# Patient Record
Sex: MALE | Race: BLACK OR AFRICAN AMERICAN | NOT HISPANIC OR LATINO | URBAN - METROPOLITAN AREA
[De-identification: names, ages, dates, MRNs, and addresses within clinical notes are randomized per-mention and may not be internally consistent; named-entity substitution may affect disease eponyms.]

---

## 2017-08-01 ENCOUNTER — EMERGENCY (EMERGENCY)
Facility: HOSPITAL | Age: 52
LOS: 1 days | Discharge: HOME | End: 2017-08-01
Attending: EMERGENCY MEDICINE | Admitting: EMERGENCY MEDICINE
Payer: COMMERCIAL

## 2017-08-01 VITALS
RESPIRATION RATE: 18 BRPM | SYSTOLIC BLOOD PRESSURE: 108 MMHG | HEART RATE: 90 BPM | OXYGEN SATURATION: 100 % | DIASTOLIC BLOOD PRESSURE: 65 MMHG | TEMPERATURE: 98 F

## 2017-08-01 LAB
ALBUMIN SERPL ELPH-MCNC: 4 G/DL — SIGNIFICANT CHANGE UP (ref 3.3–5)
ALP SERPL-CCNC: 65 U/L — SIGNIFICANT CHANGE UP (ref 40–120)
ALT FLD-CCNC: 20 U/L — SIGNIFICANT CHANGE UP (ref 4–41)
ANISOCYTOSIS BLD QL: SLIGHT — SIGNIFICANT CHANGE UP
AST SERPL-CCNC: 20 U/L — SIGNIFICANT CHANGE UP (ref 4–40)
BASE EXCESS BLDV CALC-SCNC: 4.7 MMOL/L — SIGNIFICANT CHANGE UP
BASOPHILS # BLD AUTO: 0 K/UL — SIGNIFICANT CHANGE UP (ref 0–0.2)
BASOPHILS NFR BLD AUTO: 0 % — SIGNIFICANT CHANGE UP (ref 0–2)
BASOPHILS NFR SPEC: 0 % — SIGNIFICANT CHANGE UP (ref 0–2)
BILIRUB SERPL-MCNC: 0.4 MG/DL — SIGNIFICANT CHANGE UP (ref 0.2–1.2)
BLOOD GAS VENOUS - CREATININE: 1.6 MG/DL — HIGH (ref 0.5–1.3)
BUN SERPL-MCNC: 18 MG/DL — SIGNIFICANT CHANGE UP (ref 7–23)
CALCIUM SERPL-MCNC: 9.4 MG/DL — SIGNIFICANT CHANGE UP (ref 8.4–10.5)
CHLORIDE BLDV-SCNC: 102 MMOL/L — SIGNIFICANT CHANGE UP (ref 96–108)
CHLORIDE SERPL-SCNC: 100 MMOL/L — SIGNIFICANT CHANGE UP (ref 98–107)
CK MB BLD-MCNC: 1.61 NG/ML — SIGNIFICANT CHANGE UP (ref 1–6.6)
CK MB BLD-MCNC: 1.76 NG/ML — SIGNIFICANT CHANGE UP (ref 1–6.6)
CK MB BLD-MCNC: SIGNIFICANT CHANGE UP (ref 0–2.5)
CK MB BLD-MCNC: SIGNIFICANT CHANGE UP (ref 0–2.5)
CK SERPL-CCNC: 48 U/L — SIGNIFICANT CHANGE UP (ref 30–200)
CK SERPL-CCNC: 61 U/L — SIGNIFICANT CHANGE UP (ref 30–200)
CO2 SERPL-SCNC: 26 MMOL/L — SIGNIFICANT CHANGE UP (ref 22–31)
CREAT SERPL-MCNC: 1.53 MG/DL — HIGH (ref 0.5–1.3)
ELLIPTOCYTES BLD QL SMEAR: SLIGHT — SIGNIFICANT CHANGE UP
EOSINOPHIL # BLD AUTO: 0.02 K/UL — SIGNIFICANT CHANGE UP (ref 0–0.5)
EOSINOPHIL NFR BLD AUTO: 0.4 % — SIGNIFICANT CHANGE UP (ref 0–6)
EOSINOPHIL NFR FLD: 0 % — SIGNIFICANT CHANGE UP (ref 0–6)
GAS PNL BLDV: 135 MMOL/L — LOW (ref 136–146)
GIANT PLATELETS BLD QL SMEAR: PRESENT — SIGNIFICANT CHANGE UP
GLUCOSE BLDV-MCNC: 135 — HIGH (ref 70–99)
GLUCOSE SERPL-MCNC: 133 MG/DL — HIGH (ref 70–99)
HBA1C BLD-MCNC: 7.3 % — HIGH (ref 4–5.6)
HCO3 BLDV-SCNC: 27 MMOL/L — SIGNIFICANT CHANGE UP (ref 20–27)
HCT VFR BLD CALC: 32.6 % — LOW (ref 39–50)
HCT VFR BLDV CALC: 33.9 % — LOW (ref 39–51)
HGB BLD-MCNC: 11 G/DL — LOW (ref 13–17)
HGB BLDV-MCNC: 11 G/DL — LOW (ref 13–17)
HYPOCHROMIA BLD QL: SLIGHT — SIGNIFICANT CHANGE UP
IMM GRANULOCYTES # BLD AUTO: 0.02 # — SIGNIFICANT CHANGE UP
IMM GRANULOCYTES NFR BLD AUTO: 0.4 % — SIGNIFICANT CHANGE UP (ref 0–1.5)
LACTATE BLDV-MCNC: 0.9 MMOL/L — SIGNIFICANT CHANGE UP (ref 0.5–2)
LYMPHOCYTES # BLD AUTO: 0.4 K/UL — LOW (ref 1–3.3)
LYMPHOCYTES # BLD AUTO: 8.9 % — LOW (ref 13–44)
LYMPHOCYTES NFR SPEC AUTO: 4.4 % — LOW (ref 13–44)
MACROCYTES BLD QL: SIGNIFICANT CHANGE UP
MCHC RBC-ENTMCNC: 30.6 PG — SIGNIFICANT CHANGE UP (ref 27–34)
MCHC RBC-ENTMCNC: 33.7 % — SIGNIFICANT CHANGE UP (ref 32–36)
MCV RBC AUTO: 90.8 FL — SIGNIFICANT CHANGE UP (ref 80–100)
MICROCYTES BLD QL: SLIGHT — SIGNIFICANT CHANGE UP
MONOCYTES # BLD AUTO: 0.46 K/UL — SIGNIFICANT CHANGE UP (ref 0–0.9)
MONOCYTES NFR BLD AUTO: 10.2 % — SIGNIFICANT CHANGE UP (ref 2–14)
MONOCYTES NFR BLD: 5.3 % — SIGNIFICANT CHANGE UP (ref 2–9)
NEUTROPHIL AB SER-ACNC: 88.6 % — HIGH (ref 43–77)
NEUTROPHILS # BLD AUTO: 3.6 K/UL — SIGNIFICANT CHANGE UP (ref 1.8–7.4)
NEUTROPHILS NFR BLD AUTO: 80.1 % — HIGH (ref 43–77)
NRBC # FLD: 0 — SIGNIFICANT CHANGE UP
OVALOCYTES BLD QL SMEAR: SLIGHT — SIGNIFICANT CHANGE UP
PCO2 BLDV: 50 MMHG — SIGNIFICANT CHANGE UP (ref 41–51)
PH BLDV: 7.39 PH — SIGNIFICANT CHANGE UP (ref 7.32–7.43)
PLATELET # BLD AUTO: 142 K/UL — LOW (ref 150–400)
PLATELET COUNT - ESTIMATE: SIGNIFICANT CHANGE UP
PMV BLD: 9.9 FL — SIGNIFICANT CHANGE UP (ref 7–13)
PO2 BLDV: < 24 MMHG — LOW (ref 35–40)
POIKILOCYTOSIS BLD QL AUTO: SLIGHT — SIGNIFICANT CHANGE UP
POLYCHROMASIA BLD QL SMEAR: SLIGHT — SIGNIFICANT CHANGE UP
POTASSIUM BLDV-SCNC: 4.9 MMOL/L — HIGH (ref 3.4–4.5)
POTASSIUM SERPL-MCNC: 5 MMOL/L — SIGNIFICANT CHANGE UP (ref 3.5–5.3)
POTASSIUM SERPL-SCNC: 5 MMOL/L — SIGNIFICANT CHANGE UP (ref 3.5–5.3)
PROT SERPL-MCNC: 7.2 G/DL — SIGNIFICANT CHANGE UP (ref 6–8.3)
RBC # BLD: 3.59 M/UL — LOW (ref 4.2–5.8)
RBC # FLD: 15.2 % — HIGH (ref 10.3–14.5)
SAO2 % BLDV: 30.2 % — LOW (ref 60–85)
SCHISTOCYTES BLD QL AUTO: SLIGHT — SIGNIFICANT CHANGE UP
SODIUM SERPL-SCNC: 140 MMOL/L — SIGNIFICANT CHANGE UP (ref 135–145)
TROPONIN T SERPL-MCNC: < 0.06 NG/ML — SIGNIFICANT CHANGE UP (ref 0–0.06)
TROPONIN T SERPL-MCNC: < 0.06 NG/ML — SIGNIFICANT CHANGE UP (ref 0–0.06)
VARIANT LYMPHS # BLD: 1.7 % — SIGNIFICANT CHANGE UP
WBC # BLD: 4.5 K/UL — SIGNIFICANT CHANGE UP (ref 3.8–10.5)
WBC # FLD AUTO: 4.5 K/UL — SIGNIFICANT CHANGE UP (ref 3.8–10.5)

## 2017-08-01 PROCEDURE — 72100 X-RAY EXAM L-S SPINE 2/3 VWS: CPT | Mod: 26

## 2017-08-01 PROCEDURE — 93010 ELECTROCARDIOGRAM REPORT: CPT | Mod: 59

## 2017-08-01 PROCEDURE — 99220: CPT | Mod: 25

## 2017-08-01 PROCEDURE — 71020: CPT | Mod: 26

## 2017-08-01 RX ORDER — LISINOPRIL 2.5 MG/1
20 TABLET ORAL DAILY
Qty: 0 | Refills: 0 | Status: DISCONTINUED | OUTPATIENT
Start: 2017-08-01 | End: 2017-08-05

## 2017-08-01 RX ORDER — CARVEDILOL PHOSPHATE 80 MG/1
25 CAPSULE, EXTENDED RELEASE ORAL EVERY 12 HOURS
Qty: 0 | Refills: 0 | Status: DISCONTINUED | OUTPATIENT
Start: 2017-08-01 | End: 2017-08-05

## 2017-08-01 RX ORDER — ACYCLOVIR SODIUM 500 MG
400 VIAL (EA) INTRAVENOUS DAILY
Qty: 0 | Refills: 0 | Status: DISCONTINUED | OUTPATIENT
Start: 2017-08-02 | End: 2017-08-05

## 2017-08-01 RX ORDER — ASPIRIN/CALCIUM CARB/MAGNESIUM 324 MG
81 TABLET ORAL DAILY
Qty: 0 | Refills: 0 | Status: DISCONTINUED | OUTPATIENT
Start: 2017-08-02 | End: 2017-08-05

## 2017-08-01 NOTE — ED CDU PROVIDER NOTE - INDICATION FOR OBSERVATION
Cardiac telemetry monitoring, CE #2, echocardiogram, observation and reassessment./Other Cardiac telemetry monitoring, CE #2 and #3, echocardiogram, observation and reassessment./Other

## 2017-08-01 NOTE — ED ADULT NURSE NOTE - CHIEF COMPLAINT QUOTE
hx of mult myeloma currently on DARZALEX treatment at [facility]. Patient reports back pain x 1 week and dizziness that began today. pt reports blurry vision and near syncope. Patient reports this near syncopal episode has passed at this time. 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

## 2017-08-01 NOTE — ED CDU PROVIDER NOTE - FAMILY HISTORY
No pertinent family history in first degree relatives Mother  Still living? Unknown  Family history of hypertension, Age at diagnosis: Age Unknown     Sibling  Still living? Unknown  Family history of hypertension, Age at diagnosis: Age Unknown     Father  Still living? Unknown  Family history of diabetes mellitus, Age at diagnosis: Age Unknown     Sibling  Still living? Unknown  Family history of diabetes mellitus, Age at diagnosis: Age Unknown

## 2017-08-01 NOTE — ED CDU PROVIDER NOTE - ATTENDING CONTRIBUTION TO CARE
Pt was seen and evaluated by me. Pt has a history of multiple myeloma with chronic back pain that presented to ED for episode of near syncope. Pt denies any associated headache, nausea, vomiting, SOB, chest pain, or abd pain. Pt did have a previous episode of decreased ejection fraction to 10% after initial chemo treatment but that has since resolved with last echo last year at 42%. Lungs CTA b/l. RRR. Abd soft, non-tender. 55/ b/l UE and LE. + distal pulses. Sent to OBS for CE, monitoring, and echo.

## 2017-08-01 NOTE — ED ADULT TRIAGE NOTE - CHIEF COMPLAINT QUOTE
hx of mult myeloma currently on DARZALEX treatment at [facility]. Patient reports back pain x 1 week and dizziness that began today. pt reports blurry vision and near syncope. Patient reports this near syncopal episode has passed at this time. 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

## 2017-08-01 NOTE — ED PROVIDER NOTE - OBJECTIVE STATEMENT
51yom w/ multiple myeloma, hx of HF w/ EF10% in the setting of chemotherapy, now resolved p/w 1 week of right lower back pain, and near syncopal episode today. 51yom w/ multiple myeloma, hx of HF w/ EF10% in the setting of chemotherapy, now resolved p/w 1 week of right lower back pain, and near syncopal episode today. Pt was at work at the nursing home, had a sudden onset of sweating, blurry vision, and feeling like he was going to pass out. Had his BP checked there and per pt it was 68 and his FS was in the 200s. Symptoms gradually resolved, currently asymptomatic. Back pain has been waxing and waning in severity for the past week, but more persistent for the past 2 days. Dull, aching, tight pain in the right lower back. Non-radiating. No trauma. No paresthesias, weakness, saddle anesthesia, bowel/bladder dysfunction. Follows w/ [facility] oncology. Had surveillance for mets and echocardiogram a month ago or so prior to restarting chemotherapy. Currently on darzalex chemotherapy.

## 2017-08-01 NOTE — ED ADULT NURSE NOTE - OBJECTIVE STATEMENT
received pt A&Ox3 in no apparent distress at this time. #20g IVL to LAC, bloods drawn and sent to the lab. no s/s of infiltration noted at this time. family and MD at bedside. vss. cardiac monitor in place. pt sts felt like he was going to pass out but sts feels fine now and has no complaints at this time. dispo pending

## 2017-08-01 NOTE — ED CDU PROVIDER NOTE - CONSTITUTIONAL, MLM
normal... Well appearing, thin / well nourished, awake, alert, oriented to person, place, time/situation and in no apparent distress.  Pt. appears objectively comfortable, pt is verbalizing in full, clear, effortless sentences.

## 2017-08-01 NOTE — ED CDU PROVIDER NOTE - PLAN OF CARE
Follow up with your Doctor in 1-2 days.  Follow up with cardiology in 1-2 days.  Return to the ER for any persistent/worsening or new symptoms, chest pain, shortness of breath, palpitations, dizziness or any concerning symptoms.

## 2017-08-01 NOTE — ED PROVIDER NOTE - MEDICAL DECISION MAKING DETAILS
51yom w/ multiple myeloma, prior hx of low EF p/w near syncope. Currently asymptomatic. ECG w/ evidence of STEMI, critical stenosis, Brugada or long QT. Will check CXR, basic labs, enzymes. Non-focal neurologic exam, no indication for head imaging at this time. Back pain w/o red flag or radicular symptoms, likely musculoskeletal. Will get lumbar plain film to r/o lytic lesion or compression fracture.

## 2017-08-01 NOTE — ED PROVIDER NOTE - ATTENDING CONTRIBUTION TO CARE
Dr. Walton: I have personally performed a face to face bedside history and physical examination of this patient. I have discussed the history, examination, review of systems, assessment and plan of management with the resident. I have reviewed the electronic medical record and amended it to reflect my history, review of systems, physical exam, assessment and plan.  51M h/o MM on chemo, CHF with EF 10% due to last chemotherapeutic agent, but last EF normal per pt per TTE 3 weeks ago, started new chemotherapeutic agents 2 weeks ago, p/w 1 episodes of lightheadedness, vision darkening and near syncope earlier today. Denies cp or sob. C/o R lower back pain worse with ROM. No dysuria or hematuria. Pain is non radiating.  On exam pt appears well, nad, rrr, ctab, abdo soft/nt/nd, no midline spinal ttp, no cvat, no pedal edema or calf ttp.  Plan - LS spine xray r/o mets, labs, CDU for tele monitoring and echo, no signs of acute chf exacerbation

## 2017-08-01 NOTE — ED CDU PROVIDER NOTE - MEDICAL DECISION MAKING DETAILS
Cardiac telemetry monitoring, CE #2, echocardiogram, observation and reassessment. Cardiac telemetry monitoring, CE #2 and #3, echocardiogram, observation and reassessment.

## 2017-08-01 NOTE — ED CDU PROVIDER NOTE - OBJECTIVE STATEMENT
51yom w/ multiple myeloma, hx of HF w/ EF10% in the setting of chemotherapy, now resolved p/w 1 week of right lower back pain, and near syncopal episode today. Pt was at work at the nursing home, had a sudden onset of sweating, blurry vision, and feeling like he was going to pass out. Had his BP checked there and per pt it was 68 and his FS was in the 200s. Symptoms gradually resolved, currently asymptomatic. Back pain has been waxing and waning in severity for the past week, but more persistent for the past 2 days. Dull, aching, tight pain in the right lower back. Non-radiating. No trauma. No paresthesias, weakness, saddle anesthesia, bowel/bladder dysfunction. Follows w/ [facility] oncology. Had surveillance for mets and echocardiogram a month ago or so prior to restarting chemotherapy. Currently on darzalex chemotherapy.    CDU BRISSA Rodas Note-----  ED Provider Note reviewed per above; pt is a 50 yo male with PMH of multiple myeloma (dx 2 years ago) and heart failure (dx one year ago with pre-surgical testing eval echo showing EF of 15%), who presented to the ED as noted above.  Pt. was evaluated in the ED and sent to CDU for cardiac telemetry monitoring, CE #2, echocardiogram, observation and reassessment.  On CDU arrival, pt    ; CDU plan discussed with pt who verbalizes agreement with plan. 51yom w/ multiple myeloma, hx of HF w/ EF10% in the setting of chemotherapy, now resolved p/w 1 week of right lower back pain, and near syncopal episode today. Pt was at work at the nursing home, had a sudden onset of sweating, blurry vision, and feeling like he was going to pass out. Had his BP checked there and per pt it was 68 and his FS was in the 200s. Symptoms gradually resolved, currently asymptomatic. Back pain has been waxing and waning in severity for the past week, but more persistent for the past 2 days. Dull, aching, tight pain in the right lower back. Non-radiating. No trauma. No paresthesias, weakness, saddle anesthesia, bowel/bladder dysfunction. Follows w/ [facility] oncology. Had surveillance for mets and echocardiogram a month ago or so prior to restarting chemotherapy. Currently on darzalex chemotherapy.    CDU BRISSA Rodas Note-----  ED Provider Note reviewed per above; pt is a 52 yo male with PMH of multiple myeloma (dx 2 years ago), heart failure (dx one year ago with pre-surgical testing eval echo showing EF of 15%), and former smoker status (1 ppd x 11 years; Quit ~age 26), who presented to the ED as noted above.  Pt. was evaluated in the ED and sent to CDU for cardiac telemetry monitoring, CE #2, echocardiogram, observation and reassessment.  On CDU arrival, pt has no active c/o; states back pain (atraumatic generalized nonradiating lower back pain) is felt only with ROM.  Pt. was dx with multiple myeloma 2 years ago; was initially on chemo agent Valkin, then was undergoing pre-surgical testing for stem cell transplant when PST echo found heart failure with EF 15%.  Pt. did not undergo procedure; recently started new CTX agent (daratumumab) approx. 4 weeks ago.  1 day ago, pt developed new, atraumatic, generalized, non-radiating lower back pain.  No hx/o fevers, abdominal pain, other back pain, urinary frequency / dysuria / hematuria, focal weakness.  Last night, pt states he felt "sweaty", had wife go to pharmacy and get Tylenol for pain.  Today, pt developed diaphoresis, OU blurred vision, lightheadedness; states "felt like I was passing out".  Pt. denies chest pain / SOB.  No other c/o; no active symptoms at this time except for back pain, felt only on ROM.  CDU plan discussed with pt who verbalizes agreement with plan. 51yom w/ multiple myeloma, hx of HF w/ EF10% in the setting of chemotherapy, now resolved p/w 1 week of right lower back pain, and near syncopal episode today. Pt was at work at the nursing home, had a sudden onset of sweating, blurry vision, and feeling like he was going to pass out. Had his BP checked there and per pt it was 68 and his FS was in the 200s. Symptoms gradually resolved, currently asymptomatic. Back pain has been waxing and waning in severity for the past week, but more persistent for the past 2 days. Dull, aching, tight pain in the right lower back. Non-radiating. No trauma. No paresthesias, weakness, saddle anesthesia, bowel/bladder dysfunction. Follows w/ [facility] oncology. Had surveillance for mets and echocardiogram a month ago or so prior to restarting chemotherapy. Currently on darzalex chemotherapy.    CDU BRISSA Rodas Note-----  ED Provider Note reviewed per above; pt is a 52 yo male with PMH of multiple myeloma (dx 2 years ago), heart failure (dx one year ago with pre-surgical testing eval echo showing EF of 15%), and former smoker status (1 ppd x 11 years; Quit ~age 26), who presented to the ED as noted above.  Pt. was evaluated in the ED and sent to CDU for cardiac telemetry monitoring, CE #2 and #3, echocardiogram, observation and reassessment.  On CDU arrival, pt has no active c/o; states back pain (atraumatic generalized nonradiating lower back pain) is felt only with ROM.  Pt. was dx with multiple myeloma 2 years ago; was initially on chemo agent Valkin, then was undergoing pre-surgical testing for stem cell transplant when PST echo found heart failure with EF 15%.  Pt. did not undergo procedure; recently started new CTX agent (daratumumab) approx. 4 weeks ago.  1 day ago, pt developed new, atraumatic, generalized, non-radiating lower back pain.  No hx/o fevers, abdominal pain, other back pain, urinary frequency / dysuria / hematuria, focal weakness.  Last night, pt states he felt "sweaty", had wife go to pharmacy and get Tylenol for pain.  Today, pt developed diaphoresis, OU blurred vision, lightheadedness; states "felt like I was passing out".  Pt. denies chest pain / SOB.  No other c/o; no active symptoms at this time except for back pain, felt only on ROM.  CDU plan discussed with pt who verbalizes agreement with plan.

## 2017-08-01 NOTE — ED CDU PROVIDER NOTE - PROGRESS NOTE DETAILS
CDU BRISSA Rodas Addendum----  Pt. resting comfortably in interim; no issues to date; will continue to monitor / reassess.  Pt. will be signed out to night CDU BRISSA Jenkins at 1900 hrs. DIPIKA Rodas Addendum----  Pt. resting comfortably in interim; no issues to date; will continue to monitor / reassess.  Pt. will be signed out to night DIPIKA Saini at 1900 hrs. CDU MD Vargas Note: Pt states feeling better. Denies any current dizziness or pain. Lungs CTA b/l. RRR. 5/5 b/l LE. CE neg X 3. Awaiting Echo. Will continue to monitor. BRISSA Chen: pt resting comfortably in bed NAD pt denies chest pain/dizziness at present time.  CE x 3 negative.  Pt on cardiac monitor awaiting echo will continue to monitor. CDU MD Vargas D/C Note: Pt states feeling better. Denies any dizziness or chest pain. Lungs CTA b/l. RRR. Ambulating without any difficulty. CE neg X 3. Echo shows no acute abnormalities. Will D/C home with Cardio f/u and PMD f/u. CDU MD Vargas PA Attestation for 8/2/17: I have evaluated the patient and agree with the documentation and assessment as made by the PA. We have discussed plan of care and work up for the patient. CDU MD Vargas D/C Note: Pt states feeling better. Denies any dizziness or chest pain. Lungs CTA b/l. RRR. Ambulating without any difficulty. CE neg X 3. Echo shows no acute abnormalities. A1C 7.3 discussed with pt who wants to f/u with PMD before starting Metformin. Will D/C home with Cardio f/u and PMD f/u.

## 2017-08-01 NOTE — ED CDU PROVIDER NOTE - PMH
Multiple myeloma Former smoker  (1 ppd x 11 years; Quit ~age 26)  Heart failure  (EF 15% at diagnosis in 2016)  Multiple myeloma

## 2017-08-01 NOTE — ED PROVIDER NOTE - PROGRESS NOTE DETAILS
Pt wants to dc home to get his chemo tomorrow. PMD paged for urgent outpt cards f/u for TTE Results d/w pt, cardiologist paged, will place in CDU for echo prior to getting more chemo to r/o cardiotoxic effects of chemotherapeutic agent Dee: Message also left w/ pt's oncologist Pankaj Phillip [phone]

## 2017-08-02 VITALS
OXYGEN SATURATION: 97 % | SYSTOLIC BLOOD PRESSURE: 104 MMHG | DIASTOLIC BLOOD PRESSURE: 56 MMHG | RESPIRATION RATE: 16 BRPM | TEMPERATURE: 99 F | HEART RATE: 90 BPM

## 2017-08-02 LAB
CK MB BLD-MCNC: 1.28 NG/ML — SIGNIFICANT CHANGE UP (ref 1–6.6)
CK MB BLD-MCNC: SIGNIFICANT CHANGE UP (ref 0–2.5)
CK SERPL-CCNC: 39 U/L — SIGNIFICANT CHANGE UP (ref 30–200)
TROPONIN T SERPL-MCNC: < 0.06 NG/ML — SIGNIFICANT CHANGE UP (ref 0–0.06)

## 2017-08-02 PROCEDURE — 99217: CPT

## 2017-08-02 PROCEDURE — 93306 TTE W/DOPPLER COMPLETE: CPT | Mod: 26

## 2017-08-02 RX ADMIN — Medication 81 MILLIGRAM(S): at 11:31

## 2017-08-02 RX ADMIN — CARVEDILOL PHOSPHATE 25 MILLIGRAM(S): 80 CAPSULE, EXTENDED RELEASE ORAL at 11:31

## 2017-08-02 RX ADMIN — Medication 400 MILLIGRAM(S): at 11:31

## 2017-08-02 RX ADMIN — LISINOPRIL 20 MILLIGRAM(S): 2.5 TABLET ORAL at 06:30

## 2018-09-02 ENCOUNTER — TRANSCRIPTION ENCOUNTER (OUTPATIENT)
Age: 53
End: 2018-09-02

## 2018-09-02 ENCOUNTER — RESULT REVIEW (OUTPATIENT)
Age: 53
End: 2018-09-02

## 2018-09-02 ENCOUNTER — INPATIENT (INPATIENT)
Facility: HOSPITAL | Age: 53
LOS: 4 days | Discharge: HOME HEALTH | End: 2018-09-07
Attending: INTERNAL MEDICINE | Admitting: INTERNAL MEDICINE
Payer: COMMERCIAL

## 2018-09-02 VITALS
OXYGEN SATURATION: 99 % | DIASTOLIC BLOOD PRESSURE: 72 MMHG | TEMPERATURE: 99 F | SYSTOLIC BLOOD PRESSURE: 118 MMHG | RESPIRATION RATE: 16 BRPM | HEART RATE: 114 BPM

## 2018-09-02 DIAGNOSIS — K57.20 DIVERTICULITIS OF LARGE INTESTINE WITH PERFORATION AND ABSCESS WITHOUT BLEEDING: ICD-10-CM

## 2018-09-02 LAB
ALBUMIN SERPL ELPH-MCNC: 3.4 G/DL — SIGNIFICANT CHANGE UP (ref 3.3–5)
ALP SERPL-CCNC: 70 U/L — SIGNIFICANT CHANGE UP (ref 40–120)
ALT FLD-CCNC: 10 U/L — SIGNIFICANT CHANGE UP (ref 4–41)
ANISOCYTOSIS BLD QL: SLIGHT — SIGNIFICANT CHANGE UP
APPEARANCE UR: CLEAR — SIGNIFICANT CHANGE UP
APTT BLD: 27.2 SEC — LOW (ref 27.5–37.4)
AST SERPL-CCNC: 9 U/L — SIGNIFICANT CHANGE UP (ref 4–40)
BACTERIA # UR AUTO: NEGATIVE — SIGNIFICANT CHANGE UP
BASE EXCESS BLDV CALC-SCNC: 1.5 MMOL/L — SIGNIFICANT CHANGE UP
BASOPHILS # BLD AUTO: 0 K/UL — SIGNIFICANT CHANGE UP (ref 0–0.2)
BASOPHILS NFR BLD AUTO: 0 % — SIGNIFICANT CHANGE UP (ref 0–2)
BASOPHILS NFR SPEC: 0 % — SIGNIFICANT CHANGE UP (ref 0–2)
BILIRUB SERPL-MCNC: 0.9 MG/DL — SIGNIFICANT CHANGE UP (ref 0.2–1.2)
BILIRUB UR-MCNC: NEGATIVE — SIGNIFICANT CHANGE UP
BLASTS # FLD: 0 % — SIGNIFICANT CHANGE UP (ref 0–0)
BLOOD GAS VENOUS - CREATININE: 1.19 MG/DL — SIGNIFICANT CHANGE UP (ref 0.5–1.3)
BLOOD UR QL VISUAL: NEGATIVE — SIGNIFICANT CHANGE UP
BUN SERPL-MCNC: 11 MG/DL — SIGNIFICANT CHANGE UP (ref 7–23)
CALCIUM SERPL-MCNC: 8.7 MG/DL — SIGNIFICANT CHANGE UP (ref 8.4–10.5)
CHLORIDE BLDV-SCNC: 105 MMOL/L — SIGNIFICANT CHANGE UP (ref 96–108)
CHLORIDE SERPL-SCNC: 102 MMOL/L — SIGNIFICANT CHANGE UP (ref 98–107)
CO2 SERPL-SCNC: 22 MMOL/L — SIGNIFICANT CHANGE UP (ref 22–31)
COLOR SPEC: YELLOW — SIGNIFICANT CHANGE UP
CREAT SERPL-MCNC: 1.15 MG/DL — SIGNIFICANT CHANGE UP (ref 0.5–1.3)
DACRYOCYTES BLD QL SMEAR: SLIGHT — SIGNIFICANT CHANGE UP
EOSINOPHIL # BLD AUTO: 0.02 K/UL — SIGNIFICANT CHANGE UP (ref 0–0.5)
EOSINOPHIL NFR BLD AUTO: 0.3 % — SIGNIFICANT CHANGE UP (ref 0–6)
EOSINOPHIL NFR FLD: 0 % — SIGNIFICANT CHANGE UP (ref 0–6)
GAS PNL BLDV: 136 MMOL/L — SIGNIFICANT CHANGE UP (ref 136–146)
GIANT PLATELETS BLD QL SMEAR: PRESENT — SIGNIFICANT CHANGE UP
GLUCOSE BLDV-MCNC: 208 — HIGH (ref 70–99)
GLUCOSE SERPL-MCNC: 208 MG/DL — HIGH (ref 70–99)
GLUCOSE UR-MCNC: SIGNIFICANT CHANGE UP
HCO3 BLDV-SCNC: 25 MMOL/L — SIGNIFICANT CHANGE UP (ref 20–27)
HCT VFR BLD CALC: 31.7 % — LOW (ref 39–50)
HCT VFR BLDV CALC: 34.1 % — LOW (ref 39–51)
HGB BLD-MCNC: 10.6 G/DL — LOW (ref 13–17)
HGB BLDV-MCNC: 11.1 G/DL — LOW (ref 13–17)
HYALINE CASTS # UR AUTO: NEGATIVE — SIGNIFICANT CHANGE UP
IMM GRANULOCYTES # BLD AUTO: 0.08 # — SIGNIFICANT CHANGE UP
IMM GRANULOCYTES NFR BLD AUTO: 1.2 % — SIGNIFICANT CHANGE UP (ref 0–1.5)
INR BLD: 1.21 — HIGH (ref 0.88–1.17)
KETONES UR-MCNC: NEGATIVE — SIGNIFICANT CHANGE UP
LACTATE BLDV-MCNC: 1.7 MMOL/L — SIGNIFICANT CHANGE UP (ref 0.5–2)
LEUKOCYTE ESTERASE UR-ACNC: NEGATIVE — SIGNIFICANT CHANGE UP
LIDOCAIN IGE QN: 13.2 U/L — SIGNIFICANT CHANGE UP (ref 7–60)
LYMPHOCYTES # BLD AUTO: 1.19 K/UL — SIGNIFICANT CHANGE UP (ref 1–3.3)
LYMPHOCYTES # BLD AUTO: 18.3 % — SIGNIFICANT CHANGE UP (ref 13–44)
LYMPHOCYTES NFR SPEC AUTO: 22.1 % — SIGNIFICANT CHANGE UP (ref 13–44)
MACROCYTES BLD QL: SLIGHT — SIGNIFICANT CHANGE UP
MCHC RBC-ENTMCNC: 31.3 PG — SIGNIFICANT CHANGE UP (ref 27–34)
MCHC RBC-ENTMCNC: 33.4 % — SIGNIFICANT CHANGE UP (ref 32–36)
MCV RBC AUTO: 93.5 FL — SIGNIFICANT CHANGE UP (ref 80–100)
METAMYELOCYTES # FLD: 0 % — SIGNIFICANT CHANGE UP (ref 0–1)
MONOCYTES # BLD AUTO: 1.38 K/UL — HIGH (ref 0–0.9)
MONOCYTES NFR BLD AUTO: 21.2 % — HIGH (ref 2–14)
MONOCYTES NFR BLD: 13.3 % — HIGH (ref 2–9)
MYELOCYTES NFR BLD: 0.9 % — HIGH (ref 0–0)
NEUTROPHIL AB SER-ACNC: 60.2 % — SIGNIFICANT CHANGE UP (ref 43–77)
NEUTROPHILS # BLD AUTO: 3.85 K/UL — SIGNIFICANT CHANGE UP (ref 1.8–7.4)
NEUTROPHILS NFR BLD AUTO: 59 % — SIGNIFICANT CHANGE UP (ref 43–77)
NEUTS BAND # BLD: 0 % — SIGNIFICANT CHANGE UP (ref 0–6)
NITRITE UR-MCNC: NEGATIVE — SIGNIFICANT CHANGE UP
NRBC # FLD: 0 — SIGNIFICANT CHANGE UP
OTHER - HEMATOLOGY %: 0 — SIGNIFICANT CHANGE UP
OVALOCYTES BLD QL SMEAR: SLIGHT — SIGNIFICANT CHANGE UP
PCO2 BLDV: 40 MMHG — LOW (ref 41–51)
PH BLDV: 7.43 PH — SIGNIFICANT CHANGE UP (ref 7.32–7.43)
PH UR: 6.5 — SIGNIFICANT CHANGE UP (ref 5–8)
PLATELET # BLD AUTO: 174 K/UL — SIGNIFICANT CHANGE UP (ref 150–400)
PLATELET COUNT - ESTIMATE: NORMAL — SIGNIFICANT CHANGE UP
PMV BLD: 9.9 FL — SIGNIFICANT CHANGE UP (ref 7–13)
PO2 BLDV: < 24 MMHG — LOW (ref 35–40)
POIKILOCYTOSIS BLD QL AUTO: SLIGHT — SIGNIFICANT CHANGE UP
POLYCHROMASIA BLD QL SMEAR: SLIGHT — SIGNIFICANT CHANGE UP
POTASSIUM BLDV-SCNC: 3.8 MMOL/L — SIGNIFICANT CHANGE UP (ref 3.4–4.5)
POTASSIUM SERPL-MCNC: 3.7 MMOL/L — SIGNIFICANT CHANGE UP (ref 3.5–5.3)
POTASSIUM SERPL-SCNC: 3.7 MMOL/L — SIGNIFICANT CHANGE UP (ref 3.5–5.3)
PROMYELOCYTES # FLD: 0 % — SIGNIFICANT CHANGE UP (ref 0–0)
PROT SERPL-MCNC: 6.6 G/DL — SIGNIFICANT CHANGE UP (ref 6–8.3)
PROT UR-MCNC: HIGH
PROTHROM AB SERPL-ACNC: 13.5 SEC — HIGH (ref 9.8–13.1)
RBC # BLD: 3.39 M/UL — LOW (ref 4.2–5.8)
RBC # FLD: 15.7 % — HIGH (ref 10.3–14.5)
RBC CASTS # UR COMP ASSIST: SIGNIFICANT CHANGE UP (ref 0–?)
SAO2 % BLDV: 28.1 % — LOW (ref 60–85)
SODIUM SERPL-SCNC: 138 MMOL/L — SIGNIFICANT CHANGE UP (ref 135–145)
SP GR SPEC: > 1.05 — HIGH (ref 1–1.04)
SQUAMOUS # UR AUTO: SIGNIFICANT CHANGE UP
TARGETS BLD QL SMEAR: SLIGHT — SIGNIFICANT CHANGE UP
UROBILINOGEN FLD QL: NORMAL — SIGNIFICANT CHANGE UP
VARIANT LYMPHS # BLD: 3.5 % — SIGNIFICANT CHANGE UP
WBC # BLD: 6.52 K/UL — SIGNIFICANT CHANGE UP (ref 3.8–10.5)
WBC # FLD AUTO: 6.52 K/UL — SIGNIFICANT CHANGE UP (ref 3.8–10.5)
WBC UR QL: SIGNIFICANT CHANGE UP (ref 0–?)

## 2018-09-02 PROCEDURE — 74177 CT ABD & PELVIS W/CONTRAST: CPT | Mod: 26

## 2018-09-02 PROCEDURE — 99223 1ST HOSP IP/OBS HIGH 75: CPT | Mod: GC,57,25

## 2018-09-02 PROCEDURE — 71045 X-RAY EXAM CHEST 1 VIEW: CPT | Mod: 26

## 2018-09-02 RX ORDER — MORPHINE SULFATE 50 MG/1
4 CAPSULE, EXTENDED RELEASE ORAL ONCE
Qty: 0 | Refills: 0 | Status: DISCONTINUED | OUTPATIENT
Start: 2018-09-02 | End: 2018-09-02

## 2018-09-02 RX ORDER — ACETAMINOPHEN 500 MG
650 TABLET ORAL ONCE
Qty: 0 | Refills: 0 | Status: COMPLETED | OUTPATIENT
Start: 2018-09-02 | End: 2018-09-02

## 2018-09-02 RX ORDER — SODIUM CHLORIDE 9 MG/ML
1000 INJECTION INTRAMUSCULAR; INTRAVENOUS; SUBCUTANEOUS ONCE
Qty: 0 | Refills: 0 | Status: COMPLETED | OUTPATIENT
Start: 2018-09-02 | End: 2018-09-02

## 2018-09-02 RX ORDER — SODIUM CHLORIDE 9 MG/ML
1000 INJECTION, SOLUTION INTRAVENOUS
Qty: 0 | Refills: 0 | Status: DISCONTINUED | OUTPATIENT
Start: 2018-09-02 | End: 2018-09-03

## 2018-09-02 RX ORDER — PIPERACILLIN AND TAZOBACTAM 4; .5 G/20ML; G/20ML
3.38 INJECTION, POWDER, LYOPHILIZED, FOR SOLUTION INTRAVENOUS ONCE
Qty: 0 | Refills: 0 | Status: COMPLETED | OUTPATIENT
Start: 2018-09-02 | End: 2018-09-02

## 2018-09-02 RX ADMIN — MORPHINE SULFATE 4 MILLIGRAM(S): 50 CAPSULE, EXTENDED RELEASE ORAL at 19:29

## 2018-09-02 RX ADMIN — MORPHINE SULFATE 4 MILLIGRAM(S): 50 CAPSULE, EXTENDED RELEASE ORAL at 18:53

## 2018-09-02 RX ADMIN — SODIUM CHLORIDE 1000 MILLILITER(S): 9 INJECTION INTRAMUSCULAR; INTRAVENOUS; SUBCUTANEOUS at 18:53

## 2018-09-02 RX ADMIN — SODIUM CHLORIDE 1000 MILLILITER(S): 9 INJECTION INTRAMUSCULAR; INTRAVENOUS; SUBCUTANEOUS at 19:29

## 2018-09-02 RX ADMIN — Medication 650 MILLIGRAM(S): at 21:52

## 2018-09-02 RX ADMIN — PIPERACILLIN AND TAZOBACTAM 3.38 GRAM(S): 4; .5 INJECTION, POWDER, LYOPHILIZED, FOR SOLUTION INTRAVENOUS at 21:52

## 2018-09-02 RX ADMIN — PIPERACILLIN AND TAZOBACTAM 200 GRAM(S): 4; .5 INJECTION, POWDER, LYOPHILIZED, FOR SOLUTION INTRAVENOUS at 19:29

## 2018-09-02 NOTE — ED ADULT NURSE NOTE - NSIMPLEMENTINTERV_GEN_ALL_ED
Implemented All Universal Safety Interventions:  [place] to call system. Call bell, personal items and telephone within reach. Instruct patient to call for assistance. Room bathroom lighting operational. Non-slip footwear when patient is off stretcher. Physically safe environment: no spills, clutter or unnecessary equipment. Stretcher in lowest position, wheels locked, appropriate side rails in place.

## 2018-09-02 NOTE — H&P ADULT - NSHPLABSRESULTS_GEN_ALL_CORE
CBC (09-02 @ 18:51)                          10.6<L>                   6.52    )--------------(  174        59.0  % Neuts, 18.3  % Lymphs, ANC: 3.85                            31.7<L>    BMP (09-02 @ 18:51)       138     |  102     |  11    			Ca++ --      Ca 8.7          ---------------------------------( 208<H>		Mg --           3.7     |  22      |  1.15  			Ph --        LFTs (09-02 @ 18:51)      TPro 6.6 / Alb 3.4 / TBili 0.9 / DBili -- / AST 9 / ALT 10 / AlkPhos 70    Coags (09-02 @ 22:44)  aPTT 27.2<L> / INR 1.21<H> / PT 13.5<H>        VBG (09-02 @ 18:52)     7.43 / 40<L> / < 24<L> / 25 / 1.5 / 28.1<L>%      Lactate: 1.7    Urinalysis (09-02 @ 23:00):     Color: YELLOW / Appearance: CLEAR / SG: > 1.050<H> / pH: 6.5 / Gluc: LARGE / Ketones: NEGATIVE / Bili: NEGATIVE / Urobili: NORMAL / Protein :MODERATE<H> / Nitrites: NEGATIVE / Leuk.Est: NEGATIVE / RBC: 3-5 / WBC: 0-2 / Sq Epi: OCC / Non Sq Epi:  / Bacteria NEGATIVE       CT: (prelim)  Free air, free fluid in both paracolic gutters, diverticulosis throughout bowel with diverticulitis in sigmoid with associated free fluid and air adjacent

## 2018-09-02 NOTE — ED PROVIDER NOTE - PMH
Former smoker  (1 ppd x 11 years; Quit ~age 26)  Heart failure  (EF 15% at diagnosis in 2016)  Multiple myeloma

## 2018-09-02 NOTE — H&P ADULT - FAMILY HISTORY
Father  Still living? Unknown  Family history of hypertension, Age at diagnosis: Age Unknown  Family history of diabetes mellitus, Age at diagnosis: Age Unknown     Mother  Still living? Unknown  Family history of hypertension, Age at diagnosis: Age Unknown  Family history of diabetes mellitus, Age at diagnosis: Age Unknown

## 2018-09-02 NOTE — ED ADULT NURSE NOTE - OBJECTIVE STATEMENT
Pt received AA&Ox3. Pt with a PMH of Multiple Myeloma s/p stem cell transplant 1 year ago presents to ED with 2 days of generalized abdominal pain, constipation and decreased po intake. Pt appears uncomfortable. Pt denies all other symptoms. Pt found to be febrile rectally. Septic workup started. 20g IV placed in RT AC, labs sent will monitor.

## 2018-09-02 NOTE — ED PROVIDER NOTE - OBJECTIVE STATEMENT
51yo male with MM, currently on oral chemo and decadron, p/w 3 days of abdominal pain, constipation and chills. + flatus, no h/o abdominal surgeries, no vomiting, no dysuria. + decreased PO

## 2018-09-02 NOTE — H&P ADULT - NSHPPHYSICALEXAM_GEN_ALL_CORE
NAD, A + O x 4  nonlabored breathing  RRR  hard, involuntary guarding, diffusely tender, rebound tenderness, tender to palpation in all four quadrants

## 2018-09-02 NOTE — H&P ADULT - HISTORY OF PRESENT ILLNESS
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 He has never had pain like this before. Denies nausea, vomiting, SOB, CP, dysuria, lightheadedness. He has never had a colonoscopy. He is currently on decadron, Panobinostat, and Pomalidomide for MM.

## 2018-09-02 NOTE — ED PROVIDER NOTE - PROGRESS NOTE DETAILS
Norma Ham, resident MD: pt was signed out to me. xray shows no free air. will f/u with CT. pt describes that pain is currently manageable. Norma Ham, resident MD: CT abdomen reveals perforated diverticulitis with pneumoperitoneum. surgery was notified and they will see the pt. Norma Ham, resident MD: CT abdomen reveals perforated diverticulitis with pneumoperitoneum. discussed with pt. surgery was notified and they will see the pt. Norma Ham, resident MD: CT abdomen reveals perforated diverticulitis with pneumoperitoneum. discussed with pt. surgery was notified and they will see the pt. pt aware that he will most likely be admitted. Norma Ham, resident MD: surgery has seen pt and will admit.

## 2018-09-02 NOTE — ED ADULT NURSE REASSESSMENT NOTE - NS ED NURSE REASSESS COMMENT FT1
Patient direct to OR- UA& culture sent, EKG complete. Patient awake and alert, no allergies last intake this morning- fish and plaintains- no allergies. Patient states he is 6'2'' 200lbs. Belongings secured and given to wife.

## 2018-09-02 NOTE — H&P ADULT - ATTENDING COMMENTS
I saw and evaluated Mr. Hood at bedside while in the ER. He exhibited profound peritonitis with increasingly painful shake tenderness and tachycardia. Along with his CT scan, the diagnosis of an intra-abdominal uncontrolled infection was made and I discussed operative management with him. I discussed the need for a partial colectomy and colostomy and outlined risks and benefits. He was reluctant to receive a colostomy however he understood the need to divert the fecal stream initially and possibly reverse him in future. We discussed non-operative management with IV antibiotics however I did state due to his immunocompromised state that this may yield inferior results. He opted for surgical management and was brought to the OR for a sigmoidectomy and end colostomy.

## 2018-09-02 NOTE — ED ADULT TRIAGE NOTE - CHIEF COMPLAINT QUOTE
Pt c/o abd pain , poor appetite and  constipation since Friday.  Denies nausea.  Pt with hx of Multiple  Myeloma

## 2018-09-02 NOTE — ED PROVIDER NOTE - ATTENDING CONTRIBUTION TO CARE
AJM: Patient seen with resident and agree with above note. 53yo male with MM, currently on oral chemo and decadron, p/w 3 days of abdominal pain, constipation and chills. + flatus, no h/o abdominal surgeries, no vomiting, no dysuria. + decreased PO No trauma, urinary complaints. + decreased appetite. Never had this before. No fevers or chills. Pt noted to be tachycardic on arrival. + generalized ttp and distention on abd exam without bowel sounds. + rectal fever. will obtain sepsis workup, zosyn, ivf, ct a/p, pain control

## 2018-09-03 PROBLEM — Z87.891 PERSONAL HISTORY OF NICOTINE DEPENDENCE: Chronic | Status: ACTIVE | Noted: 2017-08-01

## 2018-09-03 LAB
BASOPHILS # BLD AUTO: 0.01 K/UL — SIGNIFICANT CHANGE UP (ref 0–0.2)
BASOPHILS NFR BLD AUTO: 0.3 % — SIGNIFICANT CHANGE UP (ref 0–2)
BLD GP AB SCN SERPL QL: NEGATIVE — SIGNIFICANT CHANGE UP
BUN SERPL-MCNC: 11 MG/DL — SIGNIFICANT CHANGE UP (ref 7–23)
CALCIUM SERPL-MCNC: 7.7 MG/DL — LOW (ref 8.4–10.5)
CHLORIDE SERPL-SCNC: 105 MMOL/L — SIGNIFICANT CHANGE UP (ref 98–107)
CO2 SERPL-SCNC: 21 MMOL/L — LOW (ref 22–31)
CREAT SERPL-MCNC: 1.03 MG/DL — SIGNIFICANT CHANGE UP (ref 0.5–1.3)
EOSINOPHIL # BLD AUTO: 0.02 K/UL — SIGNIFICANT CHANGE UP (ref 0–0.5)
EOSINOPHIL NFR BLD AUTO: 0.5 % — SIGNIFICANT CHANGE UP (ref 0–6)
GLUCOSE SERPL-MCNC: 204 MG/DL — HIGH (ref 70–99)
GRAM STN WND: SIGNIFICANT CHANGE UP
HCT VFR BLD CALC: 28.7 % — LOW (ref 39–50)
HGB BLD-MCNC: 9.9 G/DL — LOW (ref 13–17)
IMM GRANULOCYTES # BLD AUTO: 0.05 # — SIGNIFICANT CHANGE UP
IMM GRANULOCYTES NFR BLD AUTO: 1.4 % — SIGNIFICANT CHANGE UP (ref 0–1.5)
LYMPHOCYTES # BLD AUTO: 0.68 K/UL — LOW (ref 1–3.3)
LYMPHOCYTES # BLD AUTO: 18.5 % — SIGNIFICANT CHANGE UP (ref 13–44)
MCHC RBC-ENTMCNC: 32.2 PG — SIGNIFICANT CHANGE UP (ref 27–34)
MCHC RBC-ENTMCNC: 34.5 % — SIGNIFICANT CHANGE UP (ref 32–36)
MCV RBC AUTO: 93.5 FL — SIGNIFICANT CHANGE UP (ref 80–100)
MONOCYTES # BLD AUTO: 0.68 K/UL — SIGNIFICANT CHANGE UP (ref 0–0.9)
MONOCYTES NFR BLD AUTO: 18.5 % — HIGH (ref 2–14)
NEUTROPHILS # BLD AUTO: 2.23 K/UL — SIGNIFICANT CHANGE UP (ref 1.8–7.4)
NEUTROPHILS NFR BLD AUTO: 60.8 % — SIGNIFICANT CHANGE UP (ref 43–77)
NRBC # FLD: 0 — SIGNIFICANT CHANGE UP
PLATELET # BLD AUTO: 175 K/UL — SIGNIFICANT CHANGE UP (ref 150–400)
PMV BLD: 10.1 FL — SIGNIFICANT CHANGE UP (ref 7–13)
POTASSIUM SERPL-MCNC: 3.8 MMOL/L — SIGNIFICANT CHANGE UP (ref 3.5–5.3)
POTASSIUM SERPL-SCNC: 3.8 MMOL/L — SIGNIFICANT CHANGE UP (ref 3.5–5.3)
RBC # BLD: 3.07 M/UL — LOW (ref 4.2–5.8)
RBC # FLD: 15.8 % — HIGH (ref 10.3–14.5)
RH IG SCN BLD-IMP: POSITIVE — SIGNIFICANT CHANGE UP
RH IG SCN BLD-IMP: POSITIVE — SIGNIFICANT CHANGE UP
SODIUM SERPL-SCNC: 140 MMOL/L — SIGNIFICANT CHANGE UP (ref 135–145)
SPECIMEN SOURCE: SIGNIFICANT CHANGE UP
WBC # BLD: 3.67 K/UL — LOW (ref 3.8–10.5)
WBC # FLD AUTO: 3.67 K/UL — LOW (ref 3.8–10.5)

## 2018-09-03 PROCEDURE — 99223 1ST HOSP IP/OBS HIGH 75: CPT | Mod: GC

## 2018-09-03 PROCEDURE — 44143 PARTIAL REMOVAL OF COLON: CPT

## 2018-09-03 PROCEDURE — 88307 TISSUE EXAM BY PATHOLOGIST: CPT | Mod: 26

## 2018-09-03 RX ORDER — POTASSIUM CHLORIDE 20 MEQ
10 PACKET (EA) ORAL ONCE
Qty: 0 | Refills: 0 | Status: COMPLETED | OUTPATIENT
Start: 2018-09-03 | End: 2018-09-03

## 2018-09-03 RX ORDER — METOPROLOL TARTRATE 50 MG
5 TABLET ORAL EVERY 6 HOURS
Qty: 0 | Refills: 0 | Status: DISCONTINUED | OUTPATIENT
Start: 2018-09-03 | End: 2018-09-06

## 2018-09-03 RX ORDER — HYDROMORPHONE HYDROCHLORIDE 2 MG/ML
0.5 INJECTION INTRAMUSCULAR; INTRAVENOUS; SUBCUTANEOUS EVERY 6 HOURS
Qty: 0 | Refills: 0 | Status: DISCONTINUED | OUTPATIENT
Start: 2018-09-03 | End: 2018-09-06

## 2018-09-03 RX ORDER — SODIUM CHLORIDE 9 MG/ML
1000 INJECTION, SOLUTION INTRAVENOUS
Qty: 0 | Refills: 0 | Status: DISCONTINUED | OUTPATIENT
Start: 2018-09-03 | End: 2018-09-03

## 2018-09-03 RX ORDER — HYDROMORPHONE HYDROCHLORIDE 2 MG/ML
1 INJECTION INTRAMUSCULAR; INTRAVENOUS; SUBCUTANEOUS EVERY 6 HOURS
Qty: 0 | Refills: 0 | Status: DISCONTINUED | OUTPATIENT
Start: 2018-09-03 | End: 2018-09-06

## 2018-09-03 RX ORDER — ONDANSETRON 8 MG/1
4 TABLET, FILM COATED ORAL ONCE
Qty: 0 | Refills: 0 | Status: DISCONTINUED | OUTPATIENT
Start: 2018-09-03 | End: 2018-09-03

## 2018-09-03 RX ORDER — ACETAMINOPHEN 500 MG
1000 TABLET ORAL ONCE
Qty: 0 | Refills: 0 | Status: COMPLETED | OUTPATIENT
Start: 2018-09-03 | End: 2018-09-03

## 2018-09-03 RX ORDER — ENOXAPARIN SODIUM 100 MG/ML
40 INJECTION SUBCUTANEOUS DAILY
Qty: 0 | Refills: 0 | Status: DISCONTINUED | OUTPATIENT
Start: 2018-09-03 | End: 2018-09-07

## 2018-09-03 RX ORDER — BENZOCAINE AND MENTHOL 5; 1 G/100ML; G/100ML
1 LIQUID ORAL
Qty: 0 | Refills: 0 | Status: DISCONTINUED | OUTPATIENT
Start: 2018-09-03 | End: 2018-09-06

## 2018-09-03 RX ORDER — INFLUENZA VIRUS VACCINE 15; 15; 15; 15 UG/.5ML; UG/.5ML; UG/.5ML; UG/.5ML
0.5 SUSPENSION INTRAMUSCULAR ONCE
Qty: 0 | Refills: 0 | Status: DISCONTINUED | OUTPATIENT
Start: 2018-09-03 | End: 2018-09-07

## 2018-09-03 RX ORDER — DEXTROSE MONOHYDRATE, SODIUM CHLORIDE, AND POTASSIUM CHLORIDE 50; .745; 4.5 G/1000ML; G/1000ML; G/1000ML
1000 INJECTION, SOLUTION INTRAVENOUS
Qty: 0 | Refills: 0 | Status: DISCONTINUED | OUTPATIENT
Start: 2018-09-03 | End: 2018-09-07

## 2018-09-03 RX ORDER — PIPERACILLIN AND TAZOBACTAM 4; .5 G/20ML; G/20ML
3.38 INJECTION, POWDER, LYOPHILIZED, FOR SOLUTION INTRAVENOUS EVERY 8 HOURS
Qty: 0 | Refills: 0 | Status: COMPLETED | OUTPATIENT
Start: 2018-09-03 | End: 2018-09-06

## 2018-09-03 RX ORDER — HYDROMORPHONE HYDROCHLORIDE 2 MG/ML
0.5 INJECTION INTRAMUSCULAR; INTRAVENOUS; SUBCUTANEOUS
Qty: 0 | Refills: 0 | Status: DISCONTINUED | OUTPATIENT
Start: 2018-09-03 | End: 2018-09-03

## 2018-09-03 RX ADMIN — Medication 100 MILLIEQUIVALENT(S): at 11:39

## 2018-09-03 RX ADMIN — SODIUM CHLORIDE 125 MILLILITER(S): 9 INJECTION, SOLUTION INTRAVENOUS at 05:11

## 2018-09-03 RX ADMIN — HYDROMORPHONE HYDROCHLORIDE 0.5 MILLIGRAM(S): 2 INJECTION INTRAMUSCULAR; INTRAVENOUS; SUBCUTANEOUS at 11:02

## 2018-09-03 RX ADMIN — PIPERACILLIN AND TAZOBACTAM 25 GRAM(S): 4; .5 INJECTION, POWDER, LYOPHILIZED, FOR SOLUTION INTRAVENOUS at 21:07

## 2018-09-03 RX ADMIN — ENOXAPARIN SODIUM 40 MILLIGRAM(S): 100 INJECTION SUBCUTANEOUS at 11:36

## 2018-09-03 RX ADMIN — HYDROMORPHONE HYDROCHLORIDE 0.5 MILLIGRAM(S): 2 INJECTION INTRAMUSCULAR; INTRAVENOUS; SUBCUTANEOUS at 17:31

## 2018-09-03 RX ADMIN — HYDROMORPHONE HYDROCHLORIDE 0.5 MILLIGRAM(S): 2 INJECTION INTRAMUSCULAR; INTRAVENOUS; SUBCUTANEOUS at 18:10

## 2018-09-03 RX ADMIN — Medication 400 MILLIGRAM(S): at 04:30

## 2018-09-03 RX ADMIN — Medication 1000 MILLIGRAM(S): at 11:27

## 2018-09-03 RX ADMIN — Medication 400 MILLIGRAM(S): at 10:28

## 2018-09-03 RX ADMIN — DEXTROSE MONOHYDRATE, SODIUM CHLORIDE, AND POTASSIUM CHLORIDE 110 MILLILITER(S): 50; .745; 4.5 INJECTION, SOLUTION INTRAVENOUS at 19:52

## 2018-09-03 RX ADMIN — BENZOCAINE AND MENTHOL 1 LOZENGE: 5; 1 LIQUID ORAL at 11:36

## 2018-09-03 RX ADMIN — HYDROMORPHONE HYDROCHLORIDE 0.5 MILLIGRAM(S): 2 INJECTION INTRAMUSCULAR; INTRAVENOUS; SUBCUTANEOUS at 11:27

## 2018-09-03 RX ADMIN — PIPERACILLIN AND TAZOBACTAM 25 GRAM(S): 4; .5 INJECTION, POWDER, LYOPHILIZED, FOR SOLUTION INTRAVENOUS at 05:12

## 2018-09-03 RX ADMIN — HYDROMORPHONE HYDROCHLORIDE 1 MILLIGRAM(S): 2 INJECTION INTRAMUSCULAR; INTRAVENOUS; SUBCUTANEOUS at 23:25

## 2018-09-03 RX ADMIN — SODIUM CHLORIDE 125 MILLILITER(S): 9 INJECTION, SOLUTION INTRAVENOUS at 10:29

## 2018-09-03 RX ADMIN — Medication 1000 MILLIGRAM(S): at 05:16

## 2018-09-03 RX ADMIN — HYDROMORPHONE HYDROCHLORIDE 1 MILLIGRAM(S): 2 INJECTION INTRAMUSCULAR; INTRAVENOUS; SUBCUTANEOUS at 23:48

## 2018-09-03 RX ADMIN — PIPERACILLIN AND TAZOBACTAM 25 GRAM(S): 4; .5 INJECTION, POWDER, LYOPHILIZED, FOR SOLUTION INTRAVENOUS at 13:11

## 2018-09-03 NOTE — CONSULT NOTE ADULT - ASSESSMENT
SONAL GARCIA is a 52y Male PMH Multiple myeloma s/p bone marrow transplant approx. 1 year ago, HF (EF ~ 15% "many years ago"), presented to [facility] ED with 2 days of progressive severe diffuse abdominal pain associated with diaphoresis and diarrhea, found on imaging to have perforated diverticulitis. Now s/p ex-lap, Hartmanns.     - Patient with unclear history of CHF (pt states he was cured of CHF "many years ago") - would recommend Echo to assess cardiac function  - AVSS - not requiring pressors or sedation, no signs of decompensation  - Patient does not require SICU bed at this time but will continue to follow along in continuum with primary team  - Care per primary team    To be discussed with attending Dr. Regino Reid PGY2  SICU SONAL GARCIA is a 52y Male PMH Multiple myeloma s/p bone marrow transplant approx. 1 year ago, HF (EF ~ 15% "many years ago"), presented to [facility] ED with 2 days of progressive severe diffuse abdominal pain associated with diaphoresis and diarrhea, found on imaging to have perforated diverticulitis. Now s/p ex-lap, Hartmanns.     - Patient with unclear history of CHF (pt states he was cured of CHF "many years ago") - would consider Echo to assess cardiac function  - Patient currently stable in the PACU - not requiring pressors or sedation, no signs of decompensation  - Patient does not require SICU bed at this time but will continue to follow along in continuum with primary team  - Care per primary team  - Discussed with attending Dr. Regino Reid PGY2  SICU

## 2018-09-03 NOTE — CONSULT NOTE ADULT - SUBJECTIVE AND OBJECTIVE BOX
HISTORY OF PRESENT ILLNESS:  SONAL GARCIA is a 52y Male PMH Multiple myeloma s/p bone marrow transplant approx. 1 year ago, HF (EF ~ 15% "many years ago"), presented to [facility] ED with 2 days of progressive severe diffuse abdominal pain associated with diaphoresis and diarrhea x 1, NBNB. He has never had pain like this before. Denies nausea, vomiting, SOB, CP, dysuria, lightheadedness. He has never had a colonoscopy. He is currently on decadron, Panobinostat, and Pomalidomide for MM.     Upon arrival to ED pt had a CT abd/pelvis which showed free air in paracolic gutters, diverticulitis in sigmoid with associated free fluid and air adjacent. On exam pt noted to be peritoneal, and decision was made to take pt to the OR. SICU consulted preemptively for possible need for post-op management. Patient underwent Hartmanns procedure, extubated successfully post-operatively. Currently in PACU not requiring pressors.     PAST MEDICAL HISTORY: Former smoker  Heart failure      PAST SURGICAL HISTORY:     FAMILY HISTORY: Family history of diabetes mellitus (Father, Mother)  Family history of hypertension (Father, Mother)  No pertinent family history in first degree relatives      SOCIAL HISTORY: Former smoker. Lives with wife    CODE STATUS: FULL    HOME MEDICATIONS:   Decadron, Panobinostat, and Pomalidomide     ALLERGIES: No Known Allergies      VITAL SIGNS:  ICU Vital Signs Last 24 Hrs  T(C): 36.6 (03 Sep 2018 04:00), Max: 38.4 (02 Sep 2018 19:01)  T(F): 97.9 (03 Sep 2018 04:00), Max: 101.2 (02 Sep 2018 19:01)  HR: 84 (03 Sep 2018 04:00) (84 - 114)  BP: 118/84 (03 Sep 2018 04:00) (103/73 - 119/65)  BP(mean): 77 (03 Sep 2018 03:30) (77 - 79)  ABP: --  ABP(mean): --  RR: 17 (03 Sep 2018 04:00) (12 - 22)  SpO2: 97% (03 Sep 2018 04:00) (95% - 100%)      NEURO  Exam:  Meds:acetaminophen  IVPB. 1000 milliGRAM(s) IV Intermittent once  acetaminophen  IVPB. 1000 milliGRAM(s) IV Intermittent once  HYDROmorphone  Injectable 1 milliGRAM(s) IV Push every 6 hours PRN Severe Pain (7 - 10)  HYDROmorphone  Injectable 0.5 milliGRAM(s) IV Push every 6 hours PRN Moderate Pain (4 - 6)  HYDROmorphone  Injectable 0.5 milliGRAM(s) IV Push every 10 minutes PRN Moderate Pain (4 - 6)  ondansetron Injectable 4 milliGRAM(s) IV Push once PRN Nausea and/or Vomiting      RESPIRATORY  Mechanical Ventilation: N/A    Exam: CTAB, on 2L NC  Meds:    CARDIOVASCULAR  VBG - ( 02 Sep 2018 18:52 )  pH: 7.43  /  pCO2: 40    /  pO2: < 24  / HCO3: 25    / Base Excess: 1.5   /  SaO2: 28.1   Lactate: 1.7              Exam:   Cardiac Rhythm: RRR  Meds:metoprolol tartrate Injectable 5 milliGRAM(s) IV Push every 6 hours      GI/NUTRITION  Exam: Soft, mildly distended. Midline incision dressed, c/d/i. Ostomy in place, bowel sweat in bag  Diet: NPO/NGT  Meds:    GENITOURINARY/RENAL  Meds:lactated ringers. 1000 milliLiter(s) IV Continuous <Continuous>      09-02 @ 07:01  -  09-03 @ 04:20  --------------------------------------------------------  IN:  Total IN: 0 mL    OUT:    Indwelling Catheter - Urethral: 50 mL  Total OUT: 50 mL    Total NET: -50 mL        Weight (kg): 91 (09-03 @ 00:40)  09-03    140  |  105  |  11  ----------------------------<  204<H>  3.8   |  21<L>  |  1.03    Ca    7.7<L>      03 Sep 2018 03:10    TPro  6.6  /  Alb  3.4  /  TBili  0.9  /  DBili  x   /  AST  9   /  ALT  10  /  AlkPhos  70  09-02    [ ] Duckworth catheter, indication: urine output monitoring in critically ill patient    HEMATOLOGIC  [X] VTE Prophylaxis:  enoxaparin Injectable 40 milliGRAM(s) SubCutaneous daily                          9.9    3.67  )-----------( 175      ( 03 Sep 2018 03:10 )             28.7     PT/INR - ( 02 Sep 2018 22:44 )   PT: 13.5 SEC;   INR: 1.21          PTT - ( 02 Sep 2018 22:44 )  PTT:27.2 SEC  Transfusion: [ ] PRBC	[ ] Platelets	[ ] FFP	[ ] Cryoprecipitate      INFECTIOUS DISEASES  Meds:piperacillin/tazobactam IVPB. 3.375 Gram(s) IV Intermittent every 8 hours    RECENT CULTURES:      ENDOCRINE  Meds:  CAPILLARY BLOOD GLUCOSE          PATIENT CARE ACCESS DEVICES:  [ ] Peripheral IV  [ ] Central Venous Line	[ ] R	[ ] L	[ ] IJ	[ ] Fem	[ ] SC	Placed:   [ ] Arterial Line		[ ] R	[ ] L	[ ] Fem	[ ] Rad	[ ] Ax	Placed:   [ ] PICC:					[ ] Mediport  [ ] Urinary Catheter, Date Placed:   [x] Necessity of urinary, arterial, and venous catheters discussed    OTHER MEDICATIONS:     IMAGING STUDIES:  CT Abdomen and Pelvis w/ IV Cont (09.02.18 @ 20:44)  Acute sigmoid colon diverticulitis with perforation. No associated   intra-abdominal abscess.    Mildly dilated loops of small bowel most likely related to ileus   secondary to the acute process discussed above.

## 2018-09-03 NOTE — BRIEF OPERATIVE NOTE - OPERATION/FINDINGS
purulent fluid in the abdomen, no feculence. Thickened sigmoid loop adherent to small bowel mesentery dissected free. Descending colon and sigmoid mobilized medially, proximal margin taken with FREDERIC 80 blue, mesentery taken with ligasure. distal margin taken with TA 90, left as closed rectal stump. end colostomy created after further mobilizing the descending colon.

## 2018-09-04 LAB
BACTERIA UR CULT: SIGNIFICANT CHANGE UP
BUN SERPL-MCNC: 14 MG/DL — SIGNIFICANT CHANGE UP (ref 7–23)
CALCIUM SERPL-MCNC: 8.1 MG/DL — LOW (ref 8.4–10.5)
CHLORIDE SERPL-SCNC: 106 MMOL/L — SIGNIFICANT CHANGE UP (ref 98–107)
CO2 SERPL-SCNC: 24 MMOL/L — SIGNIFICANT CHANGE UP (ref 22–31)
CREAT SERPL-MCNC: 1.02 MG/DL — SIGNIFICANT CHANGE UP (ref 0.5–1.3)
GLUCOSE SERPL-MCNC: 178 MG/DL — HIGH (ref 70–99)
HCT VFR BLD CALC: 27 % — LOW (ref 39–50)
HGB BLD-MCNC: 9.3 G/DL — LOW (ref 13–17)
MAGNESIUM SERPL-MCNC: 2.6 MG/DL — SIGNIFICANT CHANGE UP (ref 1.6–2.6)
MCHC RBC-ENTMCNC: 31.6 PG — SIGNIFICANT CHANGE UP (ref 27–34)
MCHC RBC-ENTMCNC: 34.4 % — SIGNIFICANT CHANGE UP (ref 32–36)
MCV RBC AUTO: 91.8 FL — SIGNIFICANT CHANGE UP (ref 80–100)
NRBC # FLD: 0 — SIGNIFICANT CHANGE UP
PHOSPHATE SERPL-MCNC: 2.2 MG/DL — LOW (ref 2.5–4.5)
PLATELET # BLD AUTO: 168 K/UL — SIGNIFICANT CHANGE UP (ref 150–400)
PMV BLD: 10.1 FL — SIGNIFICANT CHANGE UP (ref 7–13)
POTASSIUM SERPL-MCNC: 4 MMOL/L — SIGNIFICANT CHANGE UP (ref 3.5–5.3)
POTASSIUM SERPL-SCNC: 4 MMOL/L — SIGNIFICANT CHANGE UP (ref 3.5–5.3)
RBC # BLD: 2.94 M/UL — LOW (ref 4.2–5.8)
RBC # FLD: 15.9 % — HIGH (ref 10.3–14.5)
SODIUM SERPL-SCNC: 141 MMOL/L — SIGNIFICANT CHANGE UP (ref 135–145)
SPECIMEN SOURCE: SIGNIFICANT CHANGE UP
WBC # BLD: 4.42 K/UL — SIGNIFICANT CHANGE UP (ref 3.8–10.5)
WBC # FLD AUTO: 4.42 K/UL — SIGNIFICANT CHANGE UP (ref 3.8–10.5)

## 2018-09-04 RX ORDER — ACETAMINOPHEN 500 MG
1000 TABLET ORAL ONCE
Qty: 0 | Refills: 0 | Status: DISCONTINUED | OUTPATIENT
Start: 2018-09-04 | End: 2018-09-04

## 2018-09-04 RX ORDER — FUROSEMIDE 40 MG
10 TABLET ORAL ONCE
Qty: 0 | Refills: 0 | Status: COMPLETED | OUTPATIENT
Start: 2018-09-04 | End: 2018-09-04

## 2018-09-04 RX ORDER — ACETAMINOPHEN 500 MG
1000 TABLET ORAL ONCE
Qty: 0 | Refills: 0 | Status: COMPLETED | OUTPATIENT
Start: 2018-09-04 | End: 2018-09-04

## 2018-09-04 RX ADMIN — PIPERACILLIN AND TAZOBACTAM 25 GRAM(S): 4; .5 INJECTION, POWDER, LYOPHILIZED, FOR SOLUTION INTRAVENOUS at 23:21

## 2018-09-04 RX ADMIN — Medication 5 MILLIGRAM(S): at 23:21

## 2018-09-04 RX ADMIN — Medication 5 MILLIGRAM(S): at 17:45

## 2018-09-04 RX ADMIN — Medication 10 MILLIGRAM(S): at 12:24

## 2018-09-04 RX ADMIN — HYDROMORPHONE HYDROCHLORIDE 0.5 MILLIGRAM(S): 2 INJECTION INTRAMUSCULAR; INTRAVENOUS; SUBCUTANEOUS at 12:25

## 2018-09-04 RX ADMIN — Medication 400 MILLIGRAM(S): at 08:41

## 2018-09-04 RX ADMIN — HYDROMORPHONE HYDROCHLORIDE 0.5 MILLIGRAM(S): 2 INJECTION INTRAMUSCULAR; INTRAVENOUS; SUBCUTANEOUS at 12:40

## 2018-09-04 RX ADMIN — Medication 63.75 MILLIMOLE(S): at 09:46

## 2018-09-04 RX ADMIN — ENOXAPARIN SODIUM 40 MILLIGRAM(S): 100 INJECTION SUBCUTANEOUS at 12:25

## 2018-09-04 RX ADMIN — PIPERACILLIN AND TAZOBACTAM 25 GRAM(S): 4; .5 INJECTION, POWDER, LYOPHILIZED, FOR SOLUTION INTRAVENOUS at 14:26

## 2018-09-04 RX ADMIN — Medication 1000 MILLIGRAM(S): at 09:11

## 2018-09-04 RX ADMIN — Medication 5 MILLIGRAM(S): at 12:24

## 2018-09-04 RX ADMIN — PIPERACILLIN AND TAZOBACTAM 25 GRAM(S): 4; .5 INJECTION, POWDER, LYOPHILIZED, FOR SOLUTION INTRAVENOUS at 06:14

## 2018-09-04 RX ADMIN — HYDROMORPHONE HYDROCHLORIDE 1 MILLIGRAM(S): 2 INJECTION INTRAMUSCULAR; INTRAVENOUS; SUBCUTANEOUS at 21:35

## 2018-09-04 RX ADMIN — HYDROMORPHONE HYDROCHLORIDE 1 MILLIGRAM(S): 2 INJECTION INTRAMUSCULAR; INTRAVENOUS; SUBCUTANEOUS at 22:00

## 2018-09-04 RX ADMIN — HYDROMORPHONE HYDROCHLORIDE 1 MILLIGRAM(S): 2 INJECTION INTRAMUSCULAR; INTRAVENOUS; SUBCUTANEOUS at 06:35

## 2018-09-04 RX ADMIN — BENZOCAINE AND MENTHOL 1 LOZENGE: 5; 1 LIQUID ORAL at 12:24

## 2018-09-04 RX ADMIN — HYDROMORPHONE HYDROCHLORIDE 1 MILLIGRAM(S): 2 INJECTION INTRAMUSCULAR; INTRAVENOUS; SUBCUTANEOUS at 06:14

## 2018-09-04 NOTE — PHYSICAL THERAPY INITIAL EVALUATION ADULT - ACTIVE RANGE OF MOTION EXAMINATION, REHAB EVAL
bilateral lower extremity Active ROM was WNL (within normal limits)/wiley. upper extremity Active ROM was WNL (within normal limits)

## 2018-09-04 NOTE — PHYSICAL THERAPY INITIAL EVALUATION ADULT - ADDITIONAL COMMENTS
Pt reports that he lives in a private house with wife, son, daughter, and sister in law, with ~3STE; (+)1 handrail; and a flight of stairs to negotiate to bedroom (+)1 handrail. Prior to hospital admission pt was completely independent and used no assistive device with ambulation. pt denies any recent falls.    Pt left comfortable in bed, NAD, all lines intact, all precautions maintained, with call bell in reach, family @bedside, and RN aware of PT evaluation.

## 2018-09-04 NOTE — PROGRESS NOTE ADULT - ASSESSMENT
Assessment/Plan:    52M w/ PMH Multiple myeloma s/p bone marrow transplant approx. 1 year ago, HF (EF ~ 15%) with perforated diverticulitis, now POD#2 s/p ex-lap, Hartmanns.     - Abx: zosyn   - No chemo at this time   - Pain control  - NPO/NGT  - Monitor bowel function  - Duckworth  - IVF at 48  -Ostomy care teaching Assessment/Plan:    52M w/ PMH Multiple myeloma s/p bone marrow transplant approx. 1 year ago, HF (EF ~ 15%) with perforated diverticulitis, now POD#2 s/p ex-lap, Hartmanns.     - Abx: zosyn   - No chemo at this time   - Pain control  - NPO/NGT  - Monitor bowel function  - Continue with quintero at this time   - IVF at 48  -Ostomy care teaching    General Surgery- B Team  [id number]

## 2018-09-04 NOTE — PHYSICAL THERAPY INITIAL EVALUATION ADULT - GENERAL OBSERVATIONS, REHAB EVAL
Pt encountered in semisupine position, no distress, Axox4, with +IV, +NGT to wall suction, +quintero, +cardiac monitor, and family @bedside.

## 2018-09-04 NOTE — PHYSICAL THERAPY INITIAL EVALUATION ADULT - PERTINENT HX OF CURRENT PROBLEM, REHAB EVAL
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

## 2018-09-05 LAB
BUN SERPL-MCNC: 14 MG/DL — SIGNIFICANT CHANGE UP (ref 7–23)
CALCIUM SERPL-MCNC: 8.7 MG/DL — SIGNIFICANT CHANGE UP (ref 8.4–10.5)
CHLORIDE SERPL-SCNC: 108 MMOL/L — HIGH (ref 98–107)
CO2 SERPL-SCNC: 24 MMOL/L — SIGNIFICANT CHANGE UP (ref 22–31)
CREAT SERPL-MCNC: 1.03 MG/DL — SIGNIFICANT CHANGE UP (ref 0.5–1.3)
GLUCOSE SERPL-MCNC: 132 MG/DL — HIGH (ref 70–99)
HCT VFR BLD CALC: 27.3 % — LOW (ref 39–50)
HGB BLD-MCNC: 9.2 G/DL — LOW (ref 13–17)
MAGNESIUM SERPL-MCNC: 2.5 MG/DL — SIGNIFICANT CHANGE UP (ref 1.6–2.6)
MCHC RBC-ENTMCNC: 31.5 PG — SIGNIFICANT CHANGE UP (ref 27–34)
MCHC RBC-ENTMCNC: 33.7 % — SIGNIFICANT CHANGE UP (ref 32–36)
MCV RBC AUTO: 93.5 FL — SIGNIFICANT CHANGE UP (ref 80–100)
NRBC # FLD: 0 — SIGNIFICANT CHANGE UP
PHOSPHATE SERPL-MCNC: 2.9 MG/DL — SIGNIFICANT CHANGE UP (ref 2.5–4.5)
PLATELET # BLD AUTO: 181 K/UL — SIGNIFICANT CHANGE UP (ref 150–400)
PMV BLD: 10.1 FL — SIGNIFICANT CHANGE UP (ref 7–13)
POTASSIUM SERPL-MCNC: 4 MMOL/L — SIGNIFICANT CHANGE UP (ref 3.5–5.3)
POTASSIUM SERPL-SCNC: 4 MMOL/L — SIGNIFICANT CHANGE UP (ref 3.5–5.3)
RBC # BLD: 2.92 M/UL — LOW (ref 4.2–5.8)
RBC # FLD: 16 % — HIGH (ref 10.3–14.5)
SODIUM SERPL-SCNC: 143 MMOL/L — SIGNIFICANT CHANGE UP (ref 135–145)
WBC # BLD: 3.56 K/UL — LOW (ref 3.8–10.5)
WBC # FLD AUTO: 3.56 K/UL — LOW (ref 3.8–10.5)

## 2018-09-05 RX ADMIN — Medication 5 MILLIGRAM(S): at 13:19

## 2018-09-05 RX ADMIN — BENZOCAINE AND MENTHOL 1 LOZENGE: 5; 1 LIQUID ORAL at 17:13

## 2018-09-05 RX ADMIN — Medication 5 MILLIGRAM(S): at 17:13

## 2018-09-05 RX ADMIN — PIPERACILLIN AND TAZOBACTAM 25 GRAM(S): 4; .5 INJECTION, POWDER, LYOPHILIZED, FOR SOLUTION INTRAVENOUS at 05:35

## 2018-09-05 RX ADMIN — Medication 5 MILLIGRAM(S): at 23:45

## 2018-09-05 RX ADMIN — PIPERACILLIN AND TAZOBACTAM 25 GRAM(S): 4; .5 INJECTION, POWDER, LYOPHILIZED, FOR SOLUTION INTRAVENOUS at 13:18

## 2018-09-05 RX ADMIN — Medication 5 MILLIGRAM(S): at 05:36

## 2018-09-05 RX ADMIN — ENOXAPARIN SODIUM 40 MILLIGRAM(S): 100 INJECTION SUBCUTANEOUS at 13:19

## 2018-09-05 RX ADMIN — PIPERACILLIN AND TAZOBACTAM 25 GRAM(S): 4; .5 INJECTION, POWDER, LYOPHILIZED, FOR SOLUTION INTRAVENOUS at 23:45

## 2018-09-05 RX ADMIN — BENZOCAINE AND MENTHOL 1 LOZENGE: 5; 1 LIQUID ORAL at 13:18

## 2018-09-05 NOTE — PROGRESS NOTE ADULT - ATTENDING COMMENTS
Seen and examined, chart and note reviewed, case discussed with B team    SEPSIS secondary Diverticulitis (Hinchey 3) s/p Kiah's procedure PD#0  a.  NPO  b.  Continue IVF resuscitation  c.   Continue IV antibiotics  d.  Continue DVT prophylaxis    At risk for malnutrition  a.  Change IVF to maintenance
I have personally interviewed and examined this patient, reviewed pertinent labs and imaging, and discussed the case with colleagues, residents, and physician assistants on B Team rounds.    Awaiting ostomy function - a little retracted but viable appearing.     Plan  dc quintero  ambulate  ngt clamp trial   completing 4 days of abx  dvt prophylaxis        The Acute Care Surgery (B Team) Attending Group Practice:  Dr. Regino Domingo, Dr. Liban Salgado, Dr. Adonis Dudley, Dr. Supriya Shaikh, Dr. Aidan Alcaraz    urgent issues - spectra [id number] or [phone]  nonurgent issues - [phone]  patient appointments or afterhours - [phone]

## 2018-09-05 NOTE — PROGRESS NOTE ADULT - ASSESSMENT
Assessment/Plan:    52M w/ PMH Multiple myeloma s/p bone marrow transplant approx. 1 year ago, HF (EF ~ 15%) with perforated diverticulitis, now POD#2 s/p ex-lap, Hartmanns.     - Abx: zosyn   - No chemo at this time   - Pain control  - NPO/NGT  - Monitor bowel function  - Continue with quintero at this time   - IVF at 48  -Ostomy care teaching    General Surgery- B Team  [id number]

## 2018-09-06 LAB
BUN SERPL-MCNC: 15 MG/DL — SIGNIFICANT CHANGE UP (ref 7–23)
CALCIUM SERPL-MCNC: 8.7 MG/DL — SIGNIFICANT CHANGE UP (ref 8.4–10.5)
CHLORIDE SERPL-SCNC: 109 MMOL/L — HIGH (ref 98–107)
CO2 SERPL-SCNC: 21 MMOL/L — LOW (ref 22–31)
CREAT SERPL-MCNC: 0.99 MG/DL — SIGNIFICANT CHANGE UP (ref 0.5–1.3)
GLUCOSE SERPL-MCNC: 128 MG/DL — HIGH (ref 70–99)
HCT VFR BLD CALC: 24.7 % — LOW (ref 39–50)
HGB BLD-MCNC: 8.3 G/DL — LOW (ref 13–17)
MAGNESIUM SERPL-MCNC: 2.4 MG/DL — SIGNIFICANT CHANGE UP (ref 1.6–2.6)
MCHC RBC-ENTMCNC: 31.1 PG — SIGNIFICANT CHANGE UP (ref 27–34)
MCHC RBC-ENTMCNC: 33.6 % — SIGNIFICANT CHANGE UP (ref 32–36)
MCV RBC AUTO: 92.5 FL — SIGNIFICANT CHANGE UP (ref 80–100)
NRBC # FLD: 0 — SIGNIFICANT CHANGE UP
PHOSPHATE SERPL-MCNC: 3.7 MG/DL — SIGNIFICANT CHANGE UP (ref 2.5–4.5)
PLATELET # BLD AUTO: 179 K/UL — SIGNIFICANT CHANGE UP (ref 150–400)
PMV BLD: 9.8 FL — SIGNIFICANT CHANGE UP (ref 7–13)
POTASSIUM SERPL-MCNC: 4 MMOL/L — SIGNIFICANT CHANGE UP (ref 3.5–5.3)
POTASSIUM SERPL-SCNC: 4 MMOL/L — SIGNIFICANT CHANGE UP (ref 3.5–5.3)
RBC # BLD: 2.67 M/UL — LOW (ref 4.2–5.8)
RBC # FLD: 15.8 % — HIGH (ref 10.3–14.5)
SODIUM SERPL-SCNC: 142 MMOL/L — SIGNIFICANT CHANGE UP (ref 135–145)
WBC # BLD: 2.84 K/UL — LOW (ref 3.8–10.5)
WBC # FLD AUTO: 2.84 K/UL — LOW (ref 3.8–10.5)

## 2018-09-06 RX ORDER — OXYCODONE HYDROCHLORIDE 5 MG/1
10 TABLET ORAL EVERY 4 HOURS
Qty: 0 | Refills: 0 | Status: DISCONTINUED | OUTPATIENT
Start: 2018-09-06 | End: 2018-09-07

## 2018-09-06 RX ORDER — ACETAMINOPHEN 500 MG
650 TABLET ORAL EVERY 6 HOURS
Qty: 0 | Refills: 0 | Status: DISCONTINUED | OUTPATIENT
Start: 2018-09-06 | End: 2018-09-07

## 2018-09-06 RX ORDER — ASPIRIN/CALCIUM CARB/MAGNESIUM 324 MG
81 TABLET ORAL DAILY
Qty: 0 | Refills: 0 | Status: DISCONTINUED | OUTPATIENT
Start: 2018-09-07 | End: 2018-09-07

## 2018-09-06 RX ORDER — LISINOPRIL 2.5 MG/1
20 TABLET ORAL DAILY
Qty: 0 | Refills: 0 | Status: DISCONTINUED | OUTPATIENT
Start: 2018-09-07 | End: 2018-09-07

## 2018-09-06 RX ORDER — OXYCODONE HYDROCHLORIDE 5 MG/1
5 TABLET ORAL EVERY 4 HOURS
Qty: 0 | Refills: 0 | Status: DISCONTINUED | OUTPATIENT
Start: 2018-09-06 | End: 2018-09-07

## 2018-09-06 RX ORDER — ACYCLOVIR SODIUM 500 MG
400 VIAL (EA) INTRAVENOUS DAILY
Qty: 0 | Refills: 0 | Status: DISCONTINUED | OUTPATIENT
Start: 2018-09-06 | End: 2018-09-07

## 2018-09-06 RX ORDER — CARVEDILOL PHOSPHATE 80 MG/1
25 CAPSULE, EXTENDED RELEASE ORAL EVERY 12 HOURS
Qty: 0 | Refills: 0 | Status: DISCONTINUED | OUTPATIENT
Start: 2018-09-06 | End: 2018-09-07

## 2018-09-06 RX ADMIN — Medication 400 MILLIGRAM(S): at 14:22

## 2018-09-06 RX ADMIN — DEXTROSE MONOHYDRATE, SODIUM CHLORIDE, AND POTASSIUM CHLORIDE 50 MILLILITER(S): 50; .745; 4.5 INJECTION, SOLUTION INTRAVENOUS at 13:39

## 2018-09-06 RX ADMIN — PIPERACILLIN AND TAZOBACTAM 25 GRAM(S): 4; .5 INJECTION, POWDER, LYOPHILIZED, FOR SOLUTION INTRAVENOUS at 21:01

## 2018-09-06 RX ADMIN — PIPERACILLIN AND TAZOBACTAM 25 GRAM(S): 4; .5 INJECTION, POWDER, LYOPHILIZED, FOR SOLUTION INTRAVENOUS at 05:28

## 2018-09-06 RX ADMIN — ENOXAPARIN SODIUM 40 MILLIGRAM(S): 100 INJECTION SUBCUTANEOUS at 13:39

## 2018-09-06 RX ADMIN — CARVEDILOL PHOSPHATE 25 MILLIGRAM(S): 80 CAPSULE, EXTENDED RELEASE ORAL at 17:26

## 2018-09-06 RX ADMIN — PIPERACILLIN AND TAZOBACTAM 25 GRAM(S): 4; .5 INJECTION, POWDER, LYOPHILIZED, FOR SOLUTION INTRAVENOUS at 13:39

## 2018-09-06 RX ADMIN — Medication 5 MILLIGRAM(S): at 05:28

## 2018-09-06 NOTE — PROGRESS NOTE ADULT - ASSESSMENT
52M w/ PMH Multiple myeloma s/p bone marrow transplant approx. 1 year ago, HF (EF ~ 15%) with perforated diverticulitis, now POD#3 s/p ex-lap, Hartmanns.     - Abx: zosyn   - No chemo at this time   - Pain control  - advance diet to clears  - Monitor bowel function  - Continue with quintero at this time   - IVF at 48  - Ostomy care teaching  - Out of bed    General Surgery- B Team  [id number]

## 2018-09-07 ENCOUNTER — TRANSCRIPTION ENCOUNTER (OUTPATIENT)
Age: 53
End: 2018-09-07

## 2018-09-07 VITALS
OXYGEN SATURATION: 97 % | RESPIRATION RATE: 18 BRPM | SYSTOLIC BLOOD PRESSURE: 132 MMHG | DIASTOLIC BLOOD PRESSURE: 78 MMHG | HEART RATE: 85 BPM | TEMPERATURE: 99 F

## 2018-09-07 LAB
BACTERIA BLD CULT: SIGNIFICANT CHANGE UP
BACTERIA BLD CULT: SIGNIFICANT CHANGE UP
BLD GP AB SCN SERPL QL: NEGATIVE — SIGNIFICANT CHANGE UP
BUN SERPL-MCNC: 12 MG/DL — SIGNIFICANT CHANGE UP (ref 7–23)
CALCIUM SERPL-MCNC: 8.7 MG/DL — SIGNIFICANT CHANGE UP (ref 8.4–10.5)
CHLORIDE SERPL-SCNC: 106 MMOL/L — SIGNIFICANT CHANGE UP (ref 98–107)
CO2 SERPL-SCNC: 22 MMOL/L — SIGNIFICANT CHANGE UP (ref 22–31)
CREAT SERPL-MCNC: 1.04 MG/DL — SIGNIFICANT CHANGE UP (ref 0.5–1.3)
GLUCOSE SERPL-MCNC: 116 MG/DL — HIGH (ref 70–99)
HCT VFR BLD CALC: 25.6 % — LOW (ref 39–50)
HGB BLD-MCNC: 8.7 G/DL — LOW (ref 13–17)
MAGNESIUM SERPL-MCNC: 2.2 MG/DL — SIGNIFICANT CHANGE UP (ref 1.6–2.6)
MCHC RBC-ENTMCNC: 31.9 PG — SIGNIFICANT CHANGE UP (ref 27–34)
MCHC RBC-ENTMCNC: 34 % — SIGNIFICANT CHANGE UP (ref 32–36)
MCV RBC AUTO: 93.8 FL — SIGNIFICANT CHANGE UP (ref 80–100)
NRBC # FLD: 0 — SIGNIFICANT CHANGE UP
PHOSPHATE SERPL-MCNC: 3.2 MG/DL — SIGNIFICANT CHANGE UP (ref 2.5–4.5)
PLATELET # BLD AUTO: 176 K/UL — SIGNIFICANT CHANGE UP (ref 150–400)
PMV BLD: 9.9 FL — SIGNIFICANT CHANGE UP (ref 7–13)
POTASSIUM SERPL-MCNC: 4.4 MMOL/L — SIGNIFICANT CHANGE UP (ref 3.5–5.3)
POTASSIUM SERPL-SCNC: 4.4 MMOL/L — SIGNIFICANT CHANGE UP (ref 3.5–5.3)
RBC # BLD: 2.73 M/UL — LOW (ref 4.2–5.8)
RBC # FLD: 15.6 % — HIGH (ref 10.3–14.5)
RH IG SCN BLD-IMP: POSITIVE — SIGNIFICANT CHANGE UP
SODIUM SERPL-SCNC: 141 MMOL/L — SIGNIFICANT CHANGE UP (ref 135–145)
WBC # BLD: 2.99 K/UL — LOW (ref 3.8–10.5)
WBC # FLD AUTO: 2.99 K/UL — LOW (ref 3.8–10.5)

## 2018-09-07 RX ORDER — ACETAMINOPHEN 500 MG
2 TABLET ORAL
Qty: 0 | Refills: 0 | COMMUNITY
Start: 2018-09-07

## 2018-09-07 RX ORDER — OXYCODONE HYDROCHLORIDE 5 MG/1
1 TABLET ORAL
Qty: 12 | Refills: 0 | OUTPATIENT
Start: 2018-09-07 | End: 2018-09-09

## 2018-09-07 RX ORDER — DEXAMETHASONE 0.5 MG/5ML
0 ELIXIR ORAL
Qty: 0 | Refills: 0 | COMMUNITY

## 2018-09-07 RX ADMIN — Medication 81 MILLIGRAM(S): at 12:12

## 2018-09-07 RX ADMIN — CARVEDILOL PHOSPHATE 25 MILLIGRAM(S): 80 CAPSULE, EXTENDED RELEASE ORAL at 05:53

## 2018-09-07 RX ADMIN — LISINOPRIL 20 MILLIGRAM(S): 2.5 TABLET ORAL at 05:53

## 2018-09-07 RX ADMIN — ENOXAPARIN SODIUM 40 MILLIGRAM(S): 100 INJECTION SUBCUTANEOUS at 12:12

## 2018-09-07 RX ADMIN — Medication 400 MILLIGRAM(S): at 12:12

## 2018-09-07 NOTE — DISCHARGE NOTE ADULT - CONDITIONS AT DISCHARGE
vss. patient in agreement with discharge. ostomy teaching done. ostomy nurse came and spoke with patient, supplies given.

## 2018-09-07 NOTE — DISCHARGE NOTE ADULT - HOSPITAL COURSE
52M w/ PMH Multiple myeloma s/p bone marrow transplant approx. 1 year ago, HF (EF ~ 15%) with presented with progressive severe diffuse abdominal pain associated with diaphoresis and diarrhea x 1 nonbloody he was found to have perforated diverticulitis, patient went to the OR urgently and is s/p exploratory lap with sigmoid resection and end colostomy. Post op patients diet was slowly advanced as tolerated.  At this time, pt is tolerating a regular diet, ambulating and voiding.  Pt has been deemed stable for discharge at this time. 52M w/ PMH Multiple myeloma s/p bone marrow transplant approx. 1 year ago, HF (EF ~ 15%) with presented with progressive severe diffuse abdominal pain associated with diaphoresis and diarrhea x 1 nonbloody he was found to have perforated diverticulitis, patient went to the OR urgently and is s/p exploratory lap with sigmoid resection and end colostomy. Post op patients diet was slowly advanced as tolerated.  He was seen by PT and had no PT needs. At this time, pt is tolerating a regular diet, ambulating and voiding.  Pt has been deemed stable for discharge at this time.

## 2018-09-07 NOTE — DISCHARGE NOTE ADULT - PLAN OF CARE
a/p exploratory lap with sigmoid resection and end colostomy WOUND CARE:  Please keep incisions clean and dry. Please do not Scrub or rub incisions. Do not use lotion or powder on incisions.   BATHING: You may shower and/or sponge bathe. You may use warm soapy water in the shower and rinse, pat dry.  ACTIVITY: No heavy lifting or straining. Otherwise, you may return to your usual level of physical activity. If you are taking narcotic pain medication DO NOT drive a car, operate machinery or make important decisions.  DIET: Return to your usual diet.  NOTIFY YOUR SURGEON IF: You have any bleeding that does not stop, any pus draining from your wound(s), any fever (over 100.4 F) persistent nausea/vomiting, or if your pain is not controlled on your discharge pain medications, unable to urinate.  Please follow up with your primary care physician in one week regarding your hospitalization, bring copies of your discharge paperwork.  Please follow up with your surgeon, Dr. Alcaraz s/p exploratory lap with sigmoid resection and end colostomy

## 2018-09-07 NOTE — DISCHARGE NOTE ADULT - PATIENT PORTAL LINK FT
You can access the [facility] Patient Portal, offered by [facility], by registering with the following website: [email or web address]

## 2018-09-07 NOTE — PROGRESS NOTE ADULT - SUBJECTIVE AND OBJECTIVE BOX
GENERAL SURGERY DAILY PROGRESS NOTE:       Subjective:  Pain controlled. Denies N/V. No ostomy output. Duckworth to be dc'd. NG to suction. POD2 from wang's procedure.         Objective:    PE:  Gen: NAD  Resp: unlabored respirations  Abd: soft, appropriately tender. Midline incision dressing with some strikethrough   Ext: no edema, WWP  Neuro: AAOx3, no focal deficits    Vital Signs Last 24 Hrs  T(C): 36.9 (05 Sep 2018 08:01), Max: 37 (04 Sep 2018 12:18)  T(F): 98.4 (05 Sep 2018 08:01), Max: 98.6 (04 Sep 2018 12:18)  HR: 90 (05 Sep 2018 08:01) (90 - 96)  BP: 126/75 (05 Sep 2018 08:01) (126/75 - 136/80)  BP(mean): --  RR: 18 (05 Sep 2018 08:01) (18 - 20)  SpO2: 98% (05 Sep 2018 08:01) (95% - 100%)    I&O's Detail    04 Sep 2018 07:01  -  05 Sep 2018 07:00  --------------------------------------------------------  IN:    dextrose 5% + sodium chloride 0.45% with potassium chloride 20 mEq/L: 960 mL    IV PiggyBack: 250 mL  Total IN: 1210 mL    OUT:    Indwelling Catheter - Urethral: 1450 mL    Nasoenteral Tube: 400 mL    Voided: 400 mL  Total OUT: 2250 mL    Total NET: -1040 mL      05 Sep 2018 07:01  -  05 Sep 2018 10:12  --------------------------------------------------------  IN:  Total IN: 0 mL    OUT:    Indwelling Catheter - Urethral: 650 mL  Total OUT: 650 mL    Total NET: -650 mL          Daily     Daily     MEDICATIONS  (STANDING):  benzocaine 15 mG/menthol 3.6 mG Lozenge 1 Lozenge Oral four times a day  dextrose 5% + sodium chloride 0.45% with potassium chloride 20 mEq/L 1000 milliLiter(s) (50 mL/Hr) IV Continuous <Continuous>  enoxaparin Injectable 40 milliGRAM(s) SubCutaneous daily  influenza   Vaccine 0.5 milliLiter(s) IntraMuscular once  metoprolol tartrate Injectable 5 milliGRAM(s) IV Push every 6 hours  piperacillin/tazobactam IVPB. 3.375 Gram(s) IV Intermittent every 8 hours    MEDICATIONS  (PRN):  HYDROmorphone  Injectable 1 milliGRAM(s) IV Push every 6 hours PRN Severe Pain (7 - 10)  HYDROmorphone  Injectable 0.5 milliGRAM(s) IV Push every 6 hours PRN Moderate Pain (4 - 6)      LABS:                        9.2    3.56  )-----------( 181      ( 05 Sep 2018 06:12 )             27.3     09-05    143  |  108<H>  |  14  ----------------------------<  132<H>  4.0   |  24  |  1.03    Ca    8.7      05 Sep 2018 06:12  Phos  2.9     09-05  Mg     2.5     09-05            RADIOLOGY & ADDITIONAL STUDIES:
Morning Surgical Progress Note    SUBJECTIVE: Patient seen and examined at bedside with surgical team, patient without complaints this am    Vital Signs Last 24 Hrs  T(C): 36.8 (06 Sep 2018 16:17), Max: 37.2 (05 Sep 2018 23:43)  T(F): 98.2 (06 Sep 2018 16:17), Max: 98.9 (05 Sep 2018 23:43)  HR: 90 (06 Sep 2018 16:17) (85 - 98)  BP: 136/77 (06 Sep 2018 16:17) (124/75 - 136/77)  BP(mean): --  RR: 18 (06 Sep 2018 16:17) (18 - 19)  SpO2: 97% (06 Sep 2018 16:17) (96% - 98%)I&O's Detail    05 Sep 2018 07:01  -  06 Sep 2018 07:00  --------------------------------------------------------  IN:    dextrose 5% + sodium chloride 0.45% with potassium chloride 20 mEq/L: 450 mL  Total IN: 450 mL    OUT:    Indwelling Catheter - Urethral: 650 mL    Nasoenteral Tube: 50 mL    Voided: 1900 mL  Total OUT: 2600 mL    Total NET: -2150 mL      06 Sep 2018 07:01  -  06 Sep 2018 16:47  --------------------------------------------------------  IN:    dextrose 5% + sodium chloride 0.45% with potassium chloride 20 mEq/L: 400 mL    IV PiggyBack: 100 mL  Total IN: 500 mL    OUT:    Colostomy: 25 mL    Voided: 1000 mL  Total OUT: 1025 mL    Total NET: -525 mL      MEDICATIONS  (STANDING):  acyclovir   Tablet 400 milliGRAM(s) Oral daily  carvedilol 25 milliGRAM(s) Oral every 12 hours  dextrose 5% + sodium chloride 0.45% with potassium chloride 20 mEq/L 1000 milliLiter(s) (50 mL/Hr) IV Continuous <Continuous>  enoxaparin Injectable 40 milliGRAM(s) SubCutaneous daily  influenza   Vaccine 0.5 milliLiter(s) IntraMuscular once  piperacillin/tazobactam IVPB. 3.375 Gram(s) IV Intermittent every 8 hours    MEDICATIONS  (PRN):  acetaminophen   Tablet .. 650 milliGRAM(s) Oral every 6 hours PRN Mild Pain (1 - 3)  oxyCODONE    IR 5 milliGRAM(s) Oral every 4 hours PRN Moderate Pain (4 - 6)  oxyCODONE    IR 10 milliGRAM(s) Oral every 4 hours PRN Severe Pain (7 - 10)      Physical Exam  General: A&Ox3, NAD  Abdominal: soft nontender, left sided ostomy with bowel sweat no gas    LABS:                        8.3    2.84  )-----------( 179      ( 06 Sep 2018 06:26 )             24.7     09-06    142  |  109<H>  |  15  ----------------------------<  128<H>  4.0   |  21<L>  |  0.99    Ca    8.7      06 Sep 2018 06:26  Phos  3.7     09-06  Mg     2.4     09-06
Morning Surgical Progress Note    SUBJECTIVE: Patient seen and examined at bedside with surgical team, patient without complaints. He denies pain, he is tolerating a clear diet, he would like to go home    Vital Signs Last 24 Hrs  T(C): 36.9 (07 Sep 2018 05:50), Max: 37.3 (07 Sep 2018 00:07)  T(F): 98.5 (07 Sep 2018 05:50), Max: 99.1 (07 Sep 2018 00:07)  HR: 89 (07 Sep 2018 05:50) (85 - 98)  BP: 120/79 (07 Sep 2018 05:50) (120/79 - 144/79)  BP(mean): --  RR: 18 (07 Sep 2018 05:50) (18 - 19)  SpO2: 97% (07 Sep 2018 05:50) (96% - 98%)I&O's Detail    05 Sep 2018 07:01  -  06 Sep 2018 07:00  --------------------------------------------------------  IN:    dextrose 5% + sodium chloride 0.45% with potassium chloride 20 mEq/L: 450 mL  Total IN: 450 mL    OUT:    Indwelling Catheter - Urethral: 650 mL    Nasoenteral Tube: 50 mL    Voided: 1900 mL  Total OUT: 2600 mL    Total NET: -2150 mL      06 Sep 2018 07:01  -  07 Sep 2018 06:20  --------------------------------------------------------  IN:    dextrose 5% + sodium chloride 0.45% with potassium chloride 20 mEq/L: 550 mL    IV PiggyBack: 100 mL  Total IN: 650 mL    OUT:    Colostomy: 75 mL    Voided: 1600 mL  Total OUT: 1675 mL    Total NET: -1025 mL      MEDICATIONS  (STANDING):  acyclovir   Tablet 400 milliGRAM(s) Oral daily  aspirin  chewable 81 milliGRAM(s) Oral daily  carvedilol 25 milliGRAM(s) Oral every 12 hours  dextrose 5% + sodium chloride 0.45% with potassium chloride 20 mEq/L 1000 milliLiter(s) (50 mL/Hr) IV Continuous <Continuous>  enoxaparin Injectable 40 milliGRAM(s) SubCutaneous daily  influenza   Vaccine 0.5 milliLiter(s) IntraMuscular once  lisinopril 20 milliGRAM(s) Oral daily    MEDICATIONS  (PRN):  acetaminophen   Tablet .. 650 milliGRAM(s) Oral every 6 hours PRN Mild Pain (1 - 3)  oxyCODONE    IR 5 milliGRAM(s) Oral every 4 hours PRN Moderate Pain (4 - 6)  oxyCODONE    IR 10 milliGRAM(s) Oral every 4 hours PRN Severe Pain (7 - 10)      Physical Exam  General: A&Ox3, NAD  Abdominal: mildly distended, ostomy with stool and gas    LABS:                        8.3    2.84  )-----------( 179      ( 06 Sep 2018 06:26 )             24.7     09-06    142  |  109<H>  |  15  ----------------------------<  128<H>  4.0   |  21<L>  |  0.99    Ca    8.7      06 Sep 2018 06:26  Phos  3.7     09-06  Mg     2.4     09-06
S: Patient underwent ex-lap, sigmoid resection w/ end colostomy and tolerated procedure without issue and sent to PACU then floors.  Patient denies chest pain, shortness of breath, nausea, vomiting, lightheadedness, or dizziness. Pain was well controlled.      O:T(C): 36.3 (09-03-18 @ 11:09), Max: 36.6 (09-03-18 @ 04:00)  HR: 92 (09-03-18 @ 11:09) (82 - 92)  BP: 109/71 (09-03-18 @ 11:09) (109/71 - 118/84)  RR: 19 (09-03-18 @ 11:09) (15 - 19)  SpO2: 98% (09-03-18 @ 11:09) (96% - 99%)  Wt(kg): --                        9.9    3.67  )-----------( 175      ( 03 Sep 2018 03:10 )             28.7        09-03    140  |  105  |  11  ----------------------------<  204<H>  3.8   |  21<L>  |  1.03    Ca    7.7<L>      03 Sep 2018 03:10      Gen: NAD  Resp: breathing comfortably   Abd: Soft, nontender, nondistended.  No palpable masses/hematomas. Midline incision c/d/i. ostomy in place pink/viable with bowel sweat  : quintero in place      Assessment/Plan:    52M w/ PMH Multiple myeloma s/p bone marrow transplant approx. 1 year ago, HF (EF ~ 15%) with perforated diverticulitis, now POD#1 s/p ex-lap, Hartmanns.     - Abx: zosyn   - F/u hematologist for chemo recs  - Pain control  - NPO/NGT  - Monitor bowel function  - Quintero  - IVF  - DVT ppx
GENERAL SURGERY DAILY PROGRESS NOTE:       Subjective:  Patient continuing to have intense pain- medications adjusted. Denies bowel function.         Objective:    PE:  Gen: NAD  Resp:respirations unlabored  Abd: soft, ND, appropriately tender, incision c/d/i, ostomy site without output   Neuro: AAOx3, no focal deficits    Vital Signs Last 24 Hrs  T(C): 37 (04 Sep 2018 12:18), Max: 37.4 (04 Sep 2018 06:07)  T(F): 98.6 (04 Sep 2018 12:18), Max: 99.4 (04 Sep 2018 06:07)  HR: 91 (04 Sep 2018 12:18) (91 - 97)  BP: 126/78 (04 Sep 2018 12:18) (111/72 - 126/81)  BP(mean): --  RR: 20 (04 Sep 2018 12:18) (18 - 20)  SpO2: 100% (04 Sep 2018 12:18) (96% - 100%)    I&O's Detail    03 Sep 2018 07:01  -  04 Sep 2018 07:00  --------------------------------------------------------  IN:  Total IN: 0 mL    OUT:    Indwelling Catheter - Urethral: 2015 mL    Nasoenteral Tube: 500 mL  Total OUT: 2515 mL    Total NET: -2515 mL      04 Sep 2018 07:01  -  04 Sep 2018 12:33  --------------------------------------------------------  IN:    dextrose 5% + sodium chloride 0.45% with potassium chloride 20 mEq/L: 210 mL    IV PiggyBack: 250 mL  Total IN: 460 mL    OUT:    Indwelling Catheter - Urethral: 350 mL  Total OUT: 350 mL    Total NET: 110 mL          Daily     Daily     MEDICATIONS  (STANDING):  benzocaine 15 mG/menthol 3.6 mG Lozenge 1 Lozenge Oral four times a day  dextrose 5% + sodium chloride 0.45% with potassium chloride 20 mEq/L 1000 milliLiter(s) (50 mL/Hr) IV Continuous <Continuous>  enoxaparin Injectable 40 milliGRAM(s) SubCutaneous daily  influenza   Vaccine 0.5 milliLiter(s) IntraMuscular once  metoprolol tartrate Injectable 5 milliGRAM(s) IV Push every 6 hours  piperacillin/tazobactam IVPB. 3.375 Gram(s) IV Intermittent every 8 hours    MEDICATIONS  (PRN):  HYDROmorphone  Injectable 1 milliGRAM(s) IV Push every 6 hours PRN Severe Pain (7 - 10)  HYDROmorphone  Injectable 0.5 milliGRAM(s) IV Push every 6 hours PRN Moderate Pain (4 - 6)      LABS:                        9.3    4.42  )-----------( 168      ( 04 Sep 2018 06:50 )             27.0     09-04    141  |  106  |  14  ----------------------------<  178<H>  4.0   |  24  |  1.02    Ca    8.1<L>      04 Sep 2018 06:50  Phos  2.2     09-04  Mg     2.6     09-04    TPro  6.6  /  Alb  3.4  /  TBili  0.9  /  DBili  x   /  AST  9   /  ALT  10  /  AlkPhos  70  09-02    PT/INR - ( 02 Sep 2018 22:44 )   PT: 13.5 SEC;   INR: 1.21          PTT - ( 02 Sep 2018 22:44 )  PTT:27.2 SEC  Urinalysis Basic - ( 02 Sep 2018 23:00 )    Color: YELLOW / Appearance: CLEAR / SG: > 1.050 / pH: 6.5  Gluc: LARGE / Ketone: NEGATIVE  / Bili: NEGATIVE / Urobili: NORMAL   Blood: NEGATIVE / Protein: MODERATE / Nitrite: NEGATIVE   Leuk Esterase: NEGATIVE / RBC: 3-5 / WBC 0-2   Sq Epi: OCC / Non Sq Epi: x / Bacteria: NEGATIVE        RADIOLOGY & ADDITIONAL STUDIES:

## 2018-09-07 NOTE — PROGRESS NOTE ADULT - ASSESSMENT
52M w/ PMH Multiple myeloma s/p bone marrow transplant approx. 1 year ago, HF (EF ~ 15%) with perforated diverticulitis, now POD#4 s/p ex-lap, Hartmanns tolerating clears doing well    - Abx: zosyn   - No chemo at this time   - Pain control  - advance diet to regular  - IVL  - Ostomy care teaching  - Out of bed  - D/c home today if tolerates a diet  - D/w B team    General Surgery- B Team  [id number]

## 2018-09-07 NOTE — DISCHARGE NOTE ADULT - HOME CARE AGENCY
[facility] [phone] initial visit will be day after discharge home. A nurse will call prior to the home visit.

## 2018-09-07 NOTE — DISCHARGE NOTE ADULT - CARE PROVIDER_API CALL
Aidan Alcaraz), Surgery; Surgical Critical Care  [id number] [address] [place] [address]  Phone: [phone]  Fax: [phone]

## 2018-09-07 NOTE — DISCHARGE NOTE ADULT - MEDICATION SUMMARY - MEDICATIONS TO TAKE
I will START or STAY ON the medications listed below when I get home from the hospital:    aspirin 81 mg oral tablet  -- 1 tab(s) by mouth once a day  -- Indication: For Antiplatelet    acetaminophen 325 mg oral tablet  -- 2 tab(s) by mouth every 6 hours, As needed, Mild Pain (1 - 3)  -- Indication: For Pain    oxyCODONE 5 mg oral tablet  -- 1 tab(s) by mouth every 6 hours as needed for severe pain MDD:4   -- Caution federal law prohibits the transfer of this drug to any person other  than the person for whom it was prescribed.  It is very important that you take or use this exactly as directed.  Do not skip doses or discontinue unless directed by your doctor.  May cause drowsiness.  Alcohol may intensify this effect.  Use care when operating dangerous machinery.  This prescription cannot be refilled.  Using more of this medication than prescribed may cause serious breathing problems.    -- Indication: For Severe pain    lisinopril 20 mg oral tablet  -- 1 tab(s) by mouth once a day  -- Indication: For Hypertension    acyclovir 400 mg oral tablet  -- 1 tab(s) by mouth once a day  -- Indication: For Antiviral    carvedilol 25 mg oral tablet  -- 1 tab(s) by mouth 2 times a day  -- Indication: For Hypertension    Neupogen  --  injectable , As Needed  -- Indication: For Home med

## 2018-09-08 LAB — CULTURE - SURGICAL SITE: SIGNIFICANT CHANGE UP

## 2018-09-09 ENCOUNTER — EMERGENCY (EMERGENCY)
Facility: HOSPITAL | Age: 53
LOS: 1 days | Discharge: HOME | End: 2018-09-09
Attending: EMERGENCY MEDICINE | Admitting: EMERGENCY MEDICINE
Payer: COMMERCIAL

## 2018-09-09 VITALS
OXYGEN SATURATION: 100 % | RESPIRATION RATE: 17 BRPM | SYSTOLIC BLOOD PRESSURE: 133 MMHG | DIASTOLIC BLOOD PRESSURE: 84 MMHG | TEMPERATURE: 98 F | HEART RATE: 89 BPM

## 2018-09-09 VITALS
SYSTOLIC BLOOD PRESSURE: 126 MMHG | DIASTOLIC BLOOD PRESSURE: 79 MMHG | RESPIRATION RATE: 18 BRPM | HEART RATE: 95 BPM | TEMPERATURE: 99 F | OXYGEN SATURATION: 99 %

## 2018-09-09 LAB
ALBUMIN SERPL ELPH-MCNC: 3.5 G/DL — SIGNIFICANT CHANGE UP (ref 3.3–5)
ALP SERPL-CCNC: 73 U/L — SIGNIFICANT CHANGE UP (ref 40–120)
ALT FLD-CCNC: 25 U/L — SIGNIFICANT CHANGE UP (ref 4–41)
AST SERPL-CCNC: 23 U/L — SIGNIFICANT CHANGE UP (ref 4–40)
BASE EXCESS BLDV CALC-SCNC: -0.3 MMOL/L — SIGNIFICANT CHANGE UP
BASOPHILS # BLD AUTO: 0.02 K/UL — SIGNIFICANT CHANGE UP (ref 0–0.2)
BASOPHILS NFR BLD AUTO: 0.5 % — SIGNIFICANT CHANGE UP (ref 0–2)
BILIRUB SERPL-MCNC: 0.3 MG/DL — SIGNIFICANT CHANGE UP (ref 0.2–1.2)
BLOOD GAS VENOUS - CREATININE: SIGNIFICANT CHANGE UP MG/DL (ref 0.5–1.3)
BUN SERPL-MCNC: 8 MG/DL — SIGNIFICANT CHANGE UP (ref 7–23)
CALCIUM SERPL-MCNC: 8.9 MG/DL — SIGNIFICANT CHANGE UP (ref 8.4–10.5)
CHLORIDE BLDV-SCNC: 108 MMOL/L — SIGNIFICANT CHANGE UP (ref 96–108)
CHLORIDE SERPL-SCNC: 104 MMOL/L — SIGNIFICANT CHANGE UP (ref 98–107)
CO2 SERPL-SCNC: 23 MMOL/L — SIGNIFICANT CHANGE UP (ref 22–31)
CREAT SERPL-MCNC: 0.92 MG/DL — SIGNIFICANT CHANGE UP (ref 0.5–1.3)
EOSINOPHIL # BLD AUTO: 0.05 K/UL — SIGNIFICANT CHANGE UP (ref 0–0.5)
EOSINOPHIL NFR BLD AUTO: 1.4 % — SIGNIFICANT CHANGE UP (ref 0–6)
GAS PNL BLDV: 139 MMOL/L — SIGNIFICANT CHANGE UP (ref 136–146)
GLUCOSE BLDV-MCNC: 156 — HIGH (ref 70–99)
GLUCOSE SERPL-MCNC: 153 MG/DL — HIGH (ref 70–99)
HCO3 BLDV-SCNC: 23 MMOL/L — SIGNIFICANT CHANGE UP (ref 20–27)
HCT VFR BLD CALC: 27.3 % — LOW (ref 39–50)
HCT VFR BLDV CALC: 29 % — LOW (ref 39–51)
HGB BLD-MCNC: 8.9 G/DL — LOW (ref 13–17)
HGB BLDV-MCNC: 9.4 G/DL — LOW (ref 13–17)
IMM GRANULOCYTES # BLD AUTO: 0.02 # — SIGNIFICANT CHANGE UP
IMM GRANULOCYTES NFR BLD AUTO: 0.5 % — SIGNIFICANT CHANGE UP (ref 0–1.5)
LACTATE BLDV-MCNC: 1.8 MMOL/L — SIGNIFICANT CHANGE UP (ref 0.5–2)
LYMPHOCYTES # BLD AUTO: 1.04 K/UL — SIGNIFICANT CHANGE UP (ref 1–3.3)
LYMPHOCYTES # BLD AUTO: 28.4 % — SIGNIFICANT CHANGE UP (ref 13–44)
MCHC RBC-ENTMCNC: 30.5 PG — SIGNIFICANT CHANGE UP (ref 27–34)
MCHC RBC-ENTMCNC: 32.6 % — SIGNIFICANT CHANGE UP (ref 32–36)
MCV RBC AUTO: 93.5 FL — SIGNIFICANT CHANGE UP (ref 80–100)
MONOCYTES # BLD AUTO: 0.49 K/UL — SIGNIFICANT CHANGE UP (ref 0–0.9)
MONOCYTES NFR BLD AUTO: 13.4 % — SIGNIFICANT CHANGE UP (ref 2–14)
NEUTROPHILS # BLD AUTO: 2.04 K/UL — SIGNIFICANT CHANGE UP (ref 1.8–7.4)
NEUTROPHILS NFR BLD AUTO: 55.8 % — SIGNIFICANT CHANGE UP (ref 43–77)
NRBC # FLD: 0 — SIGNIFICANT CHANGE UP
PCO2 BLDV: 43 MMHG — SIGNIFICANT CHANGE UP (ref 41–51)
PH BLDV: 7.37 PH — SIGNIFICANT CHANGE UP (ref 7.32–7.43)
PLATELET # BLD AUTO: 227 K/UL — SIGNIFICANT CHANGE UP (ref 150–400)
PMV BLD: 10.6 FL — SIGNIFICANT CHANGE UP (ref 7–13)
PO2 BLDV: 24 MMHG — LOW (ref 35–40)
POTASSIUM BLDV-SCNC: 3.6 MMOL/L — SIGNIFICANT CHANGE UP (ref 3.4–4.5)
POTASSIUM SERPL-MCNC: 3.6 MMOL/L — SIGNIFICANT CHANGE UP (ref 3.5–5.3)
POTASSIUM SERPL-SCNC: 3.6 MMOL/L — SIGNIFICANT CHANGE UP (ref 3.5–5.3)
PROT SERPL-MCNC: 6.9 G/DL — SIGNIFICANT CHANGE UP (ref 6–8.3)
RBC # BLD: 2.92 M/UL — LOW (ref 4.2–5.8)
RBC # FLD: 15.6 % — HIGH (ref 10.3–14.5)
SAO2 % BLDV: 31.2 % — LOW (ref 60–85)
SODIUM SERPL-SCNC: 141 MMOL/L — SIGNIFICANT CHANGE UP (ref 135–145)
WBC # BLD: 3.66 K/UL — LOW (ref 3.8–10.5)
WBC # FLD AUTO: 3.66 K/UL — LOW (ref 3.8–10.5)

## 2018-09-09 PROCEDURE — 99284 EMERGENCY DEPT VISIT MOD MDM: CPT

## 2018-09-09 PROCEDURE — 74177 CT ABD & PELVIS W/CONTRAST: CPT | Mod: 26

## 2018-09-09 RX ORDER — AZTREONAM 2 G
1 VIAL (EA) INJECTION
Qty: 14 | Refills: 0 | OUTPATIENT
Start: 2018-09-09 | End: 2018-09-15

## 2018-09-09 NOTE — ED ADULT TRIAGE NOTE - CHIEF COMPLAINT QUOTE
SP exploratory lap with sigmoid resection and colostomy placement 1 week ago and dc 2 days ago. Pt has + midline healing incision with redness around surgical site and some drainage. Warm to touch. Denies fever. Hx: heart failure

## 2018-09-09 NOTE — ED PROVIDER NOTE - OBJECTIVE STATEMENT
51 Y/O F PMH Heart failure, Multiple Myeloma presents with 53 Y/O F PMH Heart failure, Multiple Myeloma S/P exploratory laparotomy and subsequent Delacruz's procedure for perforated diverticulitis presents with discharge, redness and warmth at his incisional site which began today (procedure was 7 days ago). He denies systemic symptoms such as fever, chills or nightsweats. He has been passing gas and brown stool from his colostomy site. He denies any other symptoms or acute changes such as CP SOB ABD PN N V D Dizz or any other acute changes. 53 Y/O F PMH Heart failure, Multiple Myeloma S/P exploratory laparotomy and subsequent Delacruz's procedure for perforated diverticulitis presents with discharge, redness and warmth at his incisional site which began today (procedure was 7 days ago). He denies systemic symptoms such as fever, chills or nightsweats. He has been passing gas and brown stool from his colostomy site. He denies any other symptoms or acute changes such as CP SOB ABD PN N V D Dizz or any other acute changes.    Attendinyo male presents with drainage from surgical wound site.  pt had surgery for perforated diverticulitis 1 week ago.  discharged from this hospital 2 days ago.  had drainage this AM which was serous.  no fever.  no pain now.  no pus drained.

## 2018-09-09 NOTE — ED ADULT NURSE NOTE - NSIMPLEMENTINTERV_GEN_ALL_ED
Implemented All Fall Risk Interventions:  [place] to call system. Call bell, personal items and telephone within reach. Instruct patient to call for assistance. Room bathroom lighting operational. Non-slip footwear when patient is off stretcher. Physically safe environment: no spills, clutter or unnecessary equipment. Stretcher in lowest position, wheels locked, appropriate side rails in place. Provide visual cue, wrist band, yellow gown, etc. Monitor gait and stability. Monitor for mental status changes and reorient to person, place, and time. Review medications for side effects contributing to fall risk. Reinforce activity limits and safety measures with patient and family.

## 2018-09-09 NOTE — CONSULT NOTE ADULT - SUBJECTIVE AND OBJECTIVE BOX
53yo M with PMHx MM s/p autologous bone marrow transplant, recent admission for perforated diverticulitis ([place] 3) s/p Kiah's, now presents two days following discharge with redness, swelling in midline incision and some discharge in the inferior aspect of the wound. Discharge is described as clear, red, not milky, not purulent, not foul-smelling, low-volume. He has otherwise been eating well, no fevers, colostomy functioning well. He complains of intermittent pain around the ostomy relieved with gas passing into the bag.     PMH  Former smoker  Heart failure    PSH  Kiah's  Bone marrow transplant    Allergies  No Known Allergies      Physical Exam  T(C): 36.7 (09-09-18 @ 15:19), Max: 37 (09-09-18 @ 11:04)  HR: 89 (09-09-18 @ 15:19) (89 - 95)  BP: 133/84 (09-09-18 @ 15:19) (126/79 - 153/86)  RR: 17 (09-09-18 @ 15:19) (16 - 18)  SpO2: 100% (09-09-18 @ 15:19) (98% - 100%)  Wt(kg): --  Tmax: T(C): , Max: 37 (09-09-18 @ 11:04)  Wt(kg): --    Gen: NAD  HEENT: normocephalic, atraumatic, no scleral icterus  CV: S1, S2, RRR  Pulm: CTA B/L  Abd: Soft, ND, maria t-incisional tenderness, midline stapled incision, some induration around the umbilicus, very small amount of serosanguinous discharge expressed from inferior aspect. No pus. No rebound, no guarding, no palpable organomegaly/masses. Ostomy pink/viable with soft stool and gas in bag  Ext: warm, no edema      Labs:                        8.9    3.66  )-----------( 227      ( 09 Sep 2018 12:55 )             27.3     09-09    141  |  104  |  8   ----------------------------<  153<H>  3.6   |  23  |  0.92    Ca    8.9      09 Sep 2018 12:55    TPro  6.9  /  Alb  3.5  /  TBili  0.3  /  DBili  x   /  AST  23  /  ALT  25  /  AlkPhos  73  09-09      Imaging    < from: CT Abdomen and Pelvis w/ IV Cont (09.09.18 @ 13:34) >  FINDINGS:    LOWER CHEST: Small bilateral pleural effusions. Trace pericardial   effusion. Mild cardiomegaly.    LIVER: Within normal limits.  BILE DUCTS: Normal caliber.  GALLBLADDER: Within normal limits.  SPLEEN: Within normal limits.  PANCREAS: Within normal limits.  ADRENALS: Within normal limits.  KIDNEYS/URETERS: Within normal limits.    BLADDER: Under distended.  REPRODUCTIVE ORGANS: The prostate is enlarged.    BOWEL: Status post Delacruz's procedure with colostomy. There is a small   loculation of fluid in the left lower quadrant. Reference measurement is   3.5 x 3.6 cm on image 86. No bowel obstruction. Colonic diverticulosis.  PERITONEUM: Trace ascites.  VESSELS:  Within normal limits.  RETROPERITONEUM: No lymphadenopathy.    ABDOMINAL WALL: Postoperative changes without abscess.  BONES: Degenerative changes. Loss of height involving multiple   thoracolumbar vertebral bodies.    IMPRESSION:     A few small loculations of fluid in the pelvis.    Small bilateral pleural effusions and trace pericardial effusion.    < end of copied text >

## 2018-09-09 NOTE — ED PROVIDER NOTE - PLAN OF CARE
Follow up with your primary doctor and surgeon as soon as possible. Return to the Emergency Department if you develop abdominal pain, fever, chills, nightsweats chest pain shortness of breath or any other new, persistent or worsening symptoms. Rest, drink plenty of fluids.  Advance activity as tolerated.  Continue all previously prescribed medications as directed.  Follow up with your primary care physician in 48-72 hours- bring copies of your results.  Return to the ER for worsening or persistent symptoms, and/or ANY NEW OR CONCERNING SYMPTOMS. If you have issues obtaining follow up, please call: [phone] to obtain a doctor or specialist who takes your insurance in your area.  You may call [phone] to make an appointment with the internal medicine clinic.

## 2018-09-09 NOTE — ED PROVIDER NOTE - MEDICAL DECISION MAKING DETAILS
Pt C/O 1 day of serous discharge and erythema, pt feels well systemically and has no fever, chills, nightsweats CP SOB ABD PN N V D Dizz or any other acute symptoms. plan is labwork and surgery evaluation.

## 2018-09-09 NOTE — ED PROVIDER NOTE - CARE PLAN
Principal Discharge DX:	Abdominal pain  Assessment and plan of treatment:	Follow up with your primary doctor and surgeon as soon as possible. Return to the Emergency Department if you develop abdominal pain, fever, chills, nightsweats chest pain shortness of breath or any other new, persistent or worsening symptoms. Rest, drink plenty of fluids.  Advance activity as tolerated.  Continue all previously prescribed medications as directed.  Follow up with your primary care physician in 48-72 hours- bring copies of your results.  Return to the ER for worsening or persistent symptoms, and/or ANY NEW OR CONCERNING SYMPTOMS. If you have issues obtaining follow up, please call: [phone] to obtain a doctor or specialist who takes your insurance in your area.  You may call [phone] to make an appointment with the internal medicine clinic.

## 2018-09-09 NOTE — ED PROVIDER NOTE - PROGRESS NOTE DETAILS
BRISSA Yi: Surgery contacted, will evaluate patient in ER. BRISSA Yi: surgery states pt should F/U with surgeon outpatient and be given Bactrim DS BID until then, no acute surgical management needed. Pt still denies pain, fever, chills, nightsweats or other symptoms.

## 2018-09-09 NOTE — CONSULT NOTE ADULT - ASSESSMENT
51yo M with incisional pain, swelling, found to have intra-abdominal fluid collection. No abscesses.  - No acute surgical intervention at this time  - Patient with no systemic symptoms. Would not elect to drain intra-abdominal collection at this time.  - Would treat superficial cellulitis with PO abx (bactrim DS)  - F/u with Dr. Aidan Alcaraz in office within 1-2 weeks  - D/w attending surgeon on call    B team  [id number]

## 2018-09-09 NOTE — ED ADULT NURSE REASSESSMENT NOTE - NS ED NURSE REASSESS COMMENT FT1
handoff received from day RN- pt currently vitally stable, awake, a/ox3, in NAD- states he has mininal pain to incision site because he sneezed multiple times, but states the pain is currently subsiding- given new colostomy bag to change- pt comfortable with family at bedside

## 2018-09-09 NOTE — ED ADULT NURSE NOTE - OBJECTIVE STATEMENT
Pt presents to room14, A&Ox3, ambulatory at baseline without assistance, pmhx of heart failure, diverticulitis with colon resection and colostomy on 9/2/18, here for evaluation of incision site pain and redness, feels as though the colostomy isn't emptying correctly. Denies any chest pain, dizziness, nausea, vomiting, shortness of breath, palpitations, diarrhea, fever, constipation, or chills.

## 2018-09-10 LAB
SPECIMEN SOURCE: SIGNIFICANT CHANGE UP
SPECIMEN SOURCE: SIGNIFICANT CHANGE UP

## 2018-09-14 LAB
BACTERIA BLD CULT: SIGNIFICANT CHANGE UP
BACTERIA BLD CULT: SIGNIFICANT CHANGE UP

## 2018-09-26 ENCOUNTER — APPOINTMENT (OUTPATIENT)
Dept: SURGERY | Facility: CLINIC | Age: 53
End: 2018-09-26
Payer: COMMERCIAL

## 2018-09-26 VITALS
BODY MASS INDEX: 24.38 KG/M2 | DIASTOLIC BLOOD PRESSURE: 88 MMHG | HEART RATE: 88 BPM | TEMPERATURE: 98.4 F | SYSTOLIC BLOOD PRESSURE: 138 MMHG | WEIGHT: 190 LBS | HEIGHT: 74 IN

## 2018-09-26 PROCEDURE — 99024 POSTOP FOLLOW-UP VISIT: CPT

## 2018-10-03 ENCOUNTER — APPOINTMENT (OUTPATIENT)
Dept: CARDIOLOGY | Facility: CLINIC | Age: 53
End: 2018-10-03
Payer: COMMERCIAL

## 2018-10-03 ENCOUNTER — NON-APPOINTMENT (OUTPATIENT)
Age: 53
End: 2018-10-03

## 2018-10-03 VITALS
SYSTOLIC BLOOD PRESSURE: 156 MMHG | HEIGHT: 74 IN | BODY MASS INDEX: 24.64 KG/M2 | HEART RATE: 83 BPM | DIASTOLIC BLOOD PRESSURE: 96 MMHG | OXYGEN SATURATION: 99 % | WEIGHT: 192 LBS

## 2018-10-03 VITALS — SYSTOLIC BLOOD PRESSURE: 152 MMHG | DIASTOLIC BLOOD PRESSURE: 88 MMHG

## 2018-10-03 PROCEDURE — 99214 OFFICE O/P EST MOD 30 MIN: CPT

## 2018-10-03 PROCEDURE — 93000 ELECTROCARDIOGRAM COMPLETE: CPT

## 2018-11-05 ENCOUNTER — APPOINTMENT (OUTPATIENT)
Dept: CARDIOLOGY | Facility: CLINIC | Age: 53
End: 2018-11-05
Payer: COMMERCIAL

## 2018-11-05 ENCOUNTER — NON-APPOINTMENT (OUTPATIENT)
Age: 53
End: 2018-11-05

## 2018-11-05 VITALS
OXYGEN SATURATION: 100 % | DIASTOLIC BLOOD PRESSURE: 93 MMHG | SYSTOLIC BLOOD PRESSURE: 151 MMHG | WEIGHT: 196 LBS | HEART RATE: 86 BPM | BODY MASS INDEX: 25.17 KG/M2

## 2018-11-05 DIAGNOSIS — I34.0 NONRHEUMATIC MITRAL (VALVE) INSUFFICIENCY: ICD-10-CM

## 2018-11-05 PROCEDURE — 93000 ELECTROCARDIOGRAM COMPLETE: CPT

## 2018-11-05 PROCEDURE — 99214 OFFICE O/P EST MOD 30 MIN: CPT

## 2018-11-05 PROCEDURE — 93306 TTE W/DOPPLER COMPLETE: CPT

## 2018-11-05 NOTE — REASON FOR VISIT
[FreeTextEntry1] : This is the second office visit  for this 52-year-old black male who comes in for followup and eventual cardiological clearance prior to having his colostomy reversed in December of 2018. The patient had perforated diverticulitis and had a colostomy. He has been treated for multiple myeloma. He has had a reduced ejection fraction and cardiomyopathy in the past as well as mitral regurgitation. He denies any chest pains, shortness of breath at rest, or palpitations.An echocardiogram performed today shows an ejection fraction of 54% which was within normal limits. There was only mild mitral regurgitation and mild aortic regurgitation. There was mild global left ventricular dysfunction and left ventricular hypertrophy. There was mild diastolic dysfunction.

## 2018-11-05 NOTE — REVIEW OF SYSTEMS
[Negative] : Heme/Lymph [Shortness Of Breath] : no shortness of breath [Chest Pain] : no chest pain [Palpitations] : no palpitations

## 2018-11-05 NOTE — PHYSICAL EXAM
[General Appearance - Well Developed] : well developed [Normal Appearance] : normal appearance [Well Groomed] : well groomed [General Appearance - Well Nourished] : well nourished [No Deformities] : no deformities [General Appearance - In No Acute Distress] : no acute distress [Normal Conjunctiva] : the conjunctiva exhibited no abnormalities [Eyelids - No Xanthelasma] : the eyelids demonstrated no xanthelasmas [Normal Oral Mucosa] : normal oral mucosa [No Oral Pallor] : no oral pallor [No Oral Cyanosis] : no oral cyanosis [Normal Jugular Venous A Waves Present] : normal jugular venous A waves present [Normal Jugular Venous V Waves Present] : normal jugular venous V waves present [No Jugular Venous Ricks A Waves] : no jugular venous ricks A waves [Respiration, Rhythm And Depth] : normal respiratory rhythm and effort [Exaggerated Use Of Accessory Muscles For Inspiration] : no accessory muscle use [Auscultation Breath Sounds / Voice Sounds] : lungs were clear to auscultation bilaterally [Heart Rate And Rhythm] : heart rate and rhythm were normal [Heart Sounds] : normal S1 and S2 [Murmurs] : no murmurs present [Abdomen Soft] : soft [Abdomen Tenderness] : non-tender [Abdomen Mass (___ Cm)] : no abdominal mass palpated [Abnormal Walk] : normal gait [Gait - Sufficient For Exercise Testing] : the gait was sufficient for exercise testing [Nail Clubbing] : no clubbing of the fingernails [Cyanosis, Localized] : no localized cyanosis [Petechial Hemorrhages (___cm)] : no petechial hemorrhages [Skin Color & Pigmentation] : normal skin color and pigmentation [] : no rash [No Venous Stasis] : no venous stasis [Skin Lesions] : no skin lesions [No Skin Ulcers] : no skin ulcer [No Xanthoma] : no  xanthoma was observed [Oriented To Time, Place, And Person] : oriented to person, place, and time [Affect] : the affect was normal [Mood] : the mood was normal [No Anxiety] : not feeling anxious [FreeTextEntry1] : colostomy

## 2018-11-05 NOTE — DISCUSSION/SUMMARY
[FreeTextEntry1] : The patient was examined. His blood pressure was 151/93,and  his pulse was 86. His lungs were clear to auscultation. Cardiac exam was  negative for murmurs rubs or gallops. His abdomen is status post a Delacruz's procedure with colostomy. The remainder of his physical exam is unremarkable. His EKG showed normal sinus rhythm with a tall R-wave in lead V1. This is unchanged from previous.  He will continue his current medications. He'll try to cut down on salt and caffeine. The patient is medically and cardiologically cleared for reversal of his colostomy.

## 2018-11-15 ENCOUNTER — OUTPATIENT (OUTPATIENT)
Dept: OUTPATIENT SERVICES | Facility: HOSPITAL | Age: 53
LOS: 1 days | End: 2018-11-15

## 2018-11-15 ENCOUNTER — APPOINTMENT (OUTPATIENT)
Dept: RADIOLOGY | Facility: HOSPITAL | Age: 53
End: 2018-11-15
Payer: COMMERCIAL

## 2018-11-15 DIAGNOSIS — K22.2 ESOPHAGEAL OBSTRUCTION: ICD-10-CM

## 2018-11-15 DIAGNOSIS — K62.4 STENOSIS OF ANUS AND RECTUM: ICD-10-CM

## 2018-11-15 DIAGNOSIS — K56.699 OTHER INTESTINAL OBSTRUCTION UNSPECIFIED AS TO PARTIAL VERSUS COMPLETE OBSTRUCTION: ICD-10-CM

## 2018-11-15 PROCEDURE — 74270 X-RAY XM COLON 1CNTRST STD: CPT | Mod: 26

## 2018-11-21 ENCOUNTER — APPOINTMENT (OUTPATIENT)
Dept: SURGERY | Facility: CLINIC | Age: 53
End: 2018-11-21

## 2018-11-21 VITALS
SYSTOLIC BLOOD PRESSURE: 131 MMHG | TEMPERATURE: 98 F | BODY MASS INDEX: 35.88 KG/M2 | HEIGHT: 62 IN | DIASTOLIC BLOOD PRESSURE: 83 MMHG | HEART RATE: 79 BPM | WEIGHT: 195 LBS

## 2018-11-27 ENCOUNTER — CHART COPY (OUTPATIENT)
Age: 53
End: 2018-11-27

## 2019-01-02 ENCOUNTER — NON-APPOINTMENT (OUTPATIENT)
Age: 54
End: 2019-01-02

## 2019-01-02 ENCOUNTER — APPOINTMENT (OUTPATIENT)
Dept: CARDIOLOGY | Facility: CLINIC | Age: 54
End: 2019-01-02
Payer: COMMERCIAL

## 2019-01-02 VITALS
WEIGHT: 193 LBS | HEART RATE: 88 BPM | HEIGHT: 74 IN | BODY MASS INDEX: 24.77 KG/M2 | DIASTOLIC BLOOD PRESSURE: 82 MMHG | SYSTOLIC BLOOD PRESSURE: 125 MMHG | OXYGEN SATURATION: 100 %

## 2019-01-02 PROCEDURE — 93000 ELECTROCARDIOGRAM COMPLETE: CPT

## 2019-01-02 PROCEDURE — 99214 OFFICE O/P EST MOD 30 MIN: CPT

## 2019-01-02 NOTE — PHYSICAL EXAM
[General Appearance - Well Developed] : well developed [Normal Appearance] : normal appearance [Well Groomed] : well groomed [General Appearance - Well Nourished] : well nourished [No Deformities] : no deformities [General Appearance - In No Acute Distress] : no acute distress [Normal Conjunctiva] : the conjunctiva exhibited no abnormalities [Eyelids - No Xanthelasma] : the eyelids demonstrated no xanthelasmas [Normal Oral Mucosa] : normal oral mucosa [No Oral Pallor] : no oral pallor [No Oral Cyanosis] : no oral cyanosis [Normal Jugular Venous A Waves Present] : normal jugular venous A waves present [Normal Jugular Venous V Waves Present] : normal jugular venous V waves present [No Jugular Venous Ricks A Waves] : no jugular venous ricks A waves [Respiration, Rhythm And Depth] : normal respiratory rhythm and effort [Exaggerated Use Of Accessory Muscles For Inspiration] : no accessory muscle use [Auscultation Breath Sounds / Voice Sounds] : lungs were clear to auscultation bilaterally [Heart Rate And Rhythm] : heart rate and rhythm were normal [Heart Sounds] : normal S1 and S2 [Murmurs] : no murmurs present [Abdomen Soft] : soft [Abdomen Tenderness] : non-tender [Abdomen Mass (___ Cm)] : no abdominal mass palpated [Abnormal Walk] : normal gait [Gait - Sufficient For Exercise Testing] : the gait was sufficient for exercise testing [Nail Clubbing] : no clubbing of the fingernails [Cyanosis, Localized] : no localized cyanosis [Petechial Hemorrhages (___cm)] : no petechial hemorrhages [Skin Color & Pigmentation] : normal skin color and pigmentation [] : no rash [No Venous Stasis] : no venous stasis [Skin Lesions] : no skin lesions [No Skin Ulcers] : no skin ulcer [No Xanthoma] : no  xanthoma was observed [Oriented To Time, Place, And Person] : oriented to person, place, and time [Affect] : the affect was normal [Mood] : the mood was normal [No Anxiety] : not feeling anxious

## 2019-01-02 NOTE — DISCUSSION/SUMMARY
[FreeTextEntry1] : The patient was examined. His blood pressure was 125/82,and his pulse was 88.. His lungs were clear to auscultation. Cardiac exam is negative for murmurs rubs or gallops. His abdomen is status post a Delacruz's procedure with colostomy. The remainder of his physical exam is unremarkable. His EKG showed normal sinus rhythm with a tall R-wave in lead V1. This is unchanged from previous.He will continue his current medications. He'll try to cut down on salt and caffeine. He will return in 4 months for medical and cardiological clearance prior to having reversal of colostomy and reanastomosis of his colon.

## 2019-01-02 NOTE — REASON FOR VISIT
[FreeTextEntry1] : This is the first office visit in 2 months  for this 53-year-old black male who comes in for followup and eventual cardiological clearance prior to having his colostomy reversed in December of 2018. The patient had perforated diverticulitis and had a colostomy. He has been treated for multiple myeloma. He has had a reduced ejection fraction and cardiomyopathy in the past as well as moderate mitral regurgitation. He requires another echocardiogram evaluation of his left ventricle prior to surgery. He denies any chest pains, shortness of breath at rest, or palpitations.

## 2019-01-30 ENCOUNTER — APPOINTMENT (OUTPATIENT)
Dept: SURGERY | Facility: CLINIC | Age: 54
End: 2019-01-30

## 2019-01-31 ENCOUNTER — APPOINTMENT (OUTPATIENT)
Dept: COLORECTAL SURGERY | Facility: CLINIC | Age: 54
End: 2019-01-31
Payer: COMMERCIAL

## 2019-01-31 VITALS
WEIGHT: 186 LBS | HEIGHT: 74 IN | BODY MASS INDEX: 23.87 KG/M2 | DIASTOLIC BLOOD PRESSURE: 64 MMHG | SYSTOLIC BLOOD PRESSURE: 110 MMHG

## 2019-01-31 DIAGNOSIS — Z93.3 COLOSTOMY STATUS: ICD-10-CM

## 2019-01-31 PROCEDURE — 99244 OFF/OP CNSLTJ NEW/EST MOD 40: CPT

## 2019-01-31 NOTE — ASSESSMENT
[FreeTextEntry1] : 53-year-old male with multiple myeloma status post Kiah's procedure\par -patient is eager for reversal.  However, after long discussion with primary surgeon, his oncologic treatment is not clear.\par -I will call his oncologist to discuss further\par -We'll discuss further with Dr. Alcaraz\par -Will schedule flex sig to evaluate rectal stump\par -Determine treatment plan after discussing further with his medical team

## 2019-01-31 NOTE — CONSULT LETTER
[Dear  ___] : Dear  [unfilled], [Consult Letter:] : I had the pleasure of evaluating your patient, [unfilled]. [( Thank you for referring [unfilled] for consultation for _____ )] : Thank you for referring [unfilled] for consultation for [unfilled] [Please see my note below.] : Please see my note below. [Sincerely,] : Sincerely, [FreeTextEntry2] : Aidan Alcaraz [FreeTextEntry3] : Will Nguyen MD FACS\par Chief Colon and Rectal Surgery\par [facility]

## 2019-01-31 NOTE — REVIEW OF SYSTEMS
[Negative] : Endocrine [FreeTextEntry3] : wears glasses [FreeTextEntry5] : h/o cardiomyopathy [FreeTextEntry7] : s/p perforated diverticulitis, ostomy [de-identified] : Multiple Myeloma

## 2019-01-31 NOTE — HISTORY OF PRESENT ILLNESS
[FreeTextEntry1] : Patient is a 54 yo male here to discuss reversal of ostomy.  He is s/p emergent Kiah's for perforated diverticulitis 9/3/18.  Ostomy is functioning well.  He currently is being treated for multiple myeloma and has had a stem cell transplant in the past.  He was on medication till the surgery and was restarted last week.

## 2019-01-31 NOTE — PHYSICAL EXAM
[Normal Breath Sounds] : Normal breath sounds [Normal Heart Sounds] : normal heart sounds [No Rash or Lesion] : No rash or lesion [Alert] : alert [Oriented to Person] : oriented to person [Oriented to Place] : oriented to place [Oriented to Time] : oriented to time [Calm] : calm [de-identified] : round, +BS, ostomy in place [de-identified] : well nourished male [de-identified] : NC/AT [de-identified] : LUNA/+ROM [de-identified] : Intact

## 2019-02-15 ENCOUNTER — OUTPATIENT (OUTPATIENT)
Dept: OUTPATIENT SERVICES | Facility: HOSPITAL | Age: 54
LOS: 1 days | End: 2019-02-15
Payer: COMMERCIAL

## 2019-02-15 ENCOUNTER — OUTPATIENT (OUTPATIENT)
Dept: OUTPATIENT SERVICES | Facility: HOSPITAL | Age: 54
LOS: 1 days | Discharge: HOME | End: 2019-02-15

## 2019-02-15 ENCOUNTER — APPOINTMENT (OUTPATIENT)
Age: 54
End: 2019-02-15

## 2019-02-15 DIAGNOSIS — C90.00 MULTIPLE MYELOMA NOT HAVING ACHIEVED REMISSION: ICD-10-CM

## 2019-02-15 LAB
BLD GP AB SCN SERPL QL: NEGATIVE — SIGNIFICANT CHANGE UP
RH IG SCN BLD-IMP: POSITIVE — SIGNIFICANT CHANGE UP
RH IG SCN BLD-IMP: POSITIVE — SIGNIFICANT CHANGE UP

## 2019-02-16 ENCOUNTER — APPOINTMENT (OUTPATIENT)
Age: 54
End: 2019-02-16

## 2019-02-20 DIAGNOSIS — Z51.89 ENCOUNTER FOR OTHER SPECIFIED AFTERCARE: ICD-10-CM

## 2019-02-21 ENCOUNTER — APPOINTMENT (OUTPATIENT)
Dept: COLORECTAL SURGERY | Facility: CLINIC | Age: 54
End: 2019-02-21
Payer: COMMERCIAL

## 2019-02-21 DIAGNOSIS — I51.9 HEART DISEASE, UNSPECIFIED: ICD-10-CM

## 2019-02-21 PROCEDURE — 99213 OFFICE O/P EST LOW 20 MIN: CPT | Mod: 25

## 2019-02-21 PROCEDURE — 45330 DIAGNOSTIC SIGMOIDOSCOPY: CPT

## 2019-02-21 NOTE — HISTORY OF PRESENT ILLNESS
[FreeTextEntry1] : 53-year-old male with multiple myeloma status post Kiah's procedure for perforated diverticulitis. Patient presents today for flexible sigmoidoscopy to evaluate rectal stump. He is currently obtaining clearance for surgery. He denies pain. No fevers or chills

## 2019-02-21 NOTE — ASSESSMENT
[FreeTextEntry1] : Perforated diverticulitis with colostomy multiple myeloma\par -Patient will need cardiac clearance before any reversal can be performed\par -I'm still trying to get in touch with oncology about multiple myeloma treatment\par -Patient wishes to have colostomy reversal despite any detriment it may cause to his overall prognosis for multiple myeloma.  She will be discussed further with Dr. Alcaraz, oncology and cardiology before proceeding

## 2019-02-25 ENCOUNTER — APPOINTMENT (OUTPATIENT)
Dept: CARDIOLOGY | Facility: CLINIC | Age: 54
End: 2019-02-25
Payer: COMMERCIAL

## 2019-02-25 ENCOUNTER — NON-APPOINTMENT (OUTPATIENT)
Age: 54
End: 2019-02-25

## 2019-02-25 VITALS — OXYGEN SATURATION: 100 % | HEART RATE: 100 BPM

## 2019-02-25 VITALS
BODY MASS INDEX: 25.41 KG/M2 | OXYGEN SATURATION: 98 % | SYSTOLIC BLOOD PRESSURE: 160 MMHG | WEIGHT: 198 LBS | HEART RATE: 105 BPM | HEIGHT: 74 IN | DIASTOLIC BLOOD PRESSURE: 95 MMHG

## 2019-02-25 DIAGNOSIS — Z01.810 ENCOUNTER FOR PREPROCEDURAL CARDIOVASCULAR EXAMINATION: ICD-10-CM

## 2019-02-25 PROCEDURE — 93000 ELECTROCARDIOGRAM COMPLETE: CPT

## 2019-02-25 PROCEDURE — 99214 OFFICE O/P EST MOD 30 MIN: CPT

## 2019-02-25 NOTE — HISTORY OF PRESENT ILLNESS
[FreeTextEntry1] : The patient comes in for medical and  cardiological clearance prior to having a reversal of his colostomy. He denies any chest pains, shortness of breath at rest, or palpitations.

## 2019-02-25 NOTE — DISCUSSION/SUMMARY
[FreeTextEntry1] : The patient was examined. His blood pressure was 160/95,and his pulse was 100.. His lungs were clear to auscultation. Cardiac exam was negative for murmurs rubs or gallops. His abdomen is status post a Delacruz's procedure with colostomy. The remainder of his physical exam is unremarkable. His EKG showed normal sinus rhythm with nonspecific ST and T wave changes. No acute changes.He will continue his current medications. He'll try to cut down on salt and caffeine. He Is medically and cardiologically cleared for surgery.

## 2019-02-27 ENCOUNTER — APPOINTMENT (OUTPATIENT)
Dept: SURGERY | Facility: CLINIC | Age: 54
End: 2019-02-27
Payer: COMMERCIAL

## 2019-02-27 VITALS
DIASTOLIC BLOOD PRESSURE: 100 MMHG | HEART RATE: 103 BPM | SYSTOLIC BLOOD PRESSURE: 158 MMHG | HEIGHT: 74 IN | WEIGHT: 198 LBS | BODY MASS INDEX: 25.41 KG/M2 | TEMPERATURE: 97.8 F

## 2019-02-27 PROCEDURE — 99214 OFFICE O/P EST MOD 30 MIN: CPT

## 2019-03-01 NOTE — PLAN
[FreeTextEntry1] : At this time Mr Erwin desperately wants his ostomy reversed. He understands the safest thing to do is either no surgery at all or undergo a reversal with a loop ileostomy. Since he absolutely doesn't want to live with an ostomy, I had him see our colorectal surgeon. he understands that he has been told by many oncologists that he has less than a year to live, even with chemotherapy. \par We will plan for reversal ASAP as he has completed pre-op enema, colonoscopy and cardiac clearance. Dr. Will Nguyen will be available for intraoperative assistance. We will try to reverse him with out a ileostomy however he understands that if there is a concern regarding the anastomosis, we will make a second ostomy. \par -Booked for early march\par -pre-op labs, ERP/ERAS pathway\par \par I spent 35min reviewing data, images and information. Greater than 50% of my time was spent in face to face discussion regarding wound healing, postoperative diet and activity.\par \par Aidan Alcaraz MD\par Acute Care Surgery\par

## 2019-03-01 NOTE — HISTORY OF PRESENT ILLNESS
[de-identified] : Mr. Hood is a pleasant 52M w/ PMH Multiple myeloma h/o bone marrow transplant and medication induced cardiomyopathy (1 year ago HF (EF ~ 15%)) who presented with progressive severe diffuse abdominal pain associated with diaphoresis and diarrhea x 1 nonbloody he was found to have perforated diverticulitis for which he is s/p exploratory laparotomy with sigmoid resection and end colostomy (Delacruz's Procedure) on 9/2/18. He recovered well and was discharged home. He did present to the ER w/ 2 days of discharge with concerns of wound drainage however this was deemed normal without underlying abscess or infection. \par Today, he feels well without fever/chills, SOB, diarrhea or constipation. He follows up after obtaining cardiac clearance and after seeing multiple oncologists and after I referred him to colorectal surgery. \par

## 2019-03-01 NOTE — PHYSICAL EXAM
[de-identified] : Well ,comfortable. [de-identified] : Soft, NT. ND. Ostomy functioning and perfused. No prolapse noted.

## 2019-03-11 ENCOUNTER — OUTPATIENT (OUTPATIENT)
Dept: OUTPATIENT SERVICES | Facility: HOSPITAL | Age: 54
LOS: 1 days | End: 2019-03-11

## 2019-03-11 VITALS
HEIGHT: 72 IN | SYSTOLIC BLOOD PRESSURE: 130 MMHG | TEMPERATURE: 99 F | OXYGEN SATURATION: 98 % | WEIGHT: 182.1 LBS | RESPIRATION RATE: 16 BRPM | HEART RATE: 98 BPM | DIASTOLIC BLOOD PRESSURE: 80 MMHG

## 2019-03-11 DIAGNOSIS — Z98.890 OTHER SPECIFIED POSTPROCEDURAL STATES: Chronic | ICD-10-CM

## 2019-03-11 DIAGNOSIS — K57.32 DIVERTICULITIS OF LARGE INTESTINE WITHOUT PERFORATION OR ABSCESS WITHOUT BLEEDING: ICD-10-CM

## 2019-03-11 DIAGNOSIS — R06.83 SNORING: ICD-10-CM

## 2019-03-11 LAB
ALBUMIN SERPL ELPH-MCNC: 4 G/DL — SIGNIFICANT CHANGE UP (ref 3.3–5)
ALP SERPL-CCNC: 67 U/L — SIGNIFICANT CHANGE UP (ref 40–120)
ALT FLD-CCNC: 73 U/L — HIGH (ref 4–41)
ANION GAP SERPL CALC-SCNC: 17 MMO/L — HIGH (ref 7–14)
ANISOCYTOSIS BLD QL: SLIGHT — SIGNIFICANT CHANGE UP
AST SERPL-CCNC: 67 U/L — HIGH (ref 4–40)
BILIRUB SERPL-MCNC: 0.7 MG/DL — SIGNIFICANT CHANGE UP (ref 0.2–1.2)
BLD GP AB SCN SERPL QL: NEGATIVE — SIGNIFICANT CHANGE UP
BUN SERPL-MCNC: 15 MG/DL — SIGNIFICANT CHANGE UP (ref 7–23)
CALCIUM SERPL-MCNC: 10.3 MG/DL — SIGNIFICANT CHANGE UP (ref 8.4–10.5)
CHLORIDE SERPL-SCNC: 101 MMOL/L — SIGNIFICANT CHANGE UP (ref 98–107)
CO2 SERPL-SCNC: 22 MMOL/L — SIGNIFICANT CHANGE UP (ref 22–31)
CREAT SERPL-MCNC: 1.38 MG/DL — HIGH (ref 0.5–1.3)
GLUCOSE SERPL-MCNC: 135 MG/DL — HIGH (ref 70–99)
HBA1C BLD-MCNC: 6.2 % — HIGH (ref 4–5.6)
HCT VFR BLD CALC: 23.4 % — LOW (ref 39–50)
HGB BLD-MCNC: 7.4 G/DL — LOW (ref 13–17)
HYPOCHROMIA BLD QL: SLIGHT — SIGNIFICANT CHANGE UP
MANUAL SMEAR VERIFICATION: SIGNIFICANT CHANGE UP
MCHC RBC-ENTMCNC: 29.4 PG — SIGNIFICANT CHANGE UP (ref 27–34)
MCHC RBC-ENTMCNC: 31.6 % — LOW (ref 32–36)
MCV RBC AUTO: 92.9 FL — SIGNIFICANT CHANGE UP (ref 80–100)
NRBC # FLD: 0.23 K/UL — LOW (ref 25–125)
NRBC FLD-RTO: 7.3 — SIGNIFICANT CHANGE UP
PLATELET # BLD AUTO: 17 K/UL — CRITICAL LOW (ref 150–400)
POIKILOCYTOSIS BLD QL AUTO: SLIGHT — SIGNIFICANT CHANGE UP
POLYCHROMASIA BLD QL SMEAR: SLIGHT — SIGNIFICANT CHANGE UP
POTASSIUM SERPL-MCNC: 3.6 MMOL/L — SIGNIFICANT CHANGE UP (ref 3.5–5.3)
POTASSIUM SERPL-SCNC: 3.6 MMOL/L — SIGNIFICANT CHANGE UP (ref 3.5–5.3)
PROT SERPL-MCNC: 10.1 G/DL — HIGH (ref 6–8.3)
RBC # BLD: 2.52 M/UL — LOW (ref 4.2–5.8)
RBC # FLD: 20.4 % — HIGH (ref 10.3–14.5)
RH IG SCN BLD-IMP: POSITIVE — SIGNIFICANT CHANGE UP
SCHISTOCYTES BLD QL AUTO: SLIGHT — SIGNIFICANT CHANGE UP
SODIUM SERPL-SCNC: 140 MMOL/L — SIGNIFICANT CHANGE UP (ref 135–145)
WBC # BLD: 3.16 K/UL — LOW (ref 3.8–10.5)
WBC # FLD AUTO: 3.16 K/UL — LOW (ref 3.8–10.5)

## 2019-03-11 RX ORDER — ASPIRIN/CALCIUM CARB/MAGNESIUM 324 MG
1 TABLET ORAL
Qty: 0 | Refills: 0 | COMMUNITY

## 2019-03-11 RX ORDER — ACYCLOVIR SODIUM 500 MG
1 VIAL (EA) INTRAVENOUS
Qty: 0 | Refills: 0 | COMMUNITY

## 2019-03-11 RX ORDER — FILGRASTIM 480MCG/1.6
0 VIAL (ML) INJECTION
Qty: 0 | Refills: 0 | COMMUNITY

## 2019-03-11 NOTE — H&P PST ADULT - NEGATIVE OPHTHALMOLOGIC SYMPTOMS
no discharge L/no pain L/no diplopia/no blurred vision L/no pain R/no irritation R/no discharge R/no irritation L/no photophobia/no blurred vision R

## 2019-03-11 NOTE — H&P PST ADULT - RS GEN PE MLT RESP DETAILS PC
clear to auscultation bilaterally/no rales/no wheezes/airway patent/respirations non-labored/breath sounds equal/good air movement

## 2019-03-11 NOTE — H&P PST ADULT - PMH
Cardiomyopathy    Diverticulitis of large intestine without perforation or abscess without bleeding    Former smoker  (1 ppd x 11 years; Quit ~age 26)  Heart failure  (EF 15% at diagnosis in 2016)  Hypertension    Left ventricular dysfunction    Mild aortic regurgitation    Mild mitral regurgitation    Multiple myeloma

## 2019-03-11 NOTE — H&P PST ADULT - PROBLEM SELECTOR PLAN 1
Patient is scheduled colostomy reversal scheduled on 3/20/2019 with Dr. Alcaraz.    Preop instructions, pepcid, surgical scrub provided. Pt stated understanding.    Cardiology / Medical evaluation in chart from Dr. Kevin Castellanos per surgeon and PST ( History of cardiomyopathy ) - [phone]-Cardiologist / and Primary.    Patient with a history of Multiple Myeloma - Pending oncology evaluation per surgeon and PST -Dr. Sarmiento -[phone]    Patient with a history of cardiomyopathy - Patient instructed to take Carvedilol, and Lisinopril in the am of surgery with a sip .

## 2019-03-11 NOTE — H&P PST ADULT - NEGATIVE MUSCULOSKELETAL SYMPTOMS
no leg pain L/no joint swelling/no myalgia/no arm pain L/no arthritis/no stiffness/no arm pain R/no leg pain R/no muscle cramps/no muscle weakness/no neck pain/no back pain

## 2019-03-11 NOTE — H&P PST ADULT - HISTORY OF PRESENT ILLNESS
53 year old male with a history of Multiple myeloma  s/p bone marrow transplant approximately 1 and a half year ago. Patient with a history of perforated diverticulitis s/p exploratory laparotomy with sigmoid resection and colostomy on 9/2/2018. Patient presents to Presurgical Testing for an evaluation for a scheduled colostomy reversal scheduled on 3/20/2019 with Dr. Alcaraz. Pre op : Diverticulitis of large intestine without perforation or abscess without bleeding.

## 2019-03-11 NOTE — H&P PST ADULT - NSANTHOSAYNRD_GEN_A_CORE
No. DEVI screening performed.  STOP BANG Legend: 0-2 = LOW Risk; 3-4 = INTERMEDIATE Risk; 5-8 = HIGH Risk

## 2019-03-11 NOTE — H&P PST ADULT - NEUROLOGICAL DETAILS
responds to verbal commands/alert and oriented x 3/responds to pain/sensation intact/normal strength

## 2019-03-11 NOTE — H&P PST ADULT - GASTROINTESTINAL COMMENTS
Left colostomy in place Left colostomy in place, Pre op : Diverticulitis of large intestine without perforation or abscess without bleeding.

## 2019-03-11 NOTE — H&P PST ADULT - ASSESSMENT
Patient is scheduled colostomy reversal scheduled on 3/20/2019 with Dr. Alcaraz. Pre op : Diverticulitis of large intestine without perforation or abscess without bleeding.

## 2019-03-11 NOTE — H&P PST ADULT - NEGATIVE NEUROLOGICAL SYMPTOMS
no weakness/no generalized seizures/no focal seizures/no syncope/no vertigo/no transient paralysis/no loss of consciousness/no loss of sensation/no difficulty walking/no paresthesias/no tremors/no headache

## 2019-03-11 NOTE — H&P PST ADULT - NEGATIVE ENMT SYMPTOMS
no nasal congestion/no nasal obstruction/no sinus symptoms/no ear pain/no tinnitus/no post-nasal discharge/no nose bleeds/no nasal discharge/no abnormal taste sensation/no gum bleeding/no vertigo/no hearing difficulty/no recurrent cold sores/no throat pain/no dysphagia

## 2019-03-12 ENCOUNTER — RESULT REVIEW (OUTPATIENT)
Age: 54
End: 2019-03-12

## 2019-03-13 ENCOUNTER — APPOINTMENT (OUTPATIENT)
Age: 54
End: 2019-03-13

## 2019-03-13 PROBLEM — I51.9 HEART DISEASE, UNSPECIFIED: Chronic | Status: ACTIVE | Noted: 2019-03-11

## 2019-03-13 PROBLEM — I10 ESSENTIAL (PRIMARY) HYPERTENSION: Chronic | Status: ACTIVE | Noted: 2019-03-11

## 2019-03-13 LAB
BLD GP AB SCN SERPL QL: NEGATIVE — SIGNIFICANT CHANGE UP
RH IG SCN BLD-IMP: POSITIVE — SIGNIFICANT CHANGE UP

## 2019-03-13 PROCEDURE — 86923 COMPATIBILITY TEST ELECTRIC: CPT

## 2019-03-13 PROCEDURE — 86850 RBC ANTIBODY SCREEN: CPT

## 2019-03-13 PROCEDURE — 86900 BLOOD TYPING SEROLOGIC ABO: CPT

## 2019-03-13 PROCEDURE — 86901 BLOOD TYPING SEROLOGIC RH(D): CPT

## 2019-03-13 PROCEDURE — 86905 BLOOD TYPING RBC ANTIGENS: CPT

## 2019-03-14 ENCOUNTER — OUTPATIENT (OUTPATIENT)
Dept: OUTPATIENT SERVICES | Facility: HOSPITAL | Age: 54
LOS: 1 days | End: 2019-03-14
Payer: COMMERCIAL

## 2019-03-14 ENCOUNTER — APPOINTMENT (OUTPATIENT)
Age: 54
End: 2019-03-14

## 2019-03-14 DIAGNOSIS — C90.00 MULTIPLE MYELOMA NOT HAVING ACHIEVED REMISSION: ICD-10-CM

## 2019-03-14 DIAGNOSIS — Z98.890 OTHER SPECIFIED POSTPROCEDURAL STATES: Chronic | ICD-10-CM

## 2019-03-19 ENCOUNTER — INPATIENT (INPATIENT)
Facility: HOSPITAL | Age: 54
LOS: 3 days | Discharge: HOME | End: 2019-03-23
Attending: INTERNAL MEDICINE | Admitting: INTERNAL MEDICINE
Payer: COMMERCIAL

## 2019-03-19 VITALS
TEMPERATURE: 98 F | RESPIRATION RATE: 16 BRPM | HEART RATE: 103 BPM | OXYGEN SATURATION: 100 % | DIASTOLIC BLOOD PRESSURE: 86 MMHG | SYSTOLIC BLOOD PRESSURE: 126 MMHG

## 2019-03-19 DIAGNOSIS — D69.6 THROMBOCYTOPENIA, UNSPECIFIED: ICD-10-CM

## 2019-03-19 DIAGNOSIS — Z98.890 OTHER SPECIFIED POSTPROCEDURAL STATES: Chronic | ICD-10-CM

## 2019-03-19 LAB
ALBUMIN SERPL ELPH-MCNC: 4 G/DL — SIGNIFICANT CHANGE UP (ref 3.3–5)
ALP SERPL-CCNC: 58 U/L — SIGNIFICANT CHANGE UP (ref 40–120)
ALT FLD-CCNC: 72 U/L — HIGH (ref 4–41)
ANION GAP SERPL CALC-SCNC: 16 MMO/L — HIGH (ref 7–14)
ANISOCYTOSIS BLD QL: SLIGHT — SIGNIFICANT CHANGE UP
APTT BLD: 30.1 SEC — SIGNIFICANT CHANGE UP (ref 27.5–36.3)
AST SERPL-CCNC: 75 U/L — HIGH (ref 4–40)
BASOPHILS # BLD AUTO: 0.01 K/UL — SIGNIFICANT CHANGE UP (ref 0–0.2)
BASOPHILS NFR BLD AUTO: 0.2 % — SIGNIFICANT CHANGE UP (ref 0–2)
BASOPHILS NFR SPEC: 0 % — SIGNIFICANT CHANGE UP (ref 0–2)
BILIRUB SERPL-MCNC: 0.9 MG/DL — SIGNIFICANT CHANGE UP (ref 0.2–1.2)
BLD GP AB SCN SERPL QL: NEGATIVE — SIGNIFICANT CHANGE UP
BUN SERPL-MCNC: 14 MG/DL — SIGNIFICANT CHANGE UP (ref 7–23)
CALCIUM SERPL-MCNC: 9.4 MG/DL — SIGNIFICANT CHANGE UP (ref 8.4–10.5)
CHLORIDE SERPL-SCNC: 100 MMOL/L — SIGNIFICANT CHANGE UP (ref 98–107)
CO2 SERPL-SCNC: 22 MMOL/L — SIGNIFICANT CHANGE UP (ref 22–31)
CREAT SERPL-MCNC: 1.46 MG/DL — HIGH (ref 0.5–1.3)
EOSINOPHIL # BLD AUTO: 0.02 K/UL — SIGNIFICANT CHANGE UP (ref 0–0.5)
EOSINOPHIL NFR BLD AUTO: 0.4 % — SIGNIFICANT CHANGE UP (ref 0–6)
EOSINOPHIL NFR FLD: 0 % — SIGNIFICANT CHANGE UP (ref 0–6)
GLUCOSE SERPL-MCNC: 100 MG/DL — HIGH (ref 70–99)
HAPTOGLOB SERPL-MCNC: 72 MG/DL — SIGNIFICANT CHANGE UP (ref 34–200)
HCT VFR BLD CALC: 22.8 % — LOW (ref 39–50)
HGB BLD-MCNC: 7.2 G/DL — LOW (ref 13–17)
IMM GRANULOCYTES NFR BLD AUTO: 2.4 % — HIGH (ref 0–1.5)
INR BLD: 1.26 — HIGH (ref 0.88–1.17)
LDH SERPL L TO P-CCNC: 314 U/L — HIGH (ref 135–225)
LYMPHOCYTES # BLD AUTO: 1.77 K/UL — SIGNIFICANT CHANGE UP (ref 1–3.3)
LYMPHOCYTES # BLD AUTO: 39.2 % — SIGNIFICANT CHANGE UP (ref 13–44)
LYMPHOCYTES NFR SPEC AUTO: 47 % — HIGH (ref 13–44)
MANUAL SMEAR VERIFICATION: SIGNIFICANT CHANGE UP
MCHC RBC-ENTMCNC: 28.5 PG — SIGNIFICANT CHANGE UP (ref 27–34)
MCHC RBC-ENTMCNC: 31.6 % — LOW (ref 32–36)
MCV RBC AUTO: 90.1 FL — SIGNIFICANT CHANGE UP (ref 80–100)
MONOCYTES # BLD AUTO: 1.63 K/UL — HIGH (ref 0–0.9)
MONOCYTES NFR BLD AUTO: 36.1 % — HIGH (ref 2–14)
MONOCYTES NFR BLD: 11 % — HIGH (ref 2–9)
NEUTROPHIL AB SER-ACNC: 30 % — LOW (ref 43–77)
NEUTROPHILS # BLD AUTO: 0.98 K/UL — LOW (ref 1.8–7.4)
NEUTROPHILS NFR BLD AUTO: 21.7 % — LOW (ref 43–77)
NEUTS BAND # BLD: 4 % — SIGNIFICANT CHANGE UP (ref 0–6)
NRBC # BLD: 8 /100WBC — SIGNIFICANT CHANGE UP
NRBC # FLD: 0.45 K/UL — LOW (ref 25–125)
NRBC FLD-RTO: 10 — SIGNIFICANT CHANGE UP
PLATELET # BLD AUTO: 10 K/UL — CRITICAL LOW (ref 150–400)
PLATELET COUNT - ESTIMATE: SIGNIFICANT CHANGE UP
PMV BLD: SIGNIFICANT CHANGE UP FL (ref 7–13)
POTASSIUM SERPL-MCNC: 4.1 MMOL/L — SIGNIFICANT CHANGE UP (ref 3.5–5.3)
POTASSIUM SERPL-SCNC: 4.1 MMOL/L — SIGNIFICANT CHANGE UP (ref 3.5–5.3)
PROT SERPL-MCNC: 10.4 G/DL — HIGH (ref 6–8.3)
PROTHROM AB SERPL-ACNC: 14.5 SEC — HIGH (ref 9.8–13.1)
RBC # BLD: 2.53 M/UL — LOW (ref 4.2–5.8)
RBC # FLD: 19.6 % — HIGH (ref 10.3–14.5)
RETICS #: 47 K/UL — SIGNIFICANT CHANGE UP (ref 25–125)
RETICS/RBC NFR: 1.9 % — SIGNIFICANT CHANGE UP (ref 0.5–2.5)
RH IG SCN BLD-IMP: POSITIVE — SIGNIFICANT CHANGE UP
ROULEAUX BLD QL SMEAR: PRESENT — SIGNIFICANT CHANGE UP
SODIUM SERPL-SCNC: 138 MMOL/L — SIGNIFICANT CHANGE UP (ref 135–145)
VARIANT LYMPHS # BLD: 8 % — SIGNIFICANT CHANGE UP
WBC # BLD: 4.52 K/UL — SIGNIFICANT CHANGE UP (ref 3.8–10.5)
WBC # FLD AUTO: 4.52 K/UL — SIGNIFICANT CHANGE UP (ref 3.8–10.5)

## 2019-03-19 PROCEDURE — 99223 1ST HOSP IP/OBS HIGH 75: CPT

## 2019-03-19 PROCEDURE — 70450 CT HEAD/BRAIN W/O DYE: CPT | Mod: 26

## 2019-03-19 NOTE — ED PROVIDER NOTE - ATTENDING CONTRIBUTION TO CARE
Delores: I have seen and examined the patient face to face, have reviewed and addended the HPI, PE and a/p as necessary.     54 y/o male pmh MM, heart failure, s/p bone marrow transplant 1.5 yrs ago, ex lap with sigmoid resection, colostomy, scheduled for resection, colostomy reversal on 3/20/19, noted to have thrombocytopenia to 16 s/p transfusion and reversal was canceled.  Pt went for re-eval noted to have plts of 6.  Had 1 episode of epistaxis earlier today, with + bleeding from colostomy (oozing).  Denies HA, neck pain, headache, nausea or vomiting, fever chills, dizziness, abdominal pain.      GEN - NAD; well appearing; A+O x3; non-toxic appearing CARD -s1s2, RRR, no M,G,R; PULM - CTA b/l, symmetric breath sounds; ABD:  +BS, ND, NT, colostomy in place with slight oozing of brb,soft, no guarding, no rebound, no masses; BACK: no CVA tenderness, Normal  spine; EXT: symmetric pulses, 2+ dp, capillary refill < 2 seconds, no clubbing, no cyanosis, no edema NEURO: alert, CN 2-12 intact, reflexes nl, sensation nl, coordination nl, finger to nose nl, romberg negative, motor 5/5 RUE/LUE/RLE/LLE/EHL/Plantar flexion, no pronator drift, gait nl     54 yo M a/w thrombocytopenia currently asymptomatic, recalcitrant to transfusions last week.  -  Check labs, type and transfuse plts over 3 hours.

## 2019-03-19 NOTE — CONSULT NOTE ADULT - SUBJECTIVE AND OBJECTIVE BOX
GENERAL SURGERY CONSULT NOTE  --------------------------------------------------------------------------------------------    Patient is a 53y old  Male who presents with a chief complaint of bleeding    HPI: 52 y/o male pmh MM, heart failure, s/p bone marrow transplant, h/o diverticulitis s/p Kiah's on 9/3/18, scheduled for reversal on 3/20/19, however 1 week ago on 3/16 needed transfusion so surgery was canceled. Patient found to be thrombocytopenic in oncologist's office. Patient did have epistaxis today and clots per colostomy, now stopped.    ROS: 10-system review is otherwise negative except HPI above.      PAST MEDICAL & SURGICAL HISTORY:  Hypertension  Mild mitral regurgitation  Mild aortic regurgitation  Left ventricular dysfunction  Cardiomyopathy  Diverticulitis of large intestine without perforation or abscess without bleeding  Former smoker: (1 ppd x 11 years; Quit ~age 26)  Heart failure: (EF 15% at diagnosis in 2016)  History of surgery: s/p exploratory laparotomy with sigmoid resection and colostomy on 9/2/2018    FAMILY HISTORY:  Family history of diabetes mellitus (Father, Mother)  Family history of hypertension (Father, Mother)    ALLERGIES: No Known Allergies    CURRENT MEDICATIONS  MEDICATIONS (STANDING):   MEDICATIONS (PRN):  --------------------------------------------------------------------------------------------    Vitals:   T(C): 36.5 (03-19-19 @ 21:55), Max: 36.9 (03-19-19 @ 19:07)  HR: 97 (03-19-19 @ 22:35) (97 - 103)  BP: 139/82 (03-19-19 @ 22:35) (121/64 - 139/82)  RR: 16 (03-19-19 @ 22:35) (16 - 18)  SpO2: 100% (03-19-19 @ 22:35) (100% - 100%)  CAPILLARY BLOOD GLUCOSE    CAPILLARY BLOOD GLUCOSE      PHYSICAL EXAM:  NAD, A+Ox3  Non labored respirations  Abdomen soft, nontender, well healed incision, colostomy viable, small clot in bag, no active bleeding.  --------------------------------------------------------------------------------------------    LABS  CBC (03-19 @ 20:20)                              7.2<L>                         4.52    )----------------(  10<LL>     21.7<L>% Neutrophils, 39.2  % Lymphocytes, ANC: 0.98<L>                              22.8<L>    BMP (03-19 @ 20:20)             138     |  100     |  14    		Ca++ --      Ca 9.4                ---------------------------------( 100<H>		Mg --                 4.1     |  22      |  1.46<H>			Ph --        LFTs (03-19 @ 20:20)      TPro 10.4<H> / Alb 4.0 / TBili 0.9 / DBili -- / AST 75<H> / ALT 72<H> / AlkPhos 58    Coags (03-19 @ 20:20)  aPTT 30.1 / INR 1.26<H> / PT 14.5<H>    --------------------------------------------------------------------------------------------      ASSESSMENT: Patient is a 53y old m with h/o multiple myeloma s/p BMT, h/o Kiah's with reversal delayed by thrombocytopenia. Patient admitted for transfusions. Patient will need to follow up in office for rescheduling of reversal once workup and treatment for thrombocytopenia complete.    Evelin, PGY4

## 2019-03-19 NOTE — ED PROVIDER NOTE - OBJECTIVE STATEMENT
52 y/o male pmh MM, heart failure, s/p bone marrow transplant 1.5 yrs ago, ex lap with sigmoid resection, colostomy, scheduled for resection, colostomy reversal on 3/20/19, however 1 week ago on 3/16 needed transfusion so suregry was canceled, went to oncologost dr. borjas today, cbc doen in office, found to have platlets of 6, sent to er for transfusion + admission, pt had 1 episode of epistaxis earlier today, + has some mild bleeding from colostomy, pt deneis any headaches, neck pain, cough,f /c/nv/d/,c hest pain, sob, abdominal pain, urinary symptoms, numbness/weakness/tingling, recent travel, social hsitory

## 2019-03-19 NOTE — ED ADULT NURSE NOTE - OBJECTIVE STATEMENT
pt called by PMD today and told his platelet count was 6, sent to ED for further evaluation. pt also had bleeding from nose and colostomy today. offers no complaints of pain at present. colostomy drainage bag empty at this time. #18 sl placed Lt ac, labs sent. skin warm and dry. offers no complaints.

## 2019-03-19 NOTE — H&P ADULT - NSHPPHYSICALEXAM_GEN_ALL_CORE
Vital Signs Last 24 Hrs  T(C): 36.9 (19 Mar 2019 22:55), Max: 36.9 (19 Mar 2019 19:07)  T(F): 98.5 (19 Mar 2019 22:55), Max: 98.5 (19 Mar 2019 19:07)  HR: 96 (19 Mar 2019 22:55) (96 - 103)  BP: 128/79 (19 Mar 2019 22:55) (121/64 - 139/82)  BP(mean): --  RR: 18 (19 Mar 2019 22:55) (16 - 18)  SpO2: 100% (19 Mar 2019 22:55) (100% - 100%)    PHYSICAL EXAM:  General: Awake and alert.  No acute distress.  HEENT: Normocephalic, atraumatic.  PERRL.  EOMI.  No scleral icterus.  Moist MM.  No oropharyngeal exudates.    Neck: Supple.  Full range of motion.  No JVD.  No carotid bruits.  No thyromegaly.  Trachea midline.  No lymphadenopathy.   Heart: RRR.  Normal S1 and S2.  No murmurs, rubs, or gallops.  No LE edema b/l.   Lungs: Good inspiratory effort.  Nonlabored breathing.  CTAB.  No wheezes, crackles, or rhonchi.    Abdomen: BS+, soft, NT/ND.  No organomegaly.  Skin: Warm and dry.  No rashes.  Extremities: No clubbing or cyanosis.  2+ peripheral pulses b/l.  Musculoskeletal: No joint deformities.  No spinal or paraspinal tenderness.  Neuro: A&Ox3.  CN II-XII intact.  5/5 motor strength in UE and LE b/l.  Tactile sensation intact in UE and LE b/l.  Cerebellar function intact as assessed by finger-to-nose test. Vital Signs Last 24 Hrs  T(C): 36.9 (19 Mar 2019 22:55), Max: 36.9 (19 Mar 2019 19:07)  T(F): 98.5 (19 Mar 2019 22:55), Max: 98.5 (19 Mar 2019 19:07)  HR: 96 (19 Mar 2019 22:55) (96 - 103)  BP: 128/79 (19 Mar 2019 22:55) (121/64 - 139/82)  BP(mean): --  RR: 18 (19 Mar 2019 22:55) (16 - 18)  SpO2: 100% (19 Mar 2019 22:55) (100% - 100%)    PHYSICAL EXAM:  General: Awake and alert.  No acute distress.  Head: Normocephalic, atraumatic.    Eyes: PERRL.  EOMI.  No scleral icterus.  No conjunctival pallor.  Mouth: No palatal petechiae.  Moist MM.  No oropharyngeal exudates.    Neck: Supple.  Full range of motion.  No JVD.  Trachea midline.  No lymphadenopathy.   Heart: Tachycardic.  Regular rhythm.  Normal S1 and S2.  No murmurs, rubs, or gallops.  No LE edema b/l.   Lungs: Good inspiratory effort.  Nonlabored breathing.  CTAB.  No wheezes, crackles, or rhonchi.    Abdomen: +Colostomy with empty bag but bright red blood coating stoma.  BS+, soft, nontender, nondistended.  No splenomegaly or hepatomegaly.  Skin: Multiple nonblanching petechiae on b/l LEs and few nonblanching petechiae on b/l UEs.  Warm and dry.  No rashes.  Extremities: No clubbing or cyanosis.  2+ peripheral pulses b/l.  Musculoskeletal: No joint deformities.  No spinal or paraspinal tenderness.  Neuro: A&Ox3.  CN II-XII intact.  5/5 motor strength in UE and LE b/l.  Tactile sensation intact in UE and LE b/l.  Cerebellar function intact as assessed by finger-to-nose test.  No focal deficits.

## 2019-03-19 NOTE — H&P ADULT - NSHPLABSRESULTS_GEN_ALL_CORE
EKG personally reviewed.   NSR at 95 bpm, no acute ischemic changes, QTc 472 ms.    Imaging personally reviewed.  CT Head No Cont (03.19.19 @ 22:50)     FINDINGS:   The ventricles and cortical sulci are within normal limits for age. There   is no acute intracranial hemorrhage, mass effect, midline shift or   abnormal extra-axial collection.     The gray white differentiation appears grossly preserved without evidence   for an acute transcortical infarction.     There is mild periventricular white matter lucency, likely the sequela of   small vessel ischemic disease.     Mild mucosal thickening of the right maxillary sinus. The mastoid air   cells are well aerated.  No displaced calvarial fracture.    IMPRESSION:   No acute intracranial hemorrhage or mass effect.

## 2019-03-19 NOTE — CONSULT NOTE ADULT - ATTENDING COMMENTS
I saw and examined the patient and agree with the above note.  [facility] delivered on3/20/19    Presence of colostomy  -due to his chronic and life threatening thrombocytopenia and anemia, any elective surgery will be postponed.   -follow up in my office in 1 week  -appreciate med/onc care.   Please call me with any surgical questions - [phone]    I spent 35min reviewing history, data, images and in discussion/coordination of care. Greater than 50% of my time was spent in face-to-face discussion with the patient regarding his course and plan for colostomy reversal.    Aidan Alcaraz MD (Cell: [phone])  Acute and Critical Care Surgery    The Acute Care Surgery (B Team) Attending Group Practice:  Dr. Liban Salgado, Dr. Adonis Dudley, Dr. Supriya Shaikh, Dr. Aidan Alcaraz    Urgent issues - spectra [id number] or [id number]  Nonurgent issues - [phone]  Patient appointments or after hours - [phone]

## 2019-03-19 NOTE — ED ADULT NURSE REASSESSMENT NOTE - GENERAL PATIENT STATE
family/SO at bedside/comfortable appearance/no change observed
comfortable appearance/family/SO at bedside/no change observed

## 2019-03-19 NOTE — H&P ADULT - NSHPREVIEWOFSYSTEMS_GEN_ALL_CORE
Constitutional: No weakness, fevers, chills, or weight loss  Eyes: No visual changes, double vision, or eye pain  Ears, Nose, Mouth, Throat: No runny nose, sinus pain, ear pain, tinnitus, sore throat, dysphagia, or odynophagia  Cardiovascular: No chest pain, palpitations, or LE edema  Respiratory: No cough, wheezing, hemoptysis, or shortness of breath  Gastrointestinal: No abdominal pain, dysphagia, anorexia, nausea/vomiting, diarrhea/constipation, hematemesis, BRBPR, or melena  Genitourinary: No dysuria, frequency, urgency, or hematuria  Musculoskeletal: No joint pain, joint swelling, or decreased ROM  Skin: No pruritus, rashes, lesions, or wounds  Neurologic:  No seizures, headache, paresthesias, numbness, or limb weakness  Psychiatric: No depression, anxiety, difficulty concentrating, anhedonia, or lack of energy  Endocrine: No heat/cold intolerance, mood swings, sweats, polydipsia, or polyuria  Hematologic/lymphatic: No purpura, petechia, or prolonged or excessive bleeding after dental extraction / injury  Allergic/Immunologic: No anaphylaxis or allergic response to materials, foods, animals    Positives and pertinent negatives noted and all other systems negative. Constitutional: No weakness, fevers, chills, or weight loss  Eyes: No visual changes, double vision, or eye pain  Ears, Nose, Mouth, Throat: No runny nose, sinus pain, ear pain, tinnitus, sore throat, dysphagia, or odynophagia  Cardiovascular: No chest pain, palpitations, or LE edema  Respiratory: No cough, wheezing, hemoptysis, or shortness of breath  Gastrointestinal: +Bleeding from stoma. No abdominal pain, nausea/vomiting, hematemesis, or changes in stool output.  Genitourinary: No dysuria, frequency, urgency, or hematuria  Musculoskeletal: No joint pain, joint swelling, or decreased ROM  Skin: No pruritus, rashes, lesions, or wounds  Neurologic:  No seizures, headache, paresthesias, numbness, or limb weakness  Psychiatric: No depression, anxiety, difficulty concentrating, anhedonia, or lack of energy  Endocrine: No heat/cold intolerance, mood swings, sweats, polydipsia, or polyuria  Hematologic/lymphatic: No purpura, petechia, or prolonged or excessive bleeding after dental extraction / injury  Allergic/Immunologic: No anaphylaxis or allergic response to materials, foods, animals    Positives and pertinent negatives noted and all other systems negative. Constitutional: No weakness, fevers, chills, or weight loss  Eyes: No visual changes, double vision, or eye pain  Ears, Nose, Mouth, Throat: +Epistaxis. No runny nose, sinus pain, ear pain, tinnitus, sore throat, dysphagia, or odynophagia.  Cardiovascular: No chest pain, palpitations, or LE edema  Respiratory: No cough, wheezing, hemoptysis, or shortness of breath  Gastrointestinal: +Bleeding from stoma. No abdominal pain, nausea/vomiting, hematemesis, or changes in stool output.  Genitourinary: No dysuria, frequency, urgency, or hematuria  Musculoskeletal: No joint pain, joint swelling, or decreased ROM  Skin: No pruritus or rashes.  Neurologic: +Headaches. No seizures, paresthesias, numbness, or limb weakness.  Psychiatric: No depression, anxiety, or agitation  Endocrine: No heat/cold intolerance, mood swings, sweats, polydipsia, or polyuria  Hematologic/lymphatic: +Petechiae. No purpura or prolonged or excessive bleeding after dental extraction / injury.  Allergic/Immunologic: No anaphylaxis or allergic response to materials, foods, animals    Positives and pertinent negatives noted and all other systems negative.

## 2019-03-19 NOTE — H&P ADULT - NSICDXPASTSURGICALHX_GEN_ALL_CORE_FT
PAST SURGICAL HISTORY:  History of surgery s/p exploratory laparotomy with sigmoid resection and colostomy on 9/2/2018 PAST SURGICAL HISTORY:  History of bone marrow transplant     History of surgery s/p exploratory laparotomy with sigmoid resection and colostomy on 9/2/2018

## 2019-03-19 NOTE — H&P ADULT - NSICDXPROBLEM_GEN_ALL_CORE_FT
PROBLEM DIAGNOSES  Problem: Thrombocytopenia  Assessment and Plan: Unclear etiology. Can be from decreased platelet production vs. increased platelet consumption vs. platelet sequestration. Pt with epistaxis, bleeding from stoma, and gum bleeding.  - S/p 2u platelets in the ED. Will check post-transfusion CBC.   - Peripheral smear examined using microscope in lab. Numerous rouleaux formation seen; no schistocytes or giant platelets present.   - Monitor CBC and transfuse if plt count < 10,000 if afebrile and < 20,000 if febrile or neutropenic  - PT/INR elevated, LDH increased, and haptoglobin wnl.  Will check fibrinogen and D-dimer       Problem: Transaminitis  Assessment and Plan:     Problem: ROBB (acute kidney injury)  Assessment and Plan:     Problem: Hypertension  Assessment and Plan:     Problem: Nonischemic cardiomyopathy  Assessment and Plan:     Problem: Multiple myeloma  Assessment and Plan:     Problem: Anemia  Assessment and Plan: PROBLEM DIAGNOSES  Problem: Thrombocytopenia  Assessment and Plan: Unclear etiology. Can be from decreased platelet production vs. increased platelet consumption vs. platelet sequestration. Pt with epistaxis, bleeding from stoma, and gum bleeding.  - S/p 2u platelets in the ED. Will check post-transfusion CBC.   - Peripheral smear examined using microscope in lab. Numerous rouleaux formation seen; no schistocytes or giant platelets present.   - Monitor CBC and transfuse if plt count < 10,000 if afebrile and < 20,000 if febrile or neutropenic  - PT/INR elevated, LDH increased, and haptoglobin wnl.  Will check fibrinogen and D-dimer to r/o DIC.  - Will check HIV, Hep B, Hep C  - Hematology-Oncology consult in AM     Problem: Anemia  Assessment and Plan: Hgb 7.2. Likely multifactorial, including 2/2 multiple myeloma, anemia of chronic disease, poor bone marrow response, and acute blood loss in setting of epistaxis and bleeding from stoma.   - LDH increased, but haptoglobin and t bili wnl; low suspicion for MAHA  - Will check direct Radha for possible autoimmune hemolysis  - Will check SPEP, UPEP, serum immunofixation, serum free light chains, and serum immunoglobulins to evaluate for possible progression of multiple myeloma  - Monitor CBC and transfuse if Hgb < 7  - Hematology-Oncology consult in AM     Problem: ROBB (acute kidney injury)  Assessment and Plan: Currently, Cr 1.46 vs. baseline Cr 0.9-1.05. Likely 2/2 multiple myeloma, with possible prerenal component.  - Will check SPEP, UPEP, serum immunofixation, serum free light chains, and serum immunoglobulins to evaluate for possible progression of multiple myeloma  - Monitor Cr and UOP  - Will hold pt's lisinopril and HCTZ for now  - Hold nephrotoxic agents  - Renally dose meds    Problem: Transaminitis  Assessment and Plan: Mild, with AST 75 and ALT 72. Unclear etiology. No abdominal complaints and no abdominal tenderness on exam.  - Will check HIV, Hep B, Hep C  - Trend LFTs  - If continues to uptrend, will obtain imaging with US vs. CT    Problem: Colostomy status  Assessment and Plan: Pt with mild bleeding from stoma, likely mucocutaneous in setting of severe thrombocytopenia.   - Surgery consult obtained. Will f/u recs. Will ask if any imaging necessary to evaluate colostomy.  - Monitor bleeding    Problem: Multiple myeloma  Assessment and Plan: Per pt, treatment on hold for past 2 months in preparation for scheduled colostomy reversal for 3/20/19. Pt previously on Pomalyst.  - Given labs, concern for progression of multiple myeloma  - Hematology-Oncology consult in AM     Problem: Nonischemic cardiomyopathy  Assessment and Plan: Pt with mild global LV systolic dysfunction with EF 54% and diastolic LV dysfunction based on TTE in 11/2018.  - Pt euvolemic on exam  - Monitor I&O, volume status  - Daily weights  - Salt restriction  - C/w Coreg, will hold lisinopril given ROBB    Problem: Hypertension  Assessment and Plan: BPs in acceptable range.  - C/w Coreg, will hold lisinopril and HCTZ given ROBB    Problem: Need for prophylactic measure  Assessment and Plan: - DVT ppx: Hold pharmacologic ppx given thrombocytopenia and bleeding  - Diet: DASH/TLC

## 2019-03-19 NOTE — ED PROVIDER NOTE - PROGRESS NOTE DETAILS
BRISSA LIU: I spoke with dr. borjas-oncology, who told me to admit  to dr. herbert carlson, I spoke with him and he accepted, will transfuse and admit

## 2019-03-19 NOTE — H&P ADULT - HISTORY OF PRESENT ILLNESS
52 yo man with history of multiple myeloma s/p bone marrow transplant (~1.5 yrs ago) and chemo/RT (stopped ~2 months ago in preparation for colostomy reversal), perforated diverticulitis s/p Kiah's (proctosigmoidectomy with colostomy, 9/2018), nonischemic cardiomyopathy (mild global LV systolic dysfunction with EF 54% and diastolic LV dysfunction based on TTE in 11/2018), and HTN presents after outpatient blood work at oncologist's office (Dr. Murphy) revealed a platelet count of 6. 52 yo man with history of multiple myeloma s/p bone marrow transplant (~1.5 yrs ago) and chemo/RT (stopped ~2 months ago in preparation for colostomy reversal), perforated diverticulitis s/p Kiah's (proctosigmoidectomy with colostomy, 9/2018), nonischemic cardiomyopathy (mild global LV systolic dysfunction with EF 54% and diastolic LV dysfunction based on TTE in 11/2018), and HTN presents after outpatient blood work at oncologist's office (Dr. Murphy) on Tuesday revealed a platelet count of 6. Pt was also found to be thrombocytopenic last Monday (3/11/19) when he was at pre-surgical testing for a scheduled colostomy reversal on 3/20/19 with Dr. Alcaraz. At that visit, pt's platelet count was 17, with pt ultimately getting transfused 1u of platelets in addition to 1u of pRBCs at his oncologist's office that same day. Pt reports that over the past 1 week, pt has been having brief episodes of epistaxis after sneezing or blowing his nose and also bleeding from his stoma when cleaning the area, though without any clots. Yesterday, pt had gum bleeding while brushing his teeth. 54 yo man with history of multiple myeloma s/p bone marrow transplant (~1.5 yrs ago) and chemo/RT (stopped ~2 months ago in preparation for colostomy reversal), perforated diverticulitis s/p Kiah's (proctosigmoidectomy with colostomy, 9/2018), nonischemic cardiomyopathy (mild global LV systolic dysfunction with EF 54% and diastolic LV dysfunction based on TTE in 11/2018), and HTN presents after outpatient blood work at oncologist's office (Dr. Murphy) on Tuesday revealed a platelet count of 6. Pt was also found to be thrombocytopenic last Monday (3/11/19) when he was at pre-surgical testing for a scheduled colostomy reversal on 3/20/19 with Dr. Alcaraz. At that visit, pt's platelet count was 17, with pt ultimately getting transfused 1u of platelets in addition to 1u of pRBCs at his oncologist's office that same day. Pt reports that over the last week, pt has been having brief episodes of epistaxis after sneezing or blowing his nose and bleeding from his stoma, though both without any clots. Pt notes that up until today, the bleeding from his stoma only occurred when cleaning the area and would quickly stop; today, however, pt started having spontaneous, ongoing bleeding from the stoma. Yesterday, pt had gum bleeding while brushing his teeth. Pt denies any ecchymoses, purpura, hematuria, hemoptysis, or hematemesis. Pt also states that although he doesn't usually get headaches, he has been experiencing intermittent headaches, ~2-3x per day for the past week. The headaches are located at the top of his head, last ~30 minutes, and resolve on their own. No accompanying visual changes, numbness/tingling, weakness, speech changes, F/C, or neck pain/stiffness. Pt denies any recent CP, SOB, URI symptoms, abdominal pain, N/V, urinary complaints, 52 yo man with history of multiple myeloma s/p bone marrow transplant (~1.5 yrs ago) and chemo/RT (stopped ~2 months ago in preparation for colostomy reversal), perforated diverticulitis s/p Kiah's (proctosigmoidectomy with colostomy, 9/2018), nonischemic cardiomyopathy (mild global LV systolic dysfunction with EF 54% and diastolic LV dysfunction based on TTE in 11/2018), and HTN presents after outpatient blood work at oncologist's office (Dr. Murphy) on Tuesday revealed a platelet count of 6. Pt was also found to be thrombocytopenic last Monday (3/11/19) when he was at pre-surgical testing for a scheduled colostomy reversal on 3/20/19 with Dr. Alcaraz. At that visit, pt's platelet count was 17, with pt ultimately getting transfused 1u of platelets in addition to 1u of pRBCs at his oncologist's office that day. Pt reports that over the last week, he has been having brief episodes of epistaxis after sneezing or blowing his nose and bleeding from his stoma, though both without any clots. Pt notes that up until today, the bleeding from his stoma only occurred when cleaning the area and would quickly stop; today, however, pt started having spontaneous, ongoing bleeding from the stoma. Yesterday, pt had gum bleeding while brushing his teeth. Pt denies any ecchymoses, purpura, hematuria, hemoptysis, or hematemesis. Pt also states that although he doesn't usually get headaches, he has been experiencing intermittent headaches, ~2-3x per day for the past week. The headaches are located at the top of his head, last ~30 minutes, and resolve on their own. No accompanying visual changes, numbness/tingling, weakness, speech changes, F/C, or neck pain/stiffness. Pt denies any recent CP, SOB, URI symptoms, abdominal pain, N/V, changes in stool output, urinary complaints, sick contacts, or travel.    In the ED,  T 98.5, , /86, RR 16, O2 sats 100% RA. Platelet count 10, Hgb 7.2, WBC 4.52. 54 yo man with history of multiple myeloma s/p bone marrow transplant (~1.5 yrs ago) and chemo/RT (stopped ~2 months ago in preparation for colostomy reversal), perforated diverticulitis s/p Kiah's (proctosigmoidectomy with colostomy, 9/2018), nonischemic cardiomyopathy (mild global LV systolic dysfunction with EF 54% and diastolic LV dysfunction based on TTE in 11/2018), and HTN presents after outpatient blood work at oncologist's office (Dr. Murphy) on Tuesday revealed a platelet count of 6. Pt was also found to be thrombocytopenic last Monday (3/11/19) when he was at pre-surgical testing for a scheduled colostomy reversal on 3/20/19 with Dr. Alcaraz. At that visit, pt's platelet count was 17, with pt ultimately getting transfused 1u of platelets in addition to 1u of pRBCs at his oncologist's office that day. Pt reports that over the last week, he has been having brief episodes of epistaxis after sneezing or blowing his nose and bleeding from his stoma, though both without any clots. Pt notes that up until today, the bleeding from his stoma only occurred when cleaning the area and would quickly stop; today, however, pt started having spontaneous, ongoing bleeding from the stoma. Yesterday, pt had gum bleeding while brushing his teeth. Pt denies any ecchymoses, purpura, rashes, hematuria, hemoptysis, or hematemesis. Pt also states that although he doesn't usually get headaches, he has been experiencing intermittent headaches, ~2-3x per day for the past week. The headaches are located at the top of his head, last ~30 minutes, and resolve on their own. No accompanying visual changes, numbness/tingling, weakness, speech changes, F/C, or neck pain/stiffness. Pt denies any recent CP, SOB, URI symptoms, abdominal pain, N/V, changes in stool output, urinary complaints, sick contacts, travel, or medication changes.    In the ED,  T 98.5, , /86, RR 16, O2 sats 100% RA. Platelet count 10, Hgb 7.2, WBC 4.52.

## 2019-03-19 NOTE — ED ADULT NURSE REASSESSMENT NOTE - SYMPTOMS
offers no complaints at this time. surg resident here to see patient.
tolerating platelets without complaints, respirations even and unlabored. offers no complaints. vs stable.

## 2019-03-19 NOTE — H&P ADULT - NSICDXPASTMEDICALHX_GEN_ALL_CORE_FT
PAST MEDICAL HISTORY:  Cardiomyopathy     Diverticulitis of large intestine without perforation or abscess without bleeding     Former smoker (1 ppd x 11 years; Quit ~age 26)    Heart failure (EF 15% at diagnosis in 2016)    Hypertension     Left ventricular dysfunction     Mild aortic regurgitation     Mild mitral regurgitation PAST MEDICAL HISTORY:  Cardiomyopathy     Diverticulitis     Former smoker (1 ppd x 11 years; Quit ~age 26)    Hypertension     Left ventricular dysfunction     Multiple myeloma

## 2019-03-19 NOTE — H&P ADULT - ASSESSMENT
54 yo man with history of multiple myeloma s/p bone marrow transplant (~1.5 yrs ago) and chemo/RT (stopped ~2 months ago in preparation for colostomy reversal), perforated diverticulitis s/p Kiah's (proctosigmoidectomy with colostomy, 9/2018), nonischemic cardiomyopathy (mild global LV systolic dysfunction with EF 54% and diastolic LV dysfunction based on TTE in 11/2018), and HTN presents after outpatient blood work at oncologist's office (Dr. Murphy) on Tuesday revealed a platelet count of 6, admitted with thrombocytopenia of unclear etiology.

## 2019-03-19 NOTE — ED ADULT TRIAGE NOTE - CHIEF COMPLAINT QUOTE
Pt sent by PCP with low platelets of 6"; and blood in colostomy bag and nose bleed . Pt  s/p blood transfusions last Thursday and  on 2/16; Pt schedule for a reverse colostomy tomorrow. Pt sent by PCP with low platelets of 6"; blood in colostomy bag and nose bleed . Pt  s/p blood transfusions last Thursday and  on 2/16; Pt schedule for a reverse colostomy tomorrow.

## 2019-03-19 NOTE — ED ADULT NURSE NOTE - CHIEF COMPLAINT QUOTE
Pt sent by PCP with low platelets of 6"; blood in colostomy bag and nose bleed . Pt  s/p blood transfusions last Thursday and  on 2/16; Pt schedule for a reverse colostomy tomorrow.

## 2019-03-19 NOTE — H&P ADULT - NSHPSOCIALHISTORY_GEN_ALL_CORE
Tobacco: denies  EtOH: denies  Illicit drugs: denies  Lives with Tobacco: former smoker, smoked 1 ppd x 11 yrs, quit ~26 yrs old  EtOH: denies  Illicit drugs: denies  Lives with wife. Ambulates without any assistance.

## 2019-03-19 NOTE — ED ADULT NURSE REASSESSMENT NOTE - REASSESS COMMUNICATION
platelet ct of 10 as per lab, Dr Estrella and BRISSA Hu made aware at time of notification/ED physician notified
transported to CT then floor with ACLS transport at 1030pm

## 2019-03-20 ENCOUNTER — APPOINTMENT (OUTPATIENT)
Dept: SURGERY | Facility: HOSPITAL | Age: 54
End: 2019-03-20

## 2019-03-20 ENCOUNTER — APPOINTMENT (OUTPATIENT)
Dept: COLORECTAL SURGERY | Facility: HOSPITAL | Age: 54
End: 2019-03-20

## 2019-03-20 DIAGNOSIS — N17.9 ACUTE KIDNEY FAILURE, UNSPECIFIED: ICD-10-CM

## 2019-03-20 DIAGNOSIS — Z94.81 BONE MARROW TRANSPLANT STATUS: Chronic | ICD-10-CM

## 2019-03-20 DIAGNOSIS — D64.9 ANEMIA, UNSPECIFIED: ICD-10-CM

## 2019-03-20 DIAGNOSIS — D69.6 THROMBOCYTOPENIA, UNSPECIFIED: ICD-10-CM

## 2019-03-20 DIAGNOSIS — I42.8 OTHER CARDIOMYOPATHIES: ICD-10-CM

## 2019-03-20 DIAGNOSIS — I10 ESSENTIAL (PRIMARY) HYPERTENSION: ICD-10-CM

## 2019-03-20 DIAGNOSIS — R74.0 NONSPECIFIC ELEVATION OF LEVELS OF TRANSAMINASE AND LACTIC ACID DEHYDROGENASE [LDH]: ICD-10-CM

## 2019-03-20 DIAGNOSIS — Z93.3 COLOSTOMY STATUS: ICD-10-CM

## 2019-03-20 DIAGNOSIS — Z29.9 ENCOUNTER FOR PROPHYLACTIC MEASURES, UNSPECIFIED: ICD-10-CM

## 2019-03-20 DIAGNOSIS — C90.00 MULTIPLE MYELOMA NOT HAVING ACHIEVED REMISSION: ICD-10-CM

## 2019-03-20 PROBLEM — K57.32 DIVERTICULITIS OF LARGE INTESTINE WITHOUT PERFORATION OR ABSCESS WITHOUT BLEEDING: Chronic | Status: INACTIVE | Noted: 2019-03-11 | Resolved: 2019-03-20

## 2019-03-20 PROBLEM — I35.1 NONRHEUMATIC AORTIC (VALVE) INSUFFICIENCY: Chronic | Status: INACTIVE | Noted: 2019-03-11 | Resolved: 2019-03-20

## 2019-03-20 PROBLEM — I34.0 NONRHEUMATIC MITRAL (VALVE) INSUFFICIENCY: Chronic | Status: INACTIVE | Noted: 2019-03-11 | Resolved: 2019-03-20

## 2019-03-20 PROBLEM — I50.9 HEART FAILURE, UNSPECIFIED: Chronic | Status: INACTIVE | Noted: 2017-08-01 | Resolved: 2019-03-20

## 2019-03-20 LAB
ALBUMIN SERPL ELPH-MCNC: 3.7 G/DL — SIGNIFICANT CHANGE UP (ref 3.3–5)
ALP SERPL-CCNC: 53 U/L — SIGNIFICANT CHANGE UP (ref 40–120)
ALT FLD-CCNC: 61 U/L — HIGH (ref 4–41)
ANION GAP SERPL CALC-SCNC: 15 MMO/L — HIGH (ref 7–14)
APPEARANCE UR: CLEAR — SIGNIFICANT CHANGE UP
AST SERPL-CCNC: 63 U/L — HIGH (ref 4–40)
BACTERIA # UR AUTO: NEGATIVE — SIGNIFICANT CHANGE UP
BASOPHILS # BLD AUTO: 0.01 K/UL — SIGNIFICANT CHANGE UP (ref 0–0.2)
BASOPHILS # BLD AUTO: 0.01 K/UL — SIGNIFICANT CHANGE UP (ref 0–0.2)
BASOPHILS NFR BLD AUTO: 0.3 % — SIGNIFICANT CHANGE UP (ref 0–2)
BASOPHILS NFR BLD AUTO: 0.3 % — SIGNIFICANT CHANGE UP (ref 0–2)
BILIRUB SERPL-MCNC: 0.9 MG/DL — SIGNIFICANT CHANGE UP (ref 0.2–1.2)
BILIRUB UR-MCNC: NEGATIVE — SIGNIFICANT CHANGE UP
BLOOD UR QL VISUAL: NEGATIVE — SIGNIFICANT CHANGE UP
BUN SERPL-MCNC: 14 MG/DL — SIGNIFICANT CHANGE UP (ref 7–23)
CALCIUM SERPL-MCNC: 9 MG/DL — SIGNIFICANT CHANGE UP (ref 8.4–10.5)
CHLORIDE SERPL-SCNC: 100 MMOL/L — SIGNIFICANT CHANGE UP (ref 98–107)
CO2 SERPL-SCNC: 24 MMOL/L — SIGNIFICANT CHANGE UP (ref 22–31)
COLOR SPEC: SIGNIFICANT CHANGE UP
CREAT ?TM UR-MCNC: 111.7 MG/DL — SIGNIFICANT CHANGE UP
CREAT SERPL-MCNC: 1.37 MG/DL — HIGH (ref 0.5–1.3)
D DIMER BLD IA.RAPID-MCNC: 217 NG/ML — SIGNIFICANT CHANGE UP
DAT C3-SP REAG RBC QL: NEGATIVE — SIGNIFICANT CHANGE UP
DAT POLY-SP REAG RBC QL: NEGATIVE — SIGNIFICANT CHANGE UP
DIRECT COOMBS IGG: NEGATIVE — SIGNIFICANT CHANGE UP
EOSINOPHIL # BLD AUTO: 0.01 K/UL — SIGNIFICANT CHANGE UP (ref 0–0.5)
EOSINOPHIL # BLD AUTO: 0.01 K/UL — SIGNIFICANT CHANGE UP (ref 0–0.5)
EOSINOPHIL NFR BLD AUTO: 0.3 % — SIGNIFICANT CHANGE UP (ref 0–6)
EOSINOPHIL NFR BLD AUTO: 0.3 % — SIGNIFICANT CHANGE UP (ref 0–6)
FIBRINOGEN PPP-MCNC: 344.5 MG/DL — LOW (ref 350–510)
GLUCOSE SERPL-MCNC: 86 MG/DL — SIGNIFICANT CHANGE UP (ref 70–99)
GLUCOSE UR-MCNC: NEGATIVE — SIGNIFICANT CHANGE UP
HBV SURFACE AG SER-ACNC: NEGATIVE — SIGNIFICANT CHANGE UP
HCT VFR BLD CALC: 20.7 % — CRITICAL LOW (ref 39–50)
HCT VFR BLD CALC: 23.9 % — LOW (ref 39–50)
HGB BLD-MCNC: 6.4 G/DL — CRITICAL LOW (ref 13–17)
HGB BLD-MCNC: 7.8 G/DL — LOW (ref 13–17)
HIV 1+2 AB+HIV1 P24 AG SERPL QL IA: SIGNIFICANT CHANGE UP
HYALINE CASTS # UR AUTO: NEGATIVE — SIGNIFICANT CHANGE UP
IGA FLD-MCNC: 3819 MG/DL — HIGH (ref 70–400)
IGG FLD-MCNC: 524 MG/DL — LOW (ref 700–1600)
IGM SERPL-MCNC: 8 MG/DL — LOW (ref 40–230)
IMM GRANULOCYTES NFR BLD AUTO: 3 % — HIGH (ref 0–1.5)
IMM GRANULOCYTES NFR BLD AUTO: 3.5 % — HIGH (ref 0–1.5)
KETONES UR-MCNC: NEGATIVE — SIGNIFICANT CHANGE UP
LEUKOCYTE ESTERASE UR-ACNC: NEGATIVE — SIGNIFICANT CHANGE UP
LYMPHOCYTES # BLD AUTO: 1.33 K/UL — SIGNIFICANT CHANGE UP (ref 1–3.3)
LYMPHOCYTES # BLD AUTO: 1.33 K/UL — SIGNIFICANT CHANGE UP (ref 1–3.3)
LYMPHOCYTES # BLD AUTO: 33.3 % — SIGNIFICANT CHANGE UP (ref 13–44)
LYMPHOCYTES # BLD AUTO: 36.2 % — SIGNIFICANT CHANGE UP (ref 13–44)
MAGNESIUM SERPL-MCNC: 1.9 MG/DL — SIGNIFICANT CHANGE UP (ref 1.6–2.6)
MANUAL SMEAR VERIFICATION: SIGNIFICANT CHANGE UP
MANUAL SMEAR VERIFICATION: SIGNIFICANT CHANGE UP
MCHC RBC-ENTMCNC: 28.8 PG — SIGNIFICANT CHANGE UP (ref 27–34)
MCHC RBC-ENTMCNC: 29.2 PG — SIGNIFICANT CHANGE UP (ref 27–34)
MCHC RBC-ENTMCNC: 30.9 % — LOW (ref 32–36)
MCHC RBC-ENTMCNC: 32.6 % — SIGNIFICANT CHANGE UP (ref 32–36)
MCV RBC AUTO: 89.5 FL — SIGNIFICANT CHANGE UP (ref 80–100)
MCV RBC AUTO: 93.2 FL — SIGNIFICANT CHANGE UP (ref 80–100)
MONOCYTES # BLD AUTO: 1.37 K/UL — HIGH (ref 0–0.9)
MONOCYTES # BLD AUTO: 1.63 K/UL — HIGH (ref 0–0.9)
MONOCYTES NFR BLD AUTO: 37.3 % — HIGH (ref 2–14)
MONOCYTES NFR BLD AUTO: 40.8 % — HIGH (ref 2–14)
NEUTROPHILS # BLD AUTO: 0.84 K/UL — LOW (ref 1.8–7.4)
NEUTROPHILS # BLD AUTO: 0.88 K/UL — LOW (ref 1.8–7.4)
NEUTROPHILS NFR BLD AUTO: 21.8 % — LOW (ref 43–77)
NEUTROPHILS NFR BLD AUTO: 22.9 % — LOW (ref 43–77)
NITRITE UR-MCNC: NEGATIVE — SIGNIFICANT CHANGE UP
NRBC # FLD: 0.31 K/UL — LOW (ref 25–125)
NRBC # FLD: 0.35 K/UL — LOW (ref 25–125)
NRBC FLD-RTO: 8.4 — SIGNIFICANT CHANGE UP
NRBC FLD-RTO: 8.8 — SIGNIFICANT CHANGE UP
PH UR: 6.5 — SIGNIFICANT CHANGE UP (ref 5–8)
PHOSPHATE SERPL-MCNC: 3.6 MG/DL — SIGNIFICANT CHANGE UP (ref 2.5–4.5)
PLATELET # BLD AUTO: 75 K/UL — LOW (ref 150–400)
PLATELET # BLD AUTO: 84 K/UL — LOW (ref 150–400)
PMV BLD: 10.1 FL — SIGNIFICANT CHANGE UP (ref 7–13)
PMV BLD: 11.3 FL — SIGNIFICANT CHANGE UP (ref 7–13)
POTASSIUM SERPL-MCNC: 3.7 MMOL/L — SIGNIFICANT CHANGE UP (ref 3.5–5.3)
POTASSIUM SERPL-SCNC: 3.7 MMOL/L — SIGNIFICANT CHANGE UP (ref 3.5–5.3)
PROT SERPL-MCNC: 9.7 G/DL — HIGH (ref 6–8.3)
PROT SERPL-MCNC: 9.8 G/DL — HIGH (ref 6–8.3)
PROT UR-MCNC: 70 — SIGNIFICANT CHANGE UP
RBC # BLD: 2.22 M/UL — LOW (ref 4.2–5.8)
RBC # BLD: 2.67 M/UL — LOW (ref 4.2–5.8)
RBC # FLD: 18.6 % — HIGH (ref 10.3–14.5)
RBC # FLD: 19.4 % — HIGH (ref 10.3–14.5)
RBC CASTS # UR COMP ASSIST: SIGNIFICANT CHANGE UP (ref 0–?)
SODIUM SERPL-SCNC: 139 MMOL/L — SIGNIFICANT CHANGE UP (ref 135–145)
SODIUM UR-SCNC: 89 MMOL/L — SIGNIFICANT CHANGE UP
SP GR SPEC: 1.01 — SIGNIFICANT CHANGE UP (ref 1–1.04)
SQUAMOUS # UR AUTO: SIGNIFICANT CHANGE UP
UROBILINOGEN FLD QL: NORMAL — SIGNIFICANT CHANGE UP
WBC # BLD: 3.67 K/UL — LOW (ref 3.8–10.5)
WBC # BLD: 4 K/UL — SIGNIFICANT CHANGE UP (ref 3.8–10.5)
WBC # FLD AUTO: 3.67 K/UL — LOW (ref 3.8–10.5)
WBC # FLD AUTO: 4 K/UL — SIGNIFICANT CHANGE UP (ref 3.8–10.5)
WBC UR QL: SIGNIFICANT CHANGE UP (ref 0–?)

## 2019-03-20 PROCEDURE — 84165 PROTEIN E-PHORESIS SERUM: CPT | Mod: 26

## 2019-03-20 PROCEDURE — 86334 IMMUNOFIX E-PHORESIS SERUM: CPT | Mod: 26

## 2019-03-20 RX ORDER — DEXAMETHASONE 0.5 MG/5ML
40 ELIXIR ORAL DAILY
Qty: 0 | Refills: 0 | Status: COMPLETED | OUTPATIENT
Start: 2019-03-20 | End: 2019-03-23

## 2019-03-20 RX ORDER — CARVEDILOL PHOSPHATE 80 MG/1
25 CAPSULE, EXTENDED RELEASE ORAL EVERY 12 HOURS
Qty: 0 | Refills: 0 | Status: DISCONTINUED | OUTPATIENT
Start: 2019-03-20 | End: 2019-03-23

## 2019-03-20 RX ORDER — LISINOPRIL 2.5 MG/1
1 TABLET ORAL
Qty: 0 | Refills: 0 | COMMUNITY

## 2019-03-20 RX ORDER — PANTOPRAZOLE SODIUM 20 MG/1
40 TABLET, DELAYED RELEASE ORAL
Qty: 0 | Refills: 0 | Status: DISCONTINUED | OUTPATIENT
Start: 2019-03-20 | End: 2019-03-23

## 2019-03-20 RX ADMIN — CARVEDILOL PHOSPHATE 25 MILLIGRAM(S): 80 CAPSULE, EXTENDED RELEASE ORAL at 06:28

## 2019-03-20 RX ADMIN — Medication 120 MILLIGRAM(S): at 17:13

## 2019-03-20 RX ADMIN — CARVEDILOL PHOSPHATE 25 MILLIGRAM(S): 80 CAPSULE, EXTENDED RELEASE ORAL at 17:13

## 2019-03-20 NOTE — PROGRESS NOTE ADULT - SUBJECTIVE AND OBJECTIVE BOX
HPI:  52 yo man well known to me with relapsed/refractory multiple myeloma, failed autologous stem cell transplant, refuses clinical trial, currently off of tx (last received Pomalidomide and Panobinostat ~ 6 weeks ago). Pt refuses any tx at this time until he has colostomy reversed. Colostomy is for perforated divertic. His myeloma is uncontrolled.     Pt was  found to be thrombocytopenic last Monday (3/11/19) when he was at pre-surgical testing for a scheduled colostomy reversal on 3/20/19 with Dr. Alcaraz. At that visit, pt's platelet count was 17, with pt ultimately getting transfused 1u of platelets in addition to 1u of pRBCs at his oncologist's office that day. Pt reports that over the last week, he has been having brief episodes of epistaxis after sneezing or blowing his nose and bleeding from his stoma, though both without any clots. Pt notes that up until today, the bleeding from his stoma only occurred when cleaning the area and would quickly stop; today, however, pt started having spontaneous, ongoing bleeding from the stoma. Yesterday, pt had gum bleeding while brushing his teeth. Pt denies any ecchymoses, purpura, rashes, hematuria, hemoptysis, or hematemesis. Pt also states that although he doesn't usually get headaches, he has been experiencing intermittent headaches, ~2-3x per day for the past week. The headaches are located at the top of his head, last ~30 minutes, and resolve on their own. No accompanying visual changes, numbness/tingling, weakness, speech changes, F/C, or neck pain/stiffness. Pt denies any recent CP, SOB, URI symptoms, abdominal pain, N/V, changes in stool output, urinary complaints, sick contacts, travel, or medication changes.           Pt is seen and examined  pt is awake and lying in bed/out of bed to chair  pt seems comfortable and denies any complaints at this time      PAST MEDICAL & SURGICAL HISTORY:  Diverticulitis  Multiple myeloma  Hypertension  Left ventricular dysfunction  Cardiomyopathy  Former smoker: (1 ppd x 11 years; Quit ~age 26)  History of bone marrow transplant  History of surgery: s/p exploratory laparotomy with sigmoid resection and colostomy on 2018      ROS:  Negative except for:    MEDICATIONS  (STANDING):  carvedilol 25 milliGRAM(s) Oral every 12 hours  dexamethasone  IVPB 40 milliGRAM(s) IV Intermittent daily  pantoprazole    Tablet 40 milliGRAM(s) Oral before breakfast    MEDICATIONS  (PRN):      Allergies    No Known Allergies    Intolerances        Vital Signs Last 24 Hrs  T(C): 36.7 (20 Mar 2019 05:09), Max: 36.9 (19 Mar 2019 19:07)  T(F): 98 (20 Mar 2019 05:09), Max: 98.5 (19 Mar 2019 19:07)  HR: 96 (20 Mar 2019 05:09) (96 - 103)  BP: 116/67 (20 Mar 2019 05:09) (116/67 - 139/82)  BP(mean): --  RR: 18 (20 Mar 2019 05:09) (16 - 18)  SpO2: 99% (20 Mar 2019 05:09) (99% - 100%)    PHYSICAL EXAM  General: adult in NAD  HEENT: clear oropharynx, anicteric sclera, pink conjunctiva  Neck: supple  CV: normal S1/S2 with no murmur rubs or gallops  Lungs: positive air movement b/l ant lungs,clear to auscultation, no wheezes, no rales  Abdomen: soft non-tender non-distended, no hepatosplenomegaly + Colostomy  Ext: no clubbing cyanosis or edema  Skin: no rashes and no petechiae  Neuro: alert and oriented X 4, no focal deficits  LABS:                          7.2    4.52  )-----------( 10       ( 19 Mar 2019 20:20 )             22.8     Serial CBC's   @ 20:20  Hct-22.8 / Hgb-7.2 / Plat-10 / RBC-2.53 / WBC-4.52            03-20    139  |  100  |  14  ----------------------------<  86  3.7   |  24  |  1.37<H>    Ca    9.0      20 Mar 2019 06:20  Phos  3.6     03-20  Mg     1.9     03-20    TPro  9.7<H>  /  Alb  3.7  /  TBili  0.9  /  DBili  x   /  AST  63<H>  /  ALT  61<H>  /  AlkPhos  53  03-20      PT/INR - ( 19 Mar 2019 20:20 )   PT: 14.5 SEC;   INR: 1.26          PTT - ( 19 Mar 2019 20:20 )  PTT:30.1 SEC    Platelet Count - Automated: 10 K/uL ( @ 20:20)  WBC Count: 4.52 K/uL ( @ 20:20)      Quantitative Ig mg/dL ( @ 06:20)        RADIOLOGY & ADDITIONAL STUDIES:

## 2019-03-20 NOTE — CONSULT NOTE ADULT - PROBLEM SELECTOR RECOMMENDATION 9
- blood at stoma site in setting of thrombocytopenia  - cont to follow heme recs on platelet transfusions   - transfuse prbc to goal of Hgb > 7.5  - outpt surgery follow up for colostomy reversal once cause of thrombocytopenia established
Uncertain etiology of ROBB.   Would check UA and urine lytes.  More likely ATN, low suspicion of Myeloma related nephropathy.   No need for IVF at this time, as pt tolerating meals well.   Consider renal imaging to assess for obstruction if Cr not improving by tomorrow.   Otherwise electrolytes accpetable.   Transfuse prbc and platelets as needed.

## 2019-03-20 NOTE — CONSULT NOTE ADULT - SUBJECTIVE AND OBJECTIVE BOX
[facility] Consult Note Nephrology - CONSULTATION NOTE - [phone] - Dr Bahena / Dr Regalado / Dr Benz / Dr Frost / Dr Mahmood / Dr Mcgee / Dr Guzman / Dr Guerra    Patient is a 53y Male with multiple myeloma, a/p failed stem cell transplant     PAST MEDICAL & SURGICAL HISTORY:  Diverticulitis  Multiple myeloma  Hypertension  Left ventricular dysfunction  Cardiomyopathy  Former smoker: (1 ppd x 11 years; Quit ~age 26)  History of bone marrow transplant  History of surgery: s/p exploratory laparotomy with sigmoid resection and colostomy on 9/2/2018    Allergies:  No Known Allergies    Home Medications Reviewed  Hospital Medications:   MEDICATIONS  (STANDING):  carvedilol 25 milliGRAM(s) Oral every 12 hours  dexamethasone  IVPB 40 milliGRAM(s) IV Intermittent daily  pantoprazole    Tablet 40 milliGRAM(s) Oral before breakfast    SOCIAL HISTORY:  Denies ETOh,Smoking,   FAMILY HISTORY:  Family history of diabetes mellitus  Family history of hypertension    REVIEW OF SYSTEMS:  CONSTITUTIONAL: No weakness, fevers or chills  EYES/ENT: No visual changes;  No vertigo or throat pain   NECK: No pain or stiffness  RESPIRATORY: No cough, wheezing, hemoptysis; No shortness of breath  CARDIOVASCULAR: No chest pain or palpitations.  GASTROINTESTINAL: No abdominal or epigastric pain. No nausea, vomiting, or hematemesis; No diarrhea or constipation. No melena or hematochezia.  GENITOURINARY: No dysuria, frequency, foamy urine, urinary urgency, incontinence or hematuria  NEUROLOGICAL: No numbness or weakness  SKIN: No itching, burning, rashes, or lesions   VASCULAR: No bilateral lower extremity edema.   All other review of systems is negative unless indicated above.    VITALS:  T(F): 97.8 (03-20-19 @ 09:42), Max: 98.5 (03-19-19 @ 19:07)  HR: 100 (03-20-19 @ 09:42)  BP: 108/71 (03-20-19 @ 09:42)  RR: 18 (03-20-19 @ 09:42)  SpO2: 99% (03-20-19 @ 09:42)  Wt(kg): --    Height (cm): 185.42 (03-19 @ 22:55)  Weight (kg): 81.6 (03-19 @ 22:55)  BMI (kg/m2): 23.7 (03-19 @ 22:55)  BSA (m2): 2.06 (03-19 @ 22:55)  PHYSICAL EXAM:  Constitutional: NAD  HEENT: anicteric sclera, oropharynx clear, MMM  Neck: No JVD  Respiratory: CTAB, no wheezes, rales or rhonchi  Cardiovascular: S1, S2, RRR  Gastrointestinal: BS+, soft, NT/ND  Extremities: No cyanosis or clubbing. No peripheral edema  Neurological: A/O x 3, no focal deficits  Psychiatric: Normal mood, normal affect  : No CVA tenderness. No quintero.   Skin: No rashes  Vascular Access:    LABS:  03-20    139  |  100  |  14  ----------------------------<  86  3.7   |  24  |  1.37<H>    Ca    9.0      20 Mar 2019 06:20  Phos  3.6     03-20  Mg     1.9     03-20    TPro  9.7<H>  /  Alb  3.7  /  TBili  0.9  /  DBili      /  AST  63<H>  /  ALT  61<H>  /  AlkPhos  53  03-20    Creatinine Trend: 1.37 <--, 1.46 <--                        6.4    3.67  )-----------( 84       ( 20 Mar 2019 06:20 )             20.7     Urine Studies:      RADIOLOGY & ADDITIONAL STUDIES: [facility] Consult Note Nephrology - CONSULTATION NOTE - [phone] - Dr Bahena / Dr Regalado / Dr Benz / Dr Frost / Dr Mahmood / Dr Mcgee / Dr Guzman / Dr Guerra    Patient is a 53y Male with refractory multiple myeloma, a/p failed BM transplant, no longer on treatment, perforated diverticulitis s/p Kiah's (proctosigmoidectomy with colostomy, 9/2018), nonischemic cardiomyopathy was referred to ED for thrombocytopenia. Pt was at presurgical testing, to obtain labwork for planned colostomy reversal on 3/20/19 with Dr. Alcaraz. Over the last week, he has been having brief episodes of epistaxis after sneezing or blowing his nose and bleeding from his stoma, though both without any clots. He denies blood in stool from stoma. Yesterday, pt had gum bleeding while brushing his teeth. On admission, platelet level of 10, and hg 7.2. He denies hematemesis. Renal consult for ROBB, cr ~1.3, with no prior hx of renal disease. No urinary complaints, denies hematuria or foamy urine. Appetite at baseline.     PAST MEDICAL & SURGICAL HISTORY:  Diverticulitis  Multiple myeloma  Hypertension  Left ventricular dysfunction  Cardiomyopathy  Former smoker: (1 ppd x 11 years; Quit ~age 26)  History of bone marrow transplant  History of surgery: s/p exploratory laparotomy with sigmoid resection and colostomy on 9/2/2018    Allergies:  No Known Allergies    Home Medications Reviewed  Hospital Medications:   MEDICATIONS  (STANDING):  carvedilol 25 milliGRAM(s) Oral every 12 hours  dexamethasone  IVPB 40 milliGRAM(s) IV Intermittent daily  pantoprazole    Tablet 40 milliGRAM(s) Oral before breakfast    SOCIAL HISTORY:  Denies ETOh,Smoking,   FAMILY HISTORY:  Family history of diabetes mellitus  Family history of hypertension    REVIEW OF SYSTEMS:  CONSTITUTIONAL: No weakness, fevers or chills  EYES/ENT: No visual changes;  No vertigo or throat pain, +epistaxis.   NECK: No pain or stiffness  RESPIRATORY: No cough, wheezing, hemoptysis; No shortness of breath  CARDIOVASCULAR: No chest pain or palpitations.  GASTROINTESTINAL: No abdominal or epigastric pain. No nausea, vomiting, or hematemesis; No diarrhea or constipation. No melena or hematochezia.  GENITOURINARY: No dysuria, frequency, foamy urine, urinary urgency, incontinence or hematuria  NEUROLOGICAL: No numbness or weakness  SKIN: No itching, burning, rashes, or lesions   VASCULAR: No bilateral lower extremity edema.   All other review of systems is negative unless indicated above.    VITALS:  T(F): 97.8 (03-20-19 @ 09:42), Max: 98.5 (03-19-19 @ 19:07)  HR: 100 (03-20-19 @ 09:42)  BP: 108/71 (03-20-19 @ 09:42)  RR: 18 (03-20-19 @ 09:42)  SpO2: 99% (03-20-19 @ 09:42)  Wt(kg): --    Height (cm): 185.42 (03-19 @ 22:55)  Weight (kg): 81.6 (03-19 @ 22:55)  BMI (kg/m2): 23.7 (03-19 @ 22:55)  BSA (m2): 2.06 (03-19 @ 22:55)  PHYSICAL EXAM:  Constitutional: NAD  HEENT: anicteric sclera, oropharynx clear, MMM  Neck: No JVD  Respiratory: CTAB, no wheezes, rales or rhonchi  Cardiovascular: S1, S2, RRR  Gastrointestinal: BS+, soft, NT/ND  Extremities: No cyanosis or clubbing. No peripheral edema  Neurological: A/O x 3, no focal deficits  Psychiatric: Normal mood, normal affect  : No CVA tenderness. No quintero.   Skin: No rashes    LABS:  03-20    139  |  100  |  14  ----------------------------<  86  3.7   |  24  |  1.37<H>    Ca    9.0      20 Mar 2019 06:20  Phos  3.6     03-20  Mg     1.9     03-20    TPro  9.7<H>  /  Alb  3.7  /  TBili  0.9  /  DBili      /  AST  63<H>  /  ALT  61<H>  /  AlkPhos  53  03-20    Creatinine Trend: 1.37 <--, 1.46 <--                        6.4    3.67  )-----------( 84       ( 20 Mar 2019 06:20 )             20.7     Urine Studies:      RADIOLOGY & ADDITIONAL STUDIES:  < from: CT Head No Cont (03.19.19 @ 22:50) >  IMPRESSION:   No acute intracranial hemorrhage or mass effect.    < end of copied text >

## 2019-03-20 NOTE — CONSULT NOTE ADULT - SUBJECTIVE AND OBJECTIVE BOX
Patient is a 53y Male     Patient is a 53y old  Male who presents with a chief complaint of Thrombocytopenia (20 Mar 2019 08:22)      HPI:  54 yo man with history of multiple myeloma s/p bone marrow transplant (~1.5 yrs ago) and chemo/RT (stopped ~2 months ago in preparation for colostomy reversal), perforated diverticulitis s/p Kiah's (proctosigmoidectomy with colostomy, 9/2018), nonischemic cardiomyopathy (mild global LV systolic dysfunction with EF 54% and diastolic LV dysfunction based on TTE in 11/2018), and HTN presents after outpatient blood work at oncologist's office (Dr. Murphy) on Tuesday revealed a platelet count of 6. Pt was also found to be thrombocytopenic last Monday (3/11/19) when he was at pre-surgical testing for a scheduled colostomy reversal on 3/20/19 with Dr. Alcaraz. At that visit, pt's platelet count was 17, with pt ultimately getting transfused 1u of platelets in addition to 1u of pRBCs at his oncologist's office that day. Pt reports that over the last week, he has been having brief episodes of epistaxis after sneezing or blowing his nose and bleeding from his stoma, though both without any clots. Pt notes that up until today, the bleeding from his stoma only occurred when cleaning the area and would quickly stop; today, however, pt started having spontaneous, ongoing bleeding from the stoma. Yesterday, pt had gum bleeding while brushing his teeth. Pt denies any ecchymoses, purpura, rashes, hematuria, hemoptysis, or hematemesis. Pt also states that although he doesn't usually get headaches, he has been experiencing intermittent headaches, ~2-3x per day for the past week. The headaches are located at the top of his head, last ~30 minutes, and resolve on their own. No accompanying visual changes, numbness/tingling, weakness, speech changes, F/C, or neck pain/stiffness. Pt denies any recent CP, SOB, URI symptoms, abdominal pain, N/V, changes in stool output, urinary complaints, sick contacts, travel, or medication changes.    In the ED,  T 98.5, , /86, RR 16, O2 sats 100% RA. Platelet count 10, Hgb 7.2, WBC 4.52. (19 Mar 2019 22:50)      PAST MEDICAL & SURGICAL HISTORY:  Diverticulitis  Multiple myeloma  Hypertension  Left ventricular dysfunction  Cardiomyopathy  Former smoker: (1 ppd x 11 years; Quit ~age 26)  History of bone marrow transplant  History of surgery: s/p exploratory laparotomy with sigmoid resection and colostomy on 9/2/2018      MEDICATIONS  (STANDING):  carvedilol 25 milliGRAM(s) Oral every 12 hours  dexamethasone  IVPB 40 milliGRAM(s) IV Intermittent daily  pantoprazole    Tablet 40 milliGRAM(s) Oral before breakfast      Allergies    No Known Allergies    Intolerances        SOCIAL HISTORY:  Denies ETOh,Smoking,     FAMILY HISTORY:  Family history of diabetes mellitus  Family history of hypertension      REVIEW OF SYSTEMS:    CONSTITUTIONAL: No weakness, fevers or chills  EYES/ENT: No visual changes;  No vertigo or throat pain   NECK: No pain or stiffness  RESPIRATORY: No cough, wheezing, hemoptysis; No shortness of breath  CARDIOVASCULAR: No chest pain or palpitations  GASTROINTESTINAL: No abdominal or epigastric pain. No nausea, vomiting, or hematemesis; No diarrhea or constipation. No melena or hematochezia.  GENITOURINARY: No dysuria, frequency or hematuria  NEUROLOGICAL: No numbness or weakness  SKIN: No itching, burning, rashes, or lesions   All other review of systems is negative unless indicated above.    VITAL:  T(C): , Max: 36.9 (03-19-19 @ 19:07)  T(F): , Max: 98.5 (03-19-19 @ 19:07)  HR: 100 (03-20-19 @ 09:42)  BP: 108/71 (03-20-19 @ 09:42)  BP(mean): --  RR: 18 (03-20-19 @ 09:42)  SpO2: 99% (03-20-19 @ 09:42)  Wt(kg): --    I and O's:    Height (cm): 185.42 (03-19 @ 22:55)  Weight (kg): 81.6 (03-19 @ 22:55)  BMI (kg/m2): 23.7 (03-19 @ 22:55)  BSA (m2): 2.06 (03-19 @ 22:55)    PHYSICAL EXAM:    Constitutional: NAD  HEENT: PERRLA,   Neck: No JVD  Respiratory: CTA B/L  Cardiovascular: S1 and S2  Gastrointestinal: BS+, soft, NT/ND  Extremities: No peripheral edema  Neurological: A/O x 3, no focal deficits  Psychiatric: Normal mood, normal affect  : No [place]  Skin: No rashes  Access: Not applicable  Back: No CVA tenderness    LABS:                        6.4    3.67  )-----------( 84       ( 20 Mar 2019 06:20 )             20.7     03-20    139  |  100  |  14  ----------------------------<  86  3.7   |  24  |  1.37<H>    Ca    9.0      20 Mar 2019 06:20  Phos  3.6     03-20  Mg     1.9     03-20    TPro  9.7<H>  /  Alb  3.7  /  TBili  0.9  /  DBili  x   /  AST  63<H>  /  ALT  61<H>  /  AlkPhos  53  03-20          RADIOLOGY & ADDITIONAL STUDIES:

## 2019-03-20 NOTE — CONSULT NOTE ADULT - PROBLEM SELECTOR RECOMMENDATION 2
- heme donato in progress; likely either sec to myelomatous infiltration of Bone Marrow   - trial of Dexamethasone 40mg IV daily x 4 days per heme   - per heme transfuse platelets if Less than 10K or clinically significant bleeding

## 2019-03-20 NOTE — PROGRESS NOTE ADULT - SUBJECTIVE AND OBJECTIVE BOX
Patient seen and examined at bedside  bleeding in colostomy overnight  Case discussed with medical team    HPI:  52 yo man with history of multiple myeloma s/p bone marrow transplant (~1.5 yrs ago) and chemo/RT (stopped ~2 months ago in preparation for colostomy reversal), perforated diverticulitis s/p Kiah's (proctosigmoidectomy with colostomy, 9/2018), nonischemic cardiomyopathy (mild global LV systolic dysfunction with EF 54% and diastolic LV dysfunction based on TTE in 11/2018), and HTN presents after outpatient blood work at oncologist's office (Dr. Murphy) on Tuesday revealed a platelet count of 6. Pt was also found to be thrombocytopenic last Monday (3/11/19) when he was at pre-surgical testing for a scheduled colostomy reversal on 3/20/19 with Dr. Alcaraz. At that visit, pt's platelet count was 17, with pt ultimately getting transfused 1u of platelets in addition to 1u of pRBCs at his oncologist's office that day. Pt reports that over the last week, he has been having brief episodes of epistaxis after sneezing or blowing his nose and bleeding from his stoma, though both without any clots. Pt notes that up until today, the bleeding from his stoma only occurred when cleaning the area and would quickly stop; today, however, pt started having spontaneous, ongoing bleeding from the stoma. Yesterday, pt had gum bleeding while brushing his teeth. Pt denies any ecchymoses, purpura, rashes, hematuria, hemoptysis, or hematemesis. Pt also states that although he doesn't usually get headaches, he has been experiencing intermittent headaches, ~2-3x per day for the past week. The headaches are located at the top of his head, last ~30 minutes, and resolve on their own. No accompanying visual changes, numbness/tingling, weakness, speech changes, F/C, or neck pain/stiffness. Pt denies any recent CP, SOB, URI symptoms, abdominal pain, N/V, changes in stool output, urinary complaints, sick contacts, travel, or medication changes.    In the ED,  T 98.5, , /86, RR 16, O2 sats 100% RA. Platelet count 10, Hgb 7.2, WBC 4.52. (19 Mar 2019 22:50)      PAST MEDICAL & SURGICAL HISTORY:  Diverticulitis  Multiple myeloma  Hypertension  Left ventricular dysfunction  Cardiomyopathy  Former smoker: (1 ppd x 11 years; Quit ~age 26)  History of bone marrow transplant  History of surgery: s/p exploratory laparotomy with sigmoid resection and colostomy on 9/2/2018      No Known Allergies       MEDICATIONS  (STANDING):  carvedilol 25 milliGRAM(s) Oral every 12 hours    MEDICATIONS  (PRN):      REVIEW OF SYSTEMS:  CONSTITUTIONAL: (+) malaise.   EYES: No acute change in vision   ENT:  No tinnitus  NECK: No stiffness  RESPIRATORY: No hemoptysis  CARDIOVASCULAR: No chest pain, palpitations, syncope  GASTROINTESTINAL: hematochezia. No hematemesis, diarrhea, melena4.  GENITOURINARY: No hematuria  NEUROLOGICAL: No headaches  LYMPH Nodes: No enlarged glands  ENDOCRINE: No heat or cold intolerance	    T(C): 36.7 (03-20-19 @ 05:09), Max: 36.9 (03-19-19 @ 19:07)  HR: 96 (03-20-19 @ 05:09) (96 - 103)  BP: 116/67 (03-20-19 @ 05:09) (116/67 - 139/82)  RR: 18 (03-20-19 @ 05:09) (16 - 18)  SpO2: 99% (03-20-19 @ 05:09) (99% - 100%)    PHYSICAL EXAMINATION:   Constitutional: WD, NAD  HEENT: NC, AT  Neck:  Supple  Respiratory:  Adequate airflow b/l. Not using accessory muscles of respiration.  Cardiovascular:  S1 & S2 intact, no R/G, 2+ radial pulses b/l  Gastrointestinal: ostomy intact. Soft, NT, ND, normoactive b.s., no organomegaly/RT/rigidity  Extremities: WWP  Neurological:  Alert and awake.  No acute focal motor deficits. Crude sensation intact.     Labs and imaging reviewed    LABS:                        7.2    4.52  )-----------( 10       ( 19 Mar 2019 20:20 )             22.8     03-19    138  |  100  |  14  ----------------------------<  100<H>  4.1   |  22  |  1.46<H>    Ca    9.4      19 Mar 2019 20:20    TPro  10.4<H>  /  Alb  4.0  /  TBili  0.9  /  DBili  x   /  AST  75<H>  /  ALT  72<H>  /  AlkPhos  58  03-19        PT/INR - ( 19 Mar 2019 20:20 )   PT: 14.5 SEC;   INR: 1.26          PTT - ( 19 Mar 2019 20:20 )  PTT:30.1 SEC    CAPILLARY BLOOD GLUCOSE            LIVER FUNCTIONS - ( 19 Mar 2019 20:20 )  Alb: 4.0 g/dL / Pro: 10.4 g/dL / ALK PHOS: 58 u/L / ALT: 72 u/L / AST: 75 u/L / GGT: x               RADIOLOGY & ADDITIONAL STUDIES:

## 2019-03-20 NOTE — CONSULT NOTE ADULT - SUBJECTIVE AND OBJECTIVE BOX
Chief Complaint:  Patient is a 53y old  Male who presents with a chief complaint of Thrombocytopenia (20 Mar 2019 10:59)    Diverticulitis  Multiple myeloma  Hypertension  Mild mitral regurgitation  Mild aortic regurgitation  Left ventricular dysfunction  Cardiomyopathy  Diverticulitis of large intestine without perforation or abscess without bleeding  Former smoker  Heart failure  History of bone marrow transplant  History of surgery     HPI:  54 yo man known to our practice with h/o multiple myeloma s/p bone marrow transplant (~1.5 yrs ago) and chemo/RT (stopped ~2 months ago in preparation for colostomy reversal), perforated diverticulitis s/p Kiah's (proctosigmoidectomy with colostomy, 9/2018), nonischemic cardiomyopathy (mild global LV systolic dysfunction with EF 54% and diastolic LV dysfunction based on TTE in 11/2018), and HTN presents after outpatient blood work at oncologist's office (Dr. Murphy) on Tuesday revealed a platelet count of 6. Pt was also found to be thrombocytopenic last Monday (3/11/19) when he was at pre-surgical testing for a scheduled colostomy reversal on 3/20/19 with Dr. Alcaraz. At that visit, pt's platelet count was 17, with pt ultimately getting transfused 1u of platelets in addition to 1u of pRBCs at his oncologist's office that day. Pt reports that over the last week, he has been having brief episodes of epistaxis after sneezing or blowing his nose and bleeding from his stoma, though both without any clots. Pt notes that up until today, the bleeding from his stoma only occurred when cleaning the area and would quickly stop; today, however, pt started having spontaneous, ongoing bleeding from the stoma. Yesterday, pt had gum bleeding while brushing his teeth. Pt denies any ecchymoses, purpura, rashes, hematuria, hemoptysis, or hematemesis. Pt also states that although he doesn't usually get headaches, he has been experiencing intermittent headaches, ~2-3x per day for the past week. The headaches are located at the top of his head, last ~30 minutes, and resolve on their own. No accompanying visual changes, numbness/tingling, weakness, speech changes, F/C, or neck pain/stiffness. Pt denies any recent CP, SOB, URI symptoms, abdominal pain, N/V, changes in stool output, urinary complaints, sick contacts, travel, or medication changes. Mr. Hood most recently had a colonoscopy in November 2018 with internal hemorrhoids noted and negative pathology. He reports that prior to his platelet counts dropping his colostomy output was brown in color, however, over the last few days, he noticed that there was blood at his stoma site in addition to epistaxis and sometime bleeding gums.      In the ED,  T 98.5, , /86, RR 16, O2 sats 100% RA. Platelet count 10, Hgb 7.2, WBC 4.52. (19 Mar 2019 22:50)      No Known Allergies      carvedilol 25 milliGRAM(s) Oral every 12 hours  dexamethasone  IVPB 40 milliGRAM(s) IV Intermittent daily  pantoprazole    Tablet 40 milliGRAM(s) Oral before breakfast        FAMILY HISTORY:  Family history of diabetes mellitus  Family history of hypertension        Review of Systems:    General:  No wt loss, fevers, chills, night sweats, fatigue  Eyes:  Good vision, no reported pain  ENT:  No sore throat, pain, runny nose, dysphagia  CV:  No pain, palpitations, no lightheadedness  Resp:  No dyspnea, cough, tachypnea, wheezing  GI: denies n/v/d/c, abdominal pain, melena   :  No pain, bleeding, incontinence, nocturia  Muscle:  No pain, weakness  Neuro:  No weakness, tingling, memory problems  Psych:  No fatigue, insomnia, mood problems, depression  Endocrine:  No polyuria, polydypsia, cold/heat intolerance  Heme:  No petechiae, ecchymosis, easy bruisability  Skin:  No rash, tattoos, scars, edema    Relevant Family History:   n/c    Relevant Social History: n/c      Physical Exam:    Vital Signs:  Vital Signs Last 24 Hrs  T(C): 36.6 (20 Mar 2019 09:42), Max: 36.9 (19 Mar 2019 19:07)  T(F): 97.8 (20 Mar 2019 09:42), Max: 98.5 (19 Mar 2019 19:07)  HR: 100 (20 Mar 2019 09:42) (96 - 103)  BP: 108/71 (20 Mar 2019 09:42) (108/71 - 139/82)  BP(mean): --  RR: 18 (20 Mar 2019 09:42) (16 - 18)  SpO2: 99% (20 Mar 2019 09:42) (99% - 100%)  Daily Height in cm: 185.42 (19 Mar 2019 22:55)    Daily     General:  Appears stated age, well-groomed, nad  HEENT:  NC/AT,  conjunctivae clear and pink, no thyromegaly, nodules, adenopathy, no JVD  Chest:  Full & symmetric excursion, no increased effort, breath sounds clear  Cardiovascular:  Regular rhythm, S1, S2, no murmur/rub/S3/S4, no abdominal bruit, no edema  Abdomen:  Soft, non-tender, non-distended, normoactive bowel sounds,  no masses ,no hepatosplenomeagaly, no signs of chronic liver disease  Extremities:  no cyanosis,clubbing or edema  Skin:  No rash/erythema/ecchymoses/petechiae/wounds/abscess/warm/dry  Neuro/Psych:  A&Ox3  , no asterixis, no tremor, no encephalopathy    Laboratory:                            6.4    3.67  )-----------( 84       ( 20 Mar 2019 06:20 )             20.7     03-20    139  |  100  |  14  ----------------------------<  86  3.7   |  24  |  1.37<H>    Ca    9.0      20 Mar 2019 06:20  Phos  3.6     03-20  Mg     1.9     03-20    TPro  9.7<H>  /  Alb  3.7  /  TBili  0.9  /  DBili  x   /  AST  63<H>  /  ALT  61<H>  /  AlkPhos  53  03-20    LIVER FUNCTIONS - ( 20 Mar 2019 06:20 )  Alb: 3.7 g/dL / Pro: 9.7 g/dL / ALK PHOS: 53 u/L / ALT: 61 u/L / AST: 63 u/L / GGT: x           PT/INR - ( 19 Mar 2019 20:20 )   PT: 14.5 SEC;   INR: 1.26          PTT - ( 19 Mar 2019 20:20 )  PTT:30.1 SEC      Imaging:

## 2019-03-20 NOTE — CONSULT NOTE ADULT - PROBLEM SELECTOR RECOMMENDATION 3
- clinical trial at [facility] strongly recommended but pt declined per heme   - cont to follow heme recs/appreciated

## 2019-03-20 NOTE — CONSULT NOTE ADULT - ASSESSMENT
52 yo man known to our practice with h/o multiple myeloma s/p bone marrow transplant (~1.5 yrs ago) and chemo/RT (stopped ~2 months ago in preparation for colostomy reversal), perforated diverticulitis s/p Kiah's (proctosigmoidectomy with colostomy, 9/2018), nonischemic cardiomyopathy (mild global LV systolic dysfunction with EF 54% and diastolic LV dysfunction based on TTE in 11/2018), and HTN presents after outpatient blood work at oncologist's office (Dr. Murphy) on Tuesday revealed a low platelet count and also with bleeding at stoma site
gib likely related to pancytopeisa, ? nose bled. recommend conservative gi managment. appreciate heme onc.  pt reports brown ostomy output.  will follow with you.
53y Male with refractory multiple myeloma, a/p failed BM transplant, no longer on treatment, perforated diverticulitis s/p Kiah's (proctosigmoidectomy with colostomy, 9/2018), nonischemic cardiomyopathy was referred to ED for thrombocytopenia and anemia, c/b ROBB

## 2019-03-20 NOTE — PROGRESS NOTE ADULT - ASSESSMENT
52 yo man with history of multiple myeloma s/p bone marrow transplant (~1.5 yrs ago) and chemo/RT (stopped ~2 months ago in preparation for colostomy reversal), perforated diverticulitis s/p Kiah's (proctosigmoidectomy with colostomy, 9/2018), nonischemic cardiomyopathy (mild global LV systolic dysfunction with EF 54% and diastolic LV dysfunction based on TTE in 11/2018), and HTN presents after outpatient blood work at oncologist's office (Dr. Murphy) on Tuesday revealed a platelet count of 6, admitted with thrombocytopenia of unclear etiology.    -> Symptomatic Thrombocytopenia:     - f/u am labs     - s/p 2 u platelets     - transfuse additional platelets and/or prbcs prn     - hem/onc management greatly appreciated   -> Hematochezia, Gastrointestinal Bleed:     - likely 2/2 symptomatic thrombocytopenia      - anemia workup     - gi on board     - treat underlying plts/hgb, monitor clinical status closely     - will adjust management per consultants recs  -> Chronic Combined Systolic and Diastolic CHF:     - c/w lifestyle modifications     - adjust rx as needed  -> Multiple Myeloma:     - per hem/onc   -> ROBB:     - unspecified etiology     - potentially 2/2 anemia vs MM vs alternative     - robb workup     - monitor renal function

## 2019-03-20 NOTE — PROGRESS NOTE ADULT - ASSESSMENT
1. Thrombocytopenia    -- Likely either sec to myelomatous infiltration of Bone Marrow vs ITP  -- will give trial of Dexamethasone 40mg IV daily x 4  -- transfuse platelets if Less than 10K or clinically significant bleeding    2. Multiple Myeloma    -- relapsed / refractory ( failed all tx) and failed auto stem cell transplant  -- pt off of tx against my and other oncologist advise in order to reverse colostomy  -- clinical trial at [facility] strongly recommended but pt declined  -- check spep, jackie, sflc, QIggs  -- transfuse for Hgb < 7    3. Bleeding from colostomy    -- his GI (Dr. hylton) aware and will see  -- plan for reversal of colostomy if counts improve    Torsten Murphy MD  [phone]

## 2019-03-21 LAB
ALBUMIN SERPL ELPH-MCNC: 3.7 G/DL — SIGNIFICANT CHANGE UP (ref 3.3–5)
ALP SERPL-CCNC: 54 U/L — SIGNIFICANT CHANGE UP (ref 40–120)
ALT FLD-CCNC: 53 U/L — HIGH (ref 4–41)
ANION GAP SERPL CALC-SCNC: 18 MMO/L — HIGH (ref 7–14)
AST SERPL-CCNC: 42 U/L — HIGH (ref 4–40)
BASOPHILS # BLD AUTO: 0.01 K/UL — SIGNIFICANT CHANGE UP (ref 0–0.2)
BASOPHILS # BLD AUTO: 0.01 K/UL — SIGNIFICANT CHANGE UP (ref 0–0.2)
BASOPHILS NFR BLD AUTO: 0.2 % — SIGNIFICANT CHANGE UP (ref 0–2)
BASOPHILS NFR BLD AUTO: 0.3 % — SIGNIFICANT CHANGE UP (ref 0–2)
BILIRUB SERPL-MCNC: 0.9 MG/DL — SIGNIFICANT CHANGE UP (ref 0.2–1.2)
BLD GP AB SCN SERPL QL: NEGATIVE — SIGNIFICANT CHANGE UP
BUN SERPL-MCNC: 19 MG/DL — SIGNIFICANT CHANGE UP (ref 7–23)
CALCIUM SERPL-MCNC: 8.8 MG/DL — SIGNIFICANT CHANGE UP (ref 8.4–10.5)
CHLORIDE SERPL-SCNC: 100 MMOL/L — SIGNIFICANT CHANGE UP (ref 98–107)
CO2 SERPL-SCNC: 19 MMOL/L — LOW (ref 22–31)
CREAT SERPL-MCNC: 1.24 MG/DL — SIGNIFICANT CHANGE UP (ref 0.5–1.3)
EOSINOPHIL # BLD AUTO: 0 K/UL — SIGNIFICANT CHANGE UP (ref 0–0.5)
EOSINOPHIL # BLD AUTO: 0.01 K/UL — SIGNIFICANT CHANGE UP (ref 0–0.5)
EOSINOPHIL NFR BLD AUTO: 0 % — SIGNIFICANT CHANGE UP (ref 0–6)
EOSINOPHIL NFR BLD AUTO: 0.3 % — SIGNIFICANT CHANGE UP (ref 0–6)
GLUCOSE SERPL-MCNC: 151 MG/DL — HIGH (ref 70–99)
HCT VFR BLD CALC: 23.2 % — LOW (ref 39–50)
HCT VFR BLD CALC: 24.4 % — LOW (ref 39–50)
HGB BLD-MCNC: 7.6 G/DL — LOW (ref 13–17)
HGB BLD-MCNC: 7.9 G/DL — LOW (ref 13–17)
IMM GRANULOCYTES NFR BLD AUTO: 1.9 % — HIGH (ref 0–1.5)
IMM GRANULOCYTES NFR BLD AUTO: 4 % — HIGH (ref 0–1.5)
LYMPHOCYTES # BLD AUTO: 1.35 K/UL — SIGNIFICANT CHANGE UP (ref 1–3.3)
LYMPHOCYTES # BLD AUTO: 1.51 K/UL — SIGNIFICANT CHANGE UP (ref 1–3.3)
LYMPHOCYTES # BLD AUTO: 36.7 % — SIGNIFICANT CHANGE UP (ref 13–44)
LYMPHOCYTES # BLD AUTO: 38.7 % — SIGNIFICANT CHANGE UP (ref 13–44)
MAGNESIUM SERPL-MCNC: 2 MG/DL — SIGNIFICANT CHANGE UP (ref 1.6–2.6)
MCHC RBC-ENTMCNC: 28.7 PG — SIGNIFICANT CHANGE UP (ref 27–34)
MCHC RBC-ENTMCNC: 29 PG — SIGNIFICANT CHANGE UP (ref 27–34)
MCHC RBC-ENTMCNC: 32.4 % — SIGNIFICANT CHANGE UP (ref 32–36)
MCHC RBC-ENTMCNC: 32.8 % — SIGNIFICANT CHANGE UP (ref 32–36)
MCV RBC AUTO: 87.5 FL — SIGNIFICANT CHANGE UP (ref 80–100)
MCV RBC AUTO: 89.7 FL — SIGNIFICANT CHANGE UP (ref 80–100)
MONOCYTES # BLD AUTO: 0.61 K/UL — SIGNIFICANT CHANGE UP (ref 0–0.9)
MONOCYTES # BLD AUTO: 0.68 K/UL — SIGNIFICANT CHANGE UP (ref 0–0.9)
MONOCYTES NFR BLD AUTO: 14.8 % — HIGH (ref 2–14)
MONOCYTES NFR BLD AUTO: 19.5 % — HIGH (ref 2–14)
NEUTROPHILS # BLD AUTO: 1.3 K/UL — LOW (ref 1.8–7.4)
NEUTROPHILS # BLD AUTO: 1.91 K/UL — SIGNIFICANT CHANGE UP (ref 1.8–7.4)
NEUTROPHILS NFR BLD AUTO: 37.2 % — LOW (ref 43–77)
NEUTROPHILS NFR BLD AUTO: 46.4 % — SIGNIFICANT CHANGE UP (ref 43–77)
NRBC # FLD: 0.28 K/UL — LOW (ref 25–125)
NRBC # FLD: 0.36 K/UL — LOW (ref 25–125)
NRBC FLD-RTO: 8 — SIGNIFICANT CHANGE UP
NRBC FLD-RTO: 8.7 — SIGNIFICANT CHANGE UP
PHOSPHATE SERPL-MCNC: 3.8 MG/DL — SIGNIFICANT CHANGE UP (ref 2.5–4.5)
PLATELET # BLD AUTO: 61 K/UL — LOW (ref 150–400)
PLATELET # BLD AUTO: 68 K/UL — LOW (ref 150–400)
PMV BLD: 10.3 FL — SIGNIFICANT CHANGE UP (ref 7–13)
PMV BLD: 10.4 FL — SIGNIFICANT CHANGE UP (ref 7–13)
POTASSIUM SERPL-MCNC: 3.9 MMOL/L — SIGNIFICANT CHANGE UP (ref 3.5–5.3)
POTASSIUM SERPL-SCNC: 3.9 MMOL/L — SIGNIFICANT CHANGE UP (ref 3.5–5.3)
PROT SERPL-MCNC: 10.1 G/DL — HIGH (ref 6–8.3)
RBC # BLD: 2.65 M/UL — LOW (ref 4.2–5.8)
RBC # BLD: 2.72 M/UL — LOW (ref 4.2–5.8)
RBC # FLD: 18.6 % — HIGH (ref 10.3–14.5)
RBC # FLD: 18.6 % — HIGH (ref 10.3–14.5)
RH IG SCN BLD-IMP: POSITIVE — SIGNIFICANT CHANGE UP
SODIUM SERPL-SCNC: 137 MMOL/L — SIGNIFICANT CHANGE UP (ref 135–145)
WBC # BLD: 3.49 K/UL — LOW (ref 3.8–10.5)
WBC # BLD: 4.12 K/UL — SIGNIFICANT CHANGE UP (ref 3.8–10.5)
WBC # FLD AUTO: 3.49 K/UL — LOW (ref 3.8–10.5)
WBC # FLD AUTO: 4.12 K/UL — SIGNIFICANT CHANGE UP (ref 3.8–10.5)

## 2019-03-21 RX ADMIN — PANTOPRAZOLE SODIUM 40 MILLIGRAM(S): 20 TABLET, DELAYED RELEASE ORAL at 06:01

## 2019-03-21 RX ADMIN — CARVEDILOL PHOSPHATE 25 MILLIGRAM(S): 80 CAPSULE, EXTENDED RELEASE ORAL at 17:50

## 2019-03-21 RX ADMIN — CARVEDILOL PHOSPHATE 25 MILLIGRAM(S): 80 CAPSULE, EXTENDED RELEASE ORAL at 06:01

## 2019-03-21 RX ADMIN — Medication 120 MILLIGRAM(S): at 06:01

## 2019-03-21 NOTE — PROGRESS NOTE ADULT - PROBLEM SELECTOR PLAN 2
- luc donato in progress  - trial of Dexamethasone 40mg IV daily x 4 days per heme   - per heme transfuse platelets if Less than 10K or clinically significant bleeding

## 2019-03-21 NOTE — PROGRESS NOTE ADULT - SUBJECTIVE AND OBJECTIVE BOX
Patient seen and examined at bedside  No acute events noted overnight  Case discussed with medical team    HPI:  52 yo man with history of multiple myeloma s/p bone marrow transplant (~1.5 yrs ago) and chemo/RT (stopped ~2 months ago in preparation for colostomy reversal), perforated diverticulitis s/p Kiah's (proctosigmoidectomy with colostomy, 2018), nonischemic cardiomyopathy (mild global LV systolic dysfunction with EF 54% and diastolic LV dysfunction based on TTE in 2018), and HTN presents after outpatient blood work at oncologist's office (Dr. Murphy) on Tuesday revealed a platelet count of 6. Pt was also found to be thrombocytopenic last Monday (3/11/19) when he was at pre-surgical testing for a scheduled colostomy reversal on 3/20/19 with Dr. Alcaraz. At that visit, pt's platelet count was 17, with pt ultimately getting transfused 1u of platelets in addition to 1u of pRBCs at his oncologist's office that day. Pt reports that over the last week, he has been having brief episodes of epistaxis after sneezing or blowing his nose and bleeding from his stoma, though both without any clots. Pt notes that up until today, the bleeding from his stoma only occurred when cleaning the area and would quickly stop; today, however, pt started having spontaneous, ongoing bleeding from the stoma. Yesterday, pt had gum bleeding while brushing his teeth. Pt denies any ecchymoses, purpura, rashes, hematuria, hemoptysis, or hematemesis. Pt also states that although he doesn't usually get headaches, he has been experiencing intermittent headaches, ~2-3x per day for the past week. The headaches are located at the top of his head, last ~30 minutes, and resolve on their own. No accompanying visual changes, numbness/tingling, weakness, speech changes, F/C, or neck pain/stiffness. Pt denies any recent CP, SOB, URI symptoms, abdominal pain, N/V, changes in stool output, urinary complaints, sick contacts, travel, or medication changes.    In the ED,  T 98.5, , /86, RR 16, O2 sats 100% RA. Platelet count 10, Hgb 7.2, WBC 4.52. (19 Mar 2019 22:50)      PAST MEDICAL & SURGICAL HISTORY:  Diverticulitis  Multiple myeloma  Hypertension  Left ventricular dysfunction  Cardiomyopathy  Former smoker: (1 ppd x 11 years; Quit ~age 26)  History of bone marrow transplant  History of surgery: s/p exploratory laparotomy with sigmoid resection and colostomy on 2018      No Known Allergies       MEDICATIONS  (STANDING):  carvedilol 25 milliGRAM(s) Oral every 12 hours  dexamethasone  IVPB 40 milliGRAM(s) IV Intermittent daily  pantoprazole    Tablet 40 milliGRAM(s) Oral before breakfast    MEDICATIONS  (PRN):      REVIEW OF SYSTEMS:  CONSTITUTIONAL: (+) malaise.   EYES: No acute change in vision   ENT:  No tinnitus  NECK: No stiffness  RESPIRATORY: No hemoptysis  CARDIOVASCULAR: No chest pain, palpitations, syncope  GASTROINTESTINAL: No hematemesis, diarrhea, melena, or hematochezia.  GENITOURINARY: No hematuria  NEUROLOGICAL: No headaches  LYMPH Nodes: No enlarged glands  ENDOCRINE: No heat or cold intolerance	    T(C): 36.7 (19 @ 05:59), Max: 36.7 (19 @ 13:00)  HR: 92 (19 @ 05:59) (90 - 103)  BP: 120/75 (19 @ 05:59) (108/71 - 131/79)  RR: 18 (19 @ 05:59) (18 - 18)  SpO2: 97% (19 @ 05:59) (97% - 99%)    PHYSICAL EXAMINATION:   Constitutional: WD, NAD  HEENT: NC, AT  Neck:  Supple  Respiratory:  Adequate airflow b/l. Not using accessory muscles of respiration.  Cardiovascular:  S1 & S2 intact, no R/G, 2+ radial pulses b/l  Gastrointestinal: Soft, NT, ND, normoactive b.s., no organomegaly/RT/rigidity  Extremities: WWP  Neurological:  Alert and awake.  No acute focal motor deficits. Crude sensation intact.     Labs and imaging reviewed    LABS:                        7.6    3.49  )-----------( 68       ( 21 Mar 2019 06:25 )             23.2     -    137  |  100  |  19  ----------------------------<  151<H>  3.9   |  19<L>  |  1.24    Ca    8.8      21 Mar 2019 06:25  Phos  3.8     -  Mg     2.0     -    TPro  10.1<H>  /  Alb  3.7  /  TBili  0.9  /  DBili  x   /  AST  42<H>  /  ALT  53<H>  /  AlkPhos  54  03-21        PT/INR - ( 19 Mar 2019 20:20 )   PT: 14.5 SEC;   INR: 1.26          PTT - ( 19 Mar 2019 20:20 )  PTT:30.1 SEC  Urinalysis Basic - ( 20 Mar 2019 16:54 )    Color: LIGHT YELLOW / Appearance: CLEAR / S.013 / pH: 6.5  Gluc: NEGATIVE / Ketone: NEGATIVE  / Bili: NEGATIVE / Urobili: NORMAL   Blood: NEGATIVE / Protein: 70 / Nitrite: NEGATIVE   Leuk Esterase: NEGATIVE / RBC: 0-2 / WBC 0-2   Sq Epi: OCC / Non Sq Epi: x / Bacteria: NEGATIVE      CAPILLARY BLOOD GLUCOSE      POCT Blood Glucose.: 163 mg/dL (21 Mar 2019 08:29)  POCT Blood Glucose.: 178 mg/dL (20 Mar 2019 21:53)        LIVER FUNCTIONS - ( 21 Mar 2019 06:25 )  Alb: 3.7 g/dL / Pro: 10.1 g/dL / ALK PHOS: 54 u/L / ALT: 53 u/L / AST: 42 u/L / GGT: x               RADIOLOGY & ADDITIONAL STUDIES:

## 2019-03-21 NOTE — PROGRESS NOTE ADULT - PROBLEM SELECTOR PLAN 1
- blood at stoma site in setting of thrombocytopenia; blood resolved  - cont to follow heme recs on platelet transfusions   - transfuse prbc to goal of Hgb > 7.5  - outpt surgery follow up for colostomy reversal once cause of thrombocytopenia established

## 2019-03-21 NOTE — PROGRESS NOTE ADULT - ASSESSMENT
52 yo man with history of multiple myeloma s/p bone marrow transplant (~1.5 yrs ago) and chemo/RT (stopped ~2 months ago in preparation for colostomy reversal), perforated diverticulitis s/p Kiah's (proctosigmoidectomy with colostomy, 9/2018), nonischemic cardiomyopathy (mild global LV systolic dysfunction with EF 54% and diastolic LV dysfunction based on TTE in 11/2018), and HTN presents after outpatient blood work at oncologist's office (Dr. Murphy) on Tuesday revealed a platelet count of 6, admitted with thrombocytopenia of unclear etiology.    -> Symptomatic Thrombocytopenia:     - progressing well s/p transfusions     - Decadron     - monitor plts closely     - transfuse additional platelets and/or prbcs prn     - hem/onc management greatly appreciated   -> Hematochezia, Gastrointestinal Bleed:     - improved. GI on board. Possible colostomy reversal if counts improve     - will adjust management per consultants   -> Chronic Combined Systolic and Diastolic CHF:     - stable. c/w lifestyle modifications     - adjust rx as needed  -> Multiple Myeloma:     - refractory/relapse     - outpt f/u for tx and per hem/onc   -> ROBB:     - unspecified etiology     - potentially 2/2 anemia vs MM vs alternative     - robb workup     - monitor renal function

## 2019-03-21 NOTE — PROGRESS NOTE ADULT - SUBJECTIVE AND OBJECTIVE BOX
Pt seen and examined at bedside   feels well  denies any compls  Had slight oozing from colostomy ostium and slight epistaxis. has resolved  no hematuria.  urinating well        Allergies:  No Known Allergies    Hospital Medications:   MEDICATIONS  (STANDING):  carvedilol 25 milliGRAM(s) Oral every 12 hours  dexamethasone  IVPB 40 milliGRAM(s) IV Intermittent daily  pantoprazole    Tablet 40 milliGRAM(s) Oral before breakfast         VITALS:  T(F): 98.1 (19 @ 05:59), Max: 98.1 (19 @ 13:00)  HR: 92 (19 @ 05:59)  BP: 120/75 (19 @ 05:59)  RR: 18 (19 @ 05:59)  SpO2: 97% (19 @ 05:59)  Wt(kg): --      PHYSICAL EXAM:  Constitutional: NAD  HEENT: anicteric sclera, oropharynx clear, MMM  Neck: No JVD  Respiratory: CTAB, no wheezes, rales or rhonchi  Cardiovascular: S1, S2, RRR  Gastrointestinal: BS+, soft, NT/ND  Extremities: No cyanosis or clubbing. No peripheral edema  Neurological: A/O x 3, no focal deficits  Psychiatric: Normal mood, normal affect  : No CVA tenderness. No quintero.   Skin: No rashes       LABS:      137  |  100  |  19  ----------------------------<  151<H>  3.9   |  19<L>  |  1.24    Ca    8.8      21 Mar 2019 06:25  Phos  3.8       Mg     2.0         TPro  10.1<H>  /  Alb  3.7  /  TBili  0.9  /  DBili      /  AST  42<H>  /  ALT  53<H>  /  AlkPhos  54      Creatinine Trend: 1.24 <--, 1.37 <--, 1.46 <--                        7.6    3.49  )-----------( 68       ( 21 Mar 2019 06:25 )             23.2     Urine Studies:  Urinalysis Basic - ( 20 Mar 2019 16:54 )    Color: LIGHT YELLOW / Appearance: CLEAR / S.013 / pH: 6.5  Gluc: NEGATIVE / Ketone: NEGATIVE  / Bili: NEGATIVE / Urobili: NORMAL   Blood: NEGATIVE / Protein: 70 / Nitrite: NEGATIVE   Leuk Esterase: NEGATIVE / RBC: 0-2 / WBC 0-2   Sq Epi: OCC / Non Sq Epi:  / Bacteria: NEGATIVE      Creatinine, Random Urine: 111.70 mg/dL (03-20 @ 16:54)  Sodium, Random Urine: 89 mmol/L (03-20 @ 16:54)    RADIOLOGY & ADDITIONAL STUDIES:

## 2019-03-21 NOTE — PROGRESS NOTE ADULT - ASSESSMENT
52 yo man known to our practice with h/o multiple myeloma s/p bone marrow transplant (~1.5 yrs ago) and chemo/RT (stopped ~2 months ago in preparation for colostomy reversal), perforated diverticulitis s/p Kiah's (proctosigmoidectomy with colostomy, 9/2018), nonischemic cardiomyopathy (mild global LV systolic dysfunction with EF 54% and diastolic LV dysfunction based on TTE in 11/2018), and HTN presents after outpatient blood work at oncologist's office (Dr. Murphy) on Tuesday revealed a low platelet count and also with bleeding at stoma site

## 2019-03-21 NOTE — PROGRESS NOTE ADULT - PROBLEM SELECTOR PLAN 3
- clinical trial at [facility] strongly recommended but pt declined per heme   - cont to follow heme recs/appreciated

## 2019-03-21 NOTE — PROGRESS NOTE ADULT - PROBLEM SELECTOR PLAN 1
Cr improving. 1.46 to 1.24  minimal proteinuria  check Urine for immunoprotein electrophoresis, Immnofixation  advised oral Hydration

## 2019-03-21 NOTE — PROGRESS NOTE ADULT - SUBJECTIVE AND OBJECTIVE BOX
INTERVAL HPI/OVERNIGHT EVENTS:    pt without further colostomy/stoma bleeding    MEDICATIONS  (STANDING):  carvedilol 25 milliGRAM(s) Oral every 12 hours  dexamethasone  IVPB 40 milliGRAM(s) IV Intermittent daily  pantoprazole    Tablet 40 milliGRAM(s) Oral before breakfast    MEDICATIONS  (PRN):      Allergies    No Known Allergies    Intolerances        Review of Systems:    General:  No wt loss, fevers, chills, night sweats, fatigue   Eyes:  Good vision, no reported pain  ENT:  No sore throat, pain, runny nose, dysphagia  CV:  No pain, palpitations, hypo/hypertension  Resp:  No dyspnea, cough, tachypnea, wheezing  GI:  No pain, No nausea, No vomiting, No diarrhea, No constipation, No weight loss, No fever, No pruritis, No rectal bleeding, No melena, No dysphagia  :  No pain, bleeding, incontinence, nocturia  Muscle:  No pain, weakness  Neuro:  No weakness, tingling, memory problems  Psych:  No fatigue, insomnia, mood problems, depression  Endocrine:  No polyuria, polydypsia, cold/heat intolerance  Heme:  No petechiae, ecchymosis, easy bruisability  Skin:  No rash, tattoos, scars, edema      Vital Signs Last 24 Hrs  T(C): 36.7 (21 Mar 2019 05:59), Max: 36.7 (20 Mar 2019 13:00)  T(F): 98.1 (21 Mar 2019 05:59), Max: 98.1 (20 Mar 2019 13:00)  HR: 92 (21 Mar 2019 05:59) (90 - 103)  BP: 120/75 (21 Mar 2019 05:59) (112/66 - 131/79)  BP(mean): --  RR: 18 (21 Mar 2019 05:59) (18 - 18)  SpO2: 97% (21 Mar 2019 05:59) (97% - 99%)    PHYSICAL EXAM:    Constitutional: NAD  HEENT: EOMI, throat clear  Neck: No LAD, supple  Respiratory: CTA and P  Cardiovascular: S1 and S2, RRR, no M  Gastrointestinal: BS+, soft, NT/ND, neg HSM, +colostomy with gas/stool  Extremities: No peripheral edema, neg clubbing, cyanosis  Vascular: 2+ peripheral pulses  Neurological: A/O x 3, no focal deficits  Psychiatric: Normal mood, normal affect  Skin: No rashes      LABS:                        7.6    3.49  )-----------( 68       ( 21 Mar 2019 06:25 )             23.2     03-21    137  |  100  |  19  ----------------------------<  151<H>  3.9   |  19<L>  |  1.24    Ca    8.8      21 Mar 2019 06:25  Phos  3.8     -  Mg     2.0         TPro  10.1<H>  /  Alb  3.7  /  TBili  0.9  /  DBili  x   /  AST  42<H>  /  ALT  53<H>  /  AlkPhos  54  03-21    PT/INR - ( 19 Mar 2019 20:20 )   PT: 14.5 SEC;   INR: 1.26          PTT - ( 19 Mar 2019 20:20 )  PTT:30.1 SEC  Urinalysis Basic - ( 20 Mar 2019 16:54 )    Color: LIGHT YELLOW / Appearance: CLEAR / S.013 / pH: 6.5  Gluc: NEGATIVE / Ketone: NEGATIVE  / Bili: NEGATIVE / Urobili: NORMAL   Blood: NEGATIVE / Protein: 70 / Nitrite: NEGATIVE   Leuk Esterase: NEGATIVE / RBC: 0-2 / WBC 0-2   Sq Epi: OCC / Non Sq Epi: x / Bacteria: NEGATIVE        RADIOLOGY & ADDITIONAL TESTS:

## 2019-03-21 NOTE — PROGRESS NOTE ADULT - ASSESSMENT
53y Male with refractory multiple myeloma, a/p failed BM transplant, no longer on treatment, perforated diverticulitis s/p Kiah's (proctosigmoidectomy with colostomy, 9/2018), nonischemic cardiomyopathy was referred to ED for thrombocytopenia and anemia, c/b ROBB

## 2019-03-22 LAB
ALBUMIN SERPL ELPH-MCNC: 3.8 G/DL — SIGNIFICANT CHANGE UP (ref 3.3–5)
ALP SERPL-CCNC: 50 U/L — SIGNIFICANT CHANGE UP (ref 40–120)
ALT FLD-CCNC: 40 U/L — SIGNIFICANT CHANGE UP (ref 4–41)
ANION GAP SERPL CALC-SCNC: 17 MMO/L — HIGH (ref 7–14)
ANISOCYTOSIS BLD QL: SLIGHT — SIGNIFICANT CHANGE UP
AST SERPL-CCNC: 27 U/L — SIGNIFICANT CHANGE UP (ref 4–40)
BASOPHILS # BLD AUTO: 0.02 K/UL — SIGNIFICANT CHANGE UP (ref 0–0.2)
BASOPHILS NFR BLD AUTO: 0.4 % — SIGNIFICANT CHANGE UP (ref 0–2)
BASOPHILS NFR SPEC: 0 % — SIGNIFICANT CHANGE UP (ref 0–2)
BILIRUB SERPL-MCNC: 0.7 MG/DL — SIGNIFICANT CHANGE UP (ref 0.2–1.2)
BUN SERPL-MCNC: 27 MG/DL — HIGH (ref 7–23)
CALCIUM SERPL-MCNC: 9.1 MG/DL — SIGNIFICANT CHANGE UP (ref 8.4–10.5)
CHLORIDE SERPL-SCNC: 103 MMOL/L — SIGNIFICANT CHANGE UP (ref 98–107)
CO2 SERPL-SCNC: 20 MMOL/L — LOW (ref 22–31)
CREAT SERPL-MCNC: 1.31 MG/DL — HIGH (ref 0.5–1.3)
EOSINOPHIL # BLD AUTO: 0.01 K/UL — SIGNIFICANT CHANGE UP (ref 0–0.5)
EOSINOPHIL NFR BLD AUTO: 0.2 % — SIGNIFICANT CHANGE UP (ref 0–6)
EOSINOPHIL NFR FLD: 0 % — SIGNIFICANT CHANGE UP (ref 0–6)
GLUCOSE SERPL-MCNC: 121 MG/DL — HIGH (ref 70–99)
HBV CORE AB SER-ACNC: NONREACTIVE — SIGNIFICANT CHANGE UP
HCT VFR BLD CALC: 19.2 % — CRITICAL LOW (ref 39–50)
HCV AB S/CO SERPL IA: 0.04 S/CO — SIGNIFICANT CHANGE UP (ref 0–0.79)
HCV AB SERPL-IMP: SIGNIFICANT CHANGE UP
HGB BLD-MCNC: 6.1 G/DL — CRITICAL LOW (ref 13–17)
IMM GRANULOCYTES NFR BLD AUTO: 1.9 % — HIGH (ref 0–1.5)
KAPPA FREE LIGHT CHAINS, SERUM: 141.58 MG/DL — HIGH (ref 0.33–1.94)
LAMBDA FREE LIGHT CHAINS, SERUM: 0.17 MG/DL — LOW (ref 0.57–2.63)
LYMPHOCYTES # BLD AUTO: 1.71 K/UL — SIGNIFICANT CHANGE UP (ref 1–3.3)
LYMPHOCYTES # BLD AUTO: 33 % — SIGNIFICANT CHANGE UP (ref 13–44)
LYMPHOCYTES NFR SPEC AUTO: 36 % — SIGNIFICANT CHANGE UP (ref 13–44)
M PROTEIN 24H MFR UR ELPH: 4784 MG/24 HR — SIGNIFICANT CHANGE UP
MACROCYTES BLD QL: SLIGHT — SIGNIFICANT CHANGE UP
MAGNESIUM SERPL-MCNC: 2.1 MG/DL — SIGNIFICANT CHANGE UP (ref 1.6–2.6)
MANUAL SMEAR VERIFICATION: SIGNIFICANT CHANGE UP
MCHC RBC-ENTMCNC: 29 PG — SIGNIFICANT CHANGE UP (ref 27–34)
MCHC RBC-ENTMCNC: 31.8 % — LOW (ref 32–36)
MCV RBC AUTO: 91.4 FL — SIGNIFICANT CHANGE UP (ref 80–100)
MONOCYTES # BLD AUTO: 1.17 K/UL — HIGH (ref 0–0.9)
MONOCYTES NFR BLD AUTO: 22.6 % — HIGH (ref 2–14)
MONOCYTES NFR BLD: 14 % — HIGH (ref 2–9)
NEUTROPHIL AB SER-ACNC: 48 % — SIGNIFICANT CHANGE UP (ref 43–77)
NEUTROPHILS # BLD AUTO: 2.17 K/UL — SIGNIFICANT CHANGE UP (ref 1.8–7.4)
NEUTROPHILS NFR BLD AUTO: 41.9 % — LOW (ref 43–77)
NEUTS BAND # BLD: 1 % — SIGNIFICANT CHANGE UP (ref 0–6)
NRBC # BLD: 5 /100WBC — SIGNIFICANT CHANGE UP
NRBC # FLD: 0.33 K/UL — LOW (ref 25–125)
NRBC FLD-RTO: 6.4 — SIGNIFICANT CHANGE UP
PHOSPHATE SERPL-MCNC: 3.6 MG/DL — SIGNIFICANT CHANGE UP (ref 2.5–4.5)
PLATELET # BLD AUTO: 47 K/UL — LOW (ref 150–400)
PLATELET COUNT - ESTIMATE: SIGNIFICANT CHANGE UP
PMV BLD: 9.8 FL — SIGNIFICANT CHANGE UP (ref 7–13)
POLYCHROMASIA BLD QL SMEAR: SLIGHT — SIGNIFICANT CHANGE UP
POTASSIUM SERPL-MCNC: 4.1 MMOL/L — SIGNIFICANT CHANGE UP (ref 3.5–5.3)
POTASSIUM SERPL-SCNC: 4.1 MMOL/L — SIGNIFICANT CHANGE UP (ref 3.5–5.3)
PROT SERPL-MCNC: 9.5 G/DL — HIGH (ref 6–8.3)
RBC # BLD: 2.1 M/UL — LOW (ref 4.2–5.8)
RBC # FLD: 18.4 % — HIGH (ref 10.3–14.5)
SODIUM SERPL-SCNC: 140 MMOL/L — SIGNIFICANT CHANGE UP (ref 135–145)
SPECIMEN VOL 24H UR: 2300 ML — SIGNIFICANT CHANGE UP
VARIANT LYMPHS # BLD: 1 % — SIGNIFICANT CHANGE UP
WBC # BLD: 5.18 K/UL — SIGNIFICANT CHANGE UP (ref 3.8–10.5)
WBC # FLD AUTO: 5.18 K/UL — SIGNIFICANT CHANGE UP (ref 3.8–10.5)

## 2019-03-22 PROCEDURE — 86335 IMMUNFIX E-PHORSIS/URINE/CSF: CPT | Mod: 26

## 2019-03-22 PROCEDURE — 84166 PROTEIN E-PHORESIS/URINE/CSF: CPT | Mod: 26

## 2019-03-22 RX ADMIN — Medication 120 MILLIGRAM(S): at 06:12

## 2019-03-22 RX ADMIN — CARVEDILOL PHOSPHATE 25 MILLIGRAM(S): 80 CAPSULE, EXTENDED RELEASE ORAL at 18:39

## 2019-03-22 RX ADMIN — CARVEDILOL PHOSPHATE 25 MILLIGRAM(S): 80 CAPSULE, EXTENDED RELEASE ORAL at 06:12

## 2019-03-22 RX ADMIN — PANTOPRAZOLE SODIUM 40 MILLIGRAM(S): 20 TABLET, DELAYED RELEASE ORAL at 06:11

## 2019-03-22 NOTE — PROGRESS NOTE ADULT - ASSESSMENT
52 yo man with history of multiple myeloma s/p bone marrow transplant (~1.5 yrs ago) and chemo/RT (stopped ~2 months ago in preparation for colostomy reversal), perforated diverticulitis s/p Kiah's (proctosigmoidectomy with colostomy, 9/2018), nonischemic cardiomyopathy (mild global LV systolic dysfunction with EF 54% and diastolic LV dysfunction based on TTE in 11/2018), and HTN presents after outpatient blood work at oncologist's office (Dr. Murphy) on Tuesday revealed a platelet count of 6, admitted with thrombocytopenia of unclear etiology.    -> Symptomatic Thrombocytopenia:     - downtrending platelets and h/h with active bleeding - transfuse prbc and plts, repeat labs post transfusion, f/u all consultants for managemet      - Decadron      - hem/onc management greatly appreciated   -> Hematochezia, Gastrointestinal Bleed:     - persistent, f/u GI for  management      - Possible colostomy reversal if counts improve     - will adjust management per consultants   -> Chronic Combined Systolic and Diastolic CHF:     - stable. c/w lifestyle modifications     - adjust rx as needed  -> Multiple Myeloma:     - refractory/relapse     - tx per hem/onc   -> ROBB:     - unspecified etiology     - potentially 2/2 anemia vs MM vs alternative     - robb workup     - monitor renal function

## 2019-03-22 NOTE — PROVIDER CONTACT NOTE (CRITICAL VALUE NOTIFICATION) - ACTION/TREATMENT ORDERED:
Vitals signs taken .Awaiting for blood from blood bank be transfused. Call bell within reach.Will continue to monitor.

## 2019-03-22 NOTE — PROGRESS NOTE ADULT - PROBLEM SELECTOR PLAN 3
- clinical trial at [facility] strongly recommended but pt declined per heme   - cont to follow heme recs/appreciated

## 2019-03-22 NOTE — PROVIDER CONTACT NOTE (CRITICAL VALUE NOTIFICATION) - ASSESSMENT
Patient A&ox4 . Denies chest pain,,SOB. Pt is Asymptomatic.VSS.
NAD noted; no signs of bleeding noted

## 2019-03-22 NOTE — PROGRESS NOTE ADULT - ASSESSMENT
54 yo man known to our practice with h/o multiple myeloma s/p bone marrow transplant (~1.5 yrs ago) and chemo/RT (stopped ~2 months ago in preparation for colostomy reversal), perforated diverticulitis s/p Kiah's (proctosigmoidectomy with colostomy, 9/2018), nonischemic cardiomyopathy (mild global LV systolic dysfunction with EF 54% and diastolic LV dysfunction based on TTE in 11/2018), and HTN presents after outpatient blood work at oncologist's office (Dr. Murphy) on Tuesday revealed a low platelet count and also with bleeding at stoma site

## 2019-03-22 NOTE — PROGRESS NOTE ADULT - PROBLEM SELECTOR PLAN 1
Cr stable. No significant proteinuria noted.   Doubt Myeloma nephropathy.   Off IVF, and tolerating meals.   Otherwise, electrolytes and volume status acceptable.   If getting discharged, he can follow up with me in 3-4 weeks at:  [address] [facility], [id number] [phone]

## 2019-03-22 NOTE — PROGRESS NOTE ADULT - SUBJECTIVE AND OBJECTIVE BOX
Patient seen and examined at bedside  bleeding in colostomy overnight  Case discussed with medical team    HPI:  52 yo man with history of multiple myeloma s/p bone marrow transplant (~1.5 yrs ago) and chemo/RT (stopped ~2 months ago in preparation for colostomy reversal), perforated diverticulitis s/p Kiah's (proctosigmoidectomy with colostomy, 2018), nonischemic cardiomyopathy (mild global LV systolic dysfunction with EF 54% and diastolic LV dysfunction based on TTE in 2018), and HTN presents after outpatient blood work at oncologist's office (Dr. Murphy) on Tuesday revealed a platelet count of 6. Pt was also found to be thrombocytopenic last Monday (3/11/19) when he was at pre-surgical testing for a scheduled colostomy reversal on 3/20/19 with Dr. Alcaraz. At that visit, pt's platelet count was 17, with pt ultimately getting transfused 1u of platelets in addition to 1u of pRBCs at his oncologist's office that day. Pt reports that over the last week, he has been having brief episodes of epistaxis after sneezing or blowing his nose and bleeding from his stoma, though both without any clots. Pt notes that up until today, the bleeding from his stoma only occurred when cleaning the area and would quickly stop; today, however, pt started having spontaneous, ongoing bleeding from the stoma. Yesterday, pt had gum bleeding while brushing his teeth. Pt denies any ecchymoses, purpura, rashes, hematuria, hemoptysis, or hematemesis. Pt also states that although he doesn't usually get headaches, he has been experiencing intermittent headaches, ~2-3x per day for the past week. The headaches are located at the top of his head, last ~30 minutes, and resolve on their own. No accompanying visual changes, numbness/tingling, weakness, speech changes, F/C, or neck pain/stiffness. Pt denies any recent CP, SOB, URI symptoms, abdominal pain, N/V, changes in stool output, urinary complaints, sick contacts, travel, or medication changes.    In the ED,  T 98.5, , /86, RR 16, O2 sats 100% RA. Platelet count 10, Hgb 7.2, WBC 4.52. (19 Mar 2019 22:50)      PAST MEDICAL & SURGICAL HISTORY:  Diverticulitis  Multiple myeloma  Hypertension  Left ventricular dysfunction  Cardiomyopathy  Former smoker: (1 ppd x 11 years; Quit ~age 26)  History of bone marrow transplant  History of surgery: s/p exploratory laparotomy with sigmoid resection and colostomy on 2018      No Known Allergies       MEDICATIONS  (STANDING):  carvedilol 25 milliGRAM(s) Oral every 12 hours  dexamethasone  IVPB 40 milliGRAM(s) IV Intermittent daily  pantoprazole    Tablet 40 milliGRAM(s) Oral before breakfast    MEDICATIONS  (PRN):      REVIEW OF SYSTEMS:  CONSTITUTIONAL: (+) malaise.   EYES: No acute change in vision   ENT:  No tinnitus  NECK: No stiffness  RESPIRATORY: No hemoptysis  CARDIOVASCULAR: No chest pain, palpitations, syncope  GASTROINTESTINAL: bright red blood in colostomy. No hematemesis,.  GENITOURINARY:  No hematuria  NEUROLOGICAL: No headaches  LYMPH Nodes: No enlarged glands  ENDOCRINE: No heat or cold intolerance	    T(C): 37.1 (19 @ 07:30), Max: 37.1 (19 @ 07:30)  HR: 87 (19 @ 07:30) (79 - 93)  BP: 141/85 (19 @ 07:30) (122/68 - 141/85)  RR: 18 (19 @ 07:30) (18 - 18)  SpO2: 99% (19 @ 07:30) (97% - 99%)    PHYSICAL EXAMINATION:   Constitutional: NAD  HEENT: NC, AT  Neck:  Supple  Respiratory:  Adequate airflow b/l. Not using accessory muscles of respiration.  Cardiovascular:  S1 & S2 intact, no R/G, 2+ radial pulses b/l  Gastrointestinal: Soft, NT, ND, normoactive b.s., no organomegaly/RT/rigidity  Extremities: WWP  Neurological:  Alert and awake.  No acute focal motor deficits. Crude sensation intact.     Labs and imaging reviewed    LABS:                        6.1    5.18  )-----------( 47       ( 22 Mar 2019 05:49 )             19.2     -    140  |  103  |  27<H>  ----------------------------<  121<H>  4.1   |  20<L>  |  1.31<H>    Ca    9.1      22 Mar 2019 05:49  Phos  3.6     -  Mg     2.1     -    TPro  9.5<H>  /  Alb  3.8  /  TBili  0.7  /  DBili  x   /  AST  27  /  ALT  40  /  AlkPhos  50  -          Urinalysis Basic - ( 20 Mar 2019 16:54 )    Color: LIGHT YELLOW / Appearance: CLEAR / S.013 / pH: 6.5  Gluc: NEGATIVE / Ketone: NEGATIVE  / Bili: NEGATIVE / Urobili: NORMAL   Blood: NEGATIVE / Protein: 70 / Nitrite: NEGATIVE   Leuk Esterase: NEGATIVE / RBC: 0-2 / WBC 0-2   Sq Epi: OCC / Non Sq Epi: x / Bacteria: NEGATIVE      CAPILLARY BLOOD GLUCOSE      POCT Blood Glucose.: 178 mg/dL (21 Mar 2019 22:23)  POCT Blood Glucose.: 195 mg/dL (21 Mar 2019 17:21)  POCT Blood Glucose.: 222 mg/dL (21 Mar 2019 12:21)  POCT Blood Glucose.: 163 mg/dL (21 Mar 2019 08:29)        LIVER FUNCTIONS - ( 22 Mar 2019 05:49 )  Alb: 3.8 g/dL / Pro: 9.5 g/dL / ALK PHOS: 50 u/L / ALT: 40 u/L / AST: 27 u/L / GGT: x               RADIOLOGY & ADDITIONAL STUDIES:

## 2019-03-22 NOTE — PROGRESS NOTE ADULT - SUBJECTIVE AND OBJECTIVE BOX
INTERVAL HPI/OVERNIGHT EVENTS:    pt reports yesterday evening while changing ostomy bag, blood was noted  no abd pain   no n/v  tolerating po intake   to get PRBC today     MEDICATIONS  (STANDING):  carvedilol 25 milliGRAM(s) Oral every 12 hours  dexamethasone  IVPB 40 milliGRAM(s) IV Intermittent daily  pantoprazole    Tablet 40 milliGRAM(s) Oral before breakfast    MEDICATIONS  (PRN):      Allergies    No Known Allergies    Intolerances        Review of Systems:    General:  No wt loss, fevers, chills, night sweats, fatigue   Eyes:  Good vision, no reported pain  ENT:  No sore throat, pain, runny nose, dysphagia  CV:  No pain, palpitations, hypo/hypertension  Resp:  No dyspnea, cough, tachypnea, wheezing  GI:  No pain, No nausea, No vomiting, No diarrhea, No constipation, No weight loss, No fever, No pruritis, No rectal bleeding, No melena, No dysphagia  :  No pain, bleeding, incontinence, nocturia  Muscle:  No pain, weakness  Neuro:  No weakness, tingling, memory problems  Psych:  No fatigue, insomnia, mood problems, depression  Endocrine:  No polyuria, polydypsia, cold/heat intolerance  Heme:  No petechiae, ecchymosis, easy bruisability  Skin:  No rash, tattoos, scars, edema      Vital Signs Last 24 Hrs  T(C): 36.5 (22 Mar 2019 10:18), Max: 37.1 (22 Mar 2019 07:30)  T(F): 97.7 (22 Mar 2019 10:18), Max: 98.7 (22 Mar 2019 07:30)  HR: 81 (22 Mar 2019 10:18) (79 - 93)  BP: 132/66 (22 Mar 2019 10:18) (131/75 - 141/85)  BP(mean): --  RR: 16 (22 Mar 2019 10:18) (16 - 18)  SpO2: 100% (22 Mar 2019 10:18) (97% - 100%)    PHYSICAL EXAM:    Constitutional: NAD  HEENT: EOMI, throat clear  Neck: No LAD, supple  Respiratory: CTA and P  Cardiovascular: S1 and S2, RRR, no M  Gastrointestinal: BS+, soft, NT/ND, neg HSM, +ostomy; unable to see contents  Extremities: No peripheral edema, neg clubbing, cyanosis  Vascular: 2+ peripheral pulses  Neurological: A/O x 3, no focal deficits  Psychiatric: Normal mood, normal affect  Skin: No rashes      LABS:                        6.1    5.18  )-----------( 47       ( 22 Mar 2019 05:49 )             19.2     -    140  |  103  |  27<H>  ----------------------------<  121<H>  4.1   |  20<L>  |  1.31<H>    Ca    9.1      22 Mar 2019 05:49  Phos  3.6       Mg     2.1         TPro  9.5<H>  /  Alb  3.8  /  TBili  0.7  /  DBili  x   /  AST  27  /  ALT  40  /  AlkPhos  50        Urinalysis Basic - ( 20 Mar 2019 16:54 )    Color: LIGHT YELLOW / Appearance: CLEAR / S.013 / pH: 6.5  Gluc: NEGATIVE / Ketone: NEGATIVE  / Bili: NEGATIVE / Urobili: NORMAL   Blood: NEGATIVE / Protein: 70 / Nitrite: NEGATIVE   Leuk Esterase: NEGATIVE / RBC: 0-2 / WBC 0-2   Sq Epi: OCC / Non Sq Epi: x / Bacteria: NEGATIVE        RADIOLOGY & ADDITIONAL TESTS:

## 2019-03-22 NOTE — PROGRESS NOTE ADULT - PROBLEM SELECTOR PLAN 2
- luc donato in progress  - trial of Dexamethasone 40mg IV daily through 3/24  - per heme transfuse platelets if Less than 10K or clinically significant bleeding  - cont to follow heme recs

## 2019-03-22 NOTE — PROGRESS NOTE ADULT - SUBJECTIVE AND OBJECTIVE BOX
Nephrology Followup Note - [phone] - Dr Bahena / Dr Regalado / Dr Benz / Dr Frost / Dr Mahmood / Dr Mcgee / Dr Guzman / Dr Guerra  Pt seen and examined at bedside  Pt feeling well overall. Denies SOB. No urinary complaints, nor LE swelling.     Allergies:  No Known Allergies    Hospital Medications:   MEDICATIONS  (STANDING):  carvedilol 25 milliGRAM(s) Oral every 12 hours  dexamethasone  IVPB 40 milliGRAM(s) IV Intermittent daily  pantoprazole    Tablet 40 milliGRAM(s) Oral before breakfast    VITALS:  T(F): 98.1 (19 @ 14:16), Max: 98.7 (19 @ 07:30)  HR: 75 (19 @ 14:16)  BP: 123/62 (19 @ 14:16)  RR: 16 (19 @ 14:16)  SpO2: 100% (19 @ 14:16)  Wt(kg): --      PHYSICAL EXAM:  Constitutional: NAD  HEENT: anicteric sclera, oropharynx clear, MMM  Neck: No JVD  Respiratory: CTAB, no wheezes, rales or rhonchi  Cardiovascular: S1, S2, RRR  Gastrointestinal: BS+, soft, NT/ND, Colostomy   Extremities: No cyanosis or clubbing. No peripheral edema  Neurological: A/O x 3, no focal deficits  Psychiatric: Normal mood, normal affect  : No CVA tenderness. No quintero.   Skin: No rashes    LABS:      140  |  103  |  27<H>  ----------------------------<  121<H>  4.1   |  20<L>  |  1.31<H>    Ca    9.1      22 Mar 2019 05:49  Phos  3.6       Mg     2.1         TPro  9.5<H>  /  Alb  3.8  /  TBili  0.7  /  DBili      /  AST  27  /  ALT  40  /  AlkPhos  50      Creatinine Trend: 1.31 <--, 1.24 <--, 1.37 <--, 1.46 <--                        6.1    5.18  )-----------( 47       ( 22 Mar 2019 05:49 )             19.2     Urine Studies:  Urinalysis Basic - ( 20 Mar 2019 16:54 )    Color: LIGHT YELLOW / Appearance: CLEAR / S.013 / pH: 6.5  Gluc: NEGATIVE / Ketone: NEGATIVE  / Bili: NEGATIVE / Urobili: NORMAL   Blood: NEGATIVE / Protein: 70 / Nitrite: NEGATIVE   Leuk Esterase: NEGATIVE / RBC: 0-2 / WBC 0-2   Sq Epi: OCC / Non Sq Epi:  / Bacteria: NEGATIVE      Creatinine, Random Urine: 111.70 mg/dL ( @ 16:54)  Sodium, Random Urine: 89 mmol/L ( @ 16:54)    RADIOLOGY & ADDITIONAL STUDIES:

## 2019-03-22 NOTE — PROGRESS NOTE ADULT - ASSESSMENT
1. Thrombocytopenia + Anemia    -- sec to Myeloma. Outpt labs show sig increase in paraprotein  --  Dexamethasone 40mg IV daily x 4, no response  -- transfuse platelets if Less than 15 K or clinically significant bleeding and Hgb < 7  -- plan to restart Pomalidomide PO (pt has own meds) and Bendamustine as an outpt    2. Multiple Myeloma    -- relapsed / refractory ( failed all tx) and failed auto stem cell transplant  -- pt off of tx against my and other oncologist advise in order to reverse colostomy  -- clinical trial at [facility] strongly recommended but pt declined  -- transfuse for Hgb < 7  -- will restart Pomalidomide, plan for outpt Bendamustine as salvage tx    3. Bleeding from colostomy    -- GI following  -- plan for reversal of colostomy if counts improve    Torsten Murphy MD  [phone]

## 2019-03-22 NOTE — PROGRESS NOTE ADULT - PROBLEM SELECTOR PLAN 1
- blood at stoma site in setting of thrombocytopenia  - cont to follow heme recs on platelet transfusions   - transfuse prbc to goal of Hgb > 7.5  - outpt surgery follow up for colostomy reversal once cause of thrombocytopenia established

## 2019-03-22 NOTE — PROGRESS NOTE ADULT - SUBJECTIVE AND OBJECTIVE BOX
Pt is seen and examined  pt is awake and lying in bed/out of bed to chair  pt seems comfortable and denies any complaints  still w occ blood colostomy      PAST MEDICAL & SURGICAL HISTORY:  Diverticulitis  Multiple myeloma  Hypertension  Left ventricular dysfunction  Cardiomyopathy  Former smoker: (1 ppd x 11 years; Quit ~age 26)  History of bone marrow transplant  History of surgery: s/p exploratory laparotomy with sigmoid resection and colostomy on 9/2/2018      ROS:  Negative except for:    MEDICATIONS  (STANDING):  carvedilol 25 milliGRAM(s) Oral every 12 hours  dexamethasone  IVPB 40 milliGRAM(s) IV Intermittent daily  pantoprazole    Tablet 40 milliGRAM(s) Oral before breakfast    MEDICATIONS  (PRN):      Allergies    No Known Allergies    Intolerances        Vital Signs Last 24 Hrs  T(C): 37.1 (22 Mar 2019 07:30), Max: 37.1 (22 Mar 2019 07:30)  T(F): 98.7 (22 Mar 2019 07:30), Max: 98.7 (22 Mar 2019 07:30)  HR: 87 (22 Mar 2019 07:30) (79 - 93)  BP: 141/85 (22 Mar 2019 07:30) (122/68 - 141/85)  BP(mean): --  RR: 18 (22 Mar 2019 07:30) (18 - 18)  SpO2: 99% (22 Mar 2019 07:30) (97% - 99%)    PHYSICAL EXAM  General: adult in NAD  HEENT: clear oropharynx, anicteric sclera, pink conjunctiva  Neck: supple  CV: normal S1/S2 with no murmur rubs or gallops  Lungs: positive air movement b/l ant lungs,clear to auscultation, no wheezes, no rales  Abdomen: soft non-tender non-distended, no hepatosplenomegaly + colostomy  Ext: no clubbing cyanosis or edema  Skin: no rashes and no petechiae  Neuro: alert and oriented X 4, no focal deficits  LABS:                          6.1    5.18  )-----------( 47       ( 22 Mar 2019 05:49 )             19.2     Serial CBC's  03-22 @ 05:49  Hct-19.2 / Hgb-6.1 / Plat-47 / RBC-2.10 / WBC-5.18          Serial CBC's  03-21 @ 16:20  Hct-24.4 / Hgb-7.9 / Plat-61 / RBC-2.72 / WBC-4.12            03-22    140  |  103  |  27<H>  ----------------------------<  121<H>  4.1   |  20<L>  |  1.31<H>    Ca    9.1      22 Mar 2019 05:49  Phos  3.6     03-22  Mg     2.1     03-22    TPro  9.5<H>  /  Alb  3.8  /  TBili  0.7  /  DBili  x   /  AST  27  /  ALT  40  /  AlkPhos  50  03-22          WBC Count: 5.18 K/uL (03-22 @ 05:49)  Hemoglobin: 6.1 g/dL (03-22 @ 05:49)            RADIOLOGY & ADDITIONAL STUDIES:

## 2019-03-23 ENCOUNTER — TRANSCRIPTION ENCOUNTER (OUTPATIENT)
Age: 54
End: 2019-03-23

## 2019-03-23 VITALS
DIASTOLIC BLOOD PRESSURE: 68 MMHG | HEART RATE: 62 BPM | TEMPERATURE: 98 F | SYSTOLIC BLOOD PRESSURE: 129 MMHG | OXYGEN SATURATION: 100 % | RESPIRATION RATE: 18 BRPM

## 2019-03-23 LAB
ALBUMIN SERPL ELPH-MCNC: 3.6 G/DL — SIGNIFICANT CHANGE UP (ref 3.3–5)
ALP SERPL-CCNC: 48 U/L — SIGNIFICANT CHANGE UP (ref 40–120)
ALT FLD-CCNC: 35 U/L — SIGNIFICANT CHANGE UP (ref 4–41)
ANION GAP SERPL CALC-SCNC: 18 MMO/L — HIGH (ref 7–14)
AST SERPL-CCNC: 24 U/L — SIGNIFICANT CHANGE UP (ref 4–40)
BASOPHILS # BLD AUTO: 0.02 K/UL — SIGNIFICANT CHANGE UP (ref 0–0.2)
BASOPHILS # BLD AUTO: 0.02 K/UL — SIGNIFICANT CHANGE UP (ref 0–0.2)
BASOPHILS NFR BLD AUTO: 0.4 % — SIGNIFICANT CHANGE UP (ref 0–2)
BASOPHILS NFR BLD AUTO: 0.5 % — SIGNIFICANT CHANGE UP (ref 0–2)
BILIRUB SERPL-MCNC: 0.7 MG/DL — SIGNIFICANT CHANGE UP (ref 0.2–1.2)
BUN SERPL-MCNC: 34 MG/DL — HIGH (ref 7–23)
CALCIUM SERPL-MCNC: 8.8 MG/DL — SIGNIFICANT CHANGE UP (ref 8.4–10.5)
CHLORIDE SERPL-SCNC: 102 MMOL/L — SIGNIFICANT CHANGE UP (ref 98–107)
CO2 SERPL-SCNC: 20 MMOL/L — LOW (ref 22–31)
CREAT SERPL-MCNC: 1.48 MG/DL — HIGH (ref 0.5–1.3)
EOSINOPHIL # BLD AUTO: 0 K/UL — SIGNIFICANT CHANGE UP (ref 0–0.5)
EOSINOPHIL # BLD AUTO: 0 K/UL — SIGNIFICANT CHANGE UP (ref 0–0.5)
EOSINOPHIL NFR BLD AUTO: 0 % — SIGNIFICANT CHANGE UP (ref 0–6)
EOSINOPHIL NFR BLD AUTO: 0 % — SIGNIFICANT CHANGE UP (ref 0–6)
GLUCOSE SERPL-MCNC: 138 MG/DL — HIGH (ref 70–99)
HCT VFR BLD CALC: 26.2 % — LOW (ref 39–50)
HCT VFR BLD CALC: 26.3 % — LOW (ref 39–50)
HGB BLD-MCNC: 8.5 G/DL — LOW (ref 13–17)
HGB BLD-MCNC: 8.6 G/DL — LOW (ref 13–17)
IMM GRANULOCYTES NFR BLD AUTO: 2.4 % — HIGH (ref 0–1.5)
IMM GRANULOCYTES NFR BLD AUTO: 4.6 % — HIGH (ref 0–1.5)
LYMPHOCYTES # BLD AUTO: 0.87 K/UL — LOW (ref 1–3.3)
LYMPHOCYTES # BLD AUTO: 1.18 K/UL — SIGNIFICANT CHANGE UP (ref 1–3.3)
LYMPHOCYTES # BLD AUTO: 19 % — SIGNIFICANT CHANGE UP (ref 13–44)
LYMPHOCYTES # BLD AUTO: 28.6 % — SIGNIFICANT CHANGE UP (ref 13–44)
MAGNESIUM SERPL-MCNC: 2.1 MG/DL — SIGNIFICANT CHANGE UP (ref 1.6–2.6)
MANUAL SMEAR VERIFICATION: SIGNIFICANT CHANGE UP
MANUAL SMEAR VERIFICATION: SIGNIFICANT CHANGE UP
MCHC RBC-ENTMCNC: 28.4 PG — SIGNIFICANT CHANGE UP (ref 27–34)
MCHC RBC-ENTMCNC: 29.2 PG — SIGNIFICANT CHANGE UP (ref 27–34)
MCHC RBC-ENTMCNC: 32.4 % — SIGNIFICANT CHANGE UP (ref 32–36)
MCHC RBC-ENTMCNC: 32.7 % — SIGNIFICANT CHANGE UP (ref 32–36)
MCV RBC AUTO: 86.8 FL — SIGNIFICANT CHANGE UP (ref 80–100)
MCV RBC AUTO: 90 FL — SIGNIFICANT CHANGE UP (ref 80–100)
MONOCYTES # BLD AUTO: 0.58 K/UL — SIGNIFICANT CHANGE UP (ref 0–0.9)
MONOCYTES # BLD AUTO: 1.38 K/UL — HIGH (ref 0–0.9)
MONOCYTES NFR BLD AUTO: 14.1 % — HIGH (ref 2–14)
MONOCYTES NFR BLD AUTO: 30.1 % — HIGH (ref 2–14)
NEUTROPHILS # BLD AUTO: 2.1 K/UL — SIGNIFICANT CHANGE UP (ref 1.8–7.4)
NEUTROPHILS # BLD AUTO: 2.24 K/UL — SIGNIFICANT CHANGE UP (ref 1.8–7.4)
NEUTROPHILS NFR BLD AUTO: 45.9 % — SIGNIFICANT CHANGE UP (ref 43–77)
NEUTROPHILS NFR BLD AUTO: 54.4 % — SIGNIFICANT CHANGE UP (ref 43–77)
NRBC # FLD: 0.4 K/UL — LOW (ref 25–125)
NRBC # FLD: 0.44 K/UL — LOW (ref 25–125)
NRBC FLD-RTO: 9.6 — SIGNIFICANT CHANGE UP
NRBC FLD-RTO: 9.7 — SIGNIFICANT CHANGE UP
PHOSPHATE SERPL-MCNC: 4.6 MG/DL — HIGH (ref 2.5–4.5)
PLATELET # BLD AUTO: 64 K/UL — LOW (ref 150–400)
PLATELET # BLD AUTO: 67 K/UL — LOW (ref 150–400)
PMV BLD: 10.1 FL — SIGNIFICANT CHANGE UP (ref 7–13)
PMV BLD: 11.4 FL — SIGNIFICANT CHANGE UP (ref 7–13)
POTASSIUM SERPL-MCNC: 4 MMOL/L — SIGNIFICANT CHANGE UP (ref 3.5–5.3)
POTASSIUM SERPL-SCNC: 4 MMOL/L — SIGNIFICANT CHANGE UP (ref 3.5–5.3)
PROT SERPL-MCNC: 9.3 G/DL — HIGH (ref 6–8.3)
RBC # BLD: 2.91 M/UL — LOW (ref 4.2–5.8)
RBC # BLD: 3.03 M/UL — LOW (ref 4.2–5.8)
RBC # FLD: 18.8 % — HIGH (ref 10.3–14.5)
RBC # FLD: 19.2 % — HIGH (ref 10.3–14.5)
SODIUM SERPL-SCNC: 140 MMOL/L — SIGNIFICANT CHANGE UP (ref 135–145)
WBC # BLD: 4.12 K/UL — SIGNIFICANT CHANGE UP (ref 3.8–10.5)
WBC # BLD: 4.58 K/UL — SIGNIFICANT CHANGE UP (ref 3.8–10.5)
WBC # FLD AUTO: 4.12 K/UL — SIGNIFICANT CHANGE UP (ref 3.8–10.5)
WBC # FLD AUTO: 4.58 K/UL — SIGNIFICANT CHANGE UP (ref 3.8–10.5)

## 2019-03-23 RX ORDER — PANTOPRAZOLE SODIUM 20 MG/1
1 TABLET, DELAYED RELEASE ORAL
Qty: 0 | Refills: 0 | COMMUNITY
Start: 2019-03-23

## 2019-03-23 RX ORDER — LISINOPRIL 2.5 MG/1
1 TABLET ORAL
Qty: 0 | Refills: 0 | COMMUNITY

## 2019-03-23 RX ADMIN — CARVEDILOL PHOSPHATE 25 MILLIGRAM(S): 80 CAPSULE, EXTENDED RELEASE ORAL at 06:14

## 2019-03-23 RX ADMIN — Medication 120 MILLIGRAM(S): at 06:14

## 2019-03-23 RX ADMIN — PANTOPRAZOLE SODIUM 40 MILLIGRAM(S): 20 TABLET, DELAYED RELEASE ORAL at 06:14

## 2019-03-23 NOTE — DISCHARGE NOTE NURSING/CASE MANAGEMENT/SOCIAL WORK - NSDCDPATPORTLINK_GEN_ALL_CORE
You can access the [facility] Patient Portal, offered by [facility], by registering with the following website: [email or web address]

## 2019-03-23 NOTE — PROGRESS NOTE ADULT - SUBJECTIVE AND OBJECTIVE BOX
INTERVAL HPI/OVERNIGHT EVENTS:    no new events   hgb stable         MEDICATIONS  (STANDING):  carvedilol 25 milliGRAM(s) Oral every 12 hours  dexamethasone  IVPB 40 milliGRAM(s) IV Intermittent daily  pantoprazole    Tablet 40 milliGRAM(s) Oral before breakfast    MEDICATIONS  (PRN):      Allergies    No Known Allergies    Intolerances        Review of Systems:    General:  No wt loss, fevers, chills, night sweats, fatigue   Eyes:  Good vision, no reported pain  ENT:  No sore throat, pain, runny nose, dysphagia  CV:  No pain, palpitations, hypo/hypertension  Resp:  No dyspnea, cough, tachypnea, wheezing  GI:  No pain, No nausea, No vomiting, No diarrhea, No constipation, No weight loss, No fever, No pruritis, No rectal bleeding, No melena, No dysphagia  :  No pain, bleeding, incontinence, nocturia  Muscle:  No pain, weakness  Neuro:  No weakness, tingling, memory problems  Psych:  No fatigue, insomnia, mood problems, depression  Endocrine:  No polyuria, polydypsia, cold/heat intolerance  Heme:  No petechiae, ecchymosis, easy bruisability  Skin:  No rash, tattoos, scars, edema      Vital Signs Last 24 Hrs  T(C): 36.5 (22 Mar 2019 10:18), Max: 37.1 (22 Mar 2019 07:30)  T(F): 97.7 (22 Mar 2019 10:18), Max: 98.7 (22 Mar 2019 07:30)  HR: 81 (22 Mar 2019 10:18) (79 - 93)  BP: 132/66 (22 Mar 2019 10:18) (131/75 - 141/85)  BP(mean): --  RR: 16 (22 Mar 2019 10:18) (16 - 18)  SpO2: 100% (22 Mar 2019 10:18) (97% - 100%)    PHYSICAL EXAM:    Constitutional: NAD  HEENT: EOMI, throat clear  Neck: No LAD, supple  Respiratory: CTA and P  Cardiovascular: S1 and S2, RRR, no M  Gastrointestinal: BS+, soft, NT/ND, neg HSM, +ostomy; unable to see contents  Extremities: No peripheral edema, neg clubbing, cyanosis  Vascular: 2+ peripheral pulses  Neurological: A/O x 3, no focal deficits  Psychiatric: Normal mood, normal affect  Skin: No rashes      LABS:                        6.1    5.18  )-----------( 47       ( 22 Mar 2019 05:49 )             19.2         140  |  103  |  27<H>  ----------------------------<  121<H>  4.1   |  20<L>  |  1.31<H>    Ca    9.1      22 Mar 2019 05:49  Phos  3.6       Mg     2.1         TPro  9.5<H>  /  Alb  3.8  /  TBili  0.7  /  DBili  x   /  AST  27  /  ALT  40  /  AlkPhos  50        Urinalysis Basic - ( 20 Mar 2019 16:54 )    Color: LIGHT YELLOW / Appearance: CLEAR / S.013 / pH: 6.5  Gluc: NEGATIVE / Ketone: NEGATIVE  / Bili: NEGATIVE / Urobili: NORMAL   Blood: NEGATIVE / Protein: 70 / Nitrite: NEGATIVE   Leuk Esterase: NEGATIVE / RBC: 0-2 / WBC 0-2   Sq Epi: OCC / Non Sq Epi: x / Bacteria: NEGATIVE        RADIOLOGY & ADDITIONAL TESTS:

## 2019-03-23 NOTE — PROGRESS NOTE ADULT - ASSESSMENT
1. Thrombocytopenia + Anemia    -- counts improved post transfusion  -- sec to Myeloma. Outpt labs show sig increase in paraprotein  --  Dexamethasone 40mg IV daily x 4   -- transfuse platelets if Less than 15 K or clinically significant bleeding and Hgb < 7  -- plan to restart Pomalidomide PO (pt has own meds) and Bendamustine as an outpt  -- No objection to d/c home today    2. Multiple Myeloma    -- relapsed / refractory ( failed all tx) and failed auto stem cell transplant  -- pt off of tx against my and other oncologist advise in order to reverse colostomy  -- clinical trial at [facility] strongly recommended but pt declined  -- transfuse for Hgb < 7  -- will restart Pomalidomide, plan for outpt Bendamustine as salvage tx    3. Bleeding from colostomy    -- GI following  -- plan for reversal of colostomy if counts improve      D/C home today.   F/U w me on Tuesday    Torsten Murphy MD  [phone]

## 2019-03-23 NOTE — PROGRESS NOTE ADULT - SUBJECTIVE AND OBJECTIVE BOX
Pt is seen and examined  pt is awake and lying in bed   comfortable  no complaints  counts improved  no bleed    PAST MEDICAL & SURGICAL HISTORY:  Diverticulitis  Multiple myeloma  Hypertension  Left ventricular dysfunction  Cardiomyopathy  Former smoker: (1 ppd x 11 years; Quit ~age 26)  History of bone marrow transplant  History of surgery: s/p exploratory laparotomy with sigmoid resection and colostomy on 9/2/2018      ROS:  Negative except for:    MEDICATIONS  (STANDING):  carvedilol 25 milliGRAM(s) Oral every 12 hours  pantoprazole    Tablet 40 milliGRAM(s) Oral before breakfast    MEDICATIONS  (PRN):      Allergies    No Known Allergies    Intolerances        Vital Signs Last 24 Hrs  T(C): 36.6 (23 Mar 2019 10:55), Max: 36.9 (23 Mar 2019 06:08)  T(F): 97.8 (23 Mar 2019 10:55), Max: 98.5 (23 Mar 2019 06:08)  HR: 66 (23 Mar 2019 10:55) (66 - 80)  BP: 132/72 (23 Mar 2019 10:55) (123/62 - 148/83)  BP(mean): --  RR: 18 (23 Mar 2019 10:55) (16 - 18)  SpO2: 98% (23 Mar 2019 10:55) (95% - 100%)    PHYSICAL EXAM      General: adult in NAD  HEENT: clear oropharynx, anicteric sclera, pink conjunctiva  Neck: supple  CV: normal S1/S2 with no murmur rubs or gallops  Lungs: positive air movement b/l ant lungs,clear to auscultation, no wheezes, no rales  Abdomen: soft non-tender non-distended, no hepatosplenomegaly + colostomy  Ext: no clubbing cyanosis or edema  Skin: no rashes and no petechiae  Neuro: alert and oriented X 4, no focal deficits    LABS:                          8.6    4.58  )-----------( 67       ( 23 Mar 2019 06:38 )             26.3     Serial CBC's  03-23 @ 06:38  Hct-26.3 / Hgb-8.6 / Plat-67 / RBC-3.03 / WBC-4.58          Serial CBC's  03-23 @ 00:19  Hct-26.2 / Hgb-8.5 / Plat-64 / RBC-2.91 / WBC-4.12            03-23    140  |  102  |  34<H>  ----------------------------<  138<H>  4.0   |  20<L>  |  1.48<H>    Ca    8.8      23 Mar 2019 06:38  Phos  4.6     03-23  Mg     2.1     03-23    TPro  9.3<H>  /  Alb  3.6  /  TBili  0.7  /  DBili  x   /  AST  24  /  ALT  35  /  AlkPhos  48  03-23          WBC Count: 4.58 K/uL (03-23 @ 06:38)  Hemoglobin: 8.6 g/dL (03-23 @ 06:38)            RADIOLOGY & ADDITIONAL STUDIES:

## 2019-03-23 NOTE — PROGRESS NOTE ADULT - ASSESSMENT
54 yo man with history of multiple myeloma s/p bone marrow transplant (~1.5 yrs ago) and chemo/RT (stopped ~2 months ago in preparation for colostomy reversal), perforated diverticulitis s/p Kiah's (proctosigmoidectomy with colostomy, 9/2018), nonischemic cardiomyopathy (mild global LV systolic dysfunction with EF 54% and diastolic LV dysfunction based on TTE in 11/2018), and HTN presents after outpatient blood work at oncologist's office (Dr. Murphy) on Tuesday revealed a platelet count of 6, admitted with thrombocytopenia of unclear etiology.    -> Symptomatic Thrombocytopenia:     - improving and stable, pt is medically cleared for safe discharge with close outpt f/u with hem/onc for additional management   -> Hematochezia, Gastrointestinal Bleed:     - improved   -> Chronic Combined Systolic and Diastolic CHF:     - stable. c/w lifestyle modifications  -> Multiple Myeloma:     - refractory/relapse     - tx per hem/onc, f/u as outpt  -> ROBB:     - likely new baseline  -> colostomy:      - outpt colorectal sx f/u for management and eventual reversal

## 2019-03-23 NOTE — DISCHARGE NOTE PROVIDER - NSDCCPCAREPLAN_GEN_ALL_CORE_FT
PRINCIPAL DISCHARGE DIAGNOSIS  Diagnosis: Thrombocytopenia  Assessment and Plan of Treatment: Please follow up with your Oncologist on Monday 5/25.      SECONDARY DISCHARGE DIAGNOSES  Diagnosis: ROBB (acute kidney injury)  Assessment and Plan of Treatment: Continue to stop taking hydrochlorthiazide and lisinopril. Follow up with Dr. Kirill Bahena in 3-4 weeks. [address] [facility], [address]  [phone]. PRINCIPAL DISCHARGE DIAGNOSIS  Diagnosis: Thrombocytopenia  Assessment and Plan of Treatment: Please follow up with your Oncologist on Monday 5/25.      SECONDARY DISCHARGE DIAGNOSES  Diagnosis: Colostomy status  Assessment and Plan of Treatment: Please follow up with your Colorectal surgeon upon discharge to discuss reversal of your colostomy.    Diagnosis: ROBB (acute kidney injury)  Assessment and Plan of Treatment: Continue to stop taking hydrochlorthiazide and lisinopril. Follow up with Dr. Kirill Bahena in 3-4 weeks. [address] [facility], [address]  [phone].

## 2019-03-23 NOTE — PROGRESS NOTE ADULT - SUBJECTIVE AND OBJECTIVE BOX
HISTORY OF PRESENT ILLNESS  Merlin Dusky is a 62 y.o. female. HPI    Patient presents for a new office visit. She does not have a prior cardiac history. She does have a history of hypertension and obesity who recently started a nonsurgical weight loss program and lost approximately 60 pounds over the past 4 months. She has been treated for what sounds like dependent edema in the past with Lasix which she continues to take. Patient was seen by her PCP early last week complaining primarily of abdominal pain, fatigue, decreased activity tolerance and not feeling well. She underwent blood work and was started on a proton pump inhibitor, however, her symptoms persisted, so she was evaluated in the emergency room at the end of last week. At that time she was diagnosed with new onset atrial fibrillation with a heart rate around 140 bpm.  She was given a dose of intravenous diltiazem and her heart rate did improve to the upper 90s, so she was discharged home on metoprolol 50 mg twice daily. She also underwent a CT scan which was negative. Her blood work showed an elevated hemoglobin level with some evidence of volume depletion. She presents to our office as part of a post ER follow-up. She has not felt better since leaving the emergency room. Her biggest complaint is fatigue, malaise and no activity tolerance. She denies any chest pain, palpitations or shortness of breath, though she admits she has not been doing any physical activity over the past week.     Past Medical History:   Diagnosis Date    Arthritis     Bilateral Knee, and Bilateral Hand/Thumb    Atrial fibrillation ([address] 1/29/2018    Carpal tunnel syndrome     Headache     History of ankle fusion 1997    left    Hx of migraine headaches     Hypercholesterolemia     Hypertension     Morbid obesity ([address]     HERZOG (nonalcoholic steatohepatitis) suggested by ultrasound report, 2016 8/11/2017    Vertigo      Current Outpatient Patient seen and examined at bedside  No acute events noted overnight  Case discussed with medical team    HPI:  54 yo man with history of multiple myeloma s/p bone marrow transplant (~1.5 yrs ago) and chemo/RT (stopped ~2 months ago in preparation for colostomy reversal), perforated diverticulitis s/p Kiah's (proctosigmoidectomy with colostomy, 9/2018), nonischemic cardiomyopathy (mild global LV systolic dysfunction with EF 54% and diastolic LV dysfunction based on TTE in 11/2018), and HTN presents after outpatient blood work at oncologist's office (Dr. Murphy) on Tuesday revealed a platelet count of 6. Pt was also found to be thrombocytopenic last Monday (3/11/19) when he was at pre-surgical testing for a scheduled colostomy reversal on 3/20/19 with Dr. Alcaraz. At that visit, pt's platelet count was 17, with pt ultimately getting transfused 1u of platelets in addition to 1u of pRBCs at his oncologist's office that day. Pt reports that over the last week, he has been having brief episodes of epistaxis after sneezing or blowing his nose and bleeding from his stoma, though both without any clots. Pt notes that up until today, the bleeding from his stoma only occurred when cleaning the area and would quickly stop; today, however, pt started having spontaneous, ongoing bleeding from the stoma. Yesterday, pt had gum bleeding while brushing his teeth. Pt denies any ecchymoses, purpura, rashes, hematuria, hemoptysis, or hematemesis. Pt also states that although he doesn't usually get headaches, he has been experiencing intermittent headaches, ~2-3x per day for the past week. The headaches are located at the top of his head, last ~30 minutes, and resolve on their own. No accompanying visual changes, numbness/tingling, weakness, speech changes, F/C, or neck pain/stiffness. Pt denies any recent CP, SOB, URI symptoms, abdominal pain, N/V, changes in stool output, urinary complaints, sick contacts, travel, or medication changes.    In the ED,  T 98.5, , /86, RR 16, O2 sats 100% RA. Platelet count 10, Hgb 7.2, WBC 4.52. (19 Mar 2019 22:50)      PAST MEDICAL & SURGICAL HISTORY:  Diverticulitis  Multiple myeloma  Hypertension  Left ventricular dysfunction  Cardiomyopathy  Former smoker: (1 ppd x 11 years; Quit ~age 26)  History of bone marrow transplant  History of surgery: s/p exploratory laparotomy with sigmoid resection and colostomy on 9/2/2018      No Known Allergies       MEDICATIONS  (STANDING):  carvedilol 25 milliGRAM(s) Oral every 12 hours  pantoprazole    Tablet 40 milliGRAM(s) Oral before breakfast    MEDICATIONS  (PRN):      REVIEW OF SYSTEMS:  CONSTITUTIONAL: (+) malaise.   EYES: No acute change in vision   ENT:  No tinnitus  NECK: No stiffness  RESPIRATORY: No hemoptysis  CARDIOVASCULAR: No chest pain, palpitations, syncope  GASTROINTESTINAL: No hematemesis, diarrhea, melena, or hematochezia.  GENITOURINARY: No hematuria  NEUROLOGICAL: No headaches  LYMPH Nodes: No enlarged glands  ENDOCRINE: No heat or cold intolerance	    T(C): 36.6 (03-23-19 @ 10:55), Max: 36.9 (03-23-19 @ 06:08)  HR: 66 (03-23-19 @ 10:55) (66 - 80)  BP: 132/72 (03-23-19 @ 10:55) (123/62 - 148/83)  RR: 18 (03-23-19 @ 10:55) (16 - 18)  SpO2: 98% (03-23-19 @ 10:55) (95% - 100%)    PHYSICAL EXAMINATION:   Constitutional: WD, NAD  HEENT: NC, AT  Neck:  Supple  Respiratory:  Adequate airflow b/l. Not using accessory muscles of respiration.  Cardiovascular:  S1 & S2 intact, no R/G, 2+ radial pulses b/l  Gastrointestinal: Soft, NT, ND, normoactive b.s., no organomegaly/RT/rigidity  Extremities: WWP  Neurological:  Alert and awake.  No acute focal motor deficits. Crude sensation intact.     Labs and imaging reviewed    LABS:                        8.6    4.58  )-----------( 67       ( 23 Mar 2019 06:38 )             26.3     03-23    140  |  102  |  34<H>  ----------------------------<  138<H>  4.0   |  20<L>  |  1.48<H>    Ca    8.8      23 Mar 2019 06:38  Phos  4.6     03-23  Mg     2.1     03-23    TPro  9.3<H>  /  Alb  3.6  /  TBili  0.7  /  DBili  x   /  AST  24  /  ALT  35  /  AlkPhos  48  03-23            CAPILLARY BLOOD GLUCOSE      POCT Blood Glucose.: 204 mg/dL (22 Mar 2019 22:39)  POCT Blood Glucose.: 140 mg/dL (22 Mar 2019 16:56)        LIVER FUNCTIONS - ( 23 Mar 2019 06:38 )  Alb: 3.6 g/dL / Pro: 9.3 g/dL / ALK PHOS: 48 u/L / ALT: 35 u/L / AST: 24 u/L / GGT: x               RADIOLOGY & ADDITIONAL STUDIES: Prescriptions   Medication Sig Dispense Refill    metoprolol tartrate (LOPRESSOR) 100 mg IR tablet Take 0.5 Tabs by mouth two (2) times a day. 30 Tab 0    aspirin 81 mg chewable tablet Take 1 Tab by mouth daily. 30 Tab 0    HYDROcodone-acetaminophen (NORCO) 7.5-325 mg per tablet Take 1-2 Tabs by mouth nightly as needed for Pain. Max Daily Amount: 2 Tabs. 14 Tab 0    cholecalciferol, vitamin D3, (VITAMIN D3) 2,000 unit tab Take 2,000 Units by mouth daily.  potassium 99 mg tablet Take 99 mg by mouth daily as needed.  acetaminophen (TYLENOL ARTHRITIS PAIN) 650 mg CR tablet Take 650 mg by mouth every six (6) hours as needed for Pain.  meloxicam (MOBIC) 15 mg tablet TAKE 1 TABLET BY MOUTH DAILY 90 Tab 3    furosemide (LASIX) 40 mg tablet Take 1 Tab by mouth daily. 90 Tab 3    simvastatin (ZOCOR) 20 mg tablet Take 1 Tab by mouth nightly. 90 Tab 3    omega-3 fatty acids-vitamin e 1,000 mg cap Take 1 Cap by mouth two (2) times a day.  calcium-cholecalciferol, d3, 600-125 mg-unit tab Take 1,200 mg by mouth daily. No Known Allergies     Social History   Substance Use Topics    Smoking status: Never Smoker    Smokeless tobacco: Never Used    Alcohol use 0.0 oz/week     0 Standard drinks or equivalent per week      Comment: rare once a year     Family History   Problem Relation Age of Onset    Heart Disease Mother     Hypertension Mother     Heart Surgery Mother     Cancer Father      colon    Hypertension Brother     No Known Problems Maternal Grandmother     Heart Disease Maternal Grandfather     MS Paternal Grandmother     Stroke Paternal Grandfather     Heart Disease Paternal Grandfather     No Known Problems Son          Review of Systems   Constitutional: Positive for malaise/fatigue. Negative for chills, fever and weight loss. HENT: Negative for nosebleeds. Eyes: Negative for blurred vision and double vision.    Respiratory: Negative for cough, shortness of breath and wheezing. Cardiovascular: Negative for chest pain, palpitations, orthopnea, claudication, leg swelling and PND. Gastrointestinal: Negative for abdominal pain, heartburn, nausea and vomiting. Genitourinary: Negative for dysuria and hematuria. Musculoskeletal: Negative for falls and myalgias. Skin: Negative for rash. Neurological: Negative for dizziness, focal weakness and headaches. Endo/Heme/Allergies: Does not bruise/bleed easily. Psychiatric/Behavioral: Negative for substance abuse. Visit Vitals    /80    Pulse (!) 131    Ht 5' 5\" (1.651 m)    Wt 95.7 kg (211 lb)    SpO2 97%    BMI 35.11 kg/m2       Physical Exam   Constitutional: She is oriented to person, place, and time. She appears well-developed and well-nourished. HENT:   Head: Normocephalic and atraumatic. Eyes: Conjunctivae are normal.   Neck: Neck supple. No JVD present. Carotid bruit is not present. Cardiovascular: S1 normal, S2 normal and normal pulses. An irregular rhythm present. Tachycardia present. Exam reveals distant heart sounds. Exam reveals no gallop. No murmur heard. Pulmonary/Chest: Effort normal and breath sounds normal. She has no wheezes. She has no rales. Abdominal: Soft. Bowel sounds are normal. There is no tenderness. Musculoskeletal: She exhibits no edema, tenderness or deformity. Neurological: She is alert and oriented to person, place, and time. Skin: Skin is warm and dry. Psychiatric: She has a normal mood and affect. Her behavior is normal. Thought content normal.     EKG: Atrial fibrillation, rapid ventricular response, low voltage throughout, nonspecific ST flattening. Compared to the previous EKG from last week, no significant interval change. ASSESSMENT and PLAN  Encounter Diagnoses   Name Primary?  Atrial fibrillation, unspecified type ([address] Yes    Pre-procedure lab exam     Essential hypertension     Rapid weight loss      Atrial fibrillation.   New onset, I suspect this is the cause of the majority of her symptoms. She has been feeling poorly for at least a week, so I suspect her atrial fibrillation may have started a week ago. She is currently taking metoprolol 50 mg twice daily and this is not controlling her heart rates, so I suspect this will be difficult to control. I recommended starting her on Xarelto 20 mg daily and scheduling her for a transesophageal echocardiogram with possible cardioversion tomorrow. In the meantime, she should discontinue her nonsurgical weight loss program.  Repeat labs will be checked tomorrow. History of essential hypertension. Patient states her blood pressure improved after the weight loss. She was previously on an unknown antihypertensive may agent, but continues to take scheduled furosemide 40 mg daily. I recommended that she stop her furosemide altogether since there was some evidence of volume depletion on her recent lab work. Rapid weight loss. Patient lost 60 pounds in weight over the past 4 months using a nonsurgical weight loss program which includes dietary shakes. I recommended that she stop this until her cardiac issues are sorted out. Follow-up in 1 month following her cardioversion, sooner if needed.

## 2019-03-23 NOTE — DISCHARGE NOTE PROVIDER - HOSPITAL COURSE
52 yo man with history of multiple myeloma s/p bone marrow transplant (~1.5 yrs ago) and chemo/RT (stopped ~2 months ago in preparation for colostomy reversal), perforated diverticulitis s/p Kiah's (proctosigmoidectomy with colostomy, 9/2018), nonischemic cardiomyopathy (mild global LV systolic dysfunction with EF 54% and diastolic LV dysfunction based on TTE in 11/2018), and HTN presents after outpatient blood work at oncologist's office (Dr. Murphy) on Tuesday revealed a platelet count of 6, admitted with thrombocytopenia, found to be 2/2 refractory/ relapse of myeloma. Heme/Onc was consulted, s/p decadron 40mg IV daily x 4 doses, no response. Plan to restart Pomalidomide PO (pt has own meds) and Bendamustine as an outpt. Platelet count on discharge was 67. Pt will follow up with outpatient Heme/Onc on Monday.         Also noted to have ROBB, stable likely new baseline, followed by Nephrology, dina on discharge 1.48, plan to follow up with Dr. Bahena outpatient.         Bleeding at colostomy site - Now resolved, outpt colorectal sx f/u for management and eventual reversal

## 2019-03-23 NOTE — PROGRESS NOTE ADULT - REASON FOR ADMISSION
Thrombocytopenia
symptomatic thrombocytopenia
Thrombocytopenia

## 2019-03-23 NOTE — DISCHARGE NOTE PROVIDER - CARE PROVIDER_API CALL
Kirill Bahena)  Internal Medicine; Nephrology  [facility] [address] [place] [address]  Phone: [phone]  Fax: [phone]  Follow Up Time:     Torsten Murphy)  Hematology; Medical Oncology  [address] [place] [address]  Phone: [phone]  Fax: [phone]  Follow Up Time:

## 2019-03-25 ENCOUNTER — TRANSCRIPTION ENCOUNTER (OUTPATIENT)
Age: 54
End: 2019-03-25

## 2019-03-25 ENCOUNTER — INPATIENT (INPATIENT)
Facility: HOSPITAL | Age: 54
LOS: 13 days | Discharge: HOME | End: 2019-04-08
Attending: INTERNAL MEDICINE | Admitting: INTERNAL MEDICINE
Payer: COMMERCIAL

## 2019-03-25 VITALS
SYSTOLIC BLOOD PRESSURE: 120 MMHG | TEMPERATURE: 98 F | RESPIRATION RATE: 18 BRPM | DIASTOLIC BLOOD PRESSURE: 76 MMHG | OXYGEN SATURATION: 99 % | HEART RATE: 110 BPM

## 2019-03-25 DIAGNOSIS — Z94.81 BONE MARROW TRANSPLANT STATUS: Chronic | ICD-10-CM

## 2019-03-25 DIAGNOSIS — Z98.890 OTHER SPECIFIED POSTPROCEDURAL STATES: Chronic | ICD-10-CM

## 2019-03-25 LAB
ALBUMIN SERPL ELPH-MCNC: 3.2 G/DL — LOW (ref 3.3–5)
ALP SERPL-CCNC: 138 U/L — HIGH (ref 40–120)
ALT FLD-CCNC: 69 U/L — HIGH (ref 4–41)
ANION GAP SERPL CALC-SCNC: 18 MMO/L — HIGH (ref 7–14)
ANISOCYTOSIS BLD QL: SLIGHT — SIGNIFICANT CHANGE UP
APPEARANCE UR: CLEAR — SIGNIFICANT CHANGE UP
APTT BLD: 28.4 SEC — SIGNIFICANT CHANGE UP (ref 27.5–36.3)
AST SERPL-CCNC: 148 U/L — HIGH (ref 4–40)
B PERT DNA SPEC QL NAA+PROBE: NOT DETECTED — SIGNIFICANT CHANGE UP
BACTERIA # UR AUTO: NEGATIVE — SIGNIFICANT CHANGE UP
BASE EXCESS BLDV CALC-SCNC: -1.7 MMOL/L — SIGNIFICANT CHANGE UP
BASOPHILS # BLD AUTO: 0.02 K/UL — SIGNIFICANT CHANGE UP (ref 0–0.2)
BASOPHILS NFR BLD AUTO: 0.3 % — SIGNIFICANT CHANGE UP (ref 0–2)
BASOPHILS NFR SPEC: 0 % — SIGNIFICANT CHANGE UP (ref 0–2)
BILIRUB SERPL-MCNC: 1.1 MG/DL — SIGNIFICANT CHANGE UP (ref 0.2–1.2)
BILIRUB UR-MCNC: NEGATIVE — SIGNIFICANT CHANGE UP
BLOOD GAS VENOUS - CREATININE: 1 MG/DL — SIGNIFICANT CHANGE UP (ref 0.5–1.3)
BLOOD UR QL VISUAL: SIGNIFICANT CHANGE UP
BUN SERPL-MCNC: 24 MG/DL — HIGH (ref 7–23)
C PNEUM DNA SPEC QL NAA+PROBE: NOT DETECTED — SIGNIFICANT CHANGE UP
CALCIUM SERPL-MCNC: 7.9 MG/DL — LOW (ref 8.4–10.5)
CHLORIDE BLDV-SCNC: 104 MMOL/L — SIGNIFICANT CHANGE UP (ref 96–108)
CHLORIDE SERPL-SCNC: 98 MMOL/L — SIGNIFICANT CHANGE UP (ref 98–107)
CK MB BLD-MCNC: < 1 NG/ML — LOW (ref 1–6.6)
CK SERPL-CCNC: 65 U/L — SIGNIFICANT CHANGE UP (ref 30–200)
CO2 SERPL-SCNC: 21 MMOL/L — LOW (ref 22–31)
COLOR SPEC: YELLOW — SIGNIFICANT CHANGE UP
CREAT SERPL-MCNC: 1.19 MG/DL — SIGNIFICANT CHANGE UP (ref 0.5–1.3)
EOSINOPHIL # BLD AUTO: 0.02 K/UL — SIGNIFICANT CHANGE UP (ref 0–0.5)
EOSINOPHIL NFR BLD AUTO: 0.3 % — SIGNIFICANT CHANGE UP (ref 0–6)
EOSINOPHIL NFR FLD: 0 % — SIGNIFICANT CHANGE UP (ref 0–6)
FLUAV H1 2009 PAND RNA SPEC QL NAA+PROBE: NOT DETECTED — SIGNIFICANT CHANGE UP
FLUAV H1 RNA SPEC QL NAA+PROBE: NOT DETECTED — SIGNIFICANT CHANGE UP
FLUAV H3 RNA SPEC QL NAA+PROBE: NOT DETECTED — SIGNIFICANT CHANGE UP
FLUAV SUBTYP SPEC NAA+PROBE: NOT DETECTED — SIGNIFICANT CHANGE UP
FLUBV RNA SPEC QL NAA+PROBE: NOT DETECTED — SIGNIFICANT CHANGE UP
GAS PNL BLDV: 137 MMOL/L — SIGNIFICANT CHANGE UP (ref 136–146)
GLUCOSE BLDV-MCNC: 113 — HIGH (ref 70–99)
GLUCOSE SERPL-MCNC: 104 MG/DL — HIGH (ref 70–99)
GLUCOSE UR-MCNC: NEGATIVE — SIGNIFICANT CHANGE UP
HADV DNA SPEC QL NAA+PROBE: NOT DETECTED — SIGNIFICANT CHANGE UP
HCO3 BLDV-SCNC: 22 MMOL/L — SIGNIFICANT CHANGE UP (ref 20–27)
HCOV PNL SPEC NAA+PROBE: SIGNIFICANT CHANGE UP
HCT VFR BLD CALC: 28.4 % — LOW (ref 39–50)
HCT VFR BLDV CALC: 29.5 % — LOW (ref 39–51)
HGB BLD-MCNC: 9.4 G/DL — LOW (ref 13–17)
HGB BLDV-MCNC: 9.5 G/DL — LOW (ref 13–17)
HMPV RNA SPEC QL NAA+PROBE: NOT DETECTED — SIGNIFICANT CHANGE UP
HPIV1 RNA SPEC QL NAA+PROBE: NOT DETECTED — SIGNIFICANT CHANGE UP
HPIV2 RNA SPEC QL NAA+PROBE: NOT DETECTED — SIGNIFICANT CHANGE UP
HPIV3 RNA SPEC QL NAA+PROBE: NOT DETECTED — SIGNIFICANT CHANGE UP
HPIV4 RNA SPEC QL NAA+PROBE: NOT DETECTED — SIGNIFICANT CHANGE UP
HYALINE CASTS # UR AUTO: SIGNIFICANT CHANGE UP
HYPOCHROMIA BLD QL: SLIGHT — SIGNIFICANT CHANGE UP
IMM GRANULOCYTES NFR BLD AUTO: 4 % — HIGH (ref 0–1.5)
INR BLD: 1.52 — HIGH (ref 0.88–1.17)
KETONES UR-MCNC: NEGATIVE — SIGNIFICANT CHANGE UP
LACTATE BLDV-MCNC: 1.6 MMOL/L — SIGNIFICANT CHANGE UP (ref 0.5–2)
LDH SERPL L TO P-CCNC: SIGNIFICANT CHANGE UP U/L (ref 135–225)
LEUKOCYTE ESTERASE UR-ACNC: NEGATIVE — SIGNIFICANT CHANGE UP
LYMPHOCYTES # BLD AUTO: 1.52 K/UL — SIGNIFICANT CHANGE UP (ref 1–3.3)
LYMPHOCYTES # BLD AUTO: 26.2 % — SIGNIFICANT CHANGE UP (ref 13–44)
LYMPHOCYTES NFR SPEC AUTO: 44 % — SIGNIFICANT CHANGE UP (ref 13–44)
MAGNESIUM SERPL-MCNC: 1.8 MG/DL — SIGNIFICANT CHANGE UP (ref 1.6–2.6)
MANUAL SMEAR VERIFICATION: SIGNIFICANT CHANGE UP
MCHC RBC-ENTMCNC: 29 PG — SIGNIFICANT CHANGE UP (ref 27–34)
MCHC RBC-ENTMCNC: 33.1 % — SIGNIFICANT CHANGE UP (ref 32–36)
MCV RBC AUTO: 87.7 FL — SIGNIFICANT CHANGE UP (ref 80–100)
METAMYELOCYTES # FLD: 1 % — SIGNIFICANT CHANGE UP (ref 0–1)
MICROCYTES BLD QL: SLIGHT — SIGNIFICANT CHANGE UP
MONOCYTES # BLD AUTO: 2.49 K/UL — HIGH (ref 0–0.9)
MONOCYTES NFR BLD AUTO: 42.9 % — HIGH (ref 2–14)
MONOCYTES NFR BLD: 13 % — HIGH (ref 2–9)
MYELOCYTES NFR BLD: 2 % — HIGH (ref 0–0)
NEUTROPHIL AB SER-ACNC: 27 % — LOW (ref 43–77)
NEUTROPHILS # BLD AUTO: 1.53 K/UL — LOW (ref 1.8–7.4)
NEUTROPHILS NFR BLD AUTO: 26.3 % — LOW (ref 43–77)
NEUTS BAND # BLD: 4 % — SIGNIFICANT CHANGE UP (ref 0–6)
NITRITE UR-MCNC: NEGATIVE — SIGNIFICANT CHANGE UP
NRBC # BLD: 0 /100WBC — SIGNIFICANT CHANGE UP
NRBC # FLD: 0.49 K/UL — SIGNIFICANT CHANGE UP (ref 0–0)
NRBC FLD-RTO: 8.4 — SIGNIFICANT CHANGE UP
NT-PROBNP SERPL-SCNC: 1661 PG/ML — SIGNIFICANT CHANGE UP
PCO2 BLDV: 37 MMHG — LOW (ref 41–51)
PH BLDV: 7.4 PH — SIGNIFICANT CHANGE UP (ref 7.32–7.43)
PH UR: 6 — SIGNIFICANT CHANGE UP (ref 5–8)
PHOSPHATE SERPL-MCNC: 5.2 MG/DL — HIGH (ref 2.5–4.5)
PLATELET # BLD AUTO: 22 K/UL — LOW (ref 150–400)
PLATELET COUNT - ESTIMATE: SIGNIFICANT CHANGE UP
PMV BLD: SIGNIFICANT CHANGE UP FL (ref 7–13)
PO2 BLDV: 30 MMHG — LOW (ref 35–40)
POLYCHROMASIA BLD QL SMEAR: SLIGHT — SIGNIFICANT CHANGE UP
POTASSIUM BLDV-SCNC: 4 MMOL/L — SIGNIFICANT CHANGE UP (ref 3.4–4.5)
POTASSIUM SERPL-MCNC: 4.2 MMOL/L — SIGNIFICANT CHANGE UP (ref 3.5–5.3)
POTASSIUM SERPL-SCNC: 4.2 MMOL/L — SIGNIFICANT CHANGE UP (ref 3.5–5.3)
PROT SERPL-MCNC: 8.4 G/DL — HIGH (ref 6–8.3)
PROT UR-MCNC: 200 — HIGH
PROTHROM AB SERPL-ACNC: 17.1 SEC — HIGH (ref 9.8–13.1)
RBC # BLD: 3.24 M/UL — LOW (ref 4.2–5.8)
RBC # FLD: 18.4 % — HIGH (ref 10.3–14.5)
RBC CASTS # UR COMP ASSIST: HIGH (ref 0–?)
RSV RNA SPEC QL NAA+PROBE: NOT DETECTED — SIGNIFICANT CHANGE UP
RV+EV RNA SPEC QL NAA+PROBE: NOT DETECTED — SIGNIFICANT CHANGE UP
SAO2 % BLDV: 50.8 % — LOW (ref 60–85)
SODIUM SERPL-SCNC: 137 MMOL/L — SIGNIFICANT CHANGE UP (ref 135–145)
SP GR SPEC: 1.02 — SIGNIFICANT CHANGE UP (ref 1–1.04)
SQUAMOUS # UR AUTO: SIGNIFICANT CHANGE UP
TROPONIN T, HIGH SENSITIVITY: 8 NG/L — SIGNIFICANT CHANGE UP (ref ?–14)
URATE SERPL-MCNC: 12.2 MG/DL — HIGH (ref 3.4–8.8)
UROBILINOGEN FLD QL: NORMAL — SIGNIFICANT CHANGE UP
VARIANT LYMPHS # BLD: 9 % — SIGNIFICANT CHANGE UP
WBC # BLD: 5.81 K/UL — SIGNIFICANT CHANGE UP (ref 3.8–10.5)
WBC # FLD AUTO: 5.81 K/UL — SIGNIFICANT CHANGE UP (ref 3.8–10.5)
WBC UR QL: SIGNIFICANT CHANGE UP (ref 0–?)

## 2019-03-25 PROCEDURE — 76705 ECHO EXAM OF ABDOMEN: CPT | Mod: 26

## 2019-03-25 PROCEDURE — 71045 X-RAY EXAM CHEST 1 VIEW: CPT | Mod: 26

## 2019-03-25 RX ORDER — MORPHINE SULFATE 50 MG/1
4 CAPSULE, EXTENDED RELEASE ORAL ONCE
Qty: 0 | Refills: 0 | Status: DISCONTINUED | OUTPATIENT
Start: 2019-03-25 | End: 2019-03-25

## 2019-03-25 RX ORDER — ACETAMINOPHEN 500 MG
650 TABLET ORAL ONCE
Qty: 0 | Refills: 0 | Status: COMPLETED | OUTPATIENT
Start: 2019-03-25 | End: 2019-03-25

## 2019-03-25 RX ORDER — PIPERACILLIN AND TAZOBACTAM 4; .5 G/20ML; G/20ML
3.38 INJECTION, POWDER, LYOPHILIZED, FOR SOLUTION INTRAVENOUS ONCE
Qty: 0 | Refills: 0 | Status: COMPLETED | OUTPATIENT
Start: 2019-03-25 | End: 2019-03-25

## 2019-03-25 RX ORDER — SODIUM CHLORIDE 9 MG/ML
2000 INJECTION INTRAMUSCULAR; INTRAVENOUS; SUBCUTANEOUS ONCE
Qty: 0 | Refills: 0 | Status: COMPLETED | OUTPATIENT
Start: 2019-03-25 | End: 2019-03-25

## 2019-03-25 RX ORDER — VANCOMYCIN HCL 1 G
1000 VIAL (EA) INTRAVENOUS ONCE
Qty: 0 | Refills: 0 | Status: COMPLETED | OUTPATIENT
Start: 2019-03-25 | End: 2019-03-25

## 2019-03-25 RX ADMIN — MORPHINE SULFATE 4 MILLIGRAM(S): 50 CAPSULE, EXTENDED RELEASE ORAL at 23:25

## 2019-03-25 RX ADMIN — Medication 250 MILLIGRAM(S): at 19:59

## 2019-03-25 RX ADMIN — SODIUM CHLORIDE 2000 MILLILITER(S): 9 INJECTION INTRAMUSCULAR; INTRAVENOUS; SUBCUTANEOUS at 19:59

## 2019-03-25 RX ADMIN — MORPHINE SULFATE 4 MILLIGRAM(S): 50 CAPSULE, EXTENDED RELEASE ORAL at 23:40

## 2019-03-25 RX ADMIN — MORPHINE SULFATE 4 MILLIGRAM(S): 50 CAPSULE, EXTENDED RELEASE ORAL at 19:59

## 2019-03-25 RX ADMIN — PIPERACILLIN AND TAZOBACTAM 3.38 GRAM(S): 4; .5 INJECTION, POWDER, LYOPHILIZED, FOR SOLUTION INTRAVENOUS at 23:16

## 2019-03-25 RX ADMIN — PIPERACILLIN AND TAZOBACTAM 200 GRAM(S): 4; .5 INJECTION, POWDER, LYOPHILIZED, FOR SOLUTION INTRAVENOUS at 22:46

## 2019-03-25 RX ADMIN — Medication 650 MILLIGRAM(S): at 19:59

## 2019-03-25 NOTE — ED PROVIDER NOTE - PSH
History of bone marrow transplant    History of surgery  s/p exploratory laparotomy with sigmoid resection and colostomy on 9/2/2018

## 2019-03-25 NOTE — ED PROVIDER NOTE - PMH
Cardiomyopathy    Diverticulitis    Former smoker  (1 ppd x 11 years; Quit ~age 26)  Hypertension    Left ventricular dysfunction    Multiple myeloma    Multiple myeloma

## 2019-03-25 NOTE — ED PROVIDER NOTE - ATTENDING CONTRIBUTION TO CARE
keke: PMH as noted with a recent hospital discharge 2 days ago. felt well on discharge. yesterday developed body aches involving the neck and down. feels weak and poor appetite.   no other acute complaints.   exam: unremarkable.  WIll do sepsis w/u and test for influenza.   start antibiotics and fluids pending further testing keke: PMH as noted with a recent hospital discharge 2 days ago. felt well on discharge. yesterday developed body aches involving the neck and down. feels weak and poor appetite.   no other acute complaints.   exam: unremarkable.  WIll do sepsis w/u and test for influenza.   start antibiotics and fluids pending further testing.

## 2019-03-25 NOTE — ED PROVIDER NOTE - CLINICAL SUMMARY MEDICAL DECISION MAKING FREE TEXT BOX
54 yo M c PMH of MM, HTN, and SBO (s/p colostomy) p/w 1 day hx of whole body pain with associated CP and SOB. Pt d/c'd 2 d/a after requiring PLT transfusion for PLT of 6 that was found pre-op before scheduled colostomy reversal that was post-poned. On exam,  T100.2F EKG sinus tach VS otherwise wnl, pt appears in severe distress no other remarkable exam findings (except firm abdomen due to guarding likely from whole body pain). labs and cxr pending, ivf/tylenol/morphine/vanc/zosyn for tx. will reassess.

## 2019-03-25 NOTE — ED PROVIDER NOTE - PROGRESS NOTE DETAILS
Multiple attempts have been made to contact Dr. Castro, [phone] without success  ~Bam Hong Klepfish: Vitals improving. US w/ no acute biliary pathology. Will admit for further care.

## 2019-03-25 NOTE — ED PROVIDER NOTE - ENMT, MLM
Airway patent, Nasal mucosa clear. Mouth with normal mucosa. Throat has no vesicles, no oropharyngeal exudates and no bacilio blood in nares/pharynx

## 2019-03-25 NOTE — ED ADULT NURSE NOTE - OBJECTIVE STATEMENT
Pt to bed 11. Alert and oriented x 3. Pt c/o generalized body pain. hx multiple myeloma. IVL placed. Bloods drawn. Will continue to monitor.

## 2019-03-25 NOTE — ED ADULT TRIAGE NOTE - CHIEF COMPLAINT QUOTE
Pt c/o chest pain, neck pain, B/L lower extrem pain x 2 days. Was d/c 2 days for low platelets. Hx: Multiple myeloma, HTN, cardiomyopathy. Denies fever, vomiting, dizziness.

## 2019-03-25 NOTE — ED ADULT NURSE REASSESSMENT NOTE - NS ED NURSE REASSESS COMMENT FT1
Family yelling at triage registrar and RN. Pt c/o increased pain. ANM made aware/notified. pt flagged for sepsis for repeat vs. Re-accommodated in ambay for comfort at this time.

## 2019-03-25 NOTE — ED PROVIDER NOTE - OBJECTIVE STATEMENT
52 yo M c PMH of MM 54 yo M c PMH of MM, HTN, and SBO (s/p colostomy) p/w 1 day hx of whole body pain with associated CP and SOB. 54 yo M c PMH of MM, HTN, and SBO (s/p colostomy) p/w 1 day hx of whole body pain with associated CP and SOB. Pt d/c'd 2 d/a after requiring PLT transfusion for PLT of 6 that was found pre-op before scheduled colostomy reversal that was post-poned. Pt also stopped chemotx 3 m/a in preparation to his surgery and pamela d/c'd from lisinopril after d/c 2 d/a. no hx of this kind of pain.    ROS positive: body pain, CP, SOB  ROS negative: f/c, congestion, coryza, pharyngitis, n/v/d, epistaxis, cough, hemoptysis, n/v/d, hematachezia, melena, dysuria, hematuria

## 2019-03-26 DIAGNOSIS — R50.9 FEVER, UNSPECIFIED: ICD-10-CM

## 2019-03-26 DIAGNOSIS — R74.0 NONSPECIFIC ELEVATION OF LEVELS OF TRANSAMINASE AND LACTIC ACID DEHYDROGENASE [LDH]: ICD-10-CM

## 2019-03-26 DIAGNOSIS — I10 ESSENTIAL (PRIMARY) HYPERTENSION: ICD-10-CM

## 2019-03-26 DIAGNOSIS — D69.6 THROMBOCYTOPENIA, UNSPECIFIED: ICD-10-CM

## 2019-03-26 DIAGNOSIS — Z43.3 ENCOUNTER FOR ATTENTION TO COLOSTOMY: ICD-10-CM

## 2019-03-26 DIAGNOSIS — Z29.9 ENCOUNTER FOR PROPHYLACTIC MEASURES, UNSPECIFIED: ICD-10-CM

## 2019-03-26 DIAGNOSIS — C90.00 MULTIPLE MYELOMA NOT HAVING ACHIEVED REMISSION: ICD-10-CM

## 2019-03-26 DIAGNOSIS — R52 PAIN, UNSPECIFIED: ICD-10-CM

## 2019-03-26 LAB
BLD GP AB SCN SERPL QL: NEGATIVE — SIGNIFICANT CHANGE UP
GAS PNL BLDMV: SIGNIFICANT CHANGE UP
KAPPA/LAMBDA FREE LIGHT CHAIN RATIO, SERUM: SIGNIFICANT CHANGE UP
RH IG SCN BLD-IMP: POSITIVE — SIGNIFICANT CHANGE UP
SPECIMEN SOURCE: SIGNIFICANT CHANGE UP
SPECIMEN SOURCE: SIGNIFICANT CHANGE UP
TROPONIN T, HIGH SENSITIVITY: 8 NG/L — SIGNIFICANT CHANGE UP (ref ?–14)

## 2019-03-26 PROCEDURE — 99223 1ST HOSP IP/OBS HIGH 75: CPT

## 2019-03-26 RX ORDER — CARVEDILOL PHOSPHATE 80 MG/1
25 CAPSULE, EXTENDED RELEASE ORAL EVERY 12 HOURS
Qty: 0 | Refills: 0 | Status: DISCONTINUED | OUTPATIENT
Start: 2019-03-26 | End: 2019-04-08

## 2019-03-26 RX ORDER — HYDROMORPHONE HYDROCHLORIDE 2 MG/ML
1 INJECTION INTRAMUSCULAR; INTRAVENOUS; SUBCUTANEOUS EVERY 6 HOURS
Qty: 0 | Refills: 0 | Status: DISCONTINUED | OUTPATIENT
Start: 2019-03-26 | End: 2019-03-27

## 2019-03-26 RX ORDER — HYDROMORPHONE HYDROCHLORIDE 2 MG/ML
2 INJECTION INTRAMUSCULAR; INTRAVENOUS; SUBCUTANEOUS ONCE
Qty: 0 | Refills: 0 | Status: DISCONTINUED | OUTPATIENT
Start: 2019-03-26 | End: 2019-03-26

## 2019-03-26 RX ORDER — OXYCODONE AND ACETAMINOPHEN 5; 325 MG/1; MG/1
1 TABLET ORAL ONCE
Qty: 0 | Refills: 0 | Status: DISCONTINUED | OUTPATIENT
Start: 2019-03-26 | End: 2019-03-26

## 2019-03-26 RX ADMIN — OXYCODONE AND ACETAMINOPHEN 1 TABLET(S): 5; 325 TABLET ORAL at 10:18

## 2019-03-26 RX ADMIN — HYDROMORPHONE HYDROCHLORIDE 2 MILLIGRAM(S): 2 INJECTION INTRAMUSCULAR; INTRAVENOUS; SUBCUTANEOUS at 11:11

## 2019-03-26 RX ADMIN — HYDROMORPHONE HYDROCHLORIDE 1 MILLIGRAM(S): 2 INJECTION INTRAMUSCULAR; INTRAVENOUS; SUBCUTANEOUS at 19:00

## 2019-03-26 RX ADMIN — HYDROMORPHONE HYDROCHLORIDE 2 MILLIGRAM(S): 2 INJECTION INTRAMUSCULAR; INTRAVENOUS; SUBCUTANEOUS at 12:09

## 2019-03-26 RX ADMIN — CARVEDILOL PHOSPHATE 25 MILLIGRAM(S): 80 CAPSULE, EXTENDED RELEASE ORAL at 18:31

## 2019-03-26 RX ADMIN — OXYCODONE AND ACETAMINOPHEN 1 TABLET(S): 5; 325 TABLET ORAL at 11:10

## 2019-03-26 RX ADMIN — HYDROMORPHONE HYDROCHLORIDE 1 MILLIGRAM(S): 2 INJECTION INTRAMUSCULAR; INTRAVENOUS; SUBCUTANEOUS at 18:31

## 2019-03-26 NOTE — H&P ADULT - ASSESSMENT
53M hx of Multiple Myeloma and perforated diverticulitis s/p colostomy admitted for diffuse body pain and worsening of thrombocytopenia

## 2019-03-26 NOTE — H&P ADULT - NSICDXPASTSURGICALHX_GEN_ALL_CORE_FT
PAST SURGICAL HISTORY:  History of bone marrow transplant     History of surgery s/p exploratory laparotomy with sigmoid resection and colostomy on 9/2/2018

## 2019-03-26 NOTE — H&P ADULT - NSICDXPASTMEDICALHX_GEN_ALL_CORE_FT
PAST MEDICAL HISTORY:  Cardiomyopathy     Diverticulitis     Former smoker (1 ppd x 11 years; Quit ~age 26)    Hypertension     Left ventricular dysfunction     Multiple myeloma

## 2019-03-26 NOTE — H&P ADULT - PROBLEM SELECTOR PLAN 2
Unclear etiology, non-toxic appearing however given relative immunocompromised state need to check for infection - f/u blood and urine cultures. Observe off abx for now given no cough, dysuria, diarrhea, or rashes

## 2019-03-26 NOTE — H&P ADULT - PROBLEM SELECTOR PLAN 5
As per H/O, patient failed certain MM treatments. Was supposed to start on other chemotherapy. F/u outpatient

## 2019-03-26 NOTE — H&P ADULT - NSHPSOCIALHISTORY_GEN_ALL_CORE
Tobacco: former smoker, smoked 1 ppd x 11 yrs, quit ~26 yrs old  EtOH: denies  Illicit drugs: denies  Lives with wife.

## 2019-03-26 NOTE — H&P ADULT - NSHPREVIEWOFSYSTEMS_GEN_ALL_CORE
REVIEW OF SYSTEMS:    CONSTITUTIONAL: No weakness, fevers or chills. (+) DIFFUSE BODY PAIN   EYES/ENT: No visual changes;  No vertigo or throat pain   NECK: No pain or stiffness  RESPIRATORY: No cough, wheezing, hemoptysis; No shortness of breath  CARDIOVASCULAR: No chest pain or palpitations  GASTROINTESTINAL: No abdominal or epigastric pain. No nausea, vomiting, or hematemesis; No diarrhea or constipation. No melena or hematochezia.  GENITOURINARY: No dysuria, frequency or hematuria  NEUROLOGICAL: No numbness or weakness  SKIN: No itching, burning, rashes, or lesions   All other review of systems is negative unless indicated above.

## 2019-03-26 NOTE — H&P ADULT - PROBLEM SELECTOR PLAN 4
RUQ is negative for acute pathology. Possibly related to myeloma v sepsis? Avoid hepatotoxic agents when possible and trend liver enzymes

## 2019-03-26 NOTE — H&P ADULT - PROBLEM SELECTOR PLAN 1
Patient with diffuse axial and partial appendicular body pain. Unclear etiology. I discussed case with patient's H/O Dr. Murphy - concern for relapsing and acute worsening of multiple myeloma with precipitated pain syndrome  - As per H/O - to give 1U of PLT and supportive therapy with pain control PRN

## 2019-03-26 NOTE — H&P ADULT - NSHPPHYSICALEXAM_GEN_ALL_CORE
Vital Signs Last 24 Hrs  T(C): 37.5 (26 Mar 2019 08:07), Max: 38.2 (25 Mar 2019 18:20)  T(F): 99.5 (26 Mar 2019 08:07), Max: 100.8 (25 Mar 2019 18:20)  HR: 115 (26 Mar 2019 08:07) (102 - 115)  BP: 107/69 (26 Mar 2019 08:07) (107/69 - 133/78)  BP(mean): --  RR: 20 (26 Mar 2019 08:07) (18 - 20)  SpO2: 96% (26 Mar 2019 08:07) (96% - 100%)    General: uncomfortable 2/2 pain  HEENT: EOMI, MMM, no JVD, no thyromegaly, neck supple, trachea midline  CV: S1S2 RRR no MRG  Lungs: CTA BL  Abdomen: soft NTND +BS, (+) colostomy bag   Extremities: No CCE +WWP  Skin/MSK: No rashes, preserved ROM on active & passive movement  Neuro: AAOx3, no focal deficits (5/5 strength all extremities), no sensory deficits

## 2019-03-26 NOTE — H&P ADULT - PROBLEM SELECTOR PLAN 3
PLT of 22, concern for worsened MM v. ITP. As per H/O - hold off steroids for now, transfuse 1U of PLT

## 2019-03-27 DIAGNOSIS — C90.02 MULTIPLE MYELOMA IN RELAPSE: ICD-10-CM

## 2019-03-27 DIAGNOSIS — R53.81 OTHER MALAISE: ICD-10-CM

## 2019-03-27 DIAGNOSIS — D61.818 OTHER PANCYTOPENIA: ICD-10-CM

## 2019-03-27 LAB
ALBUMIN SERPL ELPH-MCNC: 2.7 G/DL — LOW (ref 3.3–5)
ALP SERPL-CCNC: 117 U/L — SIGNIFICANT CHANGE UP (ref 40–120)
ALT FLD-CCNC: 34 U/L — SIGNIFICANT CHANGE UP (ref 4–41)
ANION GAP SERPL CALC-SCNC: 15 MMO/L — HIGH (ref 7–14)
AST SERPL-CCNC: 30 U/L — SIGNIFICANT CHANGE UP (ref 4–40)
BACTERIA UR CULT: SIGNIFICANT CHANGE UP
BASOPHILS # BLD AUTO: 0.01 K/UL — SIGNIFICANT CHANGE UP (ref 0–0.2)
BASOPHILS NFR BLD AUTO: 0.4 % — SIGNIFICANT CHANGE UP (ref 0–2)
BILIRUB SERPL-MCNC: 1 MG/DL — SIGNIFICANT CHANGE UP (ref 0.2–1.2)
BUN SERPL-MCNC: 27 MG/DL — HIGH (ref 7–23)
CALCIUM SERPL-MCNC: 7.3 MG/DL — LOW (ref 8.4–10.5)
CHLORIDE SERPL-SCNC: 101 MMOL/L — SIGNIFICANT CHANGE UP (ref 98–107)
CO2 SERPL-SCNC: 20 MMOL/L — LOW (ref 22–31)
CREAT SERPL-MCNC: 1.37 MG/DL — HIGH (ref 0.5–1.3)
CRP SERPL-MCNC: 293.5 MG/L — HIGH
EOSINOPHIL # BLD AUTO: 0 K/UL — SIGNIFICANT CHANGE UP (ref 0–0.5)
EOSINOPHIL NFR BLD AUTO: 0 % — SIGNIFICANT CHANGE UP (ref 0–6)
ERYTHROCYTE [SEDIMENTATION RATE] IN BLOOD: 121 MM/HR — HIGH (ref 1–15)
GLUCOSE SERPL-MCNC: 122 MG/DL — HIGH (ref 70–99)
HCT VFR BLD CALC: 24.4 % — LOW (ref 39–50)
HCV AB S/CO SERPL IA: 0.04 S/CO — SIGNIFICANT CHANGE UP (ref 0–0.79)
HCV AB SERPL-IMP: SIGNIFICANT CHANGE UP
HGB BLD-MCNC: 8.2 G/DL — LOW (ref 13–17)
IMM GRANULOCYTES NFR BLD AUTO: 0.8 % — SIGNIFICANT CHANGE UP (ref 0–1.5)
LYMPHOCYTES # BLD AUTO: 0.72 K/UL — LOW (ref 1–3.3)
LYMPHOCYTES # BLD AUTO: 30.5 % — SIGNIFICANT CHANGE UP (ref 13–44)
MAGNESIUM SERPL-MCNC: 1.9 MG/DL — SIGNIFICANT CHANGE UP (ref 1.6–2.6)
MCHC RBC-ENTMCNC: 28.6 PG — SIGNIFICANT CHANGE UP (ref 27–34)
MCHC RBC-ENTMCNC: 33.6 % — SIGNIFICANT CHANGE UP (ref 32–36)
MCV RBC AUTO: 85 FL — SIGNIFICANT CHANGE UP (ref 80–100)
MONOCYTES # BLD AUTO: 0.68 K/UL — SIGNIFICANT CHANGE UP (ref 0–0.9)
MONOCYTES NFR BLD AUTO: 28.8 % — HIGH (ref 2–14)
NEUTROPHILS # BLD AUTO: 0.93 K/UL — LOW (ref 1.8–7.4)
NEUTROPHILS NFR BLD AUTO: 39.5 % — LOW (ref 43–77)
NRBC # FLD: 0.08 K/UL — SIGNIFICANT CHANGE UP (ref 0–0)
NRBC FLD-RTO: 3.4 — SIGNIFICANT CHANGE UP
PHOSPHATE SERPL-MCNC: 2.7 MG/DL — SIGNIFICANT CHANGE UP (ref 2.5–4.5)
PLATELET # BLD AUTO: 33 K/UL — LOW (ref 150–400)
PMV BLD: 9 FL — SIGNIFICANT CHANGE UP (ref 7–13)
POTASSIUM SERPL-MCNC: 3.6 MMOL/L — SIGNIFICANT CHANGE UP (ref 3.5–5.3)
POTASSIUM SERPL-SCNC: 3.6 MMOL/L — SIGNIFICANT CHANGE UP (ref 3.5–5.3)
PROCALCITONIN SERPL-MCNC: 8.48 NG/ML — HIGH (ref 0.02–0.1)
PROT SERPL-MCNC: 7.7 G/DL — SIGNIFICANT CHANGE UP (ref 6–8.3)
RBC # BLD: 2.87 M/UL — LOW (ref 4.2–5.8)
RBC # FLD: 18 % — HIGH (ref 10.3–14.5)
SODIUM SERPL-SCNC: 136 MMOL/L — SIGNIFICANT CHANGE UP (ref 135–145)
SPECIMEN SOURCE: SIGNIFICANT CHANGE UP
WBC # BLD: 2.36 K/UL — LOW (ref 3.8–10.5)
WBC # FLD AUTO: 2.36 K/UL — LOW (ref 3.8–10.5)

## 2019-03-27 PROCEDURE — 72146 MRI CHEST SPINE W/O DYE: CPT | Mod: 26

## 2019-03-27 PROCEDURE — 72148 MRI LUMBAR SPINE W/O DYE: CPT | Mod: 26

## 2019-03-27 PROCEDURE — 72141 MRI NECK SPINE W/O DYE: CPT | Mod: 26

## 2019-03-27 RX ORDER — HYDROMORPHONE HYDROCHLORIDE 2 MG/ML
1 INJECTION INTRAMUSCULAR; INTRAVENOUS; SUBCUTANEOUS EVERY 4 HOURS
Qty: 0 | Refills: 0 | Status: DISCONTINUED | OUTPATIENT
Start: 2019-03-27 | End: 2019-03-28

## 2019-03-27 RX ORDER — HYDROMORPHONE HYDROCHLORIDE 2 MG/ML
2 INJECTION INTRAMUSCULAR; INTRAVENOUS; SUBCUTANEOUS EVERY 4 HOURS
Qty: 0 | Refills: 0 | Status: DISCONTINUED | OUTPATIENT
Start: 2019-03-27 | End: 2019-03-28

## 2019-03-27 RX ORDER — OXYCODONE HYDROCHLORIDE 5 MG/1
10 TABLET ORAL EVERY 6 HOURS
Qty: 0 | Refills: 0 | Status: DISCONTINUED | OUTPATIENT
Start: 2019-03-27 | End: 2019-03-28

## 2019-03-27 RX ADMIN — HYDROMORPHONE HYDROCHLORIDE 1 MILLIGRAM(S): 2 INJECTION INTRAMUSCULAR; INTRAVENOUS; SUBCUTANEOUS at 21:34

## 2019-03-27 RX ADMIN — HYDROMORPHONE HYDROCHLORIDE 1 MILLIGRAM(S): 2 INJECTION INTRAMUSCULAR; INTRAVENOUS; SUBCUTANEOUS at 10:31

## 2019-03-27 RX ADMIN — HYDROMORPHONE HYDROCHLORIDE 1 MILLIGRAM(S): 2 INJECTION INTRAMUSCULAR; INTRAVENOUS; SUBCUTANEOUS at 14:03

## 2019-03-27 RX ADMIN — CARVEDILOL PHOSPHATE 25 MILLIGRAM(S): 80 CAPSULE, EXTENDED RELEASE ORAL at 06:25

## 2019-03-27 RX ADMIN — HYDROMORPHONE HYDROCHLORIDE 1 MILLIGRAM(S): 2 INJECTION INTRAMUSCULAR; INTRAVENOUS; SUBCUTANEOUS at 20:59

## 2019-03-27 RX ADMIN — HYDROMORPHONE HYDROCHLORIDE 1 MILLIGRAM(S): 2 INJECTION INTRAMUSCULAR; INTRAVENOUS; SUBCUTANEOUS at 11:00

## 2019-03-27 RX ADMIN — HYDROMORPHONE HYDROCHLORIDE 1 MILLIGRAM(S): 2 INJECTION INTRAMUSCULAR; INTRAVENOUS; SUBCUTANEOUS at 06:40

## 2019-03-27 RX ADMIN — HYDROMORPHONE HYDROCHLORIDE 1 MILLIGRAM(S): 2 INJECTION INTRAMUSCULAR; INTRAVENOUS; SUBCUTANEOUS at 06:25

## 2019-03-27 NOTE — PROGRESS NOTE ADULT - SUBJECTIVE AND OBJECTIVE BOX
Patient seen and examined at bedside  uncontrolled pain overnight  Case discussed with medical team    HPI:  53M hx of multiple myeloma s/p bone marrow transplant (~1.5 yrs ago) and chemo/RT (stopped ~2 months ago in preparation for colostomy reversal), perforated diverticulitis s/p Kiah's (proctosigmoidectomy with colostomy, 2018), nonischemic cardiomyopathy (mild global LV systolic dysfunction with EF 54% and diastolic LV dysfunction based on TTE in 2018), and HTN presents to [facility] ED c/o diffuse body pain. Patient recently discharged 3/23/19 from [facility] after being hospitalized for thrombocytopenia.     Patient started to develop severe non-radiating throbbing 10/10 pain in his b/l shoulders, chest, abdomen, and b/l thighs since  night - leaving him essentially bedbound. Was supposed to see Dr. Murphy for follow up today. However because of the pain he came to the ED (26 Mar 2019 11:51)      PAST MEDICAL & SURGICAL HISTORY:  Diverticulitis  Multiple myeloma  Hypertension  Left ventricular dysfunction  Cardiomyopathy  Former smoker: (1 ppd x 11 years; Quit ~age 26)  History of bone marrow transplant  History of surgery: s/p exploratory laparotomy with sigmoid resection and colostomy on 2018      No Known Allergies       MEDICATIONS  (STANDING):  carvedilol 25 milliGRAM(s) Oral every 12 hours    MEDICATIONS  (PRN):  HYDROmorphone  Injectable 1 milliGRAM(s) IV Push every 4 hours PRN Moderate Pain (4 - 6)  HYDROmorphone  Injectable 2 milliGRAM(s) IV Push every 4 hours PRN Severe Pain (7 - 10)  oxyCODONE    IR 10 milliGRAM(s) Oral every 6 hours PRN Mild Pain (1 - 3)      REVIEW OF SYSTEMS:  CONSTITUTIONAL: (+) debilitating pain and distress, malaise.   EYES: No acute change in vision   ENT:  No tinnitus  NECK: No stiffness  RESPIRATORY: No hemoptysis  CARDIOVASCULAR: No chest pain, palpitations, syncope  GASTROINTESTINAL: No hematemesis, diarrhea, melena, or hematochezia.  GENITOURINARY: No hematuria  NEUROLOGICAL: No headaches  LYMPH Nodes: No enlarged glands  ENDOCRINE: No heat or cold intolerance	    T(C): 37.1 (19 @ 06:40), Max: 37.8 (19 @ 18:13)  HR: 107 (19 @ 06:40) (104 - 114)  BP: 111/65 (19 @ 06:40) (108/66 - 129/82)  RR: 17 (19 @ 06:40) (17 - 18)  SpO2: 100% (19 @ 06:40) (98% - 100%)    PHYSICAL EXAMINATION:   Constitutional: ill appearing, in distress 2/2 pain  HEENT: NC, AT  Neck:  Supple  Respiratory:  Adequate airflow b/l. Not using accessory muscles of respiration.  Cardiovascular:  S1 & S2 intact, no R/G, 2+ radial pulses b/l  Gastrointestinal: Soft, NT, ND, normoactive b.s., no organomegaly/RT/rigidity  Extremities: decreased rom of 4 extremities 2/2 pain, tender to palpation throughout body particularly to bone palpation. WWP  Neurological:  Alert and awake.  Crude sensation intact.     Labs and imaging reviewed    LABS:                        8.2    2.36  )-----------( 33       ( 27 Mar 2019 07:22 )             24.4         136  |  101  |  27<H>  ----------------------------<  122<H>  3.6   |  20<L>  |  1.37<H>    Ca    7.3<L>      27 Mar 2019 07:22  Phos  2.7       Mg     1.9         TPro  7.7  /  Alb  2.7<L>  /  TBili  1.0  /  DBili  x   /  AST  30  /  ALT  34  /  AlkPhos  117  03-27    CARDIAC MARKERS ( 25 Mar 2019 19:50 )  x     / x     / 65 u/L / < 1.00 ng/mL / x          PT/INR - ( 25 Mar 2019 19:50 )   PT: 17.1 SEC;   INR: 1.52          PTT - ( 25 Mar 2019 19:50 )  PTT:28.4 SEC  Urinalysis Basic - ( 25 Mar 2019 20:38 )    Color: YELLOW / Appearance: CLEAR / S.019 / pH: 6.0  Gluc: NEGATIVE / Ketone: NEGATIVE  / Bili: NEGATIVE / Urobili: NORMAL   Blood: TRACE / Protein: 200 / Nitrite: NEGATIVE   Leuk Esterase: NEGATIVE / RBC: 11-25 / WBC 3-5   Sq Epi: OCC / Non Sq Epi: x / Bacteria: NEGATIVE      CAPILLARY BLOOD GLUCOSE            LIVER FUNCTIONS - ( 27 Mar 2019 07:22 )  Alb: 2.7 g/dL / Pro: 7.7 g/dL / ALK PHOS: 117 u/L / ALT: 34 u/L / AST: 30 u/L / GGT: x               RADIOLOGY & ADDITIONAL STUDIES:

## 2019-03-27 NOTE — PROGRESS NOTE ADULT - SUBJECTIVE AND OBJECTIVE BOX
HPI:    54 yo man well known to me with relapsed/refractory multiple myeloma, failed autologous stem cell transplant, refuses clinical trial, currently off of tx (last received Pomalidomide and Panobinostat ~ 6 weeks ago). Pt refuses any tx at this time until he has colostomy reversed. Colostomy is for perforated divertic. His myeloma is uncontrolled.     Pt  presentedto [facility] ED on 3/26 c/o acute onset, severe diffuse body pain. Patient recently discharged 3/23/19 from [facility] after being hospitalized for thrombocytopenia.     Patient started to develop severe non-radiating throbbing 10/10 pain in his b/l shoulders, chest, abdomen, and b/l thighs since Sunday night - leaving him essentially bedbound. Was supposed to see me this week to begin salvage chemo with bendamustine and pomalidomide. However because of the pain he came to the ED     No N/V. No focal numbness or weakness    + fevers to 100.1         PAST MEDICAL & SURGICAL HISTORY:  Diverticulitis  Multiple myeloma  Hypertension  Left ventricular dysfunction  Cardiomyopathy  Former smoker: (1 ppd x 11 years; Quit ~age 26)  History of bone marrow transplant  History of surgery: s/p exploratory laparotomy with sigmoid resection and colostomy on 9/2/2018      ROS:  Negative except for:    MEDICATIONS  (STANDING):  carvedilol 25 milliGRAM(s) Oral every 12 hours    MEDICATIONS  (PRN):  HYDROmorphone  Injectable 1 milliGRAM(s) IV Push every 6 hours PRN Severe Pain (7 - 10)      Allergies    No Known Allergies    Intolerances        Vital Signs Last 24 Hrs  T(C): 36.9 (27 Mar 2019 06:11), Max: 37.8 (26 Mar 2019 18:13)  T(F): 98.4 (27 Mar 2019 06:11), Max: 100.1 (26 Mar 2019 18:13)  HR: 104 (27 Mar 2019 06:11) (104 - 115)  BP: 108/66 (27 Mar 2019 06:11) (107/69 - 129/82)  BP(mean): --  RR: 17 (27 Mar 2019 06:11) (17 - 20)  SpO2: 99% (27 Mar 2019 06:11) (96% - 100%)    PHYSICAL EXAM  General: uncomfortable appearing male   HEENT: clear oropharynx, anicteric sclera, pink conjunctiva  Neck: supple  CV: normal S1/S2 with no murmur rubs or gallops  Lungs: positive air movement b/l ant lungs,clear to auscultation, no wheezes, no rales  Abdomen: soft non-tender non-distended, no hepatosplenomegaly  Ext: no clubbing cyanosis or edema  Skin: no rashes and no petechiae  Neuro: alert and oriented X 4, no focal deficits  LABS:                          9.4    5.81  )-----------( 22       ( 25 Mar 2019 19:50 )             28.4     Serial CBC's  03-25 @ 19:50  Hct-28.4 / Hgb-9.4 / Plat-22 / RBC-3.24 / WBC-5.81            03-25    137  |  98  |  24<H>  ----------------------------<  104<H>  4.2   |  21<L>  |  1.19    Ca    7.9<L>      25 Mar 2019 19:50  Phos  5.2     03-25  Mg     1.8     03-25    TPro  8.4<H>  /  Alb  3.2<L>  /  TBili  1.1  /  DBili  x   /  AST  148<H>  /  ALT  69<H>  /  AlkPhos  138<H>  03-25      PT/INR - ( 25 Mar 2019 19:50 )   PT: 17.1 SEC;   INR: 1.52          PTT - ( 25 Mar 2019 19:50 )  PTT:28.4 SEC    WBC Count: 5.81 K/uL (03-25 @ 19:50)  Hemoglobin: 9.4 g/dL (03-25 @ 19:50)            RADIOLOGY & ADDITIONAL STUDIES:

## 2019-03-27 NOTE — PROGRESS NOTE ADULT - ASSESSMENT
53M hx of Multiple Myeloma and perforated diverticulitis s/p colostomy admitted for diffuse debilitating pain

## 2019-03-27 NOTE — PROGRESS NOTE ADULT - ASSESSMENT
1. Diffuse bone pain of unclear etiology    -- pt has known extensive bone involvement by myeloma  -- would get MRI C/T/L Spine to evaluate for any cord involvement  -- pain management      2. Transfusion dependent Anemia and Thrombocytopenia    -- sec to myeloma  --did not respond to high dose decadron last week  -- transfuse platelets if Less than 10K or clinically significant bleeding  -- keep Hgb > 7    3. Multiple Myeloma    -- relapsed / refractory ( failed all FDA Available tx) and failed auto stem cell transplant  -- pt off of tx against my and other oncologist advise in order to reverse colostomy  -- clinical trial at [facility] strongly recommended but pt declined  -- Plan to start Bendamustine 120mg/M2 Day 1, Pomalidomide 3mg PO QD Days 1-21 and Dexa 40mg weekly Q 28 day cycle. Was to start tx this week as outpt, may give first dose in hospital    Overall Prognosis is unfortunately very poor since pt has exhausted just about every available tx option including transplant    Torsten Murphy MD  [phone]

## 2019-03-27 NOTE — PROVIDER CONTACT NOTE (OTHER) - SITUATION
Patient admitted to unit with  and complaining of 10/10 pain due to activity. Patient received Dilaudid 1mg in ED.

## 2019-03-27 NOTE — PROGRESS NOTE ADULT - PROBLEM SELECTOR PROBLEM 9
Patient called today to clarify whether or not he should continue on Flomax. Patient informed that message was sent to provider after last [facility] visit. New message sent today to PCP. Of note, patient reports significant improvement in urinary/prostate sx (dysuria, hesitancy urgency, and pressure). Rx cued with pharmacy. Patient has 2 weeks left of current supply. Kirstin Meléndez, MARLINEN, RN       Essential hypertension

## 2019-03-27 NOTE — PROVIDER CONTACT NOTE (OTHER) - BACKGROUND
Patient admitted with fever and body pain. PMH of multiple myeloma, diverticulitis, hypertension, cardiomyopathy.

## 2019-03-27 NOTE — CONSULT NOTE ADULT - SUBJECTIVE AND OBJECTIVE BOX
53M hx of multiple myeloma s/p bone marrow transplant (~1.5 yrs ago) and chemo/RT (stopped ~2 months ago in preparation for colostomy reversal), perforated diverticulitis s/p Kiah's (proctosigmoidectomy with colostomy, 2018), nonischemic cardiomyopathy (mild global LV systolic dysfunction with EF 54% and diastolic LV dysfunction based on TTE in 2018), and HTN who is being seen for management of cardiomyopathy.  The patient was admitted with diffuse body pain.  The patient denies chest pain or any other anginal symptoms.  He has no orthopnea, PND, or LE edema.  He was diagnosed with cardiomyopathy several years ago and reports an initial EF of 10% that improved to 54% on last TTE.      Patient is a 53y old  Male who presents with a chief complaint of Diffuse body pain (27 Mar 2019 13:36)      INTERVAL HPI/OVERNIGHT EVENTS:        PAST MEDICAL & SURGICAL HISTORY:  Diverticulitis  Multiple myeloma  Hypertension  Left ventricular dysfunction  Cardiomyopathy  Former smoker: (1 ppd x 11 years; Quit ~age 26)  History of bone marrow transplant  History of surgery: s/p exploratory laparotomy with sigmoid resection and colostomy on 2018      REVIEW OF SYSTEMS: Total of twelve systems have been reviewed with patient and found to be negative unless mentioned in HPI      SOCIAL HISTORY  Alcohol: Does not drink  Tobacco: Does not smoke  Illicit substance use: None      FAMILY HISTORY: Non contributory to the present illness        No Known Allergies        T(C): 36.8 (19 @ 13:18), Max: 37.1 (19 @ 21:22)  HR: 98 (19 @ 17:31) (98 - 113)  BP: 111/67 (19 @ 17:31) (103/57 - 122/66)  RR: 16 (19 @ 13:18) (16 - 17)  SpO2: 100% (19 @ 13:18) (99% - 100%)        PHYSICAL EXAM:  GENERAL: Not in distress   CHEST/LUNG:  Aire ntry bilaterally  HEART: s1 and s2 present  ABDOMEN:  Nontender and  Nondistended  EXTREMITIES: No pedal  edema  CNS: Awake and Alert      LABS:                        8.2    2.36  )-----------( 33       ( 27 Mar 2019 07:22 )             24.4         136  |  101  |  27<H>  ----------------------------<  122<H>  3.6   |  20<L>  |  1.37<H>    Ca    7.3<L>      27 Mar 2019 07:22  Phos  2.7       Mg     1.9         TPro  7.7  /  Alb  2.7<L>  /  TBili  1.0  /  DBili  x   /  AST  30  /  ALT  34  /  AlkPhos  117      PT/INR - ( 25 Mar 2019 19:50 )   PT: 17.1 SEC;   INR: 1.52          PTT - ( 25 Mar 2019 19:50 )  PTT:28.4 SEC  Urinalysis Basic - ( 25 Mar 2019 20:38 )    Color: YELLOW / Appearance: CLEAR / S.019 / pH: 6.0  Gluc: NEGATIVE / Ketone: NEGATIVE  / Bili: NEGATIVE / Urobili: NORMAL   Blood: TRACE / Protein: 200 / Nitrite: NEGATIVE   Leuk Esterase: NEGATIVE / RBC: 11-25 / WBC 3-5   Sq Epi: OCC / Non Sq Epi: x / Bacteria: NEGATIVE      CAPILLARY BLOOD GLUCOSE            Urinalysis Basic - ( 25 Mar 2019 20:38 )    Color: YELLOW / Appearance: CLEAR / S.019 / pH: 6.0  Gluc: NEGATIVE / Ketone: NEGATIVE  / Bili: NEGATIVE / Urobili: NORMAL   Blood: TRACE / Protein: 200 / Nitrite: NEGATIVE   Leuk Esterase: NEGATIVE / RBC: 11-25 / WBC 3-5   Sq Epi: OCC / Non Sq Epi: x / Bacteria: NEGATIVE        MEDICATIONS  (STANDING):  carvedilol 25 milliGRAM(s) Oral every 12 hours    MEDICATIONS  (PRN):  artificial  tears Solution 1 Drop(s) Both EYES every 6 hours PRN Dry Eyes  HYDROmorphone  Injectable 1 milliGRAM(s) IV Push every 4 hours PRN Moderate Pain (4 - 6)  HYDROmorphone  Injectable 2 milliGRAM(s) IV Push every 4 hours PRN Severe Pain (7 - 10)  oxyCODONE    IR 10 milliGRAM(s) Oral every 6 hours PRN Mild Pain (1 - 3)          RADIOLOGY & ADDITIONAL TESTS: A  54 yo Male with multiple myeloma s/p bone marrow transplant (~1.5 yrs ago) and Perforated  Diverticulitis ,s/p  Kiah's (proctosigmoidectomy with colostomy, on 2018  and now chemo/RT (stopped ~2 months ago in preparation for colostomy reversal, who presents to the ER for evaluation  of diffuse body pain. Patient recently discharged, on 3/23/19, from [facility] after being hospitalized for thrombocytopenia. On admission, he found to have Fever, Temp. of 100.8, Tachycardia, HR of 110, and received dose of Zosyn and Vancomycin. The cultures are negative to date. The ID consult requested to assist with further evaluation and antibiotic management.         REVIEW OF SYSTEMS: Total of twelve systems have been reviewed with patient and found to be negative unless mentioned in HPI        PAST MEDICAL & SURGICAL HISTORY:  Diverticulitis  Multiple myeloma  Hypertension  Left ventricular dysfunction  Cardiomyopathy,  initial EF of 10% that improved to 54% on last TTE.  Former smoker: (1 ppd x 11 years; Quit ~age 26)  History of bone marrow transplant  Perforated  Diverticulitis-  s/p s/p Kiah's (proctosigmoidectomy with colostomy, on 2018        SOCIAL HISTORY  Alcohol: Does not drink  Tobacco: Does not smoke  Illicit substance use: None        FAMILY HISTORY: Non contributory to the present illness        ALLERGIES: No Known Allergies        T(C): 36.8 (19 @ 13:18), Max: 37.1 (19 @ 21:22)  HR: 98 (19 @ 17:31) (98 - 113)  BP: 111/67 (19 @ 17:31) (103/57 - 122/66)  RR: 16 (19 @ 13:18) (16 - 17)  SpO2: 100% (19 @ 13:18) (99% - 100%)        PHYSICAL EXAM:  GENERAL: Not in  acute distress   CHEST/LUNG:  Air entry  bilaterally  HEART: s1 and s2 present  ABDOMEN:  Colostomy bag in placed  EXTREMITIES: No pedal  edema  CNS: Awake and Alert        LABS:                        8.2    2.36  )-----------( 33       ( 27 Mar 2019 07:22 )             24.4             136  |  101  |  27<H>  ----------------------------<  122<H>  3.6   |  20<L>  |  1.37<H>    Ca    7.3<L>      27 Mar 2019 07:22  Phos  2.7       Mg     1.9         TPro  7.7  /  Alb  2.7<L>  /  TBili  1.0  /  DBili  x   /  AST  30  /  ALT  34  /  AlkPhos  117    PT/INR - ( 25 Mar 2019 19:50 )   PT: 17.1 SEC;   INR: 1.52     PTT - ( 25 Mar 2019 19:50 )  PTT:28.4 SEC      Urinalysis Basic - ( 25 Mar 2019 20:38 )  Color: YELLOW / Appearance: CLEAR / S.019 / pH: 6.0  Gluc: NEGATIVE / Ketone: NEGATIVE  / Bili: NEGATIVE / Urobili: NORMAL   Blood: TRACE / Protein: 200 / Nitrite: NEGATIVE   Leuk Esterase: NEGATIVE / RBC: 11-25 / WBC 3-5   Sq Epi: OCC / Non Sq Epi: x / Bacteria: NEGATIVE        MEDICATIONS  (STANDING):  carvedilol 25 milliGRAM(s) Oral every 12 hours    MEDICATIONS  (PRN):  artificial  tears Solution 1 Drop(s) Both EYES every 6 hours PRN Dry Eyes  HYDROmorphone  Injectable 1 milliGRAM(s) IV Push every 4 hours PRN Moderate Pain (4 - 6)  HYDROmorphone  Injectable 2 milliGRAM(s) IV Push every 4 hours PRN Severe Pain (7 - 10)  oxyCODONE    IR 10 milliGRAM(s) Oral every 6 hours PRN Mild Pain (1 - 3)        RADIOLOGY & ADDITIONAL TESTS:    3/27/19 : MR Lumbar Spine No Cont (19 @ 10:38) Abnormal T1 prolongation involving the cervical thoracic lumbar and sacral spine region as well as both iliac bones. This is likely related to patient's known multiple myeloma.  Bilateral pleural effusions are seen.      3/25/19 : US Abdomen Limited (19 @ 21:38) Normal right upper quadrant abdominal ultrasound.  Small right pleural effusion.        MICROBIOLOGY DATA:    Culture - Urine (19 @ 21:18)    Culture - Urine:   Culture grew 3 or more types of organisms which indicate  collection contamination; consider recollection only if  clinically indicated.    Specimen Source: URINE MIDSTREAM      Culture - Blood (19 @ 20:26)    Culture - Blood:   NO ORGANISMS ISOLATED  NO ORGANISMS ISOLATED AT 48 HRS.    Specimen Source: BLOOD VENOUS      Culture - Blood (19 @ 20:26)    Culture - Blood:   NO ORGANISMS ISOLATED  NO ORGANISMS ISOLATED AT 48 HRS.    Specimen Source: BLOOD PERIPHERAL      Rapid Respiratory Viral Panel (19 @ 19:33)    Adenovirus (RapRVP): Not Detected    Influenza A (RapRVP): Not Detected    Influenza AH1 2009 (RapRVP): Not Detected    Influenza AH1 (RapRVP): Not Detected    Influenza AH3 (RapRVP): Not Detected    Influenza B (RapRVP): Not Detected    Parainfluenza 1 (RapRVP): Not Detected    Parainfluenza 2 (RapRVP): Not Detected    Parainfluenza 3 (RapRVP): Not Detected    Parainfluenza 4 (RapRVP): Not Detected    Resp Syncytial Virus (RapRVP): Not Detected    Chlamydia pneumoniae (RapRVP): Not Detected    Mycoplasma pneumoniae (RapRVP): Not Detected    Entero/Rhinovirus (RapRVP): Not Detected    hMPV (RapRVP): Not Detected    Coronavirus (229E,HKU1,NL63,OC43): Not Detected This Respiratory Panel uses polymerase chain reaction (PCR)  to detect for adenovirus; coronavirus (HKU1, NL63, 229E,  OC43); human metapneumovirus (hMPV); human  enterovirus/rhinovirus (Entero/RV); influenza A; influenza  A/H1: influenza A/H3; influenza A/H1-2009; influenza B;  parainfluenza viruses 1,2,3,4; respiratory syncytial virus;  Mycoplasma pneumoniae; and Chlamydophila pneumoniae.

## 2019-03-27 NOTE — CONSULT NOTE ADULT - SUBJECTIVE AND OBJECTIVE BOX
[facility] [facility]        Patient is a 53y old  Male who presents with a chief complaint of Diffuse body pain (27 Mar 2019 18:30)      HPI:  53M hx of multiple myeloma s/p bone marrow transplant (~1.5 yrs ago) and chemo/RT (stopped ~2 months ago in preparation for colostomy reversal), perforated diverticulitis s/p Kiah's (proctosigmoidectomy with colostomy, 2018), nonischemic cardiomyopathy (mild global LV systolic dysfunction with EF 54% and diastolic LV dysfunction based on TTE in 2018), and HTN presents to [facility] ED c/o diffuse body pain. Patient recently discharged 3/23/19 from [facility] after being hospitalized for thrombocytopenia. Pt presents with severe pain all over a day after discharge. Pt was discharged after multiple platelet infusion.                  *****PAST MEDICAL / Surgical  HISTORY:  PAST MEDICAL & SURGICAL HISTORY:  Diverticulitis  Multiple myeloma  Hypertension  Left ventricular dysfunction  Cardiomyopathy  Former smoker: (1 ppd x 11 years; Quit ~age 26)  History of bone marrow transplant  History of surgery: s/p exploratory laparotomy with sigmoid resection and colostomy on 2018           *****FAMILY HISTORY:  FAMILY HISTORY:  Family history of diabetes mellitus  Family history of hypertension           *****SOCIAL HISTORY:  Alcohol: None  Smoking: None         *****ALLERGIES:   Allergies    No Known Allergies    Intolerances             *****MEDICATIONS: current medication reviewed and documented.   MEDICATIONS  (STANDING):  carvedilol 25 milliGRAM(s) Oral every 12 hours    MEDICATIONS  (PRN):  artificial  tears Solution 1 Drop(s) Both EYES every 6 hours PRN Dry Eyes  HYDROmorphone  Injectable 1 milliGRAM(s) IV Push every 4 hours PRN Moderate Pain (4 - 6)  HYDROmorphone  Injectable 2 milliGRAM(s) IV Push every 4 hours PRN Severe Pain (7 - 10)  oxyCODONE    IR 10 milliGRAM(s) Oral every 6 hours PRN Mild Pain (1 - 3)           *****REVIEW OF SYSTEM:  GEN: no fever, no chills, no pain  RESP: no SOB, no cough, no sputum  CVS: no chest pain, no palpitations, no edema  GI: no abdominal pain, no nausea, no vomiting, no constipation, no diarrhea  : no dysurea, no frequency, no hematurea  Neuro: no headache, no dizziness  PSYCH: no anxiety, no depression  Derm : no itching, no rash         *****VITAL SIGNS:  T(C): 36.8 (19 @ 13:18), Max: 37.1 (19 @ 21:22)  HR: 98 (19 @ 17:31) (98 - 113)  BP: 111/67 (19 @ 17:31) (103/57 - 122/66)  RR: 16 (19 @ 13:18) (16 - 17)  SpO2: 100% (19 @ 13:18) (99% - 100%)  Wt(kg): --           *****PHYSICAL EXAM:   Alert oriented x 3   Attention comprehension are fair. Able to name, repeat, read without any difficulty.   Able to follow 3 step commands.     EOMI fundi not visualized,  VFF to confrontration  No facial asymmetry   Tongue is midline   Palate elevates symmetrically   Moving all 4 ext symmetrically no pronator drift   Reflexes are symmetric throughout   sensation is grossly symmetric  Gait : not assessed.  B/L down going toes               *****LAB AND IMAGIN.2    2.36  )-----------( 33       ( 27 Mar 2019 07:22 )             24.4                   136  |  101  |  27<H>  ----------------------------<  122<H>  3.6   |  20<L>  |  1.37<H>    Ca    7.3<L>      27 Mar 2019 07:22  Phos  2.7       Mg     1.9         TPro  7.7  /  Alb  2.7<L>  /  TBili  1.0  /  DBili  x   /  AST  30  /  ALT  34  /  AlkPhos  117                                < from: MR Lumbar Spine No Cont (19 @ 10:38) >  Abnormal T1 prolongation involving the cervical thoracic   lumbar and sacral spine region as well as both iliac bones. This is   likely related to patient's known multiple myeloma.    Degenerative changes as described above.    Bilateral pleural effusionsare seen.            < end of copied text >    [All pertinent recent Imaging reports reviewed]         *****A S S E S S M E N T   A N D   P L A N :53M hx of multiple myeloma s/p bone marrow transplant (~1.5 yrs ago) and chemo/RT (stopped ~2 months ago in preparation for colostomy reversal), perforated diverticulitis s/p Kiah's (proctosigmoidectomy with colostomy, 2018), nonischemic cardiomyopathy (mild global LV systolic dysfunction with EF 54% and diastolic LV dysfunction based on TTE in 2018), and HTN presents to [facility] ED c/o diffuse body pain. Patient recently discharged 3/23/19 from [facility] after being hospitalized for thrombocytopenia. Pt presents with severe pain all over a day after discharge. Pt was discharged after multiple platelet infusion.         Problem/Recommendations 1:  pain generalized of unclear etiology    likely due to relapsed myeloma   extensive bony infiltration consider rt for palliative rt     ? somatoform/functional pain as pt gives poor effort.   hematology consult appreciated.    pain management per primary    check cpk   check rvp titers.  ___  Will follow with you.  Thank you,  Margret Yousif MD  Diplomate of the American Board of Neurology and Psychiatry.  Diplomate of the American Board of Vascular Neurology.   [facility] [facility]   Ph: [phone]    Differential diagnosis and plan of care discussed with patient after the evaluation.   Advanced care planning options discussed.   Pain assessed and judicious use of narcotics when appropriate was discussed.  Importance of Fall prevention discussed.  Counseling on Smoking and Alcohol cessation was offered when appropriate.  Counseling on Diet, exercise, and medication compliance was done.     62 minutes spent on the total encounter;  more than 50 % of the visit was spent on counseling  and or coordinating care by the attending physician.    Thank you for allowing me to participate in the care of this valentina patient. Please do not hesitate to call me if you have any questions.     This and subsequent notes were partially created using voice recognition software and will  inherently be subject to errors including those of syntax and sound alike substitutions which may escape proofreading. In such instances original meaning may be extrapolated by contextual derivation.

## 2019-03-27 NOTE — CONSULT NOTE ADULT - SUBJECTIVE AND OBJECTIVE BOX
[facility] - [place] / Shruti S /Margot/ S Mahmood/ SCOUT Benz/ Ori Guzman / ANALIAS Njtdu  ---------------------------------------------------------------------------------------------------------------  Patient seen and examined bedside  Full consult to follow  Denied recent NSAID use/Abx use/iv contrast studies.    PAST MEDICAL & SURGICAL HISTORY:  Diverticulitis  Multiple myeloma  Hypertension  Left ventricular dysfunction  Cardiomyopathy  Former smoker: (1 ppd x 11 years; Quit ~age 26)  History of bone marrow transplant  History of surgery: s/p exploratory laparotomy with sigmoid resection and colostomy on 2018      Allergies: No Known Allergies    Home Medications Reviewed  Hospital Medications:   MEDICATIONS  (STANDING):  carvedilol 25 milliGRAM(s) Oral every 12 hours    SOCIAL HISTORY:  Denies ETOh,Smoking, illicit drug use  FAMILY HISTORY:  Family history of diabetes mellitus  Family history of hypertension      REVIEW OF SYSTEMS:  CONSTITUTIONAL: No weakness, fevers or chills  EYES/ENT: No visual changes;  No vertigo or throat pain   NECK: No pain or stiffness  RESPIRATORY: No cough, wheezing, hemoptysis; No shortness of breath  CARDIOVASCULAR: No chest pain or palpitations.  GASTROINTESTINAL: No abdominal or epigastric pain. No nausea, vomiting, or hematemesis; No diarrhea or constipation. No melena or hematochezia.  GENITOURINARY: No dysuria, frequency, foamy urine, urinary urgency, incontinence or hematuria  NEUROLOGICAL: No numbness or weakness  SKIN: No itching, burning, rashes, or lesions   VASCULAR: No bilateral lower extremity edema.   All other review of systems is negative unless indicated above.    VITALS:  T(F): 98.2 (19 @ 13:18), Max: 100.1 (19 @ 18:13)  HR: 99 (19 @ 13:18)  BP: 103/57 (19 @ 13:18)  RR: 16 (19 @ 13:18)  SpO2: 100% (19 @ 13:18)  Wt(kg): --    Height (cm): 185.42 ( @ 21:22)  Weight (kg): 83.90251430 ( @ 21:22)  BMI (kg/m2): 24.4 ( @ 21:22)  BSA (m2): 2.08 ( @ 21:22)    PHYSICAL EXAM:  Constitutional: NAD  HEENT: anicteric sclera, oropharynx clear, MMM  Neck: No JVD  Respiratory: CTAB, no wheezes, rales or rhonchi  Cardiovascular: S1, S2, RRR  Gastrointestinal: BS+, soft, NT/ND  Extremities: No cyanosis or clubbing. No peripheral edema  Neurological: A/O x 3, no focal deficits  Psychiatric: Normal mood, normal affect  : No CVA tenderness. No quintero.   Skin: No rashes      LABS:      136  |  101  |  27<H>  ----------------------------<  122<H>  3.6   |  20<L>  |  1.37<H>    Ca    7.3<L>      27 Mar 2019 07:22  Phos  2.7       Mg     1.9         TPro  7.7  /  Alb  2.7<L>  /  TBili  1.0  /  DBili      /  AST  30  /  ALT  34  /  AlkPhos  117      Creatinine Trend: 1.37 <--, 1.19 <--, 1.48 <--, 1.31 <--, 1.24 <--                        8.2    2.36  )-----------( 33       ( 27 Mar 2019 07:22 )             24.4     Urine Studies:  Urinalysis Basic - ( 25 Mar 2019 20:38 )    Color: YELLOW / Appearance: CLEAR / S.019 / pH: 6.0  Gluc: NEGATIVE / Ketone: NEGATIVE  / Bili: NEGATIVE / Urobili: NORMAL   Blood: TRACE / Protein: 200 / Nitrite: NEGATIVE   Leuk Esterase: NEGATIVE / RBC: 11-25 / WBC 3-5   Sq Epi: OCC / Non Sq Epi:  / Bacteria: NEGATIVE      Creatinine, Random Urine: 111.70 mg/dL (03-20 @ 16:54)  Sodium, Random Urine: 89 mmol/L ( @ 16:54)      RADIOLOGY & ADDITIONAL STUDIES: [facility] - [place] / Shruti S /Margot/ S Timoteo/ S Benz/ Ori Guzman / ANALISA Njeru  ---------------------------------------------------------------------------------------------------------------  Patient seen and examined bedside    53M hx of multiple myeloma s/p bone marrow transplant (~1.5 yrs ago) and chemo/RT (stopped ~2 months ago in preparation for colostomy reversal), perforated diverticulitis s/p Kiah's (proctosigmoidectomy with colostomy, 2018), nonischemic cardiomyopathy (mild global LV systolic dysfunction with EF 54% and diastolic LV dysfunction based on TTE in 2018), and HTN presented to [facility] ED 3/26 c/o diffuse body pains. Renal consult called today for elevated renal function. Patient was recently discharged 3/23/19 from [facility] after being hospitalized for thrombocytopenia, colostomy reversal was cancelled due to the same reason per pt. Patient reported severe throbbing pain in his b/l shoulders, chest, abdomen, and b/l thighs since  night - leaving him essentially bedbound. Currently denied cp. Denied SOB/urinary sxs. reports poor po intake of solids but drinking plenty of fluids. uses Oxycodone at home for pain. Denied recent NSAID use/Abx use/iv contrast studies. Pt denied known h/o CKD/renal problems but admits wasn't f/u w/PCP regularly.     PAST MEDICAL & SURGICAL HISTORY:  Diverticulitis  Multiple myeloma  Hypertension  Left ventricular dysfunction  Cardiomyopathy  Former smoker: (1 ppd x 11 years; Quit ~age 26)  History of bone marrow transplant  History of surgery: s/p exploratory laparotomy with sigmoid resection and colostomy on 2018      Allergies: No Known Allergies    Home Medications Reviewed  Hospital Medications:   MEDICATIONS  (STANDING):  carvedilol 25 milliGRAM(s) Oral every 12 hours    SOCIAL HISTORY:  Denies ETOh, illicit drug use. former smoker, smoked 1 ppd x 11 yrs, quit ~26 yrs old  FAMILY HISTORY:  Family history of diabetes mellitus  Family history of hypertension      REVIEW OF SYSTEMS:  CONSTITUTIONAL: No weakness, fevers or chills. +whole bodyaches  EYES/ENT: No visual changes;  No vertigo or throat pain   NECK: No pain or stiffness  RESPIRATORY: No cough, wheezing, hemoptysis; No shortness of breath  CARDIOVASCULAR: No chest pain or palpitations.  GASTROINTESTINAL: No abdominal or epigastric pain. No nausea, vomiting, or hematemesis; No diarrhea or constipation. No melena or hematochezia.  GENITOURINARY: No dysuria, frequency, foamy urine, urinary urgency, incontinence or hematuria  NEUROLOGICAL: No numbness or weakness  SKIN: No itching, burning, rashes, or lesions   VASCULAR: No bilateral lower extremity edema.   All other review of systems is negative unless indicated above.    VITALS:  T(F): 98.2 (19 @ 13:18), Max: 100.1 (19 @ 18:13)  HR: 99 (19 @ 13:18)  BP: 103/57 (19 @ 13:18)  RR: 16 (19 @ 13:18)  SpO2: 100% (19 @ 13:18)  Wt(kg): --    Height (cm): 185.42 ( @ 21:22)  Weight (kg): 83.94678943 ( @ 21:22)  BMI (kg/m2): 24.4 ( @ 21:22)  BSA (m2): 2.08 ( @ 21:22)    PHYSICAL EXAM:  Constitutional: NAD  HEENT: anicteric sclera, oropharynx clear, MMM  Neck: No JVD  Respiratory: CTAB, no wheezes, rales or rhonchi  Cardiovascular: S1, S2, RRR  Gastrointestinal: BS+, soft, NT, +colostomy  Extremities: No cyanosis or clubbing. No peripheral edema  Neurological: A/O x 3, no focal deficits  Psychiatric: Normal mood, normal affect  : No CVA tenderness. No quintero.   Skin: No rashes      LABS:      136  |  101  |  27<H>  ----------------------------<  122<H>  3.6   |  20<L>  |  1.37<H>    Ca    7.3<L>      27 Mar 2019 07:22  Phos  2.7       Mg     1.9         TPro  7.7  /  Alb  2.7<L>  /  TBili  1.0  /  DBili      /  AST  30  /  ALT  34  /  AlkPhos  117      Creatinine Trend: 1.37 <--, 1.19 <--, 1.48 <--, 1.31 <--, 1.24 <--                        8.2    2.36  )-----------( 33       ( 27 Mar 2019 07:22 )             24.4     Urine Studies:  Urinalysis Basic - ( 25 Mar 2019 20:38 )    Color: YELLOW / Appearance: CLEAR / S.019 / pH: 6.0  Gluc: NEGATIVE / Ketone: NEGATIVE  / Bili: NEGATIVE / Urobili: NORMAL   Blood: TRACE / Protein: 200 / Nitrite: NEGATIVE   Leuk Esterase: NEGATIVE / RBC: 11-25 / WBC 3-5   Sq Epi: OCC / Non Sq Epi:  / Bacteria: NEGATIVE      Creatinine, Random Urine: 111.70 mg/dL ( @ 16:54)  Sodium, Random Urine: 89 mmol/L ( @ 16:54)      RADIOLOGY & ADDITIONAL STUDIES:    < from: US Abdomen Limited (19 @ 21:38) >    Right kidney: 11.6 cm. No hydronephrosis.    IMPRESSION:     Normal right upper quadrant abdominal ultrasound.    Small right pleural effusion.      < from: MR Lumbar Spine No Cont (19 @ 10:38) >  Impression: Abnormal T1 prolongation involving the cervical thoracic   lumbar and sacral spine region as well as both iliac bones. This is   likely related to patient's known multiple myeloma.    < end of copied text >

## 2019-03-27 NOTE — PROGRESS NOTE ADULT - PROBLEM SELECTOR PLAN 1
2/2 multiple myeloma relapse  - F/u MRI spine to r/o cord compression   - pain control  - f/u esr to r/o pmr   - rx and management per onc and consultants appreciated

## 2019-03-27 NOTE — CONSULT NOTE ADULT - SUBJECTIVE AND OBJECTIVE BOX
Requesting Physician : Dr. Castro    Reason for Consultation: Cardiomyopathy     HISTORY OF PRESENT ILLNESS: 53M hx of multiple myeloma s/p bone marrow transplant (~1.5 yrs ago) and chemo/RT (stopped ~2 months ago in preparation for colostomy reversal), perforated diverticulitis s/p Kiah's (proctosigmoidectomy with colostomy, 9/2018), nonischemic cardiomyopathy (mild global LV systolic dysfunction with EF 54% and diastolic LV dysfunction based on TTE in 11/2018), and HTN who is being seen for management of cardiomyopathy.  The patient was admitted with diffuse body pain.  The patient denies chest pain or any other anginal symptoms.  He has no orthopnea, PND, or LE edema.  He was diagnosed with cardiomyopathy several years ago and reports an initial EF of 10% that improved to 54% on last TTE.      PAST MEDICAL & SURGICAL HISTORY:  Diverticulitis  Multiple myeloma  Hypertension  Left ventricular dysfunction  Cardiomyopathy  Former smoker: (1 ppd x 11 years; Quit ~age 26)  History of bone marrow transplant  History of surgery: s/p exploratory laparotomy with sigmoid resection and colostomy on 9/2/2018          MEDICATIONS:  MEDICATIONS  (STANDING):  carvedilol 25 milliGRAM(s) Oral every 12 hours      Allergies    No Known Allergies    Intolerances        FAMILY HISTORY:  Family history of diabetes mellitus  Family history of hypertension    Non-contributary for premature coronary disease or sudden cardiac death    SOCIAL HISTORY:    [x ] Non-smoker  [ ] Smoker  [ ] Alcohol      REVIEW OF SYSTEMS:  [ ]chest pain  [  ]shortness of breath  [  ]palpitations  [  ]syncope  [ ]near syncope [ ]upper extremity weakness   [ ] lower extremity weakness  [  ]diplopia  [  ]altered mental status   [  ]fevers  [ ]chills [ ]nausea  [ ]vomitting  [  ]dysphagia    [ ]abdominal pain  [ ]melena  [ ]BRBPR    [  ]epistaxis  [  ]rash    [ ]lower extremity edema        [x ] All others negative	  [ ] Unable to obtain    PHYSICAL EXAM:  T(C): 37.1 (03-27-19 @ 06:40), Max: 37.8 (03-26-19 @ 18:13)  HR: 107 (03-27-19 @ 06:40) (104 - 114)  BP: 111/65 (03-27-19 @ 06:40) (108/66 - 129/82)  RR: 17 (03-27-19 @ 06:40) (17 - 18)  SpO2: 100% (03-27-19 @ 06:40) (98% - 100%)  Wt(kg): --  I&O's Summary        	  Lymphatic: No lymphadenopathy , no edema  Cardiovascular: Normal S1 S2, No JVD, No murmurs , Peripheral pulses palpable 2+ bilaterally  Respiratory: Lungs clear to auscultation, normal effort 	  Gastrointestinal:  Soft, Non-tender, + BS	  Skin: No rashes, No ecchymoses, No cyanosis, warm to touch  Psychiatry:  Mood & affect appropriate      TELEMETRY: 	    ECG:  	  RADIOLOGY:  OTHER:     DIAGNOSTIC TESTING:  [ ] Echocardiogram: < from: Transthoracic Echocardiogram (08.02.17 @ 07:06) >  CONCLUSIONS:  1. Normal mitral valve. Mild-moderate mitral regurgitation.  2. Normal trileaflet aortic valve. Mild aortic  regurgitation.  3. Normal left ventricular internal dimensions and wall  thicknesses.  4. Mild to moderate global left ventricular systolic  dysfunction.  5. The right ventricle is not well visualized; grossly  normal right ventricular systolic function.    < end of copied text >    [ ]  Catheterization:  [ ] Stress Test:    	  	  LABS:	 	    CARDIAC MARKERS:  CARDIAC MARKERS ( 25 Mar 2019 19:50 )  x     / x     / 65 u/L / < 1.00 ng/mL / x                                  8.2    2.36  )-----------( 33       ( 27 Mar 2019 07:22 )             24.4     03-27    136  |  101  |  27<H>  ----------------------------<  122<H>  3.6   |  20<L>  |  1.37<H>    Ca    7.3<L>      27 Mar 2019 07:22  Phos  2.7     03-27  Mg     1.9     03-27    TPro  7.7  /  Alb  2.7<L>  /  TBili  1.0  /  DBili  x   /  AST  30  /  ALT  34  /  AlkPhos  117  03-27    proBNP:   Lipid Profile:   HgA1c:   TSH:     ASSESSMENT/PLAN:  53M hx of multiple myeloma s/p bone marrow transplant (~1.5 yrs ago) and chemo/RT (stopped ~2 months ago in preparation for colostomy reversal), perforated diverticulitis s/p Kiah's (proctosigmoidectomy with colostomy, 9/2018), nonischemic cardiomyopathy (mild global LV systolic dysfunction with EF 54% and diastolic LV dysfunction based on TTE in 11/2018), and HTN who is being seen for management of cardiomyopathy.    -pt. with no clinical heart failure  -appears well compensated from heart failure perpsective  -continue with coreg for history of cardiomyopathy  -check TTE to evaluate LV function    Marc Owen MD

## 2019-03-28 DIAGNOSIS — I50.23 ACUTE ON CHRONIC SYSTOLIC (CONGESTIVE) HEART FAILURE: ICD-10-CM

## 2019-03-28 DIAGNOSIS — N17.9 ACUTE KIDNEY FAILURE, UNSPECIFIED: ICD-10-CM

## 2019-03-28 LAB
ALBUMIN SERPL ELPH-MCNC: 2.8 G/DL — LOW (ref 3.3–5)
ALP SERPL-CCNC: 138 U/L — HIGH (ref 40–120)
ALT FLD-CCNC: 30 U/L — SIGNIFICANT CHANGE UP (ref 4–41)
ANION GAP SERPL CALC-SCNC: 15 MMO/L — HIGH (ref 7–14)
ANISOCYTOSIS BLD QL: SLIGHT — SIGNIFICANT CHANGE UP
APPEARANCE UR: CLEAR — SIGNIFICANT CHANGE UP
AST SERPL-CCNC: 26 U/L — SIGNIFICANT CHANGE UP (ref 4–40)
BACTERIA # UR AUTO: NEGATIVE — SIGNIFICANT CHANGE UP
BASOPHILS # BLD AUTO: 0.01 K/UL — SIGNIFICANT CHANGE UP (ref 0–0.2)
BASOPHILS NFR BLD AUTO: 0.3 % — SIGNIFICANT CHANGE UP (ref 0–2)
BASOPHILS NFR SPEC: 1 % — SIGNIFICANT CHANGE UP (ref 0–2)
BILIRUB SERPL-MCNC: 0.8 MG/DL — SIGNIFICANT CHANGE UP (ref 0.2–1.2)
BILIRUB UR-MCNC: NEGATIVE — SIGNIFICANT CHANGE UP
BLASTS # FLD: 0 % — SIGNIFICANT CHANGE UP (ref 0–0)
BLOOD UR QL VISUAL: NEGATIVE — SIGNIFICANT CHANGE UP
BUN SERPL-MCNC: 32 MG/DL — HIGH (ref 7–23)
CALCIUM SERPL-MCNC: 7.4 MG/DL — LOW (ref 8.4–10.5)
CHLORIDE SERPL-SCNC: 99 MMOL/L — SIGNIFICANT CHANGE UP (ref 98–107)
CO2 SERPL-SCNC: 21 MMOL/L — LOW (ref 22–31)
COLOR SPEC: YELLOW — SIGNIFICANT CHANGE UP
CREAT ?TM UR-MCNC: 91.4 MG/DL — SIGNIFICANT CHANGE UP
CREAT SERPL-MCNC: 1.66 MG/DL — HIGH (ref 0.5–1.3)
EOSINOPHIL # BLD AUTO: 0.01 K/UL — SIGNIFICANT CHANGE UP (ref 0–0.5)
EOSINOPHIL NFR BLD AUTO: 0.3 % — SIGNIFICANT CHANGE UP (ref 0–6)
EOSINOPHIL NFR FLD: 0 % — SIGNIFICANT CHANGE UP (ref 0–6)
GAS PNL BLDMV: SIGNIFICANT CHANGE UP
GLUCOSE SERPL-MCNC: 118 MG/DL — HIGH (ref 70–99)
GLUCOSE UR-MCNC: NEGATIVE — SIGNIFICANT CHANGE UP
HCT VFR BLD CALC: 26.1 % — LOW (ref 39–50)
HGB BLD-MCNC: 8.6 G/DL — LOW (ref 13–17)
HYPOCHROMIA BLD QL: SLIGHT — SIGNIFICANT CHANGE UP
IMM GRANULOCYTES NFR BLD AUTO: 3.6 % — HIGH (ref 0–1.5)
KETONES UR-MCNC: NEGATIVE — SIGNIFICANT CHANGE UP
LEUKOCYTE ESTERASE UR-ACNC: NEGATIVE — SIGNIFICANT CHANGE UP
LYMPHOCYTES # BLD AUTO: 0.96 K/UL — LOW (ref 1–3.3)
LYMPHOCYTES # BLD AUTO: 31.6 % — SIGNIFICANT CHANGE UP (ref 13–44)
LYMPHOCYTES NFR SPEC AUTO: 12.6 % — LOW (ref 13–44)
MACROCYTES BLD QL: SLIGHT — SIGNIFICANT CHANGE UP
MAGNESIUM SERPL-MCNC: 2.1 MG/DL — SIGNIFICANT CHANGE UP (ref 1.6–2.6)
MCHC RBC-ENTMCNC: 28.9 PG — SIGNIFICANT CHANGE UP (ref 27–34)
MCHC RBC-ENTMCNC: 33 % — SIGNIFICANT CHANGE UP (ref 32–36)
MCV RBC AUTO: 87.6 FL — SIGNIFICANT CHANGE UP (ref 80–100)
METAMYELOCYTES # FLD: 0 % — SIGNIFICANT CHANGE UP (ref 0–1)
MONOCYTES # BLD AUTO: 0.58 K/UL — SIGNIFICANT CHANGE UP (ref 0–0.9)
MONOCYTES NFR BLD AUTO: 19.1 % — HIGH (ref 2–14)
MONOCYTES NFR BLD: 17.5 % — HIGH (ref 2–9)
MYELOCYTES NFR BLD: 0 % — SIGNIFICANT CHANGE UP (ref 0–0)
NEUTROPHIL AB SER-ACNC: 49.5 % — SIGNIFICANT CHANGE UP (ref 43–77)
NEUTROPHILS # BLD AUTO: 1.37 K/UL — LOW (ref 1.8–7.4)
NEUTROPHILS NFR BLD AUTO: 45.1 % — SIGNIFICANT CHANGE UP (ref 43–77)
NEUTS BAND # BLD: 11.6 % — HIGH (ref 0–6)
NITRITE UR-MCNC: NEGATIVE — SIGNIFICANT CHANGE UP
NRBC # BLD: 2 /100WBC — SIGNIFICANT CHANGE UP
NRBC # FLD: 0.1 K/UL — SIGNIFICANT CHANGE UP (ref 0–0)
NRBC FLD-RTO: 3.3 — SIGNIFICANT CHANGE UP
OTHER - HEMATOLOGY %: 0 — SIGNIFICANT CHANGE UP
PH UR: 6 — SIGNIFICANT CHANGE UP (ref 5–8)
PHOSPHATE SERPL-MCNC: 2.6 MG/DL — SIGNIFICANT CHANGE UP (ref 2.5–4.5)
PLATELET # BLD AUTO: 36 K/UL — LOW (ref 150–400)
PLATELET COUNT - ESTIMATE: SIGNIFICANT CHANGE UP
PMV BLD: SIGNIFICANT CHANGE UP FL (ref 7–13)
POLYCHROMASIA BLD QL SMEAR: SIGNIFICANT CHANGE UP
POTASSIUM SERPL-MCNC: 3.7 MMOL/L — SIGNIFICANT CHANGE UP (ref 3.5–5.3)
POTASSIUM SERPL-SCNC: 3.7 MMOL/L — SIGNIFICANT CHANGE UP (ref 3.5–5.3)
PROMYELOCYTES # FLD: 0 % — SIGNIFICANT CHANGE UP (ref 0–0)
PROT SERPL-MCNC: 7.8 G/DL — SIGNIFICANT CHANGE UP (ref 6–8.3)
PROT UR-MCNC: 100 — HIGH
PROT UR-MCNC: 111.6 MG/DL — SIGNIFICANT CHANGE UP
RBC # BLD: 2.98 M/UL — LOW (ref 4.2–5.8)
RBC # FLD: 17.4 % — HIGH (ref 10.3–14.5)
RBC CASTS # UR COMP ASSIST: SIGNIFICANT CHANGE UP (ref 0–?)
SMUDGE CELLS # BLD: PRESENT — SIGNIFICANT CHANGE UP
SODIUM SERPL-SCNC: 135 MMOL/L — SIGNIFICANT CHANGE UP (ref 135–145)
SODIUM UR-SCNC: 20 MMOL/L — SIGNIFICANT CHANGE UP
SP GR SPEC: 1.01 — SIGNIFICANT CHANGE UP (ref 1–1.04)
SQUAMOUS # UR AUTO: SIGNIFICANT CHANGE UP
UROBILINOGEN FLD QL: NORMAL — SIGNIFICANT CHANGE UP
VARIANT LYMPHS # BLD: 7.8 % — SIGNIFICANT CHANGE UP
WBC # BLD: 3.04 K/UL — LOW (ref 3.8–10.5)
WBC # FLD AUTO: 3.04 K/UL — LOW (ref 3.8–10.5)
WBC UR QL: HIGH (ref 0–?)

## 2019-03-28 RX ORDER — CEFTRIAXONE 500 MG/1
1 INJECTION, POWDER, FOR SOLUTION INTRAMUSCULAR; INTRAVENOUS ONCE
Qty: 0 | Refills: 0 | Status: COMPLETED | OUTPATIENT
Start: 2019-03-28 | End: 2019-03-28

## 2019-03-28 RX ORDER — CEFTRIAXONE 500 MG/1
INJECTION, POWDER, FOR SOLUTION INTRAMUSCULAR; INTRAVENOUS
Qty: 0 | Refills: 0 | Status: DISCONTINUED | OUTPATIENT
Start: 2019-03-28 | End: 2019-03-29

## 2019-03-28 RX ORDER — SODIUM CHLORIDE 9 MG/ML
1000 INJECTION INTRAMUSCULAR; INTRAVENOUS; SUBCUTANEOUS
Qty: 0 | Refills: 0 | Status: DISCONTINUED | OUTPATIENT
Start: 2019-03-28 | End: 2019-03-28

## 2019-03-28 RX ORDER — HYDROMORPHONE HYDROCHLORIDE 2 MG/ML
2 INJECTION INTRAMUSCULAR; INTRAVENOUS; SUBCUTANEOUS EVERY 4 HOURS
Qty: 0 | Refills: 0 | Status: DISCONTINUED | OUTPATIENT
Start: 2019-03-28 | End: 2019-03-30

## 2019-03-28 RX ORDER — OXYCODONE HYDROCHLORIDE 5 MG/1
10 TABLET ORAL EVERY 6 HOURS
Qty: 0 | Refills: 0 | Status: DISCONTINUED | OUTPATIENT
Start: 2019-03-28 | End: 2019-03-31

## 2019-03-28 RX ORDER — FUROSEMIDE 40 MG
20 TABLET ORAL ONCE
Qty: 0 | Refills: 0 | Status: COMPLETED | OUTPATIENT
Start: 2019-03-28 | End: 2019-03-28

## 2019-03-28 RX ORDER — HYDROMORPHONE HYDROCHLORIDE 2 MG/ML
1 INJECTION INTRAMUSCULAR; INTRAVENOUS; SUBCUTANEOUS EVERY 4 HOURS
Qty: 0 | Refills: 0 | Status: DISCONTINUED | OUTPATIENT
Start: 2019-03-28 | End: 2019-03-30

## 2019-03-28 RX ORDER — CEFTRIAXONE 500 MG/1
1 INJECTION, POWDER, FOR SOLUTION INTRAMUSCULAR; INTRAVENOUS EVERY 24 HOURS
Qty: 0 | Refills: 0 | Status: DISCONTINUED | OUTPATIENT
Start: 2019-03-29 | End: 2019-03-29

## 2019-03-28 RX ADMIN — HYDROMORPHONE HYDROCHLORIDE 2 MILLIGRAM(S): 2 INJECTION INTRAMUSCULAR; INTRAVENOUS; SUBCUTANEOUS at 11:13

## 2019-03-28 RX ADMIN — CARVEDILOL PHOSPHATE 25 MILLIGRAM(S): 80 CAPSULE, EXTENDED RELEASE ORAL at 05:10

## 2019-03-28 RX ADMIN — CARVEDILOL PHOSPHATE 25 MILLIGRAM(S): 80 CAPSULE, EXTENDED RELEASE ORAL at 17:25

## 2019-03-28 RX ADMIN — CEFTRIAXONE 100 GRAM(S): 500 INJECTION, POWDER, FOR SOLUTION INTRAMUSCULAR; INTRAVENOUS at 21:46

## 2019-03-28 RX ADMIN — SODIUM CHLORIDE 100 MILLILITER(S): 9 INJECTION INTRAMUSCULAR; INTRAVENOUS; SUBCUTANEOUS at 13:09

## 2019-03-28 RX ADMIN — HYDROMORPHONE HYDROCHLORIDE 2 MILLIGRAM(S): 2 INJECTION INTRAMUSCULAR; INTRAVENOUS; SUBCUTANEOUS at 05:37

## 2019-03-28 RX ADMIN — HYDROMORPHONE HYDROCHLORIDE 2 MILLIGRAM(S): 2 INJECTION INTRAMUSCULAR; INTRAVENOUS; SUBCUTANEOUS at 22:00

## 2019-03-28 RX ADMIN — Medication 20 MILLIGRAM(S): at 07:52

## 2019-03-28 RX ADMIN — HYDROMORPHONE HYDROCHLORIDE 2 MILLIGRAM(S): 2 INJECTION INTRAMUSCULAR; INTRAVENOUS; SUBCUTANEOUS at 16:08

## 2019-03-28 RX ADMIN — HYDROMORPHONE HYDROCHLORIDE 2 MILLIGRAM(S): 2 INJECTION INTRAMUSCULAR; INTRAVENOUS; SUBCUTANEOUS at 11:40

## 2019-03-28 RX ADMIN — HYDROMORPHONE HYDROCHLORIDE 2 MILLIGRAM(S): 2 INJECTION INTRAMUSCULAR; INTRAVENOUS; SUBCUTANEOUS at 16:54

## 2019-03-28 RX ADMIN — HYDROMORPHONE HYDROCHLORIDE 2 MILLIGRAM(S): 2 INJECTION INTRAMUSCULAR; INTRAVENOUS; SUBCUTANEOUS at 05:07

## 2019-03-28 RX ADMIN — HYDROMORPHONE HYDROCHLORIDE 2 MILLIGRAM(S): 2 INJECTION INTRAMUSCULAR; INTRAVENOUS; SUBCUTANEOUS at 22:26

## 2019-03-28 NOTE — PROGRESS NOTE ADULT - PROBLEM SELECTOR PLAN 1
2/2 multiple myeloma relapse  MRI spine results appreciated   c./w pain control  adjust management per consultants  possible inpt chemo

## 2019-03-28 NOTE — PROGRESS NOTE ADULT - SUBJECTIVE AND OBJECTIVE BOX
Patient is seen and examined at the bed side, is afebrile.        REVIEW OF SYSTEMS: All other review systems are negative      ALLERGIES: No Known Allergies        Vital Signs Last 24 Hrs  T(C): 36.9 (28 Mar 2019 20:45), Max: 37.4 (27 Mar 2019 20:56)  T(F): 98.5 (28 Mar 2019 20:45), Max: 99.4 (27 Mar 2019 20:56)  HR: 101 (28 Mar 2019 20:45) (72 - 109)  BP: 125/68 (28 Mar 2019 20:45) (108/69 - 126/76)  BP(mean): --  RR: 18 (28 Mar 2019 20:45) (17 - 20)  SpO2: 100% (28 Mar 2019 20:45) (98% - 100%)      PHYSICAL EXAM:  GENERAL: Not in  acute distress   CHEST/LUNG:  Air entry  bilaterally  HEART: s1 and s2 present  ABDOMEN:  Colostomy bag in placed  EXTREMITIES: No pedal  edema  CNS: Awake and Alert        LABS:                        8.6    3.04  )-----------( 36       ( 28 Mar 2019 06:46 )             26.1                           8.2    2.36  )-----------( 33       ( 27 Mar 2019 07:22 )             24.4           03    135  |  99  |  32<H>  ----------------------------<  118<H>  3.7   |  21<L>  |  1.66<H>    Ca    7.4<L>      28 Mar 2019 06:46  Phos  2.6       Mg     2.1         TPro  7.8  /  Alb  2.8<L>  /  TBili  0.8  /  DBili  x   /  AST  26  /  ALT  30  /  AlkPhos  138<H>      136  |  101  |  27<H>  ----------------------------<  122<H>  3.6   |  20<L>  |  1.37<H>    Ca    7.3<L>      27 Mar 2019 07:22  Phos  2.7       Mg     1.9         TPro  7.7  /  Alb  2.7<L>  /  TBili  1.0  /  DBili  x   /  AST  30  /  ALT  34  /  AlkPhos  117  03-  PT/INR - ( 25 Mar 2019 19:50 )   PT: 17.1 SEC;   INR: 1.52     PTT - ( 25 Mar 2019 19:50 )  PTT:28.4 SEC      Urinalysis Basic - ( 25 Mar 2019 20:38 )  Color: YELLOW / Appearance: CLEAR / S.019 / pH: 6.0  Gluc: NEGATIVE / Ketone: NEGATIVE  / Bili: NEGATIVE / Urobili: NORMAL   Blood: TRACE / Protein: 200 / Nitrite: NEGATIVE   Leuk Esterase: NEGATIVE / RBC: 11-25 / WBC 3-5   Sq Epi: OCC / Non Sq Epi: x / Bacteria: NEGATIVE        MEDICATIONS  (STANDING):  carvedilol 25 milliGRAM(s) Oral every 12 hours    MEDICATIONS  (PRN):  artificial  tears Solution 1 Drop(s) Both EYES every 6 hours PRN Dry Eyes  HYDROmorphone  Injectable 2 milliGRAM(s) IV Push every 4 hours PRN Severe Pain (7 - 10)  HYDROmorphone  Injectable 1 milliGRAM(s) IV Push every 4 hours PRN Moderate Pain (4 - 6)  oxyCODONE    IR 10 milliGRAM(s) Oral every 6 hours PRN Mild Pain (1 - 3)      Procalcitonin, Serum (19 @ 16:46)    Procalcitonin, Serum: 8.48: Procalcitonin (PCT) Interpretation (ng/mL) - Diagnosis of  systemic bacterial infection/sepsis:  PCT < 0.5: Systemic infection (sepsis) is not likely and  risk for progression to severe systemic infection is low.  Local bacterial infection is possible. If early sepsis is  suspected clinically, PCT should be re-assessed in 6-24  hours.  PCT >/= 0.5 but < 2.0: Systemic infection (sepsis) is  possible, but other conditions are known to elevate PCT as  well. Moderate risk for progression to severe systemic  infection. The patient should be closely monitored both  clinically and by re-assessing PCT within 6-24 hours.  PCT >/= 2.0 but < 10.0: Systemic infection (sepsis) is  likely, unless other causes are known. High risk of  progression to severe systemic infection (severe  sepsis/septic shock).  PCT >/= 10.0: Important systemic inflammatory response,  almost exclusively due to severe bacterial sepsis or septic  shock. High likelihood of severe sepsis or septic shock. ng/mL          RADIOLOGY & ADDITIONAL TESTS:    3/27/19 : MR Lumbar Spine No Cont (19 @ 10:38) Abnormal T1 prolongation involving the cervical thoracic lumbar and sacral spine region as well as both iliac bones. This is likely related to patient's known multiple myeloma.  Bilateral pleural effusions are seen.      3/25/19 : US Abdomen Limited (19 @ 21:38) Normal right upper quadrant abdominal ultrasound.  Small right pleural effusion.        MICROBIOLOGY DATA:    Culture - Urine (19 @ 21:18)    Culture - Urine:   Culture grew 3 or more types of organisms which indicate  collection contamination; consider recollection only if  clinically indicated.    Specimen Source: URINE MIDSTREAM      Culture - Blood (19 @ 20:26)    Culture - Blood:   NO ORGANISMS ISOLATED  NO ORGANISMS ISOLATED AT 48 HRS.    Specimen Source: BLOOD VENOUS      Culture - Blood (19 @ 20:26)    Culture - Blood:   NO ORGANISMS ISOLATED  NO ORGANISMS ISOLATED AT 48 HRS.    Specimen Source: BLOOD PERIPHERAL      Rapid Respiratory Viral Panel (19 @ 19:33)    Adenovirus (RapRVP): Not Detected    Influenza A (RapRVP): Not Detected    Influenza AH1 2009 (RapRVP): Not Detected    Influenza AH1 (RapRVP): Not Detected    Influenza AH3 (RapRVP): Not Detected    Influenza B (RapRVP): Not Detected    Parainfluenza 1 (RapRVP): Not Detected    Parainfluenza 2 (RapRVP): Not Detected    Parainfluenza 3 (RapRVP): Not Detected    Parainfluenza 4 (RapRVP): Not Detected    Resp Syncytial Virus (RapRVP): Not Detected    Chlamydia pneumoniae (RapRVP): Not Detected    Mycoplasma pneumoniae (RapRVP): Not Detected    Entero/Rhinovirus (RapRVP): Not Detected    hMPV (RapRVP): Not Detected    Coronavirus (229E,HKU1,NL63,OC43): Not Detected This Respiratory Panel uses polymerase chain reaction (PCR)  to detect for adenovirus; coronavirus (HKU1, NL63, 229E,  OC43); human metapneumovirus (hMPV); human  enterovirus/rhinovirus (Entero/RV); influenza A; influenza  A/H1: influenza A/H3; influenza A/H1-2009; influenza B;  parainfluenza viruses 1,2,3,4; respiratory syncytial virus;  Mycoplasma pneumoniae; and Chlamydophila pneumoniae. Patient is seen and examined at the bed side, is afebrile. The bodyache has improved a little. The Urine analysis is positive, culture pending. The Procalcitonin level very elevated, 8.38. The Leukopenia is resolving.        REVIEW OF SYSTEMS: All other review systems are negative      ALLERGIES: No Known Allergies        Vital Signs Last 24 Hrs  T(C): 36.9 (28 Mar 2019 20:45), Max: 37.4 (27 Mar 2019 20:56)  T(F): 98.5 (28 Mar 2019 20:45), Max: 99.4 (27 Mar 2019 20:56)  HR: 101 (28 Mar 2019 20:45) (72 - 109)  BP: 125/68 (28 Mar 2019 20:45) (108/69 - 126/76)  BP(mean): --  RR: 18 (28 Mar 2019 20:45) (17 - 20)  SpO2: 100% (28 Mar 2019 20:45) (98% - 100%)      PHYSICAL EXAM:  GENERAL: Not in  acute distress   CHEST/LUNG:  Air entry  bilaterally  HEART: s1 and s2 present  ABDOMEN:  Colostomy bag in placed  EXTREMITIES: No pedal  edema  CNS: Awake and Alert        LABS:                        8.6    3.04  )-----------( 36       ( 28 Mar 2019 06:46 )             26.1                           8.2    2.36  )-----------( 33       ( 27 Mar 2019 07:22 )             24.4               135  |  99  |  32<H>  ----------------------------<  118<H>  3.7   |  21<L>  |  1.66<H>    Ca    7.4<L>      28 Mar 2019 06:46  Phos  2.6       Mg     2.1         TPro  7.8  /  Alb  2.8<L>  /  TBili  0.8  /  DBili  x   /  AST  26  /  ALT  30  /  AlkPhos  138<H>      136  |  101  |  27<H>  ----------------------------<  122<H>  3.6   |  20<L>  |  1.37<H>    Ca    7.3<L>      27 Mar 2019 07:22  Phos  2.7       Mg     1.9         TPro  7.7  /  Alb  2.7<L>  /  TBili  1.0  /  DBili  x   /  AST  30  /  ALT  34  /  AlkPhos  117    PT/INR - ( 25 Mar 2019 19:50 )   PT: 17.1 SEC;   INR: 1.52     PTT - ( 25 Mar 2019 19:50 )  PTT:28.4 SEC      Urinalysis Basic - ( 25 Mar 2019 20:38 )  Color: YELLOW / Appearance: CLEAR / S.019 / pH: 6.0  Gluc: NEGATIVE / Ketone: NEGATIVE  / Bili: NEGATIVE / Urobili: NORMAL   Blood: TRACE / Protein: 200 / Nitrite: NEGATIVE   Leuk Esterase: NEGATIVE / RBC: 11-25 / WBC 3-5   Sq Epi: OCC / Non Sq Epi: x / Bacteria: NEGATIVE        MEDICATIONS  (STANDING):  carvedilol 25 milliGRAM(s) Oral every 12 hours    MEDICATIONS  (PRN):  artificial  tears Solution 1 Drop(s) Both EYES every 6 hours PRN Dry Eyes  HYDROmorphone  Injectable 2 milliGRAM(s) IV Push every 4 hours PRN Severe Pain (7 - 10)  HYDROmorphone  Injectable 1 milliGRAM(s) IV Push every 4 hours PRN Moderate Pain (4 - 6)  oxyCODONE    IR 10 milliGRAM(s) Oral every 6 hours PRN Mild Pain (1 - 3)      Procalcitonin, Serum (19 @ 16:46)    Procalcitonin, Serum: 8.48: Procalcitonin (PCT) Interpretation (ng/mL) - Diagnosis of  systemic bacterial infection/sepsis:          RADIOLOGY & ADDITIONAL TESTS:      3/27/19 : MR Lumbar Spine No Cont (19 @ 10:38) Abnormal T1 prolongation involving the cervical thoracic lumbar and sacral spine region as well as both iliac bones. This is likely related to patient's known multiple myeloma.  Bilateral pleural effusions are seen.      3/25/19 : US Abdomen Limited (19 @ 21:38) Normal right upper quadrant abdominal ultrasound.  Small right pleural effusion.        MICROBIOLOGY DATA:        Culture - Urine (19 @ 21:18)    Culture - Urine:   Culture grew 3 or more types of organisms which indicate  collection contamination; consider recollection only if  clinically indicated.    Specimen Source: URINE MIDSTREAM      Culture - Blood (19 @ 20:26)    Culture - Blood:   NO ORGANISMS ISOLATED  NO ORGANISMS ISOLATED AT 48 HRS.    Specimen Source: BLOOD VENOUS      Culture - Blood (19 @ 20:26)    Culture - Blood:   NO ORGANISMS ISOLATED  NO ORGANISMS ISOLATED AT 48 HRS.    Specimen Source: BLOOD PERIPHERAL      Rapid Respiratory Viral Panel (19 @ 19:33)    Adenovirus (RapRVP): Not Detected    Influenza A (RapRVP): Not Detected    Influenza AH1 2009 (RapRVP): Not Detected    Influenza AH1 (RapRVP): Not Detected    Influenza AH3 (RapRVP): Not Detected    Influenza B (RapRVP): Not Detected    Parainfluenza 1 (RapRVP): Not Detected    Parainfluenza 2 (RapRVP): Not Detected    Parainfluenza 3 (RapRVP): Not Detected    Parainfluenza 4 (RapRVP): Not Detected    Resp Syncytial Virus (RapRVP): Not Detected    Chlamydia pneumoniae (RapRVP): Not Detected    Mycoplasma pneumoniae (RapRVP): Not Detected    Entero/Rhinovirus (RapRVP): Not Detected    hMPV (RapRVP): Not Detected    Coronavirus (229E,HKU1,NL63,OC43): Not Detected This Respiratory Panel uses polymerase chain reaction (PCR)  to detect for adenovirus; coronavirus (HKU1, NL63, 229E,  OC43); human metapneumovirus (hMPV); human  enterovirus/rhinovirus (Entero/RV); influenza A; influenza  A/H1: influenza A/H3; influenza A/H1-2009; influenza B;  parainfluenza viruses 1,2,3,4; respiratory syncytial virus;  Mycoplasma pneumoniae; and Chlamydophila pneumoniae.

## 2019-03-28 NOTE — PROGRESS NOTE ADULT - SUBJECTIVE AND OBJECTIVE BOX
S: no chest pain, SOB     	  MEDICATIONS:  MEDICATIONS  (STANDING):  carvedilol 25 milliGRAM(s) Oral every 12 hours    LABS:	 	    CARDIAC MARKERS:  CARDIAC MARKERS ( 25 Mar 2019 19:50 )  x     / x     / 65 u/L / < 1.00 ng/mL / x                             8.6    3.04  )-----------( 36       ( 28 Mar 2019 06:46 )             26.1     Hemoglobin: 8.6 g/dL (03-28 @ 06:46)  Hemoglobin: 8.2 g/dL (03-27 @ 07:22)  Hemoglobin: 9.4 g/dL (03-25 @ 19:50)    03-28    135  |  99  |  32<H>  ----------------------------<  118<H>  3.7   |  21<L>  |  1.66<H>    Ca    7.4<L>      28 Mar 2019 06:46  Phos  2.6     03-28  Mg     2.1     03-28    TPro  7.8  /  Alb  2.8<L>  /  TBili  0.8  /  DBili  x   /  AST  26  /  ALT  30  /  AlkPhos  138<H>  03-28    Creatinine Trend: 1.66<--, 1.37<--, 1.19<--, 1.48<--, 1.31<--, 1.24<--    PHYSICAL EXAM:  T(C): 37 (03-28-19 @ 05:03), Max: 37.4 (03-27-19 @ 20:56)  HR: 96 (03-28-19 @ 07:36) (96 - 109)  BP: 108/69 (03-28-19 @ 07:36) (103/57 - 126/76)  RR: 20 (03-28-19 @ 05:03) (16 - 20)  SpO2: 98% (03-28-19 @ 05:03) (98% - 100%)  Wt(kg): --  83.9      Gen: Appears well in NAD  CV: N S1 S2, RRR, no m/r/g  Lungs: Clear to auscultation B/L, normal effort  GI: (+) BS Soft, NT, ND  Ext:  no edema       TELEMETRY: 	no tele         ASSESSMENT/PLAN: 	    53y  Male S: no chest pain, no SOB       MEDICATIONS:  MEDICATIONS  (STANDING):  carvedilol 25 milliGRAM(s) Oral every 12 hours    LABS:	 	    CARDIAC MARKERS:  CARDIAC MARKERS ( 25 Mar 2019 19:50 )  x     / x     / 65 u/L / < 1.00 ng/mL / x                             8.6    3.04  )-----------( 36       ( 28 Mar 2019 06:46 )             26.1     Hemoglobin: 8.6 g/dL (03-28 @ 06:46)  Hemoglobin: 8.2 g/dL (03-27 @ 07:22)  Hemoglobin: 9.4 g/dL (03-25 @ 19:50)    03-28    135  |  99  |  32<H>  ----------------------------<  118<H>  3.7   |  21<L>  |  1.66<H>    Ca    7.4<L>      28 Mar 2019 06:46  Phos  2.6     03-28  Mg     2.1     03-28    TPro  7.8  /  Alb  2.8<L>  /  TBili  0.8  /  DBili  x   /  AST  26  /  ALT  30  /  AlkPhos  138<H>  03-28    Creatinine Trend: 1.66<--, 1.37<--, 1.19<--, 1.48<--, 1.31<--, 1.24<--    PHYSICAL EXAM:  T(C): 37 (03-28-19 @ 05:03), Max: 37.4 (03-27-19 @ 20:56)  HR: 96 (03-28-19 @ 07:36) (96 - 109)  BP: 108/69 (03-28-19 @ 07:36) (103/57 - 126/76)  RR: 20 (03-28-19 @ 05:03) (16 - 20)  SpO2: 98% (03-28-19 @ 05:03) (98% - 100%)  Wt(kg): --  83.9      CV: N S1 S2, RRR, no m/r/g  Lungs: Clear to auscultation B/L, normal effort  GI: (+) BS Soft, NT, ND  Ext:  no edema       TELEMETRY: 	no tele         ASSESSMENT/PLAN: 	    53M hx of multiple myeloma s/p bone marrow transplant (~1.5 yrs ago) and chemo/RT (stopped ~2 months ago in preparation for colostomy reversal), perforated diverticulitis s/p Kiah's (proctosigmoidectomy with colostomy, 9/2018), nonischemic cardiomyopathy (mild global LV systolic dysfunction with EF 54% and diastolic LV dysfunction based on TTE in 11/2018), and HTN who is being seen for management of cardiomyopathy.    -pt. with no anginal or heart failure symptoms   -continue with coreg for history of cardiomyopathy  -pending ECHO to evaluate LV function  -ID note appreciated, BC (3/25/19) with NGTD  -MRI results noted, neurology following  -Renal note appreciated, following for elevated Cr   -continued management per primary team

## 2019-03-28 NOTE — PROGRESS NOTE ADULT - ASSESSMENT
53M hx of multiple myeloma s/p bone marrow transplant (~1.5 yrs ago) and chemo/RT (stopped ~2 months ago in preparation for colostomy reversal), perforated diverticulitis s/p Kiah's (proctosigmoidectomy with colostomy, 9/2018), nonischemic cardiomyopathy (mild global LV systolic dysfunction with EF 54% and diastolic LV dysfunction based on TTE in 11/2018), and HTN presented to [facility] ED 3/26 c/o diffuse body pains. Renal consult called today for elevated renal function.    Kristyn vs KRISTYN on CKD -b/l Cr 0.9-1 09/2018   Cr 3/25 1.2 but earlier this mth Cr was 1.2-1.5; no Cr available b/w Sept 2018 and 03/2019    KRISTYN likely 2/2 hemodynamics. clinically euvolemic. no e/o TTP/HUS  HTN, controlled-on BB  Thrombocytopenia. PLT 33k noted, concern for worsened MM v. ITP. transfused 1U of PLT 3/26.   Multiple myeloma s/p bone marrow transplant (~1.5 yrs ago) and chemo/RT    labs, rad, chart reviewed  encouraged po intake  montir BMP daily  monitor CBC closely  avoid nephrotoxics/NSAIDs/ACEI/ARB  pain Mx per primary team  UA noted, not a clean catch as 3 or more orgs on UCx. rec rpt UA w/UPCR in AM  will f/u

## 2019-03-28 NOTE — PROGRESS NOTE ADULT - SUBJECTIVE AND OBJECTIVE BOX
Patient seen and examined at bedside  body pain mildly controlled with rx  c/o mild sob and gates    Case discussed with medical team    HPI:  53M hx of multiple myeloma s/p bone marrow transplant (~1.5 yrs ago) and chemo/RT (stopped ~2 months ago in preparation for colostomy reversal), perforated diverticulitis s/p Kiah's (proctosigmoidectomy with colostomy, 2018), nonischemic cardiomyopathy (mild global LV systolic dysfunction with EF 54% and diastolic LV dysfunction based on TTE in 2018), and HTN presents to [facility] ED c/o diffuse body pain. Patient recently discharged 3/23/19 from [facility] after being hospitalized for thrombocytopenia.     Patient started to develop severe non-radiating throbbing 10/10 pain in his b/l shoulders, chest, abdomen, and b/l thighs since  night - leaving him essentially bedbound. Was supposed to see Dr. Murphy for follow up today. However because of the pain he came to the ED (26 Mar 2019 11:51)      PAST MEDICAL & SURGICAL HISTORY:  Diverticulitis  Multiple myeloma  Hypertension  Left ventricular dysfunction  Cardiomyopathy  Former smoker: (1 ppd x 11 years; Quit ~age 26)  History of bone marrow transplant  History of surgery: s/p exploratory laparotomy with sigmoid resection and colostomy on 2018      No Known Allergies       MEDICATIONS  (STANDING):  carvedilol 25 milliGRAM(s) Oral every 12 hours    MEDICATIONS  (PRN):  artificial  tears Solution 1 Drop(s) Both EYES every 6 hours PRN Dry Eyes  HYDROmorphone  Injectable 2 milliGRAM(s) IV Push every 4 hours PRN Severe Pain (7 - 10)  HYDROmorphone  Injectable 1 milliGRAM(s) IV Push every 4 hours PRN Moderate Pain (4 - 6)  oxyCODONE    IR 10 milliGRAM(s) Oral every 6 hours PRN Mild Pain (1 - 3)      REVIEW OF SYSTEMS:  CONSTITUTIONAL: (+) malaise. pain.  EYES: No acute change in vision   ENT:  No tinnitus  NECK: No stiffness  RESPIRATORY: gates. sob. No hemoptysis  CARDIOVASCULAR: No chest pain, palpitations, syncope  GASTROINTESTINAL: No hematemesis, diarrhea, melena, or hematochezia.  GENITOURINARY: No hematuria  NEUROLOGICAL: No headaches  LYMPH Nodes: No enlarged glands  ENDOCRINE: No heat or cold intolerance	    T(C): 37 (19 @ 05:03), Max: 37.4 (19 @ 20:56)  HR: 96 (19 @ 07:36) (96 - 109)  BP: 108/69 (19 @ 07:36) (103/57 - 126/76)  RR: 20 (19 @ 05:03) (16 - 20)  SpO2: 98% (19 @ 05:03) (98% - 100%)    PHYSICAL EXAMINATION:   Constitutional: ill appearing but mildly improving. WD  HEENT: NC, AT  Neck:  Supple  Respiratory: b/l lower lobe rales. Adequate airflow b/l. Not using accessory muscles of respiration.  Cardiovascular:  S1 & S2 intact, no R/G, 2+ radial pulses b/l  Gastrointestinal: Soft, NT, ND, normoactive b.s., no organomegaly/RT/rigidity  Extremities: WWP  Neurological:  Alert and awake.Crude sensation intact.     Labs and imaging reviewed    LABS:                        8.6    3.04  )-----------( 36       ( 28 Mar 2019 06:46 )             26.1         135  |  99  |  32<H>  ----------------------------<  118<H>  3.7   |  21<L>  |  1.66<H>    Ca    7.4<L>      28 Mar 2019 06:46  Phos  2.6       Mg     2.1         TPro  7.8  /  Alb  2.8<L>  /  TBili  0.8  /  DBili  x   /  AST  26  /  ALT  30  /  AlkPhos  138<H>            Urinalysis Basic - ( 28 Mar 2019 06:50 )    Color: YELLOW / Appearance: CLEAR / S.014 / pH: 6.0  Gluc: NEGATIVE / Ketone: NEGATIVE  / Bili: NEGATIVE / Urobili: NORMAL   Blood: NEGATIVE / Protein: 100 / Nitrite: NEGATIVE   Leuk Esterase: NEGATIVE / RBC: 0-2 / WBC 6-10   Sq Epi: FEW / Non Sq Epi: x / Bacteria: NEGATIVE      CAPILLARY BLOOD GLUCOSE            LIVER FUNCTIONS - ( 28 Mar 2019 06:46 )  Alb: 2.8 g/dL / Pro: 7.8 g/dL / ALK PHOS: 138 u/L / ALT: 30 u/L / AST: 26 u/L / GGT: x               RADIOLOGY & ADDITIONAL STUDIES:

## 2019-03-28 NOTE — PROGRESS NOTE ADULT - PROBLEM SELECTOR PLAN 1
Cr inc, 1.19 to 1.6  in setting of Multiple myeloma, uncontrolled,not on meds  sepsis  bp slightly low, Low urine Na. likely Volume depletion  Give IV  cc hrly  for 24 hrs, re-evaluate in AM

## 2019-03-28 NOTE — CHART NOTE - NSCHARTNOTEFT_GEN_A_CORE
Spoke with Dr. Castro regarding ID recs of Ct chest. Will hold off for now- will get repeat Chest xray in AM to assess pleural effusions. Attending recommended stopping IV fluids at this time.

## 2019-03-28 NOTE — PROGRESS NOTE ADULT - SUBJECTIVE AND OBJECTIVE BOX
Pt seen and examined at bedside  feels fairly well  still c/o inc  bone,body pains, sligtly better  eating poorly  no n/v/d  no dyspnea  urinating ok      Allergies:  No Known Allergies    Hospital Medications:   MEDICATIONS  (STANDING):  carvedilol 25 milliGRAM(s) Oral every 12 hours         VITALS:  T(F): 98.6 (19 @ 05:03), Max: 99.4 (19 @ 20:56)  HR: 96 (19 @ 07:36)  BP: 108/69 (19 @ 07:36)  RR: 20 (19 @ 05:03)  SpO2: 98% (19 @ 05:03)  Wt(kg): --      PHYSICAL EXAM:  Constitutional: NAD  HEENT: anicteric sclera, oropharynx clear, MMM  Neck: No JVD  Respiratory: CTAB, no wheezes, rales or rhonchi  Cardiovascular: S1, S2, RRR  Gastrointestinal: BS+, soft, NT/ND, colostomy in place  Extremities: No cyanosis or clubbing. No peripheral edema  Neurological: A/O x 3, no focal deficits  Psychiatric: Normal mood, normal affect  : No CVA tenderness. No quintero.   Skin: No rashes       LABS:      135  |  99  |  32<H>  ----------------------------<  118<H>  3.7   |  21<L>  |  1.66<H>    Ca    7.4<L>      28 Mar 2019 06:46  Phos  2.6       Mg     2.1         TPro  7.8  /  Alb  2.8<L>  /  TBili  0.8  /  DBili      /  AST  26  /  ALT  30  /  AlkPhos  138<H>      Creatinine Trend: 1.66 <--, 1.37 <--, 1.19 <--, 1.48 <--, 1.31 <--                        8.6    3.04  )-----------( 36       ( 28 Mar 2019 06:46 )             26.1     Urine Studies:  Urinalysis Basic - ( 28 Mar 2019 06:50 )    Color: YELLOW / Appearance: CLEAR / S.014 / pH: 6.0  Gluc: NEGATIVE / Ketone: NEGATIVE  / Bili: NEGATIVE / Urobili: NORMAL   Blood: NEGATIVE / Protein: 100 / Nitrite: NEGATIVE   Leuk Esterase: NEGATIVE / RBC: 0-2 / WBC 6-10   Sq Epi: FEW / Non Sq Epi:  / Bacteria: NEGATIVE      Protein, Random Urine: 111.6 mg/dL ( @ 06:50)  Creatinine, Random Urine: 91.40 mg/dL ( @ 06:50)  Sodium, Random Urine: 20 mmol/L ( @ 06:50)    RADIOLOGY & ADDITIONAL STUDIES:

## 2019-03-28 NOTE — PROGRESS NOTE ADULT - SUBJECTIVE AND OBJECTIVE BOX
[facility]      Seen earlier today, and examined.  - Today, patient is without complaints.           *****MEDICATIONS: Current medication reviewed and documented.    MEDICATIONS  (STANDING):  carvedilol 25 milliGRAM(s) Oral every 12 hours  sodium chloride 0.9%. 1000 milliLiter(s) (100 mL/Hr) IV Continuous <Continuous>    MEDICATIONS  (PRN):  artificial  tears Solution 1 Drop(s) Both EYES every 6 hours PRN Dry Eyes  HYDROmorphone  Injectable 2 milliGRAM(s) IV Push every 4 hours PRN Severe Pain (7 - 10)  HYDROmorphone  Injectable 1 milliGRAM(s) IV Push every 4 hours PRN Moderate Pain (4 - 6)  oxyCODONE    IR 10 milliGRAM(s) Oral every 6 hours PRN Mild Pain (1 - 3)          ***** VITAL SIGNS:  T(F): 97.5 (19 @ 12:36), Max: 99.4 (19 @ 20:56)  HR: 101 (19 @ 17:22) (72 - 109)  BP: 117/79 (19 @ 17:22) (108/69 - 126/76)  RR: 18 (19 @ 12:36) (17 - 20)  SpO2: 100% (19 @ 12:36) (98% - 100%)  Wt(kg): --  ,   I&O's Summary           *****PHYSICAL EXAM:   alert oriented x 3 attention comprehension are fair.  Able to name, repeat.   EOmi fundi not visualized   no nystagmus VFF to confrontation  Tongue is midline  Palate elevates symmetrically   Moving all 4 ext spontaneously no drift appreciated    Gait not assessed.            *****LAB AND IMAGIN.6    3.04  )-----------( 36       ( 28 Mar 2019 06:46 )             26.1                   135  |  99  |  32<H>  ----------------------------<  118<H>  3.7   |  21<L>  |  1.66<H>    Ca    7.4<L>      28 Mar 2019 06:46  Phos  2.6       Mg     2.1         TPro  7.8  /  Alb  2.8<L>  /  TBili  0.8  /  DBili  x   /  AST  26  /  ALT  30  /  AlkPhos  138<H>                         Urinalysis Basic - ( 28 Mar 2019 06:50 )    Color: YELLOW / Appearance: CLEAR / S.014 / pH: 6.0  Gluc: NEGATIVE / Ketone: NEGATIVE  / Bili: NEGATIVE / Urobili: NORMAL   Blood: NEGATIVE / Protein: 100 / Nitrite: NEGATIVE   Leuk Esterase: NEGATIVE / RBC: 0-2 / WBC 6-10   Sq Epi: FEW / Non Sq Epi: x / Bacteria: NEGATIVE      [All pertinent recent Imaging/Reports reviewed]  Creatine Kinase, Serum: 65: SPECIMEN MILDLY HEMOLYZED  CKMB is no longer reflexively performed on elevated CK  results.  To get CKMB results please order "CK AND CKMB".  Effective Brianna 15, 2016. u/L (19 @ 19:50)             *****A S S E S S M E N T   A N D   P L A N :      3M hx of multiple myeloma s/p bone marrow transplant (~1.5 yrs ago) and chemo/RT (stopped ~2 months ago in preparation for colostomy reversal), perforated diverticulitis s/p Kiah's (proctosigmoidectomy with colostomy, 2018), nonischemic cardiomyopathy (mild global LV systolic dysfunction with EF 54% and diastolic LV dysfunction based on TTE in 2018), and HTN presents to [facility] ED c/o diffuse body pain. Patient recently discharged 3/23/19 from [facility] after being hospitalized for thrombocytopenia. Pt presents with severe pain all over a day after discharge. Pt was discharged after multiple platelet infusion.         Problem/Recommendations 1:  pain generalized of unclear etiology    likely due to relapsed myeloma   extensive bony infiltration consider rt for palliative rt     ? somatoform/functional pain as pt gives poor effort.   hematology consult appreciated.    pain management per primary   nl  cpk    mobility improving, pt recommending jeniffer.     Thank you for allowing me to participate in the care of this patient. Please do not hesitate to call me if you have any  questions.        ________________  Margret Yousif MD  [facility] ([facility]  [phone]      30 minutes spent on total encounter; more than 50 % of the visit was  spent counseling about plan of care, compliance to diet/exercise and medication regimen and or  coordinating care by the attending physician.      It is advised that s stroke patients follow up with SAMUEL Mckeon @ [phone] in 1- 2 weeks.   Others please follow up with Dr. Michael Nissenbaum [phone]

## 2019-03-28 NOTE — PROGRESS NOTE ADULT - ASSESSMENT
A  54 yo Male with multiple myeloma s/p bone marrow transplant (~1.5 yrs ago) and Perforated  Diverticulitis ,s/p  Kiah's (proctosigmoidectomy with colostomy, on 9/2/2018  and now chemo/RT (stopped ~2 months ago in preparation for colostomy reversal, who presents to the ER for evaluation  of diffuse body pain. Patient recently discharged, on 3/23/19, from [facility] after being hospitalized for thrombocytopenia. On admission, he found to have Fever, Temp. of 100.8, Tachycardia, HR of 110, and received dose of Zosyn and Vancomycin. The cultures are negative to date. The ID consult requested to assist with further evaluation and antibiotic management.     # Sepsis - ( fever + tachycardia ) -source to be determined   # B/L Pleural effusion- on MRI  # RVP- Negative  # Multiple  Myeloma    would recommend:  1. Follow up final blood cultures  2. Obtain Procalcitonin level  3. Pain management as per Primary  4. Monitor Temp. and c/w supportive care  5. Repeat Chest Imaging to reassess the pleural effusion, may need diagnostic thoracentesis     d/w patient     will follow the patient with you A  52 yo Male with multiple myeloma s/p bone marrow transplant (~1.5 yrs ago) and Perforated  Diverticulitis ,s/p  Kiah's (proctosigmoidectomy with colostomy, on 9/2/2018  and now chemo/RT (stopped ~2 months ago in preparation for colostomy reversal, who presents to the ER for evaluation  of diffuse body pain. Patient recently discharged, on 3/23/19, from [facility] after being hospitalized for thrombocytopenia. On admission, he found to have Fever, Temp. of 100.8, Tachycardia, HR of 110, and received dose of Zosyn and Vancomycin. The cultures are negative to date. The ID consult requested to assist with further evaluation and antibiotic management.     # Sepsis - ( fever + tachycardia ) -source to be determined   # B/L Pleural effusion- on MRI  # RVP- Negative  # Multiple  Myeloma  # UTI- 3/28/19  # Elevated Procalcitonin level    would recommend:  1. Follow up urine  culture and start on Ceftriaxone until culture resulted  2. Pain management as per Primary  3. Monitor Temp. and c/w supportive care  4. Repeat Chest Imaging to reassess the pleural effusion, may need diagnostic thoracentesis     d/w patient, family at the bed side and Nursing staff    will follow the patient with you

## 2019-03-29 ENCOUNTER — TRANSCRIPTION ENCOUNTER (OUTPATIENT)
Age: 54
End: 2019-03-29

## 2019-03-29 LAB
ALBUMIN SERPL ELPH-MCNC: 2.9 G/DL — LOW (ref 3.3–5)
ALP SERPL-CCNC: 178 U/L — HIGH (ref 40–120)
ALT FLD-CCNC: 28 U/L — SIGNIFICANT CHANGE UP (ref 4–41)
ANION GAP SERPL CALC-SCNC: 14 MMO/L — SIGNIFICANT CHANGE UP (ref 7–14)
AST SERPL-CCNC: 27 U/L — SIGNIFICANT CHANGE UP (ref 4–40)
BACTERIA UR CULT: SIGNIFICANT CHANGE UP
BASOPHILS # BLD AUTO: 0.03 K/UL — SIGNIFICANT CHANGE UP (ref 0–0.2)
BASOPHILS NFR BLD AUTO: 0.7 % — SIGNIFICANT CHANGE UP (ref 0–2)
BILIRUB SERPL-MCNC: 0.6 MG/DL — SIGNIFICANT CHANGE UP (ref 0.2–1.2)
BUN SERPL-MCNC: 25 MG/DL — HIGH (ref 7–23)
CALCIUM SERPL-MCNC: 7.5 MG/DL — LOW (ref 8.4–10.5)
CHLORIDE SERPL-SCNC: 102 MMOL/L — SIGNIFICANT CHANGE UP (ref 98–107)
CO2 SERPL-SCNC: 21 MMOL/L — LOW (ref 22–31)
CREAT SERPL-MCNC: 1.28 MG/DL — SIGNIFICANT CHANGE UP (ref 0.5–1.3)
EOSINOPHIL # BLD AUTO: 0.01 K/UL — SIGNIFICANT CHANGE UP (ref 0–0.5)
EOSINOPHIL NFR BLD AUTO: 0.2 % — SIGNIFICANT CHANGE UP (ref 0–6)
GLUCOSE SERPL-MCNC: 111 MG/DL — HIGH (ref 70–99)
HCT VFR BLD CALC: 25.5 % — LOW (ref 39–50)
HGB BLD-MCNC: 8.5 G/DL — LOW (ref 13–17)
IMM GRANULOCYTES NFR BLD AUTO: 5.1 % — HIGH (ref 0–1.5)
LYMPHOCYTES # BLD AUTO: 1.3 K/UL — SIGNIFICANT CHANGE UP (ref 1–3.3)
LYMPHOCYTES # BLD AUTO: 28.9 % — SIGNIFICANT CHANGE UP (ref 13–44)
MANUAL SMEAR VERIFICATION: SIGNIFICANT CHANGE UP
MCHC RBC-ENTMCNC: 28.8 PG — SIGNIFICANT CHANGE UP (ref 27–34)
MCHC RBC-ENTMCNC: 33.3 % — SIGNIFICANT CHANGE UP (ref 32–36)
MCV RBC AUTO: 86.4 FL — SIGNIFICANT CHANGE UP (ref 80–100)
MONOCYTES # BLD AUTO: 0.94 K/UL — HIGH (ref 0–0.9)
MONOCYTES NFR BLD AUTO: 20.9 % — HIGH (ref 2–14)
NEUTROPHILS # BLD AUTO: 1.99 K/UL — SIGNIFICANT CHANGE UP (ref 1.8–7.4)
NEUTROPHILS NFR BLD AUTO: 44.2 % — SIGNIFICANT CHANGE UP (ref 43–77)
NRBC # FLD: 0.1 K/UL — SIGNIFICANT CHANGE UP (ref 0–0)
NRBC FLD-RTO: 2.2 — SIGNIFICANT CHANGE UP
PLATELET # BLD AUTO: 22 K/UL — LOW (ref 150–400)
PMV BLD: SIGNIFICANT CHANGE UP FL (ref 7–13)
POTASSIUM SERPL-MCNC: 3.7 MMOL/L — SIGNIFICANT CHANGE UP (ref 3.5–5.3)
POTASSIUM SERPL-SCNC: 3.7 MMOL/L — SIGNIFICANT CHANGE UP (ref 3.5–5.3)
PROCALCITONIN SERPL-MCNC: 2.42 NG/ML — HIGH (ref 0.02–0.1)
PROT SERPL-MCNC: 7.8 G/DL — SIGNIFICANT CHANGE UP (ref 6–8.3)
RBC # BLD: 2.95 M/UL — LOW (ref 4.2–5.8)
RBC # FLD: 17.9 % — HIGH (ref 10.3–14.5)
SODIUM SERPL-SCNC: 137 MMOL/L — SIGNIFICANT CHANGE UP (ref 135–145)
SPECIMEN SOURCE: SIGNIFICANT CHANGE UP
WBC # BLD: 4.5 K/UL — SIGNIFICANT CHANGE UP (ref 3.8–10.5)
WBC # FLD AUTO: 4.5 K/UL — SIGNIFICANT CHANGE UP (ref 3.8–10.5)

## 2019-03-29 PROCEDURE — 99222 1ST HOSP IP/OBS MODERATE 55: CPT | Mod: GC

## 2019-03-29 PROCEDURE — 71045 X-RAY EXAM CHEST 1 VIEW: CPT | Mod: 26

## 2019-03-29 PROCEDURE — 93306 TTE W/DOPPLER COMPLETE: CPT | Mod: 26

## 2019-03-29 RX ORDER — CEFTRIAXONE 500 MG/1
1 INJECTION, POWDER, FOR SOLUTION INTRAMUSCULAR; INTRAVENOUS EVERY 24 HOURS
Qty: 0 | Refills: 0 | Status: DISCONTINUED | OUTPATIENT
Start: 2019-03-29 | End: 2019-03-31

## 2019-03-29 RX ADMIN — HYDROMORPHONE HYDROCHLORIDE 2 MILLIGRAM(S): 2 INJECTION INTRAMUSCULAR; INTRAVENOUS; SUBCUTANEOUS at 23:18

## 2019-03-29 RX ADMIN — CEFTRIAXONE 100 GRAM(S): 500 INJECTION, POWDER, FOR SOLUTION INTRAMUSCULAR; INTRAVENOUS at 22:35

## 2019-03-29 RX ADMIN — HYDROMORPHONE HYDROCHLORIDE 2 MILLIGRAM(S): 2 INJECTION INTRAMUSCULAR; INTRAVENOUS; SUBCUTANEOUS at 11:39

## 2019-03-29 RX ADMIN — HYDROMORPHONE HYDROCHLORIDE 2 MILLIGRAM(S): 2 INJECTION INTRAMUSCULAR; INTRAVENOUS; SUBCUTANEOUS at 17:14

## 2019-03-29 RX ADMIN — HYDROMORPHONE HYDROCHLORIDE 2 MILLIGRAM(S): 2 INJECTION INTRAMUSCULAR; INTRAVENOUS; SUBCUTANEOUS at 16:44

## 2019-03-29 RX ADMIN — CARVEDILOL PHOSPHATE 25 MILLIGRAM(S): 80 CAPSULE, EXTENDED RELEASE ORAL at 05:25

## 2019-03-29 RX ADMIN — HYDROMORPHONE HYDROCHLORIDE 2 MILLIGRAM(S): 2 INJECTION INTRAMUSCULAR; INTRAVENOUS; SUBCUTANEOUS at 11:09

## 2019-03-29 RX ADMIN — CARVEDILOL PHOSPHATE 25 MILLIGRAM(S): 80 CAPSULE, EXTENDED RELEASE ORAL at 17:15

## 2019-03-29 RX ADMIN — HYDROMORPHONE HYDROCHLORIDE 2 MILLIGRAM(S): 2 INJECTION INTRAMUSCULAR; INTRAVENOUS; SUBCUTANEOUS at 05:24

## 2019-03-29 NOTE — PROGRESS NOTE ADULT - SUBJECTIVE AND OBJECTIVE BOX
Pt is seen and examined  pt is awake and lying in bed   feels much better  ambulating  pain controlled      PAST MEDICAL & SURGICAL HISTORY:  Diverticulitis  Multiple myeloma  Hypertension  Left ventricular dysfunction  Cardiomyopathy  Former smoker: (1 ppd x 11 years; Quit ~age 26)  History of bone marrow transplant  History of surgery: s/p exploratory laparotomy with sigmoid resection and colostomy on 9/2/2018      ROS:  Negative except for:    MEDICATIONS  (STANDING):  carvedilol 25 milliGRAM(s) Oral every 12 hours  cefTRIAXone   IVPB 1 Gram(s) IV Intermittent every 24 hours    MEDICATIONS  (PRN):  artificial  tears Solution 1 Drop(s) Both EYES every 6 hours PRN Dry Eyes  HYDROmorphone  Injectable 2 milliGRAM(s) IV Push every 4 hours PRN Severe Pain (7 - 10)  HYDROmorphone  Injectable 1 milliGRAM(s) IV Push every 4 hours PRN Moderate Pain (4 - 6)  oxyCODONE    IR 10 milliGRAM(s) Oral every 6 hours PRN Mild Pain (1 - 3)      Allergies    No Known Allergies    Intolerances        Vital Signs Last 24 Hrs  T(C): 37 (29 Mar 2019 05:19), Max: 37 (29 Mar 2019 05:19)  T(F): 98.6 (29 Mar 2019 05:19), Max: 98.6 (29 Mar 2019 05:19)  HR: 103 (29 Mar 2019 11:08) (98 - 103)  BP: 118/72 (29 Mar 2019 11:08) (117/79 - 139/80)  BP(mean): --  RR: 18 (29 Mar 2019 11:08) (18 - 18)  SpO2: 100% (29 Mar 2019 11:08) (100% - 100%)    PHYSICAL EXAM  General: adult in NAD Looks Chronically Ill  HEENT: clear oropharynx, anicteric sclera, pink conjunctiva  Neck: supple  CV: normal S1/S2 with no murmur rubs or gallops  Lungs: positive air movement b/l ant lungs,clear to auscultation, no wheezes, no rales  Abdomen: soft non-tender non-distended, no hepatosplenomegaly  Ext: no clubbing cyanosis or edema     LABS:                          8.5    4.50  )-----------( 22       ( 29 Mar 2019 06:14 )             25.5     Serial CBC's  03-29 @ 06:14  Hct-25.5 / Hgb-8.5 / Plat-22 / RBC-2.95 / WBC-4.50          Serial CBC's  03-28 @ 06:46  Hct-26.1 / Hgb-8.6 / Plat-36 / RBC-2.98 / WBC-3.04            03-29    137  |  102  |  25<H>  ----------------------------<  111<H>  3.7   |  21<L>  |  1.28    Ca    7.5<L>      29 Mar 2019 06:14  Phos  2.6     03-28  Mg     2.1     03-28    TPro  7.8  /  Alb  2.9<L>  /  TBili  0.6  /  DBili  x   /  AST  27  /  ALT  28  /  AlkPhos  178<H>  03-29          WBC Count: 4.50 K/uL (03-29 @ 06:14)  Hemoglobin: 8.5 g/dL (03-29 @ 06:14)            RADIOLOGY & ADDITIONAL STUDIES:

## 2019-03-29 NOTE — PROGRESS NOTE ADULT - SUBJECTIVE AND OBJECTIVE BOX
[facility]      Seen earlier today, and examined.  - Today, patient is without complaints.           *****MEDICATIONS: Current medication reviewed and documented.    MEDICATIONS  (STANDING):  carvedilol 25 milliGRAM(s) Oral every 12 hours  cefTRIAXone   IVPB 1 Gram(s) IV Intermittent every 24 hours    MEDICATIONS  (PRN):  artificial  tears Solution 1 Drop(s) Both EYES every 6 hours PRN Dry Eyes  HYDROmorphone  Injectable 2 milliGRAM(s) IV Push every 4 hours PRN Severe Pain (7 - 10)  HYDROmorphone  Injectable 1 milliGRAM(s) IV Push every 4 hours PRN Moderate Pain (4 - 6)  oxyCODONE    IR 10 milliGRAM(s) Oral every 6 hours PRN Mild Pain (1 - 3)          ***** VITAL SIGNS:  T(F): 97.4 (19 @ 13:25), Max: 98.6 (19 @ 05:19)  HR: 105 (19 @ 13:25) (98 - 105)  BP: 129/84 (19 @ 13:25) (117/70 - 139/80)  RR: 18 (19 @ 13:25) (18 - 18)  SpO2: 100% (19 @ 13:25) (100% - 100%)  Wt(kg): --  ,   I&O's Summary           *****PHYSICAL EXAM:   alert oriented x 3 attention comprehension are fair.  Able to name, repeat.   EOmi fundi not visualized   no nystagmus VFF to confrontation  Tongue is midline  Palate elevates symmetrically   Moving all 4 ext spontaneously no drift appreciated    Gait not assessed.            *****LAB AND IMAGIN.5    4.50  )-----------(        ( 29 Mar 2019 06:14 )             25.5               -    137  |  102  |  25<H>  ----------------------------<  111<H>  3.7   |  21<L>  |  1.28    Ca    7.5<L>      29 Mar 2019 06:14  Phos  2.6     -  Mg     2.1     -    TPro  7.8  /  Alb  2.9<L>  /  TBili  0.6  /  DBili  x   /  AST  27  /  ALT  28  /  AlkPhos  178<H>  03-                       Urinalysis Basic - ( 28 Mar 2019 06:50 )    Color: YELLOW / Appearance: CLEAR / S.014 / pH: 6.0  Gluc: NEGATIVE / Ketone: NEGATIVE  / Bili: NEGATIVE / Urobili: NORMAL   Blood: NEGATIVE / Protein: 100 / Nitrite: NEGATIVE   Leuk Esterase: NEGATIVE / RBC: 0-2 / WBC 6-10   Sq Epi: FEW / Non Sq Epi: x / Bacteria: NEGATIVE      [All pertinent recent Imaging/Reports reviewed]           *****A S S E S S M E N T   A N D   P L A N :      53M hx of multiple myeloma s/p bone marrow transplant (~1.5 yrs ago) and chemo/RT (stopped ~2 months ago in preparation for colostomy reversal), perforated diverticulitis s/p Kiah's (proctosigmoidectomy with colostomy, 2018), nonischemic cardiomyopathy (mild global LV systolic dysfunction with EF 54% and diastolic LV dysfunction based on TTE in 2018), and HTN presents to [facility] ED c/o diffuse body pain. Patient recently discharged 3/23/19 from [facility] after being hospitalized for thrombocytopenia. Pt presents with severe pain all over a day after discharge. Pt was discharged after multiple platelet infusion.         Problem/Recommendations 1:  pain generalized of unclear etiology    likely due to relapsed myeloma   extensive bony infiltration consider rt for palliative rt     ? somatoform/functional pain as pt gives poor effort.   hematology consult appreciated.    pain management per primary   nl  cpk    mobility improving        Thank you for allowing me to participate in the care of this patient. Please do not hesitate to call me if you have any  questions.        ________________  Margret Yousif MD  [facility] (PN)[facility]  [phone]      30 minutes spent on total encounter; more than 50 % of the visit was  spent counseling about plan of care, compliance to diet/exercise and medication regimen and or  coordinating care by the attending physician.      It is advised that s stroke patients follow up with NP Zulema Mckeon @ [phone] in 1- 2 weeks.   Others please follow up with Dr. Michael Nissenbaum [phone]

## 2019-03-29 NOTE — PROGRESS NOTE ADULT - SUBJECTIVE AND OBJECTIVE BOX
CARDIOLOGY ATTENDING    no tele    no palpitations, no syncope, no angina    artificial  tears Solution 1 Drop(s) Both EYES every 6 hours PRN  carvedilol 25 milliGRAM(s) Oral every 12 hours  cefTRIAXone   IVPB 1 Gram(s) IV Intermittent every 24 hours  HYDROmorphone  Injectable 2 milliGRAM(s) IV Push every 4 hours PRN  HYDROmorphone  Injectable 1 milliGRAM(s) IV Push every 4 hours PRN  oxyCODONE    IR 10 milliGRAM(s) Oral every 6 hours PRN                            8.5    4.50  )-----------( 22       ( 29 Mar 2019 06:14 )             25.5       03-29    137  |  102  |  25<H>  ----------------------------<  111<H>  3.7   |  21<L>  |  1.28    Ca    7.5<L>      29 Mar 2019 06:14  Phos  2.6     03-28  Mg     2.1     03-28    TPro  7.8  /  Alb  2.9<L>  /  TBili  0.6  /  DBili  x   /  AST  27  /  ALT  28  /  AlkPhos  178<H>  03-29            T(C): 36.3 (03-29-19 @ 13:25), Max: 37 (03-29-19 @ 05:19)  HR: 105 (03-29-19 @ 13:25) (98 - 105)  BP: 129/84 (03-29-19 @ 13:25) (117/70 - 139/80)  RR: 18 (03-29-19 @ 13:25) (18 - 18)  SpO2: 100% (03-29-19 @ 13:25) (100% - 100%)  Wt(kg): --    no JVD  RRR, no murmurs  CTAB  soft nt/nd  no c/c/e      A/P) 53 male PMH multiple myeloma s/p bone marrow transplant and chemo/RT, perforated diverticulitis s/p Kiah's (proctosigmoidectomy with colostomy, 9/2018), nonischemic cardiomyopathy (mild global LV systolic dysfunction with EF 54% and diastolic LV dysfunction based on TTE in 11/2018), and HTN who is being seen for management of cardiomyopathy.    -pt. with no anginal or heart failure symptoms   -continue with coreg for history of cardiomyopathy  -pending ECHO to evaluate LV function  -ID note appreciated, BC (3/25/19) with NGTD  -MRI results noted, neurology following  -Renal note appreciated, following for elevated Cr   -continued management per primary team   -if LVEF remains unremarkable then no further inpatient cardiac workup expected

## 2019-03-29 NOTE — PROGRESS NOTE ADULT - PROBLEM SELECTOR PLAN 1
improving overall, 2/2 multiple myeloma relapse  c./w pain control  f/u ct chest to r/o infectious process.  If negative infectios process, and continued ability to safely ambulate with pain control, then d/c planning per hem/onc recs (ie: inpt/outpt chemo)

## 2019-03-29 NOTE — PROGRESS NOTE ADULT - SUBJECTIVE AND OBJECTIVE BOX
Patient is seen and examined at the bed side, is afebrile. He is doing better.  The WBC count is coming up, leukopenia resolving. The Urine culture has no growth.        REVIEW OF SYSTEMS: All other review systems are negative      ALLERGIES: No Known Allergies        Vital Signs Last 24 Hrs  T(C): 37 (29 Mar 2019 05:19), Max: 37 (29 Mar 2019 05:19)  T(F): 98.6 (29 Mar 2019 05:19), Max: 98.6 (29 Mar 2019 05:19)  HR: 98 (29 Mar 2019 05:19) (72 - 101)  BP: 139/80 (29 Mar 2019 05:19) (112/63 - 139/80)  BP(mean): --  RR: 18 (29 Mar 2019 05:19) (18 - 18)  SpO2: 100% (29 Mar 2019 05:19) (100% - 100%)      PHYSICAL EXAM:  GENERAL: Not in  acute distress   CHEST/LUNG:  Air entry  bilaterally  HEART: s1 and s2 present  ABDOMEN:  Colostomy bag in placed  EXTREMITIES: No pedal  edema  CNS: Awake and Alert        LABS:                        8.5    4.50  )-----------( 22       ( 29 Mar 2019 06:14 )             25.5                           8.6    3.04  )-----------( 36       ( 28 Mar 2019 06:46 )             26.1                           8.2    2.36  )-----------( 33       ( 27 Mar 2019 07:22 )             24.4         -    137  |  102  |  25<H>  ----------------------------<  111<H>  3.7   |  21<L>  |  1.28    Ca    7.5<L>      29 Mar 2019 06:14  Phos  2.6       Mg     2.1         TPro  7.8  /  Alb  2.9<L>  /  TBili  0.6  /  DBili  x   /  AST  27  /  ALT  28  /  AlkPhos  178<H>      135  |  99  |  32<H>  ----------------------------<  118<H>  3.7   |  21<L>  |  1.66<H>    Ca    7.4<L>      28 Mar 2019 06:46  Phos  2.6       Mg     2.1         TPro  7.8  /  Alb  2.8<L>  /  TBili  0.8  /  DBili  x   /  AST  26  /  ALT  30  /  AlkPhos  138<H>        Urinalysis Basic - ( 25 Mar 2019 20:38 )  Color: YELLOW / Appearance: CLEAR / S.019 / pH: 6.0  Gluc: NEGATIVE / Ketone: NEGATIVE  / Bili: NEGATIVE / Urobili: NORMAL   Blood: TRACE / Protein: 200 / Nitrite: NEGATIVE   Leuk Esterase: NEGATIVE / RBC: 11-25 / WBC 3-5   Sq Epi: OCC / Non Sq Epi: x / Bacteria: NEGATIVE        MEDICATIONS  (STANDING):  carvedilol 25 milliGRAM(s) Oral every 12 hours    MEDICATIONS  (PRN):  artificial  tears Solution 1 Drop(s) Both EYES every 6 hours PRN Dry Eyes  HYDROmorphone  Injectable 2 milliGRAM(s) IV Push every 4 hours PRN Severe Pain (7 - 10)  HYDROmorphone  Injectable 1 milliGRAM(s) IV Push every 4 hours PRN Moderate Pain (4 - 6)  oxyCODONE    IR 10 milliGRAM(s) Oral every 6 hours PRN Mild Pain (1 - 3)      Procalcitonin, Serum (19 @ 16:46)    Procalcitonin, Serum: 8.48: Procalcitonin (PCT) Interpretation (ng/mL) - Diagnosis of  systemic bacterial infection/sepsis:          RADIOLOGY & ADDITIONAL TESTS:      3/27/19 : MR Lumbar Spine No Cont (19 @ 10:38) Abnormal T1 prolongation involving the cervical thoracic lumbar and sacral spine region as well as both iliac bones. This is likely related to patient's known multiple myeloma.  Bilateral pleural effusions are seen.      3/25/19 : US Abdomen Limited (19 @ 21:38) Normal right upper quadrant abdominal ultrasound.  Small right pleural effusion.        MICROBIOLOGY DATA:    Culture - Urine in AM (19 @ 07:41)    Culture - Urine:   NO GROWTH AT 24 HOURS    Specimen Source: URINE MIDSTREAM      Culture - Urine (19 @ 21:18)    Culture - Urine:   Culture grew 3 or more types of organisms which indicate  collection contamination; consider recollection only if  clinically indicated.    Specimen Source: URINE MIDSTREAM      Culture - Blood (19 @ 20:26)    Culture - Blood:   NO ORGANISMS ISOLATED  NO ORGANISMS ISOLATED AT 48 HRS.    Specimen Source: BLOOD VENOUS      Culture - Blood (19 @ 20:26)    Culture - Blood:   NO ORGANISMS ISOLATED  NO ORGANISMS ISOLATED AT 48 HRS.    Specimen Source: BLOOD PERIPHERAL      Rapid Respiratory Viral Panel (19 @ 19:33)    Adenovirus (RapRVP): Not Detected    Influenza A (RapRVP): Not Detected    Influenza AH1 2009 (RapRVP): Not Detected    Influenza AH1 (RapRVP): Not Detected    Influenza AH3 (RapRVP): Not Detected    Influenza B (RapRVP): Not Detected    Parainfluenza 1 (RapRVP): Not Detected    Parainfluenza 2 (RapRVP): Not Detected    Parainfluenza 3 (RapRVP): Not Detected    Parainfluenza 4 (RapRVP): Not Detected    Resp Syncytial Virus (RapRVP): Not Detected    Chlamydia pneumoniae (RapRVP): Not Detected    Mycoplasma pneumoniae (RapRVP): Not Detected    Entero/Rhinovirus (RapRVP): Not Detected    hMPV (RapRVP): Not Detected    Coronavirus (229E,HKU1,NL63,OC43): Not Detected This Respiratory Panel uses polymerase chain reaction (PCR)  to detect for adenovirus; coronavirus (HKU1, NL63, 229E,  OC43); human metapneumovirus (hMPV); human  enterovirus/rhinovirus (Entero/RV); influenza A; influenza  A/H1: influenza A/H3; influenza A/H1-2009; influenza B;  parainfluenza viruses 1,2,3,4; respiratory syncytial virus;  Mycoplasma pneumoniae; and Chlamydophila pneumoniae. Patient is seen and examined at the bed side, is afebrile. He has no new complaints.   The WBC count is coming up, leukopenia resolving. The Urine culture has no growth.        REVIEW OF SYSTEMS: All other review systems are negative      ALLERGIES: No Known Allergies        Vital Signs Last 24 Hrs  T(C): 37 (29 Mar 2019 05:19), Max: 37 (29 Mar 2019 05:19)  T(F): 98.6 (29 Mar 2019 05:19), Max: 98.6 (29 Mar 2019 05:19)  HR: 98 (29 Mar 2019 05:19) (72 - 101)  BP: 139/80 (29 Mar 2019 05:19) (112/63 - 139/80)  BP(mean): --  RR: 18 (29 Mar 2019 05:19) (18 - 18)  SpO2: 100% (29 Mar 2019 05:19) (100% - 100%)      PHYSICAL EXAM:  GENERAL: Not in  acute distress   CHEST/LUNG:  Air entry  bilaterally  HEART: s1 and s2 present  ABDOMEN:  Colostomy bag in placed  EXTREMITIES: No pedal  edema  CNS: Awake and Alert        LABS:                        8.5    4.50  )-----------( 22       ( 29 Mar 2019 06:14 )             25.5                           8.6    3.04  )-----------( 36       ( 28 Mar 2019 06:46 )             26.1                           8.2    2.36  )-----------( 33       ( 27 Mar 2019 07:22 )             24.4         -29    137  |  102  |  25<H>  ----------------------------<  111<H>  3.7   |  21<L>  |  1.28    Ca    7.5<L>      29 Mar 2019 06:14  Phos  2.6     -  Mg     2.1         TPro  7.8  /  Alb  2.9<L>  /  TBili  0.6  /  DBili  x   /  AST  27  /  ALT  28  /  AlkPhos  178<H>      135  |  99  |  32<H>  ----------------------------<  118<H>  3.7   |  21<L>  |  1.66<H>    Ca    7.4<L>      28 Mar 2019 06:46  Phos  2.6       Mg     2.1         TPro  7.8  /  Alb  2.8<L>  /  TBili  0.8  /  DBili  x   /  AST  26  /  ALT  30  /  AlkPhos  138<H>        Urinalysis Basic - ( 25 Mar 2019 20:38 )  Color: YELLOW / Appearance: CLEAR / S.019 / pH: 6.0  Gluc: NEGATIVE / Ketone: NEGATIVE  / Bili: NEGATIVE / Urobili: NORMAL   Blood: TRACE / Protein: 200 / Nitrite: NEGATIVE   Leuk Esterase: NEGATIVE / RBC: 11-25 / WBC 3-5   Sq Epi: OCC / Non Sq Epi: x / Bacteria: NEGATIVE        MEDICATIONS  (STANDING):  carvedilol 25 milliGRAM(s) Oral every 12 hours    MEDICATIONS  (PRN):  artificial  tears Solution 1 Drop(s) Both EYES every 6 hours PRN Dry Eyes  HYDROmorphone  Injectable 2 milliGRAM(s) IV Push every 4 hours PRN Severe Pain (7 - 10)  HYDROmorphone  Injectable 1 milliGRAM(s) IV Push every 4 hours PRN Moderate Pain (4 - 6)  oxyCODONE    IR 10 milliGRAM(s) Oral every 6 hours PRN Mild Pain (1 - 3)      Procalcitonin, Serum (19 @ 16:46)    Procalcitonin, Serum: 8.48: Procalcitonin (PCT) Interpretation (ng/mL) - Diagnosis of  systemic bacterial infection/sepsis:          RADIOLOGY & ADDITIONAL TESTS:      3/27/19 : MR Lumbar Spine No Cont (19 @ 10:38) Abnormal T1 prolongation involving the cervical thoracic lumbar and sacral spine region as well as both iliac bones. This is likely related to patient's known multiple myeloma.  Bilateral pleural effusions are seen.      3/25/19 : US Abdomen Limited (19 @ 21:38) Normal right upper quadrant abdominal ultrasound.  Small right pleural effusion.        MICROBIOLOGY DATA:    Culture - Urine in AM (19 @ 07:41)    Culture - Urine:   NO GROWTH AT 24 HOURS    Specimen Source: URINE MIDSTREAM      Culture - Urine (19 @ 21:18)    Culture - Urine:   Culture grew 3 or more types of organisms which indicate  collection contamination; consider recollection only if  clinically indicated.    Specimen Source: URINE MIDSTREAM      Culture - Blood (19 @ 20:26)    Culture - Blood:   NO ORGANISMS ISOLATED  NO ORGANISMS ISOLATED AT 48 HRS.    Specimen Source: BLOOD VENOUS      Culture - Blood (19 @ 20:26)    Culture - Blood:   NO ORGANISMS ISOLATED  NO ORGANISMS ISOLATED AT 48 HRS.    Specimen Source: BLOOD PERIPHERAL      Rapid Respiratory Viral Panel (19 @ 19:33)    Adenovirus (RapRVP): Not Detected    Influenza A (RapRVP): Not Detected    Influenza AH1 2009 (RapRVP): Not Detected    Influenza AH1 (RapRVP): Not Detected    Influenza AH3 (RapRVP): Not Detected    Influenza B (RapRVP): Not Detected    Parainfluenza 1 (RapRVP): Not Detected    Parainfluenza 2 (RapRVP): Not Detected    Parainfluenza 3 (RapRVP): Not Detected    Parainfluenza 4 (RapRVP): Not Detected    Resp Syncytial Virus (RapRVP): Not Detected    Chlamydia pneumoniae (RapRVP): Not Detected    Mycoplasma pneumoniae (RapRVP): Not Detected    Entero/Rhinovirus (RapRVP): Not Detected    hMPV (RapRVP): Not Detected    Coronavirus (229E,HKU1,NL63,OC43): Not Detected This Respiratory Panel uses polymerase chain reaction (PCR)  to detect for adenovirus; coronavirus (HKU1, NL63, 229E,  OC43); human metapneumovirus (hMPV); human  enterovirus/rhinovirus (Entero/RV); influenza A; influenza  A/H1: influenza A/H3; influenza A/H1-2009; influenza B;  parainfluenza viruses 1,2,3,4; respiratory syncytial virus;  Mycoplasma pneumoniae; and Chlamydophila pneumoniae.

## 2019-03-29 NOTE — DISCHARGE NOTE PROVIDER - NSDCCAREPROVSEEN_GEN_ALL_CORE_FT
[facility], ADS  Jack Castro [facility], ADS  Jack Castro, Joseph Bolden

## 2019-03-29 NOTE — CONSULT NOTE ADULT - SUBJECTIVE AND OBJECTIVE BOX
Patient is a 53y old  Male who presents with a chief complaint of Diffuse body pain (29 Mar 2019 13:39)      HPI:  53M hx of multiple myeloma s/p bone marrow transplant (~1.5 yrs ago) and chemo/RT (stopped ~2 months ago in preparation for colostomy reversal), perforated diverticulitis s/p Kiah's (proctosigmoidectomy with colostomy, 2018), nonischemic cardiomyopathy (mild global LV systolic dysfunction with EF 54% and diastolic LV dysfunction based on TTE in 2018), and HTN presents to [facility] ED c/o diffuse body pain. Patient recently discharged 3/23/19 from [facility] after being hospitalized for thrombocytopenia.  Patient started to develop severe non-radiating throbbing 10/10 pain in his b/l shoulders, chest, abdomen, and b/l thighs since  night - leaving him essentially bedbound. Was supposed to see Dr. Murphy for follow up today. However because of the pain he came to the ED (26 Mar 2019 11:51)   On admission, he found to have Fever, Temp, tachycardia_> treated for sepsis.   h/o MM-> refractory to treatment-> supposed to follow up with onc as outpt   pain is better today and he is more ambulatory.     REVIEW OF SYSTEMS: No chest pain, shortness of breath, nausea, vomiting or diarhea.      PAST MEDICAL & SURGICAL HISTORY  Diverticulitis  Multiple myeloma  Hypertension  Mild mitral regurgitation  Mild aortic regurgitation  Left ventricular dysfunction  Cardiomyopathy  Diverticulitis of large intestine without perforation or abscess without bleeding  Former smoker  Heart failure  History of bone marrow transplant  History of surgery      SOCIAL HISTORY  Smoking - Denied, EtOH - Denied, Drugs - Denied    FUNCTIONAL HISTORY:   Lives with family   Independent PTA     CURRENT FUNCTIONAL STATUS:supervision       FAMILY HISTORY   Family history of diabetes mellitus  Family history of hypertension  No pertinent family history in first degree relatives      RECENT LABS/IMAGING  CBC Full  -  ( 29 Mar 2019 06:14 )  WBC Count : 4.50 K/uL  RBC Count : 2.95 M/uL  Hemoglobin : 8.5 g/dL  Hematocrit : 25.5 %  Platelet Count - Automated : 22 K/uL  Mean Cell Volume : 86.4 fL  Mean Cell Hemoglobin : 28.8 pg  Mean Cell Hemoglobin Concentration : 33.3 %  Auto Neutrophil # : 1.99 K/uL  Auto Lymphocyte # : 1.30 K/uL  Auto Monocyte # : 0.94 K/uL  Auto Eosinophil # : 0.01 K/uL  Auto Basophil # : 0.03 K/uL  Auto Neutrophil % : 44.2 %  Auto Lymphocyte % : 28.9 %  Auto Monocyte % : 20.9 %  Auto Eosinophil % : 0.2 %  Auto Basophil % : 0.7 %        137  |  102  |  25<H>  ----------------------------<  111<H>  3.7   |  21<L>  |  1.28    Ca    7.5<L>      29 Mar 2019 06:14  Phos  2.6       Mg     2.1         TPro  7.8  /  Alb  2.9<L>  /  TBili  0.6  /  DBili  x   /  AST  27  /  ALT  28  /  AlkPhos  178<H>      Urinalysis Basic - ( 28 Mar 2019 06:50 )    Color: YELLOW / Appearance: CLEAR / S.014 / pH: 6.0  Gluc: NEGATIVE / Ketone: NEGATIVE  / Bili: NEGATIVE / Urobili: NORMAL   Blood: NEGATIVE / Protein: 100 / Nitrite: NEGATIVE   Leuk Esterase: NEGATIVE / RBC: 0-2 / WBC 6-10   Sq Epi: FEW / Non Sq Epi: x / Bacteria: NEGATIVE        VITALS  T(C): 36.3 (19 @ 13:25), Max: 37 (19 @ 05:19)  HR: 105 (19 @ 13:25) (98 - 105)  BP: 129/84 (19 @ 13:25) (117/70 - 139/80)  RR: 18 (19 @ 13:25) (18 - 18)  SpO2: 100% (19 @ 13:25) (100% - 100%)  Wt(kg): --    ALLERGIES  No Known Allergies      MEDICATIONS   artificial  tears Solution 1 Drop(s) Both EYES every 6 hours PRN  carvedilol 25 milliGRAM(s) Oral every 12 hours  cefTRIAXone   IVPB 1 Gram(s) IV Intermittent every 24 hours  HYDROmorphone  Injectable 2 milliGRAM(s) IV Push every 4 hours PRN  HYDROmorphone  Injectable 1 milliGRAM(s) IV Push every 4 hours PRN  oxyCODONE    IR 10 milliGRAM(s) Oral every 6 hours PRN    Abnormal T1 prolongation involving the cervical thoracic   lumbar and sacral spine region as well as both iliac bones. This is   likely related to patient's known multiple myeloma.    Degenerative changes as described above.    ----------------------------------------------------------------------------------------  PHYSICAL EXAM  Constitutional - NAD, Comfortable  HEENT - NCAT, EOMI  Neck - Supple, No limited ROM  Chest - CTA bilaterally, No wheeze, No rhonchi, No crackles  Cardiovascular - RRR, S1S2, No murmurs  Abdomen - BS+, Soft, NTND  Extremities - No C/C/E, No calf tenderness   Neurologic Exam -                    Cognitive - Awake, Alert, AAO to self, place, date, year, situation     Communication - Fluent, No dysarthria, no aphasia     Cranial Nerves - CN 2-12 intact     Motor - No focal deficits                       Sensory - Intact to LT     Reflexes - DTR Intact, No primitive reflexive     Balance - WNL Static  Psychiatric - Mood stable, Affect WNL

## 2019-03-29 NOTE — PROGRESS NOTE ADULT - PROBLEM SELECTOR PLAN 9
As per H/O, patient failed certain MM treatments. Was supposed to start on other chemotherapy. F/u outpatient c.w Coreg

## 2019-03-29 NOTE — PROGRESS NOTE ADULT - SUBJECTIVE AND OBJECTIVE BOX
Nephrology Followup Note - [phone] - Dr Bahena / Dr Regalado / Dr Benz / Dr Frost / Dr Mahmood / Dr Mcgee / Dr Guzman / Dr Guerra  Pt seen and examined at bedside  No acute events overnight. Pt reports pain overall better. Good appetite.     Allergies:  No Known Allergies    Hospital Medications:   MEDICATIONS  (STANDING):  carvedilol 25 milliGRAM(s) Oral every 12 hours  cefTRIAXone   IVPB 1 Gram(s) IV Intermittent every 24 hours      VITALS:  T(F): 97.4 (19 @ 13:25), Max: 98.6 (19 @ 05:19)  HR: 105 (19 @ 13:25)  BP: 129/84 (19 @ 13:25)  RR: 18 (19 @ 13:25)  SpO2: 100% (19 @ 13:25)  Wt(kg): --      PHYSICAL EXAM:  Constitutional: NAD  HEENT: anicteric sclera, oropharynx clear, MMM  Neck: No JVD  Respiratory: CTAB, no wheezes, rales or rhonchi  Cardiovascular: S1, S2, RRR  Gastrointestinal: BS+, soft, NT/ND, colostomy  Extremities: No cyanosis or clubbing. No peripheral edema  Neurological: A/O x 3, no focal deficits  Psychiatric: Normal mood, normal affect  : No CVA tenderness. No quintero.   Skin: No rashes    LABS:      137  |  102  |  25<H>  ----------------------------<  111<H>  3.7   |  21<L>  |  1.28    Ca    7.5<L>      29 Mar 2019 06:14  Phos  2.6       Mg     2.1         TPro  7.8  /  Alb  2.9<L>  /  TBili  0.6  /  DBili      /  AST  27  /  ALT  28  /  AlkPhos  178<H>      Creatinine Trend: 1.28 <--, 1.66 <--, 1.37 <--, 1.19 <--, 1.48 <--                        8.5    4.50  )-----------( 22       ( 29 Mar 2019 06:14 )             25.5     Urine Studies:  Urinalysis Basic - ( 28 Mar 2019 06:50 )    Color: YELLOW / Appearance: CLEAR / S.014 / pH: 6.0  Gluc: NEGATIVE / Ketone: NEGATIVE  / Bili: NEGATIVE / Urobili: NORMAL   Blood: NEGATIVE / Protein: 100 / Nitrite: NEGATIVE   Leuk Esterase: NEGATIVE / RBC: 0-2 / WBC 6-10   Sq Epi: FEW / Non Sq Epi:  / Bacteria: NEGATIVE      Protein, Random Urine: 111.6 mg/dL ( @ 06:50)  Creatinine, Random Urine: 91.40 mg/dL ( @ 06:50)  Sodium, Random Urine: 20 mmol/L ( @ 06:50)    RADIOLOGY & ADDITIONAL STUDIES:

## 2019-03-29 NOTE — DISCHARGE NOTE PROVIDER - HOSPITAL COURSE
This is 52 yo male with PMH of MM, HTN, and SBO (s/p colostomy) p/w 1 day hx of whole body pain with associated CP and SOB.        Dr. Murphy: concern for relapsing and acute worsening of multiple myeloma with precipitated pain syndrome    Patient is s/p 1 unit Platelet     MRI C/T/L Spine:  Abnormal T1 prolongation involving the cervical thoracic lumbar and sacral spine region as well as both iliac bones. This is likely related to patient's known multiple myeloma. Degenerative changes as described above. Bilateral pleural effusions are seen.             Fever.      Bcx NGTD    Ucx contaminated    Observe off abx for now        Thrombocytopenia (PLT of 22)    - hold off steroids for now        Transaminitis.    -RUQ Normal right upper quadrant abdominal US.Small right pleural effusion.        Essential hypertension    - Coreg.          Colostomy care 53 M PMHx MM S/P BMT (~1.5 years ago) and chemo/RT (stopped ~2 months ago for prep for colostomy removal), perforated diverticulitis S/P Kiah's (proctosigmoidectomy with colostomy, 9/2018), nonischemic cardiomyopathy (mild global LV systolic dysfunction with EF 54% and diastolic LV dysfunction based on TTE in 11/2018), HTN presents to [facility] ED c/o diffuse body pain. Vitals with low grade fever, tachycardia. S/P Vanco/Zosyn. Admitted 3/23 for thrombocytopenia.  MRI C/T/L spine - abnormal T1 prolongation involving the C/T/L/sacral/iliac bones. Degenerative changes as described above. B/L pleural effusions are seen.        Diffuse axial and partial appendicular body pain.    -Heme Cx     -Concern for relapsing and acute worsening of MM with precipitated pain syndrome    -MRI C/T/L spine - abnormal T1 prolongation involving the C/T/L/sacral/iliac bones. Degenerative changes as described above. B/L pleural effusions are seen.    -Stable, F/U outpatient PCP         Fever, tachycardia    -BCx negative, UCx contaminated, CXR clear    -Procalcitonin elevated, downtrending     -CTX (3/28)    -Stable, F/U outpatient PCP         SOB    -Repeat CXR 3/29 with B/L pleural effusions, pleural interstitial edema. S/P IV Lasix x1    -CT chest negative for PNA     -Stable, F/U outpatient PCP         Thrombocytopenia (PLT of 22)    -S/P 1 U PLT 3/26 and 3/31     -Stable, F/U outpatient PCP         Transaminitis.    -US abdomen WNL.    -Stable, F/U outpatient PCP         ROBB    -S/P IVF    -Nephro Cx    -Stable, F/U outpatient PCP         Essential hypertension    -Coreg.     -Lisinopril held 2/2 ROBB, restarted 3/30    -Stable, F/U outpatient PCP         Discharge to home with F/U Dr. Murphy (Onc) for starting treatment for MM 53 M PMHx MM S/P BMT (~1.5 years ago) and chemo/RT (stopped ~2 months ago for prep for colostomy removal), perforated diverticulitis S/P Kiah's (proctosigmoidectomy with colostomy, 9/2018), nonischemic cardiomyopathy (mild global LV systolic dysfunction with EF 54% and diastolic LV dysfunction based on TTE in 11/2018), HTN presents to [facility] ED c/o diffuse body pain. Vitals with low grade fever, tachycardia. S/P Vanco/Zosyn. Admitted 3/23 for thrombocytopenia.  MRI C/T/L spine - abnormal T1 prolongation involving the C/T/L/sacral/iliac bones. Degenerative changes as described above. B/L pleural effusions are seen.        Diffuse axial and partial appendicular body pain.    -Heme Cx     -Concern for relapsing and acute worsening of MM with precipitated pain syndrome    -MRI C/T/L spine - abnormal T1 prolongation involving the C/T/L/sacral/iliac bones. Degenerative changes as described above. B/L pleural effusions are seen.    -Stable, F/U outpatient PCP         Fever, tachycardia    -BCx negative, UCx contaminated, CXR clear    -Procalcitonin elevated, downtrending     -CTX (3/28)    -Stable, F/U outpatient PCP         SOB    -Repeat CXR 3/29 with B/L pleural effusions, pleural interstitial edema. S/P IV Lasix x1    -CT chest negative for PNA     -Stable, F/U outpatient PCP         Thrombocytopenia (PLT of 22)    -S/P 1 U PLT 3/26 and 2 U PLT 3/31     -Stable, F/U outpatient PCP         Transaminitis.    -US abdomen WNL.    -Stable, F/U outpatient PCP         ROBB    -S/P IVF    -Nephro Cx    -Stable, F/U outpatient PCP         Essential hypertension    -Coreg.     -Lisinopril held 2/2 ROBB, restarted 3/30    -Stable, F/U outpatient PCP         Discharge to home with F/U Dr. Murphy (Onc) for starting treatment for MM 53 M PMHx MM S/P BMT (~1.5 years ago) and chemo/RT (stopped ~2 months ago for prep for colostomy removal), perforated diverticulitis S/P Kiah's (proctosigmoidectomy with colostomy, 9/2018), nonischemic cardiomyopathy (mild global LV systolic dysfunction with EF 54% and diastolic LV dysfunction based on TTE in 11/2018), HTN presents to [facility] ED c/o diffuse body pain. Vitals with low grade fever, tachycardia. S/P Vanco/Zosyn. Admitted 3/23 for thrombocytopenia.  MRI C/T/L spine - abnormal T1 prolongation involving the C/T/L/sacral/iliac bones. Degenerative changes as described above. B/L pleural effusions are seen.        Diffuse axial and partial appendicular body pain.    -Heme Cx     -Concern for relapsing and acute worsening of MM with precipitated pain syndrome    -MRI C/T/L spine - abnormal T1 prolongation involving the C/T/L/sacral/iliac bones. Degenerative changes as described above. B/L pleural effusions are seen.    -Stable, F/U outpatient PCP         Fever, tachycardia    -BCx negative, UCx contaminated, CXR clear    -Procalcitonin elevated, downtrending     -CTX (3/28)    -Stable, F/U outpatient PCP         SOB    -Repeat CXR 3/29 with B/L pleural effusions, pleural interstitial edema. S/P IV Lasix x1    -CT chest negative for PNA     -Stable, F/U outpatient PCP         Thrombocytopenia (PLT of 22)    -S/P 1 U PLT 3/26 and 2 U PLT 3/31     -Stable, F/U outpatient PCP         Transaminitis.    -US abdomen WNL.    -Stable, F/U outpatient PCP         ROBB    -S/P IVF    -Nephro Cx    -Stable, F/U outpatient PCP         Essential hypertension    -Coreg.     -Lisinopril held 2/2 ROBB, restarted 3/30    -Stable, F/U outpatient PCP         Discharge to home with F/U Dr. Murphy (Onc) to continue treatment for MM 53 M PMHx MM S/P BMT (~1.5 years ago) and chemo/RT (stopped ~2 months ago for prep for colostomy removal), perforated diverticulitis S/P Kiah's (proctosigmoidectomy with colostomy, 9/2018), nonischemic cardiomyopathy (mild global LV systolic dysfunction with EF 54% and diastolic LV dysfunction based on TTE in 11/2018), HTN presents to [facility] ED c/o diffuse body pain. Vitals with low grade fever, tachycardia. S/P Vanco/Zosyn. Admitted 3/23 for thrombocytopenia.  MRI C/T/L spine - abnormal T1 prolongation involving the C/T/L/sacral/iliac bones. Degenerative changes as described above. B/L pleural effusions are seen.        Diffuse axial and partial appendicular body pain.    -Heme Cx     -Concern for relapsing and acute worsening of MM with precipitated pain syndrome    -MRI C/T/L spine - abnormal T1 prolongation involving the C/T/L/sacral/iliac bones. Degenerative changes as described above. B/L pleural effusions are seen.    -Stable, F/U outpatient PCP         Fever, tachycardia    -BCx negative, UCx contaminated, CXR clear    -Procalcitonin elevated, downtrending     -CTX (3/28)    -Stable, F/U outpatient PCP         SOB    -Repeat CXR 3/29 with B/L pleural effusions, pleural interstitial edema. S/P IV Lasix x1    -CT chest negative for PNA     -Stable, F/U outpatient PCP         Thrombocytopenia (PLT of 22)    -S/P 1 U PLT 3/26, 2 U PLT 3/31, 1 U PLT 4/6    -Stable, F/U outpatient PCP         Transaminitis.    -US abdomen WNL.    -Stable, F/U outpatient PCP         ROBB    -S/P IVF    -Nephro Cx    -Stable, F/U outpatient PCP         Essential hypertension    -Coreg.     -Lisinopril held 2/2 ROBB, restarted 3/30    -Stable, F/U outpatient PCP         Discharge to home with F/U Dr. Murphy (Onc) to continue treatment for MM

## 2019-03-29 NOTE — PROGRESS NOTE ADULT - ASSESSMENT
53M hx of multiple myeloma s/p bone marrow transplant (~1.5 yrs ago) and chemo/RT (stopped ~2 months ago in preparation for colostomy reversal), perforated diverticulitis s/p Kiah's (proctosigmoidectomy with colostomy, 9/2018), nonischemic cardiomyopathy (mild global LV systolic dysfunction with EF 54% and diastolic LV dysfunction based on TTE in 11/2018), and HTN presented to [facility] ED 3/26 c/o diffuse body pains. Renal consult called today for elevated renal function.    Kristyn vs KRISTYN on CKD -b/l Cr 0.9-1 09/2018   Cr 3/25 1.2 but earlier this mth Cr was 1.2-1.5; no Cr available b/w Sept 2018 and 03/2019    KRISTYN likely 2/2 hemodynamics. clinically euvolemic. no e/o TTP/HUS  HTN, controlled-on BB  Thrombocytopenia. PLT 33k noted, concern for worsened MM v. ITP. transfused 1U of PLT 3/26.   Multiple myeloma s/p bone marrow transplant (~1.5 yrs ago) and chemo/RT    labs, rad, chart reviewed  encouraged po intake  montir BMP daily  monitor CBC closely  avoid nephrotoxics/NSAIDs/ACEI/ARB  pain Mx per primary team

## 2019-03-29 NOTE — PROGRESS NOTE ADULT - SUBJECTIVE AND OBJECTIVE BOX
Patient seen and examined at bedside  endorses improving yet persistent pain, improving mobility, right sided chest discomfort with inspiration  Case discussed with medical team    HPI:  53M hx of multiple myeloma s/p bone marrow transplant (~1.5 yrs ago) and chemo/RT (stopped ~2 months ago in preparation for colostomy reversal), perforated diverticulitis s/p Kiah's (proctosigmoidectomy with colostomy, 2018), nonischemic cardiomyopathy (mild global LV systolic dysfunction with EF 54% and diastolic LV dysfunction based on TTE in 2018), and HTN presents to [facility] ED c/o diffuse body pain. Patient recently discharged 3/23/19 from [facility] after being hospitalized for thrombocytopenia.     Patient started to develop severe non-radiating throbbing 10/10 pain in his b/l shoulders, chest, abdomen, and b/l thighs since  night - leaving him essentially bedbound. Was supposed to see Dr. Murphy for follow up today. However because of the pain he came to the ED (26 Mar 2019 11:51)      PAST MEDICAL & SURGICAL HISTORY:  Diverticulitis  Multiple myeloma  Hypertension  Left ventricular dysfunction  Cardiomyopathy  Former smoker: (1 ppd x 11 years; Quit ~age 26)  History of bone marrow transplant  History of surgery: s/p exploratory laparotomy with sigmoid resection and colostomy on 2018      No Known Allergies       MEDICATIONS  (STANDING):  carvedilol 25 milliGRAM(s) Oral every 12 hours    MEDICATIONS  (PRN):  artificial  tears Solution 1 Drop(s) Both EYES every 6 hours PRN Dry Eyes  HYDROmorphone  Injectable 2 milliGRAM(s) IV Push every 4 hours PRN Severe Pain (7 - 10)  HYDROmorphone  Injectable 1 milliGRAM(s) IV Push every 4 hours PRN Moderate Pain (4 - 6)  oxyCODONE    IR 10 milliGRAM(s) Oral every 6 hours PRN Mild Pain (1 - 3)      REVIEW OF SYSTEMS:  CONSTITUTIONAL: (+) malaise.   EYES: No acute change in vision   ENT:  No tinnitus  NECK: No stiffness  RESPIRATORY: chest discomfort right sided. No hemoptysis  CARDIOVASCULAR: No chest pain, palpitations, syncope  GASTROINTESTINAL: No hematemesis, diarrhea, melena, or hematochezia.  GENITOURINARY: No hematuria  NEUROLOGICAL: pain. No headaches  LYMPH Nodes: No enlarged glands  ENDOCRINE: No heat or cold intolerance	    T(C): 37 (19 @ 05:19), Max: 37 (19 @ 05:19)  HR: 98 (19 @ 05:19) (72 - 101)  BP: 139/80 (19 @ 05:19) (112/63 - 139/80)  RR: 18 (19 @ 05:19) (18 - 18)  SpO2: 100% (19 @ 05:19) (100% - 100%)    PHYSICAL EXAMINATION:   Constitutional: WD, NAD  HEENT: NC, AT  Neck:  Supple  Respiratory:  Adequate airflow b/l. Not using accessory muscles of respiration.  Cardiovascular:  S1 & S2 intact, no R/G, 2+ radial pulses b/l  Gastrointestinal: Soft, NT, ND, normoactive b.s., no organomegaly/RT/rigidity  Extremities: improving rom with pain control. WWP  Neurological:  Alert and awake.  No acute focal motor deficits. Crude sensation intact.     Labs and imaging reviewed    LABS:                        8.5    4.50  )-----------( 22       ( 29 Mar 2019 06:14 )             25.5         137  |  102  |  25<H>  ----------------------------<  111<H>  3.7   |  21<L>  |  1.28    Ca    7.5<L>      29 Mar 2019 06:14  Phos  2.6       Mg     2.1         TPro  7.8  /  Alb  2.9<L>  /  TBili  0.6  /  DBili  x   /  AST  27  /  ALT  28  /  AlkPhos  178<H>            Urinalysis Basic - ( 28 Mar 2019 06:50 )    Color: YELLOW / Appearance: CLEAR / S.014 / pH: 6.0  Gluc: NEGATIVE / Ketone: NEGATIVE  / Bili: NEGATIVE / Urobili: NORMAL   Blood: NEGATIVE / Protein: 100 / Nitrite: NEGATIVE   Leuk Esterase: NEGATIVE / RBC: 0-2 / WBC 6-10   Sq Epi: FEW / Non Sq Epi: x / Bacteria: NEGATIVE      CAPILLARY BLOOD GLUCOSE            LIVER FUNCTIONS - ( 29 Mar 2019 06:14 )  Alb: 2.9 g/dL / Pro: 7.8 g/dL / ALK PHOS: 178 u/L / ALT: 28 u/L / AST: 27 u/L / GGT: x               RADIOLOGY & ADDITIONAL STUDIES:

## 2019-03-29 NOTE — PROGRESS NOTE ADULT - ASSESSMENT
1. Diffuse bone pain of unclear etiology    -- pt has known extensive bone involvement by myeloma  --  MRI C/T/L Spine w no cord compression  -- pain management      2. Transfusion dependent Anemia and Thrombocytopenia    -- sec to myeloma  --did not respond to high dose decadron last week  -- transfuse platelets if Less than 10K or clinically significant bleeding  -- keep Hgb > 7    3. Multiple Myeloma    -- relapsed / refractory ( failed all FDA Available tx) and failed auto stem cell transplant  -- pt off of tx against my and other oncologist advise in order to reverse colostomy  -- clinical trial at [facility] strongly recommended but pt declined  -- Plan to start Bendamustine 120mg/M2 Day 1, Pomalidomide 3mg PO QD Days 1-21 and Dexa 40mg weekly Q 28 day cycle. Will Give as outpt      Overall Prognosis is unfortunately very poor since pt has exhausted just about every available tx option including transplant    I spoke at length today w patient. Wife and family friend yesterday asked to transfer care to [place]. Pt today tells me he does not wish to go. Wants to go home and begin outpt treatment as outlined above with me    If goes home, please give 1 unit Platelets    Torsten Murphy MD  [phone]

## 2019-03-29 NOTE — DISCHARGE NOTE PROVIDER - CARE PROVIDER_API CALL
Torsten Murphy)  Hematology; Medical Oncology  [address] [place] [address]  Phone: [phone]  Fax: [phone]  Follow Up Time:

## 2019-03-29 NOTE — PROGRESS NOTE ADULT - ASSESSMENT
A  54 yo Male with multiple myeloma s/p bone marrow transplant (~1.5 yrs ago) and Perforated  Diverticulitis ,s/p  Kiah's (proctosigmoidectomy with colostomy, on 9/2/2018  and now chemo/RT (stopped ~2 months ago in preparation for colostomy reversal, who presents to the ER for evaluation  of diffuse body pain. Patient recently discharged, on 3/23/19, from [facility] after being hospitalized for thrombocytopenia. On admission, he found to have Fever, Temp. of 100.8, Tachycardia, HR of 110, and received dose of Zosyn and Vancomycin. The cultures are negative to date. The ID consult requested to assist with further evaluation and antibiotic management.     # Sepsis - ( fever + tachycardia ) -source to be determined   # B/L Pleural effusion- on MRI  # RVP- Negative  # Multiple  Myeloma  # UTI- 3/28/19  # Elevated Procalcitonin level    would recommend:  1.   2. Pain management as per Primary  3. Monitor Temp. and c/w supportive care  4. Repeat Chest Imaging to reassess the pleural effusion, may need diagnostic thoracentesis     d/w patient, family at the bed side and Nursing staff    will follow the patient with you A  52 yo Male with multiple myeloma s/p bone marrow transplant (~1.5 yrs ago) and Perforated  Diverticulitis ,s/p  Kiah's (proctosigmoidectomy with colostomy, on 9/2/2018  and now chemo/RT (stopped ~2 months ago in preparation for colostomy reversal, who presents to the ER for evaluation  of diffuse body pain. Patient recently discharged, on 3/23/19, from [facility] after being hospitalized for thrombocytopenia. On admission, he found to have Fever, Temp. of 100.8, Tachycardia, HR of 110, and received dose of Zosyn and Vancomycin. The cultures are negative to date. The ID consult requested to assist with further evaluation and antibiotic management.     # Sepsis - ( fever + tachycardia ) -source to be determined   # B/L Pleural effusion- on MRI  # RVP- Negative  # Multiple  Myeloma  # UTI- 3/28/19  # Elevated Procalcitonin level    would recommend:  1. Continue Ceftriaxone to treat UTI  2. Pain management as per Primary  3. Follow up CT chest     d/w patient, and DR. Castro    will follow the patient with you

## 2019-03-29 NOTE — PROGRESS NOTE ADULT - PROBLEM SELECTOR PLAN 8
Avoid hepatotoxic agents when possible and trend liver enzymes As per H/O, patient failed certain MM treatments. Was supposed to start on other chemotherapy. F/u outpatient

## 2019-03-30 LAB
ANION GAP SERPL CALC-SCNC: 15 MMO/L — HIGH (ref 7–14)
BACTERIA BLD CULT: SIGNIFICANT CHANGE UP
BACTERIA BLD CULT: SIGNIFICANT CHANGE UP
BASOPHILS # BLD AUTO: 0.05 K/UL — SIGNIFICANT CHANGE UP (ref 0–0.2)
BASOPHILS NFR BLD AUTO: 0.9 % — SIGNIFICANT CHANGE UP (ref 0–2)
BUN SERPL-MCNC: 19 MG/DL — SIGNIFICANT CHANGE UP (ref 7–23)
CALCIUM SERPL-MCNC: 7.8 MG/DL — LOW (ref 8.4–10.5)
CHLORIDE SERPL-SCNC: 100 MMOL/L — SIGNIFICANT CHANGE UP (ref 98–107)
CO2 SERPL-SCNC: 22 MMOL/L — SIGNIFICANT CHANGE UP (ref 22–31)
CREAT SERPL-MCNC: 1.19 MG/DL — SIGNIFICANT CHANGE UP (ref 0.5–1.3)
EOSINOPHIL # BLD AUTO: 0 K/UL — SIGNIFICANT CHANGE UP (ref 0–0.5)
EOSINOPHIL NFR BLD AUTO: 0 % — SIGNIFICANT CHANGE UP (ref 0–6)
GLUCOSE SERPL-MCNC: 144 MG/DL — HIGH (ref 70–99)
HCT VFR BLD CALC: 26.5 % — LOW (ref 39–50)
HGB BLD-MCNC: 8.8 G/DL — LOW (ref 13–17)
IMM GRANULOCYTES NFR BLD AUTO: 8.9 % — HIGH (ref 0–1.5)
LYMPHOCYTES # BLD AUTO: 1.81 K/UL — SIGNIFICANT CHANGE UP (ref 1–3.3)
LYMPHOCYTES # BLD AUTO: 32.9 % — SIGNIFICANT CHANGE UP (ref 13–44)
MAGNESIUM SERPL-MCNC: 2.1 MG/DL — SIGNIFICANT CHANGE UP (ref 1.6–2.6)
MANUAL SMEAR VERIFICATION: SIGNIFICANT CHANGE UP
MCHC RBC-ENTMCNC: 29.3 PG — SIGNIFICANT CHANGE UP (ref 27–34)
MCHC RBC-ENTMCNC: 33.2 % — SIGNIFICANT CHANGE UP (ref 32–36)
MCV RBC AUTO: 88.3 FL — SIGNIFICANT CHANGE UP (ref 80–100)
MONOCYTES # BLD AUTO: 0.93 K/UL — HIGH (ref 0–0.9)
MONOCYTES NFR BLD AUTO: 16.9 % — HIGH (ref 2–14)
NEUTROPHILS # BLD AUTO: 2.22 K/UL — SIGNIFICANT CHANGE UP (ref 1.8–7.4)
NEUTROPHILS NFR BLD AUTO: 40.4 % — LOW (ref 43–77)
NRBC # FLD: 0.08 K/UL — SIGNIFICANT CHANGE UP (ref 0–0)
NRBC FLD-RTO: 1.5 — SIGNIFICANT CHANGE UP
PHOSPHATE SERPL-MCNC: 2.7 MG/DL — SIGNIFICANT CHANGE UP (ref 2.5–4.5)
PLATELET # BLD AUTO: 14 K/UL — CRITICAL LOW (ref 150–400)
PMV BLD: SIGNIFICANT CHANGE UP FL (ref 7–13)
POTASSIUM SERPL-MCNC: 4 MMOL/L — SIGNIFICANT CHANGE UP (ref 3.5–5.3)
POTASSIUM SERPL-SCNC: 4 MMOL/L — SIGNIFICANT CHANGE UP (ref 3.5–5.3)
RBC # BLD: 3 M/UL — LOW (ref 4.2–5.8)
RBC # FLD: 18.2 % — HIGH (ref 10.3–14.5)
SODIUM SERPL-SCNC: 137 MMOL/L — SIGNIFICANT CHANGE UP (ref 135–145)
WBC # BLD: 5.5 K/UL — SIGNIFICANT CHANGE UP (ref 3.8–10.5)
WBC # FLD AUTO: 5.5 K/UL — SIGNIFICANT CHANGE UP (ref 3.8–10.5)

## 2019-03-30 PROCEDURE — 71250 CT THORAX DX C-: CPT | Mod: 26

## 2019-03-30 PROCEDURE — 93010 ELECTROCARDIOGRAM REPORT: CPT

## 2019-03-30 RX ORDER — LISINOPRIL 2.5 MG/1
10 TABLET ORAL DAILY
Qty: 0 | Refills: 0 | Status: DISCONTINUED | OUTPATIENT
Start: 2019-03-30 | End: 2019-04-08

## 2019-03-30 RX ORDER — HYDROMORPHONE HYDROCHLORIDE 2 MG/ML
1 INJECTION INTRAMUSCULAR; INTRAVENOUS; SUBCUTANEOUS ONCE
Qty: 0 | Refills: 0 | Status: DISCONTINUED | OUTPATIENT
Start: 2019-03-30 | End: 2019-03-30

## 2019-03-30 RX ORDER — HYDROMORPHONE HYDROCHLORIDE 2 MG/ML
1 INJECTION INTRAMUSCULAR; INTRAVENOUS; SUBCUTANEOUS EVERY 4 HOURS
Qty: 0 | Refills: 0 | Status: DISCONTINUED | OUTPATIENT
Start: 2019-03-30 | End: 2019-03-31

## 2019-03-30 RX ORDER — FUROSEMIDE 40 MG
20 TABLET ORAL ONCE
Qty: 0 | Refills: 0 | Status: COMPLETED | OUTPATIENT
Start: 2019-03-30 | End: 2019-03-30

## 2019-03-30 RX ORDER — MORPHINE SULFATE 50 MG/1
15 CAPSULE, EXTENDED RELEASE ORAL EVERY 12 HOURS
Qty: 0 | Refills: 0 | Status: DISCONTINUED | OUTPATIENT
Start: 2019-03-30 | End: 2019-03-31

## 2019-03-30 RX ORDER — TRAMADOL HYDROCHLORIDE 50 MG/1
50 TABLET ORAL EVERY 8 HOURS
Qty: 0 | Refills: 0 | Status: DISCONTINUED | OUTPATIENT
Start: 2019-03-30 | End: 2019-03-31

## 2019-03-30 RX ADMIN — HYDROMORPHONE HYDROCHLORIDE 1 MILLIGRAM(S): 2 INJECTION INTRAMUSCULAR; INTRAVENOUS; SUBCUTANEOUS at 11:45

## 2019-03-30 RX ADMIN — MORPHINE SULFATE 15 MILLIGRAM(S): 50 CAPSULE, EXTENDED RELEASE ORAL at 17:01

## 2019-03-30 RX ADMIN — HYDROMORPHONE HYDROCHLORIDE 1 MILLIGRAM(S): 2 INJECTION INTRAMUSCULAR; INTRAVENOUS; SUBCUTANEOUS at 23:15

## 2019-03-30 RX ADMIN — HYDROMORPHONE HYDROCHLORIDE 1 MILLIGRAM(S): 2 INJECTION INTRAMUSCULAR; INTRAVENOUS; SUBCUTANEOUS at 16:54

## 2019-03-30 RX ADMIN — CARVEDILOL PHOSPHATE 25 MILLIGRAM(S): 80 CAPSULE, EXTENDED RELEASE ORAL at 16:54

## 2019-03-30 RX ADMIN — HYDROMORPHONE HYDROCHLORIDE 1 MILLIGRAM(S): 2 INJECTION INTRAMUSCULAR; INTRAVENOUS; SUBCUTANEOUS at 06:54

## 2019-03-30 RX ADMIN — Medication 20 MILLIGRAM(S): at 06:54

## 2019-03-30 RX ADMIN — CARVEDILOL PHOSPHATE 25 MILLIGRAM(S): 80 CAPSULE, EXTENDED RELEASE ORAL at 06:17

## 2019-03-30 RX ADMIN — HYDROMORPHONE HYDROCHLORIDE 1 MILLIGRAM(S): 2 INJECTION INTRAMUSCULAR; INTRAVENOUS; SUBCUTANEOUS at 22:38

## 2019-03-30 RX ADMIN — HYDROMORPHONE HYDROCHLORIDE 1 MILLIGRAM(S): 2 INJECTION INTRAMUSCULAR; INTRAVENOUS; SUBCUTANEOUS at 12:16

## 2019-03-30 RX ADMIN — HYDROMORPHONE HYDROCHLORIDE 1 MILLIGRAM(S): 2 INJECTION INTRAMUSCULAR; INTRAVENOUS; SUBCUTANEOUS at 21:31

## 2019-03-30 RX ADMIN — HYDROMORPHONE HYDROCHLORIDE 1 MILLIGRAM(S): 2 INJECTION INTRAMUSCULAR; INTRAVENOUS; SUBCUTANEOUS at 17:20

## 2019-03-30 RX ADMIN — HYDROMORPHONE HYDROCHLORIDE 1 MILLIGRAM(S): 2 INJECTION INTRAMUSCULAR; INTRAVENOUS; SUBCUTANEOUS at 07:30

## 2019-03-30 RX ADMIN — CEFTRIAXONE 100 GRAM(S): 500 INJECTION, POWDER, FOR SOLUTION INTRAMUSCULAR; INTRAVENOUS at 21:34

## 2019-03-30 RX ADMIN — HYDROMORPHONE HYDROCHLORIDE 1 MILLIGRAM(S): 2 INJECTION INTRAMUSCULAR; INTRAVENOUS; SUBCUTANEOUS at 22:00

## 2019-03-30 RX ADMIN — HYDROMORPHONE HYDROCHLORIDE 2 MILLIGRAM(S): 2 INJECTION INTRAMUSCULAR; INTRAVENOUS; SUBCUTANEOUS at 00:00

## 2019-03-30 NOTE — PROGRESS NOTE ADULT - ASSESSMENT
53M hx of multiple myeloma s/p bone marrow transplant (~1.5 yrs ago) and chemo/RT (stopped ~2 months ago in preparation for colostomy reversal), perforated diverticulitis s/p Kiah's (proctosigmoidectomy with colostomy, 9/2018), nonischemic cardiomyopathy (mild global LV systolic dysfunction with EF 54% and diastolic LV dysfunction based on TTE in 11/2018), and HTN presented to [facility] ED 3/26 c/o diffuse body pains. Renal consult called today for elevated renal function.    Kristyn vs KRISTYN on CKD -b/l Cr 0.9-1 09/2018   Cr 3/25 1.2 but earlier this mth Cr was 1.2-1.5; no Cr available b/w Sept 2018 and 03/2019    KRISTYN likely 2/2 hemodynamics. clinically euvolemic. no e/o TTP/HUS  HTN, controlled-on BB  Thrombocytopenia. PLT 33k noted, concern for worsened MM v. ITP. transfused 1U of PLT 3/26.   Multiple myeloma s/p bone marrow transplant (~1.5 yrs ago) and chemo/RT    labs, rad, chart reviewed  encouraged po intake  montir BMP daily  monitor CBC closely  avoid nephrotoxics/NSAIDs/ACEI/ARB  pain Mx per primary team

## 2019-03-30 NOTE — PROGRESS NOTE ADULT - PROBLEM SELECTOR PLAN 1
Improved cr today. Electrolytes acceptable and clinically euvolemic.  in setting of Multiple myeloma, uncontrolled, not on chemotherapy  Off IVF and tolerating diet.  will restart lisinopril 10mg po qd   monitor cr

## 2019-03-30 NOTE — PROGRESS NOTE ADULT - PROBLEM SELECTOR PLAN 1
improving overall, 2/2 multiple myeloma relapse  c./w pain control  ->F/u ct chest to r/o infectious process.  Adjust abx accordingly.   -> Transition to po analgesics as tolerated, then discharge for outpt chemo improving overall with analgesics,, 2/2 multiple myeloma relapse  c./w pain control  -> Patient requests inpt chemo and requests bone marrow team eval.  Day team please obtain bone marrow consult.  ->F/u ct chest to r/o infectious process.  Adjust abx accordingly.

## 2019-03-30 NOTE — PROGRESS NOTE ADULT - SUBJECTIVE AND OBJECTIVE BOX
Patient is seen and examined at the bed side, is afebrile.  He is c/o Upper respiratory symptoms today.  The  CT chest shows no pneumonia except  Small bilateral pleural effusions and small pericardial effusion.      REVIEW OF SYSTEMS: All other review systems are negative      ALLERGIES: No Known Allergies      Vital Signs Last 24 Hrs  T(C): 36.6 (30 Mar 2019 13:45), Max: 36.7 (29 Mar 2019 22:25)  T(F): 97.8 (30 Mar 2019 13:45), Max: 98 (29 Mar 2019 22:25)  HR: 109 (30 Mar 2019 16:53) (109 - 110)  BP: 137/78 (30 Mar 2019 16:53) (128/73 - 137/78)  BP(mean): --  RR: 18 (30 Mar 2019 13:45) (18 - 18)  SpO2: 100% (30 Mar 2019 13:45) (98% - 100%)      PHYSICAL EXAM:  GENERAL: Not in  acute distress   CHEST/LUNG:  Air entry  bilaterally  HEART: s1 and s2 present  ABDOMEN:  Colostomy bag in placed  EXTREMITIES: No pedal  edema  CNS: Awake and Alert        LABS:                        8.8    5.50  )-----------( 14       ( 30 Mar 2019 08:35 )             26.5                           8.6    3.04  )-----------( 36       ( 28 Mar 2019 06:46 )             26.1                           8.2    2.36  )-----------( 33       ( 27 Mar 2019 07:22 )             24.4       -30    137  |  100  |  19  ----------------------------<  144<H>  4.0   |  22  |  1.19    Ca    7.8<L>      30 Mar 2019 08:35  Phos  2.7     03-30  Mg     2.1     03-30    TPro  7.8  /  Alb  2.9<L>  /  TBili  0.6  /  DBili  x   /  AST  27  /  ALT  28  /  AlkPhos  178<H>      137  |  102  |  25<H>  ----------------------------<  111<H>  3.7   |  21<L>  |  1.28    Ca    7.5<L>      29 Mar 2019 06:14  Phos  2.6       Mg     2.1         TPro  7.8  /  Alb  2.9<L>  /  TBili  0.6  /  DBili  x   /  AST  27  /  ALT  28  /  AlkPhos  178<H>        Urinalysis Basic - ( 25 Mar 2019 20:38 )  Color: YELLOW / Appearance: CLEAR / S.019 / pH: 6.0  Gluc: NEGATIVE / Ketone: NEGATIVE  / Bili: NEGATIVE / Urobili: NORMAL   Blood: TRACE / Protein: 200 / Nitrite: NEGATIVE   Leuk Esterase: NEGATIVE / RBC: 11-25 / WBC 3-5       Procalcitonin, Serum (19 @ 16:46)    Procalcitonin, Serum: 8.48: Procalcitonin (PCT) Interpretation (ng/mL) - Diagnosis of  systemic bacterial infection/sepsis:        MEDICATIONS  (STANDING):  carvedilol 25 milliGRAM(s) Oral every 12 hours  cefTRIAXone   IVPB 1 Gram(s) IV Intermittent every 24 hours  lisinopril 10 milliGRAM(s) Oral daily  morphine ER Tablet 15 milliGRAM(s) Oral every 12 hours    MEDICATIONS  (PRN):  artificial  tears Solution 1 Drop(s) Both EYES every 6 hours PRN Dry Eyes  HYDROmorphone  Injectable 1 milliGRAM(s) IV Push every 4 hours PRN Severe Pain (7 - 10)  oxyCODONE    IR 10 milliGRAM(s) Oral every 6 hours PRN Mild Pain (1 - 3)  traMADol 50 milliGRAM(s) Oral every 8 hours PRN Moderate Pain (4 - 6)        RADIOLOGY & ADDITIONAL TESTS:    3/30/19 : CT Chest No Cont (19 @ 08:47) No pneumonia. Small bilateral pleural effusions and small pericardial effusion.  Unchanged thoracolumbar compression fractures. T7 posterior vertebral  body lucency corresponds to the site of epidural soft tissue seen on the   3/27/2019 MR thoracic spine.      3/27/19 : MR Lumbar Spine No Cont (19 @ 10:38) Abnormal T1 prolongation involving the cervical thoracic lumbar and sacral spine region as well as both iliac bones. This is likely related to patient's known multiple myeloma.  Bilateral pleural effusions are seen.      3/25/19 : US Abdomen Limited (19 @ 21:38) Normal right upper quadrant abdominal ultrasound.  Small right pleural effusion.        MICROBIOLOGY DATA:    Culture - Urine in AM (19 @ 07:41)    Culture - Urine:   NO GROWTH AT 24 HOURS    Specimen Source: URINE MIDSTREAM      Culture - Urine (19 @ 21:18)    Culture - Urine:   Culture grew 3 or more types of organisms which indicate  collection contamination; consider recollection only if  clinically indicated.    Specimen Source: URINE MIDSTREAM      Culture - Blood (19 @ 20:26)    Culture - Blood:   NO ORGANISMS ISOLATED  NO ORGANISMS ISOLATED AT 48 HRS.    Specimen Source: BLOOD VENOUS      Culture - Blood (19 @ 20:26)    Culture - Blood:   NO ORGANISMS ISOLATED  NO ORGANISMS ISOLATED AT 48 HRS.    Specimen Source: BLOOD PERIPHERAL      Rapid Respiratory Viral Panel (19 @ 19:33)    Adenovirus (RapRVP): Not Detected    Influenza A (RapRVP): Not Detected    Influenza AH1 2009 (RapRVP): Not Detected    Influenza AH1 (RapRVP): Not Detected    Influenza AH3 (RapRVP): Not Detected    Influenza B (RapRVP): Not Detected    Parainfluenza 1 (RapRVP): Not Detected    Parainfluenza 2 (RapRVP): Not Detected    Parainfluenza 3 (RapRVP): Not Detected    Parainfluenza 4 (RapRVP): Not Detected    Resp Syncytial Virus (RapRVP): Not Detected    Chlamydia pneumoniae (RapRVP): Not Detected    Mycoplasma pneumoniae (RapRVP): Not Detected    Entero/Rhinovirus (RapRVP): Not Detected    hMPV (RapRVP): Not Detected    Coronavirus (229E,HKU1,NL63,OC43): Not Detected This Respiratory Panel uses polymerase chain reaction (PCR)  to detect for adenovirus; coronavirus (HKU1, NL63, 229E,  OC43); human metapneumovirus (hMPV); human  enterovirus/rhinovirus (Entero/RV); influenza A; influenza  A/H1: influenza A/H3; influenza A/H1-2009; influenza B;  parainfluenza viruses 1,2,3,4; respiratory syncytial virus;  Mycoplasma pneumoniae; and Chlamydophila pneumoniae.

## 2019-03-30 NOTE — PROGRESS NOTE ADULT - SUBJECTIVE AND OBJECTIVE BOX
pt seen and examined, no chest pain , diffuse ache of muscles overnight     no tele    no palpitations, no syncope, no angina    artificial  tears Solution 1 Drop(s) Both EYES every 6 hours PRN  carvedilol 25 milliGRAM(s) Oral every 12 hours  cefTRIAXone   IVPB 1 Gram(s) IV Intermittent every 24 hours  furosemide   Injectable 20 milliGRAM(s) IV Push once  HYDROmorphone  Injectable 1 milliGRAM(s) IV Push every 4 hours PRN  oxyCODONE    IR 10 milliGRAM(s) Oral every 6 hours PRN                            8.5    4.50  )-----------( 22       ( 29 Mar 2019 06:14 )             25.5       Hemoglobin: 8.5 g/dL (03-29 @ 06:14)  Hemoglobin: 8.6 g/dL (03-28 @ 06:46)  Hemoglobin: 8.2 g/dL (03-27 @ 07:22)  Hemoglobin: 9.4 g/dL (03-25 @ 19:50)      03-29    137  |  102  |  25<H>  ----------------------------<  111<H>  3.7   |  21<L>  |  1.28    Ca    7.5<L>      29 Mar 2019 06:14    TPro  7.8  /  Alb  2.9<L>  /  TBili  0.6  /  DBili  x   /  AST  27  /  ALT  28  /  AlkPhos  178<H>  03-29    Creatinine Trend: 1.28<--, 1.66<--, 1.37<--, 1.19<--, 1.48<--, 1.31<--    COAGS:           T(C): 36.6 (03-30-19 @ 06:12), Max: 36.7 (03-29-19 @ 22:25)  HR: 109 (03-30-19 @ 06:12) (100 - 110)  BP: 134/82 (03-30-19 @ 06:12) (117/70 - 144/82)  RR: 18 (03-30-19 @ 06:12) (18 - 18)  SpO2: 98% (03-30-19 @ 06:12) (98% - 100%)  Wt(kg): --    I&O's Summary    no JVD  RRR, no murmurs  CTAB  soft nt/nd  no c/c/e    ECHO: Pending     A/P) 53 male PMH multiple myeloma s/p bone marrow transplant and chemo/RT, perforated diverticulitis s/p Kiah's (proctosigmoidectomy with colostomy, 9/2018), nonischemic cardiomyopathy (mild global LV systolic dysfunction with EF 54% and diastolic LV dysfunction based on TTE in 11/2018), and HTN who is being seen for management of cardiomyopathy.    - Heme / onc follow up , M/M   - ABX per medicine, follow up on cultures , ID follow up   - Pain management   - HR stable on Coreg ,   - ECHO pending , if LVEF remains unremarkable then no further inpatient cardiac workup expected  - no s/sof chf on exam .   - Medical management for CM at present   D/W Dr Hanna

## 2019-03-30 NOTE — PROGRESS NOTE ADULT - SUBJECTIVE AND OBJECTIVE BOX
Patient seen and examined  no complaints    No Known Allergies    Hospital Medications:   MEDICATIONS  (STANDING):  carvedilol 25 milliGRAM(s) Oral every 12 hours  cefTRIAXone   IVPB 1 Gram(s) IV Intermittent every 24 hours  morphine ER Tablet 15 milliGRAM(s) Oral every 12 hours        VITALS:  T(F): 97.9 (19 @ 06:12), Max: 98 (19 @ 22:25)  HR: 109 (19 @ 06:12)  BP: 134/82 (19 @ 06:12)  RR: 18 (19 @ 06:12)  SpO2: 98% (19 @ 06:12)  Wt(kg): --      PHYSICAL EXAM:  Constitutional: NAD  HEENT: anicteric sclera, oropharynx clear, MMM  Neck: No JVD  Respiratory: CTAB, no wheezes, rales or rhonchi  Cardiovascular: S1, S2, RRR  Gastrointestinal: BS+, soft, NT/ND, colostomy  Extremities: No cyanosis or clubbing. No peripheral edema  Neurological: A/O x 3, no focal deficits  Psychiatric: Normal mood, normal affect  : No CVA tenderness. No quintero.   Skin: No rashes    LABS:      137  |  100  |  19  ----------------------------<  144<H>  4.0   |  22  |  1.19    Ca    7.8<L>      30 Mar 2019 08:35  Phos  2.7       Mg     2.1         TPro  7.8  /  Alb  2.9<L>  /  TBili  0.6  /  DBili      /  AST  27  /  ALT  28  /  AlkPhos  178<H>      Creatinine Trend: 1.19 <--, 1.28 <--, 1.66 <--, 1.37 <--, 1.19 <--                        8.8    5.50  )-----------( 14       ( 30 Mar 2019 08:35 )             26.5     Urine Studies:  Urinalysis Basic - ( 28 Mar 2019 06:50 )    Color: YELLOW / Appearance: CLEAR / S.014 / pH: 6.0  Gluc: NEGATIVE / Ketone: NEGATIVE  / Bili: NEGATIVE / Urobili: NORMAL   Blood: NEGATIVE / Protein: 100 / Nitrite: NEGATIVE   Leuk Esterase: NEGATIVE / RBC: 0-2 / WBC 6-10   Sq Epi: FEW / Non Sq Epi:  / Bacteria: NEGATIVE      Protein, Random Urine: 111.6 mg/dL ( @ 06:50)  Creatinine, Random Urine: 91.40 mg/dL ( @ 06:50)  Sodium, Random Urine: 20 mmol/L ( @ 06:50)    RADIOLOGY & ADDITIONAL STUDIES:

## 2019-03-30 NOTE — CHART NOTE - NSCHARTNOTEFT_GEN_A_CORE
Spoke with Hematology regarding PLT 14. Pt asymptomatic w/o signs of bleeding. Will hold off transfusion and monitor. Will follow.     ADS  [phone] (pager)

## 2019-03-30 NOTE — PROGRESS NOTE ADULT - SUBJECTIVE AND OBJECTIVE BOX
Patient seen and examined at bedside  c/o body pain and gates. otherwise no additional acute events noted overnight  Case discussed with medical team    HPI:  53M hx of multiple myeloma s/p bone marrow transplant (~1.5 yrs ago) and chemo/RT (stopped ~2 months ago in preparation for colostomy reversal), perforated diverticulitis s/p Kiah's (proctosigmoidectomy with colostomy, 2018), nonischemic cardiomyopathy (mild global LV systolic dysfunction with EF 54% and diastolic LV dysfunction based on TTE in 2018), and HTN presents to [facility] ED c/o diffuse body pain. Patient recently discharged 3/23/19 from [facility] after being hospitalized for thrombocytopenia.     Patient started to develop severe non-radiating throbbing 10/10 pain in his b/l shoulders, chest, abdomen, and b/l thighs since  night - leaving him essentially bedbound. Was supposed to see Dr. Murphy for follow up today. However because of the pain he came to the ED (26 Mar 2019 11:51)      PAST MEDICAL & SURGICAL HISTORY:  Diverticulitis  Multiple myeloma  Hypertension  Left ventricular dysfunction  Cardiomyopathy  Former smoker: (1 ppd x 11 years; Quit ~age 26)  History of bone marrow transplant  History of surgery: s/p exploratory laparotomy with sigmoid resection and colostomy on 2018      No Known Allergies       MEDICATIONS  (STANDING):  carvedilol 25 milliGRAM(s) Oral every 12 hours  cefTRIAXone   IVPB 1 Gram(s) IV Intermittent every 24 hours  furosemide   Injectable 20 milliGRAM(s) IV Push once    MEDICATIONS  (PRN):  artificial  tears Solution 1 Drop(s) Both EYES every 6 hours PRN Dry Eyes  HYDROmorphone  Injectable 1 milliGRAM(s) IV Push every 4 hours PRN Moderate Pain (4 - 6)  oxyCODONE    IR 10 milliGRAM(s) Oral every 6 hours PRN Mild Pain (1 - 3)      REVIEW OF SYSTEMS:  CONSTITUTIONAL: (+) malaise. body pain  EYES: No acute change in vision   ENT:  No tinnitus  NECK: No stiffness  RESPIRATORY:gates. No hemoptysis  CARDIOVASCULAR: No chest pain, palpitations, syncope  GASTROINTESTINAL: No hematemesis, diarrhea, melena, or hematochezia.  GENITOURINARY: No hematuria  NEUROLOGICAL: No headaches  LYMPH Nodes: No enlarged glands  ENDOCRINE: No heat or cold intolerance	    T(C): 36.6 (19 @ 06:12), Max: 36.7 (19 @ 22:25)  HR: 109 (19 @ 06:12) (100 - 110)  BP: 134/82 (19 @ 06:12) (117/70 - 144/82)  RR: 18 (19 @ 06:12) (18 - 18)  SpO2: 98% (19 @ 06:12) (98% - 100%)    PHYSICAL EXAMINATION:   Constitutional: WD, NAD  HEENT: NC, AT  Neck:  Supple  Respiratory:  mild b/l inspiratory rales. Adequate airflow b/l. Not using accessory muscles of respiration.  Cardiovascular:  S1 & S2 intact, no R/G, 2+ radial pulses b/l  Gastrointestinal: Soft, NT, ND, normoactive b.s., no organomegaly/RT/rigidity  Extremities: WWP  Neurological:  Alert and awake.  No acute focal motor deficits. Crude sensation intact.     Labs and imaging reviewed    LABS:                        8.5    4.50  )-----------(        ( 29 Mar 2019 06:14 )             25.5     -    137  |  102  |  25<H>  ----------------------------<  111<H>  3.7   |  21<L>  |  1.28    Ca    7.5<L>      29 Mar 2019 06:14  Phos  2.6     -  Mg     2.1     -    TPro  7.8  /  Alb  2.9<L>  /  TBili  0.6  /  DBili  x   /  AST  27  /  ALT  28  /  AlkPhos  178<H>            Urinalysis Basic - ( 28 Mar 2019 06:50 )    Color: YELLOW / Appearance: CLEAR / S.014 / pH: 6.0  Gluc: NEGATIVE / Ketone: NEGATIVE  / Bili: NEGATIVE / Urobili: NORMAL   Blood: NEGATIVE / Protein: 100 / Nitrite: NEGATIVE   Leuk Esterase: NEGATIVE / RBC: 0-2 / WBC 6-10   Sq Epi: FEW / Non Sq Epi: x / Bacteria: NEGATIVE      CAPILLARY BLOOD GLUCOSE            LIVER FUNCTIONS - ( 29 Mar 2019 06:14 )  Alb: 2.9 g/dL / Pro: 7.8 g/dL / ALK PHOS: 178 u/L / ALT: 28 u/L / AST: 27 u/L / GGT: x               RADIOLOGY & ADDITIONAL STUDIES:

## 2019-03-30 NOTE — PROGRESS NOTE ADULT - ASSESSMENT
A  52 yo Male with multiple myeloma s/p bone marrow transplant (~1.5 yrs ago) and Perforated  Diverticulitis ,s/p  Kiah's (proctosigmoidectomy with colostomy, on 9/2/2018  and now chemo/RT (stopped ~2 months ago in preparation for colostomy reversal, who presents to the ER for evaluation  of diffuse body pain. Patient recently discharged, on 3/23/19, from [facility] after being hospitalized for thrombocytopenia. On admission, he found to have Fever, Temp. of 100.8, Tachycardia, HR of 110, and received dose of Zosyn and Vancomycin. The cultures are negative to date. The ID consult requested to assist with further evaluation and antibiotic management.     # Sepsis - ( fever + tachycardia ) -source to be determined   # B/L Pleural effusion- on MRI -  Small bilateral pleural effusions and small pericardial effusion on CT chest 3/30/19  # RVP- Negative  # Multiple  Myeloma  # UTI- 3/28/19  # Elevated Procalcitonin level -8.48    would recommend:  1. Obtain Procalcitonin level in AM, and if 80 % reduction from initial level then may discontinue ABx  2. Anti- tussive agents  3. Pain management as per Primary      d/w Family at the bed side and Nursing staff    will follow the patient with you

## 2019-03-31 LAB
ANION GAP SERPL CALC-SCNC: 14 MMO/L — SIGNIFICANT CHANGE UP (ref 7–14)
BASOPHILS # BLD AUTO: 0.02 K/UL — SIGNIFICANT CHANGE UP (ref 0–0.2)
BASOPHILS NFR BLD AUTO: 0.4 % — SIGNIFICANT CHANGE UP (ref 0–2)
BASOPHILS NFR SPEC: 0 % — SIGNIFICANT CHANGE UP (ref 0–2)
BLASTS # FLD: 0 % — SIGNIFICANT CHANGE UP (ref 0–0)
BUN SERPL-MCNC: 17 MG/DL — SIGNIFICANT CHANGE UP (ref 7–23)
CALCIUM SERPL-MCNC: 7.5 MG/DL — LOW (ref 8.4–10.5)
CHLORIDE SERPL-SCNC: 101 MMOL/L — SIGNIFICANT CHANGE UP (ref 98–107)
CO2 SERPL-SCNC: 23 MMOL/L — SIGNIFICANT CHANGE UP (ref 22–31)
CREAT SERPL-MCNC: 1.13 MG/DL — SIGNIFICANT CHANGE UP (ref 0.5–1.3)
DACRYOCYTES BLD QL SMEAR: SLIGHT — SIGNIFICANT CHANGE UP
EOSINOPHIL # BLD AUTO: 0.01 K/UL — SIGNIFICANT CHANGE UP (ref 0–0.5)
EOSINOPHIL NFR BLD AUTO: 0.2 % — SIGNIFICANT CHANGE UP (ref 0–6)
EOSINOPHIL NFR FLD: 0 % — SIGNIFICANT CHANGE UP (ref 0–6)
GIANT PLATELETS BLD QL SMEAR: PRESENT — SIGNIFICANT CHANGE UP
GLUCOSE SERPL-MCNC: 116 MG/DL — HIGH (ref 70–99)
HCT VFR BLD CALC: 25 % — LOW (ref 39–50)
HCT VFR BLD CALC: 26.3 % — LOW (ref 39–50)
HGB BLD-MCNC: 8 G/DL — LOW (ref 13–17)
HGB BLD-MCNC: 8.4 G/DL — LOW (ref 13–17)
IMM GRANULOCYTES NFR BLD AUTO: 10.4 % — HIGH (ref 0–1.5)
LYMPHOCYTES # BLD AUTO: 1.51 K/UL — SIGNIFICANT CHANGE UP (ref 1–3.3)
LYMPHOCYTES # BLD AUTO: 28 % — SIGNIFICANT CHANGE UP (ref 13–44)
LYMPHOCYTES NFR SPEC AUTO: 18.2 % — SIGNIFICANT CHANGE UP (ref 13–44)
MAGNESIUM SERPL-MCNC: 2.1 MG/DL — SIGNIFICANT CHANGE UP (ref 1.6–2.6)
MCHC RBC-ENTMCNC: 29 PG — SIGNIFICANT CHANGE UP (ref 27–34)
MCHC RBC-ENTMCNC: 29 PG — SIGNIFICANT CHANGE UP (ref 27–34)
MCHC RBC-ENTMCNC: 31.9 % — LOW (ref 32–36)
MCHC RBC-ENTMCNC: 32 % — SIGNIFICANT CHANGE UP (ref 32–36)
MCV RBC AUTO: 90.6 FL — SIGNIFICANT CHANGE UP (ref 80–100)
MCV RBC AUTO: 90.7 FL — SIGNIFICANT CHANGE UP (ref 80–100)
METAMYELOCYTES # FLD: 3.6 % — HIGH (ref 0–1)
MONOCYTES # BLD AUTO: 1.14 K/UL — HIGH (ref 0–0.9)
MONOCYTES NFR BLD AUTO: 21.2 % — HIGH (ref 2–14)
MONOCYTES NFR BLD: 9.1 % — HIGH (ref 2–9)
MYELOCYTES NFR BLD: 3.6 % — HIGH (ref 0–0)
NEUTROPHIL AB SER-ACNC: 52.7 % — SIGNIFICANT CHANGE UP (ref 43–77)
NEUTROPHILS # BLD AUTO: 2.15 K/UL — SIGNIFICANT CHANGE UP (ref 1.8–7.4)
NEUTROPHILS NFR BLD AUTO: 39.8 % — LOW (ref 43–77)
NEUTS BAND # BLD: 7.3 % — HIGH (ref 0–6)
NRBC # BLD: 3 /100WBC — SIGNIFICANT CHANGE UP
NRBC # FLD: 0.08 K/UL — SIGNIFICANT CHANGE UP (ref 0–0)
NRBC # FLD: 0.08 K/UL — SIGNIFICANT CHANGE UP (ref 0–0)
NRBC FLD-RTO: 1.5 — SIGNIFICANT CHANGE UP
NRBC FLD-RTO: 1.6 — SIGNIFICANT CHANGE UP
OTHER - HEMATOLOGY %: 0 — SIGNIFICANT CHANGE UP
PHOSPHATE SERPL-MCNC: 2.7 MG/DL — SIGNIFICANT CHANGE UP (ref 2.5–4.5)
PLATELET # BLD AUTO: 10 K/UL — CRITICAL LOW (ref 150–400)
PLATELET # BLD AUTO: 72 K/UL — LOW (ref 150–400)
PLATELET COUNT - ESTIMATE: SIGNIFICANT CHANGE UP
PMV BLD: 9.6 FL — SIGNIFICANT CHANGE UP (ref 7–13)
PMV BLD: SIGNIFICANT CHANGE UP FL (ref 7–13)
POIKILOCYTOSIS BLD QL AUTO: SLIGHT — SIGNIFICANT CHANGE UP
POLYCHROMASIA BLD QL SMEAR: SLIGHT — SIGNIFICANT CHANGE UP
POTASSIUM SERPL-MCNC: 4.3 MMOL/L — SIGNIFICANT CHANGE UP (ref 3.5–5.3)
POTASSIUM SERPL-SCNC: 4.3 MMOL/L — SIGNIFICANT CHANGE UP (ref 3.5–5.3)
PROCALCITONIN SERPL-MCNC: 0.71 NG/ML — HIGH (ref 0.02–0.1)
PROMYELOCYTES # FLD: 0 % — SIGNIFICANT CHANGE UP (ref 0–0)
RBC # BLD: 2.76 M/UL — LOW (ref 4.2–5.8)
RBC # BLD: 2.9 M/UL — LOW (ref 4.2–5.8)
RBC # FLD: 17.9 % — HIGH (ref 10.3–14.5)
RBC # FLD: 18.1 % — HIGH (ref 10.3–14.5)
SMUDGE CELLS # BLD: PRESENT — SIGNIFICANT CHANGE UP
SODIUM SERPL-SCNC: 138 MMOL/L — SIGNIFICANT CHANGE UP (ref 135–145)
TARGETS BLD QL SMEAR: SLIGHT — SIGNIFICANT CHANGE UP
VARIANT LYMPHS # BLD: 5.5 % — SIGNIFICANT CHANGE UP
WBC # BLD: 5.12 K/UL — SIGNIFICANT CHANGE UP (ref 3.8–10.5)
WBC # BLD: 5.39 K/UL — SIGNIFICANT CHANGE UP (ref 3.8–10.5)
WBC # FLD AUTO: 5.12 K/UL — SIGNIFICANT CHANGE UP (ref 3.8–10.5)
WBC # FLD AUTO: 5.39 K/UL — SIGNIFICANT CHANGE UP (ref 3.8–10.5)

## 2019-03-31 PROCEDURE — 93010 ELECTROCARDIOGRAM REPORT: CPT

## 2019-03-31 RX ORDER — HYDROMORPHONE HYDROCHLORIDE 2 MG/ML
2 INJECTION INTRAMUSCULAR; INTRAVENOUS; SUBCUTANEOUS EVERY 4 HOURS
Qty: 0 | Refills: 0 | Status: DISCONTINUED | OUTPATIENT
Start: 2019-03-31 | End: 2019-04-04

## 2019-03-31 RX ORDER — MORPHINE SULFATE 50 MG/1
15 CAPSULE, EXTENDED RELEASE ORAL EVERY 12 HOURS
Qty: 0 | Refills: 0 | Status: DISCONTINUED | OUTPATIENT
Start: 2019-03-31 | End: 2019-04-01

## 2019-03-31 RX ORDER — HYDROMORPHONE HYDROCHLORIDE 2 MG/ML
1 INJECTION INTRAMUSCULAR; INTRAVENOUS; SUBCUTANEOUS EVERY 4 HOURS
Qty: 0 | Refills: 0 | Status: DISCONTINUED | OUTPATIENT
Start: 2019-03-31 | End: 2019-04-04

## 2019-03-31 RX ORDER — HYDROMORPHONE HYDROCHLORIDE 2 MG/ML
4 INJECTION INTRAMUSCULAR; INTRAVENOUS; SUBCUTANEOUS EVERY 4 HOURS
Qty: 0 | Refills: 0 | Status: DISCONTINUED | OUTPATIENT
Start: 2019-03-31 | End: 2019-04-04

## 2019-03-31 RX ORDER — OXYCODONE HYDROCHLORIDE 5 MG/1
10 TABLET ORAL EVERY 6 HOURS
Qty: 0 | Refills: 0 | Status: DISCONTINUED | OUTPATIENT
Start: 2019-03-31 | End: 2019-03-31

## 2019-03-31 RX ADMIN — HYDROMORPHONE HYDROCHLORIDE 4 MILLIGRAM(S): 2 INJECTION INTRAMUSCULAR; INTRAVENOUS; SUBCUTANEOUS at 08:25

## 2019-03-31 RX ADMIN — MORPHINE SULFATE 15 MILLIGRAM(S): 50 CAPSULE, EXTENDED RELEASE ORAL at 16:46

## 2019-03-31 RX ADMIN — HYDROMORPHONE HYDROCHLORIDE 4 MILLIGRAM(S): 2 INJECTION INTRAMUSCULAR; INTRAVENOUS; SUBCUTANEOUS at 16:46

## 2019-03-31 RX ADMIN — HYDROMORPHONE HYDROCHLORIDE 4 MILLIGRAM(S): 2 INJECTION INTRAMUSCULAR; INTRAVENOUS; SUBCUTANEOUS at 08:48

## 2019-03-31 RX ADMIN — HYDROMORPHONE HYDROCHLORIDE 4 MILLIGRAM(S): 2 INJECTION INTRAMUSCULAR; INTRAVENOUS; SUBCUTANEOUS at 17:30

## 2019-03-31 RX ADMIN — HYDROMORPHONE HYDROCHLORIDE 4 MILLIGRAM(S): 2 INJECTION INTRAMUSCULAR; INTRAVENOUS; SUBCUTANEOUS at 21:43

## 2019-03-31 RX ADMIN — LISINOPRIL 10 MILLIGRAM(S): 2.5 TABLET ORAL at 06:45

## 2019-03-31 RX ADMIN — CARVEDILOL PHOSPHATE 25 MILLIGRAM(S): 80 CAPSULE, EXTENDED RELEASE ORAL at 16:46

## 2019-03-31 RX ADMIN — CARVEDILOL PHOSPHATE 25 MILLIGRAM(S): 80 CAPSULE, EXTENDED RELEASE ORAL at 06:45

## 2019-03-31 RX ADMIN — MORPHINE SULFATE 15 MILLIGRAM(S): 50 CAPSULE, EXTENDED RELEASE ORAL at 06:45

## 2019-03-31 RX ADMIN — HYDROMORPHONE HYDROCHLORIDE 4 MILLIGRAM(S): 2 INJECTION INTRAMUSCULAR; INTRAVENOUS; SUBCUTANEOUS at 22:30

## 2019-03-31 RX ADMIN — MORPHINE SULFATE 15 MILLIGRAM(S): 50 CAPSULE, EXTENDED RELEASE ORAL at 07:14

## 2019-03-31 NOTE — CHART NOTE - NSCHARTNOTEFT_GEN_A_CORE
Spoke with attending regarding PLT 10 on AM labs. Will transfuse 2 units of platelets.     ADS  [phone] (pager)

## 2019-03-31 NOTE — PROGRESS NOTE ADULT - SUBJECTIVE AND OBJECTIVE BOX
Patient is seen and examined at the bed side, is afebrile.  The pain controlled better today.   The Procalcitonin level trended down more  than 80 % of initial level.       REVIEW OF SYSTEMS: All other review systems are negative      ALLERGIES: No Known Allergies      Vital Signs Last 24 Hrs  T(C): 37.4 (31 Mar 2019 16:43), Max: 37.4 (31 Mar 2019 16:43)  T(F): 99.3 (31 Mar 2019 16:43), Max: 99.3 (31 Mar 2019 16:43)  HR: 111 (31 Mar 2019 16:43) (107 - 113)  BP: 143/86 (31 Mar 2019 16:43) (126/76 - 143/86)  BP(mean): --  RR: 18 (31 Mar 2019 12:27) (18 - 18)  SpO2: 100% (31 Mar 2019 12:27) (98% - 100%)        PHYSICAL EXAM:  GENERAL: Not in  acute distress   CHEST/LUNG:  Air entry  bilaterally  HEART: s1 and s2 present  ABDOMEN:  Colostomy bag in placed  EXTREMITIES: No pedal  edema  CNS: Awake and Alert        LABS:                        8.0    5.12  )-----------( 72       ( 31 Mar 2019 15:14 )             25.0                           8.8    5.50  )-----------( 14       ( 30 Mar 2019 08:35 )             26.5                8.2    2.36  )-----------( 33       ( 27 Mar 2019 07:22 )             24.4       -    138  |  101  |  17  ----------------------------<  116<H>  4.3   |  23  |  1.13    Ca    7.5<L>      31 Mar 2019 06:35  Phos  2.7     03-31  Mg     2.1     03-31      03-30    137  |  100  |  19  ----------------------------<  144<H>  4.0   |  22  |  1.19    Ca    7.8<L>      30 Mar 2019 08:35  Phos  2.7     03-30  Mg     2.1     03-30    TPro  7.8  /  Alb  2.9<L>  /  TBili  0.6  /  DBili  x   /  AST  27  /  ALT  28  /  AlkPhos  178<H>            Urinalysis Basic - ( 25 Mar 2019 20:38 )  Color: YELLOW / Appearance: CLEAR / S.019 / pH: 6.0  Gluc: NEGATIVE / Ketone: NEGATIVE  / Bili: NEGATIVE / Urobili: NORMAL   Blood: TRACE / Protein: 200 / Nitrite: NEGATIVE   Leuk Esterase: NEGATIVE / RBC: 11-25 / WBC 3-5       Procalcitonin, Serum (19 @ 06:35)    Procalcitonin, Serum: 0.71:    Procalcitonin, Serum (19 @ 06:14)    Procalcitonin, Serum: 2.42:     Procalcitonin, Serum (19 @ 16:46)    Procalcitonin, Serum: 8.48: Procalcitonin (PCT) Interpretation (ng/mL) - Diagnosis of  systemic bacterial infection/sepsis:        MEDICATIONS  (STANDING):  carvedilol 25 milliGRAM(s) Oral every 12 hours  cefTRIAXone   IVPB 1 Gram(s) IV Intermittent every 24 hours  lisinopril 10 milliGRAM(s) Oral daily  morphine ER Tablet 15 milliGRAM(s) Oral every 12 hours    MEDICATIONS  (PRN):  artificial  tears Solution 1 Drop(s) Both EYES every 6 hours PRN Dry Eyes  HYDROmorphone  Injectable 1 milliGRAM(s) IV Push every 4 hours PRN Severe Pain (7 - 10)  oxyCODONE    IR 10 milliGRAM(s) Oral every 6 hours PRN Mild Pain (1 - 3)  traMADol 50 milliGRAM(s) Oral every 8 hours PRN Moderate Pain (4 - 6)        RADIOLOGY & ADDITIONAL TESTS:    3/30/19 : CT Chest No Cont (19 @ 08:47) No pneumonia. Small bilateral pleural effusions and small pericardial effusion.  Unchanged thoracolumbar compression fractures. T7 posterior vertebral  body lucency corresponds to the site of epidural soft tissue seen on the   3/27/2019 MR thoracic spine.      3/27/19 : MR Lumbar Spine No Cont (19 @ 10:38) Abnormal T1 prolongation involving the cervical thoracic lumbar and sacral spine region as well as both iliac bones. This is likely related to patient's known multiple myeloma.  Bilateral pleural effusions are seen.      3/25/19 : US Abdomen Limited (19 @ 21:38) Normal right upper quadrant abdominal ultrasound.  Small right pleural effusion.        MICROBIOLOGY DATA:    Culture - Urine in AM (19 @ 07:41)    Culture - Urine:   NO GROWTH AT 24 HOURS    Specimen Source: URINE MIDSTREAM      Culture - Urine (19 @ 21:18)    Culture - Urine:   Culture grew 3 or more types of organisms which indicate  collection contamination; consider recollection only if  clinically indicated.    Specimen Source: URINE MIDSTREAM      Culture - Blood (19 @ 20:26)    Culture - Blood:   NO ORGANISMS ISOLATED  NO ORGANISMS ISOLATED AT 48 HRS.    Specimen Source: BLOOD VENOUS      Culture - Blood (19 @ 20:26)    Culture - Blood:   NO ORGANISMS ISOLATED  NO ORGANISMS ISOLATED AT 48 HRS.    Specimen Source: BLOOD PERIPHERAL      Rapid Respiratory Viral Panel (19 @ 19:33)    Adenovirus (RapRVP): Not Detected    Influenza A (RapRVP): Not Detected    Influenza AH1 2009 (RapRVP): Not Detected    Influenza AH1 (RapRVP): Not Detected    Influenza AH3 (RapRVP): Not Detected    Influenza B (RapRVP): Not Detected    Parainfluenza 1 (RapRVP): Not Detected    Parainfluenza 2 (RapRVP): Not Detected    Parainfluenza 3 (RapRVP): Not Detected    Parainfluenza 4 (RapRVP): Not Detected    Resp Syncytial Virus (RapRVP): Not Detected    Chlamydia pneumoniae (RapRVP): Not Detected    Mycoplasma pneumoniae (RapRVP): Not Detected    Entero/Rhinovirus (RapRVP): Not Detected    hMPV (RapRVP): Not Detected    Coronavirus (229E,HKU1,NL63,OC43): Not Detected This Respiratory Panel uses polymerase chain reaction (PCR)  to detect for adenovirus; coronavirus (HKU1, NL63, 229E,  OC43); human metapneumovirus (hMPV); human  enterovirus/rhinovirus (Entero/RV); influenza A; influenza  A/H1: influenza A/H3; influenza A/H1-2009; influenza B;  parainfluenza viruses 1,2,3,4; respiratory syncytial virus;  Mycoplasma pneumoniae; and Chlamydophila pneumoniae.

## 2019-03-31 NOTE — PROGRESS NOTE ADULT - ASSESSMENT
1. Diffuse bone pain of unclear etiology    -- pt has known extensive bone involvement by myeloma  --  MRI C/T/L Spine w no cord compression  -- pain management      2. Transfusion dependent Anemia and Thrombocytopenia    -- sec to myeloma  --did not respond to high dose decadron last week  -- transfuse platelets if Less than 10K or clinically significant bleeding  -- keep Hgb > 7  plts today 10; please transfuse one unit single donor platelets.     3. Multiple Myeloma    -- relapsed / refractory ( failed all FDA Available tx) and failed auto stem cell transplant  -- pt off of tx against my and other oncologist advise in order to reverse colostomy  -- clinical trial at [facility] strongly recommended but pt declined  -- Plan to start Bendamustine 120mg/M2 Day 1, Pomalidomide 3mg PO QD Days 1-21 and Dexa 40mg weekly Q 28 day cycle. Will Give as outpt      Overall Prognosis is unfortunately very poor since pt has exhausted just about every available tx option including transplant    I spoke at length today w patient. Wife and family friend yesterday asked to transfer care to [place]. Pt today tells me he does not wish to go. Wants to go home and begin outpt treatment as outlined above with Dr Murphy 1. Diffuse bone pain of unclear etiology    -- pt has known extensive bone involvement by myeloma  --  MRI C/T/L Spine w no cord compression  -- poor pain control -  pain management consult       2. Transfusion dependent Anemia and Thrombocytopenia    -- sec to myeloma  --did not respond to high dose decadron last week  -- transfuse platelets if Less than 10K or clinically significant bleeding  -- keep Hgb > 7  plts today 10; please transfuse one unit single donor platelets.     3. Multiple Myeloma    -- relapsed / refractory ( failed all FDA Available tx) and failed auto stem cell transplant  -- pt off of tx against my and other oncologist advise in order to reverse colostomy  -- clinical trial at [facility] strongly recommended but pt declined  -- Plan to start Bendamustine 120mg/M2 Day 1, Pomalidomide 3mg PO QD Days 1-21 and Dexa 40mg weekly Q 28 day cycle.     Overall Prognosis is unfortunately very poor since pt has exhausted just about every available tx option including transplant    pt and family concerned about his declining medical condition and poor pain control.   may consider giving one cycle of chemo while inpt  pain management consult   will d/w Dr. Murphy

## 2019-03-31 NOTE — PROGRESS NOTE ADULT - SUBJECTIVE AND OBJECTIVE BOX
Pt is seen and examined  pt is awake and lying in bed   feels much better  ambulating  pain controlled      PAST MEDICAL & SURGICAL HISTORY:  Diverticulitis  Multiple myeloma  Hypertension  Left ventricular dysfunction  Cardiomyopathy  Former smoker: (1 ppd x 11 years; Quit ~age 26)  History of bone marrow transplant  History of surgery: s/p exploratory laparotomy with sigmoid resection and colostomy on 9/2/2018      ROS:  Negative except for:    MEDICATIONS  (STANDING):  carvedilol 25 milliGRAM(s) Oral every 12 hours  cefTRIAXone   IVPB 1 Gram(s) IV Intermittent every 24 hours    MEDICATIONS  (PRN):  artificial  tears Solution 1 Drop(s) Both EYES every 6 hours PRN Dry Eyes  HYDROmorphone  Injectable 2 milliGRAM(s) IV Push every 4 hours PRN Severe Pain (7 - 10)  HYDROmorphone  Injectable 1 milliGRAM(s) IV Push every 4 hours PRN Moderate Pain (4 - 6)  oxyCODONE    IR 10 milliGRAM(s) Oral every 6 hours PRN Mild Pain (1 - 3)      Allergies    No Known Allergies    Intolerances    Vital Signs Last 24 Hrs  T(C): 36.7 (31 Mar 2019 09:03), Max: 37.2 (30 Mar 2019 21:24)  T(F): 98 (31 Mar 2019 09:03), Max: 98.9 (30 Mar 2019 21:24)  HR: 107 (31 Mar 2019 09:03) (107 - 113)  BP: 131/75 (31 Mar 2019 09:03) (126/76 - 137/78)  BP(mean): --  RR: 18 (31 Mar 2019 09:03) (18 - 18)  SpO2: 100% (31 Mar 2019 09:03) (98% - 100%)    PHYSICAL EXAM  General: adult in NAD Looks Chronically Ill  HEENT: clear oropharynx, anicteric sclera, pink conjunctiva  Neck: supple  CV: normal S1/S2 with no murmur rubs or gallops  Lungs: positive air movement b/l ant lungs,clear to auscultation, no wheezes, no rales  Abdomen: soft non-tender non-distended, no hepatosplenomegaly  Ext: no clubbing cyanosis or edema     LABS:                                   8.4    5.39  )-----------( 10       ( 31 Mar 2019 06:35 )             26.3       Serial CBC's  03-28 @ 06:46  Hct-26.1 / Hgb-8.6 / Plat-36 / RBC-2.98 / WBC-3.04            03-29    137  |  102  |  25<H>  ----------------------------<  111<H>  3.7   |  21<L>  |  1.28    Ca    7.5<L>      29 Mar 2019 06:14  Phos  2.6     03-28  Mg     2.1     03-28    TPro  7.8  /  Alb  2.9<L>  /  TBili  0.6  /  DBili  x   /  AST  27  /  ALT  28  /  AlkPhos  178<H>  03-29          WBC Count: 4.50 K/uL (03-29 @ 06:14)  Hemoglobin: 8.5 g/dL (03-29 @ 06:14)            RADIOLOGY & ADDITIONAL STUDIES: Pt is seen and examined  pt is awake and lying in bed   pain is again worse he says after switching to PO pain meds   no fever, no N/V     PAST MEDICAL & SURGICAL HISTORY:  Diverticulitis  Multiple myeloma  Hypertension  Left ventricular dysfunction  Cardiomyopathy  Former smoker: (1 ppd x 11 years; Quit ~age 26)  History of bone marrow transplant  History of surgery: s/p exploratory laparotomy with sigmoid resection and colostomy on 9/2/2018      ROS:  Negative except for:    MEDICATIONS  (STANDING):  carvedilol 25 milliGRAM(s) Oral every 12 hours  lisinopril 10 milliGRAM(s) Oral daily  morphine ER Tablet 15 milliGRAM(s) Oral every 12 hours    MEDICATIONS  (PRN):  artificial  tears Solution 1 Drop(s) Both EYES every 6 hours PRN Dry Eyes  HYDROmorphone   Tablet 2 milliGRAM(s) Oral every 4 hours PRN Moderate Pain (4 - 6)  HYDROmorphone   Tablet 4 milliGRAM(s) Oral every 4 hours PRN Severe Pain (7 - 10)  HYDROmorphone  Injectable 1 milliGRAM(s) IV Push every 4 hours PRN Severe breakthrough pain      Allergies    No Known Allergies    Intolerances    Vital Signs Last 24 Hrs  T(C): 36.7 (31 Mar 2019 09:03), Max: 37.2 (30 Mar 2019 21:24)  T(F): 98 (31 Mar 2019 09:03), Max: 98.9 (30 Mar 2019 21:24)  HR: 107 (31 Mar 2019 09:03) (107 - 113)  BP: 131/75 (31 Mar 2019 09:03) (126/76 - 137/78)  BP(mean): --  RR: 18 (31 Mar 2019 09:03) (18 - 18)  SpO2: 100% (31 Mar 2019 09:03) (98% - 100%)    PHYSICAL EXAM  General: adult in NAD Looks Chronically Ill  HEENT: clear oropharynx, anicteric sclera, pink conjunctiva  Neck: supple  CV: normal S1/S2 with no murmur rubs or gallops  Lungs: positive air movement b/l ant lungs,clear to auscultation, no wheezes, no rales  Abdomen: soft non-tender non-distended, no hepatosplenomegaly  Ext: no clubbing cyanosis or edema     LABS:                                   8.4    5.39  )-----------( 10       ( 31 Mar 2019 06:35 )             26.3       Serial CBC's  03-28 @ 06:46  Hct-26.1 / Hgb-8.6 / Plat-36 / RBC-2.98 / WBC-3.04            03-29    137  |  102  |  25<H>  ----------------------------<  111<H>  3.7   |  21<L>  |  1.28    Ca    7.5<L>      29 Mar 2019 06:14  Phos  2.6     03-28  Mg     2.1     03-28    TPro  7.8  /  Alb  2.9<L>  /  TBili  0.6  /  DBili  x   /  AST  27  /  ALT  28  /  AlkPhos  178<H>  03-29          WBC Count: 4.50 K/uL (03-29 @ 06:14)  Hemoglobin: 8.5 g/dL (03-29 @ 06:14)            RADIOLOGY & ADDITIONAL STUDIES:

## 2019-03-31 NOTE — PROGRESS NOTE ADULT - SUBJECTIVE AND OBJECTIVE BOX
pt seen and examined, no chest pain, c/o body aches    MEDICATIONS  (STANDING):  carvedilol 25 milliGRAM(s) Oral every 12 hours  lisinopril 10 milliGRAM(s) Oral daily  morphine ER Tablet 15 milliGRAM(s) Oral every 12 hours    MEDICATIONS  (PRN):  artificial  tears Solution 1 Drop(s) Both EYES every 6 hours PRN Dry Eyes  HYDROmorphone   Tablet 2 milliGRAM(s) Oral every 4 hours PRN Moderate Pain (4 - 6)  HYDROmorphone   Tablet 4 milliGRAM(s) Oral every 4 hours PRN Severe Pain (7 - 10)  HYDROmorphone  Injectable 1 milliGRAM(s) IV Push every 4 hours PRN Severe breakthrough pain      LABS:                            8.4    5.39  )-----------( 10       ( 31 Mar 2019 06:35 )             26.3     138  |  101  |  17  ----------------------------<  116<H>  4.3   |  23  |  1.13    Ca    7.5<L>      31 Mar 2019 06:35  Phos  2.7     03-31  Mg     2.1     03-31    Creatinine Trend: 1.13<--, 1.19<--, 1.28<--, 1.66<--, 1.37<--, 1.19<--     PHYSICAL EXAM  Vital Signs Last 24 Hrs  T(C): 37.1 (31 Mar 2019 12:27), Max: 37.2 (30 Mar 2019 21:24)  T(F): 98.8 (31 Mar 2019 12:27), Max: 98.9 (30 Mar 2019 21:24)  HR: 107 (31 Mar 2019 12:27) (107 - 113)  BP: 133/79 (31 Mar 2019 12:27) (126/76 - 137/78)  RR: 18 (31 Mar 2019 12:27) (18 - 18)  SpO2: 100% (31 Mar 2019 12:27) (98% - 100%)    no JVD  RRR, no murmurs  CTAB  soft nt/nd  no c/c/e    ECHO: Pending     A/P) 53 male PMH multiple myeloma s/p bone marrow transplant and chemo/RT, perforated diverticulitis s/p Kiah's (proctosigmoidectomy with colostomy, 9/2018), nonischemic cardiomyopathy (mild global LV systolic dysfunction with EF 54% and diastolic LV dysfunction based on TTE in 11/2018), and HTN who is being seen for management of cardiomyopathy.    - Heme / onc follow up noted  - ABX per medicine, follow up on cultures , ID follow up   --await Echo  --not in decomp CHF  --pain management

## 2019-03-31 NOTE — PROGRESS NOTE ADULT - ASSESSMENT
A  54 yo Male with multiple myeloma s/p bone marrow transplant (~1.5 yrs ago) and Perforated  Diverticulitis ,s/p  Kiah's (proctosigmoidectomy with colostomy, on 9/2/2018  and now chemo/RT (stopped ~2 months ago in preparation for colostomy reversal, who presents to the ER for evaluation  of diffuse body pain. Patient recently discharged, on 3/23/19, from [facility] after being hospitalized for thrombocytopenia. On admission, he found to have Fever, Temp. of 100.8, Tachycardia, HR of 110, and received dose of Zosyn and Vancomycin. The cultures are negative to date. The ID consult requested to assist with further evaluation and antibiotic management.     # Sepsis - ( fever + tachycardia ) -source to be determined   # B/L Pleural effusion- on MRI -  Small bilateral pleural effusions and small pericardial effusion on CT chest 3/30/19  # RVP- Negative  # Multiple  Myeloma  # UTI- 3/28/19  # Elevated Procalcitonin level -8.48 (3/27), -2.42-0.70 (3/31)    would recommend:  1. Discontinue Ceftriaxone since  Procalcitonin level has >more than  80 % reduction from initial level   2. Continue supportive care  3. Pain management as per Primary      d/w patient, Family at the bed side and Nursing staff    will follow the patient with you

## 2019-04-01 DIAGNOSIS — M84.48XA PATHOLOGICAL FRACTURE, OTHER SITE, INITIAL ENCOUNTER FOR FRACTURE: ICD-10-CM

## 2019-04-01 LAB
ANION GAP SERPL CALC-SCNC: 14 MMO/L — SIGNIFICANT CHANGE UP (ref 7–14)
BASOPHILS # BLD AUTO: 0.04 K/UL — SIGNIFICANT CHANGE UP (ref 0–0.2)
BASOPHILS NFR BLD AUTO: 0.8 % — SIGNIFICANT CHANGE UP (ref 0–2)
BUN SERPL-MCNC: 16 MG/DL — SIGNIFICANT CHANGE UP (ref 7–23)
CALCIUM SERPL-MCNC: 7.7 MG/DL — LOW (ref 8.4–10.5)
CHLORIDE SERPL-SCNC: 101 MMOL/L — SIGNIFICANT CHANGE UP (ref 98–107)
CO2 SERPL-SCNC: 22 MMOL/L — SIGNIFICANT CHANGE UP (ref 22–31)
CREAT SERPL-MCNC: 1.11 MG/DL — SIGNIFICANT CHANGE UP (ref 0.5–1.3)
EOSINOPHIL # BLD AUTO: 0.01 K/UL — SIGNIFICANT CHANGE UP (ref 0–0.5)
EOSINOPHIL NFR BLD AUTO: 0.2 % — SIGNIFICANT CHANGE UP (ref 0–6)
GLUCOSE SERPL-MCNC: 123 MG/DL — HIGH (ref 70–99)
HCT VFR BLD CALC: 24 % — LOW (ref 39–50)
HGB BLD-MCNC: 7.7 G/DL — LOW (ref 13–17)
IMM GRANULOCYTES NFR BLD AUTO: 11.9 % — HIGH (ref 0–1.5)
LYMPHOCYTES # BLD AUTO: 1.64 K/UL — SIGNIFICANT CHANGE UP (ref 1–3.3)
LYMPHOCYTES # BLD AUTO: 31.9 % — SIGNIFICANT CHANGE UP (ref 13–44)
MAGNESIUM SERPL-MCNC: 2.1 MG/DL — SIGNIFICANT CHANGE UP (ref 1.6–2.6)
MANUAL SMEAR VERIFICATION: SIGNIFICANT CHANGE UP
MCHC RBC-ENTMCNC: 28.6 PG — SIGNIFICANT CHANGE UP (ref 27–34)
MCHC RBC-ENTMCNC: 32.1 % — SIGNIFICANT CHANGE UP (ref 32–36)
MCV RBC AUTO: 89.2 FL — SIGNIFICANT CHANGE UP (ref 80–100)
MONOCYTES # BLD AUTO: 0.98 K/UL — HIGH (ref 0–0.9)
MONOCYTES NFR BLD AUTO: 19.1 % — HIGH (ref 2–14)
NEUTROPHILS # BLD AUTO: 1.86 K/UL — SIGNIFICANT CHANGE UP (ref 1.8–7.4)
NEUTROPHILS NFR BLD AUTO: 36.1 % — LOW (ref 43–77)
NRBC # FLD: 0.11 K/UL — SIGNIFICANT CHANGE UP (ref 0–0)
NRBC FLD-RTO: 2.1 — SIGNIFICANT CHANGE UP
PHOSPHATE SERPL-MCNC: 2.9 MG/DL — SIGNIFICANT CHANGE UP (ref 2.5–4.5)
PLATELET # BLD AUTO: 68 K/UL — LOW (ref 150–400)
PMV BLD: 11.6 FL — SIGNIFICANT CHANGE UP (ref 7–13)
POTASSIUM SERPL-MCNC: 4.1 MMOL/L — SIGNIFICANT CHANGE UP (ref 3.5–5.3)
POTASSIUM SERPL-SCNC: 4.1 MMOL/L — SIGNIFICANT CHANGE UP (ref 3.5–5.3)
PROCALCITONIN SERPL-MCNC: 0.45 NG/ML — HIGH (ref 0.02–0.1)
RBC # BLD: 2.69 M/UL — LOW (ref 4.2–5.8)
RBC # FLD: 17.8 % — HIGH (ref 10.3–14.5)
SODIUM SERPL-SCNC: 137 MMOL/L — SIGNIFICANT CHANGE UP (ref 135–145)
WBC # BLD: 5.14 K/UL — SIGNIFICANT CHANGE UP (ref 3.8–10.5)
WBC # FLD AUTO: 5.14 K/UL — SIGNIFICANT CHANGE UP (ref 3.8–10.5)

## 2019-04-01 RX ORDER — MORPHINE SULFATE 50 MG/1
30 CAPSULE, EXTENDED RELEASE ORAL EVERY 12 HOURS
Qty: 0 | Refills: 0 | Status: DISCONTINUED | OUTPATIENT
Start: 2019-04-01 | End: 2019-04-04

## 2019-04-01 RX ORDER — ONDANSETRON 8 MG/1
16 TABLET, FILM COATED ORAL ONCE
Qty: 0 | Refills: 0 | Status: COMPLETED | OUTPATIENT
Start: 2019-04-02 | End: 2019-04-02

## 2019-04-01 RX ORDER — SODIUM CHLORIDE 9 MG/ML
1000 INJECTION INTRAMUSCULAR; INTRAVENOUS; SUBCUTANEOUS
Qty: 0 | Refills: 0 | Status: DISCONTINUED | OUTPATIENT
Start: 2019-04-02 | End: 2019-04-08

## 2019-04-01 RX ORDER — POMALIDOMIDE 1 MG/1
3 CAPSULE ORAL DAILY
Qty: 0 | Refills: 0 | Status: DISCONTINUED | OUTPATIENT
Start: 2019-04-02 | End: 2019-04-07

## 2019-04-01 RX ORDER — BENDAMUSTINE HYDROCHLORIDE 100 MG/20ML
208 INJECTION, POWDER, LYOPHILIZED, FOR SOLUTION INTRAVENOUS ONCE
Qty: 0 | Refills: 0 | Status: COMPLETED | OUTPATIENT
Start: 2019-04-02 | End: 2019-04-02

## 2019-04-01 RX ORDER — DEXAMETHASONE 0.5 MG/5ML
40 ELIXIR ORAL ONCE
Qty: 0 | Refills: 0 | Status: COMPLETED | OUTPATIENT
Start: 2019-04-02 | End: 2019-04-02

## 2019-04-01 RX ADMIN — HYDROMORPHONE HYDROCHLORIDE 4 MILLIGRAM(S): 2 INJECTION INTRAMUSCULAR; INTRAVENOUS; SUBCUTANEOUS at 14:10

## 2019-04-01 RX ADMIN — MORPHINE SULFATE 30 MILLIGRAM(S): 50 CAPSULE, EXTENDED RELEASE ORAL at 17:54

## 2019-04-01 RX ADMIN — MORPHINE SULFATE 15 MILLIGRAM(S): 50 CAPSULE, EXTENDED RELEASE ORAL at 06:00

## 2019-04-01 RX ADMIN — MORPHINE SULFATE 15 MILLIGRAM(S): 50 CAPSULE, EXTENDED RELEASE ORAL at 05:25

## 2019-04-01 RX ADMIN — LISINOPRIL 10 MILLIGRAM(S): 2.5 TABLET ORAL at 05:25

## 2019-04-01 RX ADMIN — HYDROMORPHONE HYDROCHLORIDE 4 MILLIGRAM(S): 2 INJECTION INTRAMUSCULAR; INTRAVENOUS; SUBCUTANEOUS at 21:34

## 2019-04-01 RX ADMIN — HYDROMORPHONE HYDROCHLORIDE 4 MILLIGRAM(S): 2 INJECTION INTRAMUSCULAR; INTRAVENOUS; SUBCUTANEOUS at 09:02

## 2019-04-01 RX ADMIN — HYDROMORPHONE HYDROCHLORIDE 4 MILLIGRAM(S): 2 INJECTION INTRAMUSCULAR; INTRAVENOUS; SUBCUTANEOUS at 13:40

## 2019-04-01 RX ADMIN — HYDROMORPHONE HYDROCHLORIDE 4 MILLIGRAM(S): 2 INJECTION INTRAMUSCULAR; INTRAVENOUS; SUBCUTANEOUS at 20:58

## 2019-04-01 RX ADMIN — CARVEDILOL PHOSPHATE 25 MILLIGRAM(S): 80 CAPSULE, EXTENDED RELEASE ORAL at 05:25

## 2019-04-01 RX ADMIN — MORPHINE SULFATE 30 MILLIGRAM(S): 50 CAPSULE, EXTENDED RELEASE ORAL at 18:30

## 2019-04-01 RX ADMIN — HYDROMORPHONE HYDROCHLORIDE 4 MILLIGRAM(S): 2 INJECTION INTRAMUSCULAR; INTRAVENOUS; SUBCUTANEOUS at 09:20

## 2019-04-01 RX ADMIN — CARVEDILOL PHOSPHATE 25 MILLIGRAM(S): 80 CAPSULE, EXTENDED RELEASE ORAL at 17:54

## 2019-04-01 NOTE — PROGRESS NOTE ADULT - ASSESSMENT
A  54 yo Male with multiple myeloma s/p bone marrow transplant (~1.5 yrs ago) and Perforated  Diverticulitis ,s/p  Kiah's (proctosigmoidectomy with colostomy, on 9/2/2018  and now chemo/RT (stopped ~2 months ago in preparation for colostomy reversal, who presents to the ER for evaluation  of diffuse body pain. Patient recently discharged, on 3/23/19, from [facility] after being hospitalized for thrombocytopenia. On admission, he found to have Fever, Temp. of 100.8, Tachycardia, HR of 110, and received dose of Zosyn and Vancomycin. The cultures are negative to date. The ID consult requested to assist with further evaluation and antibiotic management.     # Sepsis - ( fever + tachycardia ) -source to be determined   # B/L Pleural effusion- on MRI -  Small bilateral pleural effusions and small pericardial effusion on CT chest 3/30/19  # RVP- Negative  # Multiple  Myeloma  # UTI- 3/28/19  # Elevated Procalcitonin level -8.48 (3/27), -2.42-0.70 (3/31) - Discontinue Ceftriaxone since  Procalcitonin level has >more than  80 % reduction from initial level     would recommend:  1. Monitor OFF antibiotics  2. Continue supportive care  3. Pain management as per Primary/Hem/Onc      d/w patient, Family at the bed side and Nursing staff    will follow the patient with you

## 2019-04-01 NOTE — PROGRESS NOTE ADULT - PROBLEM SELECTOR PLAN 1
s/p chemo day # 1, tolerated well  prbc and plt transfusion  monitor labs to ensure no tumor lysis syndrome or adverse effects  symptomatic supportive care   additional management per hem/onc and consultants   d/c when no additional inpt management and stable/improved labs

## 2019-04-01 NOTE — PROGRESS NOTE ADULT - PROBLEM SELECTOR PLAN 1
improving overall with analgesics, however persistent symptoms  increase standing analgesics  c./w pain control  -> Patient requests/insists on inpt chemo and requests bone marrow team eval.  F/u Hem/onc for inpt vs outpt therapy. Day team please obtain bone marrow consult.

## 2019-04-01 NOTE — PROGRESS NOTE ADULT - ASSESSMENT
1. Diffuse bone pain of unclear etiology    -- pt has known extensive bone involvement by myeloma  --  MRI C/T/L Spine w no cord compression  -- poor pain control -  pain management consult       2. Transfusion dependent Anemia and Thrombocytopenia    -- sec to myeloma  --did not respond to high dose decadron last week  -- transfuse platelets if Less than 10K or clinically significant bleeding  -- keep Hgb > 7  plts today 10; please transfuse one unit single donor platelets.     3. Multiple Myeloma    -- relapsed / refractory ( failed all FDA Available tx) and failed auto stem cell transplant  -- pt off of tx against my and other oncologist advise in order to reverse colostomy  -- clinical trial at [facility] strongly recommended but pt declined  -- Plan to start Bendamustine 100mg/M2 Day 1, Pomalidomide 3mg PO QD Days 1-21 and Dexa 40mg weekly Q 28 day cycle.      Torsten Murphy MD  [phone]

## 2019-04-01 NOTE — PROGRESS NOTE ADULT - ASSESSMENT
53M hx of multiple myeloma s/p bone marrow transplant (~1.5 yrs ago) and chemo/RT (stopped ~2 months ago in preparation for colostomy reversal), perforated diverticulitis s/p Kiah's (proctosigmoidectomy with colostomy, 9/2018), nonischemic cardiomyopathy (mild global LV systolic dysfunction with EF 54% and diastolic LV dysfunction based on TTE in 11/2018), and HTN presented to [facility] ED 3/26 c/o diffuse body pains. Renal consult called today for elevated renal function.    Kristyn vs KRISTYN on CKD -b/l Cr 0.9-1 09/2018   Cr 3/25 1.2 but earlier this mth Cr was 1.2-1.5; no Cr available b/w Sept 2018 and 03/2019    KRISTYN likely 2/2 hemodynamics. clinically euvolemic. no e/o TTP/HUS  HTN, controlled-on BB  Thrombocytopenia. PLT 33k noted, concern for worsened MM v. ITP. transfused 1U of PLT 3/26.   Multiple myeloma s/p bone marrow transplant (~1.5 yrs ago) and chemo/RT    labs, chart reviewed  encouraged po intake  montir BMP daily  monitor CBC closely  avoid nephrotoxics/NSAIDs/ACEI/ARB  pain Mx per primary team

## 2019-04-01 NOTE — PROGRESS NOTE ADULT - PROBLEM SELECTOR PLAN 4
improving overall with analgesics, c/w analgesic regimen, c/w treatment of underlying multiple myeloma exacerbation and pathological vertebral fractures

## 2019-04-01 NOTE — PROGRESS NOTE ADULT - SUBJECTIVE AND OBJECTIVE BOX
pt seen and examined, no chest pain, c/o body aches      MEDICATIONS  (STANDING):  carvedilol 25 milliGRAM(s) Oral every 12 hours  lisinopril 10 milliGRAM(s) Oral daily  morphine ER Tablet 30 milliGRAM(s) Oral every 12 hours    MEDICATIONS  (PRN):  artificial  tears Solution 1 Drop(s) Both EYES every 6 hours PRN Dry Eyes  HYDROmorphone   Tablet 2 milliGRAM(s) Oral every 4 hours PRN Moderate Pain (4 - 6)  HYDROmorphone   Tablet 4 milliGRAM(s) Oral every 4 hours PRN Severe Pain (7 - 10)  HYDROmorphone  Injectable 1 milliGRAM(s) IV Push every 4 hours PRN Severe breakthrough pain    LABS:                      7.7    5.14  )-----------( 68       ( 01 Apr 2019 06:25 )             24.0     137  |  101  |  16  ----------------------------<  123<H>  4.1   |  22  |  1.11    Ca    7.7<L>      01 Apr 2019 06:25  Phos  2.9     04-01  Mg     2.1     04-01    Creatinine Trend: 1.11<--, 1.13<--, 1.19<--, 1.28<--, 1.66<--, 1.37<--    PHYSICAL EXAM  Vital Signs Last 24 Hrs  T(C): 36.9 (01 Apr 2019 12:47), Max: 37.4 (31 Mar 2019 16:43)  T(F): 98.4 (01 Apr 2019 12:47), Max: 99.3 (31 Mar 2019 16:43)  HR: 112 (01 Apr 2019 12:47) (111 - 113)  BP: 131/78 (01 Apr 2019 12:47) (121/70 - 143/86)  RR: 18 (01 Apr 2019 12:47) (18 - 18)  SpO2: 100% (01 Apr 2019 12:47) (98% - 100%)    no JVD  RRR, no murmurs  CTAB  soft nt/nd  no c/c/e    < from: Transthoracic Echocardiogram (03.29.19 @ 20:01) >  CONCLUSIONS:  1. Normal mitral valve. Minimal mitral regurgitation.  2. Normal left ventricular internal dimensions and wall  thicknesses.  3. Normal left ventricular systolic function. No segmental  wall motion abnormalities.  4. Normal right ventricular size and function.  5. Small pericardial effusion posterior to the left  ventricle and superior to the right atrium.  *** Compared with echocardiogram of 8/2/2017, left  ventricular systolic function has improved.  ------------------------------------------------------------------------  Confirmed on  4/1/2019 - 08:41:32 by Samson Hightower M.D.    < end of copied text >      A/P) 53 male PMH multiple myeloma s/p bone marrow transplant and chemo/RT, perforated diverticulitis s/p Kiah's (proctosigmoidectomy with colostomy, 9/2018), nonischemic cardiomyopathy (mild global LV systolic dysfunction with EF 54% and diastolic LV dysfunction based on TTE in 11/2018), and HTN who is being seen for management of cardiomyopathy.    - Heme / onc follow up noted  - ABX per medicine, follow up on cultures , ID follow up   --Echo noted above  --not in decomp CHF  --pain management

## 2019-04-01 NOTE — PROGRESS NOTE ADULT - SUBJECTIVE AND OBJECTIVE BOX
[facility]      Seen earlier today, and examined.  - Today, patient is without complaints.           *****MEDICATIONS: Current medication reviewed and documented.    MEDICATIONS  (STANDING):  carvedilol 25 milliGRAM(s) Oral every 12 hours  lisinopril 10 milliGRAM(s) Oral daily  morphine ER Tablet 30 milliGRAM(s) Oral every 12 hours    MEDICATIONS  (PRN):  artificial  tears Solution 1 Drop(s) Both EYES every 6 hours PRN Dry Eyes  HYDROmorphone   Tablet 2 milliGRAM(s) Oral every 4 hours PRN Moderate Pain (4 - 6)  HYDROmorphone   Tablet 4 milliGRAM(s) Oral every 4 hours PRN Severe Pain (7 - 10)  HYDROmorphone  Injectable 1 milliGRAM(s) IV Push every 4 hours PRN Severe breakthrough pain          ***** VITAL SIGNS:  T(F): 98.4 (19 @ 12:47), Max: 98.4 (19 @ 21:32)  HR: 112 (19 @ 12:47) (111 - 113)  BP: 131/78 (19 @ 12:47) (121/70 - 137/78)  RR: 18 (19 @ 12:47) (18 - 18)  SpO2: 100% (19 @ 12:47) (98% - 100%)  Wt(kg): --  ,   I&O's Summary           *****PHYSICAL EXAM:  alert    EOmi fundi not visualized   no nystagmus VFF to confrontation  Tongue is midline  Palate elevates symmetrically   Moving all 4 ext spontaneously    Gait not assessed.          *****LAB AND IMAGIN.7    5.14  )-----------( 68       ( 2019 06:25 )             24.0               -    137  |  101  |  16  ----------------------------<  123<H>  4.1   |  22  |  1.11    Ca    7.7<L>      2019 06:25  Phos  2.9     04-  Mg     2.1     -                           [All pertinent recent Imaging/Reports reviewed]           *****A S S E S S M E N T   A N D   P L A N :      53M hx of multiple myeloma s/p bone marrow transplant (~1.5 yrs ago) and chemo/RT (stopped ~2 months ago in preparation for colostomy reversal), perforated diverticulitis s/p Kiah's (proctosigmoidectomy with colostomy, 2018), nonischemic cardiomyopathy (mild global LV systolic dysfunction with EF 54% and diastolic LV dysfunction based on TTE in 2018), and HTN presents to [facility] ED c/o diffuse body pain. Patient recently discharged 3/23/19 from [facility] after being hospitalized for thrombocytopenia. Pt presents with severe pain all over a day after discharge. Pt was discharged after multiple platelet infusion.         Problem/Recommendations 1:  pain generalized of unclear etiology    likely due to relapsed myeloma   extensive bony infiltration         pain management per primary   nl  cpk    mobility improving  Thrombocytopenia defer to hem for management.     Thank you for allowing me to participate in the care of this patient. Please do not hesitate to call me if you have any  questions.        ________________  Margret Yousif MD  [facility] ([facility]  [phone]      30 minutes spent on total encounter; more than 50 % of the visit was  spent counseling about plan of care, compliance to diet/exercise and medication regimen and or  coordinating care by the attending physician.      It is advised that s stroke patients follow up with SAMUEL Mckeon @ [phone] in 1- 2 weeks.   Others please follow up with Dr. Michael Nissenbaum [phone]

## 2019-04-01 NOTE — PROGRESS NOTE ADULT - SUBJECTIVE AND OBJECTIVE BOX
Patient is seen and examined at the bed side, is afebrile.  He still has generalized bodyache.        REVIEW OF SYSTEMS: All other review systems are negative      ALLERGIES: No Known Allergies        Vital Signs Last 24 Hrs  T(C): 36.9 (2019 12:47), Max: 36.9 (31 Mar 2019 21:32)  T(F): 98.4 (2019 12:47), Max: 98.4 (31 Mar 2019 21:32)  HR: 112 (2019 12:47) (111 - 113)  BP: 131/78 (2019 12:47) (121/70 - 137/78)  BP(mean): --  RR: 18 (2019 12:47) (18 - 18)  SpO2: 100% (2019 12:47) (98% - 100%)      PHYSICAL EXAM:  GENERAL: Not in  acute distress   CHEST/LUNG:  Air entry  bilaterally  HEART: s1 and s2 present  ABDOMEN:  Colostomy bag in placed  EXTREMITIES: No pedal  edema  CNS: Awake and Alert        LABS:                        7.7    5.14  )-----------( 68       ( 2019 06:25 )             24.0                8.8    5.50  )-----------( 14       ( 30 Mar 2019 08:35 )             26.5                8.2    2.36  )-----------( 33       ( 27 Mar 2019 07:22 )             24.4           137  |  101  |  16  ----------------------------<  123<H>  4.1   |  22  |  1.11    Ca    7.7<L>      2019 06:25  Phos  2.9     04-01  Mg     2.1     04-      03-    138  |  101  |  17  ----------------------------<  116<H>  4.3   |  23  |  1.13    Ca    7.5<L>      31 Mar 2019 06:35  Phos  2.7     03-31  Mg     2.1         TPro  7.8  /  Alb  2.9<L>  /  TBili  0.6  /  DBili  x   /  AST  27  /  ALT  28  /  AlkPhos  178<H>            Urinalysis Basic - ( 25 Mar 2019 20:38 )  Color: YELLOW / Appearance: CLEAR / S.019 / pH: 6.0  Gluc: NEGATIVE / Ketone: NEGATIVE  / Bili: NEGATIVE / Urobili: NORMAL   Blood: TRACE / Protein: 200 / Nitrite: NEGATIVE   Leuk Esterase: NEGATIVE / RBC: 11-25 / WBC 3-5       Procalcitonin, Serum (19 @ 06:35)    Procalcitonin, Serum: 0.71:    Procalcitonin, Serum (19 @ 06:14)    Procalcitonin, Serum: 2.42:     Procalcitonin, Serum (19 @ 16:46)    Procalcitonin, Serum: 8.48: Procalcitonin (PCT) Interpretation (ng/mL) - Diagnosis of  systemic bacterial infection/sepsis:        MEDICATIONS  (STANDING):  carvedilol 25 milliGRAM(s) Oral every 12 hours  lisinopril 10 milliGRAM(s) Oral daily  morphine ER Tablet 30 milliGRAM(s) Oral every 12 hours    MEDICATIONS  (PRN):  artificial  tears Solution 1 Drop(s) Both EYES every 6 hours PRN Dry Eyes  HYDROmorphone   Tablet 2 milliGRAM(s) Oral every 4 hours PRN Moderate Pain (4 - 6)  HYDROmorphone   Tablet 4 milliGRAM(s) Oral every 4 hours PRN Severe Pain (7 - 10)  HYDROmorphone  Injectable 1 milliGRAM(s) IV Push every 4 hours PRN Severe breakthrough pain        RADIOLOGY & ADDITIONAL TESTS:    3/30/19 : CT Chest No Cont (19 @ 08:47) No pneumonia. Small bilateral pleural effusions and small pericardial effusion.  Unchanged thoracolumbar compression fractures. T7 posterior vertebral  body lucency corresponds to the site of epidural soft tissue seen on the   3/27/2019 MR thoracic spine.      3/27/19 : MR Lumbar Spine No Cont (19 @ 10:38) Abnormal T1 prolongation involving the cervical thoracic lumbar and sacral spine region as well as both iliac bones. This is likely related to patient's known multiple myeloma.  Bilateral pleural effusions are seen.      3/25/19 : US Abdomen Limited (19 @ 21:38) Normal right upper quadrant abdominal ultrasound.  Small right pleural effusion.        MICROBIOLOGY DATA:    Culture - Urine in AM (19 @ 07:41)    Culture - Urine:   NO GROWTH AT 24 HOURS    Specimen Source: URINE MIDSTREAM      Culture - Urine (19 @ 21:18)    Culture - Urine:   Culture grew 3 or more types of organisms which indicate  collection contamination; consider recollection only if  clinically indicated.    Specimen Source: URINE MIDSTREAM      Culture - Blood (19 @ 20:26)    Culture - Blood:   NO ORGANISMS ISOLATED  NO ORGANISMS ISOLATED AT 48 HRS.    Specimen Source: BLOOD VENOUS      Culture - Blood (19 @ 20:26)    Culture - Blood:   NO ORGANISMS ISOLATED  NO ORGANISMS ISOLATED AT 48 HRS.    Specimen Source: BLOOD PERIPHERAL      Rapid Respiratory Viral Panel (19 @ 19:33)    Adenovirus (RapRVP): Not Detected    Influenza A (RapRVP): Not Detected    Influenza AH1 2009 (RapRVP): Not Detected    Influenza AH1 (RapRVP): Not Detected    Influenza AH3 (RapRVP): Not Detected    Influenza B (RapRVP): Not Detected    Parainfluenza 1 (RapRVP): Not Detected    Parainfluenza 2 (RapRVP): Not Detected    Parainfluenza 3 (RapRVP): Not Detected    Parainfluenza 4 (RapRVP): Not Detected    Resp Syncytial Virus (RapRVP): Not Detected    Chlamydia pneumoniae (RapRVP): Not Detected    Mycoplasma pneumoniae (RapRVP): Not Detected    Entero/Rhinovirus (RapRVP): Not Detected    hMPV (RapRVP): Not Detected    Coronavirus (229E,HKU1,NL63,OC43): Not Detected This Respiratory Panel uses polymerase chain reaction (PCR)  to detect for adenovirus; coronavirus (HKU1, NL63, 229E,  OC43); human metapneumovirus (hMPV); human  enterovirus/rhinovirus (Entero/RV); influenza A; influenza  A/H1: influenza A/H3; influenza A/H1-2009; influenza B;  parainfluenza viruses 1,2,3,4; respiratory syncytial virus;  Mycoplasma pneumoniae; and Chlamydophila pneumoniae.

## 2019-04-01 NOTE — PROGRESS NOTE ADULT - PROBLEM SELECTOR PLAN 1
Improved and stable cr  Electrolytes acceptable and clinically euvolemic.  in setting of Multiple myeloma, uncontrolled, not on chemotherapy  Off IVF and tolerating diet.  BP controlled  prn PRBC xfusion if Hb 7 or below  restarted lisinopril 10mg po qd   monitor cr

## 2019-04-01 NOTE — PROGRESS NOTE ADULT - SUBJECTIVE AND OBJECTIVE BOX
[facility] - Sabine / Shruti S /Margot/ S Mahmood/ S Benz/ Ori Mcgowanfeez / ANALISA Njeru  ---------------------------------------------------------------------------------------------------------------    Patient seen and examined bedside    Subjective and Objective: No overnight events, denied urinary sxs/V/D/sob. No complaints today. feeling better. pain controlled    Allergies: oxycodone (Vomiting; Nausea)      Hospital Medications:   MEDICATIONS  (STANDING):  carvedilol 25 milliGRAM(s) Oral every 12 hours  lisinopril 10 milliGRAM(s) Oral daily  morphine ER Tablet 30 milliGRAM(s) Oral every 12 hours        VITALS:  T(F): 98.4 (19 @ 12:47), Max: 99.3 (19 @ 16:43)  HR: 112 (19 @ 12:47)  BP: 131/78 (19 @ 12:47)  RR: 18 (19 @ 12:47)  SpO2: 100% (19 @ 12:47)  Wt(kg): --     @ 07:01  -   @ 14:51  --------------------------------------------------------  IN: 0 mL / OUT: 600 mL / NET: -600 mL      PHYSICAL EXAM:  Constitutional: NAD  HEENT: anicteric sclera, oropharynx clear  Neck: No JVD  Respiratory: CTAB, no wheezes, rales or rhonchi  Cardiovascular: S1, S2, RRR  Gastrointestinal: BS+, soft, NT/ND  Extremities: No cyanosis or clubbing. No peripheral edema  Neurological: A/O x 3, no focal deficits  Psychiatric: Normal mood, normal affect  : No CVA tenderness. No quintero.   Skin: No rashes    LABS:      137  |  101  |  16  ----------------------------<  123<H>  4.1   |  22  |  1.11    Ca    7.7<L>      2019 06:25  Phos  2.9       Mg     2.1           Creatinine Trend: 1.11 <--, 1.13 <--, 1.19 <--, 1.28 <--, 1.66 <--, 1.37 <--, 1.19 <--                        7.7    5.14  )-----------( 68       ( 2019 06:25 )             24.0     Urine Studies:  Urinalysis Basic - ( 28 Mar 2019 06:50 )    Color: YELLOW / Appearance: CLEAR / S.014 / pH: 6.0  Gluc: NEGATIVE / Ketone: NEGATIVE  / Bili: NEGATIVE / Urobili: NORMAL   Blood: NEGATIVE / Protein: 100 / Nitrite: NEGATIVE   Leuk Esterase: NEGATIVE / RBC: 0-2 / WBC 6-10   Sq Epi: FEW / Non Sq Epi:  / Bacteria: NEGATIVE      Protein, Random Urine: 111.6 mg/dL ( @ 06:50)  Creatinine, Random Urine: 91.40 mg/dL ( @ 06:50)  Sodium, Random Urine: 20 mmol/L ( @ 06:50)      RADIOLOGY & ADDITIONAL STUDIES:

## 2019-04-01 NOTE — PROGRESS NOTE ADULT - SUBJECTIVE AND OBJECTIVE BOX
Patient seen and examined at bedside  s/p chemotherapy day # 1, pain well controlled with rx, no new complaints  Case discussed with medical team    HPI:  53M hx of multiple myeloma s/p bone marrow transplant (~1.5 yrs ago) and chemo/RT (stopped ~2 months ago in preparation for colostomy reversal), perforated diverticulitis s/p Kiah's (proctosigmoidectomy with colostomy, 9/2018), nonischemic cardiomyopathy (mild global LV systolic dysfunction with EF 54% and diastolic LV dysfunction based on TTE in 11/2018), and HTN presents to [facility] ED c/o diffuse body pain. Patient recently discharged 3/23/19 from [facility] after being hospitalized for thrombocytopenia.     Patient started to develop severe non-radiating throbbing 10/10 pain in his b/l shoulders, chest, abdomen, and b/l thighs since Sunday night - leaving him essentially bedbound. Was supposed to see Dr. Murphy for follow up today. However because of the pain he came to the ED (26 Mar 2019 11:51)      PAST MEDICAL & SURGICAL HISTORY:  Diverticulitis  Multiple myeloma  Hypertension  Left ventricular dysfunction  Cardiomyopathy  Former smoker: (1 ppd x 11 years; Quit ~age 26)  History of bone marrow transplant  History of surgery: s/p exploratory laparotomy with sigmoid resection and colostomy on 9/2/2018      oxycodone (Vomiting; Nausea)       MEDICATIONS  (STANDING):  carvedilol 25 milliGRAM(s) Oral every 12 hours  lisinopril 10 milliGRAM(s) Oral daily  morphine ER Tablet 30 milliGRAM(s) Oral every 12 hours    MEDICATIONS  (PRN):  artificial  tears Solution 1 Drop(s) Both EYES every 6 hours PRN Dry Eyes  HYDROmorphone   Tablet 2 milliGRAM(s) Oral every 4 hours PRN Moderate Pain (4 - 6)  HYDROmorphone   Tablet 4 milliGRAM(s) Oral every 4 hours PRN Severe Pain (7 - 10)  HYDROmorphone  Injectable 1 milliGRAM(s) IV Push every 4 hours PRN Severe breakthrough pain      REVIEW OF SYSTEMS:  CONSTITUTIONAL: (+) malaise. pain controlled with rx  EYES: No acute change in vision   ENT:  No tinnitus  NECK: No stiffness  RESPIRATORY: No hemoptysis  CARDIOVASCULAR: No chest pain, palpitations, syncope  GASTROINTESTINAL: No hematemesis, diarrhea, melena, or hematochezia.  GENITOURINARY: No hematuria  NEUROLOGICAL: No headaches  LYMPH Nodes: No enlarged glands  ENDOCRINE: No heat or cold intolerance	    T(C): 36.7 (04-01-19 @ 05:18), Max: 37.4 (03-31-19 @ 16:43)  HR: 113 (04-01-19 @ 05:18) (107 - 113)  BP: 137/78 (04-01-19 @ 05:18) (121/70 - 143/86)  RR: 18 (04-01-19 @ 05:18) (18 - 18)  SpO2: 99% (04-01-19 @ 05:18) (98% - 100%)    PHYSICAL EXAMINATION:   Constitutional: improving overall distress  HEENT: NC, AT  Neck:  Supple  Respiratory:  Adequate airflow b/l. Not using accessory muscles of respiration.  Cardiovascular:  S1 & S2 intact, no R/G, 2+ radial pulses b/l  Gastrointestinal: Soft, NT, ND, normoactive b.s., no organomegaly/RT/rigidity  Extremities: unstable gait 2/2 pain. WWP  Neurological:  Alert and awake.  No acute focal motor deficits. Crude sensation intact.     Labs and imaging reviewed  Complete Blood Count in AM (04.03.19 @ 06:20)    WBC Count: 3.94 K/uL    RBC Count: 2.35 M/uL    Hemoglobin: 6.8 g/dL    Hematocrit: 21.5 %    Mean Cell Volume: 91.5 fL    Mean Cell Hemoglobin: 28.9 pg    Mean Cell Hemoglobin Conc: 31.6 %    Red Cell Distrib Width: 17.5 %    Platelet Count - Automated: 27 K/uL    MPV: Test not performed fl    Nucleated RBC%: 1.5: NEW CBC INSTRUMENTATION AT THE [facility]OR[facility] WILL REPORT AN  AUTOMATED NRBC COUNT BASED ON FLUORESCENCE NUCLEAR STAIN AND  AUTOMATICALLY CORRECT THE WBC IN THE PRESENCE OF NRBC.    Nucleated RBC #: 0.06 K/uL

## 2019-04-01 NOTE — PROGRESS NOTE ADULT - SUBJECTIVE AND OBJECTIVE BOX
Patient seen and examined at bedside  c/o body pain, mildly improving with rx, otherwise no new additional acute events or complaints noted overnight  Case discussed with medical team    HPI:  53M hx of multiple myeloma s/p bone marrow transplant (~1.5 yrs ago) and chemo/RT (stopped ~2 months ago in preparation for colostomy reversal), perforated diverticulitis s/p Kiah's (proctosigmoidectomy with colostomy, 9/2018), nonischemic cardiomyopathy (mild global LV systolic dysfunction with EF 54% and diastolic LV dysfunction based on TTE in 11/2018), and HTN presents to [facility] ED c/o diffuse body pain. Patient recently discharged 3/23/19 from [facility] after being hospitalized for thrombocytopenia.     Patient started to develop severe non-radiating throbbing 10/10 pain in his b/l shoulders, chest, abdomen, and b/l thighs since Sunday night - leaving him essentially bedbound. Was supposed to see Dr. Murphy for follow up today. However because of the pain he came to the ED (26 Mar 2019 11:51)      PAST MEDICAL & SURGICAL HISTORY:  Diverticulitis  Multiple myeloma  Hypertension  Left ventricular dysfunction  Cardiomyopathy  Former smoker: (1 ppd x 11 years; Quit ~age 26)  History of bone marrow transplant  History of surgery: s/p exploratory laparotomy with sigmoid resection and colostomy on 9/2/2018      No Known Allergies       MEDICATIONS  (STANDING):  carvedilol 25 milliGRAM(s) Oral every 12 hours  cefTRIAXone   IVPB 1 Gram(s) IV Intermittent every 24 hours  furosemide   Injectable 20 milliGRAM(s) IV Push once    MEDICATIONS  (PRN):  artificial  tears Solution 1 Drop(s) Both EYES every 6 hours PRN Dry Eyes  HYDROmorphone  Injectable 1 milliGRAM(s) IV Push every 4 hours PRN Moderate Pain (4 - 6)  oxyCODONE    IR 10 milliGRAM(s) Oral every 6 hours PRN Mild Pain (1 - 3)      REVIEW OF SYSTEMS:  CONSTITUTIONAL: (+) malaise. body pain, unstable gait  EYES: No acute change in vision   ENT:  No tinnitus  NECK: No stiffness  RESPIRATORY:gates. No hemoptysis  CARDIOVASCULAR: No chest pain, palpitations, syncope  GASTROINTESTINAL: No hematemesis, diarrhea, melena, or hematochezia.  GENITOURINARY: No hematuria  NEUROLOGICAL: No headaches  LYMPH Nodes: No enlarged glands  ENDOCRINE: No heat or cold intolerance	    Vital Signs Last 24 Hrs  T(C): 36.7 (01 Apr 2019 05:18), Max: 37.4 (31 Mar 2019 16:43)  T(F): 98 (01 Apr 2019 05:18), Max: 99.3 (31 Mar 2019 16:43)  HR: 113 (01 Apr 2019 05:18) (107 - 113)  BP: 137/78 (01 Apr 2019 05:18) (121/70 - 143/86)  BP(mean): --  RR: 18 (01 Apr 2019 05:18) (18 - 18)  SpO2: 99% (01 Apr 2019 05:18) (98% - 100%)    PHYSICAL EXAMINATION:   Constitutional: mild distress with movement  HEENT: NC, AT  Neck:  Supple  Respiratory: Adequate airflow b/l. Not using accessory muscles of respiration.  Cardiovascular:  S1 & S2 intact, no R/G, 2+ radial pulses b/l  Gastrointestinal: Soft, NT, ND, normoactive b.s., no organomegaly/RT/rigidity  Extremities: unstable gait 2/2 pain. WWP  Neurological:  Alert and awake.  No acute focal motor deficits. Crude sensation intact.     Labs and imaging reviewed  Complete Blood Count + Automated Diff in AM (04.01.19 @ 06:25)    Nucleated RBC #: 0.11 K/uL    Nucleated RBC%: 2.1: NEW CBC INSTRUMENTATION AT THE [facility] WILL REPORT AN  AUTOMATED NRBC COUNT BASED ON FLUORESCENCE NUCLEAR STAIN AND  AUTOMATICALLY CORRECT THE WBC IN THE PRESENCE OF NRBC.    Manual Smear Verification: REVIEWED WITHIN 72HR    WBC Count: 5.14 K/uL    RBC Count: 2.69 M/uL    Hemoglobin: 7.7 g/dL    Hematocrit: 24.0 %    Mean Cell Volume: 89.2 fL    Mean Cell Hemoglobin: 28.6 pg    Mean Cell Hemoglobin Conc: 32.1 %    Red Cell Distrib Width: 17.8 %    Platelet Count - Automated: 68 K/uL    MPV: 11.6 fl    Auto Neutrophil #: 1.86 K/uL    Auto Lymphocyte #: 1.64 K/uL    Auto Monocyte #: 0.98 K/uL    Auto Eosinophil #: 0.01 K/uL    Auto Basophil #: 0.04 K/uL    Auto Neutrophil %: 36.1 %    Auto Lymphocyte %: 31.9 %    Auto Monocyte %: 19.1 %    Auto Eosinophil %: 0.2 %    Auto Basophil %: 0.8 %    Auto Immature Granulocyte %: 11.9: (Includes meta, myelo and promyelocytes) %

## 2019-04-01 NOTE — PROGRESS NOTE ADULT - SUBJECTIVE AND OBJECTIVE BOX
Pt is seen and examined  pt is awake and lying in bed  pain persists      PAST MEDICAL & SURGICAL HISTORY:  Diverticulitis  Multiple myeloma  Hypertension  Left ventricular dysfunction  Cardiomyopathy  Former smoker: (1 ppd x 11 years; Quit ~age 26)  History of bone marrow transplant  History of surgery: s/p exploratory laparotomy with sigmoid resection and colostomy on 9/2/2018      ROS:  Negative except for:    MEDICATIONS  (STANDING):  carvedilol 25 milliGRAM(s) Oral every 12 hours  lisinopril 10 milliGRAM(s) Oral daily  morphine ER Tablet 30 milliGRAM(s) Oral every 12 hours    MEDICATIONS  (PRN):  artificial  tears Solution 1 Drop(s) Both EYES every 6 hours PRN Dry Eyes  HYDROmorphone   Tablet 2 milliGRAM(s) Oral every 4 hours PRN Moderate Pain (4 - 6)  HYDROmorphone   Tablet 4 milliGRAM(s) Oral every 4 hours PRN Severe Pain (7 - 10)  HYDROmorphone  Injectable 1 milliGRAM(s) IV Push every 4 hours PRN Severe breakthrough pain      Allergies    oxycodone (Vomiting; Nausea)    Intolerances        Vital Signs Last 24 Hrs  T(C): 36.7 (01 Apr 2019 05:18), Max: 37.4 (31 Mar 2019 16:43)  T(F): 98 (01 Apr 2019 05:18), Max: 99.3 (31 Mar 2019 16:43)  HR: 113 (01 Apr 2019 05:18) (107 - 113)  BP: 137/78 (01 Apr 2019 05:18) (121/70 - 143/86)  BP(mean): --  RR: 18 (01 Apr 2019 05:18) (18 - 18)  SpO2: 99% (01 Apr 2019 05:18) (98% - 100%)    PHYSICAL EXAM  General: adult in NAD  HEENT: clear oropharynx, anicteric sclera, pink conjunctiva  Neck: supple  CV: normal S1/S2 with no murmur rubs or gallops  Lungs: positive air movement b/l ant lungs,clear to auscultation, no wheezes, no rales  Abdomen: soft non-tender non-distended, no hepatosplenomegaly  Ext: no clubbing cyanosis or edema  Skin: no rashes and no petechiae  Neuro: alert and oriented X 4, no focal deficits  LABS:                          7.7    5.14  )-----------( 68       ( 01 Apr 2019 06:25 )             24.0     Serial CBC's  04-01 @ 06:25  Hct-24.0 / Hgb-7.7 / Plat-68 / RBC-2.69 / WBC-5.14          Serial CBC's  03-31 @ 15:14  Hct-25.0 / Hgb-8.0 / Plat-72 / RBC-2.76 / WBC-5.12            03-31    138  |  101  |  17  ----------------------------<  116<H>  4.3   |  23  |  1.13    Ca    7.5<L>      31 Mar 2019 06:35  Phos  2.7     03-31  Mg     2.1     03-31            WBC Count: 5.14 K/uL (04-01 @ 06:25)  Hemoglobin: 7.7 g/dL (04-01 @ 06:25)            RADIOLOGY & ADDITIONAL STUDIES:

## 2019-04-02 DIAGNOSIS — C90.00 MULTIPLE MYELOMA NOT HAVING ACHIEVED REMISSION: ICD-10-CM

## 2019-04-02 DIAGNOSIS — K59.00 CONSTIPATION, UNSPECIFIED: ICD-10-CM

## 2019-04-02 DIAGNOSIS — G89.3 NEOPLASM RELATED PAIN (ACUTE) (CHRONIC): ICD-10-CM

## 2019-04-02 DIAGNOSIS — Z51.5 ENCOUNTER FOR PALLIATIVE CARE: ICD-10-CM

## 2019-04-02 LAB
ALBUMIN SERPL ELPH-MCNC: 3 G/DL — LOW (ref 3.3–5)
ALP SERPL-CCNC: 174 U/L — HIGH (ref 40–120)
ALT FLD-CCNC: 26 U/L — SIGNIFICANT CHANGE UP (ref 4–41)
ANION GAP SERPL CALC-SCNC: 14 MMO/L — SIGNIFICANT CHANGE UP (ref 7–14)
AST SERPL-CCNC: 24 U/L — SIGNIFICANT CHANGE UP (ref 4–40)
BASOPHILS # BLD AUTO: 0.03 K/UL — SIGNIFICANT CHANGE UP (ref 0–0.2)
BASOPHILS NFR BLD AUTO: 0.6 % — SIGNIFICANT CHANGE UP (ref 0–2)
BILIRUB SERPL-MCNC: 0.6 MG/DL — SIGNIFICANT CHANGE UP (ref 0.2–1.2)
BUN SERPL-MCNC: 14 MG/DL — SIGNIFICANT CHANGE UP (ref 7–23)
CALCIUM SERPL-MCNC: 7.4 MG/DL — LOW (ref 8.4–10.5)
CHLORIDE SERPL-SCNC: 100 MMOL/L — SIGNIFICANT CHANGE UP (ref 98–107)
CO2 SERPL-SCNC: 23 MMOL/L — SIGNIFICANT CHANGE UP (ref 22–31)
CREAT SERPL-MCNC: 1.11 MG/DL — SIGNIFICANT CHANGE UP (ref 0.5–1.3)
EOSINOPHIL # BLD AUTO: 0.01 K/UL — SIGNIFICANT CHANGE UP (ref 0–0.5)
EOSINOPHIL NFR BLD AUTO: 0.2 % — SIGNIFICANT CHANGE UP (ref 0–6)
GLUCOSE SERPL-MCNC: 99 MG/DL — SIGNIFICANT CHANGE UP (ref 70–99)
HCT VFR BLD CALC: 22.7 % — LOW (ref 39–50)
HGB BLD-MCNC: 7.2 G/DL — LOW (ref 13–17)
IMM GRANULOCYTES NFR BLD AUTO: 7.6 % — HIGH (ref 0–1.5)
LYMPHOCYTES # BLD AUTO: 1.7 K/UL — SIGNIFICANT CHANGE UP (ref 1–3.3)
LYMPHOCYTES # BLD AUTO: 32.9 % — SIGNIFICANT CHANGE UP (ref 13–44)
MAGNESIUM SERPL-MCNC: 1.9 MG/DL — SIGNIFICANT CHANGE UP (ref 1.6–2.6)
MCHC RBC-ENTMCNC: 28.3 PG — SIGNIFICANT CHANGE UP (ref 27–34)
MCHC RBC-ENTMCNC: 31.7 % — LOW (ref 32–36)
MCV RBC AUTO: 89.4 FL — SIGNIFICANT CHANGE UP (ref 80–100)
MONOCYTES # BLD AUTO: 1.05 K/UL — HIGH (ref 0–0.9)
MONOCYTES NFR BLD AUTO: 20.3 % — HIGH (ref 2–14)
NEUTROPHILS # BLD AUTO: 1.98 K/UL — SIGNIFICANT CHANGE UP (ref 1.8–7.4)
NEUTROPHILS NFR BLD AUTO: 38.4 % — LOW (ref 43–77)
NRBC # FLD: 0.1 K/UL — SIGNIFICANT CHANGE UP (ref 0–0)
NRBC FLD-RTO: 1.9 — SIGNIFICANT CHANGE UP
PHOSPHATE SERPL-MCNC: 2.9 MG/DL — SIGNIFICANT CHANGE UP (ref 2.5–4.5)
PLATELET # BLD AUTO: 38 K/UL — LOW (ref 150–400)
PMV BLD: 9.9 FL — SIGNIFICANT CHANGE UP (ref 7–13)
POTASSIUM SERPL-MCNC: 4.1 MMOL/L — SIGNIFICANT CHANGE UP (ref 3.5–5.3)
POTASSIUM SERPL-SCNC: 4.1 MMOL/L — SIGNIFICANT CHANGE UP (ref 3.5–5.3)
PROT SERPL-MCNC: 7.4 G/DL — SIGNIFICANT CHANGE UP (ref 6–8.3)
RBC # BLD: 2.54 M/UL — LOW (ref 4.2–5.8)
RBC # FLD: 17.7 % — HIGH (ref 10.3–14.5)
SODIUM SERPL-SCNC: 137 MMOL/L — SIGNIFICANT CHANGE UP (ref 135–145)
WBC # BLD: 5.16 K/UL — SIGNIFICANT CHANGE UP (ref 3.8–10.5)
WBC # FLD AUTO: 5.16 K/UL — SIGNIFICANT CHANGE UP (ref 3.8–10.5)

## 2019-04-02 PROCEDURE — 99223 1ST HOSP IP/OBS HIGH 75: CPT

## 2019-04-02 RX ORDER — SENNA PLUS 8.6 MG/1
2 TABLET ORAL AT BEDTIME
Qty: 0 | Refills: 0 | Status: DISCONTINUED | OUTPATIENT
Start: 2019-04-02 | End: 2019-04-08

## 2019-04-02 RX ORDER — ONDANSETRON 8 MG/1
8 TABLET, FILM COATED ORAL EVERY 8 HOURS
Qty: 0 | Refills: 0 | Status: DISCONTINUED | OUTPATIENT
Start: 2019-04-02 | End: 2019-04-08

## 2019-04-02 RX ORDER — ALLOPURINOL 300 MG
300 TABLET ORAL DAILY
Qty: 0 | Refills: 0 | Status: DISCONTINUED | OUTPATIENT
Start: 2019-04-02 | End: 2019-04-08

## 2019-04-02 RX ORDER — DOCUSATE SODIUM 100 MG
100 CAPSULE ORAL THREE TIMES A DAY
Qty: 0 | Refills: 0 | Status: DISCONTINUED | OUTPATIENT
Start: 2019-04-02 | End: 2019-04-08

## 2019-04-02 RX ADMIN — Medication 300 MILLIGRAM(S): at 13:04

## 2019-04-02 RX ADMIN — SODIUM CHLORIDE 100 MILLILITER(S): 9 INJECTION INTRAMUSCULAR; INTRAVENOUS; SUBCUTANEOUS at 21:53

## 2019-04-02 RX ADMIN — HYDROMORPHONE HYDROCHLORIDE 4 MILLIGRAM(S): 2 INJECTION INTRAMUSCULAR; INTRAVENOUS; SUBCUTANEOUS at 13:06

## 2019-04-02 RX ADMIN — SENNA PLUS 2 TABLET(S): 8.6 TABLET ORAL at 21:53

## 2019-04-02 RX ADMIN — SODIUM CHLORIDE 100 MILLILITER(S): 9 INJECTION INTRAMUSCULAR; INTRAVENOUS; SUBCUTANEOUS at 12:14

## 2019-04-02 RX ADMIN — MORPHINE SULFATE 30 MILLIGRAM(S): 50 CAPSULE, EXTENDED RELEASE ORAL at 18:15

## 2019-04-02 RX ADMIN — Medication 120 MILLIGRAM(S): at 12:59

## 2019-04-02 RX ADMIN — HYDROMORPHONE HYDROCHLORIDE 4 MILLIGRAM(S): 2 INJECTION INTRAMUSCULAR; INTRAVENOUS; SUBCUTANEOUS at 08:30

## 2019-04-02 RX ADMIN — BENDAMUSTINE HYDROCHLORIDE 349.92 MILLIGRAM(S): 100 INJECTION, POWDER, LYOPHILIZED, FOR SOLUTION INTRAVENOUS at 15:04

## 2019-04-02 RX ADMIN — Medication 100 MILLIGRAM(S): at 21:53

## 2019-04-02 RX ADMIN — MORPHINE SULFATE 30 MILLIGRAM(S): 50 CAPSULE, EXTENDED RELEASE ORAL at 06:27

## 2019-04-02 RX ADMIN — CARVEDILOL PHOSPHATE 25 MILLIGRAM(S): 80 CAPSULE, EXTENDED RELEASE ORAL at 17:56

## 2019-04-02 RX ADMIN — MORPHINE SULFATE 30 MILLIGRAM(S): 50 CAPSULE, EXTENDED RELEASE ORAL at 07:15

## 2019-04-02 RX ADMIN — ONDANSETRON 116 MILLIGRAM(S): 8 TABLET, FILM COATED ORAL at 13:39

## 2019-04-02 RX ADMIN — CARVEDILOL PHOSPHATE 25 MILLIGRAM(S): 80 CAPSULE, EXTENDED RELEASE ORAL at 06:29

## 2019-04-02 RX ADMIN — POMALIDOMIDE 3 MILLIGRAM(S): 1 CAPSULE ORAL at 15:55

## 2019-04-02 RX ADMIN — LISINOPRIL 10 MILLIGRAM(S): 2.5 TABLET ORAL at 06:29

## 2019-04-02 RX ADMIN — HYDROMORPHONE HYDROCHLORIDE 4 MILLIGRAM(S): 2 INJECTION INTRAMUSCULAR; INTRAVENOUS; SUBCUTANEOUS at 13:35

## 2019-04-02 RX ADMIN — HYDROMORPHONE HYDROCHLORIDE 4 MILLIGRAM(S): 2 INJECTION INTRAMUSCULAR; INTRAVENOUS; SUBCUTANEOUS at 08:03

## 2019-04-02 RX ADMIN — MORPHINE SULFATE 30 MILLIGRAM(S): 50 CAPSULE, EXTENDED RELEASE ORAL at 17:55

## 2019-04-02 NOTE — PROGRESS NOTE ADULT - SUBJECTIVE AND OBJECTIVE BOX
Pt is seen and examined  pt is awake and lying in bed comfortable  pain controlled  for Bendamustine today    PAST MEDICAL & SURGICAL HISTORY:  Diverticulitis  Multiple myeloma  Hypertension  Left ventricular dysfunction  Cardiomyopathy  Former smoker: (1 ppd x 11 years; Quit ~age 26)  History of bone marrow transplant  History of surgery: s/p exploratory laparotomy with sigmoid resection and colostomy on 9/2/2018      ROS:  Negative except for:    MEDICATIONS  (STANDING):  bendamustine IVPB (BENDEKA) (eMAR) 208 milliGRAM(s) IV Intermittent once  carvedilol 25 milliGRAM(s) Oral every 12 hours  dexamethasone  IVPB 40 milliGRAM(s) IV Intermittent once  lisinopril 10 milliGRAM(s) Oral daily  morphine ER Tablet 30 milliGRAM(s) Oral every 12 hours  ondansetron  IVPB 16 milliGRAM(s) IV Intermittent once  pomalidomide 3 milliGRAM(s) Oral daily  sodium chloride 0.9%. 1000 milliLiter(s) (100 mL/Hr) IV Continuous <Continuous>    MEDICATIONS  (PRN):  artificial  tears Solution 1 Drop(s) Both EYES every 6 hours PRN Dry Eyes  HYDROmorphone   Tablet 2 milliGRAM(s) Oral every 4 hours PRN Moderate Pain (4 - 6)  HYDROmorphone   Tablet 4 milliGRAM(s) Oral every 4 hours PRN Severe Pain (7 - 10)  HYDROmorphone  Injectable 1 milliGRAM(s) IV Push every 4 hours PRN Severe breakthrough pain      Allergies    oxycodone (Vomiting; Nausea)    Intolerances        Vital Signs Last 24 Hrs  T(C): 37.3 (02 Apr 2019 06:23), Max: 37.3 (02 Apr 2019 06:23)  T(F): 99.1 (02 Apr 2019 06:23), Max: 99.1 (02 Apr 2019 06:23)  HR: 112 (02 Apr 2019 06:23) (111 - 112)  BP: 134/78 (02 Apr 2019 06:23) (131/78 - 141/80)  BP(mean): --  RR: 18 (02 Apr 2019 06:23) (18 - 18)  SpO2: 100% (02 Apr 2019 06:23) (98% - 100%)    PHYSICAL EXAM  General: adult in NAD  HEENT: clear oropharynx, anicteric sclera, pink conjunctiva  Neck: supple  CV: normal S1/S2 with no murmur rubs or gallops  Lungs: positive air movement b/l ant lungs,clear to auscultation, no wheezes, no rales  Abdomen: soft non-tender non-distended, no hepatosplenomegaly  Ext: no clubbing cyanosis or edema     LABS:                          7.2    5.16  )-----------( 38       ( 02 Apr 2019 05:28 )             22.7     Serial CBC's  04-02 @ 05:28  Hct-22.7 / Hgb-7.2 / Plat-38 / RBC-2.54 / WBC-5.16          Serial CBC's  04-01 @ 06:25  Hct-24.0 / Hgb-7.7 / Plat-68 / RBC-2.69 / WBC-5.14            04-02    137  |  100  |  14  ----------------------------<  99  4.1   |  23  |  1.11    Ca    7.4<L>      02 Apr 2019 05:28  Phos  2.9     04-02  Mg     1.9     04-02    TPro  7.4  /  Alb  3.0<L>  /  TBili  0.6  /  DBili  x   /  AST  24  /  ALT  26  /  AlkPhos  174<H>  04-02          WBC Count: 5.16 K/uL (04-02 @ 05:28)  Hemoglobin: 7.2 g/dL (04-02 @ 05:28)            RADIOLOGY & ADDITIONAL STUDIES:

## 2019-04-02 NOTE — CONSULT NOTE ADULT - SUBJECTIVE AND OBJECTIVE BOX
HPI:  53 year old man with multiple myeloma s/p bone marrow transplant (~1.5 yrs ago) and chemo/RT (stopped ~2 months ago in preparation for colostomy reversal), perforated diverticulitis s/p Kiah's (proctosigmoidectomy with colostomy, 9/2018), nonischemic cardiomyopathy (mild global LV systolic dysfunction with EF 54% and diastolic LV dysfunction based on TTE in 11/2018), and HTN presents to [facility] ED c/o diffuse body pain. Patient recently discharged 3/23/19 from [facility] after being hospitalized for thrombocytopenia. Patient started to develop severe non-radiating throbbing 10/10 pain in his b/l shoulders, chest, abdomen, and b/l thighs since Sunday night - leaving him essentially bedbound. He follows Dr. Murphy for his Multiple myeloma. He presented to ED because of pain. He was initially started on Msconton 15mg BID with Dilaudid PRNS, continues to require multiple PRNs and they increased his Msconton to 30mg q12h with 4mg Dilaudid qh PRN. His pain is well controlled at this time.     PERTINENT PM/SXH:   Diverticulitis  Multiple myeloma  Hypertension  Mild mitral regurgitation  Mild aortic regurgitation  Left ventricular dysfunction  Cardiomyopathy  Diverticulitis of large intestine without perforation or abscess without bleeding  Former smoker  Heart failure    History of bone marrow transplant  History of surgery    FAMILY HISTORY:  Family history of diabetes mellitus  Family history of hypertension      SOCIAL HISTORY:   Significant other/partner: Yes [ x]  No [ ] Children:  Yes [ x]  No [ ] - 4 children  Nondenominational/Spirituality:  Substance hx: Yes[ ]  No [x ]   Tobacco hx:  Yes [ x] No [ ]   Alcohol hx: Yes [ ] No [ x]   Home Opioid hx:  [ ] I-Stop Reference No:  Living Situation: [x ]Home  [ ]Long term care  [ ]Rehab [ ]Other    ADVANCE DIRECTIVES:    Full code  DECISION MAKER(s):  [ ] Health Care Proxy(s)  [x ] Surrogate(s)  [ ] Guardian           Name(s): Phone Number(s):  Devan Hood [phone]    BASELINE (I)ADL(s) (prior to admission):  [place]: [ x]Total  [ ] Moderate [ ]Dependent    Allergies    oxycodone (Vomiting; Nausea)    Intolerances    MEDICATIONS  (STANDING):  allopurinol 300 milliGRAM(s) Oral daily  bendamustine IVPB (BENDEKA) (eMAR) 208 milliGRAM(s) IV Intermittent once  carvedilol 25 milliGRAM(s) Oral every 12 hours  dexamethasone  IVPB 40 milliGRAM(s) IV Intermittent once  lisinopril 10 milliGRAM(s) Oral daily  morphine ER Tablet 30 milliGRAM(s) Oral every 12 hours  ondansetron  IVPB 16 milliGRAM(s) IV Intermittent once  pomalidomide 3 milliGRAM(s) Oral daily  sodium chloride 0.9%. 1000 milliLiter(s) (100 mL/Hr) IV Continuous <Continuous>    MEDICATIONS  (PRN):  artificial  tears Solution 1 Drop(s) Both EYES every 6 hours PRN Dry Eyes  HYDROmorphone   Tablet 2 milliGRAM(s) Oral every 4 hours PRN Moderate Pain (4 - 6)  HYDROmorphone   Tablet 4 milliGRAM(s) Oral every 4 hours PRN Severe Pain (7 - 10)  HYDROmorphone  Injectable 1 milliGRAM(s) IV Push every 4 hours PRN Severe breakthrough pain  ondansetron Injectable 8 milliGRAM(s) IV Push every 8 hours PRN Nausea and/or Vomiting    PRESENT SYMPTOMS: [ ]Unable to obtain due to poor mentation   Source if other than patient:  [ ]Family   [ ]Team     Pain (Impact on QOL):  Patient had pain initiatially b/l shoulders, chest, abdomen, and b/l thighs which he rated a 10/10 that started 2 weeks ago, currently he only has pain in his b/l shoulders which he rates 5/10 and stated that he is comfortable He is unable to state the pain that he is in but states that it is there, though he is comfortable.     PAIN AD Score:     [email or web address] (press ctrl +  left click to view)    Dyspnea:  Yes [ ] No [x ] - [ ]Mild [ ]Moderate [ ]Severe  Anxiety:    Yes [ ] No [ x] - [ ]Mild [ ]Moderate [ ]Severe  Fatigue:    Yes [ ] No [ x] - [ ]Mild [ ]Moderate [ ]Severe  Nausea:    Yes [ ] No [ x] - [ ]Mild [ ]Moderate [ ]Severe                         Loss of appetite: Yes [ ] No [x ] - [ ]Mild [ ]Moderate [ ]Severe             Constipation:  Yes [ ] No x[ ] - [ ]Mild [ ]Moderate [ ]Severe  Grief:  Yes [ ] No [x ]     Other Symptoms:  [ ]All other review of systems negative     Karnofsky Performance Score/Palliative Performance Status Version 2:         80%    http://palliative.info/resource_material/[email or web address].pdf  PHYSICAL EXAM:  Vital Signs Last 24 Hrs  T(C): 37.3 (02 Apr 2019 06:23), Max: 37.3 (02 Apr 2019 06:23)  T(F): 99.1 (02 Apr 2019 06:23), Max: 99.1 (02 Apr 2019 06:23)  HR: 112 (02 Apr 2019 06:23) (111 - 112)  BP: 134/78 (02 Apr 2019 06:23) (131/78 - 141/80)  BP(mean): --  RR: 18 (02 Apr 2019 06:23) (18 - 18)  SpO2: 100% (02 Apr 2019 06:23) (98% - 100%) I&O's Summary  GENERAL:  [x ]Alert  [ x]Oriented x  3 [ ]Lethargic  [ ]Cachexia  [ ]Unarousable  [ ]Verbal  [ ]Non-Verbal  Behavioral:   [ ] Anxiety  [ ] Delirium [ ] Agitation [ ] Other  HEENT:  [x ]Normal   [ ]Dry mouth   [ ]ET Tube/Trach  [ ]Oral lesions  PULMONARY:   [ x]Clear  [ ]Tachypnea  [ ]Audible excessive secretions   [ ]Rhonchi        [ ]Right [ ]Left [ ]Bilateral  [ ]Crackles        [ ]Right [ ]Left [ ]Bilateral  [ ]Wheezing     [ ]Right [ ]Left [ ]Bilateral  CARDIOVASCULAR:    [x ]Regular [ ]Irregular [ ]Tachy  [ ]Anshul [ ]Murmur [ ]Other  GASTROINTESTINAL:  [ ]Soft  [ ]Distended   [ ]+BS  [ ]Non tender [ ]Tender  [ ]PEG [ ]OGT/ NGT  [x] colostomy Last BM: 4/1/19  GENITOURINARY:  [x ]Normal [ ] Incontinent   [ ]Oliguria/Anuria   [ ][place]  MUSCULOSKELETAL:   [x ]Normal   [ ]Weakness  [ ]Bed/Wheelchair bound [ ]Edema  NEUROLOGIC:   [x ]No focal deficits  [ ] Cognitive impairment  [ ] Dysphagia [ ]Dysarthria [ ] Paresis [ ]Other   SKIN:   [x ]Normal   [ ]Pressure ulcer(s)  [ ]Rash    LABS:                        7.2    5.16  )-----------( 38       ( 02 Apr 2019 05:28 )             22.7   04-02    137  |  100  |  14  ----------------------------<  99  4.1   |  23  |  1.11    Ca    7.4<L>      02 Apr 2019 05:28  Phos  2.9     04-02  Mg     1.9     04-02    TPro  7.4  /  Alb  3.0<L>  /  TBili  0.6  /  DBili  x   /  AST  24  /  ALT  26  /  AlkPhos  174<H>  04-02        RADIOLOGY & ADDITIONAL STUDIES:  < from: CT Chest No Cont (03.30.19 @ 08:47) >  EXAM:  CT CHEST        PROCEDURE DATE:  Mar 30 2019 IMPRESSION:     No pneumonia.    Small bilateral pleural effusions and small pericardial effusion.    Unchanged thoracolumbar compression fractures. T7 posterior vertebral   body lucency corresponds to the site of epidural soft tissue seen on the   3/27/2019 MR thoracic spine.    < end of copied text >    < from: Xray Chest 1 View- PORTABLE-Routine (03.29.19 @ 08:31) >  EXAM:  XR CHEST PORTABLE ROUTINE 1V        PROCEDURE DATE:  Mar 29 2019 IMPRESSION:    Small bilateral pleural effusionsand interstitial edema without   significant change from prior.    < end of copied text >    PROTEIN CALORIE MALNUTRITION PRESENT: [ ] Yes [ ] No  [ ] PPSV2 < or = to 30% [ ] significant weight loss  [ ] poor nutritional intake [ ] catabolic state [ ] anasarca     Albumin, Serum: 3.0 g/dL (04-02-19 @ 05:28)      REFERRALS:   [ ]Chaplaincy  [ ] Hospice  [ ]Child Life  [ ]Social Work  [ ]Case management [ ]Holistic Therapy   Goals of Care Discussion Document:

## 2019-04-02 NOTE — PROGRESS NOTE ADULT - ASSESSMENT
A  54 yo Male with multiple myeloma s/p bone marrow transplant (~1.5 yrs ago) and Perforated  Diverticulitis ,s/p  Kiah's (proctosigmoidectomy with colostomy, on 9/2/2018  and now chemo/RT (stopped ~2 months ago in preparation for colostomy reversal, who presents to the ER for evaluation  of diffuse body pain. Patient recently discharged, on 3/23/19, from [facility] after being hospitalized for thrombocytopenia. On admission, he found to have Fever, Temp. of 100.8, Tachycardia, HR of 110, and received dose of Zosyn and Vancomycin. The cultures are negative to date. The ID consult requested to assist with further evaluation and antibiotic management.     # Sepsis - ( fever + tachycardia ) -source to be determined   # B/L Pleural effusion- on MRI -  Small bilateral pleural effusions and small pericardial effusion on CT chest 3/30/19  # RVP- Negative  # Multiple  Myeloma  # UTI- 3/28/19  # Elevated Procalcitonin level -8.48 (3/27), -2.42-0.70 (3/31) - Discontinue Ceftriaxone since  Procalcitonin level has >more than  80 % reduction from initial level     would recommend:  1. Monitor OFF antibiotics  2. Continue supportive care  3. Pain management as per Primary/Hem/Onc      d/w patient, Family at the bed side and Nursing staff    will follow the patient with you A  54 yo Male with multiple myeloma s/p bone marrow transplant (~1.5 yrs ago) and Perforated  Diverticulitis ,s/p  Kiah's (proctosigmoidectomy with colostomy, on 9/2/2018  and now chemo/RT (stopped ~2 months ago in preparation for colostomy reversal, who presents to the ER for evaluation  of diffuse body pain. Patient recently discharged, on 3/23/19, from [facility] after being hospitalized for thrombocytopenia. On admission, he found to have Fever, Temp. of 100.8, Tachycardia, HR of 110, and received dose of Zosyn and Vancomycin. The cultures are negative to date. The ID consult requested to assist with further evaluation and antibiotic management.     # Sepsis - ( fever + tachycardia ) -source to be determined   # B/L Pleural effusion- on MRI -  Small bilateral pleural effusions and small pericardial effusion on CT chest 3/30/19  # RVP- Negative  # Multiple  Myeloma  # UTI- 3/28/19  # Elevated Procalcitonin level -8.48 (3/27), -2.42-0.70 (3/31) - Discontinue Ceftriaxone since  Procalcitonin level has >more than  80 % reduction from initial level     would recommend:  1. Chemotherapy as per Hem/Onc  2. Monitor OFF antibiotics  3. Continue supportive care  4. Pain management as needed    d/w patient, Family at the bed side and Nursing staff    -will follow up as needed  Thank you

## 2019-04-02 NOTE — PROGRESS NOTE ADULT - ASSESSMENT
1. Multiple Myeloma    -- relapsed / refractory ( failed all FDA Available tx) and failed auto stem cell transplant  -- pt has been off of tx against my and other oncologist advise in order to reverse colostomy  -- clinical trial at [facility] strongly recommended but pt declined  -- Plan to start Today  Bendamustine 100mg/M2 Day 1, Pomalidomide 3mg PO QD Days 1-21 and Dexa 40mg weekly Q 28 day cycle.    -- monitor for tumor lysis (CMP, LDH, Phos, Uric Acid ) Daily  -- allopurinol 300mg PO QD  -- Zofran 8mg IV Q 8hrs PRN Nausea      2. Diffuse bone pain of unclear etiology    -- pt has known extensive bone involvement by myeloma  --  MRI C/T/L Spine w no cord compression  -- poor pain control -  pain management consult       3. Transfusion dependent Anemia and Thrombocytopenia    -- sec to myeloma  --did not respond to high dose decadron last week  -- transfuse platelets if Less than 10K or clinically significant bleeding  -- keep Hgb > 7  plts today 10; please transfuse one unit single donor platelets.         Torsten Murphy MD  [phone]

## 2019-04-02 NOTE — PROGRESS NOTE ADULT - SUBJECTIVE AND OBJECTIVE BOX
Patient seen and examined at bedside  stable symptoms overnight  case discussed with medical team    HPI:  53M hx of multiple myeloma s/p bone marrow transplant (~1.5 yrs ago) and chemo/RT (stopped ~2 months ago in preparation for colostomy reversal), perforated diverticulitis s/p Kiah's (proctosigmoidectomy with colostomy, 9/2018), nonischemic cardiomyopathy (mild global LV systolic dysfunction with EF 54% and diastolic LV dysfunction based on TTE in 11/2018), and HTN presents to [facility] ED c/o diffuse body pain. Patient recently discharged 3/23/19 from [facility] after being hospitalized for thrombocytopenia.     Patient started to develop severe non-radiating throbbing 10/10 pain in his b/l shoulders, chest, abdomen, and b/l thighs since Sunday night - leaving him essentially bedbound. Was supposed to see Dr. Murphy for follow up today. However because of the pain he came to the ED (26 Mar 2019 11:51)      PAST MEDICAL & SURGICAL HISTORY:  Diverticulitis  Multiple myeloma  Hypertension  Left ventricular dysfunction  Cardiomyopathy  Former smoker: (1 ppd x 11 years; Quit ~age 26)  History of bone marrow transplant  History of surgery: s/p exploratory laparotomy with sigmoid resection and colostomy on 9/2/2018      oxycodone (Vomiting; Nausea)       MEDICATIONS  (STANDING):  bendamustine IVPB (BENDEKA) (eMAR) 208 milliGRAM(s) IV Intermittent once  carvedilol 25 milliGRAM(s) Oral every 12 hours  dexamethasone  IVPB 40 milliGRAM(s) IV Intermittent once  lisinopril 10 milliGRAM(s) Oral daily  morphine ER Tablet 30 milliGRAM(s) Oral every 12 hours  ondansetron  IVPB 16 milliGRAM(s) IV Intermittent once  pomalidomide 3 milliGRAM(s) Oral daily  sodium chloride 0.9%. 1000 milliLiter(s) (100 mL/Hr) IV Continuous <Continuous>    MEDICATIONS  (PRN):  artificial  tears Solution 1 Drop(s) Both EYES every 6 hours PRN Dry Eyes  HYDROmorphone   Tablet 2 milliGRAM(s) Oral every 4 hours PRN Moderate Pain (4 - 6)  HYDROmorphone   Tablet 4 milliGRAM(s) Oral every 4 hours PRN Severe Pain (7 - 10)  HYDROmorphone  Injectable 1 milliGRAM(s) IV Push every 4 hours PRN Severe breakthrough pain      REVIEW OF SYSTEMS:  CONSTITUTIONAL: (+) malaise. gen weakness. body pain  EYES: No acute change in vision   ENT:  No tinnitus  NECK: No stiffness  RESPIRATORY: No hemoptysis  CARDIOVASCULAR: No chest pain, palpitations, syncope  GASTROINTESTINAL: No hematemesis, diarrhea, melena, or hematochezia.  GENITOURINARY: No hematuria  NEUROLOGICAL: pain. No headaches  LYMPH Nodes: No enlarged glands  ENDOCRINE: No heat or cold intolerance	    T(C): 37.3 (04-02-19 @ 06:23), Max: 37.3 (04-02-19 @ 06:23)  HR: 112 (04-02-19 @ 06:23) (111 - 112)  BP: 134/78 (04-02-19 @ 06:23) (131/78 - 141/80)  RR: 18 (04-02-19 @ 06:23) (18 - 18)  SpO2: 100% (04-02-19 @ 06:23) (98% - 100%)    PHYSICAL EXAMINATION:   Constitutional: stable appearance. NAD  HEENT: AT  Neck:  Supple  Respiratory:  Adequate airflow b/l. Not using accessory muscles of respiration.  Cardiovascular:  S1 & S2 intact, no R/G, 2+ radial pulses b/l  Gastrointestinal: Soft, ND, normoactive b.s., no organomegaly/RT/rigidity  Extremities: stable. WWP  Neurological:  Alert and awake.  stable unstable gait 2/2 pain. Crude sensation intact.     Labs and imaging reviewed    LABS:                        7.2    5.16  )-----------( 38       ( 02 Apr 2019 05:28 )             22.7     04-02    137  |  100  |  14  ----------------------------<  99  4.1   |  23  |  1.11    Ca    7.4<L>      02 Apr 2019 05:28  Phos  2.9     04-02  Mg     1.9     04-02    TPro  7.4  /  Alb  3.0<L>  /  TBili  0.6  /  DBili  x   /  AST  24  /  ALT  26  /  AlkPhos  174<H>  04-02            CAPILLARY BLOOD GLUCOSE            LIVER FUNCTIONS - ( 02 Apr 2019 05:28 )  Alb: 3.0 g/dL / Pro: 7.4 g/dL / ALK PHOS: 174 u/L / ALT: 26 u/L / AST: 24 u/L / GGT: x               RADIOLOGY & ADDITIONAL STUDIES:

## 2019-04-02 NOTE — CONSULT NOTE ADULT - PROBLEM SELECTOR RECOMMENDATION 9
Continue MsContin 30mg BID with Dilaudid 4mg q4h PRN. Most likely secondary from his compression fractures and Multiple myeloma.

## 2019-04-02 NOTE — CONSULT NOTE ADULT - ATTENDING COMMENTS
Thanks for consulting. will closely follow up
Patient seen and examined.  Meds, labs and examined.  Patient examined by physician.   Agree with NP note.

## 2019-04-02 NOTE — CONSULT NOTE ADULT - CONSULT REQUESTED DATE/TIME
27-Mar-2019 18:30
02-Apr-2019 10:40
27-Mar-2019
27-Mar-2019 12:26
27-Mar-2019 21:17
29-Mar-2019 15:06

## 2019-04-02 NOTE — PROGRESS NOTE ADULT - PROBLEM SELECTOR PLAN 1
Improved and stable cr at 1.1  Electrolytes acceptable and clinically euvolemic.. low luly related to low Alb.  in setting of Multiple myeloma, uncontrolled, not on chemotherapy  Off IVF and tolerating diet.  BP controlled  prn PRBC xfusion if Hb 7 or below  cont Lisinopril 10 mg

## 2019-04-02 NOTE — PROGRESS NOTE ADULT - ASSESSMENT
53M hx of multiple myeloma s/p bone marrow transplant (~1.5 yrs ago) and chemo/RT (stopped ~2 months ago in preparation for colostomy reversal), perforated diverticulitis s/p Kiah's (proctosigmoidectomy with colostomy, 9/2018), nonischemic cardiomyopathy (mild global LV systolic dysfunction with EF 54% and diastolic LV dysfunction based on TTE in 11/2018), and HTN presented to [facility] ED 3/26 c/o diffuse body pains. Renal consult called today for elevated renal function.    Kristyn vs KRISTYN on CKD -b/l Cr 0.9-1 09/2018   Cr 3/25 1.2 but earlier this mth Cr was 1.2-1.5; no Cr available b/w Sept 2018 and 03/2019    KRISTYN likely 2/2 hemodynamics. clinically euvolemic. no e/o TTP/HUS  HTN, controlled-on BB  Thrombocytopenia. PLT 33k noted, concern for worsened MM v. ITP. transfused 1U of PLT 3/26.   Multiple myeloma s/p bone marrow transplant (~1.5 yrs ago) and chemo/RT    labs, chart reviewed  encouraged po intake  montir BMP daily  monitor CBC closely  avoid nephrotoxics/NSAIDs/ACEI/ARB  pain Mx per primary team

## 2019-04-02 NOTE — CONSULT NOTE ADULT - ASSESSMENT
53 year old man with Neoplasm related pain, constipation, multiple myeloma and encounter for palliative care.
pain likely related to diffuse MM  Continue pain conrol   Consider palliative consult   Bowel Regimen  allow pt to ambulate with family/staff   dispo- no need for inpt rehab
53M hx of multiple myeloma s/p bone marrow transplant (~1.5 yrs ago) and chemo/RT (stopped ~2 months ago in preparation for colostomy reversal), perforated diverticulitis s/p Kiah's (proctosigmoidectomy with colostomy, 9/2018), nonischemic cardiomyopathy (mild global LV systolic dysfunction with EF 54% and diastolic LV dysfunction based on TTE in 11/2018), and HTN presented to [facility] ED 3/26 c/o diffuse body pains. Renal consult called today for elevated renal function.    Kristyn vs KRISTYN on CKD -b/l Cr 0.9-1 09/2018   Cr 3/25 1.2 but earlier this mth Cr was 1.2-1.5; no Cr available b/w Sept 2018 and 03/2019    KRISTYN likely 2/2 hemodynamics. clinically euvolemic. no e/o TTP/HUS  HTN, controlled-on BB  Thrombocytopenia. PLT 33k noted, concern for worsened MM v. ITP. transfused 1U of PLT 3/26.   Multiple myeloma s/p bone marrow transplant (~1.5 yrs ago) and chemo/RT    labs, rad, chart reviewed  encouraged po intake  montir BMP daily  monitor CBC closely  avoid nephrotoxics/NSAIDs/ACEI/ARB  pain Mx per primary team  UA noted, not a clean catch as 3 or more orgs on UCx. rec rpt UA w/UPCR in AM  will f/u
A  52 yo Male with multiple myeloma s/p bone marrow transplant (~1.5 yrs ago) and Perforated  Diverticulitis ,s/p  Kiah's (proctosigmoidectomy with colostomy, on 9/2/2018  and now chemo/RT (stopped ~2 months ago in preparation for colostomy reversal, who presents to the ER for evaluation  of diffuse body pain. Patient recently discharged, on 3/23/19, from [facility] after being hospitalized for thrombocytopenia. On admission, he found to have Fever, Temp. of 100.8, Tachycardia, HR of 110, and received dose of Zosyn and Vancomycin. The cultures are negative to date. The ID consult requested to assist with further evaluation and antibiotic management.     # Sepsis - ( fever + tachycardia ) -source to be determined   # B/L Pleural effusion- on MRI  # RVP- Negative  # Multiple  Myeloma    would recommend:  1. Follow up final blood cultures  2. Obtain Procalcitonin level  3. Pain management as per Primary  4. Monitor Temp. and c/w supportive care  5. Repeat Chest Imaging to reassess the pleural effusion, may need diagnostic thoracentesis     d/w patient     will follow the patient with you and make further recommendation based on the clinical course and Lab results  Thank you for the opportunity to participate in Mr. GARCIA's care

## 2019-04-02 NOTE — PROGRESS NOTE ADULT - SUBJECTIVE AND OBJECTIVE BOX
S: no chest pain or sob         allopurinol 300 milliGRAM(s) Oral daily  artificial  tears Solution 1 Drop(s) Both EYES every 6 hours PRN  bendamustine IVPB (BENDEKA) (eMAR) 208 milliGRAM(s) IV Intermittent once  carvedilol 25 milliGRAM(s) Oral every 12 hours  HYDROmorphone   Tablet 2 milliGRAM(s) Oral every 4 hours PRN  HYDROmorphone   Tablet 4 milliGRAM(s) Oral every 4 hours PRN  HYDROmorphone  Injectable 1 milliGRAM(s) IV Push every 4 hours PRN  lisinopril 10 milliGRAM(s) Oral daily  morphine ER Tablet 30 milliGRAM(s) Oral every 12 hours  ondansetron  IVPB 16 milliGRAM(s) IV Intermittent once  ondansetron Injectable 8 milliGRAM(s) IV Push every 8 hours PRN  pomalidomide 3 milliGRAM(s) Oral daily  sodium chloride 0.9%. 1000 milliLiter(s) IV Continuous <Continuous>                            7.2    5.16  )-----------( 38       ( 02 Apr 2019 05:28 )             22.7       04-02    137  |  100  |  14  ----------------------------<  99  4.1   |  23  |  1.11    Ca    7.4<L>      02 Apr 2019 05:28  Phos  2.9     04-02  Mg     1.9     04-02    TPro  7.4  /  Alb  3.0<L>  /  TBili  0.6  /  DBili  x   /  AST  24  /  ALT  26  /  AlkPhos  174<H>  04-02            T(C): 37.3 (04-02-19 @ 06:23), Max: 37.3 (04-02-19 @ 06:23)  HR: 112 (04-02-19 @ 06:23) (111 - 112)  BP: 134/78 (04-02-19 @ 06:23) (134/78 - 141/80)  RR: 18 (04-02-19 @ 06:23) (18 - 18)  SpO2: 100% (04-02-19 @ 06:23) (98% - 100%)  Wt(kg): --    I&O's Summary      no JVD  RRR, no murmurs  CTAB  soft nt/nd  no c/c/e    < from: Transthoracic Echocardiogram (03.29.19 @ 20:01) >  CONCLUSIONS:  1. Normal mitral valve. Minimal mitral regurgitation.  2. Normal left ventricular internal dimensions and wall  thicknesses.  3. Normal left ventricular systolic function. No segmental  wall motion abnormalities.  4. Normal right ventricular size and function.  5. Small pericardial effusion posterior to the left  ventricle and superior to the right atrium.  *** Compared with echocardiogram of 8/2/2017, left  ventricular systolic function has improved.  ------------------------------------------------------------------------  Confirmed on  4/1/2019 - 08:41:32 by Samson Hightower M.D.    < end of copied text >      A/P) 53 male PMH multiple myeloma s/p bone marrow transplant and chemo/RT, perforated diverticulitis s/p Kiah's (proctosigmoidectomy with colostomy, 9/2018), nonischemic cardiomyopathy (mild global LV systolic dysfunction with EF 54% and diastolic LV dysfunction based on TTE in 11/2018), and HTN who is being seen for management of cardiomyopathy.    -TTE with normal LV function and small pericardial effusion  -pt. with no clinical or echo evidence of tamponade  -no further inpatient cardiac workup needed at this time.   -follow up heme-onc    Marc Owen MD

## 2019-04-02 NOTE — PROGRESS NOTE ADULT - SUBJECTIVE AND OBJECTIVE BOX
Patient is seen and examined at the bed side, is afebrile.  He still has generalized bodyache.        REVIEW OF SYSTEMS: All other review systems are negative      ALLERGIES: No Known Allergies      Vital Signs Last 24 Hrs  T(C): 36.8 (2019 17:43), Max: 37.3 (2019 06:23)  T(F): 98.3 (2019 17:43), Max: 99.1 (2019 06:23)  HR: 98 (:43) (98 - 112)  BP: 146/85 (2019 17:43) (134/78 - 146/85)  BP(mean): --  RR: 18 (:43) (18 - 18)  SpO2: 100% (:43) (98% - 100%)      PHYSICAL EXAM:  GENERAL: Not in  acute distress   CHEST/LUNG:  Air entry  bilaterally  HEART: s1 and s2 present  ABDOMEN:  Colostomy bag in placed  EXTREMITIES: No pedal  edema  CNS: Awake and Alert        LABS:                        7.2    5.16  )-----------( 38       ( 2019 05:28 )             22.7                         8.2    2.36  )-----------( 33       ( 27 Mar 2019 07:22 )             24.4         04-    137  |  100  |  14  ----------------------------<  99  4.1   |  23  |  1.11    Ca    7.4<L>      2019 05:28  Phos  2.9     04-02  Mg     1.9     04-02    TPro  7.4  /  Alb  3.0<L>  /  TBili  0.6  /  DBili  x   /  AST  24  /  ALT  26  /  AlkPhos  174<H>  04-    04-    137  |  101  |  16  ----------------------------<  123<H>  4.1   |  22  |  1.11    Ca    7.7<L>      2019 06:25  Phos  2.9     04-01    TPro  7.8  /  Alb  2.9<L>  /  TBili  0.6  /  DBili  x   /  AST  27  /  ALT  28  /  AlkPhos  178<H>  03-29          Urinalysis Basic - ( 25 Mar 2019 20:38 )  Color: YELLOW / Appearance: CLEAR / S.019 / pH: 6.0  Gluc: NEGATIVE / Ketone: NEGATIVE  / Bili: NEGATIVE / Urobili: NORMAL   Blood: TRACE / Protein: 200 / Nitrite: NEGATIVE   Leuk Esterase: NEGATIVE / RBC: 11-25 / WBC 3-5       Procalcitonin, Serum (19 @ 06:35)    Procalcitonin, Serum: 0.71:    Procalcitonin, Serum (19 @ 06:14)    Procalcitonin, Serum: 2.42:     Procalcitonin, Serum (19 @ 16:46)    Procalcitonin, Serum: 8.48: Procalcitonin (PCT) Interpretation (ng/mL) - Diagnosis of  systemic bacterial infection/sepsis:        MEDICATIONS  (STANDING):  allopurinol 300 milliGRAM(s) Oral daily  carvedilol 25 milliGRAM(s) Oral every 12 hours  docusate sodium 100 milliGRAM(s) Oral three times a day  lisinopril 10 milliGRAM(s) Oral daily  morphine ER Tablet 30 milliGRAM(s) Oral every 12 hours  pomalidomide 3 milliGRAM(s) Oral daily  senna 2 Tablet(s) Oral at bedtime  sodium chloride 0.9%. 1000 milliLiter(s) (100 mL/Hr) IV Continuous <Continuous>    MEDICATIONS  (PRN):  artificial  tears Solution 1 Drop(s) Both EYES every 6 hours PRN Dry Eyes  HYDROmorphone   Tablet 2 milliGRAM(s) Oral every 4 hours PRN Moderate Pain (4 - 6)  HYDROmorphone   Tablet 4 milliGRAM(s) Oral every 4 hours PRN Severe Pain (7 - 10)  HYDROmorphone  Injectable 1 milliGRAM(s) IV Push every 4 hours PRN Severe breakthrough pain  ondansetron Injectable 8 milliGRAM(s) IV Push every 8 hours PRN Nausea and/or Vomiting          RADIOLOGY & ADDITIONAL TESTS:    3/30/19 : CT Chest No Cont (19 @ 08:47) No pneumonia. Small bilateral pleural effusions and small pericardial effusion.  Unchanged thoracolumbar compression fractures. T7 posterior vertebral  body lucency corresponds to the site of epidural soft tissue seen on the   3/27/2019 MR thoracic spine.      3/27/19 : MR Lumbar Spine No Cont (19 @ 10:38) Abnormal T1 prolongation involving the cervical thoracic lumbar and sacral spine region as well as both iliac bones. This is likely related to patient's known multiple myeloma.  Bilateral pleural effusions are seen.      3/25/19 : US Abdomen Limited (19 @ 21:38) Normal right upper quadrant abdominal ultrasound.  Small right pleural effusion.        MICROBIOLOGY DATA:    Culture - Urine in AM (19 @ 07:41)    Culture - Urine:   NO GROWTH AT 24 HOURS    Specimen Source: URINE MIDSTREAM      Culture - Urine (19 @ 21:18)    Culture - Urine:   Culture grew 3 or more types of organisms which indicate  collection contamination; consider recollection only if  clinically indicated.    Specimen Source: URINE MIDSTREAM      Culture - Blood (19 @ 20:26)    Culture - Blood:   NO ORGANISMS ISOLATED  NO ORGANISMS ISOLATED AT 48 HRS.    Specimen Source: BLOOD VENOUS      Culture - Blood (19 @ 20:26)    Culture - Blood:   NO ORGANISMS ISOLATED  NO ORGANISMS ISOLATED AT 48 HRS.    Specimen Source: BLOOD PERIPHERAL      Rapid Respiratory Viral Panel (19 @ 19:33)    Adenovirus (RapRVP): Not Detected    Influenza A (RapRVP): Not Detected    Influenza AH1 2009 (RapRVP): Not Detected    Influenza AH1 (RapRVP): Not Detected    Influenza AH3 (RapRVP): Not Detected    Influenza B (RapRVP): Not Detected    Parainfluenza 1 (RapRVP): Not Detected    Parainfluenza 2 (RapRVP): Not Detected    Parainfluenza 3 (RapRVP): Not Detected    Parainfluenza 4 (RapRVP): Not Detected    Resp Syncytial Virus (RapRVP): Not Detected    Chlamydia pneumoniae (RapRVP): Not Detected    Mycoplasma pneumoniae (RapRVP): Not Detected    Entero/Rhinovirus (RapRVP): Not Detected    hMPV (RapRVP): Not Detected    Coronavirus (229E,HKU1,NL63,OC43): Not Detected This Respiratory Panel uses polymerase chain reaction (PCR)  to detect for adenovirus; coronavirus (HKU1, NL63, 229E,  OC43); human metapneumovirus (hMPV); human  enterovirus/rhinovirus (Entero/RV); influenza A; influenza  A/H1: influenza A/H3; influenza A/H1-2009; influenza B;  parainfluenza viruses 1,2,3,4; respiratory syncytial virus;  Mycoplasma pneumoniae; and Chlamydophila pneumoniae. Patient is seen and examined at the bed side, is afebrile.  He is feeling better today, and has started on chemotherapy today.      REVIEW OF SYSTEMS: All other review systems are negative      ALLERGIES: No Known Allergies        Vital Signs Last 24 Hrs  T(C): 36.8 (2019 17:43), Max: 37.3 (2019 06:23)  T(F): 98.3 (2019 17:43), Max: 99.1 (2019 06:23)  HR: 98 (:43) (98 - 112)  BP: 146/85 (2019 17:43) (134/78 - 146/85)  BP(mean): --  RR: 18 (:43) (18 - 18)  SpO2: 100% (:) (98% - 100%)      PHYSICAL EXAM:  GENERAL: Not in  acute distress   CHEST/LUNG:  Air entry  bilaterally  HEART: s1 and s2 present  ABDOMEN:  Colostomy bag in placed  EXTREMITIES: No pedal  edema  CNS: Awake and Alert        LABS:                        7.2    5.16  )-----------( 38       ( 2019 05:28 )             22.7                         8.2    2.36  )-----------( 33       ( 27 Mar 2019 07:22 )             24.4         04-    137  |  100  |  14  ----------------------------<  99  4.1   |  23  |  1.11    Ca    7.4<L>      2019 05:28  Phos  2.9     04-02  Mg     1.9     04-02    TPro  7.4  /  Alb  3.0<L>  /  TBili  0.6  /  DBili  x   /  AST  24  /  ALT  26  /  AlkPhos  174<H>  04-    04-    137  |  101  |  16  ----------------------------<  123<H>  4.1   |  22  |  1.11    Ca    7.7<L>      2019 06:25  Phos  2.9     04-01    TPro  7.8  /  Alb  2.9<L>  /  TBili  0.6  /  DBili  x   /  AST  27  /  ALT  28  /  AlkPhos  178<H>            Urinalysis Basic - ( 25 Mar 2019 20:38 )  Color: YELLOW / Appearance: CLEAR / S.019 / pH: 6.0  Gluc: NEGATIVE / Ketone: NEGATIVE  / Bili: NEGATIVE / Urobili: NORMAL   Blood: TRACE / Protein: 200 / Nitrite: NEGATIVE   Leuk Esterase: NEGATIVE / RBC: 11-25 / WBC 3-5       Procalcitonin, Serum (19 @ 06:35)    Procalcitonin, Serum: 0.71:    Procalcitonin, Serum (19 @ 06:14)    Procalcitonin, Serum: 2.42:     Procalcitonin, Serum (19 @ 16:46)    Procalcitonin, Serum: 8.48: Procalcitonin (PCT) Interpretation (ng/mL) - Diagnosis of  systemic bacterial infection/sepsis:        MEDICATIONS  (STANDING):  allopurinol 300 milliGRAM(s) Oral daily  carvedilol 25 milliGRAM(s) Oral every 12 hours  docusate sodium 100 milliGRAM(s) Oral three times a day  lisinopril 10 milliGRAM(s) Oral daily  morphine ER Tablet 30 milliGRAM(s) Oral every 12 hours  pomalidomide 3 milliGRAM(s) Oral daily  senna 2 Tablet(s) Oral at bedtime  sodium chloride 0.9%. 1000 milliLiter(s) (100 mL/Hr) IV Continuous <Continuous>    MEDICATIONS  (PRN):  artificial  tears Solution 1 Drop(s) Both EYES every 6 hours PRN Dry Eyes  HYDROmorphone   Tablet 2 milliGRAM(s) Oral every 4 hours PRN Moderate Pain (4 - 6)  HYDROmorphone   Tablet 4 milliGRAM(s) Oral every 4 hours PRN Severe Pain (7 - 10)  HYDROmorphone  Injectable 1 milliGRAM(s) IV Push every 4 hours PRN Severe breakthrough pain  ondansetron Injectable 8 milliGRAM(s) IV Push every 8 hours PRN Nausea and/or Vomiting          RADIOLOGY & ADDITIONAL TESTS:    3/30/19 : CT Chest No Cont (19 @ 08:47) No pneumonia. Small bilateral pleural effusions and small pericardial effusion.  Unchanged thoracolumbar compression fractures. T7 posterior vertebral  body lucency corresponds to the site of epidural soft tissue seen on the   3/27/2019 MR thoracic spine.      3/27/19 : MR Lumbar Spine No Cont (19 @ 10:38) Abnormal T1 prolongation involving the cervical thoracic lumbar and sacral spine region as well as both iliac bones. This is likely related to patient's known multiple myeloma.  Bilateral pleural effusions are seen.      3/25/19 : US Abdomen Limited (19 @ 21:38) Normal right upper quadrant abdominal ultrasound.  Small right pleural effusion.        MICROBIOLOGY DATA:    Culture - Urine in AM (19 @ 07:41)    Culture - Urine:   NO GROWTH AT 24 HOURS    Specimen Source: URINE MIDSTREAM      Culture - Urine (19 @ 21:18)    Culture - Urine:   Culture grew 3 or more types of organisms which indicate  collection contamination; consider recollection only if  clinically indicated.    Specimen Source: URINE MIDSTREAM      Culture - Blood (19 @ 20:26)    Culture - Blood:   NO ORGANISMS ISOLATED  NO ORGANISMS ISOLATED AT 48 HRS.    Specimen Source: BLOOD VENOUS      Culture - Blood (19 @ 20:26)    Culture - Blood:   NO ORGANISMS ISOLATED  NO ORGANISMS ISOLATED AT 48 HRS.    Specimen Source: BLOOD PERIPHERAL      Rapid Respiratory Viral Panel (19 @ 19:33)    Adenovirus (RapRVP): Not Detected    Influenza A (RapRVP): Not Detected    Influenza AH1 2009 (RapRVP): Not Detected    Influenza AH1 (RapRVP): Not Detected    Influenza AH3 (RapRVP): Not Detected    Influenza B (RapRVP): Not Detected    Parainfluenza 1 (RapRVP): Not Detected    Parainfluenza 2 (RapRVP): Not Detected    Parainfluenza 3 (RapRVP): Not Detected    Parainfluenza 4 (RapRVP): Not Detected    Resp Syncytial Virus (RapRVP): Not Detected    Chlamydia pneumoniae (RapRVP): Not Detected    Mycoplasma pneumoniae (RapRVP): Not Detected    Entero/Rhinovirus (RapRVP): Not Detected    hMPV (RapRVP): Not Detected    Coronavirus (229E,HKU1,NL63,OC43): Not Detected This Respiratory Panel uses polymerase chain reaction (PCR)  to detect for adenovirus; coronavirus (HKU1, NL63, 229E,  OC43); human metapneumovirus (hMPV); human  enterovirus/rhinovirus (Entero/RV); influenza A; influenza  A/H1: influenza A/H3; influenza A/H1-2009; influenza B;  parainfluenza viruses 1,2,3,4; respiratory syncytial virus;  Mycoplasma pneumoniae; and Chlamydophila pneumoniae.

## 2019-04-02 NOTE — CONSULT NOTE ADULT - PROBLEM SELECTOR RECOMMENDATION 4
Patient actively getting chemotherapy while inpatient. Will continue to follow patient for symptom management.

## 2019-04-02 NOTE — PROGRESS NOTE ADULT - SUBJECTIVE AND OBJECTIVE BOX
Pt seen and examined at bedside  feels well  denies any compls  urinating well  denies dyspnea  no new events      Allergies:  oxycodone (Vomiting; Nausea)    Hospital Medications:   MEDICATIONS  (STANDING):  allopurinol 300 milliGRAM(s) Oral daily  bendamustine IVPB (BENDEKA) (eMAR) 208 milliGRAM(s) IV Intermittent once  carvedilol 25 milliGRAM(s) Oral every 12 hours  dexamethasone  IVPB 40 milliGRAM(s) IV Intermittent once  lisinopril 10 milliGRAM(s) Oral daily  morphine ER Tablet 30 milliGRAM(s) Oral every 12 hours  ondansetron  IVPB 16 milliGRAM(s) IV Intermittent once  pomalidomide 3 milliGRAM(s) Oral daily  sodium chloride 0.9%. 1000 milliLiter(s) (100 mL/Hr) IV Continuous <Continuous>       VITALS:  T(F): 99.1 (19 @ 06:23), Max: 99.1 (19 @ 06:23)  HR: 112 (19 @ 06:23)  BP: 134/78 (19 @ 06:23)  RR: 18 (19 @ 06:23)  SpO2: 100% (19 @ 06:23)  Wt(kg): --      PHYSICAL EXAM:  Constitutional: NAD. Lying comfortably in bed  HEENT: anicteric sclera, oropharynx clear, MMM  Neck: No JVD  Respiratory: CTAB, no wheezes, rales or rhonchi  Cardiovascular: S1, S2, RRR  Gastrointestinal: BS+, soft, NT/ND  Extremities: No cyanosis or clubbing. No peripheral edema  Neurological: A/O x 3, no focal deficits  Psychiatric: Normal mood, normal affect  : No CVA tenderness. No quintero.   Skin: No rashes       LABS:      137  |  100  |  14  ----------------------------<  99  4.1   |  23  |  1.11    Ca    7.4<L>      2019 05:28  Phos  2.9     04-  Mg     1.9     -    TPro  7.4  /  Alb  3.0<L>  /  TBili  0.6  /  DBili      /  AST  24  /  ALT  26  /  AlkPhos  174<H>  04-02    Creatinine Trend: 1.11 <--, 1.11 <--, 1.13 <--, 1.19 <--, 1.28 <--, 1.66 <--, 1.37 <--                        7.2    5.16  )-----------( 38       ( 2019 05:28 )             22.7     Urine Studies:  Urinalysis Basic - ( 28 Mar 2019 06:50 )    Color: YELLOW / Appearance: CLEAR / S.014 / pH: 6.0  Gluc: NEGATIVE / Ketone: NEGATIVE  / Bili: NEGATIVE / Urobili: NORMAL   Blood: NEGATIVE / Protein: 100 / Nitrite: NEGATIVE   Leuk Esterase: NEGATIVE / RBC: 0-2 / WBC 6-10   Sq Epi: FEW / Non Sq Epi:  / Bacteria: NEGATIVE      Protein, Random Urine: 111.6 mg/dL ( @ 06:50)  Creatinine, Random Urine: 91.40 mg/dL ( @ 06:50)  Sodium, Random Urine: 20 mmol/L ( @ 06:50)    RADIOLOGY & ADDITIONAL STUDIES:  feels we

## 2019-04-02 NOTE — PROGRESS NOTE ADULT - PROBLEM SELECTOR PLAN 4
improved  persistent thrombocytopenia and anemia  transfusion prn  maintain hgb > 7 Trish Long(Attending)

## 2019-04-02 NOTE — CONSULT NOTE ADULT - PROBLEM SELECTOR RECOMMENDATION 3
Patient sees Dr. Booth. Patient to start Bendeka and Pomalyst while inpatient. Appreciate oncology involvement.

## 2019-04-02 NOTE — CONSULT NOTE ADULT - REASON FOR ADMISSION
Diffuse body pain

## 2019-04-03 DIAGNOSIS — D64.9 ANEMIA, UNSPECIFIED: ICD-10-CM

## 2019-04-03 LAB
ALBUMIN SERPL ELPH-MCNC: 3.2 G/DL — LOW (ref 3.3–5)
ALP SERPL-CCNC: 170 U/L — HIGH (ref 40–120)
ALT FLD-CCNC: 29 U/L — SIGNIFICANT CHANGE UP (ref 4–41)
ANION GAP SERPL CALC-SCNC: 14 MMO/L — SIGNIFICANT CHANGE UP (ref 7–14)
AST SERPL-CCNC: 24 U/L — SIGNIFICANT CHANGE UP (ref 4–40)
BILIRUB SERPL-MCNC: 0.4 MG/DL — SIGNIFICANT CHANGE UP (ref 0.2–1.2)
BLD GP AB SCN SERPL QL: NEGATIVE — SIGNIFICANT CHANGE UP
BUN SERPL-MCNC: 17 MG/DL — SIGNIFICANT CHANGE UP (ref 7–23)
CALCIUM SERPL-MCNC: 7.6 MG/DL — LOW (ref 8.4–10.5)
CHLORIDE SERPL-SCNC: 105 MMOL/L — SIGNIFICANT CHANGE UP (ref 98–107)
CO2 SERPL-SCNC: 21 MMOL/L — LOW (ref 22–31)
CREAT SERPL-MCNC: 1.09 MG/DL — SIGNIFICANT CHANGE UP (ref 0.5–1.3)
GAS PNL BLDMV: SIGNIFICANT CHANGE UP
GLUCOSE SERPL-MCNC: 158 MG/DL — HIGH (ref 70–99)
HCT VFR BLD CALC: 21.5 % — LOW (ref 39–50)
HGB BLD-MCNC: 6.8 G/DL — CRITICAL LOW (ref 13–17)
LDH SERPL L TO P-CCNC: 300 U/L — HIGH (ref 135–225)
MCHC RBC-ENTMCNC: 28.9 PG — SIGNIFICANT CHANGE UP (ref 27–34)
MCHC RBC-ENTMCNC: 31.6 % — LOW (ref 32–36)
MCV RBC AUTO: 91.5 FL — SIGNIFICANT CHANGE UP (ref 80–100)
NRBC # FLD: 0.06 K/UL — SIGNIFICANT CHANGE UP (ref 0–0)
NRBC FLD-RTO: 1.5 — SIGNIFICANT CHANGE UP
PHOSPHATE SERPL-MCNC: 3 MG/DL — SIGNIFICANT CHANGE UP (ref 2.5–4.5)
PLATELET # BLD AUTO: 27 K/UL — LOW (ref 150–400)
PMV BLD: SIGNIFICANT CHANGE UP FL (ref 7–13)
POTASSIUM SERPL-MCNC: 4.6 MMOL/L — SIGNIFICANT CHANGE UP (ref 3.5–5.3)
POTASSIUM SERPL-SCNC: 4.6 MMOL/L — SIGNIFICANT CHANGE UP (ref 3.5–5.3)
PROT SERPL-MCNC: 7.7 G/DL — SIGNIFICANT CHANGE UP (ref 6–8.3)
RBC # BLD: 2.35 M/UL — LOW (ref 4.2–5.8)
RBC # FLD: 17.5 % — HIGH (ref 10.3–14.5)
RH IG SCN BLD-IMP: POSITIVE — SIGNIFICANT CHANGE UP
SODIUM SERPL-SCNC: 140 MMOL/L — SIGNIFICANT CHANGE UP (ref 135–145)
URATE SERPL-MCNC: 6.5 MG/DL — SIGNIFICANT CHANGE UP (ref 3.4–8.8)
WBC # BLD: 3.94 K/UL — SIGNIFICANT CHANGE UP (ref 3.8–10.5)
WBC # FLD AUTO: 3.94 K/UL — SIGNIFICANT CHANGE UP (ref 3.8–10.5)

## 2019-04-03 RX ADMIN — SENNA PLUS 2 TABLET(S): 8.6 TABLET ORAL at 21:26

## 2019-04-03 RX ADMIN — MORPHINE SULFATE 30 MILLIGRAM(S): 50 CAPSULE, EXTENDED RELEASE ORAL at 06:15

## 2019-04-03 RX ADMIN — Medication 100 MILLIGRAM(S): at 21:26

## 2019-04-03 RX ADMIN — MORPHINE SULFATE 30 MILLIGRAM(S): 50 CAPSULE, EXTENDED RELEASE ORAL at 17:50

## 2019-04-03 RX ADMIN — CARVEDILOL PHOSPHATE 25 MILLIGRAM(S): 80 CAPSULE, EXTENDED RELEASE ORAL at 17:50

## 2019-04-03 RX ADMIN — CARVEDILOL PHOSPHATE 25 MILLIGRAM(S): 80 CAPSULE, EXTENDED RELEASE ORAL at 06:16

## 2019-04-03 RX ADMIN — LISINOPRIL 10 MILLIGRAM(S): 2.5 TABLET ORAL at 06:16

## 2019-04-03 RX ADMIN — Medication 300 MILLIGRAM(S): at 12:53

## 2019-04-03 RX ADMIN — Medication 100 MILLIGRAM(S): at 12:53

## 2019-04-03 RX ADMIN — Medication 100 MILLIGRAM(S): at 06:16

## 2019-04-03 RX ADMIN — SODIUM CHLORIDE 100 MILLILITER(S): 9 INJECTION INTRAMUSCULAR; INTRAVENOUS; SUBCUTANEOUS at 21:26

## 2019-04-03 RX ADMIN — POMALIDOMIDE 3 MILLIGRAM(S): 1 CAPSULE ORAL at 12:53

## 2019-04-03 NOTE — DIETITIAN INITIAL EVALUATION ADULT. - OTHER INFO
Length Of Stay nutrition assessment. 52 y/o M presenting with diffuse body pain; hospital course significant for recurrence of multiple myeloma- started chemo 4/2. Pt reports good PO intake of meals served in hospital, also eating food family brings from home. No GI distress (nausea/vomiting/diarrhea/constipation.) No chewing or swallowing difficulties at this time. Confirms unintentional wt loss.

## 2019-04-03 NOTE — PROGRESS NOTE ADULT - SUBJECTIVE AND OBJECTIVE BOX
Pt seen, unchanged cough, completed bendamustine yest without complications. Feels fine, no complaints otherwise.     MEDICATIONS  (STANDING):  allopurinol 300 milliGRAM(s) Oral daily  carvedilol 25 milliGRAM(s) Oral every 12 hours  docusate sodium 100 milliGRAM(s) Oral three times a day  lisinopril 10 milliGRAM(s) Oral daily  morphine ER Tablet 30 milliGRAM(s) Oral every 12 hours  pomalidomide 3 milliGRAM(s) Oral daily  senna 2 Tablet(s) Oral at bedtime  sodium chloride 0.9%. 1000 milliLiter(s) (100 mL/Hr) IV Continuous <Continuous>    MEDICATIONS  (PRN):  artificial  tears Solution 1 Drop(s) Both EYES every 6 hours PRN Dry Eyes  HYDROmorphone   Tablet 2 milliGRAM(s) Oral every 4 hours PRN Moderate Pain (4 - 6)  HYDROmorphone   Tablet 4 milliGRAM(s) Oral every 4 hours PRN Severe Pain (7 - 10)  HYDROmorphone  Injectable 1 milliGRAM(s) IV Push every 4 hours PRN Severe breakthrough pain  ondansetron Injectable 8 milliGRAM(s) IV Push every 8 hours PRN Nausea and/or Vomiting      ROS  No fever, sweats, chills  No epistaxis, HA, sore throat  No CP, SOB, sputum  No n/v/d, abd pain, melena, hematochezia  No edema  No rash  No anxiety  No back pain, joint pain  No bleeding, bruising  No dysuria, hematuria    Vital Signs Last 24 Hrs  T(C): 36.6 (03 Apr 2019 06:11), Max: 36.8 (02 Apr 2019 12:40)  T(F): 97.9 (03 Apr 2019 06:11), Max: 98.3 (02 Apr 2019 12:40)  HR: 100 (03 Apr 2019 06:11) (98 - 110)  BP: 134/86 (03 Apr 2019 06:11) (134/86 - 146/85)  BP(mean): --  RR: 18 (03 Apr 2019 06:11) (18 - 18)  SpO2: 100% (03 Apr 2019 06:11) (100% - 100%)    PE  NAD  Awake, alert  Anicteric, MMM  RRR  CTAB  Abd soft, NT, ND  No c/c/e  No rash grossly  FROM                          6.8    3.94  )-----------( 27       ( 03 Apr 2019 06:20 )             21.5       04-03    140  |  105  |  17  ----------------------------<  158<H>  4.6   |  21<L>  |  1.09    Ca    7.6<L>      03 Apr 2019 06:20  Phos  3.0     04-03  Mg     1.9     04-02    TPro  7.7  /  Alb  3.2<L>  /  TBili  0.4  /  DBili  x   /  AST  24  /  ALT  29  /  AlkPhos  170<H>  04-03

## 2019-04-03 NOTE — PROGRESS NOTE ADULT - SUBJECTIVE AND OBJECTIVE BOX
S: no chest pain or sob       MEDICATIONS  (STANDING):  allopurinol 300 milliGRAM(s) Oral daily  carvedilol 25 milliGRAM(s) Oral every 12 hours  docusate sodium 100 milliGRAM(s) Oral three times a day  lisinopril 10 milliGRAM(s) Oral daily  morphine ER Tablet 30 milliGRAM(s) Oral every 12 hours  pomalidomide 3 milliGRAM(s) Oral daily  senna 2 Tablet(s) Oral at bedtime  sodium chloride 0.9%. 1000 milliLiter(s) (100 mL/Hr) IV Continuous <Continuous>      LABS:                        6.8    3.94  )-----------( 27       ( 03 Apr 2019 06:20 )             21.5     140  |  105  |  17  ----------------------------<  158<H>  4.6   |  21<L>  |  1.09    Ca    7.6<L>      03 Apr 2019 06:20  Phos  3.0     04-03  Mg     1.9     04-02    TPro  7.7  /  Alb  3.2<L>  /  TBili  0.4  /  DBili  x   /  AST  24  /  ALT  29  /  AlkPhos  170<H>  04-03    Creatinine Trend: 1.09<--, 1.11<--, 1.11<--, 1.13<--, 1.19<--, 1.28<--     PHYSICAL EXAM  Vital Signs Last 24 Hrs  T(C): 36.6 (03 Apr 2019 06:11), Max: 36.8 (02 Apr 2019 17:43)  T(F): 97.9 (03 Apr 2019 06:11), Max: 98.3 (02 Apr 2019 17:43)  HR: 100 (03 Apr 2019 06:11) (98 - 109)  BP: 134/86 (03 Apr 2019 06:11) (134/86 - 146/85)  RR: 18 (03 Apr 2019 06:11) (18 - 18)  SpO2: 100% (03 Apr 2019 06:11) (100% - 100%)    no JVD  RRR, no murmurs  CTAB  soft nt/nd  no c/c/e    < from: Transthoracic Echocardiogram (03.29.19 @ 20:01) >  CONCLUSIONS:  1. Normal mitral valve. Minimal mitral regurgitation.  2. Normal left ventricular internal dimensions and wall  thicknesses.  3. Normal left ventricular systolic function. No segmental  wall motion abnormalities.  4. Normal right ventricular size and function.  5. Small pericardial effusion posterior to the left  ventricle and superior to the right atrium.  *** Compared with echocardiogram of 8/2/2017, left  ventricular systolic function has improved.  ------------------------------------------------------------------------  Confirmed on  4/1/2019 - 08:41:32 by Samson Hightower M.D.    < end of copied text >      A/P) 53 male PMH multiple myeloma s/p bone marrow transplant and chemo/RT, perforated diverticulitis s/p Kiah's (proctosigmoidectomy with colostomy, 9/2018), nonischemic cardiomyopathy (mild global LV systolic dysfunction with EF 54% and diastolic LV dysfunction based on TTE in 11/2018), and HTN who is being seen for management of cardiomyopathy.    -TTE with normal LV function and small pericardial effusion  -pt. with no clinical or echo evidence of tamponade  -no further inpatient cardiac workup needed at this time.   -follow up heme-onc

## 2019-04-03 NOTE — DIETITIAN INITIAL EVALUATION ADULT. - ENERGY NEEDS
Ht: 73 in Wt: (3/26) dosing 185 pounds BMI: 24.4  IBW: 184 pounds   Skin: No edema, no pressure injuries noted.

## 2019-04-03 NOTE — PROGRESS NOTE ADULT - SUBJECTIVE AND OBJECTIVE BOX
Patient is a 53y old  Male who presents with a chief complaint of Diffuse body pain (29 Mar 2019 13:39)      HPI:  53M hx of multiple myeloma s/p bone marrow transplant (~1.5 yrs ago) and chemo/RT (stopped ~2 months ago in preparation for colostomy reversal), perforated diverticulitis s/p Kiah's (proctosigmoidectomy with colostomy, 9/2018), nonischemic cardiomyopathy (mild global LV systolic dysfunction with EF 54% and diastolic LV dysfunction based on TTE in 11/2018), and HTN presents to [facility] ED c/o diffuse body pain. Patient recently discharged 3/23/19 from [facility] after being hospitalized for thrombocytopenia.  Patient started to develop severe non-radiating throbbing 10/10 pain in his b/l shoulders, chest, abdomen, and b/l thighs since Sunday night - leaving him essentially bedbound. Was supposed to see Dr. Murphy for follow up today. However because of the pain he came to the ED (26 Mar 2019 11:51)   On admission, he found to have Fever, Temp, tachycardia_> treated for sepsis.   h/o MM-> refractory to treatment->getting chemo inpt, feels well   pain is better- MSContin/Dilaudid     REVIEW OF SYSTEMS: No chest pain, shortness of breath, nausea, vomiting or diarhea.      function- supervision  ----------------------------------------------------------------------------------------  PHYSICAL EXAM  Constitutional - NAD, Comfortable  Extremities - No C/C/E, No calf tenderness   Neurologic Exam -                    Cognitive - Awake, Alert, AAO to self, place, date, year, situation     Communication - Fluent, No dysarthria, no aphasia     Cranial Nerves - CN 2-12 intact     Motor - No focal deficits                       Sensory - Intact to LT     Reflexes - DTR Intact, No primitive reflexive     Balance - WNL Static  Psychiatric - Mood stable, Affect WNL

## 2019-04-03 NOTE — DIETITIAN INITIAL EVALUATION ADULT. - PERTINENT MEDS FT
MEDICATIONS  (STANDING):  allopurinol 300 milliGRAM(s) Oral daily  carvedilol 25 milliGRAM(s) Oral every 12 hours  docusate sodium 100 milliGRAM(s) Oral three times a day  lisinopril 10 milliGRAM(s) Oral daily  morphine ER Tablet 30 milliGRAM(s) Oral every 12 hours  pomalidomide 3 milliGRAM(s) Oral daily  senna 2 Tablet(s) Oral at bedtime  sodium chloride 0.9%. 1000 milliLiter(s) (100 mL/Hr) IV Continuous <Continuous>

## 2019-04-03 NOTE — PROGRESS NOTE ADULT - ASSESSMENT
1. Multiple Myeloma    -- relapsed / refractory ( failed all FDA Available tx) and failed auto stem cell transplant  -- pt has been off of tx against my and other oncologist advise in order to reverse colostomy  -- clinical trial at [facility] strongly recommended but pt declined  -- started on 4/2 Bendamustine 100mg/M2 Day 1, Pomalidomide 3mg PO QD Days 1-21 and Dexa 40mg weekly Q 28 day cycle  -- next decadron dose on 4/9 if pt still in house  -- monitor for tumor lysis (CMP, LDH, Phos, Uric Acid ) Daily  -- allopurinol 300mg PO QD  -- Zofran 8mg IV Q 8hrs PRN Nausea      2. Diffuse bone pain of unclear etiology    -- pt has known extensive bone involvement by myeloma  --  MRI C/T/L Spine w no cord compression  -- poor pain control -  pain management consult       3. Transfusion dependent Anemia and Thrombocytopenia    -- sec to myeloma  --did not respond to high dose decadron last week  -- transfuse platelets if Less than 10K or clinically significant bleeding  -- keep Hgb > 7, give 1U PRBC today    Pls call with questions, [phone]

## 2019-04-03 NOTE — DIETITIAN INITIAL EVALUATION ADULT. - PERTINENT LABORATORY DATA
04-03 Na 140 mmol/L Glu 158 mg/dL<H> K+ 4.6 mmol/L Cr 1.09 mg/dL BUN 17 mg/dL Phos 3.0 mg/dL Alb 3.2 g/dL<L>  Hgb 6.8 g/dL<LL> Hct 21.5 %<L>Hemoglobin A1C, Whole Blood: 6.2 % (03-11-19 @ 16:40)

## 2019-04-03 NOTE — PROGRESS NOTE ADULT - ASSESSMENT
pain likely related to diffuse MM  Continue pain conrol    palliative consult appreciated   Bowel Regimen  allow pt to ambulate with family/staff to prevent deconditioning from chemo   PT-stair training  dispo- no need for inpt rehab

## 2019-04-04 LAB
ALBUMIN SERPL ELPH-MCNC: 3.1 G/DL — LOW (ref 3.3–5)
ALP SERPL-CCNC: 135 U/L — HIGH (ref 40–120)
ALT FLD-CCNC: 31 U/L — SIGNIFICANT CHANGE UP (ref 4–41)
ANION GAP SERPL CALC-SCNC: 13 MMO/L — SIGNIFICANT CHANGE UP (ref 7–14)
ANION GAP SERPL CALC-SCNC: 14 MMO/L — SIGNIFICANT CHANGE UP (ref 7–14)
AST SERPL-CCNC: 23 U/L — SIGNIFICANT CHANGE UP (ref 4–40)
BASOPHILS # BLD AUTO: 0.01 K/UL — SIGNIFICANT CHANGE UP (ref 0–0.2)
BASOPHILS NFR BLD AUTO: 0.3 % — SIGNIFICANT CHANGE UP (ref 0–2)
BILIRUB SERPL-MCNC: 0.5 MG/DL — SIGNIFICANT CHANGE UP (ref 0.2–1.2)
BUN SERPL-MCNC: 19 MG/DL — SIGNIFICANT CHANGE UP (ref 7–23)
BUN SERPL-MCNC: 23 MG/DL — SIGNIFICANT CHANGE UP (ref 7–23)
CALCIUM SERPL-MCNC: 7.6 MG/DL — LOW (ref 8.4–10.5)
CALCIUM SERPL-MCNC: 8.3 MG/DL — LOW (ref 8.4–10.5)
CHLORIDE SERPL-SCNC: 106 MMOL/L — SIGNIFICANT CHANGE UP (ref 98–107)
CHLORIDE SERPL-SCNC: 116 MMOL/L — HIGH (ref 98–107)
CO2 SERPL-SCNC: 21 MMOL/L — LOW (ref 22–31)
CO2 SERPL-SCNC: 22 MMOL/L — SIGNIFICANT CHANGE UP (ref 22–31)
CREAT SERPL-MCNC: 1.09 MG/DL — SIGNIFICANT CHANGE UP (ref 0.5–1.3)
CREAT SERPL-MCNC: 1.12 MG/DL — SIGNIFICANT CHANGE UP (ref 0.5–1.3)
EOSINOPHIL # BLD AUTO: 0 K/UL — SIGNIFICANT CHANGE UP (ref 0–0.5)
EOSINOPHIL NFR BLD AUTO: 0 % — SIGNIFICANT CHANGE UP (ref 0–6)
GLUCOSE SERPL-MCNC: 104 MG/DL — HIGH (ref 70–99)
GLUCOSE SERPL-MCNC: 106 MG/DL — HIGH (ref 70–99)
HCT VFR BLD CALC: 21.5 % — LOW (ref 39–50)
HCT VFR BLD CALC: 26.5 % — LOW (ref 39–50)
HGB BLD-MCNC: 6.9 G/DL — CRITICAL LOW (ref 13–17)
HGB BLD-MCNC: 8.5 G/DL — LOW (ref 13–17)
IMM GRANULOCYTES NFR BLD AUTO: 10.1 % — HIGH (ref 0–1.5)
LDH SERPL L TO P-CCNC: 261 U/L — HIGH (ref 135–225)
LYMPHOCYTES # BLD AUTO: 0.52 K/UL — LOW (ref 1–3.3)
LYMPHOCYTES # BLD AUTO: 17 % — SIGNIFICANT CHANGE UP (ref 13–44)
MAGNESIUM SERPL-MCNC: 1.9 MG/DL — SIGNIFICANT CHANGE UP (ref 1.6–2.6)
MANUAL SMEAR VERIFICATION: SIGNIFICANT CHANGE UP
MCHC RBC-ENTMCNC: 28.5 PG — SIGNIFICANT CHANGE UP (ref 27–34)
MCHC RBC-ENTMCNC: 28.9 PG — SIGNIFICANT CHANGE UP (ref 27–34)
MCHC RBC-ENTMCNC: 32.1 % — SIGNIFICANT CHANGE UP (ref 32–36)
MCHC RBC-ENTMCNC: 32.1 % — SIGNIFICANT CHANGE UP (ref 32–36)
MCV RBC AUTO: 88.9 FL — SIGNIFICANT CHANGE UP (ref 80–100)
MCV RBC AUTO: 90 FL — SIGNIFICANT CHANGE UP (ref 80–100)
MONOCYTES # BLD AUTO: 0.3 K/UL — SIGNIFICANT CHANGE UP (ref 0–0.9)
MONOCYTES NFR BLD AUTO: 9.8 % — SIGNIFICANT CHANGE UP (ref 2–14)
NEUTROPHILS # BLD AUTO: 1.92 K/UL — SIGNIFICANT CHANGE UP (ref 1.8–7.4)
NEUTROPHILS NFR BLD AUTO: 62.8 % — SIGNIFICANT CHANGE UP (ref 43–77)
NRBC # FLD: 0.09 K/UL — SIGNIFICANT CHANGE UP (ref 0–0)
NRBC # FLD: 0.09 K/UL — SIGNIFICANT CHANGE UP (ref 0–0)
NRBC FLD-RTO: 2.6 — SIGNIFICANT CHANGE UP
NRBC FLD-RTO: 2.9 — SIGNIFICANT CHANGE UP
PHOSPHATE SERPL-MCNC: 3.8 MG/DL — SIGNIFICANT CHANGE UP (ref 2.5–4.5)
PLATELET # BLD AUTO: 37 K/UL — LOW (ref 150–400)
PLATELET # BLD AUTO: 39 K/UL — LOW (ref 150–400)
PMV BLD: 10.6 FL — SIGNIFICANT CHANGE UP (ref 7–13)
PMV BLD: 9.2 FL — SIGNIFICANT CHANGE UP (ref 7–13)
POTASSIUM SERPL-MCNC: 3.8 MMOL/L — SIGNIFICANT CHANGE UP (ref 3.5–5.3)
POTASSIUM SERPL-MCNC: 4 MMOL/L — SIGNIFICANT CHANGE UP (ref 3.5–5.3)
POTASSIUM SERPL-SCNC: 3.8 MMOL/L — SIGNIFICANT CHANGE UP (ref 3.5–5.3)
POTASSIUM SERPL-SCNC: 4 MMOL/L — SIGNIFICANT CHANGE UP (ref 3.5–5.3)
PROT SERPL-MCNC: 7.4 G/DL — SIGNIFICANT CHANGE UP (ref 6–8.3)
RBC # BLD: 2.39 M/UL — LOW (ref 4.2–5.8)
RBC # BLD: 2.98 M/UL — LOW (ref 4.2–5.8)
RBC # FLD: 17.2 % — HIGH (ref 10.3–14.5)
RBC # FLD: 17.4 % — HIGH (ref 10.3–14.5)
SODIUM SERPL-SCNC: 142 MMOL/L — SIGNIFICANT CHANGE UP (ref 135–145)
SODIUM SERPL-SCNC: 150 MMOL/L — HIGH (ref 135–145)
URATE SERPL-MCNC: 6 MG/DL — SIGNIFICANT CHANGE UP (ref 3.4–8.8)
WBC # BLD: 3.06 K/UL — LOW (ref 3.8–10.5)
WBC # BLD: 3.4 K/UL — LOW (ref 3.8–10.5)
WBC # FLD AUTO: 3.06 K/UL — LOW (ref 3.8–10.5)
WBC # FLD AUTO: 3.4 K/UL — LOW (ref 3.8–10.5)

## 2019-04-04 RX ORDER — POLYETHYLENE GLYCOL 3350 17 G/17G
17 POWDER, FOR SOLUTION ORAL ONCE
Qty: 0 | Refills: 0 | Status: DISCONTINUED | OUTPATIENT
Start: 2019-04-04 | End: 2019-04-08

## 2019-04-04 RX ORDER — HYDROMORPHONE HYDROCHLORIDE 2 MG/ML
1 INJECTION INTRAMUSCULAR; INTRAVENOUS; SUBCUTANEOUS EVERY 4 HOURS
Qty: 0 | Refills: 0 | Status: DISCONTINUED | OUTPATIENT
Start: 2019-04-04 | End: 2019-04-08

## 2019-04-04 RX ORDER — SODIUM CHLORIDE 9 MG/ML
1000 INJECTION INTRAMUSCULAR; INTRAVENOUS; SUBCUTANEOUS
Qty: 0 | Refills: 0 | Status: DISCONTINUED | OUTPATIENT
Start: 2019-04-04 | End: 2019-04-08

## 2019-04-04 RX ORDER — HYDROMORPHONE HYDROCHLORIDE 2 MG/ML
2 INJECTION INTRAMUSCULAR; INTRAVENOUS; SUBCUTANEOUS EVERY 4 HOURS
Qty: 0 | Refills: 0 | Status: DISCONTINUED | OUTPATIENT
Start: 2019-04-04 | End: 2019-04-08

## 2019-04-04 RX ORDER — POLYETHYLENE GLYCOL 3350 17 G/17G
17 POWDER, FOR SOLUTION ORAL AT BEDTIME
Qty: 0 | Refills: 0 | Status: DISCONTINUED | OUTPATIENT
Start: 2019-04-04 | End: 2019-04-08

## 2019-04-04 RX ORDER — HYDROMORPHONE HYDROCHLORIDE 2 MG/ML
4 INJECTION INTRAMUSCULAR; INTRAVENOUS; SUBCUTANEOUS EVERY 4 HOURS
Qty: 0 | Refills: 0 | Status: DISCONTINUED | OUTPATIENT
Start: 2019-04-04 | End: 2019-04-08

## 2019-04-04 RX ORDER — MORPHINE SULFATE 50 MG/1
30 CAPSULE, EXTENDED RELEASE ORAL EVERY 12 HOURS
Qty: 0 | Refills: 0 | Status: DISCONTINUED | OUTPATIENT
Start: 2019-04-04 | End: 2019-04-08

## 2019-04-04 RX ADMIN — Medication 300 MILLIGRAM(S): at 11:45

## 2019-04-04 RX ADMIN — SODIUM CHLORIDE 70 MILLILITER(S): 9 INJECTION INTRAMUSCULAR; INTRAVENOUS; SUBCUTANEOUS at 18:48

## 2019-04-04 RX ADMIN — Medication 100 MILLIGRAM(S): at 06:21

## 2019-04-04 RX ADMIN — CARVEDILOL PHOSPHATE 25 MILLIGRAM(S): 80 CAPSULE, EXTENDED RELEASE ORAL at 06:21

## 2019-04-04 RX ADMIN — POLYETHYLENE GLYCOL 3350 17 GRAM(S): 17 POWDER, FOR SOLUTION ORAL at 22:35

## 2019-04-04 RX ADMIN — LISINOPRIL 10 MILLIGRAM(S): 2.5 TABLET ORAL at 06:21

## 2019-04-04 RX ADMIN — SENNA PLUS 2 TABLET(S): 8.6 TABLET ORAL at 22:35

## 2019-04-04 RX ADMIN — POMALIDOMIDE 3 MILLIGRAM(S): 1 CAPSULE ORAL at 11:46

## 2019-04-04 RX ADMIN — Medication 100 MILLIGRAM(S): at 22:35

## 2019-04-04 RX ADMIN — MORPHINE SULFATE 30 MILLIGRAM(S): 50 CAPSULE, EXTENDED RELEASE ORAL at 17:57

## 2019-04-04 RX ADMIN — Medication 100 MILLIGRAM(S): at 11:45

## 2019-04-04 RX ADMIN — MORPHINE SULFATE 30 MILLIGRAM(S): 50 CAPSULE, EXTENDED RELEASE ORAL at 06:21

## 2019-04-04 RX ADMIN — CARVEDILOL PHOSPHATE 25 MILLIGRAM(S): 80 CAPSULE, EXTENDED RELEASE ORAL at 17:57

## 2019-04-04 NOTE — PROGRESS NOTE ADULT - SUBJECTIVE AND OBJECTIVE BOX
[facility]      Seen earlier today, and examined.  - Today, patient is without complaints.           *****MEDICATIONS: Current medication reviewed and documented.    MEDICATIONS  (STANDING):  allopurinol 300 milliGRAM(s) Oral daily  carvedilol 25 milliGRAM(s) Oral every 12 hours  docusate sodium 100 milliGRAM(s) Oral three times a day  lisinopril 10 milliGRAM(s) Oral daily  morphine ER Tablet 30 milliGRAM(s) Oral every 12 hours  pomalidomide 3 milliGRAM(s) Oral daily  senna 2 Tablet(s) Oral at bedtime  sodium chloride 0.9%. 1000 milliLiter(s) (100 mL/Hr) IV Continuous <Continuous>    MEDICATIONS  (PRN):  artificial  tears Solution 1 Drop(s) Both EYES every 6 hours PRN Dry Eyes  HYDROmorphone   Tablet 2 milliGRAM(s) Oral every 4 hours PRN Moderate Pain (4 - 6)  HYDROmorphone   Tablet 4 milliGRAM(s) Oral every 4 hours PRN Severe Pain (7 - 10)  HYDROmorphone  Injectable 1 milliGRAM(s) IV Push every 4 hours PRN Severe breakthrough pain  ondansetron Injectable 8 milliGRAM(s) IV Push every 8 hours PRN Nausea and/or Vomiting          ***** VITAL SIGNS:  T(F): 98.4 (19 @ 11:41), Max: 98.4 (19 @ 11:41)  HR: 100 (19 @ 17:56) (91 - 104)  BP: 157/89 (19 @ 17:56) (146/89 - 159/91)  RR: 18 (19 @ 11:41) (18 - 18)  SpO2: 100% (19 @ 11:41) (100% - 100%)  Wt(kg): --  ,   I&O's Summary           *****PHYSICAL EXAM: alert    EOmi fundi not visualized   no nystagmus VFF to confrontation  Tongue is midline  Palate elevates symmetrically   Moving all 4 ext spontaneously    Gait not assessed.          *****LAB AND IMAGIN.5    3.40  )-----------( 39       ( 2019 17:21 )             26.5               04-04    150<H>  |  116<H>  |  23  ----------------------------<  106<H>  4.0   |  21<L>  |  1.09    Ca    7.6<L>      2019 06:50  Phos  3.8     04-04  Mg     1.9     04-04    TPro  7.4  /  Alb  3.1<L>  /  TBili  0.5  /  DBili  x   /  AST  23  /  ALT  31  /  AlkPhos  135<H>  04-04                         [All pertinent recent Imaging/Reports reviewed]           *****A S S E S S M E N T   A N D   P L A N :    53M hx of multiple myeloma s/p bone marrow transplant (~1.5 yrs ago) and chemo/RT (stopped ~2 months ago in preparation for colostomy reversal), perforated diverticulitis s/p Kiah's (proctosigmoidectomy with colostomy, 2018), nonischemic cardiomyopathy (mild global LV systolic dysfunction with EF 54% and diastolic LV dysfunction based on TTE in 2018), and HTN presents to [facility] ED c/o diffuse body pain. Patient recently discharged 3/23/19 from [facility] after being hospitalized for thrombocytopenia. Pt presents with severe pain all over a day after discharge. Pt was discharged after multiple platelet infusion.         Problem/Recommendations 1:  pain generalized improving, likely due to relapsed myeloma   extensive bony infiltration        pain management per primary   nl  cpk   mobility improving  Thrombocytopenia defer to hem for management.           Thank you for allowing me to participate in the care of this patient. Please do not hesitate to call me if you have any  questions.        ________________  Margret Yousif MD  [facility]  [phone]      30 minutes spent on total encounter; more than 50 % of the visit was  spent counseling about plan of care, compliance to diet/exercise and medication regimen and or  coordinating care by the attending physician.      It is advised that s stroke patients follow up with SAMUEL Mckeon @ [phone] in 1- 2 weeks.   Others please follow up with Dr. Michael Nissenbaum [phone]

## 2019-04-04 NOTE — PROVIDER CONTACT NOTE (CRITICAL VALUE NOTIFICATION) - SITUATION
pt with platelets [id number]
Hemoglobin 6.9
Pt with tachycardia, complains of chest discomfort
pt with low HGB 6.8

## 2019-04-04 NOTE — PROVIDER CONTACT NOTE (CRITICAL VALUE NOTIFICATION) - ASSESSMENT
pt with no active bleeding, oob to stretcher with no SOB
Pt is alert and oriented x4
Pt. now stable denies chest pain. SOB HR 98
pt awake alert and oriented times 4, no s/s of bleeding, pt in no distress, denies SOB chest pain.

## 2019-04-04 NOTE — PROGRESS NOTE ADULT - SUBJECTIVE AND OBJECTIVE BOX
Patient seen and examined at bedside  No acute events noted overnight  Case discussed with medical team    HPI:  53M hx of multiple myeloma s/p bone marrow transplant (~1.5 yrs ago) and chemo/RT (stopped ~2 months ago in preparation for colostomy reversal), perforated diverticulitis s/p Kiah's (proctosigmoidectomy with colostomy, 9/2018), nonischemic cardiomyopathy (mild global LV systolic dysfunction with EF 54% and diastolic LV dysfunction based on TTE in 11/2018), and HTN presents to [facility] ED c/o diffuse body pain. Patient recently discharged 3/23/19 from [facility] after being hospitalized for thrombocytopenia.     Patient started to develop severe non-radiating throbbing 10/10 pain in his b/l shoulders, chest, abdomen, and b/l thighs since Sunday night - leaving him essentially bedbound. Was supposed to see Dr. Murphy for follow up today. However because of the pain he came to the ED (26 Mar 2019 11:51)      PAST MEDICAL & SURGICAL HISTORY:  Diverticulitis  Multiple myeloma  Hypertension  Left ventricular dysfunction  Cardiomyopathy  Former smoker: (1 ppd x 11 years; Quit ~age 26)  History of bone marrow transplant  History of surgery: s/p exploratory laparotomy with sigmoid resection and colostomy on 9/2/2018      oxycodone (Vomiting; Nausea)       MEDICATIONS  (STANDING):  allopurinol 300 milliGRAM(s) Oral daily  carvedilol 25 milliGRAM(s) Oral every 12 hours  docusate sodium 100 milliGRAM(s) Oral three times a day  lisinopril 10 milliGRAM(s) Oral daily  morphine ER Tablet 30 milliGRAM(s) Oral every 12 hours  pomalidomide 3 milliGRAM(s) Oral daily  senna 2 Tablet(s) Oral at bedtime  sodium chloride 0.9%. 1000 milliLiter(s) (100 mL/Hr) IV Continuous <Continuous>    MEDICATIONS  (PRN):  artificial  tears Solution 1 Drop(s) Both EYES every 6 hours PRN Dry Eyes  HYDROmorphone   Tablet 2 milliGRAM(s) Oral every 4 hours PRN Moderate Pain (4 - 6)  HYDROmorphone   Tablet 4 milliGRAM(s) Oral every 4 hours PRN Severe Pain (7 - 10)  HYDROmorphone  Injectable 1 milliGRAM(s) IV Push every 4 hours PRN Severe breakthrough pain  ondansetron Injectable 8 milliGRAM(s) IV Push every 8 hours PRN Nausea and/or Vomiting      REVIEW OF SYSTEMS:  CONSTITUTIONAL: (+) malaise. pain controlled with rx   EYES: No acute change in vision   ENT:  No tinnitus  NECK: No stiffness  RESPIRATORY: No hemoptysis  CARDIOVASCULAR: No chest pain, palpitations, syncope  GASTROINTESTINAL: No hematemesis, diarrhea, melena, or hematochezia.  GENITOURINARY: No hematuria  NEUROLOGICAL: No headaches  LYMPH Nodes: No enlarged glands  ENDOCRINE: No heat or cold intolerance	    T(C): 36.4 (04-04-19 @ 06:17), Max: 36.8 (04-03-19 @ 20:47)  HR: 91 (04-04-19 @ 06:17) (91 - 104)  BP: 147/82 (04-04-19 @ 06:17) (140/87 - 147/82)  RR: 18 (04-04-19 @ 06:17) (18 - 18)  SpO2: 100% (04-04-19 @ 06:17) (100% - 100%)    PHYSICAL EXAMINATION:   Constitutional: WD, NAD  HEENT: NC, AT  Neck:  Supple  Respiratory:  Adequate airflow b/l. Not using accessory muscles of respiration.  Cardiovascular:  S1 & S2 intact, no R/G, 2+ radial pulses b/l  Gastrointestinal: Soft, NT, ND, normoactive b.s., no organomegaly/RT/rigidity  Extremities: WWP  Neurological:  Alert and awake.  No acute focal motor deficits. Crude sensation intact.     Labs and imaging reviewed    LABS:                        6.8    3.94  )-----------( 27       ( 03 Apr 2019 06:20 )             21.5     04-03    140  |  105  |  17  ----------------------------<  158<H>  4.6   |  21<L>  |  1.09    Ca    7.6<L>      03 Apr 2019 06:20  Phos  3.0     04-03    TPro  7.7  /  Alb  3.2<L>  /  TBili  0.4  /  DBili  x   /  AST  24  /  ALT  29  /  AlkPhos  170<H>  04-03            CAPILLARY BLOOD GLUCOSE            LIVER FUNCTIONS - ( 03 Apr 2019 06:20 )  Alb: 3.2 g/dL / Pro: 7.7 g/dL / ALK PHOS: 170 u/L / ALT: 29 u/L / AST: 24 u/L / GGT: x               RADIOLOGY & ADDITIONAL STUDIES:

## 2019-04-04 NOTE — PROGRESS NOTE ADULT - SUBJECTIVE AND OBJECTIVE BOX
S: denies chest pain, sob       allopurinol 300 milliGRAM(s) Oral daily  artificial  tears Solution 1 Drop(s) Both EYES every 6 hours PRN  carvedilol 25 milliGRAM(s) Oral every 12 hours  docusate sodium 100 milliGRAM(s) Oral three times a day  HYDROmorphone   Tablet 2 milliGRAM(s) Oral every 4 hours PRN  HYDROmorphone   Tablet 4 milliGRAM(s) Oral every 4 hours PRN  HYDROmorphone  Injectable 1 milliGRAM(s) IV Push every 4 hours PRN  lisinopril 10 milliGRAM(s) Oral daily  morphine ER Tablet 30 milliGRAM(s) Oral every 12 hours  ondansetron Injectable 8 milliGRAM(s) IV Push every 8 hours PRN  pomalidomide 3 milliGRAM(s) Oral daily  senna 2 Tablet(s) Oral at bedtime  sodium chloride 0.9%. 1000 milliLiter(s) IV Continuous <Continuous>                        6.9    3.06  )-----------( 37       ( 04 Apr 2019 06:50 )             21.5       Hemoglobin: 6.9 g/dL (04-04 @ 06:50)  Hemoglobin: 6.8 g/dL (04-03 @ 06:20)  Hemoglobin: 7.2 g/dL (04-02 @ 05:28)  Hemoglobin: 7.7 g/dL (04-01 @ 06:25)  Hemoglobin: 8.0 g/dL (03-31 @ 15:14)    04-04    150<H>  |  116<H>  |  23  ----------------------------<  106<H>  4.0   |  21<L>  |  1.09    Ca    7.6<L>      04 Apr 2019 06:50  Phos  3.8     04-04  Mg     1.9     04-04    TPro  7.4  /  Alb  3.1<L>  /  TBili  0.5  /  DBili  x   /  AST  23  /  ALT  31  /  AlkPhos  135<H>  04-04    Creatinine Trend: 1.09<--, 1.09<--, 1.11<--, 1.11<--, 1.13<--, 1.19<--    T(C): 36.9 (04-04-19 @ 11:41), Max: 36.9 (04-04-19 @ 11:41)  HR: 104 (04-04-19 @ 11:41) (91 - 104)  BP: 159/91 (04-04-19 @ 11:41) (146/89 - 159/91)  RR: 18 (04-04-19 @ 11:41) (18 - 18)  SpO2: 100% (04-04-19 @ 11:41) (100% - 100%)  Wt(kg): --  83.9    I&O's Summary    < from: Transthoracic Echocardiogram (03.29.19 @ 20:01) >  CONCLUSIONS:  1. Normal mitral valve. Minimal mitral regurgitation.  2. Normal left ventricular internal dimensions and wall  thicknesses.  3. Normal left ventricular systolic function. No segmental  wall motion abnormalities.  4. Normal right ventricular size and function.  5. Small pericardial effusion posterior to the left  ventricle and superior to the right atrium.  *** Compared with echocardiogram of 8/2/2017, left  ventricular systolic function has improved.  ------------------------------------------------------------------------  Confirmed on  4/1/2019 - 08:41:32 by Samson Hightower M.D.    < end of copied text >      ASSESSMENT:      53 male PMH multiple myeloma s/p bone marrow transplant and chemo/RT, perforated diverticulitis s/p Kiah's (proctosigmoidectomy with colostomy, 9/2018), nonischemic cardiomyopathy (mild global LV systolic dysfunction with EF 54% and diastolic LV dysfunction based on TTE in 11/2018), and HTN who is being seen for management of cardiomyopathy.    -not in clinical heart failure or c/o anginal symptoms  -TTE with normal LV function and small pericardial effusion  -pt. with no clinical or echo evidence of tamponade  -no further inpatient cardiac workup needed at this time.   -Heme-Onc note appreciated, transfuse PRN   -follow up with outpatient cardiologist for small pericardial effusion noted on echo 3/29/19 on d/c S: denies chest pain, sob       allopurinol 300 milliGRAM(s) Oral daily  artificial  tears Solution 1 Drop(s) Both EYES every 6 hours PRN  carvedilol 25 milliGRAM(s) Oral every 12 hours  docusate sodium 100 milliGRAM(s) Oral three times a day  HYDROmorphone   Tablet 2 milliGRAM(s) Oral every 4 hours PRN  HYDROmorphone   Tablet 4 milliGRAM(s) Oral every 4 hours PRN  HYDROmorphone  Injectable 1 milliGRAM(s) IV Push every 4 hours PRN  lisinopril 10 milliGRAM(s) Oral daily  morphine ER Tablet 30 milliGRAM(s) Oral every 12 hours  ondansetron Injectable 8 milliGRAM(s) IV Push every 8 hours PRN  pomalidomide 3 milliGRAM(s) Oral daily  senna 2 Tablet(s) Oral at bedtime  sodium chloride 0.9%. 1000 milliLiter(s) IV Continuous <Continuous>                        6.9    3.06  )-----------( 37       ( 04 Apr 2019 06:50 )             21.5       Hemoglobin: 6.9 g/dL (04-04 @ 06:50)  Hemoglobin: 6.8 g/dL (04-03 @ 06:20)  Hemoglobin: 7.2 g/dL (04-02 @ 05:28)  Hemoglobin: 7.7 g/dL (04-01 @ 06:25)  Hemoglobin: 8.0 g/dL (03-31 @ 15:14)    04-04    150<H>  |  116<H>  |  23  ----------------------------<  106<H>  4.0   |  21<L>  |  1.09    Ca    7.6<L>      04 Apr 2019 06:50  Phos  3.8     04-04  Mg     1.9     04-04    TPro  7.4  /  Alb  3.1<L>  /  TBili  0.5  /  DBili  x   /  AST  23  /  ALT  31  /  AlkPhos  135<H>  04-04    Creatinine Trend: 1.09<--, 1.09<--, 1.11<--, 1.11<--, 1.13<--, 1.19<--    T(C): 36.9 (04-04-19 @ 11:41), Max: 36.9 (04-04-19 @ 11:41)  HR: 104 (04-04-19 @ 11:41) (91 - 104)  BP: 159/91 (04-04-19 @ 11:41) (146/89 - 159/91)  RR: 18 (04-04-19 @ 11:41) (18 - 18)  SpO2: 100% (04-04-19 @ 11:41) (100% - 100%)  Wt(kg): --  83.9    I&O's Summary    CV: normal, S1 S2, RRR, no m/r/g  Lungs: CTA b:l  Abd: soft non tender, not distended, + bs  Ext:  no edema     < from: Transthoracic Echocardiogram (03.29.19 @ 20:01) >  CONCLUSIONS:  1. Normal mitral valve. Minimal mitral regurgitation.  2. Normal left ventricular internal dimensions and wall  thicknesses.  3. Normal left ventricular systolic function. No segmental  wall motion abnormalities.  4. Normal right ventricular size and function.  5. Small pericardial effusion posterior to the left  ventricle and superior to the right atrium.  *** Compared with echocardiogram of 8/2/2017, left  ventricular systolic function has improved.  ------------------------------------------------------------------------  Confirmed on  4/1/2019 - 08:41:32 by Samson Hightower M.D.    < end of copied text >      ASSESSMENT:      53 male PMH multiple myeloma s/p bone marrow transplant and chemo/RT, perforated diverticulitis s/p Kiah's (proctosigmoidectomy with colostomy, 9/2018), nonischemic cardiomyopathy (mild global LV systolic dysfunction with EF 54% and diastolic LV dysfunction based on TTE in 11/2018), and HTN who is being seen for management of cardiomyopathy.    -not in clinical heart failure or c/o anginal symptoms  -TTE with normal LV function and small pericardial effusion  -pt. with no clinical or echo evidence of tamponade  -no further inpatient cardiac workup needed at this time.   -Heme-Onc note appreciated, transfuse PRN   -follow up with outpatient cardiologist for small pericardial effusion noted on echo 3/29/19 on d/c

## 2019-04-04 NOTE — PROGRESS NOTE ADULT - SUBJECTIVE AND OBJECTIVE BOX
Pt seen, feeling fine but reports constipation and abd discomfort and distention. No other new complaints.     MEDICATIONS  (STANDING):  allopurinol 300 milliGRAM(s) Oral daily  carvedilol 25 milliGRAM(s) Oral every 12 hours  docusate sodium 100 milliGRAM(s) Oral three times a day  lisinopril 10 milliGRAM(s) Oral daily  morphine ER Tablet 30 milliGRAM(s) Oral every 12 hours  polyethylene glycol 3350 17 Gram(s) Oral once  polyethylene glycol 3350 17 Gram(s) Oral at bedtime  pomalidomide 3 milliGRAM(s) Oral daily  senna 2 Tablet(s) Oral at bedtime  sodium chloride 0.9%. 1000 milliLiter(s) (100 mL/Hr) IV Continuous <Continuous>  sodium chloride 0.9%. 1000 milliLiter(s) (70 mL/Hr) IV Continuous <Continuous>    MEDICATIONS  (PRN):  artificial  tears Solution 1 Drop(s) Both EYES every 6 hours PRN Dry Eyes  HYDROmorphone   Tablet 2 milliGRAM(s) Oral every 4 hours PRN Moderate Pain (4 - 6)  HYDROmorphone   Tablet 4 milliGRAM(s) Oral every 4 hours PRN Severe Pain (7 - 10)  HYDROmorphone  Injectable 1 milliGRAM(s) IV Push every 4 hours PRN Severe breakthrough pain  ondansetron Injectable 8 milliGRAM(s) IV Push every 8 hours PRN Nausea and/or Vomiting      ROS  No fever, sweats, chills  No epistaxis, HA, sore throat  No CP, SOB, cough, sputum  No n/v/d, melena, hematochezia  No edema  No rash  No anxiety  No back pain, joint pain  No bleeding, bruising  No dysuria, hematuria    Vital Signs Last 24 Hrs  T(C): 36.9 (04 Apr 2019 11:41), Max: 36.9 (04 Apr 2019 11:41)  T(F): 98.4 (04 Apr 2019 11:41), Max: 98.4 (04 Apr 2019 11:41)  HR: 100 (04 Apr 2019 17:56) (91 - 104)  BP: 157/89 (04 Apr 2019 17:56) (147/82 - 159/91)  BP(mean): --  RR: 18 (04 Apr 2019 11:41) (18 - 18)  SpO2: 100% (04 Apr 2019 11:41) (100% - 100%)    PE  NAD  Awake, alert  Anicteric, MMM  RRR  CTAB  Abd firm, distended, tender diffusely  No c/c/e  No rash grossly  FROM                          8.5    3.40  )-----------( 39       ( 04 Apr 2019 17:21 )             26.5       04-04    142  |  106  |  19  ----------------------------<  104<H>  3.8   |  22  |  1.12    Ca    8.3<L>      04 Apr 2019 18:50  Phos  3.8     04-04  Mg     1.9     04-04    TPro  7.4  /  Alb  3.1<L>  /  TBili  0.5  /  DBili  x   /  AST  23  /  ALT  31  /  AlkPhos  135<H>  04-04

## 2019-04-04 NOTE — PROVIDER CONTACT NOTE (CRITICAL VALUE NOTIFICATION) - BACKGROUND
pt admitted with pain, multiple myeloma
53 M PMHx MM S/P BMT (~1.5 years ago) and chemo/RT (stopped ~2 months ago for prep for colostomy removal), perforated diverticulitis S/P Kiah's
adm w/ malignant neoplasm and  cord compression. Hx of CHF, CVA scrotal abscess.
pt admitted for pain , multiple myeloma , started IV chemo yesterday 4/2

## 2019-04-04 NOTE — PROVIDER CONTACT NOTE (CRITICAL VALUE NOTIFICATION) - ACTION/TREATMENT ORDERED:
no new orders given
1 unit PRBC ordered to be infused
New order: 1 unit of PRBC
troponin at 19:30. cardiology consult

## 2019-04-04 NOTE — PROGRESS NOTE ADULT - PROBLEM SELECTOR PLAN 1
improving overall with analgesics, c/w pain control  -> AM labs pending. Pending stability/improvement in labs (h/h and plt) and no signs of tumor lysis syndrome, and hem/onc clearance, then d/c home with outpt f/u

## 2019-04-04 NOTE — PROGRESS NOTE ADULT - ASSESSMENT
1. Multiple Myeloma    -- relapsed / refractory ( failed all FDA Available tx) and failed auto stem cell transplant  -- pt has been off of tx against my and other oncologist advise in order to reverse colostomy  -- clinical trial at [facility] strongly recommended but pt declined  -- started on 4/2 Bendamustine 100mg/M2 Day 1, Pomalidomide 3mg PO QD Days 1-21 and Dexa 40mg weekly Q 28 day cycle  -- next decadron dose on 4/9 if pt still in house  -- monitor for tumor lysis (CMP, LDH, Phos, Uric Acid ) Daily  -- allopurinol 300mg PO QD  -- Zofran 8mg IV Q 8hrs PRN Nausea      2. Diffuse bone pain of unclear etiology    -- pt has known extensive bone involvement by myeloma  --  MRI C/T/L Spine w no cord compression  -- pain controlled  -- add miralax and colace, cont senna      3. Transfusion dependent Anemia and Thrombocytopenia    -- sec to myeloma  -- transfuse platelets if Less than 10K or clinically significant bleeding  -- keep Hgb > 7    Pls call with questions, [phone]

## 2019-04-04 NOTE — CHART NOTE - NSCHARTNOTEFT_GEN_A_CORE
Patients pain is well controlled. Continue current pain regimen of Mscontin 30mg q12h With Dilaudid 4mg q4h PRN. Palliative care will sign off at this time. Please reconsult as needed.

## 2019-04-05 LAB
ALBUMIN SERPL ELPH-MCNC: 3.1 G/DL — LOW (ref 3.3–5)
ALP SERPL-CCNC: 124 U/L — HIGH (ref 40–120)
ALT FLD-CCNC: 37 U/L — SIGNIFICANT CHANGE UP (ref 4–41)
ANION GAP SERPL CALC-SCNC: 13 MMO/L — SIGNIFICANT CHANGE UP (ref 7–14)
ANISOCYTOSIS BLD QL: SLIGHT — SIGNIFICANT CHANGE UP
AST SERPL-CCNC: 35 U/L — SIGNIFICANT CHANGE UP (ref 4–40)
BASOPHILS # BLD AUTO: 0.01 K/UL — SIGNIFICANT CHANGE UP (ref 0–0.2)
BASOPHILS NFR BLD AUTO: 0.3 % — SIGNIFICANT CHANGE UP (ref 0–2)
BASOPHILS NFR SPEC: 0 % — SIGNIFICANT CHANGE UP (ref 0–2)
BILIRUB SERPL-MCNC: 0.7 MG/DL — SIGNIFICANT CHANGE UP (ref 0.2–1.2)
BLASTS # FLD: 0 % — SIGNIFICANT CHANGE UP (ref 0–0)
BUN SERPL-MCNC: 15 MG/DL — SIGNIFICANT CHANGE UP (ref 7–23)
CALCIUM SERPL-MCNC: 7.9 MG/DL — LOW (ref 8.4–10.5)
CHLORIDE SERPL-SCNC: 108 MMOL/L — HIGH (ref 98–107)
CO2 SERPL-SCNC: 21 MMOL/L — LOW (ref 22–31)
CREAT SERPL-MCNC: 1.06 MG/DL — SIGNIFICANT CHANGE UP (ref 0.5–1.3)
DACRYOCYTES BLD QL SMEAR: SLIGHT — SIGNIFICANT CHANGE UP
EOSINOPHIL # BLD AUTO: 0.01 K/UL — SIGNIFICANT CHANGE UP (ref 0–0.5)
EOSINOPHIL NFR BLD AUTO: 0.3 % — SIGNIFICANT CHANGE UP (ref 0–6)
EOSINOPHIL NFR FLD: 0.9 % — SIGNIFICANT CHANGE UP (ref 0–6)
GAS PNL BLDMV: SIGNIFICANT CHANGE UP
GLUCOSE SERPL-MCNC: 89 MG/DL — SIGNIFICANT CHANGE UP (ref 70–99)
HCT VFR BLD CALC: 25.2 % — LOW (ref 39–50)
HGB BLD-MCNC: 8.1 G/DL — LOW (ref 13–17)
IMM GRANULOCYTES NFR BLD AUTO: 11.2 % — HIGH (ref 0–1.5)
LDH SERPL L TO P-CCNC: 269 U/L — HIGH (ref 135–225)
LYMPHOCYTES # BLD AUTO: 0.4 K/UL — LOW (ref 1–3.3)
LYMPHOCYTES # BLD AUTO: 13.6 % — SIGNIFICANT CHANGE UP (ref 13–44)
LYMPHOCYTES NFR SPEC AUTO: 5.5 % — LOW (ref 13–44)
MACROCYTES BLD QL: SIGNIFICANT CHANGE UP
MAGNESIUM SERPL-MCNC: 1.6 MG/DL — SIGNIFICANT CHANGE UP (ref 1.6–2.6)
MCHC RBC-ENTMCNC: 28.8 PG — SIGNIFICANT CHANGE UP (ref 27–34)
MCHC RBC-ENTMCNC: 32.1 % — SIGNIFICANT CHANGE UP (ref 32–36)
MCV RBC AUTO: 89.7 FL — SIGNIFICANT CHANGE UP (ref 80–100)
METAMYELOCYTES # FLD: 4.5 % — HIGH (ref 0–1)
MONOCYTES # BLD AUTO: 0.6 K/UL — SIGNIFICANT CHANGE UP (ref 0–0.9)
MONOCYTES NFR BLD AUTO: 20.4 % — HIGH (ref 2–14)
MONOCYTES NFR BLD: 11.8 % — HIGH (ref 2–9)
MYELOCYTES NFR BLD: 1.8 % — HIGH (ref 0–0)
NEUTROPHIL AB SER-ACNC: 66.4 % — SIGNIFICANT CHANGE UP (ref 43–77)
NEUTROPHILS # BLD AUTO: 1.59 K/UL — LOW (ref 1.8–7.4)
NEUTROPHILS NFR BLD AUTO: 54.2 % — SIGNIFICANT CHANGE UP (ref 43–77)
NEUTS BAND # BLD: 3.6 % — SIGNIFICANT CHANGE UP (ref 0–6)
NRBC # FLD: 0.06 K/UL — SIGNIFICANT CHANGE UP (ref 0–0)
NRBC FLD-RTO: 2 — SIGNIFICANT CHANGE UP
OTHER - HEMATOLOGY %: 0 — SIGNIFICANT CHANGE UP
PHOSPHATE SERPL-MCNC: 3.8 MG/DL — SIGNIFICANT CHANGE UP (ref 2.5–4.5)
PLATELET # BLD AUTO: 28 K/UL — LOW (ref 150–400)
PLATELET COUNT - ESTIMATE: SIGNIFICANT CHANGE UP
PMV BLD: 10.7 FL — SIGNIFICANT CHANGE UP (ref 7–13)
POLYCHROMASIA BLD QL SMEAR: SLIGHT — SIGNIFICANT CHANGE UP
POTASSIUM SERPL-MCNC: 3.9 MMOL/L — SIGNIFICANT CHANGE UP (ref 3.5–5.3)
POTASSIUM SERPL-SCNC: 3.9 MMOL/L — SIGNIFICANT CHANGE UP (ref 3.5–5.3)
PROMYELOCYTES # FLD: 0 % — SIGNIFICANT CHANGE UP (ref 0–0)
PROT SERPL-MCNC: 7 G/DL — SIGNIFICANT CHANGE UP (ref 6–8.3)
RBC # BLD: 2.81 M/UL — LOW (ref 4.2–5.8)
RBC # FLD: 17.4 % — HIGH (ref 10.3–14.5)
REVIEW TO FOLLOW: YES — SIGNIFICANT CHANGE UP
SODIUM SERPL-SCNC: 142 MMOL/L — SIGNIFICANT CHANGE UP (ref 135–145)
URATE SERPL-MCNC: 6.1 MG/DL — SIGNIFICANT CHANGE UP (ref 3.4–8.8)
VARIANT LYMPHS # BLD: 5.5 % — SIGNIFICANT CHANGE UP
WBC # BLD: 2.94 K/UL — LOW (ref 3.8–10.5)
WBC # FLD AUTO: 2.94 K/UL — LOW (ref 3.8–10.5)

## 2019-04-05 RX ADMIN — CARVEDILOL PHOSPHATE 25 MILLIGRAM(S): 80 CAPSULE, EXTENDED RELEASE ORAL at 06:01

## 2019-04-05 RX ADMIN — Medication 100 MILLIGRAM(S): at 13:14

## 2019-04-05 RX ADMIN — LISINOPRIL 10 MILLIGRAM(S): 2.5 TABLET ORAL at 06:01

## 2019-04-05 RX ADMIN — POMALIDOMIDE 3 MILLIGRAM(S): 1 CAPSULE ORAL at 13:14

## 2019-04-05 RX ADMIN — CARVEDILOL PHOSPHATE 25 MILLIGRAM(S): 80 CAPSULE, EXTENDED RELEASE ORAL at 18:36

## 2019-04-05 RX ADMIN — MORPHINE SULFATE 30 MILLIGRAM(S): 50 CAPSULE, EXTENDED RELEASE ORAL at 18:37

## 2019-04-05 RX ADMIN — Medication 100 MILLIGRAM(S): at 06:02

## 2019-04-05 RX ADMIN — MORPHINE SULFATE 30 MILLIGRAM(S): 50 CAPSULE, EXTENDED RELEASE ORAL at 06:01

## 2019-04-05 RX ADMIN — MORPHINE SULFATE 30 MILLIGRAM(S): 50 CAPSULE, EXTENDED RELEASE ORAL at 18:34

## 2019-04-05 RX ADMIN — Medication 300 MILLIGRAM(S): at 13:15

## 2019-04-05 NOTE — PROGRESS NOTE ADULT - SUBJECTIVE AND OBJECTIVE BOX
Patient seen and examined at bedside  c/o constipation, relieved with laxatives   Case discussed with medical team    HPI:  53M hx of multiple myeloma s/p bone marrow transplant (~1.5 yrs ago) and chemo/RT (stopped ~2 months ago in preparation for colostomy reversal), perforated diverticulitis s/p Kiah's (proctosigmoidectomy with colostomy, 9/2018), nonischemic cardiomyopathy (mild global LV systolic dysfunction with EF 54% and diastolic LV dysfunction based on TTE in 11/2018), and HTN presents to [facility] ED c/o diffuse body pain. Patient recently discharged 3/23/19 from [facility] after being hospitalized for thrombocytopenia.     Patient started to develop severe non-radiating throbbing 10/10 pain in his b/l shoulders, chest, abdomen, and b/l thighs since Sunday night - leaving him essentially bedbound. Was supposed to see Dr. Murphy for follow up today. However because of the pain he came to the ED (26 Mar 2019 11:51)      PAST MEDICAL & SURGICAL HISTORY:  Diverticulitis  Multiple myeloma  Hypertension  Left ventricular dysfunction  Cardiomyopathy  Former smoker: (1 ppd x 11 years; Quit ~age 26)  History of bone marrow transplant  History of surgery: s/p exploratory laparotomy with sigmoid resection and colostomy on 9/2/2018      oxycodone (Vomiting; Nausea)       MEDICATIONS  (STANDING):  allopurinol 300 milliGRAM(s) Oral daily  carvedilol 25 milliGRAM(s) Oral every 12 hours  docusate sodium 100 milliGRAM(s) Oral three times a day  lisinopril 10 milliGRAM(s) Oral daily  morphine ER Tablet 30 milliGRAM(s) Oral every 12 hours  polyethylene glycol 3350 17 Gram(s) Oral once  polyethylene glycol 3350 17 Gram(s) Oral at bedtime  pomalidomide 3 milliGRAM(s) Oral daily  senna 2 Tablet(s) Oral at bedtime  sodium chloride 0.9%. 1000 milliLiter(s) (100 mL/Hr) IV Continuous <Continuous>  sodium chloride 0.9%. 1000 milliLiter(s) (70 mL/Hr) IV Continuous <Continuous>    MEDICATIONS  (PRN):  artificial  tears Solution 1 Drop(s) Both EYES every 6 hours PRN Dry Eyes  HYDROmorphone   Tablet 2 milliGRAM(s) Oral every 4 hours PRN Moderate Pain (4 - 6)  HYDROmorphone   Tablet 4 milliGRAM(s) Oral every 4 hours PRN Severe Pain (7 - 10)  HYDROmorphone  Injectable 1 milliGRAM(s) IV Push every 4 hours PRN Severe breakthrough pain  ondansetron Injectable 8 milliGRAM(s) IV Push every 8 hours PRN Nausea and/or Vomiting      REVIEW OF SYSTEMS:  CONSTITUTIONAL: (+) malaise.   EYES: No acute change in vision   ENT:  No tinnitus  NECK: No stiffness  RESPIRATORY: No hemoptysis  CARDIOVASCULAR: No chest pain, palpitations, syncope  GASTROINTESTINAL: constipation. No hematemesis, diarrhea, melena, or hematochezia.  GENITOURINARY: No hematuria  NEUROLOGICAL: No headaches  LYMPH Nodes: No enlarged glands  ENDOCRINE: No heat or cold intolerance	    T(C): 36.8 (04-05-19 @ 06:00), Max: 36.9 (04-04-19 @ 11:41)  HR: 100 (04-05-19 @ 06:00) (98 - 104)  BP: 154/85 (04-05-19 @ 06:00) (146/88 - 159/91)  RR: 18 (04-05-19 @ 06:00) (18 - 18)  SpO2: 100% (04-05-19 @ 06:00) (100% - 100%)    PHYSICAL EXAMINATION:   Constitutional: WD, NAD  HEENT: NC, AT  Neck:  Supple  Respiratory:  Adequate airflow b/l. Not using accessory muscles of respiration.  Cardiovascular:  S1 & S2 intact, no R/G, 2+ radial pulses b/l  Gastrointestinal: Soft, NT, ND, normoactive b.s., no organomegaly/RT/rigidity  Extremities: WWP  Neurological:  Alert and awake.  No acute focal motor deficits. Crude sensation intact.     Labs and imaging reviewed    LABS:                        8.1    2.94  )-----------( 28       ( 05 Apr 2019 06:10 )             25.2     04-05    142  |  108<H>  |  15  ----------------------------<  89  3.9   |  21<L>  |  1.06    Ca    7.9<L>      05 Apr 2019 06:10  Phos  3.8     04-05  Mg     1.6     04-05    TPro  7.0  /  Alb  3.1<L>  /  TBili  0.7  /  DBili  x   /  AST  35  /  ALT  37  /  AlkPhos  124<H>  04-05            CAPILLARY BLOOD GLUCOSE            LIVER FUNCTIONS - ( 05 Apr 2019 06:10 )  Alb: 3.1 g/dL / Pro: 7.0 g/dL / ALK PHOS: 124 u/L / ALT: 37 u/L / AST: 35 u/L / GGT: x               RADIOLOGY & ADDITIONAL STUDIES:

## 2019-04-05 NOTE — PROGRESS NOTE ADULT - PROBLEM SELECTOR PLAN 1
improving overall with analgesics, c/w pain control  -> H/H and Plt mildly decreasing. No active signs of bleeding. ?Monitor labs for another day. Management appreciated by hem/onc. D/c home planning

## 2019-04-05 NOTE — PROGRESS NOTE ADULT - SUBJECTIVE AND OBJECTIVE BOX
S: no events, denies chest pain, sob       allopurinol 300 milliGRAM(s) Oral daily  artificial  tears Solution 1 Drop(s) Both EYES every 6 hours PRN  carvedilol 25 milliGRAM(s) Oral every 12 hours  docusate sodium 100 milliGRAM(s) Oral three times a day  HYDROmorphone   Tablet 2 milliGRAM(s) Oral every 4 hours PRN  HYDROmorphone   Tablet 4 milliGRAM(s) Oral every 4 hours PRN  HYDROmorphone  Injectable 1 milliGRAM(s) IV Push every 4 hours PRN  lisinopril 10 milliGRAM(s) Oral daily  morphine ER Tablet 30 milliGRAM(s) Oral every 12 hours  ondansetron Injectable 8 milliGRAM(s) IV Push every 8 hours PRN  polyethylene glycol 3350 17 Gram(s) Oral once  polyethylene glycol 3350 17 Gram(s) Oral at bedtime  pomalidomide 3 milliGRAM(s) Oral daily  senna 2 Tablet(s) Oral at bedtime  sodium chloride 0.9%. 1000 milliLiter(s) IV Continuous <Continuous>  sodium chloride 0.9%. 1000 milliLiter(s) IV Continuous <Continuous>                          8.1    2.94  )-----------( 28       ( 05 Apr 2019 06:10 )             25.2     Hemoglobin: 8.1 g/dL (04-05 @ 06:10)  Hemoglobin: 8.5 g/dL (04-04 @ 17:21)  Hemoglobin: 6.9 g/dL (04-04 @ 06:50)  Hemoglobin: 6.8 g/dL (04-03 @ 06:20)  Hemoglobin: 7.2 g/dL (04-02 @ 05:28)    04-05    142  |  108<H>  |  15  ----------------------------<  89  3.9   |  21<L>  |  1.06    Ca    7.9<L>      05 Apr 2019 06:10  Phos  3.8     04-05  Mg     1.6     04-05    TPro  7.0  /  Alb  3.1<L>  /  TBili  0.7  /  DBili  x   /  AST  35  /  ALT  37  /  AlkPhos  124<H>  04-05    Creatinine Trend: 1.06<--, 1.12<--, 1.09<--, 1.09<--, 1.11<--, 1.11<--      T(C): 36.8 (04-05-19 @ 06:00), Max: 36.9 (04-04-19 @ 11:41)  HR: 100 (04-05-19 @ 06:00) (98 - 104)  BP: 154/85 (04-05-19 @ 06:00) (146/88 - 159/91)  RR: 18 (04-05-19 @ 06:00) (18 - 18)  SpO2: 100% (04-05-19 @ 06:00) (100% - 100%)  Wt(kg): --    I&O's Summary    CV: normal, S1 S2, RRR, no JVD, no m/r/g  Lungs: CTA b/l, normal effort   Abd: soft non tender, not distended, + bs  Ext:  no edema     TELEMETRY:   not on tele     DATA:      < from: Transthoracic Echocardiogram (03.29.19 @ 20:01) >  CONCLUSIONS:  1. Normal mitral valve. Minimal mitral regurgitation.  2. Normal left ventricular internal dimensions and wall  thicknesses.  3. Normal left ventricular systolic function. No segmental  wall motion abnormalities.  4. Normal right ventricular size and function.  5. Small pericardial effusion posterior to the left  ventricle and superior to the right atrium.  *** Compared with echocardiogram of 8/2/2017, left  ventricular systolic function has improved.  ------------------------------------------------------------------------  Confirmed on  4/1/2019 - 08:41:32 by Samson Hightower M.D.    < end of copied text >      ASSESSMENT:      53 male PMH multiple myeloma s/p bone marrow transplant and chemo/RT, perforated diverticulitis s/p Kiah's (proctosigmoidectomy with colostomy, 9/2018), nonischemic cardiomyopathy (mild global LV systolic dysfunction with EF 54% and diastolic LV dysfunction based on TTE in 11/2018), and HTN who is being seen for management of cardiomyopathy.    -no evidence of heart failure or complaints of anginal symptoms   -Echo with normal LV function and small pericardial effusion, no evidence of tamponade   -no further inpatient cardiac workup needed at this time  -neurology following, note appreciated   -heme-onc note appreciated, transfuse PRN   -follow up with outpatient cardiologist for small pericardial effusion noted on echo 3/29/19 on d/c

## 2019-04-05 NOTE — PROGRESS NOTE ADULT - ASSESSMENT
1. Multiple Myeloma    -- relapsed / refractory ( failed all FDA Available tx) and failed auto stem cell transplant  -- pt has been off of tx against my and other oncologist advise in order to reverse colostomy  -- clinical trial at [facility] strongly recommended but pt declined  -- started on 4/2 Bendamustine 100mg/M2 Day 1, Pomalidomide 3mg PO QD Days 1-21 and Dexa 40mg weekly Q 28 day cycle  -- next decadron dose on 4/9 if pt still in house  -- monitor for tumor lysis (CMP, LDH, Phos, Uric Acid ) Daily  -- allopurinol 300mg PO QD  -- Zofran 8mg IV Q 8hrs PRN Nausea  -- WBC lower, still adequate, monitor for now    2. Diffuse bone pain of unclear etiology    -- pt has known extensive bone involvement by myeloma  --  MRI C/T/L Spine w no cord compression  -- pain controlled  -- cont miralax and colace, cont senna as needed      3. Transfusion dependent Anemia and Thrombocytopenia    -- sec to myeloma  -- transfuse platelets if Less than 10K or clinically significant bleeding  -- keep Hgb > 7    Pls call with questions, [phone]

## 2019-04-05 NOTE — PROGRESS NOTE ADULT - SUBJECTIVE AND OBJECTIVE BOX
[facility]      Seen earlier today, and examined.  - Today, patient is without complaints.   i am constipated            *****MEDICATIONS: Current medication reviewed and documented.    MEDICATIONS  (STANDING):  allopurinol 300 milliGRAM(s) Oral daily  carvedilol 25 milliGRAM(s) Oral every 12 hours  docusate sodium 100 milliGRAM(s) Oral three times a day  lisinopril 10 milliGRAM(s) Oral daily  morphine ER Tablet 30 milliGRAM(s) Oral every 12 hours  polyethylene glycol 3350 17 Gram(s) Oral once  polyethylene glycol 3350 17 Gram(s) Oral at bedtime  pomalidomide 3 milliGRAM(s) Oral daily  senna 2 Tablet(s) Oral at bedtime  sodium chloride 0.9%. 1000 milliLiter(s) (100 mL/Hr) IV Continuous <Continuous>  sodium chloride 0.9%. 1000 milliLiter(s) (70 mL/Hr) IV Continuous <Continuous>    MEDICATIONS  (PRN):  artificial  tears Solution 1 Drop(s) Both EYES every 6 hours PRN Dry Eyes  HYDROmorphone   Tablet 2 milliGRAM(s) Oral every 4 hours PRN Moderate Pain (4 - 6)  HYDROmorphone   Tablet 4 milliGRAM(s) Oral every 4 hours PRN Severe Pain (7 - 10)  HYDROmorphone  Injectable 1 milliGRAM(s) IV Push every 4 hours PRN Severe breakthrough pain  ondansetron Injectable 8 milliGRAM(s) IV Push every 8 hours PRN Nausea and/or Vomiting          ***** VITAL SIGNS:  T(F): 98.4 (19 @ 14:02), Max: 98.4 (19 @ 14:02)  HR: 99 (19 @ 14:02) (98 - 100)  BP: 152/87 (19 @ 14:02) (146/88 - 157/89)  RR: 18 (19 @ 14:02) (18 - 18)  SpO2: 99% (19 @ 14:02) (99% - 100%)  Wt(kg): --  ,   I&O's Summary           *****PHYSICAL EXAM:   alert oriented x 3 attention comprehension are fair.  Able to name, repeat.   EOmi fundi not visualized   no nystagmus VFF to confrontation  Tongue is midline  Palate elevates symmetrically   Moving all 4 ext spontaneously no drift appreciated    Gait not assessed.            *****LAB AND IMAGIN.1    2.94  )-----------( 28       ( 2019 06:10 )             25.2               04-05    142  |  108<H>  |  15  ----------------------------<  89  3.9   |  21<L>  |  1.06    Ca    7.9<L>      2019 06:10  Phos  3.8     04-05  Mg     1.6     04-05    TPro  7.0  /  Alb  3.1<L>  /  TBili  0.7  /  DBili  x   /  AST  35  /  ALT  37  /  AlkPhos  124<H>  -                         [All pertinent recent Imaging/Reports reviewed]           *****A S S E S S M E N T   A N D   P L A N :      53M hx of multiple myeloma s/p bone marrow transplant (~1.5 yrs ago) and chemo/RT (stopped ~2 months ago in preparation for colostomy reversal), perforated diverticulitis s/p Kiah's (proctosigmoidectomy with colostomy, 2018), nonischemic cardiomyopathy (mild global LV systolic dysfunction with EF 54% and diastolic LV dysfunction based on TTE in 2018), and HTN presents to [facility] ED c/o diffuse body pain. Patient recently discharged 3/23/19 from [facility] after being hospitalized for thrombocytopenia. Pt presents with severe pain all over a day after discharge. Pt was discharged after multiple platelet infusion.         Problem/Recommendations 1:  pain generalized improving, likely due to relapsed myeloma   extensive bony infiltration        pain management per primary   nl  cpk   mobility improving  Thrombocytopenia defer to hem for management.       Thank you for allowing me to participate in the care of this patient. Please do not hesitate to call me if you have any  questions.        ________________  Margret Yousif MD  [facility] ([facility]  [phone]      30 minutes spent on total encounter; more than 50 % of the visit was  spent counseling about plan of care, compliance to diet/exercise and medication regimen and or  coordinating care by the attending physician.      It is advised that s stroke patients follow up with SAMUEL Mckeon @ [phone] in 1- 2 weeks.   Others please follow up with Dr. Michael Nissenbaum [phone]

## 2019-04-05 NOTE — PROGRESS NOTE ADULT - SUBJECTIVE AND OBJECTIVE BOX
Pt seen, feeling better, had stool output, no complaints    MEDICATIONS  (STANDING):  allopurinol 300 milliGRAM(s) Oral daily  carvedilol 25 milliGRAM(s) Oral every 12 hours  docusate sodium 100 milliGRAM(s) Oral three times a day  lisinopril 10 milliGRAM(s) Oral daily  morphine ER Tablet 30 milliGRAM(s) Oral every 12 hours  polyethylene glycol 3350 17 Gram(s) Oral once  polyethylene glycol 3350 17 Gram(s) Oral at bedtime  pomalidomide 3 milliGRAM(s) Oral daily  senna 2 Tablet(s) Oral at bedtime  sodium chloride 0.9%. 1000 milliLiter(s) (100 mL/Hr) IV Continuous <Continuous>  sodium chloride 0.9%. 1000 milliLiter(s) (70 mL/Hr) IV Continuous <Continuous>    MEDICATIONS  (PRN):  artificial  tears Solution 1 Drop(s) Both EYES every 6 hours PRN Dry Eyes  HYDROmorphone   Tablet 2 milliGRAM(s) Oral every 4 hours PRN Moderate Pain (4 - 6)  HYDROmorphone   Tablet 4 milliGRAM(s) Oral every 4 hours PRN Severe Pain (7 - 10)  HYDROmorphone  Injectable 1 milliGRAM(s) IV Push every 4 hours PRN Severe breakthrough pain  ondansetron Injectable 8 milliGRAM(s) IV Push every 8 hours PRN Nausea and/or Vomiting      ROS  No fever, sweats, chills  No epistaxis, HA, sore throat  No CP, SOB, cough, sputum  No n/v/d, abd pain, melena, hematochezia  No edema  No rash  No anxiety  No back pain, joint pain  No bleeding, bruising  No dysuria, hematuria    Vital Signs Last 24 Hrs  T(C): 36.9 (05 Apr 2019 14:02), Max: 36.9 (05 Apr 2019 14:02)  T(F): 98.4 (05 Apr 2019 14:02), Max: 98.4 (05 Apr 2019 14:02)  HR: 108 (05 Apr 2019 18:37) (98 - 108)  BP: 160/84 (05 Apr 2019 18:37) (146/88 - 160/84)  BP(mean): --  RR: 18 (05 Apr 2019 14:02) (18 - 18)  SpO2: 99% (05 Apr 2019 14:02) (99% - 100%)    PE  NAD  Awake, alert  Anicteric, MMM  RRR  CTAB  Abd soft, NT, ND  No c/c/e  No rash grossly  FROM                          8.1    2.94  )-----------( 28       ( 05 Apr 2019 06:10 )             25.2       04-05    142  |  108<H>  |  15  ----------------------------<  89  3.9   |  21<L>  |  1.06    Ca    7.9<L>      05 Apr 2019 06:10  Phos  3.8     04-05  Mg     1.6     04-05    TPro  7.0  /  Alb  3.1<L>  /  TBili  0.7  /  DBili  x   /  AST  35  /  ALT  37  /  AlkPhos  124<H>  04-05

## 2019-04-06 LAB
ALBUMIN SERPL ELPH-MCNC: 3 G/DL — LOW (ref 3.3–5)
ALP SERPL-CCNC: 125 U/L — HIGH (ref 40–120)
ALT FLD-CCNC: 52 U/L — HIGH (ref 4–41)
ANION GAP SERPL CALC-SCNC: 15 MMO/L — HIGH (ref 7–14)
AST SERPL-CCNC: 42 U/L — HIGH (ref 4–40)
BASOPHILS # BLD AUTO: 0.01 K/UL — SIGNIFICANT CHANGE UP (ref 0–0.2)
BASOPHILS NFR BLD AUTO: 0.5 % — SIGNIFICANT CHANGE UP (ref 0–2)
BILIRUB SERPL-MCNC: 0.7 MG/DL — SIGNIFICANT CHANGE UP (ref 0.2–1.2)
BUN SERPL-MCNC: 13 MG/DL — SIGNIFICANT CHANGE UP (ref 7–23)
CALCIUM SERPL-MCNC: 7.7 MG/DL — LOW (ref 8.4–10.5)
CHLORIDE SERPL-SCNC: 104 MMOL/L — SIGNIFICANT CHANGE UP (ref 98–107)
CO2 SERPL-SCNC: 21 MMOL/L — LOW (ref 22–31)
CREAT SERPL-MCNC: 0.91 MG/DL — SIGNIFICANT CHANGE UP (ref 0.5–1.3)
EOSINOPHIL # BLD AUTO: 0.02 K/UL — SIGNIFICANT CHANGE UP (ref 0–0.5)
EOSINOPHIL NFR BLD AUTO: 1 % — SIGNIFICANT CHANGE UP (ref 0–6)
GLUCOSE SERPL-MCNC: 96 MG/DL — SIGNIFICANT CHANGE UP (ref 70–99)
HCT VFR BLD CALC: 24.3 % — LOW (ref 39–50)
HGB BLD-MCNC: 7.8 G/DL — LOW (ref 13–17)
IMM GRANULOCYTES NFR BLD AUTO: 11.6 % — HIGH (ref 0–1.5)
LDH SERPL L TO P-CCNC: 228 U/L — HIGH (ref 135–225)
LYMPHOCYTES # BLD AUTO: 0.32 K/UL — LOW (ref 1–3.3)
LYMPHOCYTES # BLD AUTO: 16.1 % — SIGNIFICANT CHANGE UP (ref 13–44)
MAGNESIUM SERPL-MCNC: 1.4 MG/DL — LOW (ref 1.6–2.6)
MANUAL SMEAR VERIFICATION: SIGNIFICANT CHANGE UP
MCHC RBC-ENTMCNC: 29 PG — SIGNIFICANT CHANGE UP (ref 27–34)
MCHC RBC-ENTMCNC: 32.1 % — SIGNIFICANT CHANGE UP (ref 32–36)
MCV RBC AUTO: 90.3 FL — SIGNIFICANT CHANGE UP (ref 80–100)
MONOCYTES # BLD AUTO: 0.34 K/UL — SIGNIFICANT CHANGE UP (ref 0–0.9)
MONOCYTES NFR BLD AUTO: 17.1 % — HIGH (ref 2–14)
NEUTROPHILS # BLD AUTO: 1.07 K/UL — LOW (ref 1.8–7.4)
NEUTROPHILS NFR BLD AUTO: 53.7 % — SIGNIFICANT CHANGE UP (ref 43–77)
NRBC # FLD: 0.02 K/UL — SIGNIFICANT CHANGE UP (ref 0–0)
NRBC FLD-RTO: 1 — SIGNIFICANT CHANGE UP
PHOSPHATE SERPL-MCNC: 4 MG/DL — SIGNIFICANT CHANGE UP (ref 2.5–4.5)
PLATELET # BLD AUTO: 14 K/UL — CRITICAL LOW (ref 150–400)
PMV BLD: SIGNIFICANT CHANGE UP FL (ref 7–13)
POTASSIUM SERPL-MCNC: 3.6 MMOL/L — SIGNIFICANT CHANGE UP (ref 3.5–5.3)
POTASSIUM SERPL-SCNC: 3.6 MMOL/L — SIGNIFICANT CHANGE UP (ref 3.5–5.3)
PROT SERPL-MCNC: 6.9 G/DL — SIGNIFICANT CHANGE UP (ref 6–8.3)
RBC # BLD: 2.69 M/UL — LOW (ref 4.2–5.8)
RBC # FLD: 16.6 % — HIGH (ref 10.3–14.5)
SODIUM SERPL-SCNC: 140 MMOL/L — SIGNIFICANT CHANGE UP (ref 135–145)
URATE SERPL-MCNC: 5.5 MG/DL — SIGNIFICANT CHANGE UP (ref 3.4–8.8)
WBC # BLD: 1.99 K/UL — LOW (ref 3.8–10.5)
WBC # FLD AUTO: 1.99 K/UL — LOW (ref 3.8–10.5)

## 2019-04-06 RX ORDER — MAGNESIUM SULFATE 500 MG/ML
2 VIAL (ML) INJECTION ONCE
Qty: 0 | Refills: 0 | Status: COMPLETED | OUTPATIENT
Start: 2019-04-06 | End: 2019-04-06

## 2019-04-06 RX ADMIN — Medication 300 MILLIGRAM(S): at 12:46

## 2019-04-06 RX ADMIN — CARVEDILOL PHOSPHATE 25 MILLIGRAM(S): 80 CAPSULE, EXTENDED RELEASE ORAL at 18:39

## 2019-04-06 RX ADMIN — LISINOPRIL 10 MILLIGRAM(S): 2.5 TABLET ORAL at 06:00

## 2019-04-06 RX ADMIN — CARVEDILOL PHOSPHATE 25 MILLIGRAM(S): 80 CAPSULE, EXTENDED RELEASE ORAL at 06:00

## 2019-04-06 RX ADMIN — MORPHINE SULFATE 30 MILLIGRAM(S): 50 CAPSULE, EXTENDED RELEASE ORAL at 05:59

## 2019-04-06 RX ADMIN — Medication 50 GRAM(S): at 16:27

## 2019-04-06 RX ADMIN — MORPHINE SULFATE 30 MILLIGRAM(S): 50 CAPSULE, EXTENDED RELEASE ORAL at 18:39

## 2019-04-06 RX ADMIN — POMALIDOMIDE 3 MILLIGRAM(S): 1 CAPSULE ORAL at 12:42

## 2019-04-06 RX ADMIN — MORPHINE SULFATE 30 MILLIGRAM(S): 50 CAPSULE, EXTENDED RELEASE ORAL at 07:21

## 2019-04-06 NOTE — PROGRESS NOTE ADULT - SUBJECTIVE AND OBJECTIVE BOX
Pt is seen and examined  pt is awake and lying in bed   Feels better  No pain  No fevers  No bleeding      PAST MEDICAL & SURGICAL HISTORY:  Diverticulitis  Multiple myeloma  Hypertension  Left ventricular dysfunction  Cardiomyopathy  Former smoker: (1 ppd x 11 years; Quit ~age 26)  History of bone marrow transplant  History of surgery: s/p exploratory laparotomy with sigmoid resection and colostomy on 9/2/2018      ROS:  Negative except for:    MEDICATIONS  (STANDING):  allopurinol 300 milliGRAM(s) Oral daily  carvedilol 25 milliGRAM(s) Oral every 12 hours  docusate sodium 100 milliGRAM(s) Oral three times a day  lisinopril 10 milliGRAM(s) Oral daily  magnesium sulfate  IVPB 2 Gram(s) IV Intermittent once  morphine ER Tablet 30 milliGRAM(s) Oral every 12 hours  polyethylene glycol 3350 17 Gram(s) Oral once  polyethylene glycol 3350 17 Gram(s) Oral at bedtime  pomalidomide 3 milliGRAM(s) Oral daily  senna 2 Tablet(s) Oral at bedtime  sodium chloride 0.9%. 1000 milliLiter(s) (100 mL/Hr) IV Continuous <Continuous>  sodium chloride 0.9%. 1000 milliLiter(s) (70 mL/Hr) IV Continuous <Continuous>    MEDICATIONS  (PRN):  artificial  tears Solution 1 Drop(s) Both EYES every 6 hours PRN Dry Eyes  HYDROmorphone   Tablet 2 milliGRAM(s) Oral every 4 hours PRN Moderate Pain (4 - 6)  HYDROmorphone   Tablet 4 milliGRAM(s) Oral every 4 hours PRN Severe Pain (7 - 10)  HYDROmorphone  Injectable 1 milliGRAM(s) IV Push every 4 hours PRN Severe breakthrough pain  ondansetron Injectable 8 milliGRAM(s) IV Push every 8 hours PRN Nausea and/or Vomiting      Allergies    oxycodone (Vomiting; Nausea)    Intolerances        Vital Signs Last 24 Hrs  T(C): 37.3 (06 Apr 2019 05:54), Max: 37.3 (06 Apr 2019 05:54)  T(F): 99.1 (06 Apr 2019 05:54), Max: 99.1 (06 Apr 2019 05:54)  HR: 110 (06 Apr 2019 05:54) (99 - 110)  BP: 149/94 (06 Apr 2019 05:54) (149/94 - 160/84)  BP(mean): --  RR: 18 (06 Apr 2019 05:54) (18 - 18)  SpO2: 95% (06 Apr 2019 05:54) (95% - 99%)    PHYSICAL EXAM  General: adult in NAD  HEENT: clear oropharynx, anicteric sclera, pink conjunctiva  Neck: supple  CV: normal S1/S2 with no murmur rubs or gallops  Lungs: positive air movement b/l ant lungs,clear to auscultation, no wheezes, no rales  Abdomen: soft non-tender non-distended, no hepatosplenomegaly  Ext: no clubbing cyanosis or edema     LABS:                          7.8    1.99  )-----------( 14       ( 06 Apr 2019 06:55 )             24.3     Serial CBC's  04-06 @ 06:55  Hct-24.3 / Hgb-7.8 / Plat-14 / RBC-2.69 / WBC-1.99          Serial CBC's  04-05 @ 06:10  Hct-25.2 / Hgb-8.1 / Plat-28 / RBC-2.81 / WBC-2.94            04-06    140  |  104  |  13  ----------------------------<  96  3.6   |  21<L>  |  0.91    Ca    7.7<L>      06 Apr 2019 06:55  Phos  4.0     04-06  Mg     1.4     04-06    TPro  6.9  /  Alb  3.0<L>  /  TBili  0.7  /  DBili  x   /  AST  42<H>  /  ALT  52<H>  /  AlkPhos  125<H>  04-06          WBC Count: 1.99 K/uL (04-06 @ 06:55)  Hemoglobin: 7.8 g/dL (04-06 @ 06:55)            RADIOLOGY & ADDITIONAL STUDIES:

## 2019-04-06 NOTE — PROGRESS NOTE ADULT - SUBJECTIVE AND OBJECTIVE BOX
S: no chest pain or sob     allopurinol 300 milliGRAM(s) Oral daily  artificial  tears Solution 1 Drop(s) Both EYES every 6 hours PRN  carvedilol 25 milliGRAM(s) Oral every 12 hours  docusate sodium 100 milliGRAM(s) Oral three times a day  HYDROmorphone   Tablet 2 milliGRAM(s) Oral every 4 hours PRN  HYDROmorphone   Tablet 4 milliGRAM(s) Oral every 4 hours PRN  HYDROmorphone  Injectable 1 milliGRAM(s) IV Push every 4 hours PRN  lisinopril 10 milliGRAM(s) Oral daily  morphine ER Tablet 30 milliGRAM(s) Oral every 12 hours  ondansetron Injectable 8 milliGRAM(s) IV Push every 8 hours PRN  polyethylene glycol 3350 17 Gram(s) Oral once  polyethylene glycol 3350 17 Gram(s) Oral at bedtime  pomalidomide 3 milliGRAM(s) Oral daily  senna 2 Tablet(s) Oral at bedtime  sodium chloride 0.9%. 1000 milliLiter(s) IV Continuous <Continuous>  sodium chloride 0.9%. 1000 milliLiter(s) IV Continuous <Continuous>                            7.8    1.99  )-----------( 14       ( 06 Apr 2019 06:55 )             24.3       04-06    140  |  104  |  13  ----------------------------<  96  3.6   |  21<L>  |  0.91    Ca    7.7<L>      06 Apr 2019 06:55  Phos  4.0     04-06  Mg     1.4     04-06    TPro  6.9  /  Alb  3.0<L>  /  TBili  0.7  /  DBili  x   /  AST  42<H>  /  ALT  52<H>  /  AlkPhos  125<H>  04-06            T(C): 37.2 (04-06-19 @ 14:00), Max: 37.3 (04-06-19 @ 05:54)  HR: 102 (04-06-19 @ 14:00) (100 - 110)  BP: 146/83 (04-06-19 @ 14:00) (146/83 - 160/84)  RR: 18 (04-06-19 @ 14:00) (18 - 18)  SpO2: 96% (04-06-19 @ 14:00) (95% - 97%)  Wt(kg): --    I&O's Summary      CV: normal, S1 S2, RRR, no JVD, no m/r/g  Lungs: CTA b/l, normal effort   Abd: soft non tender, not distended, + bs  Ext:  no edema     TELEMETRY:   not on tele     DATA:      < from: Transthoracic Echocardiogram (03.29.19 @ 20:01) >  CONCLUSIONS:  1. Normal mitral valve. Minimal mitral regurgitation.  2. Normal left ventricular internal dimensions and wall  thicknesses.  3. Normal left ventricular systolic function. No segmental  wall motion abnormalities.  4. Normal right ventricular size and function.  5. Small pericardial effusion posterior to the left  ventricle and superior to the right atrium.  *** Compared with echocardiogram of 8/2/2017, left  ventricular systolic function has improved.  ------------------------------------------------------------------------  Confirmed on  4/1/2019 - 08:41:32 by Samson Hightower M.D.    < end of copied text >      ASSESSMENT:      53 male PMH multiple myeloma s/p bone marrow transplant and chemo/RT, perforated diverticulitis s/p Kiah's (proctosigmoidectomy with colostomy, 9/2018), nonischemic cardiomyopathy (mild global LV systolic dysfunction with EF 54% and diastolic LV dysfunction based on TTE in 11/2018), and HTN who is being seen for management of cardiomyopathy.    -no evidence of heart failure or complaints of anginal symptoms   -Echo with normal LV function and small pericardial effusion, no evidence of tamponade   -no further inpatient cardiac workup needed at this time  -follow up with outpatient cardiologist for small pericardial effusion noted on echo 3/29/19 on d/c          Marc Owen MD

## 2019-04-06 NOTE — PROGRESS NOTE ADULT - ASSESSMENT
1. Multiple Myeloma    -- Day 5/Cycle 1 Bendamustine and Pomalidomide  -- relapsed / refractory ( failed all FDA Available tx) and failed auto stem cell transplant  -- clinical trial at [facility] strongly recommended but pt declined  -- next decadron dose on 4/9 if pt still in house (40 mg PO )  -- monitor for tumor lysis (CMP, LDH, Phos, Uric Acid ) Daily. None so far  -- allopurinol 300mg PO QD  -- Zofran 8mg IV Q 8hrs PRN Nausea  -- If WBC drops further will hold pomalidomide and start neupogen    2. Diffuse bone pain of unclear etiology    -- sx resolved  -- pt has known extensive bone involvement by myeloma  --  MRI C/T/L Spine w no cord compression       3. Transfusion dependent Anemia and Thrombocytopenia    -- sec to myeloma  -- transfuse platelets if Less than 10K or clinically significant bleeding  -- keep Hgb > 7    Torsten Murphy MD  [phone]

## 2019-04-06 NOTE — CHART NOTE - NSCHARTNOTEFT_GEN_A_CORE
Pt's platelets on AM labs is 14. Spoke with attending Dr. Castro, attending rec 1 u plt transfusion. Will order per attending rec.

## 2019-04-06 NOTE — PROGRESS NOTE ADULT - SUBJECTIVE AND OBJECTIVE BOX
Patient seen and examined at bedside  No acute events noted overnight  Case discussed with medical team    HPI:  53M hx of multiple myeloma s/p bone marrow transplant (~1.5 yrs ago) and chemo/RT (stopped ~2 months ago in preparation for colostomy reversal), perforated diverticulitis s/p Kiah's (proctosigmoidectomy with colostomy, 9/2018), nonischemic cardiomyopathy (mild global LV systolic dysfunction with EF 54% and diastolic LV dysfunction based on TTE in 11/2018), and HTN presents to [facility] ED c/o diffuse body pain. Patient recently discharged 3/23/19 from [facility] after being hospitalized for thrombocytopenia.     Patient started to develop severe non-radiating throbbing 10/10 pain in his b/l shoulders, chest, abdomen, and b/l thighs since Sunday night - leaving him essentially bedbound. Was supposed to see Dr. Murphy for follow up today. However because of the pain he came to the ED (26 Mar 2019 11:51)      PAST MEDICAL & SURGICAL HISTORY:  Diverticulitis  Multiple myeloma  Hypertension  Left ventricular dysfunction  Cardiomyopathy  Former smoker: (1 ppd x 11 years; Quit ~age 26)  History of bone marrow transplant  History of surgery: s/p exploratory laparotomy with sigmoid resection and colostomy on 9/2/2018      oxycodone (Vomiting; Nausea)       MEDICATIONS  (STANDING):  allopurinol 300 milliGRAM(s) Oral daily  carvedilol 25 milliGRAM(s) Oral every 12 hours  docusate sodium 100 milliGRAM(s) Oral three times a day  lisinopril 10 milliGRAM(s) Oral daily  magnesium sulfate  IVPB 2 Gram(s) IV Intermittent once  morphine ER Tablet 30 milliGRAM(s) Oral every 12 hours  polyethylene glycol 3350 17 Gram(s) Oral once  polyethylene glycol 3350 17 Gram(s) Oral at bedtime  pomalidomide 3 milliGRAM(s) Oral daily  senna 2 Tablet(s) Oral at bedtime  sodium chloride 0.9%. 1000 milliLiter(s) (100 mL/Hr) IV Continuous <Continuous>  sodium chloride 0.9%. 1000 milliLiter(s) (70 mL/Hr) IV Continuous <Continuous>    MEDICATIONS  (PRN):  artificial  tears Solution 1 Drop(s) Both EYES every 6 hours PRN Dry Eyes  HYDROmorphone   Tablet 2 milliGRAM(s) Oral every 4 hours PRN Moderate Pain (4 - 6)  HYDROmorphone   Tablet 4 milliGRAM(s) Oral every 4 hours PRN Severe Pain (7 - 10)  HYDROmorphone  Injectable 1 milliGRAM(s) IV Push every 4 hours PRN Severe breakthrough pain  ondansetron Injectable 8 milliGRAM(s) IV Push every 8 hours PRN Nausea and/or Vomiting      REVIEW OF SYSTEMS:  CONSTITUTIONAL: (+) malaise.   EYES: No acute change in vision   ENT:  No tinnitus  NECK: No stiffness  RESPIRATORY: No hemoptysis  CARDIOVASCULAR: No chest pain, palpitations, syncope  GASTROINTESTINAL: No hematemesis, diarrhea, melena, or hematochezia.  GENITOURINARY: No hematuria  NEUROLOGICAL: No headaches  LYMPH Nodes: No enlarged glands  ENDOCRINE: No heat or cold intolerance	    T(C): 37.3 (04-06-19 @ 05:54), Max: 37.3 (04-06-19 @ 05:54)  HR: 110 (04-06-19 @ 05:54) (99 - 110)  BP: 149/94 (04-06-19 @ 05:54) (149/94 - 160/84)  RR: 18 (04-06-19 @ 05:54) (18 - 18)  SpO2: 95% (04-06-19 @ 05:54) (95% - 99%)    PHYSICAL EXAMINATION:   Constitutional: WD, NAD  HEENT: NC, AT  Neck:  Supple  Respiratory:  Adequate airflow b/l. Not using accessory muscles of respiration.  Cardiovascular:  S1 & S2 intact, no R/G, 2+ radial pulses b/l  Gastrointestinal: Soft, NT, ND, normoactive b.s., no organomegaly/RT/rigidity  Extremities: WWP  Neurological:  Alert and awake.  No acute focal motor deficits. Crude sensation intact.     Labs and imaging reviewed    LABS:                        7.8    1.99  )-----------( 14       ( 06 Apr 2019 06:55 )             24.3     04-06    140  |  104  |  13  ----------------------------<  96  3.6   |  21<L>  |  0.91    Ca    7.7<L>      06 Apr 2019 06:55  Phos  4.0     04-06  Mg     1.4     04-06    TPro  6.9  /  Alb  3.0<L>  /  TBili  0.7  /  DBili  x   /  AST  42<H>  /  ALT  52<H>  /  AlkPhos  125<H>  04-06            CAPILLARY BLOOD GLUCOSE            LIVER FUNCTIONS - ( 06 Apr 2019 06:55 )  Alb: 3.0 g/dL / Pro: 6.9 g/dL / ALK PHOS: 125 u/L / ALT: 52 u/L / AST: 42 u/L / GGT: x               RADIOLOGY & ADDITIONAL STUDIES:

## 2019-04-06 NOTE — PROGRESS NOTE ADULT - PROBLEM SELECTOR PLAN 1
improving overall with analgesics, c/w pain control  -plt's downtrending, transfuse plt.    additional management appreciated by hem/onc and consultants

## 2019-04-07 LAB
ALBUMIN SERPL ELPH-MCNC: 3 G/DL — LOW (ref 3.3–5)
ALP SERPL-CCNC: 125 U/L — HIGH (ref 40–120)
ALT FLD-CCNC: 43 U/L — HIGH (ref 4–41)
ANION GAP SERPL CALC-SCNC: 14 MMO/L — SIGNIFICANT CHANGE UP (ref 7–14)
AST SERPL-CCNC: 26 U/L — SIGNIFICANT CHANGE UP (ref 4–40)
BASOPHILS # BLD AUTO: 0.01 K/UL — SIGNIFICANT CHANGE UP (ref 0–0.2)
BASOPHILS NFR BLD AUTO: 0.6 % — SIGNIFICANT CHANGE UP (ref 0–2)
BILIRUB SERPL-MCNC: 0.6 MG/DL — SIGNIFICANT CHANGE UP (ref 0.2–1.2)
BUN SERPL-MCNC: 12 MG/DL — SIGNIFICANT CHANGE UP (ref 7–23)
CALCIUM SERPL-MCNC: 8.1 MG/DL — LOW (ref 8.4–10.5)
CHLORIDE SERPL-SCNC: 103 MMOL/L — SIGNIFICANT CHANGE UP (ref 98–107)
CO2 SERPL-SCNC: 20 MMOL/L — LOW (ref 22–31)
CREAT SERPL-MCNC: 0.92 MG/DL — SIGNIFICANT CHANGE UP (ref 0.5–1.3)
EOSINOPHIL # BLD AUTO: 0.01 K/UL — SIGNIFICANT CHANGE UP (ref 0–0.5)
EOSINOPHIL NFR BLD AUTO: 0.6 % — SIGNIFICANT CHANGE UP (ref 0–6)
GLUCOSE SERPL-MCNC: 92 MG/DL — SIGNIFICANT CHANGE UP (ref 70–99)
HCT VFR BLD CALC: 24.3 % — LOW (ref 39–50)
HGB BLD-MCNC: 8 G/DL — LOW (ref 13–17)
IMM GRANULOCYTES NFR BLD AUTO: 7.8 % — HIGH (ref 0–1.5)
LDH SERPL L TO P-CCNC: 269 U/L — HIGH (ref 135–225)
LYMPHOCYTES # BLD AUTO: 0.43 K/UL — LOW (ref 1–3.3)
LYMPHOCYTES # BLD AUTO: 25.7 % — SIGNIFICANT CHANGE UP (ref 13–44)
MAGNESIUM SERPL-MCNC: 1.8 MG/DL — SIGNIFICANT CHANGE UP (ref 1.6–2.6)
MANUAL SMEAR VERIFICATION: SIGNIFICANT CHANGE UP
MCHC RBC-ENTMCNC: 29.5 PG — SIGNIFICANT CHANGE UP (ref 27–34)
MCHC RBC-ENTMCNC: 32.9 % — SIGNIFICANT CHANGE UP (ref 32–36)
MCV RBC AUTO: 89.7 FL — SIGNIFICANT CHANGE UP (ref 80–100)
MONOCYTES # BLD AUTO: 0.18 K/UL — SIGNIFICANT CHANGE UP (ref 0–0.9)
MONOCYTES NFR BLD AUTO: 10.8 % — SIGNIFICANT CHANGE UP (ref 2–14)
NEUTROPHILS # BLD AUTO: 0.91 K/UL — LOW (ref 1.8–7.4)
NEUTROPHILS NFR BLD AUTO: 54.5 % — SIGNIFICANT CHANGE UP (ref 43–77)
NRBC # FLD: 0 K/UL — SIGNIFICANT CHANGE UP (ref 0–0)
PHOSPHATE SERPL-MCNC: 3.6 MG/DL — SIGNIFICANT CHANGE UP (ref 2.5–4.5)
PLATELET # BLD AUTO: 31 K/UL — LOW (ref 150–400)
PMV BLD: SIGNIFICANT CHANGE UP FL (ref 7–13)
POTASSIUM SERPL-MCNC: 4.1 MMOL/L — SIGNIFICANT CHANGE UP (ref 3.5–5.3)
POTASSIUM SERPL-SCNC: 4.1 MMOL/L — SIGNIFICANT CHANGE UP (ref 3.5–5.3)
PROT SERPL-MCNC: 6.9 G/DL — SIGNIFICANT CHANGE UP (ref 6–8.3)
RBC # BLD: 2.71 M/UL — LOW (ref 4.2–5.8)
RBC # FLD: 17 % — HIGH (ref 10.3–14.5)
SODIUM SERPL-SCNC: 137 MMOL/L — SIGNIFICANT CHANGE UP (ref 135–145)
URATE SERPL-MCNC: 5.4 MG/DL — SIGNIFICANT CHANGE UP (ref 3.4–8.8)
WBC # BLD: 1.67 K/UL — LOW (ref 3.8–10.5)
WBC # FLD AUTO: 1.67 K/UL — LOW (ref 3.8–10.5)

## 2019-04-07 RX ORDER — FILGRASTIM 480MCG/1.6
480 VIAL (ML) INJECTION DAILY
Qty: 0 | Refills: 0 | Status: DISCONTINUED | OUTPATIENT
Start: 2019-04-07 | End: 2019-04-08

## 2019-04-07 RX ADMIN — MORPHINE SULFATE 30 MILLIGRAM(S): 50 CAPSULE, EXTENDED RELEASE ORAL at 07:03

## 2019-04-07 RX ADMIN — MORPHINE SULFATE 30 MILLIGRAM(S): 50 CAPSULE, EXTENDED RELEASE ORAL at 06:01

## 2019-04-07 RX ADMIN — Medication 480 MICROGRAM(S): at 15:42

## 2019-04-07 RX ADMIN — CARVEDILOL PHOSPHATE 25 MILLIGRAM(S): 80 CAPSULE, EXTENDED RELEASE ORAL at 18:32

## 2019-04-07 RX ADMIN — LISINOPRIL 10 MILLIGRAM(S): 2.5 TABLET ORAL at 06:01

## 2019-04-07 RX ADMIN — Medication 300 MILLIGRAM(S): at 12:51

## 2019-04-07 RX ADMIN — CARVEDILOL PHOSPHATE 25 MILLIGRAM(S): 80 CAPSULE, EXTENDED RELEASE ORAL at 06:01

## 2019-04-07 RX ADMIN — MORPHINE SULFATE 30 MILLIGRAM(S): 50 CAPSULE, EXTENDED RELEASE ORAL at 18:32

## 2019-04-07 RX ADMIN — MORPHINE SULFATE 30 MILLIGRAM(S): 50 CAPSULE, EXTENDED RELEASE ORAL at 18:33

## 2019-04-07 NOTE — PROGRESS NOTE ADULT - SUBJECTIVE AND OBJECTIVE BOX
Patient seen and examined at bedside  No acute events noted overnight  Case discussed with medical team    HPI:  53M hx of multiple myeloma s/p bone marrow transplant (~1.5 yrs ago) and chemo/RT (stopped ~2 months ago in preparation for colostomy reversal), perforated diverticulitis s/p Kiah's (proctosigmoidectomy with colostomy, 9/2018), nonischemic cardiomyopathy (mild global LV systolic dysfunction with EF 54% and diastolic LV dysfunction based on TTE in 11/2018), and HTN presents to [facility] ED c/o diffuse body pain. Patient recently discharged 3/23/19 from [facility] after being hospitalized for thrombocytopenia.     Patient started to develop severe non-radiating throbbing 10/10 pain in his b/l shoulders, chest, abdomen, and b/l thighs since Sunday night - leaving him essentially bedbound. Was supposed to see Dr. Murphy for follow up today. However because of the pain he came to the ED (26 Mar 2019 11:51)      PAST MEDICAL & SURGICAL HISTORY:  Diverticulitis  Multiple myeloma  Hypertension  Left ventricular dysfunction  Cardiomyopathy  Former smoker: (1 ppd x 11 years; Quit ~age 26)  History of bone marrow transplant  History of surgery: s/p exploratory laparotomy with sigmoid resection and colostomy on 9/2/2018      oxycodone (Vomiting; Nausea)       MEDICATIONS  (STANDING):  allopurinol 300 milliGRAM(s) Oral daily  carvedilol 25 milliGRAM(s) Oral every 12 hours  docusate sodium 100 milliGRAM(s) Oral three times a day  lisinopril 10 milliGRAM(s) Oral daily  morphine ER Tablet 30 milliGRAM(s) Oral every 12 hours  polyethylene glycol 3350 17 Gram(s) Oral once  polyethylene glycol 3350 17 Gram(s) Oral at bedtime  pomalidomide 3 milliGRAM(s) Oral daily  senna 2 Tablet(s) Oral at bedtime  sodium chloride 0.9%. 1000 milliLiter(s) (100 mL/Hr) IV Continuous <Continuous>  sodium chloride 0.9%. 1000 milliLiter(s) (70 mL/Hr) IV Continuous <Continuous>    MEDICATIONS  (PRN):  artificial  tears Solution 1 Drop(s) Both EYES every 6 hours PRN Dry Eyes  HYDROmorphone   Tablet 2 milliGRAM(s) Oral every 4 hours PRN Moderate Pain (4 - 6)  HYDROmorphone   Tablet 4 milliGRAM(s) Oral every 4 hours PRN Severe Pain (7 - 10)  HYDROmorphone  Injectable 1 milliGRAM(s) IV Push every 4 hours PRN Severe breakthrough pain  ondansetron Injectable 8 milliGRAM(s) IV Push every 8 hours PRN Nausea and/or Vomiting      REVIEW OF SYSTEMS:  CONSTITUTIONAL: (+) malaise.   EYES: No acute change in vision   ENT:  No tinnitus  NECK: No stiffness  RESPIRATORY: No hemoptysis  CARDIOVASCULAR: No chest pain, palpitations, syncope  GASTROINTESTINAL: No hematemesis, diarrhea, melena, or hematochezia.  GENITOURINARY: No hematuria  NEUROLOGICAL: No headaches  LYMPH Nodes: No enlarged glands  ENDOCRINE: No heat or cold intolerance	    T(C): 37 (04-07-19 @ 05:57), Max: 37.4 (04-06-19 @ 20:40)  HR: 108 (04-07-19 @ 05:57) (102 - 109)  BP: 144/89 (04-07-19 @ 05:57) (138/85 - 146/83)  RR: 18 (04-07-19 @ 05:57) (18 - 18)  SpO2: 98% (04-07-19 @ 05:57) (96% - 98%)    PHYSICAL EXAMINATION:   Constitutional: WD, NAD  HEENT: NC, AT  Neck:  Supple  Respiratory:  Adequate airflow b/l. Not using accessory muscles of respiration.  Cardiovascular:  S1 & S2 intact, no R/G, 2+ radial pulses b/l  Gastrointestinal: Soft, NT, ND, normoactive b.s., no organomegaly/RT/rigidity  Extremities: WWP  Neurological:  Alert and awake.  No acute focal motor deficits. Crude sensation intact.     Labs and imaging reviewed    LABS:                        8.0    x     )-----------( x        ( 07 Apr 2019 06:21 )             24.3     04-06    140  |  104  |  13  ----------------------------<  96  3.6   |  21<L>  |  0.91    Ca    7.7<L>      06 Apr 2019 06:55  Phos  4.0     04-06  Mg     1.4     04-06    TPro  6.9  /  Alb  3.0<L>  /  TBili  0.7  /  DBili  x   /  AST  42<H>  /  ALT  52<H>  /  AlkPhos  125<H>  04-06            CAPILLARY BLOOD GLUCOSE            LIVER FUNCTIONS - ( 06 Apr 2019 06:55 )  Alb: 3.0 g/dL / Pro: 6.9 g/dL / ALK PHOS: 125 u/L / ALT: 52 u/L / AST: 42 u/L / GGT: x               RADIOLOGY & ADDITIONAL STUDIES:

## 2019-04-07 NOTE — PROGRESS NOTE ADULT - SUBJECTIVE AND OBJECTIVE BOX
S: no chest pain or sob       MEDICATIONS  (STANDING):  allopurinol 300 milliGRAM(s) Oral daily  carvedilol 25 milliGRAM(s) Oral every 12 hours  docusate sodium 100 milliGRAM(s) Oral three times a day  filgrastim-sndz Injectable 480 MICROGram(s) SubCutaneous daily  lisinopril 10 milliGRAM(s) Oral daily  morphine ER Tablet 30 milliGRAM(s) Oral every 12 hours  polyethylene glycol 3350 17 Gram(s) Oral once  polyethylene glycol 3350 17 Gram(s) Oral at bedtime  senna 2 Tablet(s) Oral at bedtime  sodium chloride 0.9%. 1000 milliLiter(s) (100 mL/Hr) IV Continuous <Continuous>  sodium chloride 0.9%. 1000 milliLiter(s) (70 mL/Hr) IV Continuous <Continuous>    MEDICATIONS  (PRN):  artificial  tears Solution 1 Drop(s) Both EYES every 6 hours PRN Dry Eyes  HYDROmorphone   Tablet 2 milliGRAM(s) Oral every 4 hours PRN Moderate Pain (4 - 6)  HYDROmorphone   Tablet 4 milliGRAM(s) Oral every 4 hours PRN Severe Pain (7 - 10)  HYDROmorphone  Injectable 1 milliGRAM(s) IV Push every 4 hours PRN Severe breakthrough pain  ondansetron Injectable 8 milliGRAM(s) IV Push every 8 hours PRN Nausea and/or Vomiting      LABS:                            8.0    1.67  )-----------( 31 ( 07 Apr 2019 06:21 )             24.3    137  |  103  |  12  ----------------------------<  92  4.1   |  20<L>  |  0.92    Ca    8.1<L>      07 Apr 2019 06:21  Phos  3.6     04-07  Mg     1.8     04-07    TPro  6.9  /  Alb  3.0<L>  /  TBili  0.6  /  DBili  x   /  AST  26  /  ALT  43<H>  /  AlkPhos  125<H>  04-07    Creatinine Trend: 0.92<--, 0.91<--, 1.06<--, 1.12<--, 1.09<--, 1.09<--     PHYSICAL EXAM  Vital Signs Last 24 Hrs  T(C): 37 (07 Apr 2019 05:57), Max: 37.4 (06 Apr 2019 20:40)  T(F): 98.6 (07 Apr 2019 05:57), Max: 99.4 (06 Apr 2019 20:40)  HR: 108 (07 Apr 2019 05:57) (102 - 109)  BP: 144/89 (07 Apr 2019 05:57) (138/85 - 146/83)  RR: 18 (07 Apr 2019 05:57) (18 - 18)  SpO2: 98% (07 Apr 2019 05:57) (96% - 98%)    CV: normal, S1 S2, RRR, no JVD, no m/r/g  Lungs: CTA b/l, normal effort   Abd: soft non tender, not distended, + bs  Ext:  no edema     TELEMETRY:   not on tele     DATA:      < from: Transthoracic Echocardiogram (03.29.19 @ 20:01) >  CONCLUSIONS:  1. Normal mitral valve. Minimal mitral regurgitation.  2. Normal left ventricular internal dimensions and wall  thicknesses.  3. Normal left ventricular systolic function. No segmental  wall motion abnormalities.  4. Normal right ventricular size and function.  5. Small pericardial effusion posterior to the left  ventricle and superior to the right atrium.  *** Compared with echocardiogram of 8/2/2017, left  ventricular systolic function has improved.  ------------------------------------------------------------------------  Confirmed on  4/1/2019 - 08:41:32 by Samson Hightower M.D.    < end of copied text >      ASSESSMENT:      53 male PMH multiple myeloma s/p bone marrow transplant and chemo/RT, perforated diverticulitis s/p Kiah's (proctosigmoidectomy with colostomy, 9/2018), nonischemic cardiomyopathy (mild global LV systolic dysfunction with EF 54% and diastolic LV dysfunction based on TTE in 11/2018), and HTN who is being seen for management of cardiomyopathy.    -no evidence of heart failure or complaints of anginal symptoms   -Echo with normal LV function and small pericardial effusion, no evidence of tamponade   -no further inpatient cardiac workup needed at this time  -follow up with outpatient cardiologist for small pericardial effusion noted on echo 3/29/19 on d/c

## 2019-04-07 NOTE — PROGRESS NOTE ADULT - ASSESSMENT
1. Multiple Myeloma    -- Day 6/Cycle 1 Bendamustine and Pomalidomide  -- Hold Pomalidomide for ANC<1000  -- start Zarxio 480 mcg QD. D/C after ANC > 1500 x 2 consecutive days or if WBC> 10,000  -- next decadron dose on 4/9 if pt still in house (40 mg PO )  -- monitor for tumor lysis (CMP, LDH, Phos, Uric Acid ) Daily. None so far  -- allopurinol 300mg PO QD  -- Zofran 8mg IV Q 8hrs PRN Nausea       2. Diffuse bone pain of unclear etiology    -- sx resolved  -- pt has known extensive bone involvement by myeloma  --  MRI C/T/L Spine w no cord compression       3. Transfusion dependent Anemia and Thrombocytopenia    -- sec to myeloma  -- transfuse platelets if Less than 10K or clinically significant bleeding  -- keep Hgb > 7    Torsten Murphy MD  [phone]

## 2019-04-07 NOTE — PROGRESS NOTE ADULT - PROBLEM SELECTOR PLAN 1
improving overall with analgesics, c/w pain control  * F/u AM labs (in particular wbc, h/h, plt)  - If downtrending wbc then plan to d/c palidomide and start neupogen  transfuse prn   additional management appreciated by hem/onc and consultants

## 2019-04-07 NOTE — CHART NOTE - NSCHARTNOTEFT_GEN_A_CORE
Pt's WBC 1.6 on AM labs. Discussed with Heme/Onc Dr. Murphy which recommended to DC Pomalidomide. Dr. Murphy to place neupogen orders; will f/u.

## 2019-04-07 NOTE — PROGRESS NOTE ADULT - SUBJECTIVE AND OBJECTIVE BOX
Pt is seen and examined  pt is awake and lying in bed   comfortable  no pain  no fevers/chills      PAST MEDICAL & SURGICAL HISTORY:  Diverticulitis  Multiple myeloma  Hypertension  Left ventricular dysfunction  Cardiomyopathy  Former smoker: (1 ppd x 11 years; Quit ~age 26)  History of bone marrow transplant  History of surgery: s/p exploratory laparotomy with sigmoid resection and colostomy on 9/2/2018      ROS:  Negative except for:    MEDICATIONS  (STANDING):  allopurinol 300 milliGRAM(s) Oral daily  carvedilol 25 milliGRAM(s) Oral every 12 hours  docusate sodium 100 milliGRAM(s) Oral three times a day  filgrastim-sndz Injectable 480 MICROGram(s) SubCutaneous daily  lisinopril 10 milliGRAM(s) Oral daily  morphine ER Tablet 30 milliGRAM(s) Oral every 12 hours  polyethylene glycol 3350 17 Gram(s) Oral once  polyethylene glycol 3350 17 Gram(s) Oral at bedtime  senna 2 Tablet(s) Oral at bedtime  sodium chloride 0.9%. 1000 milliLiter(s) (100 mL/Hr) IV Continuous <Continuous>  sodium chloride 0.9%. 1000 milliLiter(s) (70 mL/Hr) IV Continuous <Continuous>    MEDICATIONS  (PRN):  artificial  tears Solution 1 Drop(s) Both EYES every 6 hours PRN Dry Eyes  HYDROmorphone   Tablet 2 milliGRAM(s) Oral every 4 hours PRN Moderate Pain (4 - 6)  HYDROmorphone   Tablet 4 milliGRAM(s) Oral every 4 hours PRN Severe Pain (7 - 10)  HYDROmorphone  Injectable 1 milliGRAM(s) IV Push every 4 hours PRN Severe breakthrough pain  ondansetron Injectable 8 milliGRAM(s) IV Push every 8 hours PRN Nausea and/or Vomiting      Allergies    oxycodone (Vomiting; Nausea)    Intolerances        Vital Signs Last 24 Hrs  T(C): 37 (07 Apr 2019 05:57), Max: 37.4 (06 Apr 2019 20:40)  T(F): 98.6 (07 Apr 2019 05:57), Max: 99.4 (06 Apr 2019 20:40)  HR: 108 (07 Apr 2019 05:57) (108 - 109)  BP: 144/89 (07 Apr 2019 05:57) (138/85 - 144/89)  BP(mean): --  RR: 18 (07 Apr 2019 05:57) (18 - 18)  SpO2: 98% (07 Apr 2019 05:57) (97% - 98%)    PHYSICAL EXAM  General: adult in NAD  HEENT: clear oropharynx, anicteric sclera, pink conjunctiva  Neck: supple  CV: normal S1/S2 with no murmur rubs or gallops  Lungs: positive air movement b/l ant lungs,clear to auscultation, no wheezes, no rales  Abdomen: soft non-tender non-distended, no hepatosplenomegaly  Ext: no clubbing cyanosis or edema     LABS:                          8.0    1.67  )-----------( 31       ( 07 Apr 2019 06:21 )             24.3     Serial CBC's  04-07 @ 06:21  Hct-24.3 / Hgb-8.0 / Plat-31 / RBC-2.71 / WBC-1.67          Serial CBC's  04-06 @ 06:55  Hct-24.3 / Hgb-7.8 / Plat-14 / RBC-2.69 / WBC-1.99            04-07    137  |  103  |  12  ----------------------------<  92  4.1   |  20<L>  |  0.92    Ca    8.1<L>      07 Apr 2019 06:21  Phos  3.6     04-07  Mg     1.8     04-07    TPro  6.9  /  Alb  3.0<L>  /  TBili  0.6  /  DBili  x   /  AST  26  /  ALT  43<H>  /  AlkPhos  125<H>  04-07          Hemoglobin: 8.0 g/dL (04-07 @ 06:21)  Hematocrit: 24.3 % (04-07 @ 06:21)            RADIOLOGY & ADDITIONAL STUDIES:

## 2019-04-08 ENCOUNTER — TRANSCRIPTION ENCOUNTER (OUTPATIENT)
Age: 54
End: 2019-04-08

## 2019-04-08 VITALS
HEART RATE: 104 BPM | SYSTOLIC BLOOD PRESSURE: 138 MMHG | DIASTOLIC BLOOD PRESSURE: 79 MMHG | TEMPERATURE: 98 F | OXYGEN SATURATION: 100 % | RESPIRATION RATE: 16 BRPM

## 2019-04-08 LAB
ALBUMIN SERPL ELPH-MCNC: 3.2 G/DL — LOW (ref 3.3–5)
ALP SERPL-CCNC: 132 U/L — HIGH (ref 40–120)
ALT FLD-CCNC: 35 U/L — SIGNIFICANT CHANGE UP (ref 4–41)
ANION GAP SERPL CALC-SCNC: 11 MMO/L — SIGNIFICANT CHANGE UP (ref 7–14)
AST SERPL-CCNC: 18 U/L — SIGNIFICANT CHANGE UP (ref 4–40)
BASOPHILS # BLD AUTO: 0.01 K/UL — SIGNIFICANT CHANGE UP (ref 0–0.2)
BASOPHILS NFR BLD AUTO: 0.5 % — SIGNIFICANT CHANGE UP (ref 0–2)
BILIRUB SERPL-MCNC: 0.6 MG/DL — SIGNIFICANT CHANGE UP (ref 0.2–1.2)
BLD GP AB SCN SERPL QL: NEGATIVE — SIGNIFICANT CHANGE UP
BUN SERPL-MCNC: 12 MG/DL — SIGNIFICANT CHANGE UP (ref 7–23)
CALCIUM SERPL-MCNC: 8.2 MG/DL — LOW (ref 8.4–10.5)
CHLORIDE SERPL-SCNC: 105 MMOL/L — SIGNIFICANT CHANGE UP (ref 98–107)
CO2 SERPL-SCNC: 23 MMOL/L — SIGNIFICANT CHANGE UP (ref 22–31)
CREAT SERPL-MCNC: 0.97 MG/DL — SIGNIFICANT CHANGE UP (ref 0.5–1.3)
EOSINOPHIL # BLD AUTO: 0.02 K/UL — SIGNIFICANT CHANGE UP (ref 0–0.5)
EOSINOPHIL NFR BLD AUTO: 1 % — SIGNIFICANT CHANGE UP (ref 0–6)
GLUCOSE SERPL-MCNC: 93 MG/DL — SIGNIFICANT CHANGE UP (ref 70–99)
HCT VFR BLD CALC: 22.9 % — LOW (ref 39–50)
HGB BLD-MCNC: 7.4 G/DL — LOW (ref 13–17)
IMM GRANULOCYTES NFR BLD AUTO: 7.2 % — HIGH (ref 0–1.5)
LDH SERPL L TO P-CCNC: 166 U/L — SIGNIFICANT CHANGE UP (ref 135–225)
LYMPHOCYTES # BLD AUTO: 0.5 K/UL — LOW (ref 1–3.3)
LYMPHOCYTES # BLD AUTO: 24.2 % — SIGNIFICANT CHANGE UP (ref 13–44)
MAGNESIUM SERPL-MCNC: 1.7 MG/DL — SIGNIFICANT CHANGE UP (ref 1.6–2.6)
MCHC RBC-ENTMCNC: 28.4 PG — SIGNIFICANT CHANGE UP (ref 27–34)
MCHC RBC-ENTMCNC: 32.3 % — SIGNIFICANT CHANGE UP (ref 32–36)
MCV RBC AUTO: 87.7 FL — SIGNIFICANT CHANGE UP (ref 80–100)
MONOCYTES # BLD AUTO: 0.14 K/UL — SIGNIFICANT CHANGE UP (ref 0–0.9)
MONOCYTES NFR BLD AUTO: 6.8 % — SIGNIFICANT CHANGE UP (ref 2–14)
NEUTROPHILS # BLD AUTO: 1.25 K/UL — LOW (ref 1.8–7.4)
NEUTROPHILS NFR BLD AUTO: 60.3 % — SIGNIFICANT CHANGE UP (ref 43–77)
NRBC # FLD: 0.06 K/UL — SIGNIFICANT CHANGE UP (ref 0–0)
NRBC FLD-RTO: 2.9 — SIGNIFICANT CHANGE UP
PHOSPHATE SERPL-MCNC: 3.3 MG/DL — SIGNIFICANT CHANGE UP (ref 2.5–4.5)
PLATELET # BLD AUTO: 21 K/UL — LOW (ref 150–400)
PMV BLD: SIGNIFICANT CHANGE UP FL (ref 7–13)
POTASSIUM SERPL-MCNC: 3.8 MMOL/L — SIGNIFICANT CHANGE UP (ref 3.5–5.3)
POTASSIUM SERPL-SCNC: 3.8 MMOL/L — SIGNIFICANT CHANGE UP (ref 3.5–5.3)
PROT SERPL-MCNC: 6.9 G/DL — SIGNIFICANT CHANGE UP (ref 6–8.3)
RBC # BLD: 2.61 M/UL — LOW (ref 4.2–5.8)
RBC # FLD: 16.7 % — HIGH (ref 10.3–14.5)
RH IG SCN BLD-IMP: POSITIVE — SIGNIFICANT CHANGE UP
SODIUM SERPL-SCNC: 139 MMOL/L — SIGNIFICANT CHANGE UP (ref 135–145)
URATE SERPL-MCNC: 5.3 MG/DL — SIGNIFICANT CHANGE UP (ref 3.4–8.8)
WBC # BLD: 2.07 K/UL — LOW (ref 3.8–10.5)
WBC # FLD AUTO: 2.07 K/UL — LOW (ref 3.8–10.5)

## 2019-04-08 RX ORDER — ERYTHROPOIETIN 10000 [IU]/ML
20000 INJECTION, SOLUTION INTRAVENOUS; SUBCUTANEOUS ONCE
Qty: 0 | Refills: 0 | Status: COMPLETED | OUTPATIENT
Start: 2019-04-08 | End: 2019-04-08

## 2019-04-08 RX ORDER — DOCUSATE SODIUM 100 MG
1 CAPSULE ORAL
Qty: 0 | Refills: 0 | COMMUNITY
Start: 2019-04-08

## 2019-04-08 RX ORDER — SENNA PLUS 8.6 MG/1
2 TABLET ORAL
Qty: 0 | Refills: 0 | COMMUNITY
Start: 2019-04-08

## 2019-04-08 RX ORDER — DEXAMETHASONE 0.5 MG/5ML
40 ELIXIR ORAL ONCE
Qty: 0 | Refills: 0 | Status: DISCONTINUED | OUTPATIENT
Start: 2019-04-09 | End: 2019-04-08

## 2019-04-08 RX ORDER — ALLOPURINOL 300 MG
1 TABLET ORAL
Qty: 30 | Refills: 0 | OUTPATIENT
Start: 2019-04-08 | End: 2019-05-07

## 2019-04-08 RX ORDER — FILGRASTIM 480MCG/1.6
480 VIAL (ML) INJECTION
Qty: 0 | Refills: 0 | COMMUNITY
Start: 2019-04-08

## 2019-04-08 RX ORDER — FILGRASTIM 480MCG/1.6
300 VIAL (ML) INJECTION ONCE
Qty: 0 | Refills: 0 | Status: COMPLETED | OUTPATIENT
Start: 2019-04-08 | End: 2019-04-08

## 2019-04-08 RX ORDER — MORPHINE SULFATE 50 MG/1
1 CAPSULE, EXTENDED RELEASE ORAL
Qty: 10 | Refills: 0 | OUTPATIENT
Start: 2019-04-08 | End: 2019-04-12

## 2019-04-08 RX ORDER — POLYETHYLENE GLYCOL 3350 17 G/17G
17 POWDER, FOR SOLUTION ORAL
Qty: 0 | Refills: 0 | COMMUNITY
Start: 2019-04-08

## 2019-04-08 RX ORDER — HYDROMORPHONE HYDROCHLORIDE 2 MG/ML
1 INJECTION INTRAMUSCULAR; INTRAVENOUS; SUBCUTANEOUS
Qty: 30 | Refills: 0 | OUTPATIENT
Start: 2019-04-08 | End: 2019-04-12

## 2019-04-08 RX ORDER — LISINOPRIL 2.5 MG/1
1 TABLET ORAL
Qty: 30 | Refills: 0 | OUTPATIENT
Start: 2019-04-08 | End: 2019-05-07

## 2019-04-08 RX ADMIN — ERYTHROPOIETIN 20000 UNIT(S): 10000 INJECTION, SOLUTION INTRAVENOUS; SUBCUTANEOUS at 11:15

## 2019-04-08 RX ADMIN — MORPHINE SULFATE 30 MILLIGRAM(S): 50 CAPSULE, EXTENDED RELEASE ORAL at 16:58

## 2019-04-08 RX ADMIN — LISINOPRIL 10 MILLIGRAM(S): 2.5 TABLET ORAL at 05:47

## 2019-04-08 RX ADMIN — CARVEDILOL PHOSPHATE 25 MILLIGRAM(S): 80 CAPSULE, EXTENDED RELEASE ORAL at 05:47

## 2019-04-08 RX ADMIN — MORPHINE SULFATE 30 MILLIGRAM(S): 50 CAPSULE, EXTENDED RELEASE ORAL at 05:47

## 2019-04-08 RX ADMIN — Medication 300 MICROGRAM(S): at 15:48

## 2019-04-08 RX ADMIN — Medication 300 MILLIGRAM(S): at 13:12

## 2019-04-08 NOTE — PROGRESS NOTE ADULT - SUBJECTIVE AND OBJECTIVE BOX
Pt is seen and examined  pt is awake and lying in bed   No complaints  No pain  Ambulating w/o difficulty  Received GCSF yesterday      PAST MEDICAL & SURGICAL HISTORY:  Diverticulitis  Multiple myeloma  Hypertension  Left ventricular dysfunction  Cardiomyopathy  Former smoker: (1 ppd x 11 years; Quit ~age 26)  History of bone marrow transplant  History of surgery: s/p exploratory laparotomy with sigmoid resection and colostomy on 9/2/2018      ROS:  Negative except for:    MEDICATIONS  (STANDING):  allopurinol 300 milliGRAM(s) Oral daily  carvedilol 25 milliGRAM(s) Oral every 12 hours  docusate sodium 100 milliGRAM(s) Oral three times a day  filgrastim-sndz Injectable 480 MICROGram(s) SubCutaneous daily  lisinopril 10 milliGRAM(s) Oral daily  morphine ER Tablet 30 milliGRAM(s) Oral every 12 hours  polyethylene glycol 3350 17 Gram(s) Oral once  polyethylene glycol 3350 17 Gram(s) Oral at bedtime  senna 2 Tablet(s) Oral at bedtime  sodium chloride 0.9%. 1000 milliLiter(s) (100 mL/Hr) IV Continuous <Continuous>  sodium chloride 0.9%. 1000 milliLiter(s) (70 mL/Hr) IV Continuous <Continuous>    MEDICATIONS  (PRN):  artificial  tears Solution 1 Drop(s) Both EYES every 6 hours PRN Dry Eyes  HYDROmorphone   Tablet 2 milliGRAM(s) Oral every 4 hours PRN Moderate Pain (4 - 6)  HYDROmorphone   Tablet 4 milliGRAM(s) Oral every 4 hours PRN Severe Pain (7 - 10)  HYDROmorphone  Injectable 1 milliGRAM(s) IV Push every 4 hours PRN Severe breakthrough pain  ondansetron Injectable 8 milliGRAM(s) IV Push every 8 hours PRN Nausea and/or Vomiting      Allergies    oxycodone (Vomiting; Nausea)    Intolerances        Vital Signs Last 24 Hrs  T(C): 36.9 (08 Apr 2019 05:45), Max: 36.9 (08 Apr 2019 05:45)  T(F): 98.5 (08 Apr 2019 05:45), Max: 98.5 (08 Apr 2019 05:45)  HR: 99 (08 Apr 2019 05:45) (99 - 111)  BP: 139/89 (08 Apr 2019 05:45) (138/87 - 149/92)  BP(mean): --  RR: 16 (08 Apr 2019 05:45) (16 - 18)  SpO2: 100% (08 Apr 2019 05:45) (100% - 100%)    PHYSICAL EXAM  General: adult in NAD  HEENT: clear oropharynx, anicteric sclera, pink conjunctiva  Neck: supple  CV: normal S1/S2 with no murmur rubs or gallops  Lungs: positive air movement b/l ant lungs,clear to auscultation, no wheezes, no rales  Abdomen: soft non-tender non-distended, no hepatosplenomegaly  Ext: no clubbing cyanosis or edema     LABS:                          8.0    1.67  )-----------( 31       ( 07 Apr 2019 06:21 )             24.3     Serial CBC's  04-07 @ 06:21  Hct-24.3 / Hgb-8.0 / Plat-31 / RBC-2.71 / WBC-1.67          Serial CBC's  04-06 @ 06:55  Hct-24.3 / Hgb-7.8 / Plat-14 / RBC-2.69 / WBC-1.99            04-07    137  |  103  |  12  ----------------------------<  92  4.1   |  20<L>  |  0.92    Ca    8.1<L>      07 Apr 2019 06:21  Phos  3.6     04-07  Mg     1.8     04-07    TPro  6.9  /  Alb  3.0<L>  /  TBili  0.6  /  DBili  x   /  AST  26  /  ALT  43<H>  /  AlkPhos  125<H>  04-07          Hemoglobin: 8.0 g/dL (04-07 @ 06:21)  Hematocrit: 24.3 % (04-07 @ 06:21)            RADIOLOGY & ADDITIONAL STUDIES:

## 2019-04-08 NOTE — PROGRESS NOTE ADULT - PROBLEM SELECTOR PROBLEM 6
Thrombocytopenia
Thrombocytopenia
Debility
Essential hypertension
Thrombocytopenia

## 2019-04-08 NOTE — PROGRESS NOTE ADULT - REASON FOR ADMISSION
Diffuse body pain

## 2019-04-08 NOTE — PROGRESS NOTE ADULT - PROBLEM SELECTOR PLAN 3
afebrile, f/u cultures to final date
f/u cultures to final date  no infectious source identified to date thus no abx
improved  afebrile, no abx
on admission  as above
persistent  as above  maintain hgb > 7  monitor labs
persistent  management as above
transfusion
afebrile, f/u cultures to final date
f/u cultures to final date  no infectious source identified to date thus no abx

## 2019-04-08 NOTE — PROGRESS NOTE ADULT - PROBLEM SELECTOR PROBLEM 2
Acute on chronic systolic heart failure
Fever
Thrombocytopenia
Acute on chronic systolic heart failure
Acute on chronic systolic heart failure

## 2019-04-08 NOTE — PROGRESS NOTE ADULT - SUBJECTIVE AND OBJECTIVE BOX
S: no chest pain or sob       MEDICATIONS  (STANDING):  allopurinol 300 milliGRAM(s) Oral daily  carvedilol 25 milliGRAM(s) Oral every 12 hours  docusate sodium 100 milliGRAM(s) Oral three times a day  filgrastim-sndz Injectable 480 MICROGram(s) SubCutaneous daily  lisinopril 10 milliGRAM(s) Oral daily  morphine ER Tablet 30 milliGRAM(s) Oral every 12 hours  polyethylene glycol 3350 17 Gram(s) Oral once  polyethylene glycol 3350 17 Gram(s) Oral at bedtime  senna 2 Tablet(s) Oral at bedtime  sodium chloride 0.9%. 1000 milliLiter(s) (100 mL/Hr) IV Continuous <Continuous>  sodium chloride 0.9%. 1000 milliLiter(s) (70 mL/Hr) IV Continuous <Continuous>    MEDICATIONS  (PRN):  artificial  tears Solution 1 Drop(s) Both EYES every 6 hours PRN Dry Eyes  HYDROmorphone   Tablet 2 milliGRAM(s) Oral every 4 hours PRN Moderate Pain (4 - 6)  HYDROmorphone   Tablet 4 milliGRAM(s) Oral every 4 hours PRN Severe Pain (7 - 10)  HYDROmorphone  Injectable 1 milliGRAM(s) IV Push every 4 hours PRN Severe breakthrough pain  ondansetron Injectable 8 milliGRAM(s) IV Push every 8 hours PRN Nausea and/or Vomiting      LABS:                        7.4    2.07  )-----------( 21       ( 08 Apr 2019 06:05 )             22.9     139  |  105  |  12  ----------------------------<  93  3.8   |  23  |  0.97    Ca    8.2<L>      08 Apr 2019 06:05  Phos  3.3     04-08  Mg     1.7     04-08    TPro  6.9  /  Alb  3.2<L>  /  TBili  0.6  /  DBili  x   /  AST  18  /  ALT  35  /  AlkPhos  132<H>  04-08    Creatinine Trend: 0.97<--, 0.92<--, 0.91<--, 1.06<--, 1.12<--, 1.09<--     PHYSICAL EXAM  Vital Signs Last 24 Hrs  T(C): 36.6 (08 Apr 2019 13:07), Max: 36.9 (08 Apr 2019 05:45)  T(F): 97.9 (08 Apr 2019 13:07), Max: 98.5 (08 Apr 2019 05:45)  HR: 104 (08 Apr 2019 13:07) (99 - 111)  BP: 138/79 (08 Apr 2019 13:07) (138/79 - 149/92)  RR: 16 (08 Apr 2019 13:07) (16 - 18)  SpO2: 100% (08 Apr 2019 13:07) (100% - 100%)    CV: normal, S1 S2, RRR, no JVD, no m/r/g  Lungs: CTA b/l, normal effort   Abd: soft non tender, not distended, + bs  Ext:  no edema     DATA:      < from: Transthoracic Echocardiogram (03.29.19 @ 20:01) >  CONCLUSIONS:  1. Normal mitral valve. Minimal mitral regurgitation.  2. Normal left ventricular internal dimensions and wall  thicknesses.  3. Normal left ventricular systolic function. No segmental  wall motion abnormalities.  4. Normal right ventricular size and function.  5. Small pericardial effusion posterior to the left  ventricle and superior to the right atrium.  *** Compared with echocardiogram of 8/2/2017, left  ventricular systolic function has improved.  ------------------------------------------------------------------------  Confirmed on  4/1/2019 - 08:41:32 by Samsno Hightower M.D.    < end of copied text >      ASSESSMENT:     53 male PMH multiple myeloma s/p bone marrow transplant and chemo/RT, perforated diverticulitis s/p Kiah's (proctosigmoidectomy with colostomy, 9/2018), nonischemic cardiomyopathy (mild global LV systolic dysfunction with EF 54% and diastolic LV dysfunction based on TTE in 11/2018), and HTN who is being seen for management of cardiomyopathy.    -no evidence of heart failure or complaints of anginal symptoms   -Echo with normal LV function and small pericardial effusion, no evidence of tamponade   -no further inpatient cardiac workup needed at this time  -follow up with outpatient cardiologist for small pericardial effusion noted on echo 3/29/19 on d/c

## 2019-04-08 NOTE — PROGRESS NOTE ADULT - PROBLEM SELECTOR PROBLEM 3
Fever
Pancytopenia with fever
Symptomatic anemia
Fever
Fever

## 2019-04-08 NOTE — PROGRESS NOTE ADULT - PROBLEM SELECTOR PLAN 1
well controlled pain.   **-> H/H and Plts mildly downtrending.  Case discussed with Hem/Onc.  Decadron, 1 U PRBC and 1 U Platelet today. Neupogen.  Discharge today after transfusions, and pt will f/u with / tmrw in the office

## 2019-04-08 NOTE — DISCHARGE NOTE NURSING/CASE MANAGEMENT/SOCIAL WORK - NSDCPEPT PROEDHF_GEN_ALL_CORE
Call primary care provider for follow up after discharge/Monitor weight daily/Low salt diet/Activities as tolerated/Report signs and symptoms to primary care provider

## 2019-04-08 NOTE — PROGRESS NOTE ADULT - PROBLEM SELECTOR PROBLEM 4
Multiple myeloma in relapse
Pain
Pancytopenia with fever

## 2019-04-08 NOTE — PROGRESS NOTE ADULT - PROBLEM SELECTOR PLAN 2
-> F/u TTE  c/w cardiac meds
clinically improving  c/w pain control, c/w cardiac meds
improved  c/w cardiac meds
transfusion
w/ tachycardia on admission, and pancytopenia  no source of infection identitified  monitor clinical status and labs, adjust management as needed
-> F/u TTE  - Lasix today   adjust management as needed, otherwise c/w cardiac meds
causing b/l pleural effusions, complicated by pain 2/2 MM relapse  iv lasix today  repeat cxr in am

## 2019-04-08 NOTE — PROGRESS NOTE ADULT - ATTENDING COMMENTS
Agree with above  No further inpatient cardiac workup needed at this time.     Marc Owen MD
Agree with above.   TTE with normal LVEF  No further inpatient cardiac workup needed at this time.     Marc Owen MD
CARDIOLOGY ATTENDING    Agree with above. Awaiting echo given prior mild systolic cardiomyopathy. If LVEF remains unremarkable then no further inpatient cardiac workup expected
CARDIOLOGY ATTENDING    Agree with above. Awaiting echo given prior mild systolic cardiomyopathy. If LVEF remains unremarkable then no further inpatient cardiac workup expected .
Patient seen and examined, agree with above assessment and plan as transcribed above.    - No need for further inpatient cardiac work up.    Joseph Ragsdale MD, [facility]  BEEPER [phone]
Patient seen and examined.  Agree with above.   -TTE with normal LV function and small pericardial effusion  -no clinical or echo evidence of tamponade  -no further cardiac workup needed at this time    Marc Owen MD
Patient seen and examined.  Agree with above.   No further inpatient cardiac workup needed at this time.     Marc Owen MD
Patient seen and examined.  Agree with above.   -Admitted with diffuse body pain, being seen for history of cardiomyopathy  -on IVF per renal for ROBB - monitor fluid status closely  -check TTE to evaluate LV function  -further cardiac workup pending above    Marc Owen MD
acute pulmonary edema and effusions b/l - 2/2 acute on chronic systolic chf exacerbation. Clinically improved
acute pulmonary edema and effusions b/l - clinically improving s/p lasix
acute pulmonary edema and effusions b/l - symptomatic, iv lasix, monitor closely

## 2019-04-08 NOTE — PROGRESS NOTE ADULT - PROBLEM SELECTOR PLAN 6
monitor plts and clinical status
monitor plts and clinical status
2/2 pain 2/2 MM   pain control  tx MM  poor prognosis
c.w Coreg
c.w Coreg
c/w rx   monitor vitals
monitor plts and clinical status
monitor plts and clinical status, additional transfusion prn
monitor plts and clinical status, additional transfusion prn
persistent  plt transfusion
persistent  transfuse if plt < 10,000 or if patient develops bleeding with higher platelet counts
s/p transfusion  monitor plts and clinical status, additional transfusion prn

## 2019-04-08 NOTE — PROGRESS NOTE ADULT - PROBLEM SELECTOR PROBLEM 1
Multiple myeloma in relapse
Pain
ROBB (acute kidney injury)
Pain
Pain

## 2019-04-08 NOTE — DISCHARGE NOTE NURSING/CASE MANAGEMENT/SOCIAL WORK - NSDCDPATPORTLINK_GEN_ALL_CORE
You can access the "[employer] Patient Portal, offered by [facility], by registering with the following website: [email or web address]

## 2019-04-08 NOTE — PROGRESS NOTE ADULT - PROBLEM SELECTOR PLAN 5
causing severe bone pain   inpt/outpt therapy per onc
causing severe bone pain   inpt/outpt therapy per onc
c/w tx
c/w tx
causing severe bone pain   inpt/outpt therapy per onc
causing severe bone pain   inpt/outpt therapy per onc
causing severe bone pain   mm treatment
clinically improved  c/w cardiac meds
s/p platelet transfusion  monitor plts and clinical stauts

## 2019-04-08 NOTE — PROGRESS NOTE ADULT - ASSESSMENT
1. Multiple Myeloma    -- Day 7/Cycle 1 Bendamustine and Pomalidomide  -- Pomalidomide on hold  for ANC<1000. Will likely resume in 1-2 days  --  Zarxio 480 mcg QD. D/C after ANC > 1500 x 2 consecutive days or if WBC> 10,000  -- next decadron dose on 4/9 if pt still in house (40 mg PO )  -- allopurinol 300mg PO QD  -- Zofran 8mg IV Q 8hrs PRN Nausea       2. Diffuse bone pain of unclear etiology    -- sx resolved  -- pt has known extensive bone involvement by myeloma  --  MRI C/T/L Spine w no cord compression       3. Transfusion dependent Anemia and Thrombocytopenia    -- sec to myeloma  -- transfuse platelets if Less than 10K or clinically significant bleeding  -- keep Hgb > 7    If counts stable can d/c home in 24-48 hrs from Heme Onc perspective    Torsten Murphy MD  [phone]

## 2019-04-08 NOTE — PROGRESS NOTE ADULT - PROBLEM SELECTOR PROBLEM 5
Multiple myeloma in relapse
Multiple myeloma in relapse
Acute on chronic systolic heart failure
Multiple myeloma
Multiple myeloma
Multiple myeloma in relapse
Thrombocytopenia

## 2019-04-08 NOTE — PROGRESS NOTE ADULT - SUBJECTIVE AND OBJECTIVE BOX
[facility]      Seen earlier today, and examined.  - Today, patient is without complaints.           *****MEDICATIONS: Current medication reviewed and documented.    MEDICATIONS  (STANDING):  allopurinol 300 milliGRAM(s) Oral daily  carvedilol 25 milliGRAM(s) Oral every 12 hours  docusate sodium 100 milliGRAM(s) Oral three times a day  lisinopril 10 milliGRAM(s) Oral daily  morphine ER Tablet 30 milliGRAM(s) Oral every 12 hours  polyethylene glycol 3350 17 Gram(s) Oral once  polyethylene glycol 3350 17 Gram(s) Oral at bedtime  senna 2 Tablet(s) Oral at bedtime  sodium chloride 0.9%. 1000 milliLiter(s) (100 mL/Hr) IV Continuous <Continuous>  sodium chloride 0.9%. 1000 milliLiter(s) (70 mL/Hr) IV Continuous <Continuous>    MEDICATIONS  (PRN):  artificial  tears Solution 1 Drop(s) Both EYES every 6 hours PRN Dry Eyes  HYDROmorphone   Tablet 2 milliGRAM(s) Oral every 4 hours PRN Moderate Pain (4 - 6)  HYDROmorphone   Tablet 4 milliGRAM(s) Oral every 4 hours PRN Severe Pain (7 - 10)  HYDROmorphone  Injectable 1 milliGRAM(s) IV Push every 4 hours PRN Severe breakthrough pain  ondansetron Injectable 8 milliGRAM(s) IV Push every 8 hours PRN Nausea and/or Vomiting          ***** VITAL SIGNS:  T(F): 97.9 (19 @ 13:07), Max: 98.5 (19 @ 05:45)  HR: 104 (19 @ 13:07) (99 - 104)  BP: 138/79 (19 @ 13:07) (138/79 - 139/89)  RR: 16 (19 @ 13:07) (16 - 18)  SpO2: 100% (19 @ 13:07) (100% - 100%)  Wt(kg): --  ,   I&O's Summary           *****PHYSICAL EXAM:   alert oriented x 3 attention comprehension are fair.  Able to name, repeat.   EOmi fundi not visualized   no nystagmus VFF to confrontation  Tongue is midline  Palate elevates symmetrically   Moving all 4 ext spontaneously no drift appreciated    Gait not assessed.            *****LAB AND IMAGIN.4    2.07  )-----------( 21       ( 2019 06:05 )             22.9               04-    139  |  105  |  12  ----------------------------<  93  3.8   |  23  |  0.97    Ca    8.2<L>      2019 06:05  Phos  3.3     04-08  Mg     1.7     -08    TPro  6.9  /  Alb  3.2<L>  /  TBili  0.6  /  DBili  x   /  AST  18  /  ALT  35  /  AlkPhos  132<H>                           [All pertinent recent Imaging/Reports reviewed]           *****A S S E S S M E N T   A N D   P L A N :        53M hx of multiple myeloma s/p bone marrow transplant (~1.5 yrs ago) and chemo/RT (stopped ~2 months ago in preparation for colostomy reversal), perforated diverticulitis s/p Kiah's (proctosigmoidectomy with colostomy, 2018), nonischemic cardiomyopathy (mild global LV systolic dysfunction with EF 54% and diastolic LV dysfunction based on TTE in 2018), and HTN presents to [facility] ED c/o diffuse body pain. Patient recently discharged 3/23/19 from [facility] after being hospitalized for thrombocytopenia. Pt presents with severe pain all over a day after discharge. Pt was discharged after multiple platelet infusion.         Problem/Recommendations 1:  pain generalized improving, likely due to relapsed myeloma   extensive bony infiltration        pain management per primary   nl  cpk   mobility improved able to ambulate independently     Thrombocytopenia defer to hem for management.   f/u with hem on discharge.   Thank you for allowing me to participate in the care of this patient. Please do not hesitate to call me if you have any  questions.        ________________  Margret Yousif MD  [facility] ([facility]  [phone]      30 minutes spent on total encounter; more than 50 % of the visit was  spent counseling about plan of care, compliance to diet/exercise and medication regimen and or  coordinating care by the attending physician.      It is advised that s stroke patients follow up with NP Zulema Mckeon @ [phone] in 1- 2 weeks.   Others please follow up with Dr. Michael Nissenbaum [phone]

## 2019-04-08 NOTE — PROGRESS NOTE ADULT - PROBLEM SELECTOR PLAN 7
improving w/ analgesics   c/w pain control  tx MM  poor prognosis
improving w/ analgesics   c/w pain control  tx MM  poor prognosis
Avoid hepatotoxic agents when possible and trend liver enzymes
c/w supportive medical care   treatment of underlying MM
c/w tx
improving w/ analgesics   c/w pain control  tx MM  poor prognosis
improving w/ analgesics   c/w pain control  tx MM  poor prognosis
thoracic and lumbar, 2/2 multiple myeloma, symptomatic   c/w supportive care  treat underlying MM
thoracic and lumbar, 2/2 multiple myeloma, symptomatic   c/w supportive care  treat underlying MM

## 2019-04-08 NOTE — PROGRESS NOTE ADULT - SUBJECTIVE AND OBJECTIVE BOX
Patient seen and examined at bedside  No acute events noted overnight  Case discussed with medical team    HPI:  53M hx of multiple myeloma s/p bone marrow transplant (~1.5 yrs ago) and chemo/RT (stopped ~2 months ago in preparation for colostomy reversal), perforated diverticulitis s/p Kiah's (proctosigmoidectomy with colostomy, 9/2018), nonischemic cardiomyopathy (mild global LV systolic dysfunction with EF 54% and diastolic LV dysfunction based on TTE in 11/2018), and HTN presents to [facility] ED c/o diffuse body pain. Patient recently discharged 3/23/19 from [facility] after being hospitalized for thrombocytopenia.     Patient started to develop severe non-radiating throbbing 10/10 pain in his b/l shoulders, chest, abdomen, and b/l thighs since Sunday night - leaving him essentially bedbound. Was supposed to see Dr. Murphy for follow up today. However because of the pain he came to the ED (26 Mar 2019 11:51)      PAST MEDICAL & SURGICAL HISTORY:  Diverticulitis  Multiple myeloma  Hypertension  Left ventricular dysfunction  Cardiomyopathy  Former smoker: (1 ppd x 11 years; Quit ~age 26)  History of bone marrow transplant  History of surgery: s/p exploratory laparotomy with sigmoid resection and colostomy on 9/2/2018      oxycodone (Vomiting; Nausea)       MEDICATIONS  (STANDING):  allopurinol 300 milliGRAM(s) Oral daily  carvedilol 25 milliGRAM(s) Oral every 12 hours  docusate sodium 100 milliGRAM(s) Oral three times a day  epoetin haylee Injectable 52894 Unit(s) SubCutaneous once  filgrastim-sndz Injectable 480 MICROGram(s) SubCutaneous daily  lisinopril 10 milliGRAM(s) Oral daily  morphine ER Tablet 30 milliGRAM(s) Oral every 12 hours  polyethylene glycol 3350 17 Gram(s) Oral once  polyethylene glycol 3350 17 Gram(s) Oral at bedtime  senna 2 Tablet(s) Oral at bedtime  sodium chloride 0.9%. 1000 milliLiter(s) (100 mL/Hr) IV Continuous <Continuous>  sodium chloride 0.9%. 1000 milliLiter(s) (70 mL/Hr) IV Continuous <Continuous>    MEDICATIONS  (PRN):  artificial  tears Solution 1 Drop(s) Both EYES every 6 hours PRN Dry Eyes  HYDROmorphone   Tablet 2 milliGRAM(s) Oral every 4 hours PRN Moderate Pain (4 - 6)  HYDROmorphone   Tablet 4 milliGRAM(s) Oral every 4 hours PRN Severe Pain (7 - 10)  HYDROmorphone  Injectable 1 milliGRAM(s) IV Push every 4 hours PRN Severe breakthrough pain  ondansetron Injectable 8 milliGRAM(s) IV Push every 8 hours PRN Nausea and/or Vomiting      REVIEW OF SYSTEMS:  CONSTITUTIONAL: (+) malaise.   EYES: No acute change in vision   ENT:  No tinnitus  NECK: No stiffness  RESPIRATORY: No hemoptysis  CARDIOVASCULAR: No chest pain, palpitations, syncope  GASTROINTESTINAL: No hematemesis, diarrhea, melena, or hematochezia.  GENITOURINARY: No hematuria  NEUROLOGICAL: No headaches  LYMPH Nodes: No enlarged glands  ENDOCRINE: No heat or cold intolerance	    T(C): 36.9 (04-08-19 @ 05:45), Max: 36.9 (04-08-19 @ 05:45)  HR: 99 (04-08-19 @ 05:45) (99 - 111)  BP: 139/89 (04-08-19 @ 05:45) (138/87 - 149/92)  RR: 16 (04-08-19 @ 05:45) (16 - 18)  SpO2: 100% (04-08-19 @ 05:45) (100% - 100%)    PHYSICAL EXAMINATION:   Constitutional: WD, NAD  HEENT: NC, AT  Neck:  Supple  Respiratory:  Adequate airflow b/l. Not using accessory muscles of respiration.  Cardiovascular:  S1 & S2 intact, no R/G, 2+ radial pulses b/l  Gastrointestinal: Soft, NT, ND, normoactive b.s., no organomegaly/RT/rigidity  Extremities: WWP  Neurological:  Alert and awake.  No acute focal motor deficits. Crude sensation intact.     Labs and imaging reviewed    LABS:                        7.4    2.07  )-----------( 21       ( 08 Apr 2019 06:05 )             22.9     04-08    139  |  105  |  12  ----------------------------<  93  3.8   |  23  |  0.97    Ca    8.2<L>      08 Apr 2019 06:05  Phos  3.3     04-08  Mg     1.7     04-08    TPro  6.9  /  Alb  3.2<L>  /  TBili  0.6  /  DBili  x   /  AST  18  /  ALT  35  /  AlkPhos  132<H>  04-08            CAPILLARY BLOOD GLUCOSE            LIVER FUNCTIONS - ( 08 Apr 2019 06:05 )  Alb: 3.2 g/dL / Pro: 6.9 g/dL / ALK PHOS: 132 u/L / ALT: 35 u/L / AST: 18 u/L / GGT: x               RADIOLOGY & ADDITIONAL STUDIES:

## 2019-04-08 NOTE — PROGRESS NOTE ADULT - PROBLEM SELECTOR PROBLEM 7
Debility
Multiple myeloma
Transaminitis
Vertebral fracture, pathological

## 2019-04-11 ENCOUNTER — OUTPATIENT (OUTPATIENT)
Dept: OUTPATIENT SERVICES | Facility: HOSPITAL | Age: 54
LOS: 1 days | Discharge: HOME | End: 2019-04-11

## 2019-04-11 ENCOUNTER — NON-APPOINTMENT (OUTPATIENT)
Age: 54
End: 2019-04-11

## 2019-04-11 ENCOUNTER — APPOINTMENT (OUTPATIENT)
Dept: CARDIOLOGY | Facility: CLINIC | Age: 54
End: 2019-04-11
Payer: COMMERCIAL

## 2019-04-11 VITALS
HEIGHT: 74 IN | HEART RATE: 109 BPM | OXYGEN SATURATION: 98 % | BODY MASS INDEX: 23.1 KG/M2 | DIASTOLIC BLOOD PRESSURE: 88 MMHG | WEIGHT: 180 LBS | SYSTOLIC BLOOD PRESSURE: 162 MMHG

## 2019-04-11 VITALS — HEART RATE: 100 BPM | OXYGEN SATURATION: 100 %

## 2019-04-11 DIAGNOSIS — C90.00 MULTIPLE MYELOMA NOT HAVING ACHIEVED REMISSION: ICD-10-CM

## 2019-04-11 DIAGNOSIS — I34.0 NONRHEUMATIC MITRAL (VALVE) INSUFFICIENCY: ICD-10-CM

## 2019-04-11 DIAGNOSIS — I35.1 NONRHEUMATIC AORTIC (VALVE) INSUFFICIENCY: ICD-10-CM

## 2019-04-11 DIAGNOSIS — Z94.81 BONE MARROW TRANSPLANT STATUS: Chronic | ICD-10-CM

## 2019-04-11 DIAGNOSIS — Z98.890 OTHER SPECIFIED POSTPROCEDURAL STATES: Chronic | ICD-10-CM

## 2019-04-11 DIAGNOSIS — I42.9 CARDIOMYOPATHY, UNSPECIFIED: ICD-10-CM

## 2019-04-11 PROCEDURE — 93000 ELECTROCARDIOGRAM COMPLETE: CPT

## 2019-04-11 PROCEDURE — 99214 OFFICE O/P EST MOD 30 MIN: CPT

## 2019-04-11 RX ORDER — ALLOPURINOL 300 MG/1
300 TABLET ORAL
Qty: 30 | Refills: 0 | Status: ACTIVE | COMMUNITY
Start: 2019-04-08

## 2019-04-11 RX ORDER — HYDROMORPHONE HYDROCHLORIDE 2 MG/1
2 TABLET ORAL
Qty: 30 | Refills: 0 | Status: ACTIVE | COMMUNITY
Start: 2019-04-08

## 2019-04-11 RX ORDER — MORPHINE SULFATE 30 MG/1
30 TABLET, FILM COATED, EXTENDED RELEASE ORAL
Qty: 10 | Refills: 0 | Status: ACTIVE | COMMUNITY
Start: 2019-04-08

## 2019-04-11 RX ORDER — LISINOPRIL 10 MG/1
10 TABLET ORAL
Qty: 30 | Refills: 0 | Status: ACTIVE | COMMUNITY
Start: 2019-04-08

## 2019-04-11 RX ORDER — HYDROCHLOROTHIAZIDE 25 MG/1
25 TABLET ORAL DAILY
Qty: 90 | Refills: 3 | Status: DISCONTINUED | COMMUNITY
Start: 2019-01-02 | End: 2019-04-11

## 2019-04-11 NOTE — DISCUSSION/SUMMARY
[FreeTextEntry1] : The patient was examined. His blood pressure was 162/88,and his pulse was 100.. His lungs were clear to auscultation. Cardiac exam was negative for murmurs rubs or gallops. His abdomen is status post a Delacruz's procedure with colostomy. The remainder of his physical exam is unremarkable. His EKG showed normal sinus rhythm with nonspecific ST and T wave changes. No acute changes.He will continue his current medications. He'll try to cut down on salt and caffeine. He will return in 3 months or earlier if needed.

## 2019-04-11 NOTE — PHYSICAL EXAM
[General Appearance - Well Developed] : well developed [Well Groomed] : well groomed [Normal Appearance] : normal appearance [General Appearance - Well Nourished] : well nourished [No Deformities] : no deformities [General Appearance - In No Acute Distress] : no acute distress [Eyelids - No Xanthelasma] : the eyelids demonstrated no xanthelasmas [Normal Conjunctiva] : the conjunctiva exhibited no abnormalities [Normal Oral Mucosa] : normal oral mucosa [No Oral Pallor] : no oral pallor [No Oral Cyanosis] : no oral cyanosis [Normal Jugular Venous A Waves Present] : normal jugular venous A waves present [Normal Jugular Venous V Waves Present] : normal jugular venous V waves present [No Jugular Venous Ricks A Waves] : no jugular venous ricks A waves [Respiration, Rhythm And Depth] : normal respiratory rhythm and effort [Exaggerated Use Of Accessory Muscles For Inspiration] : no accessory muscle use [Heart Rate And Rhythm] : heart rate and rhythm were normal [Auscultation Breath Sounds / Voice Sounds] : lungs were clear to auscultation bilaterally [Murmurs] : no murmurs present [Heart Sounds] : normal S1 and S2 [Abdomen Soft] : soft [Abdomen Tenderness] : non-tender [Abdomen Mass (___ Cm)] : no abdominal mass palpated [Abnormal Walk] : normal gait [Gait - Sufficient For Exercise Testing] : the gait was sufficient for exercise testing [Nail Clubbing] : no clubbing of the fingernails [Cyanosis, Localized] : no localized cyanosis [Petechial Hemorrhages (___cm)] : no petechial hemorrhages [Skin Color & Pigmentation] : normal skin color and pigmentation [] : no rash [No Venous Stasis] : no venous stasis [Skin Lesions] : no skin lesions [No Skin Ulcers] : no skin ulcer [No Xanthoma] : no  xanthoma was observed [Oriented To Time, Place, And Person] : oriented to person, place, and time [Affect] : the affect was normal [Mood] : the mood was normal [No Anxiety] : not feeling anxious [FreeTextEntry1] : Status post Delacruz's procedure

## 2019-04-11 NOTE — REVIEW OF SYSTEMS
[Negative] : Heme/Lymph [Shortness Of Breath] : no shortness of breath [Palpitations] : no palpitations [Chest Pain] : no chest pain

## 2019-04-11 NOTE — REASON FOR VISIT
[FreeTextEntry1] : This is the first office visit since the patient was hospitalized at [facility]. They could never do the surgery because his platelets were too low. He was transfused platelets. They're looking for different treatment for his myeloma. He had renal insufficiency and may have to lower his lisinopril to 10 mg and stop his hydrochlorothiazide.

## 2019-04-12 ENCOUNTER — APPOINTMENT (OUTPATIENT)
Age: 54
End: 2019-04-12

## 2019-04-12 PROCEDURE — 86901 BLOOD TYPING SEROLOGIC RH(D): CPT

## 2019-04-12 PROCEDURE — 86850 RBC ANTIBODY SCREEN: CPT

## 2019-04-12 PROCEDURE — 86923 COMPATIBILITY TEST ELECTRIC: CPT

## 2019-04-12 PROCEDURE — 86900 BLOOD TYPING SEROLOGIC ABO: CPT

## 2019-04-13 ENCOUNTER — APPOINTMENT (OUTPATIENT)
Age: 54
End: 2019-04-13

## 2019-04-15 DIAGNOSIS — Z51.89 ENCOUNTER FOR OTHER SPECIFIED AFTERCARE: ICD-10-CM

## 2019-04-25 ENCOUNTER — INPATIENT (INPATIENT)
Facility: HOSPITAL | Age: 54
LOS: 18 days | End: 2019-05-14
Attending: INTERNAL MEDICINE | Admitting: INTERNAL MEDICINE
Payer: COMMERCIAL

## 2019-04-25 VITALS
HEART RATE: 101 BPM | DIASTOLIC BLOOD PRESSURE: 58 MMHG | RESPIRATION RATE: 18 BRPM | TEMPERATURE: 99 F | OXYGEN SATURATION: 100 % | SYSTOLIC BLOOD PRESSURE: 116 MMHG

## 2019-04-25 DIAGNOSIS — R51 HEADACHE: ICD-10-CM

## 2019-04-25 DIAGNOSIS — Z98.890 OTHER SPECIFIED POSTPROCEDURAL STATES: Chronic | ICD-10-CM

## 2019-04-25 DIAGNOSIS — Z94.81 BONE MARROW TRANSPLANT STATUS: Chronic | ICD-10-CM

## 2019-04-25 LAB
ALBUMIN SERPL ELPH-MCNC: 3.9 G/DL — SIGNIFICANT CHANGE UP (ref 3.3–5)
ALP SERPL-CCNC: 136 U/L — HIGH (ref 40–120)
ALT FLD-CCNC: 160 U/L — HIGH (ref 4–41)
ANION GAP SERPL CALC-SCNC: 17 MMO/L — HIGH (ref 7–14)
ANISOCYTOSIS BLD QL: SLIGHT — SIGNIFICANT CHANGE UP
APTT BLD: 31.8 SEC — SIGNIFICANT CHANGE UP (ref 27.5–36.3)
AST SERPL-CCNC: 86 U/L — HIGH (ref 4–40)
BASOPHILS # BLD AUTO: 0 K/UL — SIGNIFICANT CHANGE UP (ref 0–0.2)
BASOPHILS # BLD AUTO: 0 K/UL — SIGNIFICANT CHANGE UP (ref 0–0.2)
BASOPHILS NFR BLD AUTO: 0 % — SIGNIFICANT CHANGE UP (ref 0–2)
BASOPHILS NFR BLD AUTO: 0 % — SIGNIFICANT CHANGE UP (ref 0–2)
BASOPHILS NFR SPEC: 0 % — SIGNIFICANT CHANGE UP (ref 0–2)
BILIRUB SERPL-MCNC: 0.9 MG/DL — SIGNIFICANT CHANGE UP (ref 0.2–1.2)
BLASTS # FLD: 0 % — SIGNIFICANT CHANGE UP (ref 0–0)
BLD GP AB SCN SERPL QL: NEGATIVE — SIGNIFICANT CHANGE UP
BUN SERPL-MCNC: 18 MG/DL — SIGNIFICANT CHANGE UP (ref 7–23)
CALCIUM SERPL-MCNC: 8.9 MG/DL — SIGNIFICANT CHANGE UP (ref 8.4–10.5)
CHLORIDE SERPL-SCNC: 101 MMOL/L — SIGNIFICANT CHANGE UP (ref 98–107)
CO2 SERPL-SCNC: 19 MMOL/L — LOW (ref 22–31)
CREAT SERPL-MCNC: 1.17 MG/DL — SIGNIFICANT CHANGE UP (ref 0.5–1.3)
EOSINOPHIL # BLD AUTO: 0 K/UL — SIGNIFICANT CHANGE UP (ref 0–0.5)
EOSINOPHIL # BLD AUTO: 0.01 K/UL — SIGNIFICANT CHANGE UP (ref 0–0.5)
EOSINOPHIL NFR BLD AUTO: 0 % — SIGNIFICANT CHANGE UP (ref 0–6)
EOSINOPHIL NFR BLD AUTO: 1.4 % — SIGNIFICANT CHANGE UP (ref 0–6)
EOSINOPHIL NFR FLD: 0 % — SIGNIFICANT CHANGE UP (ref 0–6)
GIANT PLATELETS BLD QL SMEAR: PRESENT — SIGNIFICANT CHANGE UP
GLUCOSE SERPL-MCNC: 116 MG/DL — HIGH (ref 70–99)
HCT VFR BLD CALC: 17.7 % — CRITICAL LOW (ref 39–50)
HCT VFR BLD CALC: 18.7 % — CRITICAL LOW (ref 39–50)
HGB BLD-MCNC: 5.6 G/DL — CRITICAL LOW (ref 13–17)
HGB BLD-MCNC: 6.1 G/DL — CRITICAL LOW (ref 13–17)
IMM GRANULOCYTES NFR BLD AUTO: 0 % — SIGNIFICANT CHANGE UP (ref 0–1.5)
IMM GRANULOCYTES NFR BLD AUTO: 0 % — SIGNIFICANT CHANGE UP (ref 0–1.5)
INR BLD: 1.27 — HIGH (ref 0.88–1.17)
LDH SERPL L TO P-CCNC: 268 U/L — HIGH (ref 135–225)
LYMPHOCYTES # BLD AUTO: 0.35 K/UL — LOW (ref 1–3.3)
LYMPHOCYTES # BLD AUTO: 0.51 K/UL — LOW (ref 1–3.3)
LYMPHOCYTES # BLD AUTO: 50.7 % — HIGH (ref 13–44)
LYMPHOCYTES # BLD AUTO: 55.4 % — HIGH (ref 13–44)
LYMPHOCYTES NFR SPEC AUTO: 54.8 % — HIGH (ref 13–44)
MCHC RBC-ENTMCNC: 28.1 PG — SIGNIFICANT CHANGE UP (ref 27–34)
MCHC RBC-ENTMCNC: 28.5 PG — SIGNIFICANT CHANGE UP (ref 27–34)
MCHC RBC-ENTMCNC: 31.6 % — LOW (ref 32–36)
MCHC RBC-ENTMCNC: 32.6 % — SIGNIFICANT CHANGE UP (ref 32–36)
MCV RBC AUTO: 87.4 FL — SIGNIFICANT CHANGE UP (ref 80–100)
MCV RBC AUTO: 88.9 FL — SIGNIFICANT CHANGE UP (ref 80–100)
METAMYELOCYTES # FLD: 0 % — SIGNIFICANT CHANGE UP (ref 0–1)
MICROCYTES BLD QL: SLIGHT — SIGNIFICANT CHANGE UP
MONOCYTES # BLD AUTO: 0.24 K/UL — SIGNIFICANT CHANGE UP (ref 0–0.9)
MONOCYTES # BLD AUTO: 0.28 K/UL — SIGNIFICANT CHANGE UP (ref 0–0.9)
MONOCYTES NFR BLD AUTO: 30.4 % — HIGH (ref 2–14)
MONOCYTES NFR BLD AUTO: 34.8 % — HIGH (ref 2–14)
MONOCYTES NFR BLD: 0.9 % — LOW (ref 2–9)
MYELOCYTES NFR BLD: 0 % — SIGNIFICANT CHANGE UP (ref 0–0)
NEUTROPHIL AB SER-ACNC: 7.8 % — LOW (ref 43–77)
NEUTROPHILS # BLD AUTO: 0.09 K/UL — LOW (ref 1.8–7.4)
NEUTROPHILS # BLD AUTO: 0.13 K/UL — LOW (ref 1.8–7.4)
NEUTROPHILS NFR BLD AUTO: 13.1 % — LOW (ref 43–77)
NEUTROPHILS NFR BLD AUTO: 14.2 % — LOW (ref 43–77)
NEUTS BAND # BLD: 0 % — SIGNIFICANT CHANGE UP (ref 0–6)
NRBC # FLD: 0 K/UL — SIGNIFICANT CHANGE UP (ref 0–0)
NRBC # FLD: 0 K/UL — SIGNIFICANT CHANGE UP (ref 0–0)
OTHER - HEMATOLOGY %: 0 — SIGNIFICANT CHANGE UP
PLATELET # BLD AUTO: 3 K/UL — CRITICAL LOW (ref 150–400)
PLATELET # BLD AUTO: 3 K/UL — CRITICAL LOW (ref 150–400)
PLATELET COUNT - ESTIMATE: SIGNIFICANT CHANGE UP
PMV BLD: SIGNIFICANT CHANGE UP FL (ref 7–13)
PMV BLD: SIGNIFICANT CHANGE UP FL (ref 7–13)
POTASSIUM SERPL-MCNC: 4.2 MMOL/L — SIGNIFICANT CHANGE UP (ref 3.5–5.3)
POTASSIUM SERPL-SCNC: 4.2 MMOL/L — SIGNIFICANT CHANGE UP (ref 3.5–5.3)
PROMYELOCYTES # FLD: 0 % — SIGNIFICANT CHANGE UP (ref 0–0)
PROT SERPL-MCNC: 7.4 G/DL — SIGNIFICANT CHANGE UP (ref 6–8.3)
PROTHROM AB SERPL-ACNC: 14.6 SEC — HIGH (ref 9.8–13.1)
RBC # BLD: 1.99 M/UL — LOW (ref 4.2–5.8)
RBC # BLD: 2.14 M/UL — LOW (ref 4.2–5.8)
RBC # FLD: 15.5 % — HIGH (ref 10.3–14.5)
RBC # FLD: 15.7 % — HIGH (ref 10.3–14.5)
RH IG SCN BLD-IMP: POSITIVE — SIGNIFICANT CHANGE UP
SODIUM SERPL-SCNC: 137 MMOL/L — SIGNIFICANT CHANGE UP (ref 135–145)
URATE SERPL-MCNC: 6 MG/DL — SIGNIFICANT CHANGE UP (ref 3.4–8.8)
VARIANT LYMPHS # BLD: 36.5 % — SIGNIFICANT CHANGE UP
WBC # BLD: 0.69 K/UL — CRITICAL LOW (ref 3.8–10.5)
WBC # BLD: 0.92 K/UL — CRITICAL LOW (ref 3.8–10.5)
WBC # FLD AUTO: 0.69 K/UL — CRITICAL LOW (ref 3.8–10.5)
WBC # FLD AUTO: 0.92 K/UL — CRITICAL LOW (ref 3.8–10.5)

## 2019-04-25 PROCEDURE — 70450 CT HEAD/BRAIN W/O DYE: CPT | Mod: 26

## 2019-04-25 RX ORDER — DEXAMETHASONE 0.5 MG/5ML
40 ELIXIR ORAL ONCE
Qty: 0 | Refills: 0 | Status: COMPLETED | OUTPATIENT
Start: 2019-04-25 | End: 2019-04-25

## 2019-04-25 RX ORDER — DEXAMETHASONE 0.5 MG/5ML
40 ELIXIR ORAL ONCE
Qty: 0 | Refills: 0 | Status: DISCONTINUED | OUTPATIENT
Start: 2019-04-25 | End: 2019-04-25

## 2019-04-25 RX ADMIN — Medication 120 MILLIGRAM(S): at 22:07

## 2019-04-25 NOTE — ED PROVIDER NOTE - OBJECTIVE STATEMENT
53M presenting as code stroke. Hx of MM s/p BM transplant, chemo/RT, and now on new meds early April, s/p perorated diverticulitis s/p Kiah's pouch, pending reveral, presenting with HA associated with blurry vision 4hr ago. Pt arrived with no headache and no focal deficits. Pt also had two episodes of nausea, vomiting before having headache. No focal deficits endorsed. No chest pain, back pain or abd pain. Denies any active headache, or blurry vision and pt did not take anything for HA. 53M presenting as code stroke. Hx of MM s/p BM transplant, chemo/RT, and now on new meds early April, s/p perorated diverticulitis s/p Kiah's pouch, pending reveral, presenting with HA associated with blurry vision 4hr ago. Pt arrived with no headache and no focal deficits. Pt also had two episodes of nausea, vomiting before having headache. No focal deficits endorsed. No chest pain, back pain or abd pain. Denies any active headache, or blurry vision and pt did not take anything for HA. Pt also endorsed that he's tachycardiac to 100-110.

## 2019-04-25 NOTE — ED ADULT NURSE NOTE - NSIMPLEMENTINTERV_GEN_ALL_ED
Implemented All Universal Safety Interventions:  Worth to call system. Call bell, personal items and telephone within reach. Instruct patient to call for assistance. Room bathroom lighting operational. Non-slip footwear when patient is off stretcher. Physically safe environment: no spills, clutter or unnecessary equipment. Stretcher in lowest position, wheels locked, appropriate side rails in place.

## 2019-04-25 NOTE — ED ADULT NURSE REASSESSMENT NOTE - NS ED NURSE REASSESS COMMENT FT1
pt a&Ox3, breathing even & unlabored, denies sob, n/v/d, vision changes, headache, numbness, tingling, abd pain, cp. pt appears comfortable in bed, decadron running as per MD order, consent signed for PRBCs & platelets, report given to Tiera IQBAL, pt going to 804a, NSR on CM, family at bedside, will continue to monitor.

## 2019-04-25 NOTE — ED PROVIDER NOTE - CLINICAL SUMMARY MEDICAL DECISION MAKING FREE TEXT BOX
Arriving as code stroke, hx of low platelets in the setting of HA and blurry vision 4hr ago, currently asx, no focal deficits, no headache or change in vision - likely TIA. Will get code stroke studies, and dispo per neuro.

## 2019-04-25 NOTE — ED PROVIDER NOTE - ATTENDING CONTRIBUTION TO CARE
Dr. Pereira: I have personally performed a face to face bedside history and physical examination of this patient. I have discussed the history, examination, review of systems, assessment and plan of management with the resident. I have reviewed the electronic medical record and amended it to reflect my history, review of systems, physical exam, assessment and plan.    Attending Exam - Dr. Pereira: GEN: well appearing, NAD  HEENT: +PERRL, EOMI dried blood at nare but no active bleeding.  RESP: CTAB, no signs of respiratory distress CV: s1s2 RRR ABD: soft/non tender/non distended  MSK: no deformities / swelling, normal range of motion, spine grossly normal NEURO: alert, non focal exam SKIN: normal color / temperature / condition with diffuse petichiae

## 2019-04-25 NOTE — ED PROVIDER NOTE - CARE PLAN
Principal Discharge DX:	Headache Principal Discharge DX:	Thrombocytopenia  Secondary Diagnosis:	Anemia  Secondary Diagnosis:	Neutropenia  Secondary Diagnosis:	Multiple myeloma

## 2019-04-25 NOTE — ED ADULT TRIAGE NOTE - CHIEF COMPLAINT QUOTE
episode of vomiting had visual loss that resolved visual loss started both eyes 4 pm episode lasted 15 minutes with diaphoresis  HALLEY rakeshal a StROkE coDE CalleD

## 2019-04-25 NOTE — ED ADULT NURSE REASSESSMENT NOTE - NS ED NURSE REASSESS COMMENT FT1
report received from Lucie IQBAL, pt A&Ox3, breathing even & unlabored, VSS, denies numbness, tingling, dizziness, headache, blurry vision, sob. Wife at bedside, will continue to monitor. report received from Lucie IQBAL, pt A&Ox3, breathing even & unlabored, VSS, denies numbness, tingling, dizziness, headache, blurry vision, sob, n/v/d. pt has + sensation in both hands bilaterally, pt has equal strength in all 4 extremities, negative facial droop, negative slurred speech. Wife at bedside, will continue to monitor.

## 2019-04-25 NOTE — ED ADULT NURSE NOTE - OBJECTIVE STATEMENT
Pt AxOx4 presenting to [facility] with c/o dizziness and blurry vision x1 day. PMH of multiple myeloma, cardiomyopathy, HTN, diverticulitis- with colostomy bag in LLQ- site dry and intact, no drainage noted on arrival to ED. Pt presenting with positive ROM and full sensation in upper and lower extremities, states he has been feeling weaker than usual. On arrival to ED pt is alert, awake, cooperative and calm, with no slurred speech or facial droop noted at this time. Skin dry and warm to touch. Pt denies blurry vision and dizziness on arrival to ED. Vitals noted and stable, labs drawn and sent, MD at bedside, will continue to monitor.

## 2019-04-25 NOTE — CHART NOTE - NSCHARTNOTEFT_GEN_A_CORE
53M hx of multiple myeloma s/p bone marrow transplant (~1.5 yrs ago) and chemo/RT (stopped ~2 months ago in preparation for colostomy reversal), perforated diverticulitis s/p Kiah's (proctosigmoidectomy with colostomy, 9/2018), nonischemic cardiomyopathy (mild global LV systolic dysfunction with EF 54% and diastolic LV dysfunction based on TTE in 11/2018), and HTN presents to [facility] ED w/ blurred vision, dizziness, generalized weakness. LKW 1600. Was staring at window when he noticed his vision was blurred; started feeling nauseous, dizzy. On initial assessment, as per patient, feels well, no acute complaints.     Vital Signs Last 24 Hrs  T(C): 37 (25 Apr 2019 19:21), Max: 37 (25 Apr 2019 19:21)  T(F): 98.6 (25 Apr 2019 19:21), Max: 98.6 (25 Apr 2019 19:21)  HR: 101 (25 Apr 2019 19:21) (101 - 101)  BP: 116/58 (25 Apr 2019 19:21) (116/58 - 116/58)  BP(mean): --  RR: 18 (25 Apr 2019 19:21) (18 - 18)  SpO2: 100% (25 Apr 2019 19:21) (100% - 100%)    Neurological Exam:  Mental Status: Orientated to self, date and place.  Attention intact.  No dysarthria, aphasia or neglect.  Knowledge intact.  Registration intact.  Short and long term memory grossly intact.      Cranial Nerves:  EOMI, VFF, no nystagmus or diplopia.  No facial asymmetry.     Motor:   Tone: normal.                  Strength: intact throughout  Pronator drift: none                 Dysmeria: None to finger-nose-finger or heel-shin-heel  Tremor: No resting, postural or action tremor.  No myoclonus.    Sensation: intact to light touch    Deep Tendon Reflexes: 1+ bilateral biceps, triceps, brachioradialis, knee and ankle    A/P:   CT head w/o contrast unremarkable, no bleed noted. Code stroke called for urgent CT to rule out bleed. Patient following with Dr. Yousif during prior admission; for further neurologic care would defer to Dr. Margret Yousif. 53M hx of multiple myeloma s/p bone marrow transplant (~1.5 yrs ago) and chemo/RT (stopped ~2 months ago in preparation for colostomy reversal), perforated diverticulitis s/p Kiah's (proctosigmoidectomy with colostomy, 9/2018), nonischemic cardiomyopathy (mild global LV systolic dysfunction with EF 54% and diastolic LV dysfunction based on TTE in 11/2018), and HTN presents to [facility] ED w/ blurred vision, dizziness, generalized weakness. LKW 1600. Was staring at window when he noticed his vision was blurred; started feeling nauseous, dizzy. On initial assessment, as per patient, feels well, no acute complaints.     Vital Signs Last 24 Hrs  T(C): 37 (25 Apr 2019 19:21), Max: 37 (25 Apr 2019 19:21)  T(F): 98.6 (25 Apr 2019 19:21), Max: 98.6 (25 Apr 2019 19:21)  HR: 101 (25 Apr 2019 19:21) (101 - 101)  BP: 116/58 (25 Apr 2019 19:21) (116/58 - 116/58)  BP(mean): --  RR: 18 (25 Apr 2019 19:21) (18 - 18)  SpO2: 100% (25 Apr 2019 19:21) (100% - 100%)    Neurological Exam:  Mental Status: Orientated to self, date and place.  Attention intact.  No dysarthria, aphasia or neglect.  Knowledge intact.  Registration intact.  Short and long term memory grossly intact.      Cranial Nerves:  EOMI, VFF, no nystagmus or diplopia.  No facial asymmetry.     Motor:   Tone: normal.                  Strength: intact throughout  Pronator drift: none                 Dysmeria: None to finger-nose-finger or heel-shin-heel  Tremor: No resting, postural or action tremor.  No myoclonus.    Sensation: intact to light touch    Deep Tendon Reflexes: 1+ bilateral biceps, triceps, brachioradialis, knee and ankle    A/P:   CT head w/o contrast unremarkable, no bleed noted. Code stroke called for urgent CT to rule out bleed, no bleed noted. Transient episode of blurred vision, dizziness, vomiting likely 2/2 toxic metabolic etiology. Would rule out toxic/metabolic etiologies. Patient following with Dr. Yousif during prior admission; for further neurologic care would defer to Dr. Margret Yousif.

## 2019-04-26 DIAGNOSIS — I42.9 CARDIOMYOPATHY, UNSPECIFIED: ICD-10-CM

## 2019-04-26 DIAGNOSIS — I10 ESSENTIAL (PRIMARY) HYPERTENSION: ICD-10-CM

## 2019-04-26 DIAGNOSIS — C90.02 MULTIPLE MYELOMA IN RELAPSE: ICD-10-CM

## 2019-04-26 DIAGNOSIS — D61.818 OTHER PANCYTOPENIA: ICD-10-CM

## 2019-04-26 DIAGNOSIS — H53.9 UNSPECIFIED VISUAL DISTURBANCE: ICD-10-CM

## 2019-04-26 DIAGNOSIS — Z29.9 ENCOUNTER FOR PROPHYLACTIC MEASURES, UNSPECIFIED: ICD-10-CM

## 2019-04-26 PROBLEM — K57.92 DIVERTICULITIS OF INTESTINE, PART UNSPECIFIED, WITHOUT PERFORATION OR ABSCESS WITHOUT BLEEDING: Chronic | Status: ACTIVE | Noted: 2019-03-20

## 2019-04-26 PROBLEM — C90.00 MULTIPLE MYELOMA NOT HAVING ACHIEVED REMISSION: Chronic | Status: ACTIVE | Noted: 2019-03-20

## 2019-04-26 LAB
ALBUMIN SERPL ELPH-MCNC: 3.8 G/DL — SIGNIFICANT CHANGE UP (ref 3.3–5)
ALP SERPL-CCNC: 132 U/L — HIGH (ref 40–120)
ALT FLD-CCNC: 152 U/L — HIGH (ref 4–41)
ANION GAP SERPL CALC-SCNC: 18 MMO/L — HIGH (ref 7–14)
APTT BLD: 30 SEC — SIGNIFICANT CHANGE UP (ref 27.5–36.3)
AST SERPL-CCNC: 72 U/L — HIGH (ref 4–40)
BASOPHILS # BLD AUTO: 0.01 K/UL — SIGNIFICANT CHANGE UP (ref 0–0.2)
BASOPHILS # BLD AUTO: 0.01 K/UL — SIGNIFICANT CHANGE UP (ref 0–0.2)
BASOPHILS NFR BLD AUTO: 0.7 % — SIGNIFICANT CHANGE UP (ref 0–2)
BASOPHILS NFR BLD AUTO: 0.9 % — SIGNIFICANT CHANGE UP (ref 0–2)
BILIRUB SERPL-MCNC: 3.1 MG/DL — HIGH (ref 0.2–1.2)
BUN SERPL-MCNC: 22 MG/DL — SIGNIFICANT CHANGE UP (ref 7–23)
CALCIUM SERPL-MCNC: 8.3 MG/DL — LOW (ref 8.4–10.5)
CHLORIDE SERPL-SCNC: 105 MMOL/L — SIGNIFICANT CHANGE UP (ref 98–107)
CO2 SERPL-SCNC: 17 MMOL/L — LOW (ref 22–31)
CREAT SERPL-MCNC: 1.02 MG/DL — SIGNIFICANT CHANGE UP (ref 0.5–1.3)
EOSINOPHIL # BLD AUTO: 0 K/UL — SIGNIFICANT CHANGE UP (ref 0–0.5)
EOSINOPHIL # BLD AUTO: 0.01 K/UL — SIGNIFICANT CHANGE UP (ref 0–0.5)
EOSINOPHIL NFR BLD AUTO: 0 % — SIGNIFICANT CHANGE UP (ref 0–6)
EOSINOPHIL NFR BLD AUTO: 0.9 % — SIGNIFICANT CHANGE UP (ref 0–6)
GLUCOSE SERPL-MCNC: 173 MG/DL — HIGH (ref 70–99)
HCT VFR BLD CALC: 18.5 % — CRITICAL LOW (ref 39–50)
HCT VFR BLD CALC: 24.1 % — LOW (ref 39–50)
HGB BLD-MCNC: 6.1 G/DL — CRITICAL LOW (ref 13–17)
HGB BLD-MCNC: 8.3 G/DL — LOW (ref 13–17)
IMM GRANULOCYTES NFR BLD AUTO: 0 % — SIGNIFICANT CHANGE UP (ref 0–1.5)
IMM GRANULOCYTES NFR BLD AUTO: 0.9 % — SIGNIFICANT CHANGE UP (ref 0–1.5)
INR BLD: 1.3 — HIGH (ref 0.88–1.17)
LDH SERPL L TO P-CCNC: 267 U/L — HIGH (ref 135–225)
LYMPHOCYTES # BLD AUTO: 0.82 K/UL — LOW (ref 1–3.3)
LYMPHOCYTES # BLD AUTO: 1 K/UL — SIGNIFICANT CHANGE UP (ref 1–3.3)
LYMPHOCYTES # BLD AUTO: 70.4 % — HIGH (ref 13–44)
LYMPHOCYTES # BLD AUTO: 71.3 % — HIGH (ref 13–44)
MAGNESIUM SERPL-MCNC: 2 MG/DL — SIGNIFICANT CHANGE UP (ref 1.6–2.6)
MANUAL SMEAR VERIFICATION: SIGNIFICANT CHANGE UP
MCHC RBC-ENTMCNC: 29.2 PG — SIGNIFICANT CHANGE UP (ref 27–34)
MCHC RBC-ENTMCNC: 29.3 PG — SIGNIFICANT CHANGE UP (ref 27–34)
MCHC RBC-ENTMCNC: 33 % — SIGNIFICANT CHANGE UP (ref 32–36)
MCHC RBC-ENTMCNC: 34.4 % — SIGNIFICANT CHANGE UP (ref 32–36)
MCV RBC AUTO: 85.2 FL — SIGNIFICANT CHANGE UP (ref 80–100)
MCV RBC AUTO: 88.5 FL — SIGNIFICANT CHANGE UP (ref 80–100)
MONOCYTES # BLD AUTO: 0.22 K/UL — SIGNIFICANT CHANGE UP (ref 0–0.9)
MONOCYTES # BLD AUTO: 0.26 K/UL — SIGNIFICANT CHANGE UP (ref 0–0.9)
MONOCYTES NFR BLD AUTO: 18.3 % — HIGH (ref 2–14)
MONOCYTES NFR BLD AUTO: 19.1 % — HIGH (ref 2–14)
NEUTROPHILS # BLD AUTO: 0.08 K/UL — LOW (ref 1.8–7.4)
NEUTROPHILS # BLD AUTO: 0.15 K/UL — LOW (ref 1.8–7.4)
NEUTROPHILS NFR BLD AUTO: 10.6 % — LOW (ref 43–77)
NEUTROPHILS NFR BLD AUTO: 6.9 % — LOW (ref 43–77)
NRBC # FLD: 0 K/UL — SIGNIFICANT CHANGE UP (ref 0–0)
NRBC # FLD: 0 K/UL — SIGNIFICANT CHANGE UP (ref 0–0)
PHOSPHATE SERPL-MCNC: 3.2 MG/DL — SIGNIFICANT CHANGE UP (ref 2.5–4.5)
PLATELET # BLD AUTO: 35 K/UL — LOW (ref 150–400)
PLATELET # BLD AUTO: 54 K/UL — LOW (ref 150–400)
PMV BLD: 10 FL — SIGNIFICANT CHANGE UP (ref 7–13)
PMV BLD: 11.3 FL — SIGNIFICANT CHANGE UP (ref 7–13)
POTASSIUM SERPL-MCNC: 4.4 MMOL/L — SIGNIFICANT CHANGE UP (ref 3.5–5.3)
POTASSIUM SERPL-SCNC: 4.4 MMOL/L — SIGNIFICANT CHANGE UP (ref 3.5–5.3)
PROT SERPL-MCNC: 6.9 G/DL — SIGNIFICANT CHANGE UP (ref 6–8.3)
PROTHROM AB SERPL-ACNC: 14.5 SEC — HIGH (ref 9.8–13.1)
RBC # BLD: 2.09 M/UL — LOW (ref 4.2–5.8)
RBC # BLD: 2.83 M/UL — LOW (ref 4.2–5.8)
RBC # FLD: 14.5 % — SIGNIFICANT CHANGE UP (ref 10.3–14.5)
RBC # FLD: 15 % — HIGH (ref 10.3–14.5)
SODIUM SERPL-SCNC: 140 MMOL/L — SIGNIFICANT CHANGE UP (ref 135–145)
URATE SERPL-MCNC: 6.2 MG/DL — SIGNIFICANT CHANGE UP (ref 3.4–8.8)
WBC # BLD: 1.15 K/UL — LOW (ref 3.8–10.5)
WBC # BLD: 1.42 K/UL — LOW (ref 3.8–10.5)
WBC # FLD AUTO: 1.15 K/UL — LOW (ref 3.8–10.5)
WBC # FLD AUTO: 1.42 K/UL — LOW (ref 3.8–10.5)

## 2019-04-26 RX ORDER — ONDANSETRON 8 MG/1
8 TABLET, FILM COATED ORAL EVERY 8 HOURS
Qty: 0 | Refills: 0 | Status: DISCONTINUED | OUTPATIENT
Start: 2019-04-26 | End: 2019-05-08

## 2019-04-26 RX ORDER — MORPHINE SULFATE 50 MG/1
30 CAPSULE, EXTENDED RELEASE ORAL EVERY 8 HOURS
Qty: 0 | Refills: 0 | Status: DISCONTINUED | OUTPATIENT
Start: 2019-04-26 | End: 2019-04-26

## 2019-04-26 RX ORDER — HYDROMORPHONE HYDROCHLORIDE 2 MG/ML
2 INJECTION INTRAMUSCULAR; INTRAVENOUS; SUBCUTANEOUS EVERY 4 HOURS
Qty: 0 | Refills: 0 | Status: DISCONTINUED | OUTPATIENT
Start: 2019-04-26 | End: 2019-05-02

## 2019-04-26 RX ORDER — ALLOPURINOL 300 MG
300 TABLET ORAL DAILY
Qty: 0 | Refills: 0 | Status: DISCONTINUED | OUTPATIENT
Start: 2019-04-27 | End: 2019-05-08

## 2019-04-26 RX ORDER — DOCUSATE SODIUM 100 MG
100 CAPSULE ORAL THREE TIMES A DAY
Qty: 0 | Refills: 0 | Status: DISCONTINUED | OUTPATIENT
Start: 2019-04-26 | End: 2019-05-07

## 2019-04-26 RX ORDER — SENNA PLUS 8.6 MG/1
2 TABLET ORAL AT BEDTIME
Qty: 0 | Refills: 0 | Status: DISCONTINUED | OUTPATIENT
Start: 2019-04-26 | End: 2019-05-07

## 2019-04-26 RX ORDER — CARVEDILOL PHOSPHATE 80 MG/1
1 CAPSULE, EXTENDED RELEASE ORAL
Qty: 0 | Refills: 0 | COMMUNITY

## 2019-04-26 NOTE — CONSULT NOTE ADULT - SUBJECTIVE AND OBJECTIVE BOX
Reason for consult: pancytopenia    HPI:  52yo M hx of relapsed multiple myeloma s/p bone marrow transplant (~1.5 yrs ago) and chemo/RT (last infusion Bendamustine and Pomalidomide 4/08/2019), perforated diverticulitis s/p Kiah's (proctosigmoidectomy with colostomy, 9/2018), nonischemic cardiomyopathy (mild global LV systolic dysfunction with EF 54% and diastolic LV dysfunction based on TTE in 11/2018), and HTN presents to [facility] ED w/ vomiting and change of vision. He also had been weak, with epistaxis, and petechiae. He was last seen in office by Dr Murphy on 4/16, had received outpt plt transfusions, and started on pomalidomide at 3mg QOD, last dose about 2 days ago. Pt feeling much better now.        PAST MEDICAL & SURGICAL HISTORY:  Diverticulitis: perforated s/p Kiah  Multiple myeloma: relapsed 3/2019  Hypertension  Left ventricular dysfunction  Cardiomyopathy: nonischemic  Former smoker: (1 ppd x 11 years; Quit ~age 26)  History of bone marrow transplant  History of surgery: s/p exploratory laparotomy with sigmoid resection and colostomy on 9/2/2018      FAMILY HISTORY:  Family history of diabetes mellitus  Family history of hypertension      Alochol: Denied  Smoking: Nonsmoker  Drug Use: Denied  Marital Status:         Allergies    oxycodone (Vomiting; Nausea)    Intolerances        MEDICATIONS  (STANDING):    MEDICATIONS  (PRN):  artificial  tears Solution 1 Drop(s) Both EYES every 6 hours PRN Dry Eyes  docusate sodium 100 milliGRAM(s) Oral three times a day PRN Constipation  HYDROmorphone   Tablet 2 milliGRAM(s) Oral every 4 hours PRN moderate - severe pain  ondansetron Injectable 8 milliGRAM(s) IV Push every 8 hours PRN Nausea and/or Vomiting  senna 2 Tablet(s) Oral at bedtime PRN Constipation      ROS  No fever, sweats, chills  No epistaxis, HA, sore throat  No CP, SOB, cough, sputum  No n/v/d, abd pain, melena, hematochezia  No edema  No rash  No anxiety  No back pain, joint pain  No bleeding, bruising  No dysuria, hematuria    T(C): 36.7 (04-26-19 @ 06:25), Max: 37 (04-25-19 @ 19:21)  HR: 98 (04-26-19 @ 06:25) (98 - 102)  BP: 110/21 (04-26-19 @ 06:25) (101/49 - 116/58)  RR: 18 (04-26-19 @ 06:25) (17 - 18)  SpO2: 100% (04-26-19 @ 06:25) (96% - 100%)  Wt(kg): --    PE  NAD  Awake, alert  Anicteric, MMM  RRR  CTAB  Abd soft, NT, ND  No c/c/e  No rash grossly  FROM                          6.1    1.15  )-----------( 54       ( 26 Apr 2019 07:45 )             18.5       04-26    140  |  105  |  22  ----------------------------<  173<H>  4.4   |  17<L>  |  1.02    Ca    8.3<L>      26 Apr 2019 07:45  Phos  3.2     04-26  Mg     2.0     04-26    TPro  6.9  /  Alb  3.8  /  TBili  3.1<H>  /  DBili  x   /  AST  72<H>  /  ALT  152<H>  /  AlkPhos  132<H>  04-26

## 2019-04-26 NOTE — PROGRESS NOTE ADULT - PROBLEM SELECTOR PLAN 3
MM in relapse s/p bone marrow transplant (~1.5 yrs ago) and chemo/RT. Last infusion Bendamustine and Pomalidomide 4/08/2019  onc on board, will f/u management   continue allopruinol daily  -zofran prn, monitor QTc. Dilaudid 2mg po q4 prn moderate-severe pain

## 2019-04-26 NOTE — H&P ADULT - NSHPLABSRESULTS_GEN_ALL_CORE
5.6    0.69  )-----------( 3        ( 25 Apr 2019 20:30 )             17.7     Hemoglobin: 5.6 g/dL (04-25 @ 20:30)  Hemoglobin: 6.1 g/dL (04-25 @ 19:56)    CBC Full  -  ( 25 Apr 2019 20:30 )  WBC Count : 0.69 K/uL  RBC Count : 1.99 M/uL  Hemoglobin : 5.6 g/dL  Hematocrit : 17.7 %  Platelet Count - Automated : 3 K/uL  Mean Cell Volume : 88.9 fL  Mean Cell Hemoglobin : 28.1 pg  Mean Cell Hemoglobin Concentration : 31.6 %  Auto Neutrophil # : 0.09 K/uL  Auto Lymphocyte # : 0.35 K/uL  Auto Monocyte # : 0.24 K/uL  Auto Eosinophil # : 0.01 K/uL  Auto Basophil # : 0.00 K/uL  Auto Neutrophil % : 13.1 %  Auto Lymphocyte % : 50.7 %  Auto Monocyte % : 34.8 %  Auto Eosinophil % : 1.4 %  Auto Basophil % : 0.0 %    04-25    137  |  101  |  18  ----------------------------<  116<H>  4.2   |  19<L>  |  1.17    Ca    8.9      25 Apr 2019 19:56    TPro  7.4  /  Alb  3.9  /  TBili  0.9  /  DBili  x   /  AST  86<H>  /  ALT  160<H>  /  AlkPhos  136<H>  04-25    Creatinine Trend: 1.17<--, 0.97<--, 0.92<--, 0.91<--, 1.06<--, 1.12<--  LIVER FUNCTIONS - ( 25 Apr 2019 19:56 )  Alb: 3.9 g/dL / Pro: 7.4 g/dL / ALK PHOS: 136 u/L / ALT: 160 u/L / AST: 86 u/L / GGT: x           PT/INR - ( 25 Apr 2019 19:56 )   PT: 14.6 SEC;   INR: 1.27     PTT - ( 25 Apr 2019 19:56 )  PTT:31.8 SEC    EKG: SR rate 100, intervals wnl, QTc 461, normal axis, no St changes or TWI    MICROBIOLOGY:    IMAGING:  < from: CT Brain Stroke Protocol (04.25.19 @ 19:42) >    IMPRESSION:     No CT evidence of acute intracranial hemorrhage or mass effect from   vasogenic edema. If symptoms persist and there are no contraindications,   recommend MRI of the brain and orbits with intravenous contrast for   further evaluation.    These findings were discussed with Dr. Baez at 4/25/2019 7:47 PM by Dr. Palmer with read back confirmation.    < end of copied text >        Labs, imaging, EKG personally reviewed

## 2019-04-26 NOTE — H&P ADULT - NSHPSOCIALHISTORY_GEN_ALL_CORE
Lives with wife, children, AURELIO. Ambulates without cane/walker. Former smoker quit age 26, 11 pack years. No etoh, illicit drugs.

## 2019-04-26 NOTE — PROGRESS NOTE ADULT - SUBJECTIVE AND OBJECTIVE BOX
Patient seen and examined at bedside  s/p plt and prbc transfusion   Case discussed with medical team    HPI:  54yo M hx of relapsed multiple myeloma s/p bone marrow transplant (~1.5 yrs ago) and chemo/RT (last infusion Bendamustine and Pomalidomide 4/08/2019), perforated diverticulitis s/p Kiah's (proctosigmoidectomy with colostomy, 9/2018), nonischemic cardiomyopathy (mild global LV systolic dysfunction with EF 54% and diastolic LV dysfunction based on TTE in 11/2018), and HTN presents to [facility] ED w/ vomiting and change of vision. Wife Devan Hood at bedside. Pt was sitting at home. He vomited 2-3 times nonbloody, nonbilious and was not able to eat or drink anything. On 4/25 at 4pm, while sitting he noticed his vision went "white" for a few minutes associated with sweating, shaking, and weakness. His wife noticed he had a red rash on his legs and they brought him to the ED. Pt received a platelet transfusion about a week ago outpt. Denies any pain. Had a nose bleed earlier. He denies skin sores, measured fever, HA, trouble speaking, numbness/tingling, focal weakness, dizziness, CP, SOB, abd pain, N/V/D, hematochezia, melena, urinary symptoms, oral/genital sores. He has a recent admission to [facility] discharged 4/08/2019 for diffuse body pain and low-grade fever, infectious workup was negative. MRI C/T/L showed abnormal T1 prolongation in the whole spine, sacral and iliac bones. Received 7 days of Bendamustine and Pomalidomide (on hold few days for neutropenia), last on 4/08/2019.    When pt presented to the ED, code stroke called. Neuro exam was nonfocal, CTH neg for acute process, and vomiting and vision changes were resolved. ED vitals afebrile, H101, /58 --> 102/62, R18, sat 100% on RA. Given decadron 40mg IV, 1u PLT. 1u pRBC ordered. (26 Apr 2019 00:00)      PAST MEDICAL & SURGICAL HISTORY:  Diverticulitis: perforated s/p Kiah  Multiple myeloma: relapsed 3/2019  Hypertension  Left ventricular dysfunction  Cardiomyopathy: nonischemic  Former smoker: (1 ppd x 11 years; Quit ~age 26)  History of bone marrow transplant  History of surgery: s/p exploratory laparotomy with sigmoid resection and colostomy on 9/2/2018      oxycodone (Vomiting; Nausea)       MEDICATIONS  (STANDING):    MEDICATIONS  (PRN):  artificial  tears Solution 1 Drop(s) Both EYES every 6 hours PRN Dry Eyes  docusate sodium 100 milliGRAM(s) Oral three times a day PRN Constipation  HYDROmorphone   Tablet 2 milliGRAM(s) Oral every 4 hours PRN moderate - severe pain  ondansetron Injectable 8 milliGRAM(s) IV Push every 8 hours PRN Nausea and/or Vomiting  senna 2 Tablet(s) Oral at bedtime PRN Constipation      REVIEW OF SYSTEMS:  CONSTITUTIONAL: (+) malaise.   EYES: No acute change in vision   ENT:  No tinnitus  NECK: No stiffness  RESPIRATORY: gates. No hemoptysis  CARDIOVASCULAR: decreased exercise tolerance. No chest pain, palpitations, syncope  GASTROINTESTINAL: No hematemesis, diarrhea, melena, or hematochezia.  GENITOURINARY: No hematuria  NEUROLOGICAL: No headaches  LYMPH Nodes: No enlarged glands  ENDOCRINE: No heat or cold intolerance	    T(C): 36.7 (04-26-19 @ 06:25), Max: 37 (04-25-19 @ 19:21)  HR: 98 (04-26-19 @ 06:25) (98 - 102)  BP: 110/21 (04-26-19 @ 06:25) (101/49 - 116/58)  RR: 18 (04-26-19 @ 06:25) (17 - 18)  SpO2: 100% (04-26-19 @ 06:25) (96% - 100%)    PHYSICAL EXAMINATION:   Constitutional: NAD  HEENT: AT  Neck:  Supple  Respiratory:  Adequate airflow b/l. Not using accessory muscles of respiration.  Cardiovascular: systolic murmur, S1 & S2 intact, no R/G, 2+ radial pulses b/l  Gastrointestinal: Soft, NT, ND, normoactive b.s., no organomegaly/RT/rigidity  Extremities: WWP  Neurological:  Alert and awake.  No acute focal motor deficits. Crude sensation intact.     Labs and imaging reviewed    LABS:                        6.1    1.15  )-----------( 54       ( 26 Apr 2019 07:45 )             18.5     04-26    140  |  105  |  22  ----------------------------<  173<H>  4.4   |  17<L>  |  1.02    Ca    8.3<L>      26 Apr 2019 07:45  Phos  3.2     04-26  Mg     2.0     04-26    TPro  6.9  /  Alb  3.8  /  TBili  3.1<H>  /  DBili  x   /  AST  72<H>  /  ALT  152<H>  /  AlkPhos  132<H>  04-26        PT/INR - ( 26 Apr 2019 07:45 )   PT: 14.5 SEC;   INR: 1.30          PTT - ( 26 Apr 2019 07:45 )  PTT:30.0 SEC    CAPILLARY BLOOD GLUCOSE      POCT Blood Glucose.: 111 mg/dL (25 Apr 2019 19:31)        LIVER FUNCTIONS - ( 26 Apr 2019 07:45 )  Alb: 3.8 g/dL / Pro: 6.9 g/dL / ALK PHOS: 132 u/L / ALT: 152 u/L / AST: 72 u/L / GGT: x               RADIOLOGY & ADDITIONAL STUDIES:

## 2019-04-26 NOTE — H&P ADULT - ASSESSMENT
52yo M hx of relapsed multiple myeloma s/p bone marrow transplant (~1.5 yrs ago) and chemo/RT (last infusion Bendamustine and Pomalidomide 4/08/2019), perforated diverticulitis s/p Kiah's (proctosigmoidectomy with colostomy, 9/2018), nonischemic cardiomyopathy (mild global LV systolic dysfunction with EF 54% and diastolic LV dysfunction based on TTE in 11/2018), and HTN presents to the ED with 52yo M hx of relapsed multiple myeloma s/p bone marrow transplant (~1.5 yrs ago) and chemo/RT (last infusion Bendamustine and Pomalidomide 4/08/2019), perforated diverticulitis s/p Kiah's (proctosigmoidectomy with colostomy, 9/2018), nonischemic cardiomyopathy (mild global LV systolic dysfunction with EF 54% and diastolic LV dysfunction based on TTE in 11/2018), and HTN presents with vision change and vomiting that resolved, concern for CVA, admitted for pancytopenia.

## 2019-04-26 NOTE — H&P ADULT - PROBLEM SELECTOR PLAN 3
MM in relapse s/p bone marrow transplant (~1.5 yrs ago) and chemo/RT. Last infusion Bendamustine and Pomalidomide 4/08/2019  -Consult pt's oncologist Dr. Torsten Murphy in AM  -continue allopruinol daily  -zofran prn, monitor QTc. Dilaudid 2mg po q4 prn moderate-severe pain  -monitor TLS labs daily

## 2019-04-26 NOTE — PROGRESS NOTE ADULT - PROBLEM SELECTOR PLAN 2
Pt with transient vision change of vision going white proceeded by NBNB emesis. Code stroke called, CTH neg for bleed. Pt with PLT 3 at risk for spontaneous brain bleed. Neuro exam currently nonfocal  -neuro checks  -If change in mental status or new focal deficit, will obtain CTH

## 2019-04-26 NOTE — H&P ADULT - PROBLEM SELECTOR PLAN 4
Nonischemic cardiomyopathy with TTE in 11/2018 showing mild global LV systolic dysfunction with EF 54% and diastolic LV dysfunction. Appears euvolemic on exam  -hold lisinopril and cardizem due to soft BP, restart as tolerated  -low salt diet

## 2019-04-26 NOTE — PROGRESS NOTE ADULT - PROBLEM SELECTOR PLAN 4
Chronic Systolic CHF  -holding lisinopril and cardizem due to low normal BP, restart as tolerated  -low salt diet

## 2019-04-26 NOTE — H&P ADULT - PROBLEM SELECTOR PLAN 1
Found to have pancytopenia with WBC 0.92, ANC 71, Hg 5.6, PLT 3 due to relapsed MM and chemotherapy  -CTH neg for acute bleed  -neuro checks q4  -Transfuse for PLT <10k and Hg <7  -s/p 1u PLT, giving 1u pRBC now, will give 2ndu PLT then check CBC Found to have pancytopenia with WBC 0.92, ANC 71, Hg 5.6, PLT 3 due to relapsed MM and chemotherapy  -CTH neg for acute bleed  -neuro checks q4  -Transfuse for PLT <10k and Hg <7  -s/p 1u PLT, giving 1u pRBC now, will give 2ndu PLT then check CBC  -Consult pt's oncologist Dr. Torsten Murphy in AM

## 2019-04-26 NOTE — CONSULT NOTE ADULT - SUBJECTIVE AND OBJECTIVE BOX
[facility]), [facility]        Patient is a 53y old  Male who presents with a chief complaint of Pancytopenia (2019 12:49)      HPI:  54yo M hx of relapsed multiple myeloma s/p bone marrow transplant (~1.5 yrs ago) and chemo/RT (last infusion Bendamustine and Pomalidomide 2019), perforated diverticulitis s/p Kiah's (proctosigmoidectomy with colostomy, 2018), nonischemic cardiomyopathy (mild global LV systolic dysfunction with EF 54% and diastolic LV dysfunction based on TTE in 2018), and HTN presents to [facility] ED w/ vomiting and change of vision. Wife Devan Hood at bedside. Pt was sitting at home. He vomited 2-3 times nonbloody, nonbilious and was not able to eat or drink anything. On  at 4pm, while sitting he noticed his vision went "white" for a few minutes associated with sweating, shaking, and weakness. His wife noticed he had a red rash on his legs and they brought him to the ED. Pt received a platelet transfusion about a week ago outpt. Denies any pain. Had a nose bleed earlier. He denies skin sores, measured fever, HA, trouble speaking, numbness/tingling, focal weakness, dizziness, CP, SOB, abd pain, N/V/D, hematochezia, melena, urinary symptoms, oral/genital sores. He has a recent admission to [facility] discharged 2019 for diffuse body pain and low-grade fever, infectious workup was negative. MRI C/T/L showed abnormal T1 prolongation in the whole spine, sacral and iliac bones. Received 7 days of Bendamustine and Pomalidomide (on hold few days for neutropenia), last on 2019.    When pt presented to the ED, code stroke called. Neuro exam was nonfocal, CTH neg for acute process, and vomiting and vision changes were resolved. ED vitals afebrile, H101, /58 --> 102/62, R18, sat 100% on RA. Given decadron 40mg IV, 1u PLT. 1u pRBC ordered.            *****PAST MEDICAL / Surgical  HISTORY:  PAST MEDICAL & SURGICAL HISTORY:  Diverticulitis: perforated s/p Kiah  Multiple myeloma: relapsed 3/2019  Hypertension  Left ventricular dysfunction  Cardiomyopathy: nonischemic  Former smoker: (1 ppd x 11 years; Quit ~age 26)  History of bone marrow transplant  History of surgery: s/p exploratory laparotomy with sigmoid resection and colostomy on 2018           *****FAMILY HISTORY:  FAMILY HISTORY:  Family history of diabetes mellitus  Family history of hypertension           *****SOCIAL HISTORY:  Alcohol: None  Smoking: None         *****ALLERGIES:   Allergies    oxycodone (Vomiting; Nausea)    Intolerances             *****MEDICATIONS: current medication reviewed and documented.   MEDICATIONS  (STANDING):    MEDICATIONS  (PRN):  artificial  tears Solution 1 Drop(s) Both EYES every 6 hours PRN Dry Eyes  docusate sodium 100 milliGRAM(s) Oral three times a day PRN Constipation  HYDROmorphone   Tablet 2 milliGRAM(s) Oral every 4 hours PRN moderate - severe pain  ondansetron Injectable 8 milliGRAM(s) IV Push every 8 hours PRN Nausea and/or Vomiting  senna 2 Tablet(s) Oral at bedtime PRN Constipation           *****REVIEW OF SYSTEM:  GEN: no fever, no chills, no pain  RESP: no SOB, no cough, no sputum  CVS: no chest pain, no palpitations, no edema  GI: no abdominal pain, no nausea, no vomiting, no constipation, no diarrhea  : no dysurea, no frequency, no hematurea  Neuro: no headache, no dizziness  PSYCH: no anxiety, no depression  Derm : no itching, no rash         *****VITAL SIGNS:  T(C): 36.7 (19 @ 06:25), Max: 37 (19 @ 19:21)  HR: 98 (19 @ 06:25) (98 - 102)  BP: 110/21 (19 @ 06:25) (101/49 - 116/58)  RR: 18 (19 @ 06:25) (17 - 18)  SpO2: 100% (19 @ 06:25) (96% - 100%)  Wt(kg): --     @ 07:01  -   @ 07:00  --------------------------------------------------------  IN: 0 mL / OUT: 250 mL / NET: -250 mL             *****PHYSICAL EXAM:   Alert oriented x 2   Attention comprehension are fair.    EOMI   No facial asymmetry   Tongue is midline   Palate elevates symmetrically   Moving all 4 ext symmetrically no pronator drift      Gait : not assessed.  B/L down going toes               *****LAB AND IMAGIN.1    1.15  )-----------( 54       ( 2019 07:45 )             18.5                   140  |  105  |  22  ----------------------------<  173<H>  4.4   |  17<L>  |  1.02    Ca    8.3<L>      2019 07:45  Phos  3.2       Mg     2.0         TPro  6.9  /  Alb  3.8  /  TBili  3.1<H>  /  DBili  x   /  AST  72<H>  /  ALT  152<H>  /  AlkPhos  132<H>      PT/INR - ( 2019 07:45 )   PT: 14.5 SEC;   INR: 1.30          PTT - ( 2019 07:45 )  PTT:30.0 SEC                            [All pertinent recent Imaging reports reviewed]         *****A S S E S S M E N T   A N D   P L A N :        54yo M hx of relapsed multiple myeloma s/p bone marrow transplant (~1.5 yrs ago) and chemo/RT (last infusion Bendamustine and Pomalidomide 2019), perforated diverticulitis s/p Kiah's (proctosigmoidectomy with colostomy, 2018), nonischemic cardiomyopathy (mild global LV systolic dysfunction with EF 54% and diastolic LV dysfunction based on TTE in 2018), and HTN presents to [facility] ED w/ vomiting and change of vision. Wife Devan Hood at bedside. Pt was sitting at home. He vomited 2-3 times nonbloody, nonbilious and was not able to eat or drink anything. On  at 4pm, while sitting he noticed his vision went "white" for a few minutes associated with sweating, shaking, and weakness. His wife noticed he had a red rash on his legs and they brought him to the ED. Pt received a platelet transfusion about a week ago outpt. Denies any pain. Had a nose bleed earlier. He denies skin sores, measured fever, HA, trouble speaking, numbness/tingling, focal weakness, dizziness, CP, SOB, abd pain, N/V/D, hematochezia, melena, urinary symptoms, oral/genital sores. He has a recent admission to [facility] discharged 2019 for diffuse body pain and low-grade fever, infectious workup was negative. MRI C/T/L showed abnormal T1 prolongation in the whole spine, sacral and iliac bones. Received 7 days of Bendamustine and Pomalidomide (on hold few days for neutropenia), last on 2019.    When pt presented to the ED, code stroke called, stroke team saw pt and deemed not a candidate for acute therapy. Neuro exam was nonfocal, CTH neg for acute process, and vomiting and vision changes were resolved. ED vitals afebrile, H101, /58 --> 102/62, R18, sat 100% on RA. Given decadron 40mg IV, 1u PLT. 1u pRBC ordered.     Problem/Recommendations 1: near syncope due to anemia/pancytopenia   hemonc on board  ct with no acute intracranial pathology   check orthostatic  tele monitoring.   goc discussion.   overall poor prognosis      ___________________________  Will follow with you.  Thank you,  Margret Yousif MD  Diplomate of the American Board of Neurology and Psychiatry.  Diplomate of the American Board of Vascular Neurology.   [facility] ([facility]   Ph: [phone]    Differential diagnosis and plan of care discussed with patient after the evaluation.   Advanced care planning options discussed.   Pain assessed and judicious use of narcotics when appropriate was discussed.  Importance of Fall prevention discussed.  Counseling on Smoking and Alcohol cessation was offered when appropriate.  Counseling on Diet, exercise, and medication compliance was done.     62 minutes spent on the total encounter;  more than 50 % of the visit was spent on counseling  and or coordinating care by the attending physician.    Thank you for allowing me to participate in the care of this [facility] patient. Please do not hesitate to call me if you have any questions.     This and subsequent notes were partially created using voice recognition software and will  inherently be subject to errors including those of syntax and sound alike substitutions which may escape proofreading. In such instances original meaning may be extrapolated by contextual derivation. [facility] [place] [facility]), [facility]        Patient is a 53y old  Male who presents with a chief complaint of Pancytopenia (2019 12:49)      HPI:  54yo M hx of relapsed multiple myeloma s/p bone marrow transplant (~1.5 yrs ago) and chemo/RT (last infusion Bendamustine and Pomalidomide 2019), perforated diverticulitis s/p Kiah's (proctosigmoidectomy with colostomy, 2018), nonischemic cardiomyopathy (mild global LV systolic dysfunction with EF 54% and diastolic LV dysfunction based on TTE in 2018), and HTN presents to [facility] ED w/ vomiting and change of vision. Wife Devan Hood at bedside. Pt was sitting at home. He vomited 2-3 times nonbloody, nonbilious and was not able to eat or drink anything. On  at 4pm, while sitting he noticed his vision went "white" for a few minutes associated with sweating, shaking, and weakness. His wife noticed he had a red rash on his legs and they brought him to the ED. Pt received a platelet transfusion about a week ago outpt. Denies any pain. Had a nose bleed earlier. He denies skin sores, measured fever, HA, trouble speaking, numbness/tingling, focal weakness, dizziness, CP, SOB, abd pain, N/V/D, hematochezia, melena, urinary symptoms, oral/genital sores. He has a recent admission to [facility] discharged 2019 for diffuse body pain and low-grade fever, infectious workup was negative. MRI C/T/L showed abnormal T1 prolongation in the whole spine, sacral and iliac bones. Received 7 days of Bendamustine and Pomalidomide (on hold few days for neutropenia), last on 2019.    When pt presented to the ED, code stroke called. Neuro exam was nonfocal, CTH neg for acute process, and vomiting and vision changes were resolved. ED vitals afebrile, H101, /58 --> 102/62, R18, sat 100% on RA. Given decadron 40mg IV, 1u PLT. 1u pRBC ordered.            *****PAST MEDICAL / Surgical  HISTORY:  PAST MEDICAL & SURGICAL HISTORY:  Diverticulitis: perforated s/p Kiah  Multiple myeloma: relapsed 3/2019  Hypertension  Left ventricular dysfunction  Cardiomyopathy: nonischemic  Former smoker: (1 ppd x 11 years; Quit ~age 26)  History of bone marrow transplant  History of surgery: s/p exploratory laparotomy with sigmoid resection and colostomy on 2018           *****FAMILY HISTORY:  FAMILY HISTORY:  Family history of diabetes mellitus  Family history of hypertension           *****SOCIAL HISTORY:  Alcohol: None  Smoking: None         *****ALLERGIES:   Allergies    oxycodone (Vomiting; Nausea)    Intolerances             *****MEDICATIONS: current medication reviewed and documented.   MEDICATIONS  (STANDING):    MEDICATIONS  (PRN):  artificial  tears Solution 1 Drop(s) Both EYES every 6 hours PRN Dry Eyes  docusate sodium 100 milliGRAM(s) Oral three times a day PRN Constipation  HYDROmorphone   Tablet 2 milliGRAM(s) Oral every 4 hours PRN moderate - severe pain  ondansetron Injectable 8 milliGRAM(s) IV Push every 8 hours PRN Nausea and/or Vomiting  senna 2 Tablet(s) Oral at bedtime PRN Constipation           *****REVIEW OF SYSTEM:  GEN: no fever, no chills, no pain  RESP: no SOB, no cough, no sputum  CVS: no chest pain, no palpitations, no edema  GI: no abdominal pain, no nausea, no vomiting, no constipation, no diarrhea  : no dysurea, no frequency, no hematurea  Neuro: no headache, no dizziness  PSYCH: no anxiety, no depression  Derm : no itching, no rash         *****VITAL SIGNS:  T(C): 36.7 (19 @ 06:25), Max: 37 (19 @ 19:21)  HR: 98 (19 @ 06:25) (98 - 102)  BP: 110/21 (19 @ 06:25) (101/49 - 116/58)  RR: 18 (19 @ 06:25) (17 - 18)  SpO2: 100% (19 @ 06:25) (96% - 100%)  Wt(kg): --     @ 07:01  -   @ 07:00  --------------------------------------------------------  IN: 0 mL / OUT: 250 mL / NET: -250 mL             *****PHYSICAL EXAM:   Alert oriented x 2   Attention comprehension are fair.    EOMI   No facial asymmetry   Tongue is midline   Palate elevates symmetrically   Moving all 4 ext symmetrically no pronator drift      Gait : not assessed.  B/L down going toes               *****LAB AND IMAGIN.1    1.15  )-----------( 54       ( 2019 07:45 )             18.5                   140  |  105  |  22  ----------------------------<  173<H>  4.4   |  17<L>  |  1.02    Ca    8.3<L>      2019 07:45  Phos  3.2       Mg     2.0         TPro  6.9  /  Alb  3.8  /  TBili  3.1<H>  /  DBili  x   /  AST  72<H>  /  ALT  152<H>  /  AlkPhos  132<H>      PT/INR - ( 2019 07:45 )   PT: 14.5 SEC;   INR: 1.30          PTT - ( 2019 07:45 )  PTT:30.0 SEC                            [All pertinent recent Imaging reports reviewed]         *****A S S E S S M E N T   A N D   P L A N :        54yo M hx of relapsed multiple myeloma s/p bone marrow transplant (~1.5 yrs ago) and chemo/RT (last infusion Bendamustine and Pomalidomide 2019), perforated diverticulitis s/p Kiah's (proctosigmoidectomy with colostomy, 2018), nonischemic cardiomyopathy (mild global LV systolic dysfunction with EF 54% and diastolic LV dysfunction based on TTE in 2018), and HTN presents to [facility] ED w/ vomiting and change of vision. Wife Devan Hood at bedside. Pt was sitting at home. He vomited 2-3 times nonbloody, nonbilious and was not able to eat or drink anything. On  at 4pm, while sitting he noticed his vision went "white" for a few minutes associated with sweating, shaking, and weakness. His wife noticed he had a red rash on his legs and they brought him to the ED. Pt received a platelet transfusion about a week ago outpt. Denies any pain. Had a nose bleed earlier. He denies skin sores, measured fever, HA, trouble speaking, numbness/tingling, focal weakness, dizziness, CP, SOB, abd pain, N/V/D, hematochezia, melena, urinary symptoms, oral/genital sores. He has a recent admission to [facility] discharged 2019 for diffuse body pain and low-grade fever, infectious workup was negative. MRI C/T/L showed abnormal T1 prolongation in the whole spine, sacral and iliac bones. Received 7 days of Bendamustine and Pomalidomide (on hold few days for neutropenia), last on 2019.    When pt presented to the ED, code stroke called, stroke team saw pt and deemed not a candidate for acute therapy. Neuro exam was nonfocal, CTH neg for acute process, and vomiting and vision changes were resolved. ED vitals afebrile, H101, /58 --> 102/62, R18, sat 100% on RA. Given decadron 40mg IV, 1u PLT. 1u pRBC ordered.     Problem/Recommendations 1: near syncope due to anemia/pancytopenia   hemonc on board  ct with no acute intracranial pathology   check orthostatic  goc discussion.   overall poor prognosis      ___________________________  Will follow with you.  Thank you,  Margret Yousif MD  Diplomate of the American Board of Neurology and Psychiatry.  Diplomate of the American Board of Vascular Neurology.   [facility]   Ph: [phone]    Differential diagnosis and plan of care discussed with patient after the evaluation.   Advanced care planning options discussed.   Pain assessed and judicious use of narcotics when appropriate was discussed.  Importance of Fall prevention discussed.  Counseling on Smoking and Alcohol cessation was offered when appropriate.  Counseling on Diet, exercise, and medication compliance was done.     62 minutes spent on the total encounter;  more than 50 % of the visit was spent on counseling  and or coordinating care by the attending physician.    Thank you for allowing me to participate in the care of this [facility] patient. Please do not hesitate to call me if you have any questions.     This and subsequent notes were partially created using voice recognition software and will  inherently be subject to errors including those of syntax and sound alike substitutions which may escape proofreading. In such instances original meaning may be extrapolated by contextual derivation.

## 2019-04-26 NOTE — CONSULT NOTE ADULT - SUBJECTIVE AND OBJECTIVE BOX
HISTORY OF PRESENT ILLNESS: HPI:    53 y.o. male, known to our office, with PMHX hx of relapsed multiple myeloma s/p bone marrow transplant (~1.5 yrs ago) and chemo/RT (last infusion Bendamustine and Pomalidomide 4/08/2019), perforated diverticulitis s/p Kiah's (proctosigmoidectomy with colostomy, 9/2018), nonischemic cardiomyopathy (mild global LV systolic dysfunction with EF 54% and diastolic LV dysfunction based on TTE in 11/2018), and HTN presents to [facility] ED w/ vomiting and change of vision. Wife Devan Hood at bedside. Pt was sitting at home. He vomited 2-3 times nonbloody, nonbilious and was not able to eat or drink anything. On 4/25 at 4pm, while sitting he noticed his vision went "white" for a few minutes associated with sweating, shaking, and weakness. His wife noticed he had a red rash on his legs and they brought him to the ED. Pt received a platelet transfusion about a week ago outpt. Denies any pain. Had a nose bleed earlier. He denies skin sores, measured fever, HA, trouble speaking, numbness/tingling, focal weakness, dizziness, CP, SOB, abd pain, N/V/D, hematochezia, melena, urinary symptoms, oral/genital sores. He has a recent admission to [facility] discharged 4/08/2019 for diffuse body pain and low-grade fever, infectious workup was negative. MRI C/T/L showed abnormal T1 prolongation in the whole spine, sacral and iliac bones. Received 7 days of Bendamustine and Pomalidomide (on hold few days for neutropenia), last on 4/08/2019.    53M hx of multiple myeloma s/p bone marrow transplant (~1.5 yrs ago) and chemo/RT (stopped ~2 months ago in preparation for colostomy reversal), perforated diverticulitis s/p Kiah's (proctosigmoidectomy with colostomy, 9/2018), nonischemic cardiomyopathy (mild global LV systolic dysfunction with EF 54% and diastolic LV dysfunction based on TTE in 11/2018), and HTN who is being seen for management of cardiomyopathy.  The patient was admitted with diffuse body pain.  The patient denies chest pain or any other anginal symptoms.  He has no orthopnea, PND, or LE edema.  He was diagnosed with cardiomyopathy several years ago and reports an initial EF of 10% that improved to 54% on last TTE.          PAST MEDICAL & SURGICAL HISTORY:  Diverticulitis: perforated s/p Kiah  Multiple myeloma: relapsed 3/2019  Hypertension  Left ventricular dysfunction  Cardiomyopathy: nonischemic  Former smoker: (1 ppd x 11 years; Quit ~age 26)  History of bone marrow transplant  History of surgery: s/p exploratory laparotomy with sigmoid resection and colostomy on 9/2/2018      MEDICATIONS:  MEDICATIONS  (STANDING):    Allergies    oxycodone (Vomiting; Nausea)    Intolerances    FAMILY HISTORY:  Family history of diabetes mellitus  Family history of hypertension    Non-contributary for premature coronary disease or sudden cardiac death    SOCIAL HISTORY:    [ X] Non-smoker  [ ] Smoker  [ ] Alcohol      REVIEW OF SYSTEMS:  [ ]chest pain  [  ]shortness of breath  [  ]palpitations  [  ]syncope  [ ]near syncope [ ]upper extremity weakness   [ ] lower extremity weakness  [ X ]diplopia  [ X ]altered mental status   [  ]fevers  [ ]chills [ ]nausea  [ ]vomitting  [  ]dysphagia    [ ]abdominal pain  [ ]melena  [ ]BRBPR    [  ]epistaxis  [  ]rash    [ ]lower extremity edema        [X ] All others negative	  [ ] Unable to obtain    LABS:	 	    CARDIAC MARKERS:                       6.1    1.15  )-----------( 54       ( 26 Apr 2019 07:45 )             18.5     Hb Trend: 6.1<--, 5.6<--, 6.1<--    04-26    140  |  105  |  22  ----------------------------<  173<H>  4.4   |  17<L>  |  1.02    Ca    8.3<L>      26 Apr 2019 07:45  Phos  3.2     04-26  Mg     2.0     04-26    TPro  6.9  /  Alb  3.8  /  TBili  3.1<H>  /  DBili  x   /  AST  72<H>  /  ALT  152<H>  /  AlkPhos  132<H>  04-26    Creatinine Trend: 1.02<--, 1.17<--, 0.97<--, 0.92<--, 0.91<--, 1.06<--    Coags:  PT/INR - ( 26 Apr 2019 07:45 )   PT: 14.5 SEC;   INR: 1.30        PTT - ( 26 Apr 2019 07:45 )  PTT:30.0 SEC    proBNP:   Lipid Profile:   HgA1c:   TSH:     PHYSICAL EXAM:  T(C): 36.7 (04-26-19 @ 06:25), Max: 37 (04-25-19 @ 19:21)  HR: 98 (04-26-19 @ 06:25) (98 - 102)  BP: 110/21 (04-26-19 @ 06:25) (101/49 - 116/58)  RR: 18 (04-26-19 @ 06:25) (17 - 18)  SpO2: 100% (04-26-19 @ 06:25) (96% - 100%)  Wt(kg): --  77.4  I&O's Summary    25 Apr 2019 07:01  -  26 Apr 2019 07:00  --------------------------------------------------------  IN: 0 mL / OUT: 250 mL / NET: -250 mL    Gen: Appears well in NAD  HEENT:  (-)icterus (-)pallor  CV: N S1 S2 1/6 AISHA (+)2 Pulses B/l  Resp:  Clear to auscultation  B/L, normal effort  GI: (+) BS Soft, NT, ND  Lymph:  (-)Edema, (-)obvious lymphadenopathy  Skin: Warm to touch, Normal turgor  Psych: Appropriate mood and affect    TELEMETRY: 	  Not on telemetry     ECG:  	    RADIOLOGY:         CXR: pending     < from: CT Brain Stroke Protocol (04.25.19 @ 19:42) >  IMPRESSION:     No CT evidence of acute intracranial hemorrhage or mass effect from   vasogenic edema. If symptoms persist and there are no contraindications,   recommend MRI of the brain and orbits with intravenous contrast for   further evaluation.    These findings were discussed with Dr. Baez at 4/25/2019 7:47 PM by Dr. Palmer with read back confirmation.    TEOFILO PALMER M.D., RADIOLOGY RESIDENT  This document has been electronically signed.  DUNIA SPRAGUE M.D., RADIOLOGIST  This document has been electronically signed. Apr 25 2019  7:58PM        < end of copied text >    < from: Transthoracic Echocardiogram (03.29.19 @ 20:01) >  CONCLUSIONS:  1. Normal mitral valve. Minimal mitral regurgitation.  2. Normal left ventricular internal dimensions and wall  thicknesses.  3. Normal left ventricular systolic function. No segmental  wall motion abnormalities.  4. Normal right ventricular size and function.  5. Small pericardial effusion posterior to the left  ventricle and superior to the right atrium.  *** Compared with echocardiogram of 8/2/2017, left  ventricular systolic function has improved.  ------------------------------------------------------------------------  Confirmed on  4/1/2019 - 08:41:32 by Samson Hightower M.D.    < end of copied text >    ASSESSMENT/PLAN: 	    52yo M hx of relapsed multiple myeloma s/p bone marrow transplant (~1.5 yrs ago) and chemo/RT (last infusion Bendamustine and Pomalidomide 4/08/2019), perforated diverticulitis s/p Kiah's, nonischemic cardiomyopathy, HTN. Presents to ED as code stroke. Follow up is for NICM and HTN.     -- No chest pain or evidence of heart failure  -- Previous echo from office as noted above  -- Recent ECHO with EF 61%, normal LV systolic function  -- NST HISTORY OF PRESENT ILLNESS: HPI:    53 y.o. male, known to our office, with PMHX hx of relapsed multiple myeloma s/p bone marrow transplant (~1.5 yrs ago) and chemo/RT (last infusion Bendamustine and Pomalidomide 4/08/2019), perforated diverticulitis s/p Kiah's (proctosigmoidectomy with colostomy, 9/2018), nonischemic cardiomyopathy (mild global LV systolic dysfunction with EF 54% and diastolic LV dysfunction based on TTE in 11/2018), and HTN presents to [facility] ED with w/ vomiting and change of vision. Wife Devan Hood at bedside. Pt was sitting at home. He vomited 2-3 times nonbloody, nonbilious and was not able to eat or drink anything. On 4/25 at 4pm, while sitting he noticed his vision went "white" for a few minutes associated with sweating, shaking, and weakness. His wife noticed he had a red rash on his legs and they brought him to the ED. Pt received a platelet transfusion about a week ago outpt. Denies any pain. Had a nose bleed earlier. He denies skin sores, measured fever, HA, trouble speaking, numbness/tingling, focal weakness, dizziness, CP, SOB, abd pain, N/V/D, hematochezia, melena, urinary symptoms, oral/genital sores. He has a recent admission to [facility] discharged 4/08/2019 for diffuse body pain and low-grade fever, infectious workup was negative. MRI C/T/L showed abnormal T1 prolongation in the whole spine, sacral and iliac bones. Received 7 days of Bendamustine and Pomalidomide (on hold few days for neutropenia), last on 4/08/2019.  He is now     being seen for management of cardiomyopathy.  The patient was admitted with diffuse body pain.  The patient denies chest pain or any other anginal symptoms.  He has no orthopnea, PND, or LE edema.  He was diagnosed with cardiomyopathy several years ago and reports an initial EF of 10% that improved to 54% on last TTE.          PAST MEDICAL & SURGICAL HISTORY:  Diverticulitis: perforated s/p Kiah  Multiple myeloma: relapsed 3/2019  Hypertension  Left ventricular dysfunction  Cardiomyopathy: nonischemic  Former smoker: (1 ppd x 11 years; Quit ~age 26)  History of bone marrow transplant  History of surgery: s/p exploratory laparotomy with sigmoid resection and colostomy on 9/2/2018      MEDICATIONS:  MEDICATIONS  (STANDING):    Allergies    oxycodone (Vomiting; Nausea)    Intolerances    FAMILY HISTORY:  Family history of diabetes mellitus  Family history of hypertension    Non-contributary for premature coronary disease or sudden cardiac death    SOCIAL HISTORY:    [ X] Non-smoker  [ ] Smoker  [ ] Alcohol      REVIEW OF SYSTEMS:  [ ]chest pain  [  ]shortness of breath  [  ]palpitations  [  ]syncope  [ ]near syncope [ ]upper extremity weakness   [ ] lower extremity weakness  [ X ]diplopia  [ X ]altered mental status   [  ]fevers  [ ]chills [ ]nausea  [ ]vomitting  [  ]dysphagia    [ ]abdominal pain  [ ]melena  [ ]BRBPR    [  ]epistaxis  [  ]rash    [ ]lower extremity edema        [X ] All others negative	  [ ] Unable to obtain    LABS:	 	    CARDIAC MARKERS:                       6.1    1.15  )-----------( 54       ( 26 Apr 2019 07:45 )             18.5     Hb Trend: 6.1<--, 5.6<--, 6.1<--    04-26    140  |  105  |  22  ----------------------------<  173<H>  4.4   |  17<L>  |  1.02    Ca    8.3<L>      26 Apr 2019 07:45  Phos  3.2     04-26  Mg     2.0     04-26    TPro  6.9  /  Alb  3.8  /  TBili  3.1<H>  /  DBili  x   /  AST  72<H>  /  ALT  152<H>  /  AlkPhos  132<H>  04-26    Creatinine Trend: 1.02<--, 1.17<--, 0.97<--, 0.92<--, 0.91<--, 1.06<--    Coags:  PT/INR - ( 26 Apr 2019 07:45 )   PT: 14.5 SEC;   INR: 1.30        PTT - ( 26 Apr 2019 07:45 )  PTT:30.0 SEC    proBNP:   Lipid Profile:   HgA1c:   TSH:     PHYSICAL EXAM:  T(C): 36.7 (04-26-19 @ 06:25), Max: 37 (04-25-19 @ 19:21)  HR: 98 (04-26-19 @ 06:25) (98 - 102)  BP: 110/21 (04-26-19 @ 06:25) (101/49 - 116/58)  RR: 18 (04-26-19 @ 06:25) (17 - 18)  SpO2: 100% (04-26-19 @ 06:25) (96% - 100%)  Wt(kg): --  77.4  I&O's Summary    25 Apr 2019 07:01  -  26 Apr 2019 07:00  --------------------------------------------------------  IN: 0 mL / OUT: 250 mL / NET: -250 mL    Gen: Appears well in NAD  HEENT:  (-)icterus (-)pallor  CV: N S1 S2 1/6 AISHA (+)2 Pulses B/l  Resp:  Clear to auscultation  B/L, normal effort  GI: (+) BS Soft, NT, ND  Lymph:  (-)Edema, (-)obvious lymphadenopathy  Skin: Warm to touch, Normal turgor  Psych: Appropriate mood and affect    TELEMETRY: 	  Not on telemetry     ECG:  	    RADIOLOGY:         CXR: pending     < from: CT Brain Stroke Protocol (04.25.19 @ 19:42) >  IMPRESSION:     No CT evidence of acute intracranial hemorrhage or mass effect from   vasogenic edema. If symptoms persist and there are no contraindications,   recommend MRI of the brain and orbits with intravenous contrast for   further evaluation.    These findings were discussed with Dr. Baez at 4/25/2019 7:47 PM by Dr. Palmer with read back confirmation.    TEOFILO PALMER M.D., RADIOLOGY RESIDENT  This document has been electronically signed.  DUNIA SPRAGUE M.D., RADIOLOGIST  This document has been electronically signed. Apr 25 2019  7:58PM        < end of copied text >    < from: Transthoracic Echocardiogram (03.29.19 @ 20:01) >  CONCLUSIONS:  1. Normal mitral valve. Minimal mitral regurgitation.  2. Normal left ventricular internal dimensions and wall  thicknesses.  3. Normal left ventricular systolic function. No segmental  wall motion abnormalities.  4. Normal right ventricular size and function.  5. Small pericardial effusion posterior to the left  ventricle and superior to the right atrium.  *** Compared with echocardiogram of 8/2/2017, left  ventricular systolic function has improved.  ------------------------------------------------------------------------  Confirmed on  4/1/2019 - 08:41:32 by Samson Hightower M.D.    < end of copied text >    ASSESSMENT/PLAN: 	    52yo M hx of relapsed multiple myeloma s/p bone marrow transplant (~1.5 yrs ago) and chemo/RT (last infusion Bendamustine and Pomalidomide 4/08/2019), perforated diverticulitis s/p Kiah's, nonischemic cardiomyopathy, HTN. Presents to ED as code stroke. Follow up is for NICM and HTN.     -- No chest pain or evidence of heart failure  -- Previous echo from office as noted above  -- Recent ECHO with EF 61%, normal LV systolic function  -- NST HISTORY OF PRESENT ILLNESS: HPI:    53 y.o. male, known to our office, with PMHX hx of relapsed multiple myeloma s/p bone marrow transplant (~1.5 yrs ago) and chemo/RT (last infusion Bendamustine and Pomalidomide 4/08/2019), perforated diverticulitis s/p Kiah's (proctosigmoidectomy with colostomy, 9/2018), nonischemic cardiomyopathy (mild global LV systolic dysfunction with EF 54% and diastolic LV dysfunction based on TTE in 11/2018), and HTN presents to [facility] ED with w/ vomiting and change of vision. Wife Devan Hood at bedside. Pt was sitting at home. He vomited 2-3 times nonbloody, nonbilious and was not able to eat or drink anything. On 4/25 at 4pm, while sitting he noticed his vision went "white" for a few minutes associated with sweating, shaking, and weakness. His wife noticed he had a red rash on his legs and they brought him to the ED. Pt received a platelet transfusion about a week ago outpt. Denies any pain. Had a nose bleed earlier. He denies skin sores, measured fever, HA, trouble speaking, numbness/tingling, focal weakness, dizziness, CP, SOB, abd pain, N/V/D, hematochezia, melena, urinary symptoms, oral/genital sores. He has a recent admission to [facility] discharged 4/08/2019 for diffuse body pain and low-grade fever, infectious workup was negative. MRI C/T/L showed abnormal T1 prolongation in the whole spine, sacral and iliac bones. Received 7 days of Bendamustine and Pomalidomide (on hold few days for neutropenia), last on 4/08/2019. He is being seen for management of cardiomyopathy.   The patient denies chest pain or any other anginal symptoms.  He has no orthopnea, PND, or LE edema.  He was diagnosed with cardiomyopathy several years ago and reports an initial EF of 10% that improved to 54% on last TTE.          PAST MEDICAL & SURGICAL HISTORY:  Diverticulitis: perforated s/p Kiah  Multiple myeloma: relapsed 3/2019  Hypertension  Left ventricular dysfunction  Cardiomyopathy: nonischemic  Former smoker: (1 ppd x 11 years; Quit ~age 26)  History of bone marrow transplant  History of surgery: s/p exploratory laparotomy with sigmoid resection and colostomy on 9/2/2018      MEDICATIONS:  MEDICATIONS  (STANDING):    Allergies    oxycodone (Vomiting; Nausea)    Intolerances    FAMILY HISTORY:  Family history of diabetes mellitus  Family history of hypertension    Non-contributary for premature coronary disease or sudden cardiac death    SOCIAL HISTORY:    [ X] Non-smoker  [ ] Smoker  [ ] Alcohol      REVIEW OF SYSTEMS:  [ ]chest pain  [  ]shortness of breath  [  ]palpitations  [  ]syncope  [ ]near syncope [ ]upper extremity weakness   [ ] lower extremity weakness  [ X ]diplopia  [ X ]altered mental status   [  ]fevers  [ ]chills [ ]nausea  [ ]vomitting  [  ]dysphagia    [ ]abdominal pain  [ ]melena  [ ]BRBPR    [  ]epistaxis  [  ]rash    [ ]lower extremity edema        [X ] All others negative	  [ ] Unable to obtain    LABS:	 	    CARDIAC MARKERS:                       6.1    1.15  )-----------( 54       ( 26 Apr 2019 07:45 )             18.5     Hb Trend: 6.1<--, 5.6<--, 6.1<--    04-26    140  |  105  |  22  ----------------------------<  173<H>  4.4   |  17<L>  |  1.02    Ca    8.3<L>      26 Apr 2019 07:45  Phos  3.2     04-26  Mg     2.0     04-26    TPro  6.9  /  Alb  3.8  /  TBili  3.1<H>  /  DBili  x   /  AST  72<H>  /  ALT  152<H>  /  AlkPhos  132<H>  04-26    Creatinine Trend: 1.02<--, 1.17<--, 0.97<--, 0.92<--, 0.91<--, 1.06<--    Coags:  PT/INR - ( 26 Apr 2019 07:45 )   PT: 14.5 SEC;   INR: 1.30        PTT - ( 26 Apr 2019 07:45 )  PTT:30.0 SEC    proBNP:   Lipid Profile:   HgA1c:   TSH:     PHYSICAL EXAM:  T(C): 36.7 (04-26-19 @ 06:25), Max: 37 (04-25-19 @ 19:21)  HR: 98 (04-26-19 @ 06:25) (98 - 102)  BP: 110/21 (04-26-19 @ 06:25) (101/49 - 116/58)  RR: 18 (04-26-19 @ 06:25) (17 - 18)  SpO2: 100% (04-26-19 @ 06:25) (96% - 100%)  Wt(kg): --  77.4  I&O's Summary    25 Apr 2019 07:01  -  26 Apr 2019 07:00  --------------------------------------------------------  IN: 0 mL / OUT: 250 mL / NET: -250 mL    Gen: Appears well in NAD  HEENT:  (-)icterus (-)pallor  CV: N S1 S2 1/6 AISHA (+)2 Pulses B/l  Resp:  Clear to auscultation  B/L, normal effort  GI: (+) BS Soft, NT, ND  Lymph:  (-)Edema, (-)obvious lymphadenopathy  Skin: Warm to touch, Normal turgor  Psych: Appropriate mood and affect    TELEMETRY: 	  Not on telemetry     ECG:  	    RADIOLOGY:         CXR: pending     < from: CT Brain Stroke Protocol (04.25.19 @ 19:42) >  IMPRESSION:     No CT evidence of acute intracranial hemorrhage or mass effect from   vasogenic edema. If symptoms persist and there are no contraindications,   recommend MRI of the brain and orbits with intravenous contrast for   further evaluation.    These findings were discussed with Dr. Baez at 4/25/2019 7:47 PM by Dr. Palmer with read back confirmation.    TEOFILO PALMER M.D., RADIOLOGY RESIDENT  This document has been electronically signed.  DUNIA SPRAGUE M.D., RADIOLOGIST  This document has been electronically signed. Apr 25 2019  7:58PM        < end of copied text >    < from: Transthoracic Echocardiogram (03.29.19 @ 20:01) >  CONCLUSIONS:  1. Normal mitral valve. Minimal mitral regurgitation.  2. Normal left ventricular internal dimensions and wall  thicknesses.  3. Normal left ventricular systolic function. No segmental  wall motion abnormalities.  4. Normal right ventricular size and function.  5. Small pericardial effusion posterior to the left  ventricle and superior to the right atrium.  *** Compared with echocardiogram of 8/2/2017, left  ventricular systolic function has improved.  ------------------------------------------------------------------------  Confirmed on  4/1/2019 - 08:41:32 by Samson Hightower M.D.    < end of copied text >    ASSESSMENT/PLAN: 	    54yo M hx of relapsed multiple myeloma s/p bone marrow transplant (~1.5 yrs ago) and chemo/RT (last infusion Bendamustine and Pomalidomide 4/08/2019), perforated diverticulitis s/p Kiah's, nonischemic cardiomyopathy, HTN. Presents to ED as code stroke. Follow up is for NICM and HTN.     -- No chest pain or evidence of heart failure  -- Previous echo from office as noted above  -- Recent ECHO with EF 61%, normal LV systolic function  -- NST HISTORY OF PRESENT ILLNESS: HPI:    53 y.o. male, known to our office, with PMHX hx of relapsed multiple myeloma s/p bone marrow transplant (~1.5 yrs ago) and chemo/RT (last infusion Bendamustine and Pomalidomide 4/08/2019), perforated diverticulitis s/p Kiah's (proctosigmoidectomy with colostomy, 9/2018), nonischemic cardiomyopathy (mild global LV systolic dysfunction with EF 54% and diastolic LV dysfunction based on TTE in 11/2018), and HTN presents to [facility] ED with w/ vomiting and change of vision. Wife Devan Hood at bedside. Pt was sitting at home. He vomited 2-3 times nonbloody, nonbilious and was not able to eat or drink anything. On 4/25 at 4pm, while sitting he noticed his vision went "white" for a few minutes associated with sweating, shaking, and weakness. His wife noticed he had a red rash on his legs and they brought him to the ED. Pt received a platelet transfusion about a week ago outpt.  Had a nose bleed earlier. He has a recent admission to [facility] discharged 4/08/2019 for diffuse body pain and low-grade fever, infectious workup was negative. MRI C/T/L showed abnormal T1 prolongation in the whole spine, sacral and iliac bones. Received 7 days of Bendamustine and Pomalidomide (on hold few days for neutropenia), last on 4/08/2019. Denies skin chest pain, SOB, HA, trouble speaking, numbness/tingling, focal weakness, dizziness, N/V/D, hematochezia, melena, urinary symptoms. He is being seen for management of cardiomyopathy.  Was diagnosed with cardiomyopathy several years ago and reports an initial EF of 10% that improved to 54% on last TTE.          PAST MEDICAL & SURGICAL HISTORY:  Diverticulitis: perforated s/p Kiah  Multiple myeloma: relapsed 3/2019  Hypertension  Left ventricular dysfunction  Cardiomyopathy: nonischemic  Former smoker: (1 ppd x 11 years; Quit ~age 26)  History of bone marrow transplant  History of surgery: s/p exploratory laparotomy with sigmoid resection and colostomy on 9/2/2018      MEDICATIONS:  MEDICATIONS  (STANDING):    Allergies    oxycodone (Vomiting; Nausea)    Intolerances    FAMILY HISTORY:  Family history of diabetes mellitus  Family history of hypertension    Non-contributary for premature coronary disease or sudden cardiac death    SOCIAL HISTORY:    [ X] Non-smoker  [ ] Smoker  [ ] Alcohol      REVIEW OF SYSTEMS:  [ ]chest pain  [  ]shortness of breath  [  ]palpitations  [  ]syncope  [ ]near syncope [ ]upper extremity weakness   [ ] lower extremity weakness  [ X ]diplopia  [ X ]altered mental status   [  ]fevers  [ ]chills [ ]nausea  [ ]vomitting  [  ]dysphagia    [ ]abdominal pain  [ ]melena  [ ]BRBPR    [  ]epistaxis  [  ]rash    [ ]lower extremity edema        [X ] All others negative	  [ ] Unable to obtain    LABS:	 	    CARDIAC MARKERS:                       6.1    1.15  )-----------( 54       ( 26 Apr 2019 07:45 )             18.5     Hb Trend: 6.1<--, 5.6<--, 6.1<--    04-26    140  |  105  |  22  ----------------------------<  173<H>  4.4   |  17<L>  |  1.02    Ca    8.3<L>      26 Apr 2019 07:45  Phos  3.2     04-26  Mg     2.0     04-26    TPro  6.9  /  Alb  3.8  /  TBili  3.1<H>  /  DBili  x   /  AST  72<H>  /  ALT  152<H>  /  AlkPhos  132<H>  04-26    Creatinine Trend: 1.02<--, 1.17<--, 0.97<--, 0.92<--, 0.91<--, 1.06<--    Coags:  PT/INR - ( 26 Apr 2019 07:45 )   PT: 14.5 SEC;   INR: 1.30        PTT - ( 26 Apr 2019 07:45 )  PTT:30.0 SEC    proBNP:   Lipid Profile:   HgA1c:   TSH:     PHYSICAL EXAM:  T(C): 36.7 (04-26-19 @ 06:25), Max: 37 (04-25-19 @ 19:21)  HR: 98 (04-26-19 @ 06:25) (98 - 102)  BP: 110/21 (04-26-19 @ 06:25) (101/49 - 116/58)  RR: 18 (04-26-19 @ 06:25) (17 - 18)  SpO2: 100% (04-26-19 @ 06:25) (96% - 100%)  Wt(kg): --  77.4  I&O's Summary    25 Apr 2019 07:01  -  26 Apr 2019 07:00  --------------------------------------------------------  IN: 0 mL / OUT: 250 mL / NET: -250 mL    Gen: Appears well in NAD  HEENT:  (-)icterus (-)pallor  CV: N S1 S2 1/6 AISHA (+)2 Pulses B/l  Resp:  Clear to auscultation  B/L, normal effort  GI: (+) BS Soft, NT, ND  Lymph:  (-)Edema, (-)obvious lymphadenopathy  Skin: Warm to touch, Normal turgor  Psych: Appropriate mood and affect    TELEMETRY: 	  Not on telemetry     ECG:  	    RADIOLOGY:         CXR: pending     < from: CT Brain Stroke Protocol (04.25.19 @ 19:42) >  IMPRESSION:     No CT evidence of acute intracranial hemorrhage or mass effect from   vasogenic edema. If symptoms persist and there are no contraindications,   recommend MRI of the brain and orbits with intravenous contrast for   further evaluation.    These findings were discussed with Dr. Baez at 4/25/2019 7:47 PM by Dr. Palmer with read back confirmation.    TEOFILO PALMER M.D., RADIOLOGY RESIDENT  This document has been electronically signed.  DUNIA SPRAGUE M.D., RADIOLOGIST  This document has been electronically signed. Apr 25 2019  7:58PM        < end of copied text >    < from: Transthoracic Echocardiogram (03.29.19 @ 20:01) >  CONCLUSIONS:  1. Normal mitral valve. Minimal mitral regurgitation.  2. Normal left ventricular internal dimensions and wall  thicknesses.  3. Normal left ventricular systolic function. No segmental  wall motion abnormalities.  4. Normal right ventricular size and function.  5. Small pericardial effusion posterior to the left  ventricle and superior to the right atrium.  *** Compared with echocardiogram of 8/2/2017, left  ventricular systolic function has improved.  ------------------------------------------------------------------------  Confirmed on  4/1/2019 - 08:41:32 by Samson Hightower M.D.    < end of copied text >    ASSESSMENT/PLAN: 	    54yo M hx of relapsed multiple myeloma s/p bone marrow transplant (~1.5 yrs ago) and chemo/RT (last infusion Bendamustine and Pomalidomide 4/08/2019), perforated diverticulitis s/p Kiah's, nonischemic cardiomyopathy, HTN. Presents to ED as code stroke. Follow up is for NICM and HTN.     -- No chest pain or evidence of heart failure  -- Previous echo from office as noted above  -- Recent ECHO with EF 61%, normal LV systolic function  -- NST HISTORY OF PRESENT ILLNESS: HPI:    53 y.o. male, known to our office, with PMHX hx of relapsed multiple myeloma s/p bone marrow transplant (~1.5 yrs ago) and chemo/RT (last infusion Bendamustine and Pomalidomide 4/08/2019), perforated diverticulitis s/p Kiah's (proctosigmoidectomy with colostomy, 9/2018), nonischemic cardiomyopathy (mild global LV systolic dysfunction with EF 54% and diastolic LV dysfunction based on TTE in 11/2018), and HTN presents to [facility] ED with w/ vomiting and change of vision. Patient states that while he was at home, he vomited 2-3 times nonbloody, nonbilious and was not able to eat or drink anything. Yesterday, while sitting he noticed his vision went "white" for a few minutes associated with sweating, shaking, and weakness, LE rash. Had a nose bleed earlier during the week, on his scheduled Tuesday appointment for lab work. Patient was not able to be seen because there was confusion about his insurance carrier. Pt received a platelet transfusion about a week ago outpt. He had a recent admission to [facility] discharged 4/08/2019 for diffuse body pain and low-grade fever, infectious workup was negative. MRI C/T/L showed abnormal T1 prolongation in the whole spine, sacral and iliac bones. Received 7 days of Bendamustine and Pomalidomide (on hold few days for neutropenia), last on 4/08/2019. He was diagnosed with cardiomyopathy several years ago and reports an initial EF of 10% that improved to 54% on last TTE.  He is being seen for management of cardiomyopathy.  Denies skin chest pain, SOB, HA, trouble speaking, numbness/tingling, focal weakness, dizziness, N/V/D, hematochezia, melena, urinary symptoms.     PAST MEDICAL & SURGICAL HISTORY:  Diverticulitis: perforated s/p Kiah  Multiple myeloma: relapsed 3/2019  Hypertension  Left ventricular dysfunction  Cardiomyopathy: nonischemic  Former smoker: (1 ppd x 11 years; Quit ~age 26)  History of bone marrow transplant  History of surgery: s/p exploratory laparotomy with sigmoid resection and colostomy on 9/2/2018    MEDICATIONS:  MEDICATIONS  (STANDING):    Allergies    oxycodone (Vomiting; Nausea)    Intolerances    FAMILY HISTORY:  Family history of diabetes mellitus  Family history of hypertension    Non-contributary for premature coronary disease or sudden cardiac death    SOCIAL HISTORY:    [ X] Non-smoker  [ ] Smoker  [ ] Alcohol    REVIEW OF SYSTEMS:  [ ]chest pain  [  ]shortness of breath  [  ]palpitations  [  ]syncope  [ ]near syncope [ ]upper extremity weakness   [ ] lower extremity weakness  [ X ]diplopia  [ X ]altered mental status   [  ]fevers  [ ]chills [ ]nausea  [ ]vomitting  [  ]dysphagia    [ ]abdominal pain  [ ]melena  [ ]BRBPR    [  ]epistaxis  [  ]rash    [ ]lower extremity edema      [X ] All others negative	  [ ] Unable to obtain    LABS:	 	    CARDIAC MARKERS:                       6.1    1.15  )-----------( 54       ( 26 Apr 2019 07:45 )             18.5     Hb Trend: 6.1<--, 5.6<--, 6.1<--    04-26    140  |  105  |  22  ----------------------------<  173<H>  4.4   |  17<L>  |  1.02    Ca    8.3<L>      26 Apr 2019 07:45  Phos  3.2     04-26  Mg     2.0     04-26    TPro  6.9  /  Alb  3.8  /  TBili  3.1<H>  /  DBili  x   /  AST  72<H>  /  ALT  152<H>  /  AlkPhos  132<H>  04-26    Creatinine Trend: 1.02<--, 1.17<--, 0.97<--, 0.92<--, 0.91<--, 1.06<--    Coags:  PT/INR - ( 26 Apr 2019 07:45 )   PT: 14.5 SEC;   INR: 1.30        PTT - ( 26 Apr 2019 07:45 )  PTT:30.0 SEC    proBNP:   Lipid Profile:   HgA1c:   TSH:     PHYSICAL EXAM:  T(C): 36.7 (04-26-19 @ 06:25), Max: 37 (04-25-19 @ 19:21)  HR: 98 (04-26-19 @ 06:25) (98 - 102)  BP: 110/21 (04-26-19 @ 06:25) (101/49 - 116/58)  RR: 18 (04-26-19 @ 06:25) (17 - 18)  SpO2: 100% (04-26-19 @ 06:25) (96% - 100%)  Wt(kg): --  77.4  I&O's Summary    25 Apr 2019 07:01  -  26 Apr 2019 07:00  --------------------------------------------------------  IN: 0 mL / OUT: 250 mL / NET: -250 mL    Gen: Appears well in NAD  HEENT:  (-)icterus (-)pallor  CV: N S1 S2 1/6 AISHA (+)2 Pulses B/l  Resp:  Clear to auscultation  B/L, normal effort  GI: (+) BS Soft, NT, ND  Lymph:  (-)Edema, (-)obvious lymphadenopathy  Skin: Warm to touch, Normal turgor  Psych: Appropriate mood and affect    TELEMETRY: 	  Not on telemetry     ECG:  	    RADIOLOGY:         CXR: pending     < from: CT Brain Stroke Protocol (04.25.19 @ 19:42) >  IMPRESSION:     No CT evidence of acute intracranial hemorrhage or mass effect from   vasogenic edema. If symptoms persist and there are no contraindications,   recommend MRI of the brain and orbits with intravenous contrast for   further evaluation.    These findings were discussed with Dr. Baez at 4/25/2019 7:47 PM by Dr. Palmer with read back confirmation.    TEOFILO PALMER M.D., RADIOLOGY RESIDENT  This document has been electronically signed.  DUNIA SPRAGUE M.D., RADIOLOGIST  This document has been electronically signed. Apr 25 2019  7:58PM        < end of copied text >    < from: Transthoracic Echocardiogram (03.29.19 @ 20:01) >  CONCLUSIONS:  1. Normal mitral valve. Minimal mitral regurgitation.  2. Normal left ventricular internal dimensions and wall  thicknesses.  3. Normal left ventricular systolic function. No segmental  wall motion abnormalities.  4. Normal right ventricular size and function.  5. Small pericardial effusion posterior to the left  ventricle and superior to the right atrium.  *** Compared with echocardiogram of 8/2/2017, left  ventricular systolic function has improved.  ------------------------------------------------------------------------  Confirmed on  4/1/2019 - 08:41:32 by Samson Hightower M.D.    < end of copied text >    ASSESSMENT/PLAN: 	    54yo M hx of relapsed multiple myeloma s/p bone marrow transplant (~1.5 yrs ago) and chemo/RT (last infusion Bendamustine and Pomalidomide 4/08/2019), perforated diverticulitis s/p Kiah's, nonischemic cardiomyopathy, HTN. Presents to ED as code stroke. Follow up is for NICM and HTN.     -- No chest pain or evidence of heart failure  -- Recent ECHO with EF 61%, normal LV systolic function  -- Heme/Onc note appreciated, transfuse PRN, monitor CBC   -- CT Head negative for bleed, neurology following   -- No further cardiac work up planned at this time

## 2019-04-26 NOTE — H&P ADULT - NSHPREVIEWOFSYSTEMS_GEN_ALL_CORE
CONSTITUTIONAL: No weakness, fevers or chills  EYES/ENT: No visual changes;  No dysphagia  NECK: No pain or stiffness  RESPIRATORY: No cough, wheezing, hemoptysis; No shortness of breath  CARDIOVASCULAR: No chest pain or palpitations; No lower extremity edema  GASTROINTESTINAL: No abdominal or epigastric pain. No nausea, vomiting, or hematemesis; No diarrhea or constipation. No melena or hematochezia.  GENITOURINARY: No dysuria, frequency or hematuria  NEUROLOGICAL: No numbness or weakness  HEMATOLOGY: No easy bleeding, no lymphadenopathy  SKIN: No itching, burning, rashes, or lesions   All other review of systems is negative unless indicated above. CONSTITUTIONAL: see HPI  EYES/ENT: +vision change, No dysphagia  NECK: No pain or stiffness  RESPIRATORY: No cough, wheezing, hemoptysis; No shortness of breath  CARDIOVASCULAR: No chest pain or palpitations; No lower extremity edema  GASTROINTESTINAL: No abdominal or epigastric pain. No nausea, vomiting, or hematemesis; No diarrhea or constipation. No melena or hematochezia.  GENITOURINARY: No dysuria, frequency or hematuria  NEUROLOGICAL: see HPI  HEMATOLOGY: +nose bleed, no lymphadenopathy  SKIN: No itching, burning, or lesions. +red rash legs  All other review of systems is negative unless indicated above.

## 2019-04-26 NOTE — CONSULT NOTE ADULT - ASSESSMENT
54yo M hx of relapsed multiple myeloma s/p bone marrow transplant (~1.5 yrs ago) and chemo/RT (last infusion Bendamustine and Pomalidomide 4/08/2019), perforated diverticulitis s/p Kiah's (proctosigmoidectomy with colostomy, 9/2018), nonischemic cardiomyopathy (mild global LV systolic dysfunction with EF 54% and diastolic LV dysfunction based on TTE in 11/2018), and HTN presents to [facility] ED w/ vomiting and change of vision. He also had been weak, with epistaxis, and petechiae. He was last seen in office by Dr Murphy on 4/16, had received outpt plt transfusions, and started on pomalidomide at 3mg QOD, last dose about day before presentation.    1. pancytopenia -- likely related to tx, MM  -- agree with transfusions  -- goal hgb >7, plts >10 if no further bleeding, >50 if with bleeding  -- monitor CBC  -- afebrile, hold on GCSF for now    2. MM -- on tavares/pomalyst.   -- hold pomalyst for now in acute setting and severe pancytopenia  -- bendamustine scheduled in office for 4/30  -- consider resuming pomalyst if pt still here if counts improve  -- s/p decadron 40mg 4/25, cont weekly    3. cards following    Plan and impression d/w pt, will d/w primary team, [phone]

## 2019-04-26 NOTE — H&P ADULT - NSHPPHYSICALEXAM_GEN_ALL_CORE
Vital Signs Last 24 Hrs  T(C): 36.8 (25 Apr 2019 23:45), Max: 37 (25 Apr 2019 19:21)  T(F): 98.2 (25 Apr 2019 23:45), Max: 98.6 (25 Apr 2019 19:21)  HR: 102 (25 Apr 2019 23:45) (99 - 102)  BP: 102/62 (25 Apr 2019 23:45) (102/62 - 116/58)  BP(mean): --  RR: 18 (25 Apr 2019 23:45) (17 - 18)  SpO2: 100% (25 Apr 2019 23:45) (96% - 100%)    GENERAL: No acute distress, well-developed  HEAD:  Atraumatic, Normocephalic  ENT: PERRL, conjunctiva and sclera clear, neck supple, no JVD, moist mucosa, posterior oropharynx clear except few petechiae on hard palate  CHEST/LUNG: Clear to auscultation bilaterally; No wheeze, equal breath sounds bilaterally, respirations nonlabored  HEART: Regular rate and rhythm; No murmurs, rubs, or gallops  ABDOMEN: Soft, nontender, nondistended; Bowel sounds present, no organomegaly, no suprapubic tenderness  BACK: no spinal tenderness, no CVA tenderness  EXTREMITIES:  No clubbing, cyanosis, or edema  PSYCH: Nl behavior, nl affect  NEUROLOGY: AAOx3, CN intact, sensation to light touch intact, strength 5/5 throughotu, FTN and HTS intact, speech fluent, non-focal, moves all extremities spontaneously  SKIN: Normal color or lesions, +petechiae over b/l shins

## 2019-04-26 NOTE — H&P ADULT - ATTENDING COMMENTS
I agree with the above info, changes made above as needed  pt has extensive multiple myeloma, end stage, on chemotherapy, hx anemia and thrombocytopenia requiring transfusions and dexamethasone, who p/w symptomatic anemia, vision changes, and severe thrombocytopenia  transfuse, dexamethasone, monitor labs  all consultants management greatly appreciated

## 2019-04-26 NOTE — H&P ADULT - PROBLEM SELECTOR PLAN 2
Pt with transient vision change of vision going white proceeded by NBNB emesis. Initial CTH neg for bleed. Pt with PLT 3 at risk for spontaneous brain bleed. Neuro exam currently nonfocal. Perhaps had TIA.  -neuro checks q4  -If change in mental status or new focal deficit, will obtain CTH Pt with transient vision change of vision going white proceeded by NBNB emesis. Code stroke called, CTH neg for bleed. Pt with PLT 3 at risk for spontaneous brain bleed. Neuro exam currently nonfocal. Perhaps had TIA.  -neuro checks q4  -If change in mental status or new focal deficit, will obtain CTH

## 2019-04-26 NOTE — H&P ADULT - PROBLEM SELECTOR PLAN 6
VTE ppx: no chemoppx or SCDs due to thrombocytopenia, pt ambulatory  Diet: low salt VTE ppx: no chemoppx or SCDs due to thrombocytopenia, pt ambulatory  Diet: low salt  Attempted to present to private attending Dr. Jack Castro twice at 2:09AM leaving . Will attempt again VTE ppx: no chemoppx or SCDs due to thrombocytopenia, pt ambulatory  Diet: low salt  Attempted to present to private attending Dr. Jack Castro ([phone]) twice at 2:09AM leaving . Will attempt again VTE ppx: no chemoppx or SCDs due to thrombocytopenia, pt ambulatory  Diet: low salt  Attempted to present to private attending Dr. Jack Castro ([phone]) twice at 2:09AM leaving , attempted again at 4:15AM. Will attempt again

## 2019-04-26 NOTE — PROGRESS NOTE ADULT - ASSESSMENT
52yo M hx of relapsed multiple myeloma s/p bone marrow transplant (~1.5 yrs ago) and chemo/RT (last infusion Bendamustine and Pomalidomide 4/08/2019), perforated diverticulitis s/p Kiah's (proctosigmoidectomy with colostomy, 9/2018), nonischemic cardiomyopathy (mild global LV systolic dysfunction with EF 54% and diastolic LV dysfunction based on TTE in 11/2018), and HTN presents with vision change and vomiting that resolved, concern for CVA, admitted for pancytopenia.

## 2019-04-26 NOTE — H&P ADULT - NSICDXPASTMEDICALHX_GEN_ALL_CORE_FT
PAST MEDICAL HISTORY:  Cardiomyopathy nonischemic    Diverticulitis perforated s/p Kiah    Former smoker (1 ppd x 11 years; Quit ~age 26)    Hypertension     Left ventricular dysfunction     Multiple myeloma relapsed 3/2019

## 2019-04-26 NOTE — H&P ADULT - HISTORY OF PRESENT ILLNESS
52yo M hx of multiple myeloma s/p bone marrow transplant (~1.5 yrs ago) and chemo/RT (stopped ~2 months ago in preparation for colostomy reversal), perforated diverticulitis s/p Kiah's (proctosigmoidectomy with colostomy, 9/2018), nonischemic cardiomyopathy (mild global LV systolic dysfunction with EF 54% and diastolic LV dysfunction based on TTE in 11/2018), and HTN presents to [facility] ED w/ blurred vision, dizziness, generalized weakness. 52yo M hx of relapsed multiple myeloma s/p bone marrow transplant (~1.5 yrs ago) and chemo/RT (last infusion Bendamustine and Pomalidomide 4/08/2019), perforated diverticulitis s/p Kiah's (proctosigmoidectomy with colostomy, 9/2018), nonischemic cardiomyopathy (mild global LV systolic dysfunction with EF 54% and diastolic LV dysfunction based on TTE in 11/2018), and HTN presents to [facility] ED w/ vomiting and change of vision. Wife Devan Hood at bedside. Pt was sitting at home. He vomited 2-3 times nonbloody, nonbilious and was not able to eat or drink anything. On 4/25 at 4pm, while sitting he noticed his vision went "white" for a few minutes associated with sweating, shaking, and weakness. His wife noticed he had a red rash on his legs and they brought him to the ED. Pt received a platelet transfusion about a week ago outpt. Denies any pain. Had a nose bleed earlier. He denies skin sores, measured fever, HA, trouble speaking, numbness/tingling, focal weakness, dizziness, CP, SOB, abd pain, N/V/D, hematochezia, melena, urinary symptoms, oral/genital sores. He has a recent admission to [facility] discharged 4/08/2019 for diffuse body pain and low-grade fever, infectious workup was negative. MRI C/T/L showed abnormal T1 prolongation in the whole spine, sacral and iliac bones. Received 7 days of Bendamustine and Pomalidomide (on hold few days for neutropenia), last on 4/08/2019.    When pt presented to the ED, code stroke called. Neuro exam was nonfocal, CTH neg for acute process, and vomiting and vision changes were resolved. ED vitals afebrile, H101, /58 --> 102/62, R18, sat 100% on RA. Given decadron 40mg IV, 1u PLT. 1u pRBC ordered.

## 2019-04-26 NOTE — H&P ADULT - NSHPOUTPATIENTPROVIDERS_GEN_ALL_CORE
PMD Dr. Jack Castro  Onc Dr. Torsten Murphy  Cards Dr. Omar Landry PMD Dr. Jack Castro  Onc Dr. Torsten Castellanos

## 2019-04-27 LAB
ALBUMIN SERPL ELPH-MCNC: 3.7 G/DL — SIGNIFICANT CHANGE UP (ref 3.3–5)
ALP SERPL-CCNC: 121 U/L — HIGH (ref 40–120)
ALT FLD-CCNC: 123 U/L — HIGH (ref 4–41)
ANION GAP SERPL CALC-SCNC: 15 MMO/L — HIGH (ref 7–14)
AST SERPL-CCNC: 38 U/L — SIGNIFICANT CHANGE UP (ref 4–40)
B PERT DNA SPEC QL NAA+PROBE: NOT DETECTED — SIGNIFICANT CHANGE UP
BASOPHILS # BLD AUTO: 0.01 K/UL — SIGNIFICANT CHANGE UP (ref 0–0.2)
BASOPHILS NFR BLD AUTO: 0.9 % — SIGNIFICANT CHANGE UP (ref 0–2)
BILIRUB SERPL-MCNC: 1 MG/DL — SIGNIFICANT CHANGE UP (ref 0.2–1.2)
BUN SERPL-MCNC: 23 MG/DL — SIGNIFICANT CHANGE UP (ref 7–23)
C PNEUM DNA SPEC QL NAA+PROBE: NOT DETECTED — SIGNIFICANT CHANGE UP
CALCIUM SERPL-MCNC: 8.2 MG/DL — LOW (ref 8.4–10.5)
CHLORIDE SERPL-SCNC: 107 MMOL/L — SIGNIFICANT CHANGE UP (ref 98–107)
CO2 SERPL-SCNC: 18 MMOL/L — LOW (ref 22–31)
CREAT SERPL-MCNC: 1.1 MG/DL — SIGNIFICANT CHANGE UP (ref 0.5–1.3)
EOSINOPHIL # BLD AUTO: 0 K/UL — SIGNIFICANT CHANGE UP (ref 0–0.5)
EOSINOPHIL NFR BLD AUTO: 0 % — SIGNIFICANT CHANGE UP (ref 0–6)
FLUAV H1 2009 PAND RNA SPEC QL NAA+PROBE: NOT DETECTED — SIGNIFICANT CHANGE UP
FLUAV H1 RNA SPEC QL NAA+PROBE: NOT DETECTED — SIGNIFICANT CHANGE UP
FLUAV H3 RNA SPEC QL NAA+PROBE: NOT DETECTED — SIGNIFICANT CHANGE UP
FLUAV SUBTYP SPEC NAA+PROBE: NOT DETECTED — SIGNIFICANT CHANGE UP
FLUBV RNA SPEC QL NAA+PROBE: NOT DETECTED — SIGNIFICANT CHANGE UP
GLUCOSE SERPL-MCNC: 115 MG/DL — HIGH (ref 70–99)
HADV DNA SPEC QL NAA+PROBE: NOT DETECTED — SIGNIFICANT CHANGE UP
HCOV PNL SPEC NAA+PROBE: SIGNIFICANT CHANGE UP
HCT VFR BLD CALC: 23.1 % — LOW (ref 39–50)
HGB BLD-MCNC: 7.8 G/DL — LOW (ref 13–17)
HMPV RNA SPEC QL NAA+PROBE: NOT DETECTED — SIGNIFICANT CHANGE UP
HPIV1 RNA SPEC QL NAA+PROBE: NOT DETECTED — SIGNIFICANT CHANGE UP
HPIV2 RNA SPEC QL NAA+PROBE: NOT DETECTED — SIGNIFICANT CHANGE UP
HPIV3 RNA SPEC QL NAA+PROBE: NOT DETECTED — SIGNIFICANT CHANGE UP
HPIV4 RNA SPEC QL NAA+PROBE: NOT DETECTED — SIGNIFICANT CHANGE UP
IMM GRANULOCYTES NFR BLD AUTO: 0 % — SIGNIFICANT CHANGE UP (ref 0–1.5)
LDH SERPL L TO P-CCNC: 180 U/L — SIGNIFICANT CHANGE UP (ref 135–225)
LYMPHOCYTES # BLD AUTO: 0.8 K/UL — LOW (ref 1–3.3)
LYMPHOCYTES # BLD AUTO: 74.1 % — HIGH (ref 13–44)
MAGNESIUM SERPL-MCNC: 2.2 MG/DL — SIGNIFICANT CHANGE UP (ref 1.6–2.6)
MCHC RBC-ENTMCNC: 29.5 PG — SIGNIFICANT CHANGE UP (ref 27–34)
MCHC RBC-ENTMCNC: 33.8 % — SIGNIFICANT CHANGE UP (ref 32–36)
MCV RBC AUTO: 87.5 FL — SIGNIFICANT CHANGE UP (ref 80–100)
MONOCYTES # BLD AUTO: 0.15 K/UL — SIGNIFICANT CHANGE UP (ref 0–0.9)
MONOCYTES NFR BLD AUTO: 13.9 % — SIGNIFICANT CHANGE UP (ref 2–14)
NEUTROPHILS # BLD AUTO: 0.12 K/UL — LOW (ref 1.8–7.4)
NEUTROPHILS NFR BLD AUTO: 11.1 % — LOW (ref 43–77)
NRBC # FLD: 0 K/UL — SIGNIFICANT CHANGE UP (ref 0–0)
PHOSPHATE SERPL-MCNC: 3.2 MG/DL — SIGNIFICANT CHANGE UP (ref 2.5–4.5)
PLATELET # BLD AUTO: 32 K/UL — LOW (ref 150–400)
PMV BLD: 11.3 FL — SIGNIFICANT CHANGE UP (ref 7–13)
POTASSIUM SERPL-MCNC: 4.1 MMOL/L — SIGNIFICANT CHANGE UP (ref 3.5–5.3)
POTASSIUM SERPL-SCNC: 4.1 MMOL/L — SIGNIFICANT CHANGE UP (ref 3.5–5.3)
PROT SERPL-MCNC: 6.8 G/DL — SIGNIFICANT CHANGE UP (ref 6–8.3)
RBC # BLD: 2.64 M/UL — LOW (ref 4.2–5.8)
RBC # FLD: 14.7 % — HIGH (ref 10.3–14.5)
RSV RNA SPEC QL NAA+PROBE: NOT DETECTED — SIGNIFICANT CHANGE UP
RV+EV RNA SPEC QL NAA+PROBE: NOT DETECTED — SIGNIFICANT CHANGE UP
SODIUM SERPL-SCNC: 140 MMOL/L — SIGNIFICANT CHANGE UP (ref 135–145)
URATE SERPL-MCNC: 6.8 MG/DL — SIGNIFICANT CHANGE UP (ref 3.4–8.8)
WBC # BLD: 1.08 K/UL — CRITICAL LOW (ref 3.8–10.5)
WBC # FLD AUTO: 1.08 K/UL — CRITICAL LOW (ref 3.8–10.5)

## 2019-04-27 PROCEDURE — 71045 X-RAY EXAM CHEST 1 VIEW: CPT | Mod: 26

## 2019-04-27 PROCEDURE — 93010 ELECTROCARDIOGRAM REPORT: CPT

## 2019-04-27 RX ORDER — FILGRASTIM 480MCG/1.6
480 VIAL (ML) INJECTION ONCE
Qty: 0 | Refills: 0 | Status: DISCONTINUED | OUTPATIENT
Start: 2019-04-27 | End: 2019-04-27

## 2019-04-27 RX ORDER — BENZOCAINE AND MENTHOL 5; 1 G/100ML; G/100ML
1 LIQUID ORAL THREE TIMES A DAY
Qty: 0 | Refills: 0 | Status: DISCONTINUED | OUTPATIENT
Start: 2019-04-27 | End: 2019-04-28

## 2019-04-27 RX ORDER — BENZOCAINE AND MENTHOL 5; 1 G/100ML; G/100ML
1 LIQUID ORAL THREE TIMES A DAY
Qty: 0 | Refills: 0 | Status: DISCONTINUED | OUTPATIENT
Start: 2019-04-27 | End: 2019-04-27

## 2019-04-27 RX ORDER — FILGRASTIM 480MCG/1.6
480 VIAL (ML) INJECTION DAILY
Qty: 0 | Refills: 0 | Status: DISCONTINUED | OUTPATIENT
Start: 2019-04-27 | End: 2019-05-10

## 2019-04-27 RX ORDER — LIDOCAINE 4 G/100G
15 CREAM TOPICAL ONCE
Qty: 0 | Refills: 0 | Status: COMPLETED | OUTPATIENT
Start: 2019-04-27 | End: 2019-04-27

## 2019-04-27 RX ORDER — CEFEPIME 1 G/1
2000 INJECTION, POWDER, FOR SOLUTION INTRAMUSCULAR; INTRAVENOUS ONCE
Qty: 0 | Refills: 0 | Status: COMPLETED | OUTPATIENT
Start: 2019-04-27 | End: 2019-04-27

## 2019-04-27 RX ORDER — ACETAMINOPHEN 500 MG
650 TABLET ORAL ONCE
Qty: 0 | Refills: 0 | Status: COMPLETED | OUTPATIENT
Start: 2019-04-27 | End: 2019-04-27

## 2019-04-27 RX ORDER — CEFEPIME 1 G/1
INJECTION, POWDER, FOR SOLUTION INTRAMUSCULAR; INTRAVENOUS
Qty: 0 | Refills: 0 | Status: DISCONTINUED | OUTPATIENT
Start: 2019-04-27 | End: 2019-05-01

## 2019-04-27 RX ORDER — CEFEPIME 1 G/1
2000 INJECTION, POWDER, FOR SOLUTION INTRAMUSCULAR; INTRAVENOUS EVERY 8 HOURS
Qty: 0 | Refills: 0 | Status: DISCONTINUED | OUTPATIENT
Start: 2019-04-27 | End: 2019-05-01

## 2019-04-27 RX ADMIN — Medication 300 MILLIGRAM(S): at 12:43

## 2019-04-27 RX ADMIN — Medication 650 MILLIGRAM(S): at 22:24

## 2019-04-27 RX ADMIN — CEFEPIME 100 MILLIGRAM(S): 1 INJECTION, POWDER, FOR SOLUTION INTRAMUSCULAR; INTRAVENOUS at 22:08

## 2019-04-27 RX ADMIN — LIDOCAINE 15 MILLILITER(S): 4 CREAM TOPICAL at 22:24

## 2019-04-27 RX ADMIN — BENZOCAINE AND MENTHOL 1 LOZENGE: 5; 1 LIQUID ORAL at 18:02

## 2019-04-27 RX ADMIN — CEFEPIME 100 MILLIGRAM(S): 1 INJECTION, POWDER, FOR SOLUTION INTRAMUSCULAR; INTRAVENOUS at 14:13

## 2019-04-27 RX ADMIN — Medication 480 MICROGRAM(S): at 19:06

## 2019-04-27 NOTE — PROGRESS NOTE ADULT - SUBJECTIVE AND OBJECTIVE BOX
Reason for consult: pancytopenia    HPI:  54yo M hx of relapsed multiple myeloma s/p bone marrow transplant (~1.5 yrs ago) and chemo/RT (last infusion Bendamustine and Pomalidomide 4/08/2019), perforated diverticulitis s/p Kiah's (proctosigmoidectomy with colostomy, 9/2018), nonischemic cardiomyopathy (mild global LV systolic dysfunction with EF 54% and diastolic LV dysfunction based on TTE in 11/2018), and HTN presents to [facility] ED w/ vomiting and change of vision. He also had been weak, with epistaxis, and petechiae. He was last seen in office by Dr Murphy on 4/16, had received outpt plt transfusions, and started on pomalidomide at 3mg QOD, last dose about 2 days ago. Pt feeling much better now.        PAST MEDICAL & SURGICAL HISTORY:  Diverticulitis: perforated s/p Kiah  Multiple myeloma: relapsed 3/2019  Hypertension  Left ventricular dysfunction  Cardiomyopathy: nonischemic  Former smoker: (1 ppd x 11 years; Quit ~age 26)  History of bone marrow transplant  History of surgery: s/p exploratory laparotomy with sigmoid resection and colostomy on 9/2/2018      FAMILY HISTORY:  Family history of diabetes mellitus  Family history of hypertension      Alochol: Denied  Smoking: Nonsmoker  Drug Use: Denied  Marital Status:         Allergies    oxycodone (Vomiting; Nausea)    Intolerances        MEDICATIONS  (STANDING):    MEDICATIONS  (PRN):  artificial  tears Solution 1 Drop(s) Both EYES every 6 hours PRN Dry Eyes  docusate sodium 100 milliGRAM(s) Oral three times a day PRN Constipation  HYDROmorphone   Tablet 2 milliGRAM(s) Oral every 4 hours PRN moderate - severe pain  ondansetron Injectable 8 milliGRAM(s) IV Push every 8 hours PRN Nausea and/or Vomiting  senna 2 Tablet(s) Oral at bedtime PRN Constipation      ROS  No fever, sweats, chills  No epistaxis, HA, sore throat  No CP, SOB, cough, sputum  No n/v/d, abd pain, melena, hematochezia  No edema  No rash  No anxiety  No back pain, joint pain  No bleeding, bruising  No dysuria, hematuria    Vital Signs Last 24 Hrs  T(C): 36.6 (27 Apr 2019 11:32), Max: 36.7 (27 Apr 2019 01:10)  T(F): 97.9 (27 Apr 2019 11:32), Max: 98.1 (27 Apr 2019 07:23)  HR: 92 (27 Apr 2019 11:32) (92 - 98)  BP: 133/77 (27 Apr 2019 11:32) (121/77 - 133/77)  BP(mean): --  RR: 17 (27 Apr 2019 11:32) (16 - 18)  SpO2: 100% (27 Apr 2019 11:32) (100% - 100%)  PE  NAD  Awake, alert  Anicteric, MMM  RRR  CTAB  Abd soft, NT, ND  No c/c/e  No rash grossly                            7.8    1.08  )-----------( 32       ( 27 Apr 2019 07:00 )             23.1 Reason for consult: pancytopenia    HPI:  54yo M hx of relapsed multiple myeloma s/p bone marrow transplant (~1.5 yrs ago) and chemo/RT (last infusion Bendamustine and Pomalidomide 4/08/2019), perforated diverticulitis s/p Kiah's (proctosigmoidectomy with colostomy, 9/2018), nonischemic cardiomyopathy (mild global LV systolic dysfunction with EF 54% and diastolic LV dysfunction based on TTE in 11/2018), and HTN presents to [facility] ED w/ vomiting and change of vision. He also had been weak, with epistaxis, and petechiae. He was last seen in office by Dr Murphy on 4/16, had received outpt plt transfusions, and started on pomalidomide at 3mg QOD, last dose about 2 days ago. Pt feeling much better now.    pt comfortable; sore throat  no N/V   no visual changes       PAST MEDICAL & SURGICAL HISTORY:  Diverticulitis: perforated s/p Kiah  Multiple myeloma: relapsed 3/2019  Hypertension  Left ventricular dysfunction  Cardiomyopathy: nonischemic  Former smoker: (1 ppd x 11 years; Quit ~age 26)  History of bone marrow transplant  History of surgery: s/p exploratory laparotomy with sigmoid resection and colostomy on 9/2/2018      FAMILY HISTORY:  Family history of diabetes mellitus  Family history of hypertension      Alochol: Denied  Smoking: Nonsmoker  Drug Use: Denied  Marital Status:         Allergies    oxycodone (Vomiting; Nausea)    Intolerances        MEDICATIONS  (STANDING):    MEDICATIONS  (PRN):  artificial  tears Solution 1 Drop(s) Both EYES every 6 hours PRN Dry Eyes  docusate sodium 100 milliGRAM(s) Oral three times a day PRN Constipation  HYDROmorphone   Tablet 2 milliGRAM(s) Oral every 4 hours PRN moderate - severe pain  ondansetron Injectable 8 milliGRAM(s) IV Push every 8 hours PRN Nausea and/or Vomiting  senna 2 Tablet(s) Oral at bedtime PRN Constipation      ROS  No fever, sweats, chills  No epistaxis, HA, sore throat  No CP, SOB, cough, sputum  No n/v/d, abd pain, melena, hematochezia  No edema  No rash  No anxiety  No back pain, joint pain  No bleeding, bruising  No dysuria, hematuria    Vital Signs Last 24 Hrs  T(C): 36.6 (27 Apr 2019 11:32), Max: 36.7 (27 Apr 2019 01:10)  T(F): 97.9 (27 Apr 2019 11:32), Max: 98.1 (27 Apr 2019 07:23)  HR: 92 (27 Apr 2019 11:32) (92 - 98)  BP: 133/77 (27 Apr 2019 11:32) (121/77 - 133/77)  BP(mean): --  RR: 17 (27 Apr 2019 11:32) (16 - 18)  SpO2: 100% (27 Apr 2019 11:32) (100% - 100%)  PE  NAD  Awake, alert  Anicteric, MMM  RRR  CTAB  Abd soft, NT, ND  No c/c/e  No rash grossly                            7.8    1.08  )-----------( 32       ( 27 Apr 2019 07:00 )             23.1

## 2019-04-27 NOTE — PROGRESS NOTE ADULT - ASSESSMENT
52yo M hx of relapsed multiple myeloma s/p bone marrow transplant (~1.5 yrs ago) and chemo/RT (last infusion Bendamustine and Pomalidomide 4/08/2019), perforated diverticulitis s/p Kiah's (proctosigmoidectomy with colostomy, 9/2018), nonischemic cardiomyopathy (mild global LV systolic dysfunction with EF 54% and diastolic LV dysfunction based on TTE in 11/2018), and HTN presents to [facility] ED w/ vomiting and change of vision. He also had been weak, with epistaxis, and petechiae. He was last seen in office by Dr Murphy on 4/16, had received outpt plt transfusions, and started on pomalidomide at 3mg QOD, last dose about day before presentation.    1. pancytopenia -- likely related to tx, MM  -- agree with transfusions  -- goal hgb >7, plts >10 if no further bleeding, >50 if with bleeding  -- monitor CBC  -- start GCSF - Granix 480mcg daily   -- ppx Abx     2. MM -- on tavares/pomalyst.   -- hold pomalyst for now in acute setting and severe pancytopenia  -- bendamustine scheduled in office for 4/30  -- s/p decadron 40mg 4/25, cont weekly 52yo M hx of relapsed multiple myeloma s/p bone marrow transplant (~1.5 yrs ago) and chemo/RT (last infusion Bendamustine and Pomalidomide 4/08/2019), perforated diverticulitis s/p Kiah's (proctosigmoidectomy with colostomy, 9/2018), nonischemic cardiomyopathy (mild global LV systolic dysfunction with EF 54% and diastolic LV dysfunction based on TTE in 11/2018), and HTN presents to [facility] ED w/ vomiting and change of vision. He also had been weak, with epistaxis, and petechiae. He was last seen in office by Dr Murphy on 4/16, had received outpt plt transfusions, and started on pomalidomide at 3mg QOD, last dose about day before presentation.    1. pancytopenia -- likely related to tx, MM  -- agree with transfusions  -- goal hgb >7, plts >10 if no further bleeding, >50 if with bleeding  -- monitor CBC  -- start GCSF - Zarxio 480mcg daily   -- ppx Abx - may d/c Abx after 24hr if afebrile     2. MM -- on tavares/pomalyst.   -- hold pomalyst for now in acute setting and severe pancytopenia  -- bendamustine scheduled in office for 4/30  -- s/p decadron 40mg 4/25, cont weekly    d/c planning once ANC >1500    Mandy Ospina MD  [facility]  [phone]  cell: [phone]

## 2019-04-27 NOTE — CHART NOTE - NSCHARTNOTEFT_GEN_A_CORE
Patient complaining of sore throat and dry cough, throat soreness worsens with swallowing. +tonsillar exudates noted on exam. VSS, patient remains afebrile. Lungs CTA b/l, heart RRR.   today. RVP and throat cx sent.  Discussed w/ on-call hematology attg Dr. Ospina ([phone]), rec restarting Zarxio 480mcg and initiating broad spectrum abx. Discussed w/ Dr. Castro, rec to start cefepime 2g q8h. Orders in place. Will cont. to monitor closely.

## 2019-04-27 NOTE — PROGRESS NOTE ADULT - SUBJECTIVE AND OBJECTIVE BOX
[facility]      Seen earlier today, and examined.  - Today, patient is without complaints.  complains of sore throat,.            *****MEDICATIONS: Current medication reviewed and documented.    MEDICATIONS  (STANDING):  allopurinol 300 milliGRAM(s) Oral daily    MEDICATIONS  (PRN):  artificial  tears Solution 1 Drop(s) Both EYES every 6 hours PRN Dry Eyes  benzocaine 15 mG/menthol 3.6 mG Lozenge 1 Lozenge Oral three times a day PRN Sore Throat  docusate sodium 100 milliGRAM(s) Oral three times a day PRN Constipation  HYDROmorphone   Tablet 2 milliGRAM(s) Oral every 4 hours PRN moderate - severe pain  ondansetron Injectable 8 milliGRAM(s) IV Push every 8 hours PRN Nausea and/or Vomiting  senna 2 Tablet(s) Oral at bedtime PRN Constipation          ***** VITAL SIGNS:  T(F): 98.1 (19 @ 07:23), Max: 98.5 (19 @ 14:42)  HR: 94 (19 @ 07:23) (94 - 102)  BP: 129/83 (19 @ 07:23) (121/77 - 133/86)  RR: 16 (19 @ 07:23) (16 - 18)  SpO2: 100% (19 @ 07:23) (100% - 100%)  Wt(kg): --  ,   I&O's Summary    2019 07:01  -  2019 07:00  --------------------------------------------------------  IN: 1300 mL / OUT: 450 mL / NET: 850 mL             *****PHYSICAL EXAM: Alert oriented x 2   Attention comprehension are fair.    EOMI   No facial asymmetry   Tongue is midline   Palate elevates symmetrically   Moving all 4 ext symmetrically no pronator drift      Gait : not assessed.  B/L down going toes          *****LAB AND IMAGIN.8    1.08  )-----------( 32       ( 2019 07:00 )             23.1               04-27    140  |  107  |  23  ----------------------------<  115<H>  4.1   |  18<L>  |  1.10    Ca    8.2<L>      2019 07:00  Phos  3.2       Mg     2.2         TPro  6.8  /  Alb  3.7  /  TBili  1.0  /  DBili  x   /  AST  38  /  ALT  123<H>  /  AlkPhos  121<H>      PT/INR - ( 2019 07:45 )   PT: 14.5 SEC;   INR: 1.30          PTT - ( 2019 07:45 )  PTT:30.0 SEC                     [All pertinent recent Imaging/Reports reviewed]           *****A S S E S S M E N T   A N D   P L A N :      52yo M hx of relapsed multiple myeloma s/p bone marrow transplant (~1.5 yrs ago) and chemo/RT (last infusion Bendamustine and Pomalidomide 2019), perforated diverticulitis s/p Kiah's (proctosigmoidectomy with colostomy, 2018), nonischemic cardiomyopathy (mild global LV systolic dysfunction with EF 54% and diastolic LV dysfunction based on TTE in 2018), and HTN presents to [facility] ED w/ vomiting and change of vision. Wife Devan Hood at bedside. Pt was sitting at home. He vomited 2-3 times nonbloody, nonbilious and was not able to eat or drink anything. On  at 4pm, while sitting he noticed his vision went "white" for a few minutes associated with sweating, shaking, and weakness. His wife noticed he had a red rash on his legs and they brought him to the ED. Pt received a platelet transfusion about a week ago outpt. Denies any pain. Had a nose bleed earlier. He denies skin sores, measured fever, HA, trouble speaking, numbness/tingling, focal weakness, dizziness, CP, SOB, abd pain, N/V/D, hematochezia, melena, urinary symptoms, oral/genital sores. He has a recent admission to [facility] discharged 2019 for diffuse body pain and low-grade fever, infectious workup was negative. MRI C/T/L showed abnormal T1 prolongation in the whole spine, sacral and iliac bones. Received 7 days of Bendamustine and Pomalidomide (on hold few days for neutropenia), last on 2019.    When pt presented to the ED, code stroke called, stroke team saw pt and deemed not a candidate for acute therapy. Neuro exam was nonfocal, CTH neg for acute process, and vomiting and vision changes were resolved. ED vitals afebrile, H101, /58 --> 102/62, R18, sat 100% on RA. Given decadron 40mg IV, 1u PLT. 1u pRBC ordered.     Problem/Recommendations 1: near syncope due to anemia/pancytopenia   hemonc on board  ct with no acute intracranial pathology   check orthostatic  goc discussion.   overall poor prognosis '    sore throat today, defer to medicine     Thank you for allowing me to participate in the care of this patient. Please do not hesitate to call me if you have any  questions.        ________________  Margret Yousif MD  [facility]  [phone]      30 minutes spent on total encounter; more than 50 % of the visit was  spent counseling about plan of care, compliance to diet/exercise and medication regimen and or  coordinating care by the attending physician.      It is advised that s stroke patients follow up with NP Zulema Mckeon @ [phone] in 1- 2 weeks.   Others please follow up with Dr. Michael Nissenbaum [phone]

## 2019-04-27 NOTE — CONSULT NOTE ADULT - ASSESSMENT
# Neutropenia  # Acute pharyngitis/ Tonsilitis    would recommend:    1. Monitor temp and ANC  2. Obtain Blood cultures X2 for completeness  3. start on Cefepime until work up is done  4. Agree with neupogen      will follow the patient with you and make further recommendation based on the clinical course and Lab results  Thank you for the opportunity to participate in Mr. GARCIA's care A 52 yo Male with  relapsed multiple myeloma s/p bone marrow transplant (~1.5 yrs ago) and chemo/RT (last infusion Bendamustine and Pomalidomide 4/08/2019), perforated diverticulitis s/p Kiah's (proctosigmoidectomy with colostomy, presents to [facility] ER for evaluation of  vomiting and change of vision. He also had been weak, with epistaxis, and petechiae. ON admission, he found to have Pancytopenia and Neutropenia with ANC of 120. He has no fever but rigors and also c/o Odynophagia. The CT head shows No acute intracranial events. The ID consult requested to assist with evaluation and antibiotic management of Neutropenia and Tonsilitis.     # Neutropenia  # Acute Tonsilitis  # Possible Candida Esophagitis    would recommend:    1. Monitor temp and ANC  2. Obtain Blood cultures X2 for completeness and obtain Chest xray  3. start on Cefepime until work up is done  4. Agree with Neupogen  5. Trial of IV Fluconazole for Candida esophagitis in the setting of Neutropenia  6. Monitor Cell counts and Transfuse as needed    d/w Dr. Castro and Patient     will follow the patient with you and make further recommendation based on the clinical course and Lab results  Thank you for the opportunity to participate in Mr. GARCIA's care

## 2019-04-27 NOTE — CONSULT NOTE ADULT - SUBJECTIVE AND OBJECTIVE BOX
54yo M hx of relapsed multiple myeloma s/p bone marrow transplant (~1.5 yrs ago) and chemo/RT (last infusion Bendamustine and Pomalidomide 4/08/2019), perforated diverticulitis s/p Kiah's (proctosigmoidectomy with colostomy, 9/2018), nonischemic cardiomyopathy (mild global LV systolic dysfunction with EF 54% and diastolic LV dysfunction based on TTE in 11/2018), and HTN presents to [facility] ED w/ vomiting and change of vision. He also had been weak, with epistaxis, and petechiae. He was last seen in office by Dr Murphy on 4/16, had received outpt plt transfusions, and started on pomalidomide at 3mg QOD, last dose about 2 days ago. Pt feeling much better now.    Patient is a 53y old  Male who presents with a chief complaint of Pancytopenia (27 Apr 2019 15:05)      INTERVAL HPI/OVERNIGHT EVENTS:        PAST MEDICAL & SURGICAL HISTORY:  Diverticulitis: perforated s/p Kiah  Multiple myeloma: relapsed 3/2019  Hypertension  Left ventricular dysfunction  Cardiomyopathy: nonischemic  Former smoker: (1 ppd x 11 years; Quit ~age 26)  History of bone marrow transplant  History of surgery: s/p exploratory laparotomy with sigmoid resection and colostomy on 9/2/2018      REVIEW OF SYSTEMS: Total of twelve systems have been reviewed with patient and found to be negative unless mentioned in HPI      SOCIAL HISTORY  Alcohol: Does not drink  Tobacco: Does not smoke  Illicit substance use: None      FAMILY HISTORY: Non contributory to the present illness        oxycodone (Vomiting; Nausea)        T(C): 36.9 (04-27-19 @ 15:30), Max: 36.9 (04-27-19 @ 15:30)  HR: 99 (04-27-19 @ 15:30) (92 - 99)  BP: 133/75 (04-27-19 @ 15:30) (121/77 - 133/77)  RR: 18 (04-27-19 @ 15:30) (16 - 18)  SpO2: 100% (04-27-19 @ 15:30) (100% - 100%)      04-26-19 @ 07:01  -  04-27-19 @ 07:00  --------------------------------------------------------  IN: 1300 mL / OUT: 450 mL / NET: 850 mL        PHYSICAL EXAM:  GENERAL: Not in distress   CHEST/LUNG:  Aire ntry bilaterally  HEART: s1 and s2 present  ABDOMEN:  Nontender and  Nondistended  EXTREMITIES: No pedal  edema  CNS: Awake and Alert        LABS:                        7.8    1.08  )-----------( 32       ( 27 Apr 2019 07:00 )             23.1     04-27    140  |  107  |  23  ----------------------------<  115<H>  4.1   |  18<L>  |  1.10    Ca    8.2<L>      27 Apr 2019 07:00  Phos  3.2     04-27  Mg     2.2     04-27    TPro  6.8  /  Alb  3.7  /  TBili  1.0  /  DBili  x   /  AST  38  /  ALT  123<H>  /  AlkPhos  121<H>  04-27  PT/INR - ( 26 Apr 2019 07:45 )   PT: 14.5 SEC;   INR: 1.30      PTT - ( 26 Apr 2019 07:45 )  PTT:30.0 SEC        MEDICATIONS  (STANDING):  allopurinol 300 milliGRAM(s) Oral daily  cefepime   IVPB      cefepime   IVPB 2000 milliGRAM(s) IV Intermittent every 8 hours  filgrastim-sndz Injectable 480 MICROGram(s) SubCutaneous daily    MEDICATIONS  (PRN):  artificial  tears Solution 1 Drop(s) Both EYES every 6 hours PRN Dry Eyes  benzocaine 15 mG/menthol 3.6 mG (Sugar-Free) Lozenge 1 Lozenge Oral three times a day PRN Sore Throat  docusate sodium 100 milliGRAM(s) Oral three times a day PRN Constipation  HYDROmorphone   Tablet 2 milliGRAM(s) Oral every 4 hours PRN moderate - severe pain  ondansetron Injectable 8 milliGRAM(s) IV Push every 8 hours PRN Nausea and/or Vomiting  senna 2 Tablet(s) Oral at bedtime PRN Constipation        RADIOLOGY & ADDITIONAL TESTS:    < from: CT Chest No Cont (03.30.19 @ 08:47) >  No pneumonia.    Small bilateral pleural effusions and small pericardial effusion.    Unchanged thoracolumbar compression fractures. T7 posterior vertebral   body lucency corresponds to the site of epidural soft tissue seen on the   3/27/2019 MR thoracic spine.      < end of copied text > A 54 yo Male with  relapsed multiple myeloma s/p bone marrow transplant (~1.5 yrs ago) and chemo/RT (last infusion Bendamustine and Pomalidomide 4/08/2019), perforated diverticulitis s/p Kiah's (proctosigmoidectomy with colostomy, presents to [facility] ER for evaluation of  vomiting and change of vision. He also had been weak, with epistaxis, and petechiae. ON admission, he found to have Pancytopenia and Neutropenia with ANC of 120. He has no fever but rigors and also c/o Odynophagia. The CT head shows No acute intracranial events. The ID consult requested to assist with evaluation and antibiotic management of Neutropenia and Tonsilitis.         REVIEW OF SYSTEMS: Total of twelve systems have been reviewed with patient and found to be negative unless mentioned in HPI      PAST MEDICAL & SURGICAL HISTORY:  Diverticulitis: perforated s/p Kiah  Multiple myeloma: relapsed 3/2019  Hypertension  Left ventricular dysfunction  Cardiomyopathy: nonischemic  Former smoker: (1 ppd x 11 years; Quit ~age 26)  History of bone marrow transplant  History of surgery: s/p exploratory laparotomy with sigmoid resection and colostomy on 9/2/2018      SOCIAL HISTORY  Alcohol: Does not drink  Tobacco: Does not smoke  Illicit substance use: None      FAMILY HISTORY: Non contributory to the present illness        ALLERGIES: oxycodone (Vomiting; Nausea)        T(C): 36.9 (04-27-19 @ 15:30), Max: 36.9 (04-27-19 @ 15:30)  HR: 99 (04-27-19 @ 15:30) (92 - 99)  BP: 133/75 (04-27-19 @ 15:30) (121/77 - 133/77)  RR: 18 (04-27-19 @ 15:30) (16 - 18)  SpO2: 100% (04-27-19 @ 15:30) (100% - 100%)      04-26-19 @ 07:01  -  04-27-19 @ 07:00  --------------------------------------------------------  IN: 1300 mL / OUT: 450 mL / NET: 850 mL        PHYSICAL EXAM:  GENERAL: Not in distress   HEENT: No oral thrush or any tonsilar exudate noted   CHEST/LUNG:  Air entry bilaterally  HEART: s1 and s2 present  ABDOMEN:  Nontender and  Nondistended  EXTREMITIES: No pedal  edema  CNS: Awake and Alert        LABS:                        7.8    1.08  )-----------( 32       ( 27 Apr 2019 07:00 )             23.1         04-27    140  |  107  |  23  ----------------------------<  115<H>  4.1   |  18<L>  |  1.10    Ca    8.2<L>      27 Apr 2019 07:00  Phos  3.2     04-27  Mg     2.2     04-27    TPro  6.8  /  Alb  3.7  /  TBili  1.0  /  DBili  x   /  AST  38  /  ALT  123<H>  /  AlkPhos  121<H>  04-27  PT/INR - ( 26 Apr 2019 07:45 )   PT: 14.5 SEC;   INR: 1.30      PTT - ( 26 Apr 2019 07:45 )  PTT:30.0 SEC        MEDICATIONS  (STANDING):  allopurinol 300 milliGRAM(s) Oral daily  cefepime   IVPB      cefepime   IVPB 2000 milliGRAM(s) IV Intermittent every 8 hours  filgrastim-sndz Injectable 480 MICROGram(s) SubCutaneous daily    MEDICATIONS  (PRN):  artificial  tears Solution 1 Drop(s) Both EYES every 6 hours PRN Dry Eyes  benzocaine 15 mG/menthol 3.6 mG (Sugar-Free) Lozenge 1 Lozenge Oral three times a day PRN Sore Throat  docusate sodium 100 milliGRAM(s) Oral three times a day PRN Constipation  HYDROmorphone   Tablet 2 milliGRAM(s) Oral every 4 hours PRN moderate - severe pain  ondansetron Injectable 8 milliGRAM(s) IV Push every 8 hours PRN Nausea and/or Vomiting  senna 2 Tablet(s) Oral at bedtime PRN Constipation        RADIOLOGY & ADDITIONAL TESTS:    4/25/19 : CT Brain Stroke Protocol (04.25.19 @ 19:42) No acute intracranial hemorrhage, mass effect, or midline shift. No  abnormal extra-axial collections. The basal cisterns are patent without evidence of central herniation.   The sulci and ventricles are within normal limits for the patient's age. Stable few patchy areas of low-attenuation in the bihemispheric white  matter, which is nonspecific, but likely related to sequela of chronic  microvascular ischemic disease. Stable coarse calcifications in the left   temporal lobe.

## 2019-04-27 NOTE — PROVIDER CONTACT NOTE (OTHER) - SITUATION
VSS except . Pt c/o chills and stated, "My chest hurts but maybe that's because I've been coughing a lot." VSS except . Pt c/o chills and stated, "My chest hurts but maybe that's because I've been coughing a lot." When asked how it feels, pt described it as "aching."

## 2019-04-28 LAB
ALBUMIN SERPL ELPH-MCNC: 3.6 G/DL — SIGNIFICANT CHANGE UP (ref 3.3–5)
ALP SERPL-CCNC: 115 U/L — SIGNIFICANT CHANGE UP (ref 40–120)
ALT FLD-CCNC: 97 U/L — HIGH (ref 4–41)
ANION GAP SERPL CALC-SCNC: 15 MMO/L — HIGH (ref 7–14)
ANISOCYTOSIS BLD QL: SLIGHT — SIGNIFICANT CHANGE UP
APPEARANCE UR: CLEAR — SIGNIFICANT CHANGE UP
AST SERPL-CCNC: 26 U/L — SIGNIFICANT CHANGE UP (ref 4–40)
BACTERIA # UR AUTO: NEGATIVE — SIGNIFICANT CHANGE UP
BASOPHILS # BLD AUTO: 0 K/UL — SIGNIFICANT CHANGE UP (ref 0–0.2)
BASOPHILS NFR BLD AUTO: 0 % — SIGNIFICANT CHANGE UP (ref 0–2)
BASOPHILS NFR SPEC: 0.9 % — SIGNIFICANT CHANGE UP (ref 0–2)
BILIRUB SERPL-MCNC: 1.3 MG/DL — HIGH (ref 0.2–1.2)
BILIRUB UR-MCNC: NEGATIVE — SIGNIFICANT CHANGE UP
BLASTS # FLD: 0 % — SIGNIFICANT CHANGE UP (ref 0–0)
BLOOD UR QL VISUAL: NEGATIVE — SIGNIFICANT CHANGE UP
BUN SERPL-MCNC: 17 MG/DL — SIGNIFICANT CHANGE UP (ref 7–23)
CALCIUM SERPL-MCNC: 8 MG/DL — LOW (ref 8.4–10.5)
CHLORIDE SERPL-SCNC: 110 MMOL/L — HIGH (ref 98–107)
CO2 SERPL-SCNC: 18 MMOL/L — LOW (ref 22–31)
COLOR SPEC: YELLOW — SIGNIFICANT CHANGE UP
CREAT SERPL-MCNC: 1.1 MG/DL — SIGNIFICANT CHANGE UP (ref 0.5–1.3)
EOSINOPHIL # BLD AUTO: 0.01 K/UL — SIGNIFICANT CHANGE UP (ref 0–0.5)
EOSINOPHIL NFR BLD AUTO: 1.2 % — SIGNIFICANT CHANGE UP (ref 0–6)
EOSINOPHIL NFR FLD: 0.9 % — SIGNIFICANT CHANGE UP (ref 0–6)
GIANT PLATELETS BLD QL SMEAR: PRESENT — SIGNIFICANT CHANGE UP
GLUCOSE SERPL-MCNC: 100 MG/DL — HIGH (ref 70–99)
GLUCOSE UR-MCNC: NEGATIVE — SIGNIFICANT CHANGE UP
HCT VFR BLD CALC: 23.2 % — LOW (ref 39–50)
HGB BLD-MCNC: 7.9 G/DL — LOW (ref 13–17)
HYALINE CASTS # UR AUTO: NEGATIVE — SIGNIFICANT CHANGE UP
HYPOCHROMIA BLD QL: SLIGHT — SIGNIFICANT CHANGE UP
IMM GRANULOCYTES NFR BLD AUTO: 1.2 % — SIGNIFICANT CHANGE UP (ref 0–1.5)
KETONES UR-MCNC: NEGATIVE — SIGNIFICANT CHANGE UP
LDH SERPL L TO P-CCNC: 156 U/L — SIGNIFICANT CHANGE UP (ref 135–225)
LEUKOCYTE ESTERASE UR-ACNC: NEGATIVE — SIGNIFICANT CHANGE UP
LYMPHOCYTES # BLD AUTO: 0.53 K/UL — LOW (ref 1–3.3)
LYMPHOCYTES # BLD AUTO: 62.4 % — HIGH (ref 13–44)
LYMPHOCYTES NFR SPEC AUTO: 52.3 % — HIGH (ref 13–44)
MAGNESIUM SERPL-MCNC: 2.1 MG/DL — SIGNIFICANT CHANGE UP (ref 1.6–2.6)
MCHC RBC-ENTMCNC: 29.4 PG — SIGNIFICANT CHANGE UP (ref 27–34)
MCHC RBC-ENTMCNC: 34.1 % — SIGNIFICANT CHANGE UP (ref 32–36)
MCV RBC AUTO: 86.2 FL — SIGNIFICANT CHANGE UP (ref 80–100)
METAMYELOCYTES # FLD: 0 % — SIGNIFICANT CHANGE UP (ref 0–1)
MONOCYTES # BLD AUTO: 0.17 K/UL — SIGNIFICANT CHANGE UP (ref 0–0.9)
MONOCYTES NFR BLD AUTO: 20 % — HIGH (ref 2–14)
MONOCYTES NFR BLD: 0 % — LOW (ref 2–9)
MYELOCYTES NFR BLD: 0 % — SIGNIFICANT CHANGE UP (ref 0–0)
NEUTROPHIL AB SER-ACNC: 15.6 % — LOW (ref 43–77)
NEUTROPHILS # BLD AUTO: 0.13 K/UL — LOW (ref 1.8–7.4)
NEUTROPHILS NFR BLD AUTO: 15.2 % — LOW (ref 43–77)
NEUTS BAND # BLD: 0 % — SIGNIFICANT CHANGE UP (ref 0–6)
NITRITE UR-MCNC: NEGATIVE — SIGNIFICANT CHANGE UP
NRBC # BLD: 6 /100WBC — SIGNIFICANT CHANGE UP
NRBC # FLD: 0.02 K/UL — SIGNIFICANT CHANGE UP (ref 0–0)
NRBC FLD-RTO: 2.4 — SIGNIFICANT CHANGE UP
OTHER - HEMATOLOGY %: 0 — SIGNIFICANT CHANGE UP
PH UR: 6.5 — SIGNIFICANT CHANGE UP (ref 5–8)
PHOSPHATE SERPL-MCNC: 2.7 MG/DL — SIGNIFICANT CHANGE UP (ref 2.5–4.5)
PLATELET # BLD AUTO: 16 K/UL — CRITICAL LOW (ref 150–400)
PLATELET COUNT - ESTIMATE: SIGNIFICANT CHANGE UP
PMV BLD: 9.8 FL — SIGNIFICANT CHANGE UP (ref 7–13)
POTASSIUM SERPL-MCNC: 4 MMOL/L — SIGNIFICANT CHANGE UP (ref 3.5–5.3)
POTASSIUM SERPL-SCNC: 4 MMOL/L — SIGNIFICANT CHANGE UP (ref 3.5–5.3)
PROMYELOCYTES # FLD: 0 % — SIGNIFICANT CHANGE UP (ref 0–0)
PROT SERPL-MCNC: 6.6 G/DL — SIGNIFICANT CHANGE UP (ref 6–8.3)
PROT UR-MCNC: 200 — HIGH
RBC # BLD: 2.69 M/UL — LOW (ref 4.2–5.8)
RBC # FLD: 14.6 % — HIGH (ref 10.3–14.5)
RBC CASTS # UR COMP ASSIST: SIGNIFICANT CHANGE UP (ref 0–?)
SMUDGE CELLS # BLD: PRESENT — SIGNIFICANT CHANGE UP
SODIUM SERPL-SCNC: 143 MMOL/L — SIGNIFICANT CHANGE UP (ref 135–145)
SP GR SPEC: 1.02 — SIGNIFICANT CHANGE UP (ref 1–1.04)
SPECIMEN SOURCE: SIGNIFICANT CHANGE UP
SPECIMEN SOURCE: SIGNIFICANT CHANGE UP
SQUAMOUS # UR AUTO: SIGNIFICANT CHANGE UP
URATE SERPL-MCNC: 6.2 MG/DL — SIGNIFICANT CHANGE UP (ref 3.4–8.8)
UROBILINOGEN FLD QL: NORMAL — SIGNIFICANT CHANGE UP
VARIANT LYMPHS # BLD: 30.3 % — SIGNIFICANT CHANGE UP
WBC # BLD: 0.85 K/UL — CRITICAL LOW (ref 3.8–10.5)
WBC # FLD AUTO: 0.85 K/UL — CRITICAL LOW (ref 3.8–10.5)
WBC UR QL: SIGNIFICANT CHANGE UP (ref 0–?)

## 2019-04-28 PROCEDURE — 93010 ELECTROCARDIOGRAM REPORT: CPT

## 2019-04-28 RX ORDER — FLUCONAZOLE 150 MG/1
400 TABLET ORAL ONCE
Qty: 0 | Refills: 0 | Status: COMPLETED | OUTPATIENT
Start: 2019-04-28 | End: 2019-04-28

## 2019-04-28 RX ORDER — FLUCONAZOLE 150 MG/1
400 TABLET ORAL EVERY 24 HOURS
Qty: 0 | Refills: 0 | Status: DISCONTINUED | OUTPATIENT
Start: 2019-04-29 | End: 2019-05-01

## 2019-04-28 RX ORDER — FLUCONAZOLE 150 MG/1
TABLET ORAL
Qty: 0 | Refills: 0 | Status: DISCONTINUED | OUTPATIENT
Start: 2019-04-28 | End: 2019-05-01

## 2019-04-28 RX ORDER — IBUPROFEN 200 MG
400 TABLET ORAL ONCE
Qty: 0 | Refills: 0 | Status: COMPLETED | OUTPATIENT
Start: 2019-04-28 | End: 2019-04-28

## 2019-04-28 RX ORDER — ACETAMINOPHEN 500 MG
650 TABLET ORAL ONCE
Qty: 0 | Refills: 0 | Status: COMPLETED | OUTPATIENT
Start: 2019-04-28 | End: 2019-04-28

## 2019-04-28 RX ORDER — ACETAMINOPHEN 500 MG
650 TABLET ORAL EVERY 6 HOURS
Qty: 0 | Refills: 0 | Status: DISCONTINUED | OUTPATIENT
Start: 2019-04-28 | End: 2019-05-08

## 2019-04-28 RX ORDER — BENZOCAINE AND MENTHOL 5; 1 G/100ML; G/100ML
1 LIQUID ORAL
Qty: 0 | Refills: 0 | Status: DISCONTINUED | OUTPATIENT
Start: 2019-04-28 | End: 2019-05-08

## 2019-04-28 RX ORDER — FLUCONAZOLE 150 MG/1
TABLET ORAL
Qty: 0 | Refills: 0 | Status: DISCONTINUED | OUTPATIENT
Start: 2019-04-28 | End: 2019-04-28

## 2019-04-28 RX ADMIN — Medication 300 MILLIGRAM(S): at 14:27

## 2019-04-28 RX ADMIN — BENZOCAINE AND MENTHOL 1 LOZENGE: 5; 1 LIQUID ORAL at 07:57

## 2019-04-28 RX ADMIN — CEFEPIME 100 MILLIGRAM(S): 1 INJECTION, POWDER, FOR SOLUTION INTRAMUSCULAR; INTRAVENOUS at 14:26

## 2019-04-28 RX ADMIN — CEFEPIME 100 MILLIGRAM(S): 1 INJECTION, POWDER, FOR SOLUTION INTRAMUSCULAR; INTRAVENOUS at 22:10

## 2019-04-28 RX ADMIN — Medication 400 MILLIGRAM(S): at 18:04

## 2019-04-28 RX ADMIN — CEFEPIME 100 MILLIGRAM(S): 1 INJECTION, POWDER, FOR SOLUTION INTRAMUSCULAR; INTRAVENOUS at 07:51

## 2019-04-28 RX ADMIN — Medication 480 MICROGRAM(S): at 19:02

## 2019-04-28 RX ADMIN — FLUCONAZOLE 100 MILLIGRAM(S): 150 TABLET ORAL at 02:04

## 2019-04-28 RX ADMIN — Medication 100 MILLIGRAM(S): at 02:04

## 2019-04-28 RX ADMIN — Medication 650 MILLIGRAM(S): at 15:02

## 2019-04-28 NOTE — PROVIDER CONTACT NOTE (OTHER) - ASSESSMENT
V/S stable except HR elevated. No s/s of acute distress. Denies chest pain, SOB, dizziness, fatigue.

## 2019-04-28 NOTE — CHART NOTE - NSCHARTNOTEFT_GEN_A_CORE
Notified by nurse pt c/o chest pain. Pt seen and evaluated at bedside. He denies chest pain but reports cough. He denies SOB, palpitations.   Cardio- normal rhythm  lungs- negative for wheezing   -vitals stable   -EKG- NS with tachycardia at 106, no acute ST changes   -cough suppressants   will continue to monitor

## 2019-04-28 NOTE — PROGRESS NOTE ADULT - SUBJECTIVE AND OBJECTIVE BOX
Patient is seen and examined at the bed side, febrile.       REVIEW OF SYSTEMS: All other review systems are negative        ALLERGIES: oxycodone (Vomiting; Nausea)        Vital Signs Last 24 Hrs  T(C): 38 (28 Apr 2019 16:29), Max: 38 (28 Apr 2019 16:29)  T(F): 100.4 (28 Apr 2019 16:29), Max: 100.4 (28 Apr 2019 16:29)  HR: 112 (28 Apr 2019 16:29) (103 - 112)  BP: 127/70 (28 Apr 2019 16:29) (123/75 - 140/78)  BP(mean): --  RR: 20 (28 Apr 2019 13:32) (19 - 20)  SpO2: 100% (28 Apr 2019 13:32) (98% - 100%)      PHYSICAL EXAM:  GENERAL: Not in distress   HEENT: No oral thrush or any tonsilar exudate noted   CHEST/LUNG:  Air entry bilaterally  HEART: s1 and s2 present  ABDOMEN:  Nontender and  Nondistended  EXTREMITIES: No pedal  edema  CNS: Awake and Alert        LABS:                        7.9    0.85  )-----------( 16       ( 28 Apr 2019 07:45 )             23.2                           7.8    1.08  )-----------( 32       ( 27 Apr 2019 07:00 )             23.1       04-28    143  |  110<H>  |  17  ----------------------------<  100<H>  4.0   |  18<L>  |  1.10    Ca    8.0<L>      28 Apr 2019 07:45  Phos  2.7     04-28  Mg     2.1     04-28    TPro  6.6  /  Alb  3.6  /  TBili  1.3<H>  /  DBili  x   /  AST  26  /  ALT  97<H>  /  AlkPhos  115  04-28 04-27    140  |  107  |  23  ----------------------------<  115<H>  4.1   |  18<L>  |  1.10    Ca    8.2<L>      27 Apr 2019 07:00  Phos  3.2     04-27  Mg     2.2     04-27    TPro  6.8  /  Alb  3.7  /  TBili  1.0  /  DBili  x   /  AST  38  /  ALT  123<H>  /  AlkPhos  121<H>  04-27  PT/INR - ( 26 Apr 2019 07:45 )   PT: 14.5 SEC;   INR: 1.30      PTT - ( 26 Apr 2019 07:45 )  PTT:30.0 SEC        MEDICATIONS  (STANDING):  allopurinol 300 milliGRAM(s) Oral daily  cefepime   IVPB      cefepime   IVPB 2000 milliGRAM(s) IV Intermittent every 8 hours  filgrastim-sndz Injectable 480 MICROGram(s) SubCutaneous daily  fluconAZOLE IVPB        MEDICATIONS  (PRN):  acetaminophen   Tablet .. 650 milliGRAM(s) Oral every 6 hours PRN Temp greater or equal to 38C (100.4F)  artificial  tears Solution 1 Drop(s) Both EYES every 6 hours PRN Dry Eyes  benzocaine 15 mG/menthol 3.6 mG (Sugar-Free) Lozenge 1 Lozenge Oral five times a day PRN Sore Throat  docusate sodium 100 milliGRAM(s) Oral three times a day PRN Constipation  HYDROmorphone   Tablet 2 milliGRAM(s) Oral every 4 hours PRN moderate - severe pain  ondansetron Injectable 8 milliGRAM(s) IV Push every 8 hours PRN Nausea and/or Vomiting  senna 2 Tablet(s) Oral at bedtime PRN Constipation        RADIOLOGY & ADDITIONAL TESTS:    < from: Xray Chest 1 View- PORTABLE-Urgent (04.27.19 @ 22:45) >  Probable small left pleural effusion with adjacent opacity that likely   represents atelectasis.        < end of copied text >      4/25/19 : CT Brain Stroke Protocol (04.25.19 @ 19:42) No acute intracranial hemorrhage, mass effect, or midline shift. No  abnormal extra-axial collections. The basal cisterns are patent without evidence of central herniation. The sulci and ventricles are within normal limits for the patient's age. Stable few patchy areas of low-attenuation in the bihemispheric white  matter, which is nonspecific, but likely related to sequela of chronic  microvascular ischemic disease. Stable coarse calcifications in the left temporal lobe.      MICROBIOLOGY DATA:    Culture - Blood (04.27.19 @ 19:50)    Culture - Blood:   NO ORGANISMS ISOLATED  NO ORGANISMS ISOLATED AT 24 HOURS    Specimen Source: BLOOD PERIPHERAL      Culture - Group A Streptococcus (04.27.19 @ 14:24)    Culture - Group A Streptococcus:   NO BETA STREP. GROUP A  AFTER 24HRS.    Specimen Source: THROAT Patient is seen and examined at the bed side, spiked fever and remains febrile. The odynophagia has improved a little and able to swallow liquid diet.      REVIEW OF SYSTEMS: All other review systems are negative        ALLERGIES: oxycodone (Vomiting; Nausea)        Vital Signs Last 24 Hrs  T(C): 38 (28 Apr 2019 16:29), Max: 38 (28 Apr 2019 16:29)  T(F): 100.4 (28 Apr 2019 16:29), Max: 100.4 (28 Apr 2019 16:29)  HR: 112 (28 Apr 2019 16:29) (103 - 112)  BP: 127/70 (28 Apr 2019 16:29) (123/75 - 140/78)  BP(mean): --  RR: 20 (28 Apr 2019 13:32) (19 - 20)  SpO2: 100% (28 Apr 2019 13:32) (98% - 100%)        PHYSICAL EXAM:  GENERAL: Not in distress   HEENT: No oral thrush or any tonsilar exudate noted   CHEST/LUNG:  Air entry bilaterally  HEART: s1 and s2 present  ABDOMEN:  Nontender and  Nondistended  EXTREMITIES: No pedal  edema  CNS: Awake and Alert        LABS:                        7.9    0.85  )-----------( 16       ( 28 Apr 2019 07:45 )             23.2                           7.8    1.08  )-----------( 32       ( 27 Apr 2019 07:00 )             23.1       04-28    143  |  110<H>  |  17  ----------------------------<  100<H>  4.0   |  18<L>  |  1.10    Ca    8.0<L>      28 Apr 2019 07:45  Phos  2.7     04-28  Mg     2.1     04-28    TPro  6.6  /  Alb  3.6  /  TBili  1.3<H>  /  DBili  x   /  AST  26  /  ALT  97<H>  /  AlkPhos  115  04-28 04-27    140  |  107  |  23  ----------------------------<  115<H>  4.1   |  18<L>  |  1.10    Ca    8.2<L>      27 Apr 2019 07:00  Phos  3.2     04-27  Mg     2.2     04-27    TPro  6.8  /  Alb  3.7  /  TBili  1.0  /  DBili  x   /  AST  38  /  ALT  123<H>  /  AlkPhos  121<H>  04-27  PT/INR - ( 26 Apr 2019 07:45 )   PT: 14.5 SEC;   INR: 1.30      PTT - ( 26 Apr 2019 07:45 )  PTT:30.0 SEC        MEDICATIONS  (STANDING):  allopurinol 300 milliGRAM(s) Oral daily  cefepime   IVPB      cefepime   IVPB 2000 milliGRAM(s) IV Intermittent every 8 hours  filgrastim-sndz Injectable 480 MICROGram(s) SubCutaneous daily  fluconAZOLE IVPB        MEDICATIONS  (PRN):  acetaminophen   Tablet .. 650 milliGRAM(s) Oral every 6 hours PRN Temp greater or equal to 38C (100.4F)  artificial  tears Solution 1 Drop(s) Both EYES every 6 hours PRN Dry Eyes  benzocaine 15 mG/menthol 3.6 mG (Sugar-Free) Lozenge 1 Lozenge Oral five times a day PRN Sore Throat  docusate sodium 100 milliGRAM(s) Oral three times a day PRN Constipation  HYDROmorphone   Tablet 2 milliGRAM(s) Oral every 4 hours PRN moderate - severe pain  ondansetron Injectable 8 milliGRAM(s) IV Push every 8 hours PRN Nausea and/or Vomiting  senna 2 Tablet(s) Oral at bedtime PRN Constipation        RADIOLOGY & ADDITIONAL TESTS:    4/27/19 : Xray Chest 1 View- PORTABLE-Urgent (04.27.19 @ 22:45) Probable small left pleural effusion with adjacent opacity that likely  represents atelectasis.    4/25/19 : CT Brain Stroke Protocol (04.25.19 @ 19:42) No acute intracranial hemorrhage, mass effect, or midline shift. No  abnormal extra-axial collections. The basal cisterns are patent without evidence of central herniation. The sulci and ventricles are within normal limits for the patient's age. Stable few patchy areas of low-attenuation in the bihemispheric white  matter, which is nonspecific, but likely related to sequela of chronic  microvascular ischemic disease. Stable coarse calcifications in the left temporal lobe.      MICROBIOLOGY DATA:        Culture - Blood (04.27.19 @ 19:50)    Culture - Blood:   NO ORGANISMS ISOLATED  NO ORGANISMS ISOLATED AT 24 HOURS    Specimen Source: BLOOD PERIPHERAL      Culture - Group A Streptococcus (04.27.19 @ 14:24)    Culture - Group A Streptococcus:   NO BETA STREP. GROUP A  AFTER 24HRS.    Specimen Source: THROAT

## 2019-04-28 NOTE — CHART NOTE - NSCHARTNOTEFT_GEN_A_CORE
Pt with fever 100.4 and WBC 0.85. CXR 4/27 showing small L pleural effusion with adjacent opacity, likely atelectasis, throat cx NTD x24h and RVP neg. Discussed with both heme/onc and ID which are recommending to continue with current treatment of zarxio, and fluconazole and cefepime. ID recommending to send repeat BCx, UA/UCx. To follow up.

## 2019-04-28 NOTE — PROGRESS NOTE ADULT - ASSESSMENT
_________________________________________________________________________________________  ========>>  M E D I C A L   A T T E N D I N G    F O L L O W  U P  N O T E  <<=========   			( covering Dr Castro 4/28 & 29)   -----------------------------------------------------------------------------------------------------    - Patient seen and examined by me approximately thirty minutes ago.  - Patient today overall doing fairly, uncomfortable due to sore throat, not eating / drinking well     ==================>> REVIEW OF SYSTEM <<=================    GEN: no fever, no chills, pain as above   RESP: no SOB, + occasional cough, no sputum  CVS: no chest pain, no palpitations, no edema  GI: no abdominal pain, no nausea  : no dysuria  Neuro: no headache, no dizziness  Derm : no itching, no rash    ==================>> PHYSICAL EXAM <<=================    GEN: A&O X 3 , NAD , resting comfortably, weak appearing   HEENT: NCAT, PERRL, MMM, hearing intact     no oral thrush appreciated   Neck: supple , no JVD  CVS: S1S2 , regular , No M/R/G appreciated  PULM: CTA B/L,  no W/R/R appreciated  ABD.: soft. non tender, non distended,  bowel sounds present  Extrem: intact pulses , no edema   PSYCH : normal mood,  not anxious      ==================>> MEDICATIONS <<====================    MEDICATIONS  (STANDING):  allopurinol 300 milliGRAM(s) Oral daily  cefepime   IVPB      cefepime   IVPB 2000 milliGRAM(s) IV Intermittent every 8 hours  filgrastim-sndz Injectable 480 MICROGram(s) SubCutaneous daily  fluconAZOLE IVPB        MEDICATIONS  (PRN):  acetaminophen   Tablet .. 650 milliGRAM(s) Oral every 6 hours PRN Temp greater or equal to 38C (100.4F)  artificial  tears Solution 1 Drop(s) Both EYES every 6 hours PRN Dry Eyes  benzocaine 15 mG/menthol 3.6 mG (Sugar-Free) Lozenge 1 Lozenge Oral three times a day PRN Sore Throat  docusate sodium 100 milliGRAM(s) Oral three times a day PRN Constipation  HYDROmorphone   Tablet 2 milliGRAM(s) Oral every 4 hours PRN moderate - severe pain  ondansetron Injectable 8 milliGRAM(s) IV Push every 8 hours PRN Nausea and/or Vomiting  senna 2 Tablet(s) Oral at bedtime PRN Constipation    ==================>> VITAL SIGNS <<==================    T(C): 37.3 (04-28-19 @ 07:47), Max: 37.3 (04-27-19 @ 21:57)  HR: 112 (04-28-19 @ 07:47) (92 - 112)  BP: 131/69 (04-28-19 @ 07:47) (123/75 - 137/85)  RR: 20 (04-28-19 @ 07:47) (17 - 20)  SpO2: 100% (04-28-19 @ 07:47) (98% - 100%)    I&O's Summary    27 Apr 2019 07:01  -  28 Apr 2019 07:00  --------------------------------------------------------  IN: 300 mL / OUT: 900 mL / NET: -600 mL     ==================>> LAB AND IMAGING <<==================                        7.9    0.85  )-----------( 16       ( 28 Apr 2019 07:45 )             23.2          WBC count:   0.85 <<== ,  1.08 <<== ,  1.42 <<== ,  1.15 <<== ,  0.69 <<== ,  0.92 <<==   Hemoglobin:   7.9 <<==,  7.8 <<==,  8.3 <<==,  6.1 <<==,  5.6 <<==,  6.1 <<==  platelets:  16 <==, 32 <==, 35 <==, 54 <==, 3 <==, 3 <==    143  |  110<H>  |  17  ----------------------------<  100<H>  4.0   |  18<L>  |  1.10    Ca    8.0<L>      28 Apr 2019 07:45  Phos  2.7     04-28  Mg     2.1     04-28    TPro  6.6  /  Alb  3.6  /  TBili  1.3<H>  /  DBili  x   /  AST  26  /  ALT  97<H>  /  AlkPhos  115  04-28    HgA1C: 6.2  (03-11-19)            ___________________________________________________________________________________  ===============>>  A S S E S S M E N T   A N D   P L A N <<===============  ------------------------------------------------------------------------------------------    · Assessment		  52yo M hx of relapsed multiple myeloma s/p bone marrow transplant (~1.5 yrs ago) and chemo/RT (last infusion Bendamustine and Pomalidomide 4/08/2019), perforated diverticulitis s/p Kiah's (proctosigmoidectomy with colostomy, 9/2018), nonischemic cardiomyopathy (mild global LV systolic dysfunction with EF 54% and diastolic LV dysfunction based on TTE in 11/2018), and HTN presents with vision change and vomiting that resolved, concern for CVA, admitted for pancytopenia.    Problem/Plan - 1:  ·  Problem: Pancytopenia.        monitor closely post transfusion  hem/onc and all consultants management greatly appreciated.     Problem/Plan - 2:  ·  Problem: Vision changes.  Plan: Pt with transient vision change of vision going white proceeded by NBNB emesis. Code stroke called, CTH neg for bleed. Pt with PLT 3 at risk for spontaneous brain bleed. Neuro exam currently nonfocal  -neuro checks  -If change in mental status or new focal deficit, will obtain CTH.     Problem/Plan - 3:  ·  Problem: Multiple myeloma in relapse.  Plan: MM in relapse s/p bone marrow transplant (~1.5 yrs ago) and chemo/RT. Last infusion Bendamustine and Pomalidomide 4/08/2019  Continue Current medications and monitor.   - Hem/ onc follow up for further treatment / mgmt    Problem/Plan - 4:  ·  Problem: Cardiomyopathy.  Plan: Chronic Systolic CHF  -holding lisinopril and cardizem due to low normal BP, restart as tolerated  -low salt diet.   - resume home meds as able / needed     Problem/Plan - 5:  ·  Problem: sore  throat     unclear etiology :possible mucositis vs fungal infection  continue antifungals per ID  continue Cepacol  pt encouraged to increase po intake as able     Problem/Plan - 6:  Problem: Prophylactic measure. Plan: VTE ppx: no chemoppx or SCDs due to thrombocytopenia, pt encouraged to ambulate / OOB more     --------------------------------------------  Case discussed with pt, family at bedside   Education given on findings and plan of care  ___________________________  H. AMILCAR Lehman  ( covering Dr Castro 4/28 & 29)   Pager: [phone]

## 2019-04-28 NOTE — PROGRESS NOTE ADULT - ASSESSMENT
A 54 yo Male with  relapsed multiple myeloma s/p bone marrow transplant (~1.5 yrs ago) and chemo/RT (last infusion Bendamustine and Pomalidomide 4/08/2019), perforated diverticulitis s/p Kiah's (proctosigmoidectomy with colostomy, presents to [facility] ER for evaluation of  vomiting and change of vision. He also had been weak, with epistaxis, and petechiae. ON admission, he found to have Pancytopenia and Neutropenia with ANC of 120. He has no fever but rigors and also c/o Odynophagia. The CT head shows No acute intracranial events. The ID consult requested to assist with evaluation and antibiotic management of Neutropenia and Tonsilitis.     # Neutropenic Fever - develope dfever today, 4/28/19  # Acute Tonsilitis  # Possible Candida Esophagitis    would recommend:    1. Monitor temp and ANC  2. Obtain Blood cultures X2 for completeness and obtain Chest xray  3. Continue Cefepime and Fluconazole until work up is done  4. Continue Neupogen and Monitor   5. Trial of IV Fluconazole for Candida esophagitis in the setting of Neutropenia  6. Monitor Cell counts and Transfuse as needed    d/w Covering PA and Patient     will follow the patient with you A 54 yo Male with  relapsed multiple myeloma s/p bone marrow transplant (~1.5 yrs ago) and chemo/RT (last infusion Bendamustine and Pomalidomide 4/08/2019), perforated diverticulitis s/p Kiah's (proctosigmoidectomy with colostomy, presents to [facility] ER for evaluation of  vomiting and change of vision. He also had been weak, with epistaxis, and petechiae. ON admission, he found to have Pancytopenia and Neutropenia with ANC of 120. He has no fever but rigors and also c/o Odynophagia. The CT head shows No acute intracranial events. The ID consult requested to assist with evaluation and antibiotic management of Neutropenia and Tonsilitis.     # Neutropenic Fever - develope dfever today, 4/28/19  # Acute Tonsilitis  # Possible Candida Esophagitis    would recommend:    1. Monitor temp and c/w supportive care  2. Obtain cultures X2 since he is febrile  3. Continue Cefepime and Fluconazole until work up is done  4. Continue Neupogen and Monitor ANC  5. Continue IV Fluconazole since Odynophagia is improving  6. Monitor Cell counts and Transfuse as needed    d/w Covering PA and Patient     will follow the patient with you

## 2019-04-29 LAB
ALBUMIN SERPL ELPH-MCNC: 3.6 G/DL — SIGNIFICANT CHANGE UP (ref 3.3–5)
ALP SERPL-CCNC: 127 U/L — HIGH (ref 40–120)
ALT FLD-CCNC: 77 U/L — HIGH (ref 4–41)
ANION GAP SERPL CALC-SCNC: 15 MMO/L — HIGH (ref 7–14)
AST SERPL-CCNC: 20 U/L — SIGNIFICANT CHANGE UP (ref 4–40)
BASOPHILS # BLD AUTO: 0.01 K/UL — SIGNIFICANT CHANGE UP (ref 0–0.2)
BASOPHILS NFR BLD AUTO: 1.1 % — SIGNIFICANT CHANGE UP (ref 0–2)
BILIRUB SERPL-MCNC: 1.4 MG/DL — HIGH (ref 0.2–1.2)
BUN SERPL-MCNC: 17 MG/DL — SIGNIFICANT CHANGE UP (ref 7–23)
CALCIUM SERPL-MCNC: 8.2 MG/DL — LOW (ref 8.4–10.5)
CHLORIDE SERPL-SCNC: 103 MMOL/L — SIGNIFICANT CHANGE UP (ref 98–107)
CO2 SERPL-SCNC: 18 MMOL/L — LOW (ref 22–31)
CREAT SERPL-MCNC: 1.15 MG/DL — SIGNIFICANT CHANGE UP (ref 0.5–1.3)
EOSINOPHIL # BLD AUTO: 0.01 K/UL — SIGNIFICANT CHANGE UP (ref 0–0.5)
EOSINOPHIL NFR BLD AUTO: 1.1 % — SIGNIFICANT CHANGE UP (ref 0–6)
GLUCOSE SERPL-MCNC: 108 MG/DL — HIGH (ref 70–99)
HCT VFR BLD CALC: 25.7 % — LOW (ref 39–50)
HGB BLD-MCNC: 8.5 G/DL — LOW (ref 13–17)
IMM GRANULOCYTES NFR BLD AUTO: 2.3 % — HIGH (ref 0–1.5)
LDH SERPL L TO P-CCNC: 177 U/L — SIGNIFICANT CHANGE UP (ref 135–225)
LYMPHOCYTES # BLD AUTO: 0.79 K/UL — LOW (ref 1–3.3)
LYMPHOCYTES # BLD AUTO: 89.8 % — HIGH (ref 13–44)
MAGNESIUM SERPL-MCNC: 2.2 MG/DL — SIGNIFICANT CHANGE UP (ref 1.6–2.6)
MANUAL SMEAR VERIFICATION: SIGNIFICANT CHANGE UP
MCHC RBC-ENTMCNC: 29.8 PG — SIGNIFICANT CHANGE UP (ref 27–34)
MCHC RBC-ENTMCNC: 33.1 % — SIGNIFICANT CHANGE UP (ref 32–36)
MCV RBC AUTO: 90.2 FL — SIGNIFICANT CHANGE UP (ref 80–100)
MONOCYTES # BLD AUTO: 0.03 K/UL — SIGNIFICANT CHANGE UP (ref 0–0.9)
MONOCYTES NFR BLD AUTO: 3.4 % — SIGNIFICANT CHANGE UP (ref 2–14)
NEUTROPHILS # BLD AUTO: 0.02 K/UL — LOW (ref 1.8–7.4)
NEUTROPHILS NFR BLD AUTO: 2.3 % — LOW (ref 43–77)
NRBC # FLD: 0 K/UL — SIGNIFICANT CHANGE UP (ref 0–0)
PHOSPHATE SERPL-MCNC: 2.4 MG/DL — LOW (ref 2.5–4.5)
PLATELET # BLD AUTO: 9 K/UL — CRITICAL LOW (ref 150–400)
PMV BLD: 9.7 FL — SIGNIFICANT CHANGE UP (ref 7–13)
POTASSIUM SERPL-MCNC: 3.7 MMOL/L — SIGNIFICANT CHANGE UP (ref 3.5–5.3)
POTASSIUM SERPL-SCNC: 3.7 MMOL/L — SIGNIFICANT CHANGE UP (ref 3.5–5.3)
PROT SERPL-MCNC: 6.9 G/DL — SIGNIFICANT CHANGE UP (ref 6–8.3)
RBC # BLD: 2.85 M/UL — LOW (ref 4.2–5.8)
RBC # FLD: 14.7 % — HIGH (ref 10.3–14.5)
S PYO SPEC QL CULT: SIGNIFICANT CHANGE UP
SODIUM SERPL-SCNC: 136 MMOL/L — SIGNIFICANT CHANGE UP (ref 135–145)
SPECIMEN SOURCE: SIGNIFICANT CHANGE UP
SPECIMEN SOURCE: SIGNIFICANT CHANGE UP
URATE SERPL-MCNC: 5.7 MG/DL — SIGNIFICANT CHANGE UP (ref 3.4–8.8)
WBC # BLD: 0.88 K/UL — CRITICAL LOW (ref 3.8–10.5)
WBC # FLD AUTO: 0.88 K/UL — CRITICAL LOW (ref 3.8–10.5)

## 2019-04-29 PROCEDURE — 93010 ELECTROCARDIOGRAM REPORT: CPT

## 2019-04-29 RX ORDER — IBUPROFEN 200 MG
400 TABLET ORAL ONCE
Qty: 0 | Refills: 0 | Status: COMPLETED | OUTPATIENT
Start: 2019-04-29 | End: 2019-04-29

## 2019-04-29 RX ORDER — DIPHENHYDRAMINE HCL 50 MG
25 CAPSULE ORAL ONCE
Qty: 0 | Refills: 0 | Status: COMPLETED | OUTPATIENT
Start: 2019-04-29 | End: 2019-04-29

## 2019-04-29 RX ORDER — DIPHENHYDRAMINE HYDROCHLORIDE AND LIDOCAINE HYDROCHLORIDE AND ALUMINUM HYDROXIDE AND MAGNESIUM HYDRO
5 KIT
Qty: 0 | Refills: 0 | Status: DISCONTINUED | OUTPATIENT
Start: 2019-04-29 | End: 2019-05-08

## 2019-04-29 RX ORDER — ACETAMINOPHEN 500 MG
650 TABLET ORAL ONCE
Qty: 0 | Refills: 0 | Status: COMPLETED | OUTPATIENT
Start: 2019-04-29 | End: 2019-04-29

## 2019-04-29 RX ORDER — SODIUM CHLORIDE 9 MG/ML
1000 INJECTION INTRAMUSCULAR; INTRAVENOUS; SUBCUTANEOUS
Qty: 0 | Refills: 0 | Status: DISCONTINUED | OUTPATIENT
Start: 2019-04-29 | End: 2019-05-04

## 2019-04-29 RX ORDER — SODIUM,POTASSIUM PHOSPHATES 278-250MG
1 POWDER IN PACKET (EA) ORAL
Qty: 0 | Refills: 0 | Status: COMPLETED | OUTPATIENT
Start: 2019-04-29 | End: 2019-04-30

## 2019-04-29 RX ORDER — VANCOMYCIN HCL 1 G
1250 VIAL (EA) INTRAVENOUS ONCE
Qty: 0 | Refills: 0 | Status: COMPLETED | OUTPATIENT
Start: 2019-04-29 | End: 2019-04-29

## 2019-04-29 RX ORDER — VANCOMYCIN HCL 1 G
VIAL (EA) INTRAVENOUS
Qty: 0 | Refills: 0 | Status: DISCONTINUED | OUTPATIENT
Start: 2019-04-29 | End: 2019-05-01

## 2019-04-29 RX ORDER — VANCOMYCIN HCL 1 G
1250 VIAL (EA) INTRAVENOUS EVERY 12 HOURS
Qty: 0 | Refills: 0 | Status: DISCONTINUED | OUTPATIENT
Start: 2019-04-30 | End: 2019-05-01

## 2019-04-29 RX ORDER — SALIVA SUBSTITUTE COMB NO.11 351 MG
30 POWDER IN PACKET (EA) MUCOUS MEMBRANE EVERY 6 HOURS
Qty: 0 | Refills: 0 | Status: DISCONTINUED | OUTPATIENT
Start: 2019-04-29 | End: 2019-05-08

## 2019-04-29 RX ORDER — CARVEDILOL PHOSPHATE 80 MG/1
25 CAPSULE, EXTENDED RELEASE ORAL EVERY 12 HOURS
Qty: 0 | Refills: 0 | Status: DISCONTINUED | OUTPATIENT
Start: 2019-04-29 | End: 2019-05-08

## 2019-04-29 RX ADMIN — Medication 30 MILLILITER(S): at 17:15

## 2019-04-29 RX ADMIN — DIPHENHYDRAMINE HYDROCHLORIDE AND LIDOCAINE HYDROCHLORIDE AND ALUMINUM HYDROXIDE AND MAGNESIUM HYDRO 5 MILLILITER(S): KIT at 21:51

## 2019-04-29 RX ADMIN — CEFEPIME 100 MILLIGRAM(S): 1 INJECTION, POWDER, FOR SOLUTION INTRAMUSCULAR; INTRAVENOUS at 21:52

## 2019-04-29 RX ADMIN — CARVEDILOL PHOSPHATE 25 MILLIGRAM(S): 80 CAPSULE, EXTENDED RELEASE ORAL at 20:26

## 2019-04-29 RX ADMIN — Medication 650 MILLIGRAM(S): at 10:38

## 2019-04-29 RX ADMIN — Medication 100 MILLIGRAM(S): at 21:57

## 2019-04-29 RX ADMIN — Medication 30 MILLILITER(S): at 14:32

## 2019-04-29 RX ADMIN — SODIUM CHLORIDE 75 MILLILITER(S): 9 INJECTION INTRAMUSCULAR; INTRAVENOUS; SUBCUTANEOUS at 10:34

## 2019-04-29 RX ADMIN — DIPHENHYDRAMINE HYDROCHLORIDE AND LIDOCAINE HYDROCHLORIDE AND ALUMINUM HYDROXIDE AND MAGNESIUM HYDRO 5 MILLILITER(S): KIT at 17:13

## 2019-04-29 RX ADMIN — Medication 1 TABLET(S): at 17:14

## 2019-04-29 RX ADMIN — CEFEPIME 100 MILLIGRAM(S): 1 INJECTION, POWDER, FOR SOLUTION INTRAMUSCULAR; INTRAVENOUS at 14:34

## 2019-04-29 RX ADMIN — Medication 480 MICROGRAM(S): at 17:14

## 2019-04-29 RX ADMIN — CEFEPIME 100 MILLIGRAM(S): 1 INJECTION, POWDER, FOR SOLUTION INTRAMUSCULAR; INTRAVENOUS at 06:06

## 2019-04-29 RX ADMIN — ONDANSETRON 8 MILLIGRAM(S): 8 TABLET, FILM COATED ORAL at 17:13

## 2019-04-29 RX ADMIN — FLUCONAZOLE 100 MILLIGRAM(S): 150 TABLET ORAL at 01:00

## 2019-04-29 RX ADMIN — Medication 400 MILLIGRAM(S): at 06:21

## 2019-04-29 RX ADMIN — BENZOCAINE AND MENTHOL 1 LOZENGE: 5; 1 LIQUID ORAL at 11:39

## 2019-04-29 RX ADMIN — Medication 1 TABLET(S): at 11:39

## 2019-04-29 RX ADMIN — Medication 300 MILLIGRAM(S): at 11:40

## 2019-04-29 RX ADMIN — SODIUM CHLORIDE 75 MILLILITER(S): 9 INJECTION INTRAMUSCULAR; INTRAVENOUS; SUBCUTANEOUS at 20:27

## 2019-04-29 RX ADMIN — Medication 166.67 MILLIGRAM(S): at 22:22

## 2019-04-29 RX ADMIN — Medication 25 MILLIGRAM(S): at 10:38

## 2019-04-29 NOTE — DISCHARGE NOTE PROVIDER - HOSPITAL COURSE
Dx: vision change, vomiting concern for CVA, pancytopenia    52 yo M hx of relapsed multiple myeloma s/p bone marrow transplant (~1.5 yrs ago) and chemo/RT (last infusion Bendamustine and Pomalidomide 4/08/2019), perforated diverticulitis s/p Kiah's (proctosigmoidectomy with colostomy, 9/2018), nonischemic cardiomyopathy (mild global LV systolic dysfunction with EF 54% and diastolic LV dysfunction based on TTE in 11/2018), and HTN presents with vision change and vomiting that resolved, concern for CVA, admitted for pancytopenia.            Hospital Course        Pancytopenia.    Found to have pancytopenia with WBC 0.92, ANC 71, Hg 5.6, PLT 3 due to relapsed MM and chemotherapy    -CTH neg for acute bleed    -neuro checks q4    -Transfuse for PLT <10k and Hg <7    -Heme/Onc consulted     -Pt S/p 3 units PRBC, 4 units PLTs         Sore throat/dry cough 4/27 w/ neutropenic fever on 4/28    RVP neg    CXR small L pleural effusion, w/ opactiy - likely atelectasis     -miracle mouth wash    -throat cx NTD    -f/u BCx 4/27 and 4/28    -f/u UA:__________________________    -UCx :________________________________        Vision changes.    -Pt with transient vision change of vision going white proceeded by NBNB emesis. Code stroke called, CTH neg for bleed. Pt with PLT 3 at risk for spontaneous brain bleed. Neuro exam currently nonfocal. Perhaps had TIA.    -neuro checks q4    -If change in mental status or new focal deficit, will obtain CTH.         Multiple myeloma in relapse.     -MM in relapse s/p bone marrow transplant (~1.5 yrs ago) and chemo/RT. Last infusion Bendamustine and Pomalidomide 4/08/2019    -oncology consulted     -continue allopruinol daily    -zofran prn, monitor QTc. Dilaudid 2mg po q4 prn moderate-severe pain    -monitor TLS labs daily.         Cardiomyopathy.     Nonischemic cardiomyopathy with TTE in 11/2018 showing mild global LV systolic dysfunction with EF 54% and diastolic LV dysfunction. Appears euvolemic on exam    -hold lisinopril and cardizem due to soft BP, restart as tolerated    -low salt diet.         Hypertension.     Hold home lisinopril and coreg due to soft BP.         Prophylactic measure.     VTE ppx: no chemoppx or SCDs due to thrombocytopenia, pt ambulatory    Diet: low salt HPI: 54yo M hx of relapsed multiple myeloma s/p bone marrow transplant (~1.5 yrs ago) and chemo/RT (Bendamustine and Pomalidomide on 4/08/2019), perforated diverticulitis s/p Kiah's (proctosigmoidectomy with colostomy, 9/2018), nonischemic cardiomyopathy (mild systolic and diastolic dysfunction), and HTN initially presented to [facility] ED on 4/25/19  w/ vomiting and change of vision. Code stroke was called and CTH was unremarkable. He was found to be pancytopenic likely due to MM and recent chemo. He was admitted to medicine for further w/u. In ED, he received decadron 40mg IV, 1u PLT. 1u pRBC.  Heme/onc and ID was consulted. Hospital course was complicated by odynophagia and neutropenic fevers. He received neupogen and cefepime, vancomycin, and valacyclovir. Also on fluconazole for esophagitis. GI was consulted for Esophagitis, and EGD deemed too risky in setting of pancytopenia. Hospital course further complicated by acute renal failure, metabolic acidosis and anuria. Pt had R groin shiley placed by vascular and received first HD on 5/4. Further complicated by C diff colitis dx on 5/6 and R groin shiley removed due to C Diff.          Later on 5/8 MICU consulted for altered mental status. Wife at bedside reports pt with cognitive decline since 5/4 with first HD session. He could previously hold a conversation and now with difficulty following commands. Pt currently waxing and waning. Denies pain. Transferred to MICU for acute encephalopathy.  Pt was transferred to MICU s/p R subclavian shiley placement by IR on 5/8. He received urgent HD with 1U pRBC and mental status improved to AAOx2 with some confusion. He had an NGT placed for oral meds. Extensive GOC conversations were held with Heme, Palliative and MICU team. Heme has expressed currently he has likely exhausted all MM treatment and would not be a candidate for trials while on HD.  He is receiving prbcs and platelets for goal hg >7 and plt >40.  Pt remains full code.He remained stable on room air, and never required respiratory or pressure support however does have thick secretions with dry blood around oropharynx.  He has been seen by [facility]S who recommends strict dysphagia/aspiration precautions.  Nephro feels pt would not be a good candidate for long term HD.

## 2019-04-29 NOTE — PROGRESS NOTE ADULT - ASSESSMENT
A 54 yo Male with  relapsed multiple myeloma s/p bone marrow transplant (~1.5 yrs ago) and chemo/RT (last infusion Bendamustine and Pomalidomide 4/08/2019), perforated diverticulitis s/p Kiah's (proctosigmoidectomy with colostomy, presents to [facility] ER for evaluation of  vomiting and change of vision. He also had been weak, with epistaxis, and petechiae. ON admission, he found to have Pancytopenia and Neutropenia with ANC of 120. He has no fever but rigors and also c/o Odynophagia. The CT head shows No acute intracranial events. The ID consult requested to assist with evaluation and antibiotic management of Neutropenia and Tonsilitis.     # Neutropenic Fever - develope dfever today, 4/28/19  # Acute Tonsilitis  # Possible Candida Esophagitis    would recommend:    1. Monitor temp and c/w supportive care  2. Obtain cultures X2 since he is febrile  3. Continue Cefepime , Fluconazole and add Vancomycin since still spiking fever  4. Continue Neupogen and Monitor ANC  5. Monitor Cell counts and Transfuse as needed    d/w Covering PA and Patient     will follow the patient with you A 54 yo Male with  relapsed multiple myeloma s/p bone marrow transplant (~1.5 yrs ago) and chemo/RT (last infusion Bendamustine and Pomalidomide 4/08/2019), perforated diverticulitis s/p Kiah's (proctosigmoidectomy with colostomy, presents to [facility] ER for evaluation of  vomiting and change of vision. He also had been weak, with epistaxis, and petechiae. ON admission, he found to have Pancytopenia and Neutropenia with ANC of 120. He has no fever but rigors and also c/o Odynophagia. The CT head shows No acute intracranial events. The ID consult requested to assist with evaluation and antibiotic management of Neutropenia and Tonsilitis.     # Neutropenic Fever - develope dfever today, 4/28/19  #? Acute Tonsilitis  # Possible Candida Esophagitis-     would recommend:    1. May benefit from  EGD when cell numbers are stable, since Odynophagia  not improving on Fluconazole  2. Monitor temp and c/w supportive care  3. Continue Cefepime , Fluconazole and add Vancomycin since still spiking fever  4. Continue Zarxio and Monitor ANC  5. If Odynophagia not improved then add Valtrex    d/w Nursing staff and Patient     will follow the patient with you

## 2019-04-29 NOTE — PROGRESS NOTE ADULT - ASSESSMENT
1. Pancytopenia     -- likely related to tx, MM  -- Transfuse to keep Hgb > 7 and Platelets> 10  -- cont  Zarxio 480mcg daily until ANC > 1500 x 2 consecutive days  -- Abx per ID    2. MM relapsed/refractory including failed Auto Transplant  -- on tavares/pomalyst.   -- hold pomalyst for now in acute setting and severe pancytopenia  -- bendamustine as outpt when recovers  -- paraproteins improving  -- s/p decadron 40mg 4/25, cont weekly    Torsten Murphy MD  [phone]

## 2019-04-29 NOTE — PROGRESS NOTE ADULT - SUBJECTIVE AND OBJECTIVE BOX
Pt is seen and examined  pt is awake and lying in bed   c/o severe weakness  + Anorexia  + Odynophagia  Bleeding Gums and mild epistaxis      PAST MEDICAL & SURGICAL HISTORY:  Diverticulitis: perforated s/p Kiah  Multiple myeloma: relapsed 3/2019  Hypertension  Left ventricular dysfunction  Cardiomyopathy: nonischemic  Former smoker: (1 ppd x 11 years; Quit ~age 26)  History of bone marrow transplant  History of surgery: s/p exploratory laparotomy with sigmoid resection and colostomy on 9/2/2018      ROS:  Negative except for:    MEDICATIONS  (STANDING):  acetaminophen   Tablet .. 650 milliGRAM(s) Oral once  allopurinol 300 milliGRAM(s) Oral daily  cefepime   IVPB      cefepime   IVPB 2000 milliGRAM(s) IV Intermittent every 8 hours  diphenhydrAMINE   Injectable 25 milliGRAM(s) IV Push once  filgrastim-sndz Injectable 480 MICROGram(s) SubCutaneous daily  FIRST- Mouthwash  BLM 5 milliLiter(s) Swish and Spit five times a day  fluconAZOLE IVPB 400 milliGRAM(s) IV Intermittent every 24 hours  fluconAZOLE IVPB        MEDICATIONS  (PRN):  acetaminophen   Tablet .. 650 milliGRAM(s) Oral every 6 hours PRN Temp greater or equal to 38C (100.4F)  artificial  tears Solution 1 Drop(s) Both EYES every 6 hours PRN Dry Eyes  benzocaine 15 mG/menthol 3.6 mG (Sugar-Free) Lozenge 1 Lozenge Oral five times a day PRN Sore Throat  docusate sodium 100 milliGRAM(s) Oral three times a day PRN Constipation  HYDROmorphone   Tablet 2 milliGRAM(s) Oral every 4 hours PRN moderate - severe pain  ondansetron Injectable 8 milliGRAM(s) IV Push every 8 hours PRN Nausea and/or Vomiting  senna 2 Tablet(s) Oral at bedtime PRN Constipation      Allergies    oxycodone (Vomiting; Nausea)    Intolerances        Vital Signs Last 24 Hrs  T(C): 37.8 (29 Apr 2019 07:25), Max: 38 (28 Apr 2019 16:29)  T(F): 100.1 (29 Apr 2019 07:25), Max: 100.4 (28 Apr 2019 16:29)  HR: 110 (29 Apr 2019 06:06) (110 - 113)  BP: 127/62 (29 Apr 2019 06:06) (125/52 - 140/78)  BP(mean): --  RR: 20 (29 Apr 2019 06:06) (20 - 20)  SpO2: 100% (29 Apr 2019 06:06) (100% - 100%)    PHYSICAL EXAM  General: adult in NAD  HEENT: + gum bleeding  Neck: supple  CV: normal S1/S2 Tachy  Lungs: positive air movement b/l ant lungs,clear to auscultation, no wheezes, no rales  Abdomen: soft non-tender non-distended, no hepatosplenomegaly  Ext: no clubbing cyanosis or edema     LABS:                          8.5    0.88  )-----------( 9        ( 29 Apr 2019 05:30 )             25.7     Serial CBC's  04-29 @ 05:30  Hct-25.7 / Hgb-8.5 / Plat-9 / RBC-2.85 / WBC-0.88          Serial CBC's  04-28 @ 07:45  Hct-23.2 / Hgb-7.9 / Plat-16 / RBC-2.69 / WBC-0.85            04-29    136  |  103  |  17  ----------------------------<  108<H>  3.7   |  18<L>  |  1.15    Ca    8.2<L>      29 Apr 2019 05:30  Phos  2.4     04-29  Mg     2.2     04-29    TPro  6.9  /  Alb  3.6  /  TBili  1.4<H>  /  DBili  x   /  AST  20  /  ALT  77<H>  /  AlkPhos  127<H>  04-29          WBC Count: 0.88 K/uL (04-29 @ 05:30)  Hemoglobin: 8.5 g/dL (04-29 @ 05:30)            RADIOLOGY & ADDITIONAL STUDIES:

## 2019-04-29 NOTE — PROGRESS NOTE ADULT - SUBJECTIVE AND OBJECTIVE BOX
Patient is seen and examined at the bed side, spiked fever again and currently is afebrile. The ANC only 20. His throat feels better, no solid diet yet, still only able to take liquid diet.        REVIEW OF SYSTEMS: All other review systems are negative        ALLERGIES: oxycodone (Vomiting; Nausea)      Vital Signs Last 24 Hrs  T(C): 37.1 (29 Apr 2019 16:37), Max: 38.1 (29 Apr 2019 10:06)  T(F): 98.7 (29 Apr 2019 16:37), Max: 100.6 (29 Apr 2019 10:06)  HR: 126 (29 Apr 2019 16:37) (74 - 136)  BP: 126/82 (29 Apr 2019 16:37) (125/52 - 130/63)  BP(mean): --  RR: 20 (29 Apr 2019 16:37) (20 - 20)  SpO2: 98% (29 Apr 2019 16:37) (98% - 100%)        PHYSICAL EXAM:  GENERAL: Not in distress   HEENT: No oral thrush or any tonsilar exudate noted   CHEST/LUNG:  Air entry bilaterally  HEART: s1 and s2 present  ABDOMEN:  Nontender and  Nondistended  EXTREMITIES: No pedal  edema  CNS: Awake and Alert        LABS:                                   8.5    0.88  )-----------( 9        ( 29 Apr 2019 05:30 )             25.7                7.9    0.85  )-----------( 16       ( 28 Apr 2019 07:45 )             23.2                           7.8    1.08  )-----------( 32       ( 27 Apr 2019 07:00 )             23.1       04-29    136  |  103  |  17  ----------------------------<  108<H>  3.7   |  18<L>  |  1.15    Ca    8.2<L>      29 Apr 2019 05:30  Phos  2.4     04-29  Mg     2.2     04-29    TPro  6.9  /  Alb  3.6  /  TBili  1.4<H>  /  DBili  x   /  AST  20  /  ALT  77<H>  /  AlkPhos  127<H>  04-29 04-28    143  |  110<H>  |  17  ----------------------------<  100<H>  4.0   |  18<L>  |  1.10    Ca    8.0<L>      28 Apr 2019 07:45  Phos  2.7     04-28  Mg     2.1     04-28    TPro  6.6  /  Alb  3.6  /  TBili  1.3<H>  /  DBili  x   /  AST  26  /  ALT  97<H>  /  AlkPhos  115  04-28        MEDICATIONS  (STANDING):  allopurinol 300 milliGRAM(s) Oral daily  carvedilol 25 milliGRAM(s) Oral every 12 hours  cefepime   IVPB      cefepime   IVPB 2000 milliGRAM(s) IV Intermittent every 8 hours  filgrastim-sndz Injectable 480 MICROGram(s) SubCutaneous daily  FIRST- Mouthwash  BLM 5 milliLiter(s) Swish and Spit five times a day  fluconAZOLE IVPB 400 milliGRAM(s) IV Intermittent every 24 hours  fluconAZOLE IVPB      potassium acid phosphate/sodium acid phosphate tablet (K-PHOS No. 2) 1 Tablet(s) Oral four times a day with meals  Saliva Substitute (CAPHOSOL) 30 milliLiter(s) Swish and Spit every 6 hours  sodium chloride 0.9%. 1000 milliLiter(s) (75 mL/Hr) IV Continuous <Continuous>    MEDICATIONS  (PRN):  acetaminophen   Tablet .. 650 milliGRAM(s) Oral every 6 hours PRN Temp greater or equal to 38C (100.4F)  artificial  tears Solution 1 Drop(s) Both EYES every 6 hours PRN Dry Eyes  benzocaine 15 mG/menthol 3.6 mG (Sugar-Free) Lozenge 1 Lozenge Oral five times a day PRN Sore Throat  docusate sodium 100 milliGRAM(s) Oral three times a day PRN Constipation  HYDROmorphone   Tablet 2 milliGRAM(s) Oral every 4 hours PRN moderate - severe pain  ondansetron Injectable 8 milliGRAM(s) IV Push every 8 hours PRN Nausea and/or Vomiting  senna 2 Tablet(s) Oral at bedtime PRN Constipation          RADIOLOGY & ADDITIONAL TESTS:      4/27/19 : Xray Chest 1 View- PORTABLE-Urgent (04.27.19 @ 22:45) Probable small left pleural effusion with adjacent opacity that likely  represents atelectasis.    4/25/19 : CT Brain Stroke Protocol (04.25.19 @ 19:42) No acute intracranial hemorrhage, mass effect, or midline shift. No  abnormal extra-axial collections. The basal cisterns are patent without evidence of central herniation. The sulci and ventricles are within normal limits for the patient's age. Stable few patchy areas of low-attenuation in the bihemispheric white  matter, which is nonspecific, but likely related to sequela of chronic  microvascular ischemic disease. Stable coarse calcifications in the left temporal lobe.      MICROBIOLOGY DATA:      Culture - Blood (04.28.19 @ 18:20)    Culture - Blood:   NO ORGANISMS ISOLATED  NO ORGANISMS ISOLATED AT 24 HOURS    Specimen Source: BLOOD VENOUS      Culture - Blood (04.28.19 @ 18:20)    Culture - Blood:   NO ORGANISMS ISOLATED  NO ORGANISMS ISOLATED AT 24 HOURS    Specimen Source: BLOOD PERIPHERAL        Culture - Blood (04.27.19 @ 19:50)    Culture - Blood:   NO ORGANISMS ISOLATED  NO ORGANISMS ISOLATED AT 24 HOURS    Specimen Source: BLOOD PERIPHERAL      Culture - Group A Streptococcus (04.27.19 @ 14:24)    Culture - Group A Streptococcus:   NO BETA STREP. GROUP A  AFTER 24HRS.    Specimen Source: THROAT Patient is seen and examined at the bed side, spiked fever again and currently is afebrile. The ANC only 20, started on Zarxio. The Odynophagia not improved.         REVIEW OF SYSTEMS: All other review systems are negative        ALLERGIES: oxycodone (Vomiting; Nausea)      Vital Signs Last 24 Hrs  T(C): 37.1 (29 Apr 2019 16:37), Max: 38.1 (29 Apr 2019 10:06)  T(F): 98.7 (29 Apr 2019 16:37), Max: 100.6 (29 Apr 2019 10:06)  HR: 126 (29 Apr 2019 16:37) (74 - 136)  BP: 126/82 (29 Apr 2019 16:37) (125/52 - 130/63)  BP(mean): --  RR: 20 (29 Apr 2019 16:37) (20 - 20)  SpO2: 98% (29 Apr 2019 16:37) (98% - 100%)        PHYSICAL EXAM:  GENERAL: Appears uncomfortable   HEENT: No oral thrush or any tonsilar exudate noted   CHEST/LUNG:  Air entry bilaterally  HEART: s1 and s2 present  ABDOMEN:  Nontender and  Nondistended  EXTREMITIES: No pedal  edema  CNS: Awake and Alert        LABS:                                   8.5    0.88  )-----------( 9        ( 29 Apr 2019 05:30 )             25.7                7.9    0.85  )-----------( 16       ( 28 Apr 2019 07:45 )             23.2                           7.8    1.08  )-----------( 32       ( 27 Apr 2019 07:00 )             23.1       04-29    136  |  103  |  17  ----------------------------<  108<H>  3.7   |  18<L>  |  1.15    Ca    8.2<L>      29 Apr 2019 05:30  Phos  2.4     04-29  Mg     2.2     04-29    TPro  6.9  /  Alb  3.6  /  TBili  1.4<H>  /  DBili  x   /  AST  20  /  ALT  77<H>  /  AlkPhos  127<H>  04-29 04-28    143  |  110<H>  |  17  ----------------------------<  100<H>  4.0   |  18<L>  |  1.10    Ca    8.0<L>      28 Apr 2019 07:45  Phos  2.7     04-28  Mg     2.1     04-28    TPro  6.6  /  Alb  3.6  /  TBili  1.3<H>  /  DBili  x   /  AST  26  /  ALT  97<H>  /  AlkPhos  115  04-28        MEDICATIONS  (STANDING):  allopurinol 300 milliGRAM(s) Oral daily  carvedilol 25 milliGRAM(s) Oral every 12 hours  cefepime   IVPB      cefepime   IVPB 2000 milliGRAM(s) IV Intermittent every 8 hours  filgrastim-sndz Injectable 480 MICROGram(s) SubCutaneous daily  FIRST- Mouthwash  BLM 5 milliLiter(s) Swish and Spit five times a day  fluconAZOLE IVPB 400 milliGRAM(s) IV Intermittent every 24 hours  fluconAZOLE IVPB      potassium acid phosphate/sodium acid phosphate tablet (K-PHOS No. 2) 1 Tablet(s) Oral four times a day with meals  Saliva Substitute (CAPHOSOL) 30 milliLiter(s) Swish and Spit every 6 hours  sodium chloride 0.9%. 1000 milliLiter(s) (75 mL/Hr) IV Continuous <Continuous>    MEDICATIONS  (PRN):  acetaminophen   Tablet .. 650 milliGRAM(s) Oral every 6 hours PRN Temp greater or equal to 38C (100.4F)  artificial  tears Solution 1 Drop(s) Both EYES every 6 hours PRN Dry Eyes  benzocaine 15 mG/menthol 3.6 mG (Sugar-Free) Lozenge 1 Lozenge Oral five times a day PRN Sore Throat  docusate sodium 100 milliGRAM(s) Oral three times a day PRN Constipation  HYDROmorphone   Tablet 2 milliGRAM(s) Oral every 4 hours PRN moderate - severe pain  ondansetron Injectable 8 milliGRAM(s) IV Push every 8 hours PRN Nausea and/or Vomiting  senna 2 Tablet(s) Oral at bedtime PRN Constipation          RADIOLOGY & ADDITIONAL TESTS:      4/27/19 : Xray Chest 1 View- PORTABLE-Urgent (04.27.19 @ 22:45) Probable small left pleural effusion with adjacent opacity that likely  represents atelectasis.    4/25/19 : CT Brain Stroke Protocol (04.25.19 @ 19:42) No acute intracranial hemorrhage, mass effect, or midline shift. No  abnormal extra-axial collections. The basal cisterns are patent without evidence of central herniation. The sulci and ventricles are within normal limits for the patient's age. Stable few patchy areas of low-attenuation in the bihemispheric white  matter, which is nonspecific, but likely related to sequela of chronic  microvascular ischemic disease. Stable coarse calcifications in the left temporal lobe.      MICROBIOLOGY DATA:      Culture - Blood (04.28.19 @ 18:20)    Culture - Blood:   NO ORGANISMS ISOLATED  NO ORGANISMS ISOLATED AT 24 HOURS    Specimen Source: BLOOD VENOUS      Culture - Blood (04.28.19 @ 18:20)    Culture - Blood:   NO ORGANISMS ISOLATED  NO ORGANISMS ISOLATED AT 24 HOURS    Specimen Source: BLOOD PERIPHERAL        Culture - Blood (04.27.19 @ 19:50)    Culture - Blood:   NO ORGANISMS ISOLATED  NO ORGANISMS ISOLATED AT 24 HOURS    Specimen Source: BLOOD PERIPHERAL      Culture - Group A Streptococcus (04.27.19 @ 14:24)    Culture - Group A Streptococcus:   NO BETA STREP. GROUP A  AFTER 24HRS.    Specimen Source: THROAT

## 2019-04-29 NOTE — DISCHARGE NOTE PROVIDER - CARE PROVIDER_API CALL
Torsten Murphy)  Hematology; Medical Oncology  [address] [facility] [address]  [place] [address]  Phone: [phone]  Fax: [phone]  Follow Up Time:

## 2019-04-29 NOTE — PROGRESS NOTE ADULT - ASSESSMENT
_________________________________________________________________________________________  ========>>  M E D I C A L   A T T E N D I N G    F O L L O W  U P  N O T E  <<=========   			( covering Dr Castro 4/28 & 29)   -----------------------------------------------------------------------------------------------------    - Patient seen and examined by me approximately thirty minutes ago.  - Patient today overall doing fairly, feels tired, comfortable but still with some sore throat, not eating solids, drinking some ensure     ==================>> REVIEW OF SYSTEM <<=================    GEN: no fever, no chills, pain as above   RESP: no SOB, + occasional cough, no sputum  CVS: no chest pain, no palpitations, no edema  GI: no abdominal pain, no nausea  : no dysuria  Neuro: no headache, no dizziness  Derm : no itching, no rash    ==================>> PHYSICAL EXAM <<=================    GEN: A&O X 3 , NAD , resting comfortably, weak appearing   HEENT: NCAT, PERRL, MMM, hearing intact  Neck: supple , no JVD  CVS: S1S2 , regular , No M/R/G appreciated  PULM: CTA B/L,  no W/R/R appreciated  ABD.: soft. non tender, non distended,  bowel sounds present  Extrem: intact pulses , no edema   PSYCH : normal mood,  not anxious       ==================>> MEDICATIONS <<====================    allopurinol 300 milliGRAM(s) Oral daily  cefepime   IVPB      cefepime   IVPB 2000 milliGRAM(s) IV Intermittent every 8 hours  filgrastim-sndz Injectable 480 MICROGram(s) SubCutaneous daily  FIRST- Mouthwash  BLM 5 milliLiter(s) Swish and Spit five times a day  fluconAZOLE IVPB 400 milliGRAM(s) IV Intermittent every 24 hours  fluconAZOLE IVPB      potassium acid phosphate/sodium acid phosphate tablet (K-PHOS No. 2) 1 Tablet(s) Oral four times a day with meals  Saliva Substitute (CAPHOSOL) 30 milliLiter(s) Swish and Spit every 6 hours  sodium chloride 0.9%. 1000 milliLiter(s) IV Continuous <Continuous>    MEDICATIONS  (PRN):  acetaminophen   Tablet .. 650 milliGRAM(s) Oral every 6 hours PRN Temp greater or equal to 38C (100.4F)  artificial  tears Solution 1 Drop(s) Both EYES every 6 hours PRN Dry Eyes  benzocaine 15 mG/menthol 3.6 mG (Sugar-Free) Lozenge 1 Lozenge Oral five times a day PRN Sore Throat  docusate sodium 100 milliGRAM(s) Oral three times a day PRN Constipation  HYDROmorphone   Tablet 2 milliGRAM(s) Oral every 4 hours PRN moderate - severe pain  ondansetron Injectable 8 milliGRAM(s) IV Push every 8 hours PRN Nausea and/or Vomiting  senna 2 Tablet(s) Oral at bedtime PRN Constipation    ==================>> VITAL SIGNS <<==================    Vital Signs Last 24 Hrs  T(C): 38.1 (04-29-19 @ 10:06)  T(F): 100.6 (04-29-19 @ 10:06), Max: 100.6 (04-29-19 @ 10:06)  HR: 74 (04-29-19 @ 10:06) (74 - 113)  BP: 128/75 (04-29-19 @ 10:06)  RR: 20 (04-29-19 @ 10:06) (20 - 20)  SpO2: 100% (04-29-19 @ 10:06) (100% - 100%)       ==================>> LAB AND IMAGING <<==================                        8.5    0.88  )-----------( 9        ( 29 Apr 2019 05:30 )             25.7     WBC count:   0.88 <<== ,  0.85 <<== ,  1.08 <<== ,  1.42 <<== ,  1.15 <<== ,  0.69 <<==   Hemoglobin:   8.5 <<==,  7.9 <<==,  7.8 <<==,  8.3 <<==,  6.1 <<==,  5.6 <<==  platelets:  9 <==, 16 <==, 32 <==, 35 <==, 54 <==, 3 <==, 3 <==       136  |  103  |  17  ----------------------------<  108<H>  3.7   |  18<L>  |  1.15    Ca    8.2<L>      29 Apr 2019 05:30  Phos  2.4     04-29  Mg     2.2     04-29    TPro  6.9  /  Alb  3.6  /  TBili  1.4<H>  /  DBili  x   /  AST  20  /  ALT  77<H>  /  AlkPhos  127<H>  04-29    ___________________________________________________________________________________  ===============>>  A S S E S S M E N T   A N D   P L A N <<===============  ------------------------------------------------------------------------------------------    · Assessment		  54yo M hx of relapsed multiple myeloma s/p bone marrow transplant (~1.5 yrs ago) and chemo/RT (last infusion Bendamustine and Pomalidomide 4/08/2019), perforated diverticulitis s/p Kiah's (proctosigmoidectomy with colostomy, 9/2018), nonischemic cardiomyopathy (mild global LV systolic dysfunction with EF 54% and diastolic LV dysfunction based on TTE in 11/2018), and HTN presents with vision change and vomiting that resolved, concern for CVA, admitted for pancytopenia.    Problem/Plan - 1:  ·  Problem: Pancytopenia.  with neutropenic fever      monitor closely        transfusion of blood products today per Onc   hem/onc management greatly appreciated.     Problem/Plan - 2:  ·  Problem: Vision changes.  Plan: Pt with transient vision change of vision going white proceeded by NBNB emesis. Code stroke called, CTH neg for bleed. Pt with PLT 3 at risk for spontaneous brain bleed. symptoms resolved   -neuro checks  -If change in mental status or new focal deficit, will obtain CTH.     Problem/Plan - 3:  ·  Problem: Multiple myeloma in relapse.  Plan: MM in relapse s/p bone marrow transplant (~1.5 yrs ago) and chemo/RT. Last infusion Bendamustine and Pomalidomide 4/08/2019  Continue Current medications and monitor.   - Hem/ onc follow up for further treatment / mgmt    Problem/Plan - 4:  ·  Problem: Cardiomyopathy.  Plan: Chronic Systolic CHF  -holding lisinopril and cardizem due to low normal BP, restart as tolerated  -low salt diet.   - resume home meds as able / needed     Problem/Plan - 5:  ·  Problem: sore  throat : possible acute Tonsilitis, and Candida Esophagitis per ID  continue antifungals per ID  continue Cepacol  pt encouraged to increase po intake as able     Problem/Plan - 6:  Problem: Prophylactic measure. Plan: VTE ppx: no chemoppx or SCDs due to thrombocytopenia, pt encouraged to ambulate / OOB more     --------------------------------------------  Case discussed with pt, family at bedside   Education given on findings and plan of care  ___________________________  H. MIKI Lehman.  ( covering Dr Castro 4/28 & 29)   Pager: [phone]

## 2019-04-30 LAB
ALBUMIN SERPL ELPH-MCNC: 3 G/DL — LOW (ref 3.3–5)
ALP SERPL-CCNC: 100 U/L — SIGNIFICANT CHANGE UP (ref 40–120)
ALT FLD-CCNC: 47 U/L — HIGH (ref 4–41)
ANION GAP SERPL CALC-SCNC: 15 MMO/L — HIGH (ref 7–14)
AST SERPL-CCNC: 13 U/L — SIGNIFICANT CHANGE UP (ref 4–40)
BACTERIA UR CULT: SIGNIFICANT CHANGE UP
BASOPHILS # BLD AUTO: 0 K/UL — SIGNIFICANT CHANGE UP (ref 0–0.2)
BASOPHILS # BLD AUTO: 0 K/UL — SIGNIFICANT CHANGE UP (ref 0–0.2)
BASOPHILS NFR BLD AUTO: 0 % — SIGNIFICANT CHANGE UP (ref 0–2)
BASOPHILS NFR BLD AUTO: 0 % — SIGNIFICANT CHANGE UP (ref 0–2)
BILIRUB SERPL-MCNC: 2 MG/DL — HIGH (ref 0.2–1.2)
BLD GP AB SCN SERPL QL: NEGATIVE — SIGNIFICANT CHANGE UP
BUN SERPL-MCNC: 20 MG/DL — SIGNIFICANT CHANGE UP (ref 7–23)
CALCIUM SERPL-MCNC: 7.2 MG/DL — LOW (ref 8.4–10.5)
CHLORIDE SERPL-SCNC: 104 MMOL/L — SIGNIFICANT CHANGE UP (ref 98–107)
CO2 SERPL-SCNC: 16 MMOL/L — LOW (ref 22–31)
CREAT SERPL-MCNC: 1.46 MG/DL — HIGH (ref 0.5–1.3)
EOSINOPHIL # BLD AUTO: 0 K/UL — SIGNIFICANT CHANGE UP (ref 0–0.5)
EOSINOPHIL # BLD AUTO: 0 K/UL — SIGNIFICANT CHANGE UP (ref 0–0.5)
EOSINOPHIL NFR BLD AUTO: 0 % — SIGNIFICANT CHANGE UP (ref 0–6)
EOSINOPHIL NFR BLD AUTO: 0 % — SIGNIFICANT CHANGE UP (ref 0–6)
GLUCOSE SERPL-MCNC: 139 MG/DL — HIGH (ref 70–99)
HCT VFR BLD CALC: 17.9 % — CRITICAL LOW (ref 39–50)
HCT VFR BLD CALC: 18 % — CRITICAL LOW (ref 39–50)
HCT VFR BLD CALC: 22.2 % — LOW (ref 39–50)
HGB BLD-MCNC: 6.1 G/DL — CRITICAL LOW (ref 13–17)
HGB BLD-MCNC: 6.1 G/DL — CRITICAL LOW (ref 13–17)
HGB BLD-MCNC: 7.6 G/DL — LOW (ref 13–17)
IMM GRANULOCYTES NFR BLD AUTO: 0 % — SIGNIFICANT CHANGE UP (ref 0–1.5)
IMM GRANULOCYTES NFR BLD AUTO: 0 % — SIGNIFICANT CHANGE UP (ref 0–1.5)
LDH SERPL L TO P-CCNC: 140 U/L — SIGNIFICANT CHANGE UP (ref 135–225)
LYMPHOCYTES # BLD AUTO: 0.51 K/UL — LOW (ref 1–3.3)
LYMPHOCYTES # BLD AUTO: 0.53 K/UL — LOW (ref 1–3.3)
LYMPHOCYTES # BLD AUTO: 83.6 % — HIGH (ref 13–44)
LYMPHOCYTES # BLD AUTO: 84.1 % — HIGH (ref 13–44)
MAGNESIUM SERPL-MCNC: 1.8 MG/DL — SIGNIFICANT CHANGE UP (ref 1.6–2.6)
MANUAL SMEAR VERIFICATION: SIGNIFICANT CHANGE UP
MANUAL SMEAR VERIFICATION: SIGNIFICANT CHANGE UP
MCHC RBC-ENTMCNC: 29.5 PG — SIGNIFICANT CHANGE UP (ref 27–34)
MCHC RBC-ENTMCNC: 29.9 PG — SIGNIFICANT CHANGE UP (ref 27–34)
MCHC RBC-ENTMCNC: 30 PG — SIGNIFICANT CHANGE UP (ref 27–34)
MCHC RBC-ENTMCNC: 33.9 % — SIGNIFICANT CHANGE UP (ref 32–36)
MCHC RBC-ENTMCNC: 34.1 % — SIGNIFICANT CHANGE UP (ref 32–36)
MCHC RBC-ENTMCNC: 34.2 % — SIGNIFICANT CHANGE UP (ref 32–36)
MCV RBC AUTO: 86 FL — SIGNIFICANT CHANGE UP (ref 80–100)
MCV RBC AUTO: 88.2 FL — SIGNIFICANT CHANGE UP (ref 80–100)
MCV RBC AUTO: 88.2 FL — SIGNIFICANT CHANGE UP (ref 80–100)
MONOCYTES # BLD AUTO: 0.08 K/UL — SIGNIFICANT CHANGE UP (ref 0–0.9)
MONOCYTES # BLD AUTO: 0.08 K/UL — SIGNIFICANT CHANGE UP (ref 0–0.9)
MONOCYTES NFR BLD AUTO: 12.7 % — SIGNIFICANT CHANGE UP (ref 2–14)
MONOCYTES NFR BLD AUTO: 13.1 % — SIGNIFICANT CHANGE UP (ref 2–14)
NEUTROPHILS # BLD AUTO: 0.02 K/UL — LOW (ref 1.8–7.4)
NEUTROPHILS # BLD AUTO: 0.02 K/UL — LOW (ref 1.8–7.4)
NEUTROPHILS NFR BLD AUTO: 3.2 % — LOW (ref 43–77)
NEUTROPHILS NFR BLD AUTO: 3.3 % — LOW (ref 43–77)
NRBC # FLD: 0 K/UL — SIGNIFICANT CHANGE UP (ref 0–0)
PHOSPHATE SERPL-MCNC: 2.4 MG/DL — LOW (ref 2.5–4.5)
PLATELET # BLD AUTO: 11 K/UL — CRITICAL LOW (ref 150–400)
PLATELET # BLD AUTO: 12 K/UL — CRITICAL LOW (ref 150–400)
PLATELET # BLD AUTO: 13 K/UL — CRITICAL LOW (ref 150–400)
PMV BLD: 8.7 FL — SIGNIFICANT CHANGE UP (ref 7–13)
PMV BLD: 9.1 FL — SIGNIFICANT CHANGE UP (ref 7–13)
PMV BLD: 9.3 FL — SIGNIFICANT CHANGE UP (ref 7–13)
POTASSIUM SERPL-MCNC: 3.8 MMOL/L — SIGNIFICANT CHANGE UP (ref 3.5–5.3)
POTASSIUM SERPL-SCNC: 3.8 MMOL/L — SIGNIFICANT CHANGE UP (ref 3.5–5.3)
PROT SERPL-MCNC: 5.9 G/DL — LOW (ref 6–8.3)
RBC # BLD: 2.03 M/UL — LOW (ref 4.2–5.8)
RBC # BLD: 2.04 M/UL — LOW (ref 4.2–5.8)
RBC # BLD: 2.58 M/UL — LOW (ref 4.2–5.8)
RBC # FLD: 14.6 % — HIGH (ref 10.3–14.5)
RBC # FLD: 14.6 % — HIGH (ref 10.3–14.5)
RBC # FLD: 15.9 % — HIGH (ref 10.3–14.5)
RH IG SCN BLD-IMP: POSITIVE — SIGNIFICANT CHANGE UP
SODIUM SERPL-SCNC: 135 MMOL/L — SIGNIFICANT CHANGE UP (ref 135–145)
SPECIMEN SOURCE: SIGNIFICANT CHANGE UP
URATE SERPL-MCNC: 5 MG/DL — SIGNIFICANT CHANGE UP (ref 3.4–8.8)
WBC # BLD: 0.39 K/UL — CRITICAL LOW (ref 3.8–10.5)
WBC # BLD: 0.61 K/UL — CRITICAL LOW (ref 3.8–10.5)
WBC # BLD: 0.63 K/UL — CRITICAL LOW (ref 3.8–10.5)
WBC # FLD AUTO: 0.39 K/UL — CRITICAL LOW (ref 3.8–10.5)
WBC # FLD AUTO: 0.61 K/UL — CRITICAL LOW (ref 3.8–10.5)
WBC # FLD AUTO: 0.63 K/UL — CRITICAL LOW (ref 3.8–10.5)

## 2019-04-30 PROCEDURE — 74220 X-RAY XM ESOPHAGUS 1CNTRST: CPT | Mod: 26

## 2019-04-30 RX ORDER — IBUPROFEN 200 MG
400 TABLET ORAL ONCE
Qty: 0 | Refills: 0 | Status: COMPLETED | OUTPATIENT
Start: 2019-04-30 | End: 2019-04-30

## 2019-04-30 RX ORDER — FUROSEMIDE 40 MG
20 TABLET ORAL ONCE
Qty: 0 | Refills: 0 | Status: COMPLETED | OUTPATIENT
Start: 2019-04-30 | End: 2019-04-30

## 2019-04-30 RX ADMIN — Medication 30 MILLILITER(S): at 18:28

## 2019-04-30 RX ADMIN — Medication 20 MILLIGRAM(S): at 14:44

## 2019-04-30 RX ADMIN — Medication 30 MILLILITER(S): at 06:20

## 2019-04-30 RX ADMIN — CEFEPIME 100 MILLIGRAM(S): 1 INJECTION, POWDER, FOR SOLUTION INTRAMUSCULAR; INTRAVENOUS at 22:36

## 2019-04-30 RX ADMIN — Medication 300 MILLIGRAM(S): at 11:53

## 2019-04-30 RX ADMIN — DIPHENHYDRAMINE HYDROCHLORIDE AND LIDOCAINE HYDROCHLORIDE AND ALUMINUM HYDROXIDE AND MAGNESIUM HYDRO 5 MILLILITER(S): KIT at 11:53

## 2019-04-30 RX ADMIN — Medication 166.67 MILLIGRAM(S): at 18:29

## 2019-04-30 RX ADMIN — CARVEDILOL PHOSPHATE 25 MILLIGRAM(S): 80 CAPSULE, EXTENDED RELEASE ORAL at 18:28

## 2019-04-30 RX ADMIN — Medication 30 MILLILITER(S): at 11:52

## 2019-04-30 RX ADMIN — Medication 166.67 MILLIGRAM(S): at 06:46

## 2019-04-30 RX ADMIN — Medication 480 MICROGRAM(S): at 12:30

## 2019-04-30 RX ADMIN — Medication 100 MILLIGRAM(S): at 06:46

## 2019-04-30 RX ADMIN — DIPHENHYDRAMINE HYDROCHLORIDE AND LIDOCAINE HYDROCHLORIDE AND ALUMINUM HYDROXIDE AND MAGNESIUM HYDRO 5 MILLILITER(S): KIT at 16:06

## 2019-04-30 RX ADMIN — ONDANSETRON 8 MILLIGRAM(S): 8 TABLET, FILM COATED ORAL at 21:30

## 2019-04-30 RX ADMIN — FLUCONAZOLE 100 MILLIGRAM(S): 150 TABLET ORAL at 00:10

## 2019-04-30 RX ADMIN — Medication 400 MILLIGRAM(S): at 06:46

## 2019-04-30 RX ADMIN — CEFEPIME 100 MILLIGRAM(S): 1 INJECTION, POWDER, FOR SOLUTION INTRAMUSCULAR; INTRAVENOUS at 06:20

## 2019-04-30 RX ADMIN — CEFEPIME 100 MILLIGRAM(S): 1 INJECTION, POWDER, FOR SOLUTION INTRAMUSCULAR; INTRAVENOUS at 14:45

## 2019-04-30 NOTE — PROGRESS NOTE ADULT - SUBJECTIVE AND OBJECTIVE BOX
Patient seen and examined at bedside  low grade temp overnight  Case discussed with medical team    HPI:  52yo M hx of relapsed multiple myeloma s/p bone marrow transplant (~1.5 yrs ago) and chemo/RT (last infusion Bendamustine and Pomalidomide 2019), perforated diverticulitis s/p Kiah's (proctosigmoidectomy with colostomy, 2018), nonischemic cardiomyopathy (mild global LV systolic dysfunction with EF 54% and diastolic LV dysfunction based on TTE in 2018), and HTN presents to [facility] ED w/ vomiting and change of vision. Wife Devan Hood at bedside. Pt was sitting at home. He vomited 2-3 times nonbloody, nonbilious and was not able to eat or drink anything. On  at 4pm, while sitting he noticed his vision went "white" for a few minutes associated with sweating, shaking, and weakness. His wife noticed he had a red rash on his legs and they brought him to the ED. Pt received a platelet transfusion about a week ago outpt. Denies any pain. Had a nose bleed earlier. He denies skin sores, measured fever, HA, trouble speaking, numbness/tingling, focal weakness, dizziness, CP, SOB, abd pain, N/V/D, hematochezia, melena, urinary symptoms, oral/genital sores. He has a recent admission to [facility] discharged 2019 for diffuse body pain and low-grade fever, infectious workup was negative. MRI C/T/L showed abnormal T1 prolongation in the whole spine, sacral and iliac bones. Received 7 days of Bendamustine and Pomalidomide (on hold few days for neutropenia), last on 2019.    When pt presented to the ED, code stroke called. Neuro exam was nonfocal, CTH neg for acute process, and vomiting and vision changes were resolved. ED vitals afebrile, H101, /58 --> 102/62, R18, sat 100% on RA. Given decadron 40mg IV, 1u PLT. 1u pRBC ordered. (2019 00:00)      PAST MEDICAL & SURGICAL HISTORY:  Diverticulitis: perforated s/p Kiah  Multiple myeloma: relapsed 3/2019  Hypertension  Left ventricular dysfunction  Cardiomyopathy: nonischemic  Former smoker: (1 ppd x 11 years; Quit ~age 26)  History of bone marrow transplant  History of surgery: s/p exploratory laparotomy with sigmoid resection and colostomy on 2018      oxycodone (Vomiting; Nausea)       MEDICATIONS  (STANDING):  allopurinol 300 milliGRAM(s) Oral daily  carvedilol 25 milliGRAM(s) Oral every 12 hours  cefepime   IVPB      cefepime   IVPB 2000 milliGRAM(s) IV Intermittent every 8 hours  filgrastim-sndz Injectable 480 MICROGram(s) SubCutaneous daily  FIRST- Mouthwash  BLM 5 milliLiter(s) Swish and Spit five times a day  fluconAZOLE IVPB 400 milliGRAM(s) IV Intermittent every 24 hours  fluconAZOLE IVPB      potassium acid phosphate/sodium acid phosphate tablet (K-PHOS No. 2) 1 Tablet(s) Oral four times a day with meals  Saliva Substitute (CAPHOSOL) 30 milliLiter(s) Swish and Spit every 6 hours  sodium chloride 0.9%. 1000 milliLiter(s) (75 mL/Hr) IV Continuous <Continuous>  vancomycin  IVPB      vancomycin  IVPB 1250 milliGRAM(s) IV Intermittent every 12 hours    MEDICATIONS  (PRN):  acetaminophen   Tablet .. 650 milliGRAM(s) Oral every 6 hours PRN Temp greater or equal to 38C (100.4F)  artificial  tears Solution 1 Drop(s) Both EYES every 6 hours PRN Dry Eyes  benzocaine 15 mG/menthol 3.6 mG (Sugar-Free) Lozenge 1 Lozenge Oral five times a day PRN Sore Throat  docusate sodium 100 milliGRAM(s) Oral three times a day PRN Constipation  guaiFENesin    Syrup 100 milliGRAM(s) Oral every 6 hours PRN Cough  HYDROmorphone   Tablet 2 milliGRAM(s) Oral every 4 hours PRN moderate - severe pain  ondansetron Injectable 8 milliGRAM(s) IV Push every 8 hours PRN Nausea and/or Vomiting  senna 2 Tablet(s) Oral at bedtime PRN Constipation      REVIEW OF SYSTEMS:  CONSTITUTIONAL: (+) malaise.   EYES: No acute change in vision   ENT:  No tinnitus  NECK: No stiffness  RESPIRATORY: No hemoptysis  CARDIOVASCULAR: No chest pain, palpitations, syncope  GASTROINTESTINAL: dysphagia. No hematemesis, diarrhea, melena, or hematochezia.  GENITOURINARY: No hematuria  NEUROLOGICAL: No headaches  LYMPH Nodes: No enlarged glands  ENDOCRINE: No heat or cold intolerance	    T(C): 38.1 (19 @ 06:26), Max: 38.1 (19 @ 10:06)  HR: 110 (19 @ 06:26) (74 - 136)  BP: 99/50 (19 @ 06:26) (99/50 - 133/89)  RR: 16 (19 @ 06:26) (16 - 20)  SpO2: 100% (19 @ 06:26) (97% - 100%)    PHYSICAL EXAMINATION:   Constitutional: ill appearing. NAD  HEENT: NC, AT  Neck:  Supple  Respiratory:  Adequate airflow b/l. Not using accessory muscles of respiration.  Cardiovascular:  S1 & S2 intact, no R/G, 2+ radial pulses b/l  Gastrointestinal: Soft, NT, ND, normoactive b.s., no organomegaly/RT/rigidity  Extremities: WWP  Neurological:  Alert and awake.  No acute focal motor deficits. Crude sensation intact.     Labs and imaging reviewed    LABS:                        8.5    0.88  )-----------( 9        ( 2019 05:30 )             25.7         136  |  103  |  17  ----------------------------<  108<H>  3.7   |  18<L>  |  1.15    Ca    8.2<L>      2019 05:30  Phos  2.4       Mg     2.2         TPro  6.9  /  Alb  3.6  /  TBili  1.4<H>  /  DBili  x   /  AST  20  /  ALT  77<H>  /  AlkPhos  127<H>            Urinalysis Basic - ( 2019 19:08 )    Color: YELLOW / Appearance: CLEAR / S.016 / pH: 6.5  Gluc: NEGATIVE / Ketone: NEGATIVE  / Bili: NEGATIVE / Urobili: NORMAL   Blood: NEGATIVE / Protein: 200 / Nitrite: NEGATIVE   Leuk Esterase: NEGATIVE / RBC: 3-5 / WBC 0-2   Sq Epi: OCC / Non Sq Epi: x / Bacteria: NEGATIVE      CAPILLARY BLOOD GLUCOSE            LIVER FUNCTIONS - ( 2019 05:30 )  Alb: 3.6 g/dL / Pro: 6.9 g/dL / ALK PHOS: 127 u/L / ALT: 77 u/L / AST: 20 u/L / GGT: x               RADIOLOGY & ADDITIONAL STUDIES:

## 2019-04-30 NOTE — CONSULT NOTE ADULT - SUBJECTIVE AND OBJECTIVE BOX
Patient is a 53y old  Male who presents with a chief complaint of Pancytopenia (30 Apr 2019 06:30)      HPI:  52yo M hx of relapsed multiple myeloma s/p bone marrow transplant (~1.5 yrs ago) and chemo/RT (last infusion Bendamustine and Pomalidomide 4/08/2019), perforated diverticulitis s/p Kiah's (proctosigmoidectomy with colostomy, 9/2018), nonischemic cardiomyopathy (mild global LV systolic dysfunction with EF 54% and diastolic LV dysfunction based on TTE in 11/2018), and HTN presents to [facility] ED w/ vomiting and change of vision. Wife Devan Hood at bedside. Pt was sitting at home. He vomited 2-3 times nonbloody, nonbilious and was not able to eat or drink anything. On 4/25 at 4pm, while sitting he noticed his vision went "white" for a few minutes associated with sweating, shaking, and weakness. His wife noticed he had a red rash on his legs and they brought him to the ED. Pt received a platelet transfusion about a week ago outpt. Denies any pain. Had a nose bleed earlier. He denies skin sores, measured fever, HA, trouble speaking, numbness/tingling, focal weakness, dizziness, CP, SOB, abd pain, N/V/D, hematochezia, melena, urinary symptoms, oral/genital sores. He has a recent admission to [facility] discharged 4/08/2019 for diffuse body pain and low-grade fever, infectious workup was negative. MRI C/T/L showed abnormal T1 prolongation in the whole spine, sacral and iliac bones. Received 7 days of Bendamustine and Pomalidomide (on hold few days for neutropenia), last on 4/08/2019.    When pt presented to the ED, code stroke called. Neuro exam was nonfocal, CTH neg for acute process, and vomiting and vision changes were resolved. ED vitals afebrile, H101, /58 --> 102/62, R18, sat 100% on RA. Given decadron 40mg IV, 1u PLT. 1u pRBC ordered. (26 Apr 2019 00:00)      PAST MEDICAL & SURGICAL HISTORY:  Diverticulitis: perforated s/p Kiah  Multiple myeloma: relapsed 3/2019  Hypertension  Left ventricular dysfunction  Cardiomyopathy: nonischemic  Former smoker: (1 ppd x 11 years; Quit ~age 26)  History of bone marrow transplant  History of surgery: s/p exploratory laparotomy with sigmoid resection and colostomy on 9/2/2018      MEDICATIONS  (STANDING):  allopurinol 300 milliGRAM(s) Oral daily  carvedilol 25 milliGRAM(s) Oral every 12 hours  cefepime   IVPB      cefepime   IVPB 2000 milliGRAM(s) IV Intermittent every 8 hours  filgrastim-sndz Injectable 480 MICROGram(s) SubCutaneous daily  FIRST- Mouthwash  BLM 5 milliLiter(s) Swish and Spit five times a day  fluconAZOLE IVPB 400 milliGRAM(s) IV Intermittent every 24 hours  fluconAZOLE IVPB      potassium acid phosphate/sodium acid phosphate tablet (K-PHOS No. 2) 1 Tablet(s) Oral four times a day with meals  Saliva Substitute (CAPHOSOL) 30 milliLiter(s) Swish and Spit every 6 hours  sodium chloride 0.9%. 1000 milliLiter(s) (75 mL/Hr) IV Continuous <Continuous>  vancomycin  IVPB      vancomycin  IVPB 1250 milliGRAM(s) IV Intermittent every 12 hours      Allergies    oxycodone (Vomiting; Nausea)    Intolerances        SOCIAL HISTORY:  Denies ETOh,Smoking,     FAMILY HISTORY:  Family history of diabetes mellitus  Family history of hypertension      REVIEW OF SYSTEMS:    CONSTITUTIONAL: No weakness, fevers or chills  EYES/ENT: No visual changes;  No vertigo or throat pain   NECK: No pain or stiffness  RESPIRATORY: No cough, wheezing, hemoptysis; No shortness of breath  CARDIOVASCULAR: No chest pain or palpitations  GASTROINTESTINAL: No abdominal or epigastric pain. No nausea, vomiting, or hematemesis; No diarrhea or constipation. No melena or hematochezia.  GENITOURINARY: No dysuria, frequency or hematuria  NEUROLOGICAL: No numbness or weakness  SKIN: No itching, burning, rashes, or lesions   All other review of systems is negative unless indicated above.    VITAL:  T(C): , Max: 38.1 (04-29-19 @ 10:06)  T(F): , Max: 100.6 (04-29-19 @ 10:06)  HR: 110 (04-30-19 @ 06:26)  BP: 99/50 (04-30-19 @ 06:26)  BP(mean): --  RR: 16 (04-30-19 @ 06:26)  SpO2: 100% (04-30-19 @ 06:26)  Wt(kg): --    I and O's:    04-28 @ 07:01  -  04-29 @ 07:00  --------------------------------------------------------  IN: 375 mL / OUT: 400 mL / NET: -25 mL    04-29 @ 07:01  -  04-30 @ 07:00  --------------------------------------------------------  IN: 0 mL / OUT: 420 mL / NET: -420 mL          PHYSICAL EXAM:    Constitutional: NAD  HEENT: PERRLA,   Neck: No JVD  Respiratory: CTA B/L  Cardiovascular: S1 and S2  Gastrointestinal: BS+, soft, NT/ND  Extremities: No peripheral edema  Neurological: A/O x 3, no focal deficits  Psychiatric: Normal mood, normal affect  : No Duckworth  Skin: No rashes  Access: Not applicable  Back: No CVA tenderness    LABS:                        6.1    0.61  )-----------( 11       ( 30 Apr 2019 06:15 )             17.9     04-30    135  |  104  |  20  ----------------------------<  139<H>  3.8   |  16<L>  |  1.46<H>    Ca    7.2<L>      30 Apr 2019 06:15  Phos  2.4     04-30  Mg     1.8     04-30    TPro  5.9<L>  /  Alb  3.0<L>  /  TBili  2.0<H>  /  DBili  x   /  AST  13  /  ALT  47<H>  /  AlkPhos  100  04-30          RADIOLOGY & ADDITIONAL STUDIES:

## 2019-04-30 NOTE — CHART NOTE - NSCHARTNOTEFT_GEN_A_CORE
Discussed CBC results with med attg. Plan to administer 2 units PRBC with 20 mg of IV lasix in between. Will repeat CBC this evening.

## 2019-04-30 NOTE — PROGRESS NOTE ADULT - SUBJECTIVE AND OBJECTIVE BOX
Patient is seen and examined at the bed side, still spiking fever and currently is afebrile. The ANC only 20, on Zarxio. The Odynophagia has not improved yet, the Esophagram noted.        REVIEW OF SYSTEMS: All other review systems are negative        ALLERGIES: oxycodone (Vomiting; Nausea)        Vital Signs Last 24 Hrs  T(C): 36.8 (30 Apr 2019 14:04), Max: 38.1 (30 Apr 2019 06:26)  T(F): 98.2 (30 Apr 2019 14:04), Max: 100.5 (30 Apr 2019 06:26)  HR: 115 (30 Apr 2019 18:29) (103 - 122)  BP: 111/60 (30 Apr 2019 18:29) (99/50 - 133/89)  BP(mean): --  RR: 19 (30 Apr 2019 14:04) (16 - 20)  SpO2: 97% (30 Apr 2019 14:04) (97% - 100%)      PHYSICAL EXAM:  GENERAL: Appears ill  HEENT: No oral thrush or any tonsilar exudate noted   CHEST/LUNG:  Air entry bilaterally  HEART: s1 and s2 present  ABDOMEN:  Nontender and  Nondistended  EXTREMITIES: No pedal  edema  CNS: Awake and Alert        LABS:                          6.1    0.63  )-----------( 12       ( 30 Apr 2019 07:55 )             18.0                       7.9    0.85  )-----------( 16       ( 28 Apr 2019 07:45 )             23.2                           7.8    1.08  )-----------( 32       ( 27 Apr 2019 07:00 )             23.1       04-30    135  |  104  |  20  ----------------------------<  139<H>  3.8   |  16<L>  |  1.46<H>    Ca    7.2<L>      30 Apr 2019 06:15  Phos  2.4     04-30  Mg     1.8     04-30    TPro  5.9<L>  /  Alb  3.0<L>  /  TBili  2.0<H>  /  DBili  x   /  AST  13  /  ALT  47<H>  /  AlkPhos  100  04-30    04-29    136  |  103  |  17  ----------------------------<  108<H>  3.7   |  18<L>  |  1.15    Ca    8.2<L>      29 Apr 2019 05:30  Phos  2.4     04-29  Mg     2.2     04-29    TPro  6.9  /  Alb  3.6  /  TBili  1.4<H>  /  DBili  x   /  AST  20  /  ALT  77<H>  /  AlkPhos  127<H>  04-29        MEDICATIONS  (STANDING):  allopurinol 300 milliGRAM(s) Oral daily  carvedilol 25 milliGRAM(s) Oral every 12 hours  cefepime   IVPB      cefepime   IVPB 2000 milliGRAM(s) IV Intermittent every 8 hours  filgrastim-sndz Injectable 480 MICROGram(s) SubCutaneous daily  FIRST- Mouthwash  BLM 5 milliLiter(s) Swish and Spit five times a day  fluconAZOLE IVPB 400 milliGRAM(s) IV Intermittent every 24 hours  fluconAZOLE IVPB      Saliva Substitute (CAPHOSOL) 30 milliLiter(s) Swish and Spit every 6 hours  sodium chloride 0.9%. 1000 milliLiter(s) (75 mL/Hr) IV Continuous <Continuous>  vancomycin  IVPB      vancomycin  IVPB 1250 milliGRAM(s) IV Intermittent every 12 hours    MEDICATIONS  (PRN):  acetaminophen   Tablet .. 650 milliGRAM(s) Oral every 6 hours PRN Temp greater or equal to 38C (100.4F)  artificial  tears Solution 1 Drop(s) Both EYES every 6 hours PRN Dry Eyes  benzocaine 15 mG/menthol 3.6 mG (Sugar-Free) Lozenge 1 Lozenge Oral five times a day PRN Sore Throat  docusate sodium 100 milliGRAM(s) Oral three times a day PRN Constipation  guaiFENesin    Syrup 100 milliGRAM(s) Oral every 6 hours PRN Cough  HYDROmorphone   Tablet 2 milliGRAM(s) Oral every 4 hours PRN moderate - severe pain  ondansetron Injectable 8 milliGRAM(s) IV Push every 8 hours PRN Nausea and/or Vomiting  senna 2 Tablet(s) Oral at bedtime PRN Constipation          RADIOLOGY & ADDITIONAL TESTS:    4/30/19 : Xray Esophagram (04.30.19 @ 09:31) Limited evaluation for esophageal ulcers secondary to underdistention of  the esophagus.      4/27/19 : Xray Chest 1 View- PORTABLE-Urgent (04.27.19 @ 22:45) Probable small left pleural effusion with adjacent opacity that likely  represents atelectasis.    4/25/19 : CT Brain Stroke Protocol (04.25.19 @ 19:42) No acute intracranial hemorrhage, mass effect, or midline shift. No  abnormal extra-axial collections. The basal cisterns are patent without evidence of central herniation. The sulci and ventricles are within normal limits for the patient's age. Stable few patchy areas of low-attenuation in the bihemispheric white  matter, which is nonspecific, but likely related to sequela of chronic  microvascular ischemic disease. Stable coarse calcifications in the left temporal lobe.      MICROBIOLOGY DATA:      Culture - Blood (04.28.19 @ 18:20)    Culture - Blood:   NO ORGANISMS ISOLATED  NO ORGANISMS ISOLATED AT 24 HOURS    Specimen Source: BLOOD VENOUS      Culture - Blood (04.28.19 @ 18:20)    Culture - Blood:   NO ORGANISMS ISOLATED  NO ORGANISMS ISOLATED AT 24 HOURS    Specimen Source: BLOOD PERIPHERAL        Culture - Blood (04.27.19 @ 19:50)    Culture - Blood:   NO ORGANISMS ISOLATED  NO ORGANISMS ISOLATED AT 24 HOURS    Specimen Source: BLOOD PERIPHERAL      Culture - Group A Streptococcus (04.27.19 @ 14:24)    Culture - Group A Streptococcus:   NO BETA STREP. GROUP A  AFTER 24HRS.    Specimen Source: THROAT

## 2019-04-30 NOTE — PROGRESS NOTE ADULT - SUBJECTIVE AND OBJECTIVE BOX
Pt is seen and examined  pt is awake and lying in bed  Family at bedside  s/p 1 u Platelets yesterday  No bleeding or epistaxis  still w odynophagia      PAST MEDICAL & SURGICAL HISTORY:  Diverticulitis: perforated s/p Kiah  Multiple myeloma: relapsed 3/2019  Hypertension  Left ventricular dysfunction  Cardiomyopathy: nonischemic  Former smoker: (1 ppd x 11 years; Quit ~age 26)  History of bone marrow transplant  History of surgery: s/p exploratory laparotomy with sigmoid resection and colostomy on 9/2/2018      ROS:  Negative except for:    MEDICATIONS  (STANDING):  allopurinol 300 milliGRAM(s) Oral daily  carvedilol 25 milliGRAM(s) Oral every 12 hours  cefepime   IVPB      cefepime   IVPB 2000 milliGRAM(s) IV Intermittent every 8 hours  filgrastim-sndz Injectable 480 MICROGram(s) SubCutaneous daily  FIRST- Mouthwash  BLM 5 milliLiter(s) Swish and Spit five times a day  fluconAZOLE IVPB 400 milliGRAM(s) IV Intermittent every 24 hours  fluconAZOLE IVPB      potassium acid phosphate/sodium acid phosphate tablet (K-PHOS No. 2) 1 Tablet(s) Oral four times a day with meals  Saliva Substitute (CAPHOSOL) 30 milliLiter(s) Swish and Spit every 6 hours  sodium chloride 0.9%. 1000 milliLiter(s) (75 mL/Hr) IV Continuous <Continuous>  vancomycin  IVPB      vancomycin  IVPB 1250 milliGRAM(s) IV Intermittent every 12 hours    MEDICATIONS  (PRN):  acetaminophen   Tablet .. 650 milliGRAM(s) Oral every 6 hours PRN Temp greater or equal to 38C (100.4F)  artificial  tears Solution 1 Drop(s) Both EYES every 6 hours PRN Dry Eyes  benzocaine 15 mG/menthol 3.6 mG (Sugar-Free) Lozenge 1 Lozenge Oral five times a day PRN Sore Throat  docusate sodium 100 milliGRAM(s) Oral three times a day PRN Constipation  guaiFENesin    Syrup 100 milliGRAM(s) Oral every 6 hours PRN Cough  HYDROmorphone   Tablet 2 milliGRAM(s) Oral every 4 hours PRN moderate - severe pain  ondansetron Injectable 8 milliGRAM(s) IV Push every 8 hours PRN Nausea and/or Vomiting  senna 2 Tablet(s) Oral at bedtime PRN Constipation      Allergies    oxycodone (Vomiting; Nausea)    Intolerances        Vital Signs Last 24 Hrs  T(C): 37.1 (30 Apr 2019 02:18), Max: 38.1 (29 Apr 2019 10:06)  T(F): 98.7 (30 Apr 2019 02:18), Max: 100.6 (29 Apr 2019 10:06)  HR: 103 (30 Apr 2019 02:18) (74 - 136)  BP: 103/50 (30 Apr 2019 02:18) (103/50 - 133/89)  BP(mean): --  RR: 18 (30 Apr 2019 02:18) (18 - 20)  SpO2: 100% (30 Apr 2019 02:18) (97% - 100%)    PHYSICAL EXAM    General: adult in NAD  HEENT: + gum bleeding  Neck: supple  CV: normal S1/S2 Tachy  Lungs: positive air movement b/l ant lungs,clear to auscultation, no wheezes, no rales  Abdomen: soft non-tender non-distended, no hepatosplenomegaly + Colostomy  Ext: no clubbing cyanosis or edema    LABS:                          8.5    0.88  )-----------( 9        ( 29 Apr 2019 05:30 )             25.7     Serial CBC's  04-29 @ 05:30  Hct-25.7 / Hgb-8.5 / Plat-9 / RBC-2.85 / WBC-0.88          Serial CBC's  04-28 @ 07:45  Hct-23.2 / Hgb-7.9 / Plat-16 / RBC-2.69 / WBC-0.85            04-29    136  |  103  |  17  ----------------------------<  108<H>  3.7   |  18<L>  |  1.15    Ca    8.2<L>      29 Apr 2019 05:30  Phos  2.4     04-29  Mg     2.2     04-29    TPro  6.9  /  Alb  3.6  /  TBili  1.4<H>  /  DBili  x   /  AST  20  /  ALT  77<H>  /  AlkPhos  127<H>  04-29          WBC Count: 0.88 K/uL (04-29 @ 05:30)  Hemoglobin: 8.5 g/dL (04-29 @ 05:30)            RADIOLOGY & ADDITIONAL STUDIES:

## 2019-04-30 NOTE — PROGRESS NOTE ADULT - ASSESSMENT
1. Pancytopenia     -- counts slow to recover sec to heavily tx for myeloma  -- likely related to tx, MM  -- Transfuse to keep Hgb > 7 and Platelets> 10  -- cont  Zarxio 480mcg daily until ANC > 1500 x 2 consecutive days       2. MM relapsed/refractory including failed Auto Transplant  -- on tavares/pomalyst.   -- hold pomalyst for now in acute setting and severe pancytopenia  -- bendamustine as outpt when recovers  -- paraproteins improving  -- s/p decadron 40mg 4/25, cont weekly    3. Tonsilitis vs Candida Esophagitis    -- cont Cefepime, Diflucan and Vanco per ID  -- would consider adding Valtrex 1000mg PO QD since sx not better and pt was on Bendamustine and multiple prior tx  -- ID f/u    Torsten Murphy MD  [phone]

## 2019-04-30 NOTE — CONSULT NOTE ADULT - ASSESSMENT
ddx includes viral or fungal esophagitis.  would recommend emperic rx as given. risk of endosocpy / anesthesia with cardiomyopathy and pan cytopenia is significant.  recommend conservaive managment with abx and antifungal. if no reponse, will discuss egd.  barrium esophagram will be helpful

## 2019-04-30 NOTE — PROGRESS NOTE ADULT - ASSESSMENT
· Assessment		  54yo M hx of relapsed multiple myeloma s/p bone marrow transplant (~1.5 yrs ago) and chemo/RT (last infusion Bendamustine and Pomalidomide 4/08/2019), perforated diverticulitis s/p Kiah's (proctosigmoidectomy with colostomy, 9/2018), nonischemic cardiomyopathy (mild global LV systolic dysfunction with EF 54% and diastolic LV dysfunction based on TTE in 11/2018), and HTN presents with vision change and vomiting that resolved, concern for CVA, admitted for pancytopenia.    Problem/Plan - 1:  ·  Problem: Pancytopenia.  with neutropenic fever 2/2 acute tonsillitis  vs esophageal candidiasis vs alternative etiology      c/w abx      possible egd      f/u am labs      transfuse prn      all consultants management greatly appreciated      f/u cultures to final date         Problem/Plan - 2:  ·  Problem: Multiple myeloma in relapse.  Plan: MM in relapse s/p bone marrow transplant (~1.5 yrs ago) and chemo/RT. Last infusion Bendamustine and Pomalidomide 4/08/2019  Continue Current medications and monitor.   - Hem/ onc follow up for further treatment / mgmt    Problem/Plan - 3:  ·  Problem: Cardiomyopathy.  Plan: Chronic Systolic CHF  - c/w cardiac meds, adjust/restart meds as needed    Problem/Plan - 4:  ·  Problem: sore  throat : possible acute Tonsilitis, and Candida Esophagitis per ID  continue antifungals per ID  continue Cepacol      Problem/Plan - 5:  Problem: Prophylactic measure. Plan: VTE ppx: no chemoppx or SCDs due to thrombocytopenia, pt encouraged to ambulate / OOB more

## 2019-04-30 NOTE — PROGRESS NOTE ADULT - ASSESSMENT
A 54 yo Male with  relapsed multiple myeloma s/p bone marrow transplant (~1.5 yrs ago) and chemo/RT (last infusion Bendamustine and Pomalidomide 4/08/2019), perforated diverticulitis s/p Kiah's (proctosigmoidectomy with colostomy, presents to [facility] ER for evaluation of  vomiting and change of vision. He also had been weak, with epistaxis, and petechiae. ON admission, he found to have Pancytopenia and Neutropenia with ANC of 120. He has no fever but rigors and also c/o Odynophagia. The CT head shows No acute intracranial events. The ID consult requested to assist with evaluation and antibiotic management of Neutropenia and Tonsilitis.     # Neutropenic Fever - develope dfever today, 4/28/19  #? Acute Tonsilitis  # Possible Candida Esophagitis-  May benefit from  EGD when cell numbers are stable, since Odynophagia  not improving on Fluconazole    would recommend:    1. Add IV Valacyclovir still has odynophagia on Fluconazole  2. Monitor temp and c/w supportive care  3. Continue Cefepime , Fluconazole, Vancomycin and Add Valacyclovir, since still has odynophagia and spiking fever  4. Continue Zarxio and Monitor ANC  5. Management of MM as per Hem/Onc    d/w  Patient     will follow the patient with you

## 2019-05-01 ENCOUNTER — APPOINTMENT (OUTPATIENT)
Dept: CARDIOLOGY | Facility: CLINIC | Age: 54
End: 2019-05-01

## 2019-05-01 DIAGNOSIS — N17.9 ACUTE KIDNEY FAILURE, UNSPECIFIED: ICD-10-CM

## 2019-05-01 LAB
ANION GAP SERPL CALC-SCNC: 19 MMO/L — HIGH (ref 7–14)
BASOPHILS # BLD AUTO: 0 K/UL — SIGNIFICANT CHANGE UP (ref 0–0.2)
BASOPHILS NFR BLD AUTO: 0 % — SIGNIFICANT CHANGE UP (ref 0–2)
BUN SERPL-MCNC: 41 MG/DL — HIGH (ref 7–23)
CALCIUM SERPL-MCNC: 6.9 MG/DL — LOW (ref 8.4–10.5)
CHLORIDE SERPL-SCNC: 103 MMOL/L — SIGNIFICANT CHANGE UP (ref 98–107)
CHLORIDE UR-SCNC: < 20 MMOL/L — SIGNIFICANT CHANGE UP
CO2 SERPL-SCNC: 12 MMOL/L — LOW (ref 22–31)
CREAT SERPL-MCNC: 3.16 MG/DL — HIGH (ref 0.5–1.3)
EOSINOPHIL # BLD AUTO: 0.01 K/UL — SIGNIFICANT CHANGE UP (ref 0–0.5)
EOSINOPHIL NFR BLD AUTO: 2.5 % — SIGNIFICANT CHANGE UP (ref 0–6)
EOSINOPHIL NFR URNS MANUAL: SIGNIFICANT CHANGE UP (ref 0–0)
GLUCOSE SERPL-MCNC: 120 MG/DL — HIGH (ref 70–99)
HCT VFR BLD CALC: 21.3 % — LOW (ref 39–50)
HGB BLD-MCNC: 7.3 G/DL — LOW (ref 13–17)
IMM GRANULOCYTES NFR BLD AUTO: 0 % — SIGNIFICANT CHANGE UP (ref 0–1.5)
LDH SERPL L TO P-CCNC: 134 U/L — LOW (ref 135–225)
LYMPHOCYTES # BLD AUTO: 0.3 K/UL — LOW (ref 1–3.3)
LYMPHOCYTES # BLD AUTO: 75 % — HIGH (ref 13–44)
MAGNESIUM SERPL-MCNC: 1.8 MG/DL — SIGNIFICANT CHANGE UP (ref 1.6–2.6)
MANUAL SMEAR VERIFICATION: SIGNIFICANT CHANGE UP
MCHC RBC-ENTMCNC: 29.3 PG — SIGNIFICANT CHANGE UP (ref 27–34)
MCHC RBC-ENTMCNC: 34.3 % — SIGNIFICANT CHANGE UP (ref 32–36)
MCV RBC AUTO: 85.5 FL — SIGNIFICANT CHANGE UP (ref 80–100)
MONOCYTES # BLD AUTO: 0.05 K/UL — SIGNIFICANT CHANGE UP (ref 0–0.9)
MONOCYTES NFR BLD AUTO: 12.5 % — SIGNIFICANT CHANGE UP (ref 2–14)
NEUTROPHILS # BLD AUTO: 0.04 K/UL — LOW (ref 1.8–7.4)
NEUTROPHILS NFR BLD AUTO: 10 % — LOW (ref 43–77)
NRBC # FLD: 0 K/UL — SIGNIFICANT CHANGE UP (ref 0–0)
OSMOLALITY UR: 307 MOSMO/KG — SIGNIFICANT CHANGE UP (ref 50–1200)
PHOSPHATE SERPL-MCNC: 2.8 MG/DL — SIGNIFICANT CHANGE UP (ref 2.5–4.5)
PLATELET # BLD AUTO: 12 K/UL — CRITICAL LOW (ref 150–400)
PMV BLD: 11.3 FL — SIGNIFICANT CHANGE UP (ref 7–13)
POTASSIUM SERPL-MCNC: 4 MMOL/L — SIGNIFICANT CHANGE UP (ref 3.5–5.3)
POTASSIUM SERPL-SCNC: 4 MMOL/L — SIGNIFICANT CHANGE UP (ref 3.5–5.3)
POTASSIUM UR-SCNC: 28.6 MMOL/L — SIGNIFICANT CHANGE UP
RBC # BLD: 2.49 M/UL — LOW (ref 4.2–5.8)
RBC # FLD: 15.8 % — HIGH (ref 10.3–14.5)
SODIUM SERPL-SCNC: 134 MMOL/L — LOW (ref 135–145)
SODIUM UR-SCNC: 24 MMOL/L — SIGNIFICANT CHANGE UP
URATE SERPL-MCNC: 5.8 MG/DL — SIGNIFICANT CHANGE UP (ref 3.4–8.8)
VANCOMYCIN TROUGH SERPL-MCNC: 29.7 UG/ML — CRITICAL HIGH (ref 10–20)
WBC # BLD: 0.4 K/UL — CRITICAL LOW (ref 3.8–10.5)
WBC # FLD AUTO: 0.4 K/UL — CRITICAL LOW (ref 3.8–10.5)

## 2019-05-01 PROCEDURE — 76770 US EXAM ABDO BACK WALL COMP: CPT | Mod: 26

## 2019-05-01 RX ORDER — VALACYCLOVIR 500 MG/1
1000 TABLET, FILM COATED ORAL DAILY
Qty: 0 | Refills: 0 | Status: DISCONTINUED | OUTPATIENT
Start: 2019-05-01 | End: 2019-05-01

## 2019-05-01 RX ORDER — SODIUM CHLORIDE 9 MG/ML
1000 INJECTION INTRAMUSCULAR; INTRAVENOUS; SUBCUTANEOUS
Qty: 0 | Refills: 0 | Status: DISCONTINUED | OUTPATIENT
Start: 2019-05-01 | End: 2019-05-02

## 2019-05-01 RX ORDER — VALACYCLOVIR 500 MG/1
1000 TABLET, FILM COATED ORAL DAILY
Qty: 0 | Refills: 0 | Status: DISCONTINUED | OUTPATIENT
Start: 2019-05-01 | End: 2019-05-03

## 2019-05-01 RX ORDER — METOCLOPRAMIDE HCL 10 MG
10 TABLET ORAL ONCE
Qty: 0 | Refills: 0 | Status: COMPLETED | OUTPATIENT
Start: 2019-05-01 | End: 2019-05-01

## 2019-05-01 RX ORDER — CEFEPIME 1 G/1
2000 INJECTION, POWDER, FOR SOLUTION INTRAMUSCULAR; INTRAVENOUS EVERY 24 HOURS
Qty: 0 | Refills: 0 | Status: DISCONTINUED | OUTPATIENT
Start: 2019-05-02 | End: 2019-05-08

## 2019-05-01 RX ORDER — METOCLOPRAMIDE HCL 10 MG
10 TABLET ORAL
Qty: 0 | Refills: 0 | Status: DISCONTINUED | OUTPATIENT
Start: 2019-05-01 | End: 2019-05-06

## 2019-05-01 RX ORDER — FLUCONAZOLE 150 MG/1
200 TABLET ORAL EVERY 24 HOURS
Qty: 0 | Refills: 0 | Status: DISCONTINUED | OUTPATIENT
Start: 2019-05-02 | End: 2019-05-14

## 2019-05-01 RX ORDER — SODIUM CHLORIDE 9 MG/ML
1000 INJECTION INTRAMUSCULAR; INTRAVENOUS; SUBCUTANEOUS
Qty: 0 | Refills: 0 | Status: DISCONTINUED | OUTPATIENT
Start: 2019-05-01 | End: 2019-05-01

## 2019-05-01 RX ORDER — RANITIDINE HYDROCHLORIDE 150 MG/1
150 TABLET, FILM COATED ORAL EVERY 24 HOURS
Qty: 0 | Refills: 0 | Status: DISCONTINUED | OUTPATIENT
Start: 2019-05-01 | End: 2019-05-08

## 2019-05-01 RX ADMIN — Medication 10 MILLIGRAM(S): at 14:16

## 2019-05-01 RX ADMIN — Medication 10 MILLIGRAM(S): at 23:26

## 2019-05-01 RX ADMIN — DIPHENHYDRAMINE HYDROCHLORIDE AND LIDOCAINE HYDROCHLORIDE AND ALUMINUM HYDROXIDE AND MAGNESIUM HYDRO 5 MILLILITER(S): KIT at 13:37

## 2019-05-01 RX ADMIN — Medication 480 MICROGRAM(S): at 13:37

## 2019-05-01 RX ADMIN — Medication 30 MILLILITER(S): at 23:26

## 2019-05-01 RX ADMIN — Medication 10 MILLIGRAM(S): at 10:37

## 2019-05-01 RX ADMIN — VALACYCLOVIR 1000 MILLIGRAM(S): 500 TABLET, FILM COATED ORAL at 13:37

## 2019-05-01 RX ADMIN — Medication 10 MILLIGRAM(S): at 17:22

## 2019-05-01 RX ADMIN — CEFEPIME 100 MILLIGRAM(S): 1 INJECTION, POWDER, FOR SOLUTION INTRAMUSCULAR; INTRAVENOUS at 06:33

## 2019-05-01 RX ADMIN — Medication 30 MILLILITER(S): at 13:37

## 2019-05-01 RX ADMIN — RANITIDINE HYDROCHLORIDE 150 MILLIGRAM(S): 150 TABLET, FILM COATED ORAL at 17:36

## 2019-05-01 RX ADMIN — CARVEDILOL PHOSPHATE 25 MILLIGRAM(S): 80 CAPSULE, EXTENDED RELEASE ORAL at 17:22

## 2019-05-01 RX ADMIN — Medication 300 MILLIGRAM(S): at 13:37

## 2019-05-01 RX ADMIN — DIPHENHYDRAMINE HYDROCHLORIDE AND LIDOCAINE HYDROCHLORIDE AND ALUMINUM HYDROXIDE AND MAGNESIUM HYDRO 5 MILLILITER(S): KIT at 08:24

## 2019-05-01 RX ADMIN — Medication 30 MILLILITER(S): at 17:22

## 2019-05-01 RX ADMIN — FLUCONAZOLE 100 MILLIGRAM(S): 150 TABLET ORAL at 00:25

## 2019-05-01 RX ADMIN — DIPHENHYDRAMINE HYDROCHLORIDE AND LIDOCAINE HYDROCHLORIDE AND ALUMINUM HYDROXIDE AND MAGNESIUM HYDRO 5 MILLILITER(S): KIT at 17:22

## 2019-05-01 RX ADMIN — DIPHENHYDRAMINE HYDROCHLORIDE AND LIDOCAINE HYDROCHLORIDE AND ALUMINUM HYDROXIDE AND MAGNESIUM HYDRO 5 MILLILITER(S): KIT at 23:27

## 2019-05-01 RX ADMIN — DIPHENHYDRAMINE HYDROCHLORIDE AND LIDOCAINE HYDROCHLORIDE AND ALUMINUM HYDROXIDE AND MAGNESIUM HYDRO 5 MILLILITER(S): KIT at 19:47

## 2019-05-01 RX ADMIN — ONDANSETRON 8 MILLIGRAM(S): 8 TABLET, FILM COATED ORAL at 08:48

## 2019-05-01 NOTE — PROGRESS NOTE ADULT - SUBJECTIVE AND OBJECTIVE BOX
SONAL GARCIA:[id number],   53yMale followed for:  oxycodone (Vomiting; Nausea)    PAST MEDICAL & SURGICAL HISTORY:  Diverticulitis: perforated s/p Kiah  Multiple myeloma: relapsed 3/2019  Hypertension  Left ventricular dysfunction  Cardiomyopathy: nonischemic  Former smoker: (1 ppd x 11 years; Quit ~age 26)  History of bone marrow transplant  History of surgery: s/p exploratory laparotomy with sigmoid resection and colostomy on 9/2/2018    FAMILY HISTORY:  Family history of diabetes mellitus  Family history of hypertension    MEDICATIONS  (STANDING):  allopurinol 300 milliGRAM(s) Oral daily  carvedilol 25 milliGRAM(s) Oral every 12 hours  filgrastim-sndz Injectable 480 MICROGram(s) SubCutaneous daily  FIRST- Mouthwash  BLM 5 milliLiter(s) Swish and Spit five times a day  Saliva Substitute (CAPHOSOL) 30 milliLiter(s) Swish and Spit every 6 hours  sodium chloride 0.9%. 1000 milliLiter(s) (75 mL/Hr) IV Continuous <Continuous>  sodium chloride 0.9%. 1000 milliLiter(s) (75 mL/Hr) IV Continuous <Continuous>  valACYclovir 1000 milliGRAM(s) Oral daily    MEDICATIONS  (PRN):  acetaminophen   Tablet .. 650 milliGRAM(s) Oral every 6 hours PRN Temp greater or equal to 38C (100.4F)  artificial  tears Solution 1 Drop(s) Both EYES every 6 hours PRN Dry Eyes  benzocaine 15 mG/menthol 3.6 mG (Sugar-Free) Lozenge 1 Lozenge Oral five times a day PRN Sore Throat  docusate sodium 100 milliGRAM(s) Oral three times a day PRN Constipation  guaiFENesin    Syrup 100 milliGRAM(s) Oral every 6 hours PRN Cough  HYDROmorphone   Tablet 2 milliGRAM(s) Oral every 4 hours PRN moderate - severe pain  ondansetron Injectable 8 milliGRAM(s) IV Push every 8 hours PRN Nausea and/or Vomiting  senna 2 Tablet(s) Oral at bedtime PRN Constipation      Vital Signs Last 24 Hrs  T(C): 37.1 (01 May 2019 10:35), Max: 37.1 (01 May 2019 10:35)  T(F): 98.7 (01 May 2019 10:35), Max: 98.7 (01 May 2019 10:35)  HR: 108 (01 May 2019 10:35) (104 - 115)  BP: 108/59 (01 May 2019 10:35) (92/52 - 111/60)  BP(mean): --  RR: 19 (01 May 2019 10:35) (16 - 19)  SpO2: 100% (01 May 2019 10:35) (97% - 100%)  nc/at  s1s2  cta  soft, nt, nd no guarding or rebound  no c/c/e    CBC Full  -  ( 01 May 2019 06:05 )  WBC Count : 0.40 K/uL  RBC Count : 2.49 M/uL  Hemoglobin : 7.3 g/dL  Hematocrit : 21.3 %  Platelet Count - Automated : 12 K/uL  Mean Cell Volume : 85.5 fL  Mean Cell Hemoglobin : 29.3 pg  Mean Cell Hemoglobin Concentration : 34.3 %  Auto Neutrophil # : 0.04 K/uL  Auto Lymphocyte # : 0.30 K/uL  Auto Monocyte # : 0.05 K/uL  Auto Eosinophil # : 0.01 K/uL  Auto Basophil # : 0.00 K/uL  Auto Neutrophil % : 10.0 %  Auto Lymphocyte % : 75.0 %  Auto Monocyte % : 12.5 %  Auto Eosinophil % : 2.5 %  Auto Basophil % : 0.0 %    05-01    134<L>  |  103  |  41<H>  ----------------------------<  120<H>  4.0   |  12<L>  |  3.16<H>    Ca    6.9<L>      01 May 2019 06:05  Phos  2.8     05-01  Mg     1.8     05-01    TPro  5.9<L>  /  Alb  3.0<L>  /  TBili  2.0<H>  /  DBili  x   /  AST  13  /  ALT  47<H>  /  AlkPhos  100  04-30

## 2019-05-01 NOTE — PROGRESS NOTE ADULT - SUBJECTIVE AND OBJECTIVE BOX
[facility]      Seen earlier today, and examined.  - Today, patient is without complaints.           *****MEDICATIONS: Current medication reviewed and documented.    MEDICATIONS  (STANDING):  allopurinol 300 milliGRAM(s) Oral daily  carvedilol 25 milliGRAM(s) Oral every 12 hours  filgrastim-sndz Injectable 480 MICROGram(s) SubCutaneous daily  FIRST- Mouthwash  BLM 5 milliLiter(s) Swish and Spit five times a day  metoclopramide Injectable 10 milliGRAM(s) IV Push four times a day  Saliva Substitute (CAPHOSOL) 30 milliLiter(s) Swish and Spit every 6 hours  sodium chloride 0.9%. 1000 milliLiter(s) (75 mL/Hr) IV Continuous <Continuous>  sodium chloride 0.9%. 1000 milliLiter(s) (75 mL/Hr) IV Continuous <Continuous>  valACYclovir 1000 milliGRAM(s) Oral daily    MEDICATIONS  (PRN):  acetaminophen   Tablet .. 650 milliGRAM(s) Oral every 6 hours PRN Temp greater or equal to 38C (100.4F)  artificial  tears Solution 1 Drop(s) Both EYES every 6 hours PRN Dry Eyes  benzocaine 15 mG/menthol 3.6 mG (Sugar-Free) Lozenge 1 Lozenge Oral five times a day PRN Sore Throat  docusate sodium 100 milliGRAM(s) Oral three times a day PRN Constipation  guaiFENesin    Syrup 100 milliGRAM(s) Oral every 6 hours PRN Cough  HYDROmorphone   Tablet 2 milliGRAM(s) Oral every 4 hours PRN moderate - severe pain  ondansetron Injectable 8 milliGRAM(s) IV Push every 8 hours PRN Nausea and/or Vomiting  senna 2 Tablet(s) Oral at bedtime PRN Constipation          ***** VITAL SIGNS:  T(F): 97.3 (19 @ 14:53), Max: 98.7 (19 @ 10:35)  HR: 105 (19 @ 14:53) (104 - 115)  BP: 97/54 (19 @ 14:53) (92/52 - 111/60)  RR: 20 (19 @ 14:53) (16 - 20)  SpO2: 100% (19 @ 14:53) (100% - 100%)  Wt(kg): --  ,   I&O's Summary    2019 07:01  -  01 May 2019 07:00  --------------------------------------------------------  IN: 0 mL / OUT: 825 mL / NET: -825 mL             *****PHYSICAL EXAM:   alert oriented x 3 attention comprehension are fair.  Able to name, repeat.   EOmi fundi not visualized   no nystagmus VFF to confrontation  Tongue is midline  Palate elevates symmetrically   Moving all 4 ext spontaneously no drift appreciated    Gait not assessed.            *****LAB AND IMAGIN.3    0.40  )-----------( 12       ( 01 May 2019 06:05 )             21.3               05-01    134<L>  |  103  |  41<H>  ----------------------------<  120<H>  4.0   |  12<L>  |  3.16<H>    Ca    6.9<L>      01 May 2019 06:05  Phos  2.8     05-01  Mg     1.8     05-    TPro  5.9<L>  /  Alb  3.0<L>  /  TBili  2.0<H>  /  DBili  x   /  AST  13  /  ALT  47<H>  /  AlkPhos  100  04-30                         [All pertinent recent Imaging/Reports reviewed]           *****A S S E S S M E N T   A N D   P L A N :    52yo M hx of relapsed multiple myeloma s/p bone marrow transplant (~1.5 yrs ago) and chemo/RT (last infusion Bendamustine and Pomalidomide 2019), perforated diverticulitis s/p Kiah's (proctosigmoidectomy with colostomy, 2018), nonischemic cardiomyopathy (mild global LV systolic dysfunction with EF 54% and diastolic LV dysfunction based on TTE in 2018), and HTN presents to [facility] ED w/ vomiting and change of vision. Wife Devan Hood at bedside. Pt was sitting at home. He vomited 2-3 times nonbloody, nonbilious and was not able to eat or drink anything. On  at 4pm, while sitting he noticed his vision went "white" for a few minutes associated with sweating, shaking, and weakness. His wife noticed he had a red rash on his legs and they brought him to the ED. Pt received a platelet transfusion about a week ago outpt. Denies any pain. Had a nose bleed earlier. He denies skin sores, measured fever, HA, trouble speaking, numbness/tingling, focal weakness, dizziness, CP, SOB, abd pain, N/V/D, hematochezia, melena, urinary symptoms, oral/genital sores. He has a recent admission to [facility] discharged 2019 for diffuse body pain and low-grade fever, infectious workup was negative. MRI C/T/L showed abnormal T1 prolongation in the whole spine, sacral and iliac bones. Received 7 days of Bendamustine and Pomalidomide (on hold few days for neutropenia), last on 2019.       Problem/Recommendations 1: near syncope due to anemia/pancytopenia   hemonc on board  ct with no acute intracranial pathology   check orthostatic  goc discussion.   overall poor prognosis '      Acute renal insufficiency of unclear etiology pt more lethargic today, defer to renal.       Thank you for allowing me to participate in the care of this patient. Please do not hesitate to call me if you have any  questions.        ________________  Margret Yousif MD  [facility] (PN)[facility]  [phone]      30 minutes spent on total encounter; more than 50 % of the visit was  spent counseling about plan of care, compliance to diet/exercise and medication regimen and or  coordinating care by the attending physician.      It is advised that s stroke patients follow up with SAMUEL Mckeon @ [phone] in 1- 2 weeks.   Others please follow up with Dr. Michael Nissenbaum [phone]

## 2019-05-01 NOTE — PROGRESS NOTE ADULT - SUBJECTIVE AND OBJECTIVE BOX
Patient is seen and examined at the bed side, still spiking fever and currently is afebrile. The ANC only 20, on Zarxio. The Odynophagia has not improved yet, the Esophagram noted.        REVIEW OF SYSTEMS: All other review systems are negative        ALLERGIES: oxycodone (Vomiting; Nausea)      Vital Signs Last 24 Hrs  T(C): 36.8 (01 May 2019 17:12), Max: 37.1 (01 May 2019 10:35)  T(F): 98.2 (01 May 2019 17:12), Max: 98.7 (01 May 2019 10:35)  HR: 108 (01 May 2019 17:12) (104 - 108)  BP: 115/69 (01 May 2019 17:12) (92/52 - 115/69)  BP(mean): --  RR: 18 (01 May 2019 17:12) (16 - 20)  SpO2: 100% (01 May 2019 17:12) (100% - 100%)      PHYSICAL EXAM:  GENERAL: Appears ill  HEENT: No oral thrush or any tonsilar exudate noted   CHEST/LUNG:  Air entry bilaterally  HEART: s1 and s2 present  ABDOMEN:  Nontender and  Nondistended  EXTREMITIES: No pedal  edema  CNS: Awake and Alert        LABS:                          7.3    0.40  )-----------( 12       ( 01 May 2019 06:05 )             21.3                           6.1    0.63  )-----------( 12       ( 30 Apr 2019 07:55 )             18.0                       7.9    0.85  )-----------( 16       ( 28 Apr 2019 07:45 )             23.2                           7.8    1.08  )-----------( 32       ( 27 Apr 2019 07:00 )             23.1         05-01    134<L>  |  103  |  41<H>  ----------------------------<  120<H>  4.0   |  12<L>  |  3.16<H>    Ca    6.9<L>      01 May 2019 06:05  Phos  2.8     05-01  Mg     1.8     05-01    TPro  5.9<L>  /  Alb  3.0<L>  /  TBili  2.0<H>  /  DBili  x   /  AST  13  /  ALT  47<H>  /  AlkPhos  100  04-30 04-30    135  |  104  |  20  ----------------------------<  139<H>  3.8   |  16<L>  |  1.46<H>    Ca    7.2<L>      30 Apr 2019 06:15  Phos  2.4     04-30  Mg     1.8     04-30    TPro  5.9<L>  /  Alb  3.0<L>  /  TBili  2.0<H>  /  DBili  x   /  AST  13  /  ALT  47<H>  /  AlkPhos  100  04-30        MEDICATIONS  (STANDING):  allopurinol 300 milliGRAM(s) Oral daily  carvedilol 25 milliGRAM(s) Oral every 12 hours  filgrastim-sndz Injectable 480 MICROGram(s) SubCutaneous daily  FIRST- Mouthwash  BLM 5 milliLiter(s) Swish and Spit five times a day  metoclopramide Injectable 10 milliGRAM(s) IV Push four times a day  ranitidine 150 milliGRAM(s) Oral every 24 hours  Saliva Substitute (CAPHOSOL) 30 milliLiter(s) Swish and Spit every 6 hours  sodium chloride 0.9%. 1000 milliLiter(s) (75 mL/Hr) IV Continuous <Continuous>  sodium chloride 0.9%. 1000 milliLiter(s) (75 mL/Hr) IV Continuous <Continuous>  valACYclovir 1000 milliGRAM(s) Oral daily    MEDICATIONS  (PRN):  acetaminophen   Tablet .. 650 milliGRAM(s) Oral every 6 hours PRN Temp greater or equal to 38C (100.4F)  artificial  tears Solution 1 Drop(s) Both EYES every 6 hours PRN Dry Eyes  benzocaine 15 mG/menthol 3.6 mG (Sugar-Free) Lozenge 1 Lozenge Oral five times a day PRN Sore Throat  docusate sodium 100 milliGRAM(s) Oral three times a day PRN Constipation  guaiFENesin    Syrup 100 milliGRAM(s) Oral every 6 hours PRN Cough  HYDROmorphone   Tablet 2 milliGRAM(s) Oral every 4 hours PRN moderate - severe pain  ondansetron Injectable 8 milliGRAM(s) IV Push every 8 hours PRN Nausea and/or Vomiting  senna 2 Tablet(s) Oral at bedtime PRN Constipation        RADIOLOGY & ADDITIONAL TESTS:    4/30/19 : Xray Esophagram (04.30.19 @ 09:31) Limited evaluation for esophageal ulcers secondary to underdistention of  the esophagus.      4/27/19 : Xray Chest 1 View- PORTABLE-Urgent (04.27.19 @ 22:45) Probable small left pleural effusion with adjacent opacity that likely  represents atelectasis.    4/25/19 : CT Brain Stroke Protocol (04.25.19 @ 19:42) No acute intracranial hemorrhage, mass effect, or midline shift. No  abnormal extra-axial collections. The basal cisterns are patent without evidence of central herniation. The sulci and ventricles are within normal limits for the patient's age. Stable few patchy areas of low-attenuation in the bihemispheric white  matter, which is nonspecific, but likely related to sequela of chronic  microvascular ischemic disease. Stable coarse calcifications in the left temporal lobe.      MICROBIOLOGY DATA:      Culture - Blood (04.28.19 @ 18:20)    Culture - Blood:   NO ORGANISMS ISOLATED  NO ORGANISMS ISOLATED AT 24 HOURS    Specimen Source: BLOOD VENOUS      Culture - Blood (04.28.19 @ 18:20)    Culture - Blood:   NO ORGANISMS ISOLATED  NO ORGANISMS ISOLATED AT 24 HOURS    Specimen Source: BLOOD PERIPHERAL        Culture - Blood (04.27.19 @ 19:50)    Culture - Blood:   NO ORGANISMS ISOLATED  NO ORGANISMS ISOLATED AT 24 HOURS    Specimen Source: BLOOD PERIPHERAL      Culture - Group A Streptococcus (04.27.19 @ 14:24)    Culture - Group A Streptococcus:   NO BETA STREP. GROUP A  AFTER 24HRS.    Specimen Source: THROAT Patient is seen and examined at the bed side,  is afebrile today. The ANC is 40 today. The mucosal pain  and Odynophagia still persists. The Vancomycin level is high, 29.7.        REVIEW OF SYSTEMS: All other review systems are negative        ALLERGIES: oxycodone (Vomiting; Nausea)        Vital Signs Last 24 Hrs  T(C): 36.8 (01 May 2019 17:12), Max: 37.1 (01 May 2019 10:35)  T(F): 98.2 (01 May 2019 17:12), Max: 98.7 (01 May 2019 10:35)  HR: 108 (01 May 2019 17:12) (104 - 108)  BP: 115/69 (01 May 2019 17:12) (92/52 - 115/69)  BP(mean): --  RR: 18 (01 May 2019 17:12) (16 - 20)  SpO2: 100% (01 May 2019 17:12) (100% - 100%)        PHYSICAL EXAM:  GENERAL: Appears ill  HEENT: No oral thrush or any tonsilar exudate noted   CHEST/LUNG:  Air entry bilaterally  HEART: s1 and s2 present  ABDOMEN:  Nontender and  Nondistended  EXTREMITIES: No pedal  edema  CNS: Awake and Alert        LABS:                          7.3    0.40  )-----------( 12       ( 01 May 2019 06:05 )             21.3                           6.1    0.63  )-----------( 12       ( 30 Apr 2019 07:55 )             18.0                       7.9    0.85  )-----------( 16       ( 28 Apr 2019 07:45 )             23.2                           7.8    1.08  )-----------( 32       ( 27 Apr 2019 07:00 )             23.1         05-01    134<L>  |  103  |  41<H>  ----------------------------<  120<H>  4.0   |  12<L>  |  3.16<H>    Ca    6.9<L>      01 May 2019 06:05  Phos  2.8     05-01  Mg     1.8     05-01    TPro  5.9<L>  /  Alb  3.0<L>  /  TBili  2.0<H>  /  DBili  x   /  AST  13  /  ALT  47<H>  /  AlkPhos  100  04-30    04-30    135  |  104  |  20  ----------------------------<  139<H>  3.8   |  16<L>  |  1.46<H>    Ca    7.2<L>      30 Apr 2019 06:15  Phos  2.4     04-30  Mg     1.8     04-30    TPro  5.9<L>  /  Alb  3.0<L>  /  TBili  2.0<H>  /  DBili  x   /  AST  13  /  ALT  47<H>  /  AlkPhos  100  04-30      Vancomycin level:    Vancomycin Level, Trough - Prior to Next Dose (05.01.19 @ 06:05) Vancomycin Level, Trough: 29.7: Vancomycin trough levels should be rapidly reached and  maintained at 15-20 ug/ml for life threatening MRSA infections such as sepsis, endocarditis, osteomyelitis and pneumonia.         MEDICATIONS  (STANDING):  allopurinol 300 milliGRAM(s) Oral daily  carvedilol 25 milliGRAM(s) Oral every 12 hours  filgrastim-sndz Injectable 480 MICROGram(s) SubCutaneous daily  FIRST- Mouthwash  BLM 5 milliLiter(s) Swish and Spit five times a day  metoclopramide Injectable 10 milliGRAM(s) IV Push four times a day  ranitidine 150 milliGRAM(s) Oral every 24 hours  Saliva Substitute (CAPHOSOL) 30 milliLiter(s) Swish and Spit every 6 hours  sodium chloride 0.9%. 1000 milliLiter(s) (75 mL/Hr) IV Continuous <Continuous>  sodium chloride 0.9%. 1000 milliLiter(s) (75 mL/Hr) IV Continuous <Continuous>  valACYclovir 1000 milliGRAM(s) Oral daily    MEDICATIONS  (PRN):  acetaminophen   Tablet .. 650 milliGRAM(s) Oral every 6 hours PRN Temp greater or equal to 38C (100.4F)  artificial  tears Solution 1 Drop(s) Both EYES every 6 hours PRN Dry Eyes  benzocaine 15 mG/menthol 3.6 mG (Sugar-Free) Lozenge 1 Lozenge Oral five times a day PRN Sore Throat  docusate sodium 100 milliGRAM(s) Oral three times a day PRN Constipation  guaiFENesin    Syrup 100 milliGRAM(s) Oral every 6 hours PRN Cough  HYDROmorphone   Tablet 2 milliGRAM(s) Oral every 4 hours PRN moderate - severe pain  ondansetron Injectable 8 milliGRAM(s) IV Push every 8 hours PRN Nausea and/or Vomiting  senna 2 Tablet(s) Oral at bedtime PRN Constipation        RADIOLOGY & ADDITIONAL TESTS:    4/30/19 : Xray Esophagram (04.30.19 @ 09:31) Limited evaluation for esophageal ulcers secondary to underdistention of  the esophagus.      4/27/19 : Xray Chest 1 View- PORTABLE-Urgent (04.27.19 @ 22:45) Probable small left pleural effusion with adjacent opacity that likely  represents atelectasis.    4/25/19 : CT Brain Stroke Protocol (04.25.19 @ 19:42) No acute intracranial hemorrhage, mass effect, or midline shift. No  abnormal extra-axial collections. The basal cisterns are patent without evidence of central herniation. The sulci and ventricles are within normal limits for the patient's age. Stable few patchy areas of low-attenuation in the bihemispheric white  matter, which is nonspecific, but likely related to sequela of chronic  microvascular ischemic disease. Stable coarse calcifications in the left temporal lobe.      MICROBIOLOGY DATA:      Culture - Blood (04.28.19 @ 18:20)    Culture - Blood:   NO ORGANISMS ISOLATED  NO ORGANISMS ISOLATED AT 24 HOURS    Specimen Source: BLOOD VENOUS      Culture - Blood (04.28.19 @ 18:20)    Culture - Blood:   NO ORGANISMS ISOLATED  NO ORGANISMS ISOLATED AT 24 HOURS    Specimen Source: BLOOD PERIPHERAL        Culture - Blood (04.27.19 @ 19:50)    Culture - Blood:   NO ORGANISMS ISOLATED  NO ORGANISMS ISOLATED AT 24 HOURS    Specimen Source: BLOOD PERIPHERAL      Culture - Group A Streptococcus (04.27.19 @ 14:24)    Culture - Group A Streptococcus:   NO BETA STREP. GROUP A  AFTER 24HRS.    Specimen Source: THROAT

## 2019-05-01 NOTE — PROGRESS NOTE ADULT - ASSESSMENT
1. Pancytopenia     -- counts slow to recover sec to heavily tx for myeloma  -- likely related to tx, MM  -- Transfuse to keep Hgb > 7 and Platelets> 10  -- cont  Zarxio 480mcg daily until ANC > 1500 x 2 consecutive days  -- monitor for infection or bleeding     2. MM relapsed/refractory including failed Auto Transplant  -- on tavares/pomalyst.   -- hold pomalyst for now in acute setting and severe pancytopenia  -- bendamustine as outpt when recovers  -- paraproteins improving  -- s/p decadron 40mg 4/25, cont weekly    3. Tonsilitis vs Candida Esophagitis    -- cont Cefepime, Diflucan,  and Vanco per ID  -- cont Valtrex 1000mg PO QD since sx not better and pt was on Bendamustine and multiple prior tx  -- ID f/u  -- cont magic mouthwash  -- cont IVF  -- nutrition consult    Pls call with questions, [phone]

## 2019-05-01 NOTE — PROGRESS NOTE ADULT - ASSESSMENT
· Assessment		  52yo M hx of relapsed multiple myeloma s/p bone marrow transplant (~1.5 yrs ago) and chemo/RT (last infusion Bendamustine and Pomalidomide 4/08/2019), perforated diverticulitis s/p Kiah's (proctosigmoidectomy with colostomy, 9/2018), nonischemic cardiomyopathy (mild global LV systolic dysfunction with EF 54% and diastolic LV dysfunction based on TTE in 11/2018), and HTN presents with vision change and vomiting that resolved, concern for CVA, admitted for pancytopenia.    Problem/Plan - 1:  ·  Problem: Pancytopenia with neutropenic fever 2/2 acute tonsillitis and esophageal candidiasis complicated by symptomatic anemia requiring prbc transfusions:      persistent      c/w abx, filgrastim      transfuse prn to maintain hgb > 7.5      all consultants and medical team members management greatly appreciated      f/u cultures to final date      Problem/Plan:  Problem: Acute Renal Failure, likely ATN:      intrinsic vs post renal etiology      possibly 2/2 underlying multiple myeloma complicated by symptomatic anemia/nsaids/vanco/valacyclovir +/- alternative etiology      f/u urine lytes      f/u renal u/s      f/u consultants for management      strict i/o       avoid nephrotoxic rx        optimize h/h and intravascular volume      Problem/Plan -:  ·  Problem: Multiple myeloma in relapse.  Plan: MM in relapse s/p bone marrow transplant (~1.5 yrs ago) and chemo/RT. Last infusion Bendamustine and Pomalidomide 4/08/2019  Continue Current medications and monitor.   - Hem/ onc mgmt    Problem/Plan -:  ·  Chronic Systolic CHF  - c/w cardiac meds, adjust/restart meds as needed    Problem/Plan -:  Acute Tonsilitis and Candida Esophagitis  continue antifungals per ID  continue Cepacol      Problem/Plan -:  Problem: Prophylactic measure. Plan: VTE ppx: no chemoppx or SCDs due to thrombocytopenia, pt encouraged to ambulate / OOB more

## 2019-05-01 NOTE — PROGRESS NOTE ADULT - ASSESSMENT
pt with nausea and vomiting, would not do endosocpic evaluation secondary to counts.  ppi bid and reglan.

## 2019-05-01 NOTE — CONSULT NOTE ADULT - SUBJECTIVE AND OBJECTIVE BOX
[facility] Consult Note Nephrology - CONSULTATION NOTE - [phone] - Dr Bahena / Dr Regalado / Dr Benz / Dr Frost / Dr Mahmood / Dr Mcgee / Dr Guzman / Dr Guerra    Patient is a 53y Male with relapsed multiple myeloma s/p bone marrow transplant (~1.5 yrs ago) and chemo/RT (last infusion Bendamustine and Pomalidomide 2019), perforated diverticulitis s/p Kiah's (proctosigmoidectomy with colostomy, 2018), nonischemic cardiomyopathy (mild global LV systolic dysfunction with EF 54% and diastolic LV dysfunction based on TTE in 2018), and HTN admitted to [facility] on  for nausea and vomiting, and poor PO intake. Upon presentation to ED, code stroke was called for concern of AMS. MRI C/T/L showed abnormal T1 prolongation in the whole spine, sacral and iliac bones. Received 7 days of Bendamustine and Pomalidomide (on hold few days for neutropenia), last on 2019. Since admission, he has been treated for neutropenic fever, thought secondary to tonsilitis and esophagitis.     PAST MEDICAL & SURGICAL HISTORY:  Diverticulitis: perforated s/p Kiah  Multiple myeloma: relapsed 3/2019  Hypertension  Left ventricular dysfunction  Cardiomyopathy: nonischemic  Former smoker: (1 ppd x 11 years; Quit ~age 26)  History of bone marrow transplant  History of surgery: s/p exploratory laparotomy with sigmoid resection and colostomy on 2018    Allergies:  oxycodone (Vomiting; Nausea)    Home Medications Reviewed  Hospital Medications:   MEDICATIONS  (STANDING):  allopurinol 300 milliGRAM(s) Oral daily  carvedilol 25 milliGRAM(s) Oral every 12 hours  filgrastim-sndz Injectable 480 MICROGram(s) SubCutaneous daily  FIRST- Mouthwash  BLM 5 milliLiter(s) Swish and Spit five times a day  metoclopramide Injectable 10 milliGRAM(s) IV Push four times a day  Saliva Substitute (CAPHOSOL) 30 milliLiter(s) Swish and Spit every 6 hours  sodium chloride 0.9%. 1000 milliLiter(s) (75 mL/Hr) IV Continuous <Continuous>  sodium chloride 0.9%. 1000 milliLiter(s) (75 mL/Hr) IV Continuous <Continuous>  valACYclovir 1000 milliGRAM(s) Oral daily    SOCIAL HISTORY:  Denies ETOh,Smoking,   FAMILY HISTORY:  Family history of diabetes mellitus  Family history of hypertension    REVIEW OF SYSTEMS:  CONSTITUTIONAL: No weakness, fevers or chills  EYES/ENT: No visual changes;  No vertigo or throat pain   NECK: No pain or stiffness  RESPIRATORY: No cough, wheezing, hemoptysis; No shortness of breath  CARDIOVASCULAR: No chest pain or palpitations.  GASTROINTESTINAL: No abdominal or epigastric pain. No nausea, vomiting, or hematemesis; No diarrhea or constipation. No melena or hematochezia.  GENITOURINARY: No dysuria, frequency, foamy urine, urinary urgency, incontinence or hematuria  NEUROLOGICAL: No numbness or weakness  SKIN: No itching, burning, rashes, or lesions   VASCULAR: No bilateral lower extremity edema.   All other review of systems is negative unless indicated above.    VITALS:  T(F): 98.7 (19 @ 10:35), Max: 98.7 (19 @ 10:35)  HR: 108 (19 @ 10:35)  BP: 108/59 (19 @ 10:35)  RR: 19 (19 @ 10:35)  SpO2: 100% (19 @ 10:35)  Wt(kg): --    04-30 @ 07:01  -   @ 07:00  --------------------------------------------------------  IN: 0 mL / OUT: 825 mL / NET: -825 mL        PHYSICAL EXAM:  Constitutional: NAD  HEENT: anicteric sclera, oropharynx clear, MMM  Neck: No JVD  Respiratory: CTAB, no wheezes, rales or rhonchi  Cardiovascular: S1, S2, RRR  Gastrointestinal: BS+, soft, NT/ND  Extremities: No cyanosis or clubbing. No peripheral edema  Neurological: A/O x 3, no focal deficits  Psychiatric: Normal mood, normal affect  : No CVA tenderness. No quintero.   Skin: No rashes  Vascular Access:    LABS:      134<L>  |  103  |  41<H>  ----------------------------<  120<H>  4.0   |  12<L>  |  3.16<H>    Ca    6.9<L>      01 May 2019 06:05  Phos  2.8     05  Mg     1.8         TPro  5.9<L>  /  Alb  3.0<L>  /  TBili  2.0<H>  /  DBili      /  AST  13  /  ALT  47<H>  /  AlkPhos  100  04-    Creatinine Trend: 3.16 <--, 1.46 <--, 1.15 <--, 1.10 <--, 1.10 <--, 1.02 <--, 1.17 <--                        7.3    0.40  )-----------(        ( 01 May 2019 06:05 )             21.3     Urine Studies:  Urinalysis Basic - ( 2019 19:08 )    Color: YELLOW / Appearance: CLEAR / S.016 / pH: 6.5  Gluc: NEGATIVE / Ketone: NEGATIVE  / Bili: NEGATIVE / Urobili: NORMAL   Blood: NEGATIVE / Protein: 200 / Nitrite: NEGATIVE   Leuk Esterase: NEGATIVE / RBC: 3-5 / WBC 0-2   Sq Epi: OCC / Non Sq Epi:  / Bacteria: NEGATIVE        RADIOLOGY & ADDITIONAL STUDIES: [facility] Consult Note Nephrology - CONSULTATION NOTE - [phone] - Dr Bahena / Dr Regalado / Dr Benz / Dr Frost / Dr Mahmood / Dr Mcgee / Dr Guzman / Dr Guerra    Patient is a 53y Male with relapsed multiple myeloma s/p bone marrow transplant (~1.5 yrs ago) and chemo/RT (last infusion Bendamustine and Pomalidomide 2019), perforated diverticulitis s/p Kiah's (proctosigmoidectomy with colostomy, 2018), nonischemic cardiomyopathy (mild global LV systolic dysfunction with EF 54% and diastolic LV dysfunction based on TTE in 2018), and HTN admitted to [facility] on  for nausea and vomiting, and poor PO intake. Upon presentation to ED, code stroke was called for concern of AMS. MRI C/T/L showed abnormal T1 prolongation in the whole spine, sacral and iliac bones. Received 7 days of Bendamustine and Pomalidomide (on hold few days for neutropenia), last on 2019. Since admission, he has been treated for neutropenic fever, thought secondary to tonsilitis and esophagitis, with cefepime, vanc and fluconazole and valcyte. Labs significant for pancytopenia, elevated vanc level, and new ROBB. No hx of renal disease. PT without complaints currently, other than fatigue and poor appetite. While inpatient, he has been getting occasional doses of lasix and ibuprofen. Renal consult for new ROBB. No urinary complaints, SOB or LE swelling.     PAST MEDICAL & SURGICAL HISTORY:  Diverticulitis: perforated s/p Kiah  Multiple myeloma: relapsed 3/2019  Hypertension  Left ventricular dysfunction  Cardiomyopathy: nonischemic  Former smoker: (1 ppd x 11 years; Quit ~age 26)  History of bone marrow transplant  History of surgery: s/p exploratory laparotomy with sigmoid resection and colostomy on 2018    Allergies:  oxycodone (Vomiting; Nausea)    Home Medications Reviewed  Hospital Medications:   MEDICATIONS  (STANDING):  allopurinol 300 milliGRAM(s) Oral daily  carvedilol 25 milliGRAM(s) Oral every 12 hours  filgrastim-sndz Injectable 480 MICROGram(s) SubCutaneous daily  FIRST- Mouthwash  BLM 5 milliLiter(s) Swish and Spit five times a day  metoclopramide Injectable 10 milliGRAM(s) IV Push four times a day  Saliva Substitute (CAPHOSOL) 30 milliLiter(s) Swish and Spit every 6 hours  sodium chloride 0.9%. 1000 milliLiter(s) (75 mL/Hr) IV Continuous <Continuous>  sodium chloride 0.9%. 1000 milliLiter(s) (75 mL/Hr) IV Continuous <Continuous>  valACYclovir 1000 milliGRAM(s) Oral daily    SOCIAL HISTORY:  Denies ETOh,Smoking,   FAMILY HISTORY:  Family history of diabetes mellitus  Family history of hypertension    REVIEW OF SYSTEMS:  CONSTITUTIONAL: +weakness, +fevers or chills  EYES/ENT: No visual changes;  No vertigo or throat pain   NECK: No pain or stiffness  RESPIRATORY: No cough, wheezing, hemoptysis; No shortness of breath  CARDIOVASCULAR: No chest pain or palpitations.  GASTROINTESTINAL: No abdominal or epigastric pain. No nausea, vomiting, or hematemesis; No diarrhea or constipation. No melena or hematochezia.  GENITOURINARY: No dysuria, frequency, foamy urine, urinary urgency, incontinence or hematuria  NEUROLOGICAL: No numbness or weakness  SKIN: +rash  VASCULAR: No bilateral lower extremity edema.   All other review of systems is negative unless indicated above.    VITALS:  T(F): 98.7 (19 @ 10:35), Max: 98.7 (19 @ 10:35)  HR: 108 (19 @ 10:35)  BP: 108/59 (19 @ 10:35)  RR: 19 (19 @ 10:35)  SpO2: 100% (19 @ 10:35)  Wt(kg): --     @ 07:01  -   @ 07:00  --------------------------------------------------------  IN: 0 mL / OUT: 825 mL / NET: -825 mL        PHYSICAL EXAM:  Constitutional: NAD  HEENT: anicteric sclera, oropharynx clear, MMM  Neck: No JVD  Respiratory: CTAB, no wheezes, rales or rhonchi  Cardiovascular: S1, S2, RRR  Gastrointestinal: BS+, soft, NT/ND, colostomy   Extremities: No cyanosis or clubbing. No peripheral edema  Neurological: A/O x 3, no focal deficits  Psychiatric: Normal mood, normal affect  : No CVA tenderness. No quintero.   Skin: Diffuse puritic rash     LABS:      134<L>  |  103  |  41<H>  ----------------------------<  120<H>  4.0   |  12<L>  |  3.16<H>    Ca    6.9<L>      01 May 2019 06:05  Phos  2.8       Mg     1.8         TPro  5.9<L>  /  Alb  3.0<L>  /  TBili  2.0<H>  /  DBili      /  AST  13  /  ALT  47<H>  /  AlkPhos  100      Creatinine Trend: 3.16 <--, 1.46 <--, 1.15 <--, 1.10 <--, 1.10 <--, 1.02 <--, 1.17 <--                        7.3    0.40  )-----------( 12       ( 01 May 2019 06:05 )             21.3     Urine Studies:  Urinalysis Basic - ( 2019 19:08 )    Color: YELLOW / Appearance: CLEAR / S.016 / pH: 6.5  Gluc: NEGATIVE / Ketone: NEGATIVE  / Bili: NEGATIVE / Urobili: NORMAL   Blood: NEGATIVE / Protein: 200 / Nitrite: NEGATIVE   Leuk Esterase: NEGATIVE / RBC: 3-5 / WBC 0-2   Sq Epi: OCC / Non Sq Epi:  / Bacteria: NEGATIVE        RADIOLOGY & ADDITIONAL STUDIES:  < from: US Kidney and Bladder (19 @ 12:46) >  IMPRESSION:     Parenchymal renal disease.    < end of copied text >      < from: CT Brain Stroke Protocol (19 @ 19:42) >  IMPRESSION:     No CT evidence of acute intracranial hemorrhage or mass effect from   vasogenic edema. If symptoms persist and there are no contraindications,   recommend MRI of the brain and orbits with intravenous contrast for   further evaluation.    These findings were discussed with Dr. Baez at 2019 7:47 PM by Dr. Palmer with read back confirmation.    < end of copied text >

## 2019-05-01 NOTE — PROGRESS NOTE ADULT - ASSESSMENT
A 52 yo Male with  relapsed multiple myeloma s/p bone marrow transplant (~1.5 yrs ago) and chemo/RT (last infusion Bendamustine and Pomalidomide 4/08/2019), perforated diverticulitis s/p Kiah's (proctosigmoidectomy with colostomy, presents to [facility] ER for evaluation of  vomiting and change of vision. He also had been weak, with epistaxis, and petechiae. ON admission, he found to have Pancytopenia and Neutropenia with ANC of 120. He has no fever but rigors and also c/o Odynophagia. The CT head shows No acute intracranial events. The ID consult requested to assist with evaluation and antibiotic management of Neutropenia and Tonsilitis.     # Neutropenic Fever - develope dfever today, 4/28/19  #? Acute Tonsilitis  # Possible Candida Esophagitis-  May benefit from  EGD when cell numbers are stable, since Odynophagia  not improving on Fluconazole    would recommend:    1. Add IV Valacyclovir still has odynophagia on Fluconazole  2. Monitor temp and c/w supportive care  3. Continue Cefepime , Fluconazole, Vancomycin and Add Valacyclovir, since still has odynophagia and spiking fever  4. Continue Zarxio and Monitor ANC  5. Management of MM as per Hem/Onc    d/w  Patient     will follow the patient with you A 52 yo Male with  relapsed multiple myeloma s/p bone marrow transplant (~1.5 yrs ago) and chemo/RT (last infusion Bendamustine and Pomalidomide 4/08/2019), perforated diverticulitis s/p Kiah's (proctosigmoidectomy with colostomy, presents to [facility] ER for evaluation of  vomiting and change of vision. He also had been weak, with epistaxis, and petechiae. ON admission, he found to have Pancytopenia and Neutropenia with ANC of 120. He has no fever but rigors and also c/o Odynophagia. The CT head shows No acute intracranial events. The ID consult requested to assist with evaluation and antibiotic management of Neutropenia and Tonsilitis.     # Neutropenic Fever - develope dfever today, 4/28/19  #? Acute Tonsilitis  # Possible Candida Esophagitis-  May benefit from  EGD when cell numbers are stable, since Odynophagia  not improving on Fluconazole    would recommend:    1. Hold Vancomycin , monitor level daily and restart when level is less than 15  2. Continue oral  Valacyclovir, Fluconazole and Cefepime  3. Continue Zarxio and Monitor ANC  4. Management of MM as per Hem/Onc    d/w  Patient     will follow the patient with you

## 2019-05-01 NOTE — CONSULT NOTE ADULT - ASSESSMENT
53y Male with relapsed multiple myeloma s/p bone marrow transplant (~1.5 yrs ago) and chemo/RT (last infusion Bendamustine and Pomalidomide 4/08/2019), perforated diverticulitis s/p Kiah's (proctosigmoidectomy with colostomy, 9/2018), nonischemic cardiomyopathy (mild global LV systolic dysfunction with EF 54% and diastolic LV dysfunction based on TTE in 11/2018), and HTN admitted to [facility] on 4/25 for nausea and vomiting, and poor PO intake, treated for neutropenic fever, c/b ROBB

## 2019-05-01 NOTE — PROGRESS NOTE ADULT - SUBJECTIVE AND OBJECTIVE BOX
Patient seen and examined at bedside  s/p 2 u prbc for symptomatic anemia  Case discussed with medical team    HPI:  54yo M hx of relapsed multiple myeloma s/p bone marrow transplant (~1.5 yrs ago) and chemo/RT (last infusion Bendamustine and Pomalidomide 4/08/2019), perforated diverticulitis s/p Kiah's (proctosigmoidectomy with colostomy, 9/2018), nonischemic cardiomyopathy (mild global LV systolic dysfunction with EF 54% and diastolic LV dysfunction based on TTE in 11/2018), and HTN presents to [facility] ED w/ vomiting and change of vision. Wife Devan Hood at bedside. Pt was sitting at home. He vomited 2-3 times nonbloody, nonbilious and was not able to eat or drink anything. On 4/25 at 4pm, while sitting he noticed his vision went "white" for a few minutes associated with sweating, shaking, and weakness. His wife noticed he had a red rash on his legs and they brought him to the ED. Pt received a platelet transfusion about a week ago outpt. Denies any pain. Had a nose bleed earlier. He denies skin sores, measured fever, HA, trouble speaking, numbness/tingling, focal weakness, dizziness, CP, SOB, abd pain, N/V/D, hematochezia, melena, urinary symptoms, oral/genital sores. He has a recent admission to [facility] discharged 4/08/2019 for diffuse body pain and low-grade fever, infectious workup was negative. MRI C/T/L showed abnormal T1 prolongation in the whole spine, sacral and iliac bones. Received 7 days of Bendamustine and Pomalidomide (on hold few days for neutropenia), last on 4/08/2019.    When pt presented to the ED, code stroke called. Neuro exam was nonfocal, CTH neg for acute process, and vomiting and vision changes were resolved. ED vitals afebrile, H101, /58 --> 102/62, R18, sat 100% on RA. Given decadron 40mg IV, 1u PLT. 1u pRBC ordered. (26 Apr 2019 00:00)      PAST MEDICAL & SURGICAL HISTORY:  Diverticulitis: perforated s/p Kiah  Multiple myeloma: relapsed 3/2019  Hypertension  Left ventricular dysfunction  Cardiomyopathy: nonischemic  Former smoker: (1 ppd x 11 years; Quit ~age 26)  History of bone marrow transplant  History of surgery: s/p exploratory laparotomy with sigmoid resection and colostomy on 9/2/2018      oxycodone (Vomiting; Nausea)       MEDICATIONS  (STANDING):  allopurinol 300 milliGRAM(s) Oral daily  carvedilol 25 milliGRAM(s) Oral every 12 hours  filgrastim-sndz Injectable 480 MICROGram(s) SubCutaneous daily  FIRST- Mouthwash  BLM 5 milliLiter(s) Swish and Spit five times a day  Saliva Substitute (CAPHOSOL) 30 milliLiter(s) Swish and Spit every 6 hours  sodium chloride 0.9%. 1000 milliLiter(s) (75 mL/Hr) IV Continuous <Continuous>  sodium chloride 0.9%. 1000 milliLiter(s) (75 mL/Hr) IV Continuous <Continuous>  valACYclovir 1000 milliGRAM(s) Oral daily    MEDICATIONS  (PRN):  acetaminophen   Tablet .. 650 milliGRAM(s) Oral every 6 hours PRN Temp greater or equal to 38C (100.4F)  artificial  tears Solution 1 Drop(s) Both EYES every 6 hours PRN Dry Eyes  benzocaine 15 mG/menthol 3.6 mG (Sugar-Free) Lozenge 1 Lozenge Oral five times a day PRN Sore Throat  docusate sodium 100 milliGRAM(s) Oral three times a day PRN Constipation  guaiFENesin    Syrup 100 milliGRAM(s) Oral every 6 hours PRN Cough  HYDROmorphone   Tablet 2 milliGRAM(s) Oral every 4 hours PRN moderate - severe pain  ondansetron Injectable 8 milliGRAM(s) IV Push every 8 hours PRN Nausea and/or Vomiting  senna 2 Tablet(s) Oral at bedtime PRN Constipation      REVIEW OF SYSTEMS:  CONSTITUTIONAL: (+) malaise. fatigue. lethargy. gen weakness.   EYES: No acute change in vision   ENT:  No tinnitus  NECK: No stiffness  RESPIRATORY: No hemoptysis  CARDIOVASCULAR: No chest pain, palpitations, syncope  GASTROINTESTINAL: No hematemesis, diarrhea, melena, or hematochezia.  GENITOURINARY: No hematuria  NEUROLOGICAL: No headaches  LYMPH Nodes: No enlarged glands  ENDOCRINE: No heat or cold intolerance	    T(C): 36.9 (05-01-19 @ 06:31), Max: 36.9 (05-01-19 @ 06:31)  HR: 106 (05-01-19 @ 06:31) (104 - 115)  BP: 98/56 (05-01-19 @ 06:31) (92/52 - 111/60)  RR: 17 (05-01-19 @ 06:31) (16 - 19)  SpO2: 100% (05-01-19 @ 06:31) (97% - 100%)    PHYSICAL EXAMINATION:   Constitutional: ill appearing  HEENT: AT  Neck:  Supple  Respiratory:  Adequate airflow b/l. Not using accessory muscles of respiration.  Cardiovascular:  S1 & S2 intact, no R/G, 2+ radial pulses b/l  Gastrointestinal: Soft, NT, ND, normoactive b.s., no organomegaly/RT/rigidity  Extremities: WWP  Neurological:  Alert and awake.  No acute focal motor deficits. Crude sensation intact.     Labs and imaging reviewed    LABS:                        7.3    0.40  )-----------( 12       ( 01 May 2019 06:05 )             21.3     05-01    134<L>  |  103  |  41<H>  ----------------------------<  120<H>  4.0   |  12<L>  |  3.16<H>    Ca    6.9<L>      01 May 2019 06:05  Phos  2.8     05-01  Mg     1.8     05-01    TPro  5.9<L>  /  Alb  3.0<L>  /  TBili  2.0<H>  /  DBili  x   /  AST  13  /  ALT  47<H>  /  AlkPhos  100  04-30            CAPILLARY BLOOD GLUCOSE            LIVER FUNCTIONS - ( 30 Apr 2019 06:15 )  Alb: 3.0 g/dL / Pro: 5.9 g/dL / ALK PHOS: 100 u/L / ALT: 47 u/L / AST: 13 u/L / GGT: x               RADIOLOGY & ADDITIONAL STUDIES:

## 2019-05-01 NOTE — CHART NOTE - NSCHARTNOTEFT_GEN_A_CORE
Pt noted to have elevated Vanco trough and ROBB (Cr 3.14). Vancomycin discontinued. Cefepime and fluconazole dose adjusted for renal impairment. Valacyclovir ordered (also renally dosed). Gentle IVF initiated (will monitor fluid status as pt with hx of heart failure). Urine osm, urine lytes and PVR bladder scan obtained to further evaluate ROBB, although likely 2/2 elevated vanco trough. Other than Cr, TLS labs (potassium, phos, LDH, uric acid) WNL. Will continue to monitor Cr and vanco trough level. Medication doses to be re-adjusted when ROBB resolves. Discussed with medicine attg.

## 2019-05-02 DIAGNOSIS — E87.2 ACIDOSIS: ICD-10-CM

## 2019-05-02 LAB
ALBUMIN SERPL ELPH-MCNC: 2.7 G/DL — LOW (ref 3.3–5)
ANION GAP SERPL CALC-SCNC: 21 MMO/L — HIGH (ref 7–14)
ANISOCYTOSIS BLD QL: SLIGHT — SIGNIFICANT CHANGE UP
APPEARANCE UR: SIGNIFICANT CHANGE UP
BACTERIA # UR AUTO: SIGNIFICANT CHANGE UP
BACTERIA BLD CULT: SIGNIFICANT CHANGE UP
BASE EXCESS BLDA CALC-SCNC: -15.3 MMOL/L — SIGNIFICANT CHANGE UP
BASOPHILS # BLD AUTO: 0 K/UL — SIGNIFICANT CHANGE UP (ref 0–0.2)
BASOPHILS NFR BLD AUTO: 0 % — SIGNIFICANT CHANGE UP (ref 0–2)
BASOPHILS NFR SPEC: 0 % — SIGNIFICANT CHANGE UP (ref 0–2)
BILIRUB UR-MCNC: NEGATIVE — SIGNIFICANT CHANGE UP
BLASTS # FLD: 0 % — SIGNIFICANT CHANGE UP (ref 0–0)
BLOOD GAS ARTERIAL - FIO2: 21 — SIGNIFICANT CHANGE UP
BLOOD UR QL VISUAL: SIGNIFICANT CHANGE UP
BUN SERPL-MCNC: 61 MG/DL — HIGH (ref 7–23)
BURR CELLS BLD QL SMEAR: PRESENT — SIGNIFICANT CHANGE UP
CALCIUM SERPL-MCNC: 6.7 MG/DL — LOW (ref 8.4–10.5)
CHLORIDE SERPL-SCNC: 102 MMOL/L — SIGNIFICANT CHANGE UP (ref 98–107)
CO2 SERPL-SCNC: 10 MMOL/L — CRITICAL LOW (ref 22–31)
COLOR SPEC: YELLOW — SIGNIFICANT CHANGE UP
CREAT SERPL-MCNC: 5.21 MG/DL — HIGH (ref 0.5–1.3)
EOSINOPHIL # BLD AUTO: 0.01 K/UL — SIGNIFICANT CHANGE UP (ref 0–0.5)
EOSINOPHIL NFR BLD AUTO: 2.4 % — SIGNIFICANT CHANGE UP (ref 0–6)
EOSINOPHIL NFR FLD: 2.9 % — SIGNIFICANT CHANGE UP (ref 0–6)
GIANT PLATELETS BLD QL SMEAR: PRESENT — SIGNIFICANT CHANGE UP
GLUCOSE SERPL-MCNC: 91 MG/DL — SIGNIFICANT CHANGE UP (ref 70–99)
GLUCOSE UR-MCNC: NEGATIVE — SIGNIFICANT CHANGE UP
HCO3 BLDA-SCNC: 13 MMOL/L — LOW (ref 22–26)
HCT VFR BLD CALC: 23.9 % — LOW (ref 39–50)
HCT VFR BLD CALC: 26 % — LOW (ref 39–50)
HGB BLD-MCNC: 8.1 G/DL — LOW (ref 13–17)
HGB BLD-MCNC: 8.8 G/DL — LOW (ref 13–17)
HYALINE CASTS # UR AUTO: NEGATIVE — SIGNIFICANT CHANGE UP
IMM GRANULOCYTES NFR BLD AUTO: 4.9 % — HIGH (ref 0–1.5)
KETONES UR-MCNC: SIGNIFICANT CHANGE UP
LDH SERPL L TO P-CCNC: 141 U/L — SIGNIFICANT CHANGE UP (ref 135–225)
LEUKOCYTE ESTERASE UR-ACNC: NEGATIVE — SIGNIFICANT CHANGE UP
LYMPHOCYTES # BLD AUTO: 0.24 K/UL — LOW (ref 1–3.3)
LYMPHOCYTES # BLD AUTO: 58.5 % — HIGH (ref 13–44)
LYMPHOCYTES NFR SPEC AUTO: 45.2 % — HIGH (ref 13–44)
MAGNESIUM SERPL-MCNC: 2.1 MG/DL — SIGNIFICANT CHANGE UP (ref 1.6–2.6)
MANUAL SMEAR VERIFICATION: SIGNIFICANT CHANGE UP
MCHC RBC-ENTMCNC: 28.7 PG — SIGNIFICANT CHANGE UP (ref 27–34)
MCHC RBC-ENTMCNC: 28.8 PG — SIGNIFICANT CHANGE UP (ref 27–34)
MCHC RBC-ENTMCNC: 33.8 % — SIGNIFICANT CHANGE UP (ref 32–36)
MCHC RBC-ENTMCNC: 33.9 % — SIGNIFICANT CHANGE UP (ref 32–36)
MCV RBC AUTO: 84.8 FL — SIGNIFICANT CHANGE UP (ref 80–100)
MCV RBC AUTO: 85 FL — SIGNIFICANT CHANGE UP (ref 80–100)
METAMYELOCYTES # FLD: 1.9 % — HIGH (ref 0–1)
MICROCYTES BLD QL: SLIGHT — SIGNIFICANT CHANGE UP
MONOCYTES # BLD AUTO: 0.06 K/UL — SIGNIFICANT CHANGE UP (ref 0–0.9)
MONOCYTES NFR BLD AUTO: 14.6 % — HIGH (ref 2–14)
MONOCYTES NFR BLD: 4.8 % — SIGNIFICANT CHANGE UP (ref 2–9)
MYELOCYTES NFR BLD: 0 % — SIGNIFICANT CHANGE UP (ref 0–0)
NEUTROPHIL AB SER-ACNC: 18.3 % — LOW (ref 43–77)
NEUTROPHILS # BLD AUTO: 0.08 K/UL — LOW (ref 1.8–7.4)
NEUTROPHILS NFR BLD AUTO: 19.6 % — LOW (ref 43–77)
NEUTS BAND # BLD: 4.8 % — SIGNIFICANT CHANGE UP (ref 0–6)
NITRITE UR-MCNC: NEGATIVE — SIGNIFICANT CHANGE UP
NRBC # BLD: 2 /100WBC — SIGNIFICANT CHANGE UP
NRBC # FLD: 0 K/UL — SIGNIFICANT CHANGE UP (ref 0–0)
NRBC # FLD: 0.03 K/UL — SIGNIFICANT CHANGE UP (ref 0–0)
NRBC FLD-RTO: 5.3 — SIGNIFICANT CHANGE UP
OTHER - HEMATOLOGY %: 0 — SIGNIFICANT CHANGE UP
OVALOCYTES BLD QL SMEAR: SLIGHT — SIGNIFICANT CHANGE UP
PCO2 BLDA: 17 MMHG — LOW (ref 35–48)
PH BLDA: 7.37 PH — SIGNIFICANT CHANGE UP (ref 7.35–7.45)
PH UR: 6 — SIGNIFICANT CHANGE UP (ref 5–8)
PHOSPHATE SERPL-MCNC: 3.1 MG/DL — SIGNIFICANT CHANGE UP (ref 2.5–4.5)
PLATELET # BLD AUTO: 23 K/UL — LOW (ref 150–400)
PLATELET # BLD AUTO: 6 K/UL — CRITICAL LOW (ref 150–400)
PLATELET COUNT - ESTIMATE: SIGNIFICANT CHANGE UP
PMV BLD: 10 FL — SIGNIFICANT CHANGE UP (ref 7–13)
PMV BLD: 9.7 FL — SIGNIFICANT CHANGE UP (ref 7–13)
PO2 BLDA: 166 MMHG — HIGH (ref 83–108)
POIKILOCYTOSIS BLD QL AUTO: SIGNIFICANT CHANGE UP
POLYCHROMASIA BLD QL SMEAR: SLIGHT — SIGNIFICANT CHANGE UP
POTASSIUM SERPL-MCNC: 3.9 MMOL/L — SIGNIFICANT CHANGE UP (ref 3.5–5.3)
POTASSIUM SERPL-SCNC: 3.9 MMOL/L — SIGNIFICANT CHANGE UP (ref 3.5–5.3)
PROMYELOCYTES # FLD: 0 % — SIGNIFICANT CHANGE UP (ref 0–0)
PROT UR-MCNC: 300 — HIGH
RBC # BLD: 2.82 M/UL — LOW (ref 4.2–5.8)
RBC # BLD: 3.06 M/UL — LOW (ref 4.2–5.8)
RBC # FLD: 15.9 % — HIGH (ref 10.3–14.5)
RBC # FLD: 16.7 % — HIGH (ref 10.3–14.5)
RBC CASTS # UR COMP ASSIST: HIGH (ref 0–?)
SAO2 % BLDA: 98.3 % — SIGNIFICANT CHANGE UP (ref 95–99)
SMUDGE CELLS # BLD: PRESENT — SIGNIFICANT CHANGE UP
SODIUM SERPL-SCNC: 133 MMOL/L — LOW (ref 135–145)
SP GR SPEC: 1.01 — SIGNIFICANT CHANGE UP (ref 1–1.04)
SQUAMOUS # UR AUTO: SIGNIFICANT CHANGE UP
URATE SERPL-MCNC: 7 MG/DL — SIGNIFICANT CHANGE UP (ref 3.4–8.8)
UROBILINOGEN FLD QL: NORMAL — SIGNIFICANT CHANGE UP
VANCOMYCIN FLD-MCNC: 23.8 UG/ML — SIGNIFICANT CHANGE UP
VARIANT LYMPHS # BLD: 20.2 % — SIGNIFICANT CHANGE UP
WBC # BLD: 0.41 K/UL — CRITICAL LOW (ref 3.8–10.5)
WBC # BLD: 0.57 K/UL — CRITICAL LOW (ref 3.8–10.5)
WBC # FLD AUTO: 0.41 K/UL — CRITICAL LOW (ref 3.8–10.5)
WBC # FLD AUTO: 0.57 K/UL — CRITICAL LOW (ref 3.8–10.5)
WBC UR QL: HIGH (ref 0–?)

## 2019-05-02 RX ORDER — CALCIUM GLUCONATE 100 MG/ML
1 VIAL (ML) INTRAVENOUS ONCE
Qty: 0 | Refills: 0 | Status: COMPLETED | OUTPATIENT
Start: 2019-05-02 | End: 2019-05-02

## 2019-05-02 RX ORDER — SODIUM CHLORIDE 9 MG/ML
1000 INJECTION INTRAMUSCULAR; INTRAVENOUS; SUBCUTANEOUS
Qty: 0 | Refills: 0 | Status: DISCONTINUED | OUTPATIENT
Start: 2019-05-02 | End: 2019-05-04

## 2019-05-02 RX ORDER — HYDROMORPHONE HYDROCHLORIDE 2 MG/ML
2 INJECTION INTRAMUSCULAR; INTRAVENOUS; SUBCUTANEOUS EVERY 4 HOURS
Qty: 0 | Refills: 0 | Status: DISCONTINUED | OUTPATIENT
Start: 2019-05-02 | End: 2019-05-08

## 2019-05-02 RX ORDER — DEXAMETHASONE 0.5 MG/5ML
40 ELIXIR ORAL ONCE
Qty: 0 | Refills: 0 | Status: COMPLETED | OUTPATIENT
Start: 2019-05-02 | End: 2019-05-02

## 2019-05-02 RX ADMIN — Medication 30 MILLILITER(S): at 11:30

## 2019-05-02 RX ADMIN — DIPHENHYDRAMINE HYDROCHLORIDE AND LIDOCAINE HYDROCHLORIDE AND ALUMINUM HYDROXIDE AND MAGNESIUM HYDRO 5 MILLILITER(S): KIT at 11:29

## 2019-05-02 RX ADMIN — Medication 10 MILLIGRAM(S): at 07:30

## 2019-05-02 RX ADMIN — Medication 10 MILLIGRAM(S): at 23:15

## 2019-05-02 RX ADMIN — DIPHENHYDRAMINE HYDROCHLORIDE AND LIDOCAINE HYDROCHLORIDE AND ALUMINUM HYDROXIDE AND MAGNESIUM HYDRO 5 MILLILITER(S): KIT at 07:30

## 2019-05-02 RX ADMIN — Medication 200 GRAM(S): at 09:20

## 2019-05-02 RX ADMIN — Medication 30 MILLILITER(S): at 07:30

## 2019-05-02 RX ADMIN — DIPHENHYDRAMINE HYDROCHLORIDE AND LIDOCAINE HYDROCHLORIDE AND ALUMINUM HYDROXIDE AND MAGNESIUM HYDRO 5 MILLILITER(S): KIT at 23:15

## 2019-05-02 RX ADMIN — Medication 120 MILLIGRAM(S): at 10:57

## 2019-05-02 RX ADMIN — Medication 30 MILLILITER(S): at 23:15

## 2019-05-02 RX ADMIN — CEFEPIME 100 MILLIGRAM(S): 1 INJECTION, POWDER, FOR SOLUTION INTRAMUSCULAR; INTRAVENOUS at 11:29

## 2019-05-02 RX ADMIN — Medication 480 MICROGRAM(S): at 11:29

## 2019-05-02 RX ADMIN — Medication 30 MILLILITER(S): at 17:13

## 2019-05-02 RX ADMIN — Medication 300 MILLIGRAM(S): at 11:30

## 2019-05-02 RX ADMIN — CARVEDILOL PHOSPHATE 25 MILLIGRAM(S): 80 CAPSULE, EXTENDED RELEASE ORAL at 17:12

## 2019-05-02 RX ADMIN — Medication 10 MILLIGRAM(S): at 11:30

## 2019-05-02 RX ADMIN — VALACYCLOVIR 1000 MILLIGRAM(S): 500 TABLET, FILM COATED ORAL at 11:30

## 2019-05-02 RX ADMIN — Medication 10 MILLIGRAM(S): at 17:13

## 2019-05-02 RX ADMIN — RANITIDINE HYDROCHLORIDE 150 MILLIGRAM(S): 150 TABLET, FILM COATED ORAL at 17:12

## 2019-05-02 RX ADMIN — DIPHENHYDRAMINE HYDROCHLORIDE AND LIDOCAINE HYDROCHLORIDE AND ALUMINUM HYDROXIDE AND MAGNESIUM HYDRO 5 MILLILITER(S): KIT at 17:12

## 2019-05-02 RX ADMIN — FLUCONAZOLE 100 MILLIGRAM(S): 150 TABLET ORAL at 07:30

## 2019-05-02 NOTE — DIETITIAN INITIAL EVALUATION ADULT. - PROBLEM SELECTOR PLAN 6
VTE ppx: no chemoppx or SCDs due to thrombocytopenia, pt ambulatory  Diet: low salt  Attempted to present to private attending Dr. Jack Castro ([phone]) twice at 2:09AM leaving , attempted again at 4:15AM. Will attempt again

## 2019-05-02 NOTE — DIETITIAN INITIAL EVALUATION ADULT. - OTHER INFO
Initial Dietitian Evaluation 2/2 to extended length of stay. Met with patient and family at bedside. Patient endorses poor appetite and PO intake x 4 days during the course of admission. Reports difficulty swallowing, taking sips of PO supplement Ensure Clear. NKFA. Encouraged small frequent po intake and po supplement between meals and chewing well.

## 2019-05-02 NOTE — PROGRESS NOTE ADULT - SUBJECTIVE AND OBJECTIVE BOX
Pt seen, feeling a little better today    MEDICATIONS  (STANDING):  allopurinol 300 milliGRAM(s) Oral daily  carvedilol 25 milliGRAM(s) Oral every 12 hours  cefepime   IVPB 2000 milliGRAM(s) IV Intermittent every 24 hours  filgrastim-sndz Injectable 480 MICROGram(s) SubCutaneous daily  FIRST- Mouthwash  BLM 5 milliLiter(s) Swish and Spit five times a day  fluconAZOLE IVPB 200 milliGRAM(s) IV Intermittent every 24 hours  metoclopramide Injectable 10 milliGRAM(s) IV Push four times a day  ranitidine 150 milliGRAM(s) Oral every 24 hours  Saliva Substitute (CAPHOSOL) 30 milliLiter(s) Swish and Spit every 6 hours  sodium chloride 0.9%. 1000 milliLiter(s) (75 mL/Hr) IV Continuous <Continuous>  sodium chloride 0.9%. 1000 milliLiter(s) (100 mL/Hr) IV Continuous <Continuous>  valACYclovir 1000 milliGRAM(s) Oral daily    MEDICATIONS  (PRN):  acetaminophen   Tablet .. 650 milliGRAM(s) Oral every 6 hours PRN Temp greater or equal to 38C (100.4F)  artificial  tears Solution 1 Drop(s) Both EYES every 6 hours PRN Dry Eyes  benzocaine 15 mG/menthol 3.6 mG (Sugar-Free) Lozenge 1 Lozenge Oral five times a day PRN Sore Throat  docusate sodium 100 milliGRAM(s) Oral three times a day PRN Constipation  guaiFENesin    Syrup 100 milliGRAM(s) Oral every 6 hours PRN Cough  HYDROmorphone   Tablet 2 milliGRAM(s) Oral every 4 hours PRN moderate - severe pain  ondansetron Injectable 8 milliGRAM(s) IV Push every 8 hours PRN Nausea and/or Vomiting  senna 2 Tablet(s) Oral at bedtime PRN Constipation      ROS  No fever, sweats, chills  No epistaxis, HA, + sore throat  No CP, SOB, cough, sputum  No n/v/d, abd pain, melena, hematochezia  No edema  No rash  No anxiety  No back pain, joint pain  No bleeding, + bruising  No dysuria, hematuria    Vital Signs Last 24 Hrs  T(C): 36.9 (02 May 2019 14:40), Max: 37.4 (02 May 2019 05:25)  T(F): 98.5 (02 May 2019 14:40), Max: 99.4 (02 May 2019 05:25)  HR: 107 (02 May 2019 17:10) (100 - 107)  BP: 132/82 (02 May 2019 17:10) (99/61 - 132/82)  BP(mean): --  RR: 19 (02 May 2019 14:40) (17 - 19)  SpO2: 99% (02 May 2019 14:40) (99% - 100%)    PE  NAD  Awake, alert  Anicteric  RRR  CTAB  Abd soft, NT, ND  No edema, + petechia b/l LE  FROM                          8.1    0.41  )-----------( 6        ( 02 May 2019 06:04 )             23.9       05-02    133<L>  |  102  |  61<H>  ----------------------------<  91  3.9   |  10<LL>  |  5.21<H>    Ca    6.7<L>      02 May 2019 06:04  Phos  3.1     05-02  Mg     2.1     05-02    TPro  x   /  Alb  2.7<L>  /  TBili  x   /  DBili  x   /  AST  x   /  ALT  x   /  AlkPhos  x   05-02

## 2019-05-02 NOTE — PROGRESS NOTE ADULT - ASSESSMENT
A 52 yo Male with  relapsed multiple myeloma s/p bone marrow transplant (~1.5 yrs ago) and chemo/RT (last infusion Bendamustine and Pomalidomide 4/08/2019), perforated diverticulitis s/p Kiah's (proctosigmoidectomy with colostomy, presents to [facility] ER for evaluation of  vomiting and change of vision. He also had been weak, with epistaxis, and petechiae. ON admission, he found to have Pancytopenia and Neutropenia with ANC of 120. He has no fever but rigors and also c/o Odynophagia. The CT head shows No acute intracranial events. The ID consult requested to assist with evaluation and antibiotic management of Neutropenia and Tonsilitis.     # Neutropenic Fever - develope dfever today, 4/28/19  #? Acute Tonsilitis  # Possible Candida Esophagitis-  May benefit from  EGD when cell numbers are stable, since Odynophagia  not improving on Fluconazole    would recommend:    1. monitor level daily and restart when level is less than 15  2. Continue oral  Valacyclovir, Fluconazole and Cefepime  3. Continue Zarxio and Monitor ANC  4. Management of MM as per Hem/Onc    d/w  Patient     will follow the patient with you A 54 yo Male with  relapsed multiple myeloma s/p bone marrow transplant (~1.5 yrs ago) and chemo/RT (last infusion Bendamustine and Pomalidomide 4/08/2019), perforated diverticulitis s/p Kiah's (proctosigmoidectomy with colostomy, presents to [facility] ER for evaluation of  vomiting and change of vision. He also had been weak, with epistaxis, and petechiae. ON admission, he found to have Pancytopenia and Neutropenia with ANC of 120. He has no fever but rigors and also c/o Odynophagia. The CT head shows No acute intracranial events. The ID consult requested to assist with evaluation and antibiotic management of Neutropenia and Tonsilitis.     # Neutropenic Fever - develope dfever today, 4/28/19  #? Acute Tonsilitis  # Possible Candida Esophagitis-  May benefit from  EGD when cell numbers are stable, since Odynophagia  not improving on Fluconazole    would recommend:    1. Monitor kidney function and Vancomycin level daily   2. IVF   3. Continue oral  Valacyclovir, Fluconazole and Cefepime  4. Continue Zarxio and Monitor ANC  5. Management of MM as per Hem/Onc    d/w  Patient and Family at the bed side    will follow the patient with you

## 2019-05-02 NOTE — PROGRESS NOTE ADULT - SUBJECTIVE AND OBJECTIVE BOX
Patient is seen and examined at the bed side,  is afebrile today. The ANC is 40 today. The mucosal pain  and Odynophagia still persists. The Vancomycin level is high, 29.7.        REVIEW OF SYSTEMS: All other review systems are negative        ALLERGIES: oxycodone (Vomiting; Nausea)      Vital Signs Last 24 Hrs  T(C): 36.9 (02 May 2019 14:40), Max: 37.4 (02 May 2019 05:25)  T(F): 98.5 (02 May 2019 14:40), Max: 99.4 (02 May 2019 05:25)  HR: 107 (02 May 2019 17:10) (100 - 107)  BP: 132/82 (02 May 2019 17:10) (99/61 - 132/82)  BP(mean): --  RR: 19 (02 May 2019 14:40) (17 - 19)  SpO2: 99% (02 May 2019 14:40) (99% - 100%)        PHYSICAL EXAM:  GENERAL: Appears ill  HEENT: No oral thrush or any tonsilar exudate noted   CHEST/LUNG:  Air entry bilaterally  HEART: s1 and s2 present  ABDOMEN:  Nontender and  Nondistended  EXTREMITIES: No pedal  edema  CNS: Awake and Alert        LABS:                          8.8    0.57  )-----------( 23       ( 02 May 2019 18:36 )             26.0                         7.3    0.40  )-----------( 12       ( 01 May 2019 06:05 )             21.3                           6.1    0.63  )-----------( 12       ( 30 Apr 2019 07:55 )             18.0                       7.9    0.85  )-----------( 16       ( 28 Apr 2019 07:45 )             23.2                           7.8    1.08  )-----------( 32       ( 27 Apr 2019 07:00 )             23.1       05-02    133<L>  |  102  |  61<H>  ----------------------------<  91  3.9   |  10<LL>  |  5.21<H>    Ca    6.7<L>      02 May 2019 06:04  Phos  3.1     05-02  Mg     2.1     05-02    TPro  x   /  Alb  2.7<L>  /  TBili  x   /  DBili  x   /  AST  x   /  ALT  x   /  AlkPhos  x   05-02 05-01    134<L>  |  103  |  41<H>  ----------------------------<  120<H>  4.0   |  12<L>  |  3.16<H>    Ca    6.9<L>      01 May 2019 06:05  Phos  2.8     05-01  Mg     1.8     05-01    TPro  5.9<L>  /  Alb  3.0<L>  /  TBili  2.0<H>  /  DBili  x   /  AST  13  /  ALT  47<H>  /  AlkPhos  100  04-30 04-30    135  |  104  |  20  ----------------------------<  139<H>  3.8   |  16<L>  |  1.46<H>    Ca    7.2<L>      30 Apr 2019 06:15  Phos  2.4     04-30  Mg     1.8     04-30    TPro  5.9<L>  /  Alb  3.0<L>  /  TBili  2.0<H>  /  DBili  x   /  AST  13  /  ALT  47<H>  /  AlkPhos  100  04-30      Vancomycin level:    Vancomycin Level, Trough - Prior to Next Dose (05.01.19 @ 06:05) Vancomycin Level, Trough: 29.7: Vancomycin trough levels should be rapidly reached and  maintained at 15-20 ug/ml for life threatening MRSA infections such as sepsis, endocarditis, osteomyelitis and pneumonia.     Vancomycin Level, Random (05.02.19 @ 06:04)  Vancomycin Level, Random: 23.8:         MEDICATIONS  (STANDING):  allopurinol 300 milliGRAM(s) Oral daily  carvedilol 25 milliGRAM(s) Oral every 12 hours  cefepime   IVPB 2000 milliGRAM(s) IV Intermittent every 24 hours  filgrastim-sndz Injectable 480 MICROGram(s) SubCutaneous daily  FIRST- Mouthwash  BLM 5 milliLiter(s) Swish and Spit five times a day  fluconAZOLE IVPB 200 milliGRAM(s) IV Intermittent every 24 hours  metoclopramide Injectable 10 milliGRAM(s) IV Push four times a day  ranitidine 150 milliGRAM(s) Oral every 24 hours  Saliva Substitute (CAPHOSOL) 30 milliLiter(s) Swish and Spit every 6 hours  sodium chloride 0.9%. 1000 milliLiter(s) (75 mL/Hr) IV Continuous <Continuous>  sodium chloride 0.9%. 1000 milliLiter(s) (100 mL/Hr) IV Continuous <Continuous>  valACYclovir 1000 milliGRAM(s) Oral daily    MEDICATIONS  (PRN):  acetaminophen   Tablet .. 650 milliGRAM(s) Oral every 6 hours PRN Temp greater or equal to 38C (100.4F)  artificial  tears Solution 1 Drop(s) Both EYES every 6 hours PRN Dry Eyes  benzocaine 15 mG/menthol 3.6 mG (Sugar-Free) Lozenge 1 Lozenge Oral five times a day PRN Sore Throat  docusate sodium 100 milliGRAM(s) Oral three times a day PRN Constipation  guaiFENesin    Syrup 100 milliGRAM(s) Oral every 6 hours PRN Cough  HYDROmorphone   Tablet 2 milliGRAM(s) Oral every 4 hours PRN moderate - severe pain  ondansetron Injectable 8 milliGRAM(s) IV Push every 8 hours PRN Nausea and/or Vomiting  senna 2 Tablet(s) Oral at bedtime PRN Constipation        RADIOLOGY & ADDITIONAL TESTS:    4/30/19 : Xray Esophagram (04.30.19 @ 09:31) Limited evaluation for esophageal ulcers secondary to underdistention of  the esophagus.      4/27/19 : Xray Chest 1 View- PORTABLE-Urgent (04.27.19 @ 22:45) Probable small left pleural effusion with adjacent opacity that likely  represents atelectasis.    4/25/19 : CT Brain Stroke Protocol (04.25.19 @ 19:42) No acute intracranial hemorrhage, mass effect, or midline shift. No  abnormal extra-axial collections. The basal cisterns are patent without evidence of central herniation. The sulci and ventricles are within normal limits for the patient's age. Stable few patchy areas of low-attenuation in the bihemispheric white  matter, which is nonspecific, but likely related to sequela of chronic  microvascular ischemic disease. Stable coarse calcifications in the left temporal lobe.      MICROBIOLOGY DATA:      Culture - Blood (04.28.19 @ 18:20)    Culture - Blood:   NO ORGANISMS ISOLATED  NO ORGANISMS ISOLATED AT 24 HOURS    Specimen Source: BLOOD VENOUS      Culture - Blood (04.28.19 @ 18:20)    Culture - Blood:   NO ORGANISMS ISOLATED  NO ORGANISMS ISOLATED AT 24 HOURS    Specimen Source: BLOOD PERIPHERAL        Culture - Blood (04.27.19 @ 19:50)    Culture - Blood:   NO ORGANISMS ISOLATED  NO ORGANISMS ISOLATED AT 24 HOURS    Specimen Source: BLOOD PERIPHERAL      Culture - Group A Streptococcus (04.27.19 @ 14:24)    Culture - Group A Streptococcus:   NO BETA STREP. GROUP A  AFTER 24HRS.    Specimen Source: THROAT Patient is seen and examined at the bed side,  is afebrile today. The ANC is 570 today. The mucosal pain  and Odynophagia improved a little. He is refusing  to eat.  The creatinine is trending up.         REVIEW OF SYSTEMS: All other review systems are negative        ALLERGIES: oxycodone (Vomiting; Nausea)      Vital Signs Last 24 Hrs  T(C): 36.9 (02 May 2019 14:40), Max: 37.4 (02 May 2019 05:25)  T(F): 98.5 (02 May 2019 14:40), Max: 99.4 (02 May 2019 05:25)  HR: 107 (02 May 2019 17:10) (100 - 107)  BP: 132/82 (02 May 2019 17:10) (99/61 - 132/82)  BP(mean): --  RR: 19 (02 May 2019 14:40) (17 - 19)  SpO2: 99% (02 May 2019 14:40) (99% - 100%)        PHYSICAL EXAM:  GENERAL: Appears ill  HEENT: No oral thrush or any tonsilar exudate noted   CHEST/LUNG:  Air entry bilaterally  HEART: s1 and s2 present  ABDOMEN:  Nontender and  Nondistended  EXTREMITIES: No pedal  edema  CNS: Awake and Alert        LABS:                          8.8    0.57  )-----------( 23       ( 02 May 2019 18:36 )             26.0                         7.3    0.40  )-----------( 12       ( 01 May 2019 06:05 )             21.3                                  7.9    0.85  )-----------( 16       ( 28 Apr 2019 07:45 )             23.2                           7.8    1.08  )-----------( 32       ( 27 Apr 2019 07:00 )             23.1       05-02    133<L>  |  102  |  61<H>  ----------------------------<  91  3.9   |  10<LL>  |  5.21<H>    Ca    6.7<L>      02 May 2019 06:04  Phos  3.1     05-02  Mg     2.1     05-02    TPro  x   /  Alb  2.7<L>  /  TBili  x   /  DBili  x   /  AST  x   /  ALT  x   /  AlkPhos  x   05-02 05-01    134<L>  |  103  |  41<H>  ----------------------------<  120<H>  4.0   |  12<L>  |  3.16<H>    Ca    6.9<L>      01 May 2019 06:05  Phos  2.8     05-01  Mg     1.8     05-01    TPro  5.9<L>  /  Alb  3.0<L>  /  TBili  2.0<H>  /  DBili  x   /  AST  13  /  ALT  47<H>  /  AlkPhos  100  04-30 04-30    135  |  104  |  20  ----------------------------<  139<H>  3.8   |  16<L>  |  1.46<H>    Ca    7.2<L>      30 Apr 2019 06:15  Phos  2.4     04-30  Mg     1.8     04-30    TPro  5.9<L>  /  Alb  3.0<L>  /  TBili  2.0<H>  /  DBili  x   /  AST  13  /  ALT  47<H>  /  AlkPhos  100  04-30      Vancomycin level:    Vancomycin Level, Trough - Prior to Next Dose (05.01.19 @ 06:05) Vancomycin Level, Trough: 29.7: Vancomycin trough levels should be rapidly reached and  maintained at 15-20 ug/ml for life threatening MRSA infections such as sepsis, endocarditis, osteomyelitis and pneumonia.     Vancomycin Level, Random (05.02.19 @ 06:04)  Vancomycin Level, Random: 23.8:         MEDICATIONS  (STANDING):  allopurinol 300 milliGRAM(s) Oral daily  carvedilol 25 milliGRAM(s) Oral every 12 hours  cefepime   IVPB 2000 milliGRAM(s) IV Intermittent every 24 hours  filgrastim-sndz Injectable 480 MICROGram(s) SubCutaneous daily  FIRST- Mouthwash  BLM 5 milliLiter(s) Swish and Spit five times a day  fluconAZOLE IVPB 200 milliGRAM(s) IV Intermittent every 24 hours  metoclopramide Injectable 10 milliGRAM(s) IV Push four times a day  ranitidine 150 milliGRAM(s) Oral every 24 hours  Saliva Substitute (CAPHOSOL) 30 milliLiter(s) Swish and Spit every 6 hours  sodium chloride 0.9%. 1000 milliLiter(s) (75 mL/Hr) IV Continuous <Continuous>  sodium chloride 0.9%. 1000 milliLiter(s) (100 mL/Hr) IV Continuous <Continuous>  valACYclovir 1000 milliGRAM(s) Oral daily    MEDICATIONS  (PRN):  acetaminophen   Tablet .. 650 milliGRAM(s) Oral every 6 hours PRN Temp greater or equal to 38C (100.4F)  artificial  tears Solution 1 Drop(s) Both EYES every 6 hours PRN Dry Eyes  benzocaine 15 mG/menthol 3.6 mG (Sugar-Free) Lozenge 1 Lozenge Oral five times a day PRN Sore Throat  docusate sodium 100 milliGRAM(s) Oral three times a day PRN Constipation  guaiFENesin    Syrup 100 milliGRAM(s) Oral every 6 hours PRN Cough  HYDROmorphone   Tablet 2 milliGRAM(s) Oral every 4 hours PRN moderate - severe pain  ondansetron Injectable 8 milliGRAM(s) IV Push every 8 hours PRN Nausea and/or Vomiting  senna 2 Tablet(s) Oral at bedtime PRN Constipation        RADIOLOGY & ADDITIONAL TESTS:    4/30/19 : Xray Esophagram (04.30.19 @ 09:31) Limited evaluation for esophageal ulcers secondary to underdistention of  the esophagus.      4/27/19 : Xray Chest 1 View- PORTABLE-Urgent (04.27.19 @ 22:45) Probable small left pleural effusion with adjacent opacity that likely  represents atelectasis.    4/25/19 : CT Brain Stroke Protocol (04.25.19 @ 19:42) No acute intracranial hemorrhage, mass effect, or midline shift. No  abnormal extra-axial collections. The basal cisterns are patent without evidence of central herniation. The sulci and ventricles are within normal limits for the patient's age. Stable few patchy areas of low-attenuation in the bihemispheric white  matter, which is nonspecific, but likely related to sequela of chronic  microvascular ischemic disease. Stable coarse calcifications in the left temporal lobe.      MICROBIOLOGY DATA:      Culture - Blood (04.28.19 @ 18:20)    Culture - Blood:   NO ORGANISMS ISOLATED  NO ORGANISMS ISOLATED AT 24 HOURS    Specimen Source: BLOOD VENOUS      Culture - Blood (04.28.19 @ 18:20)    Culture - Blood:   NO ORGANISMS ISOLATED  NO ORGANISMS ISOLATED AT 24 HOURS    Specimen Source: BLOOD PERIPHERAL        Culture - Blood (04.27.19 @ 19:50)    Culture - Blood:   NO ORGANISMS ISOLATED  NO ORGANISMS ISOLATED AT 24 HOURS    Specimen Source: BLOOD PERIPHERAL      Culture - Group A Streptococcus (04.27.19 @ 14:24)    Culture - Group A Streptococcus:   NO BETA STREP. GROUP A  AFTER 24HRS.    Specimen Source: THROAT

## 2019-05-02 NOTE — DIETITIAN INITIAL EVALUATION ADULT. - NS AS NUTRI INTERV COLLABORAT
As discussed with ADS: 1. Suggest speech and swallow consult. PO diet consistency based on SLP recommendation 2. Monitor weights, labs, BM's, skin integrity, p.o. intake/po tolerance. 3. Consider alternate means of nutrition if unable to tolerate po intake./Collaboration with other providers

## 2019-05-02 NOTE — PROGRESS NOTE ADULT - ASSESSMENT
· Assessment		  54yo M hx of relapsed multiple myeloma s/p bone marrow transplant (~1.5 yrs ago) and chemo/RT (last infusion Bendamustine and Pomalidomide 4/08/2019), perforated diverticulitis s/p Kiah's (proctosigmoidectomy with colostomy, 9/2018), nonischemic cardiomyopathy (mild global LV systolic dysfunction with EF 54% and diastolic LV dysfunction based on TTE in 11/2018), and HTN presents with vision change and vomiting that resolved, concern for CVA, admitted for pancytopenia.    Problem/Plan - 1:  ·  Problem: Pancytopenia with neutropenic fever 2/2 acute tonsillitis and esophageal candidiasis complicated by symptomatic anemia requiring prbc transfusions:      persistent       administer decadron today      f/u vanco trough in AM. Restart Vanco if level < 15.       c/w abx, filgrastim      transfuse prn to maintain hgb > 7.5      all consultants and medical team members management greatly appreciated      f/u cultures to final date      Problem/Plan:  Problem: Acute Renal Failure, likely ATN:      persistent abnormal renal function       fluid hydration       possibly 2/2 underlying multiple myeloma complicated by symptomatic anemia/nsaids/vanco/valacyclovir +/- alternative etiology      f/u consultants for management      strict i/o       avoid nephrotoxic rx        optimize h/h and intravascular volume      Problem/Plan -:  ·  Problem: Multiple myeloma in relapse.  Plan: MM in relapse s/p bone marrow transplant (~1.5 yrs ago) and chemo/RT. Last infusion Bendamustine and Pomalidomide 4/08/2019        Continue Current medications and monitor.         Hem/ onc mgmt    Problem/Plan -:  ·Chronic Systolic CHF       - c/w cardiac meds, adjust/restart meds as needed    Problem/Plan -:  Acute Tonsilitis and Candida Esophagitis        continue abx per ID       continue Cepacol      Problem/Plan -:  Problem: Prophylactic measure. Plan: VTE ppx: no chemoppx or SCDs due to thrombocytopenia, pt encouraged to ambulate / OOB more

## 2019-05-02 NOTE — PROGRESS NOTE ADULT - SUBJECTIVE AND OBJECTIVE BOX
Nephrology Followup Note - [phone] - Dr Bahena / Dr Regalado / Dr Benz / Dr Frost / Dr Mahmood / Dr Mcgee / Dr Guzman / Dr Guerra  Pt seen and examined at bedside  No acute events overnight. Afebrile. Unable to tolerate PO.     Allergies:  oxycodone (Vomiting; Nausea)    Hospital Medications:   MEDICATIONS  (STANDING):  allopurinol 300 milliGRAM(s) Oral daily  carvedilol 25 milliGRAM(s) Oral every 12 hours  cefepime   IVPB 2000 milliGRAM(s) IV Intermittent every 24 hours  filgrastim-sndz Injectable 480 MICROGram(s) SubCutaneous daily  FIRST- Mouthwash  BLM 5 milliLiter(s) Swish and Spit five times a day  fluconAZOLE IVPB 200 milliGRAM(s) IV Intermittent every 24 hours  metoclopramide Injectable 10 milliGRAM(s) IV Push four times a day  ranitidine 150 milliGRAM(s) Oral every 24 hours  Saliva Substitute (CAPHOSOL) 30 milliLiter(s) Swish and Spit every 6 hours  sodium chloride 0.9%. 1000 milliLiter(s) (75 mL/Hr) IV Continuous <Continuous>  sodium chloride 0.9%. 1000 milliLiter(s) (75 mL/Hr) IV Continuous <Continuous>  valACYclovir 1000 milliGRAM(s) Oral daily      VITALS:  T(F): 99.4 (19 @ 05:25), Max: 99.4 (19 @ 05:25)  HR: 100 (19 @ 07:29)  BP: 108/66 (19 @ 07:29)  RR: 18 (19 @ 05:25)  SpO2: 99% (19 @ 05:25)  Wt(kg): --     @ 07:01  -   @ 07:00  --------------------------------------------------------  IN: 0 mL / OUT: 450 mL / NET: -450 mL        PHYSICAL EXAM:  Constitutional: NAD  HEENT: anicteric sclera, oropharynx clear, MMM  Neck: No JVD  Respiratory: CTAB, no wheezes, rales or rhonchi  Cardiovascular: S1, S2, RRR  Gastrointestinal: BS+, soft, NT/ND  Extremities: No cyanosis or clubbing. No peripheral edema  Neurological: A/O x 3, no focal deficits  Psychiatric: Normal mood, normal affect  : No CVA tenderness. No quintero.   Skin: +rashes    LABS:      133<L>  |  102  |  61<H>  ----------------------------<  91  3.9   |  10<LL>  |  5.21<H>    Ca    6.7<L>      02 May 2019 06:04  Phos  3.1       Mg     2.1         TPro      /  Alb  2.7<L>  /  TBili      /  DBili      /  AST      /  ALT      /  AlkPhos          Creatinine Trend: 5.21 <--, 3.16 <--, 1.46 <--, 1.15 <--, 1.10 <--, 1.10 <--, 1.02 <--, 1.17 <--                        8.1    0.41  )-----------( 6        ( 02 May 2019 06:04 )             23.9     Urine Studies:  Urinalysis Basic - ( 2019 19:08 )    Color: YELLOW / Appearance: CLEAR / S.016 / pH: 6.5  Gluc: NEGATIVE / Ketone: NEGATIVE  / Bili: NEGATIVE / Urobili: NORMAL   Blood: NEGATIVE / Protein: 200 / Nitrite: NEGATIVE   Leuk Esterase: NEGATIVE / RBC: 3-5 / WBC 0-2   Sq Epi: OCC / Non Sq Epi:  / Bacteria: NEGATIVE      Sodium, Random Urine: 24 mmol/L ( @ 18:30)  Potassium, Random Urine: 28.6 mmol/L ( @ 18:30)  Chloride, Random Urine: < 20 mmol/L ( @ 18:30)  Osmolality, Random Urine: 307 mosmo/kg ( @ 18:30)    RADIOLOGY & ADDITIONAL STUDIES:

## 2019-05-02 NOTE — PROGRESS NOTE ADULT - PROBLEM SELECTOR PLAN 2
Mixed acid-base picture. High anion gap acidosis with compensated resp alkalosis.   Check serum lactate. Met acidosis 2/2 ROBB v lactic acidosis.   Increase IVF hydration. Mixed acid-base picture. High anion gap acidosis with resp alkalosis.   Check serum lactate. Met acidosis 2/2 ROBB v lactic acidosis.   Increase IVF hydration.

## 2019-05-02 NOTE — DIETITIAN INITIAL EVALUATION ADULT. - PERTINENT MEDS FT
MEDICATIONS  (STANDING):  allopurinol 300 milliGRAM(s) Oral daily  carvedilol 25 milliGRAM(s) Oral every 12 hours  cefepime   IVPB 2000 milliGRAM(s) IV Intermittent every 24 hours  filgrastim-sndz Injectable 480 MICROGram(s) SubCutaneous daily  FIRST- Mouthwash  BLM 5 milliLiter(s) Swish and Spit five times a day  fluconAZOLE IVPB 200 milliGRAM(s) IV Intermittent every 24 hours  metoclopramide Injectable 10 milliGRAM(s) IV Push four times a day  ranitidine 150 milliGRAM(s) Oral every 24 hours  Saliva Substitute (CAPHOSOL) 30 milliLiter(s) Swish and Spit every 6 hours  sodium chloride 0.9%. 1000 milliLiter(s) (75 mL/Hr) IV Continuous <Continuous>  sodium chloride 0.9%. 1000 milliLiter(s) (100 mL/Hr) IV Continuous <Continuous>  valACYclovir 1000 milliGRAM(s) Oral daily    MEDICATIONS  (PRN):  acetaminophen   Tablet .. 650 milliGRAM(s) Oral every 6 hours PRN Temp greater or equal to 38C (100.4F)  artificial  tears Solution 1 Drop(s) Both EYES every 6 hours PRN Dry Eyes  benzocaine 15 mG/menthol 3.6 mG (Sugar-Free) Lozenge 1 Lozenge Oral five times a day PRN Sore Throat  docusate sodium 100 milliGRAM(s) Oral three times a day PRN Constipation  guaiFENesin    Syrup 100 milliGRAM(s) Oral every 6 hours PRN Cough  HYDROmorphone   Tablet 2 milliGRAM(s) Oral every 4 hours PRN moderate - severe pain  ondansetron Injectable 8 milliGRAM(s) IV Push every 8 hours PRN Nausea and/or Vomiting  senna 2 Tablet(s) Oral at bedtime PRN Constipation

## 2019-05-02 NOTE — DIETITIAN INITIAL EVALUATION ADULT. - ENERGY NEEDS
Height (cm): 185.42 (25 Apr 2019 23:45) Weight (kg): 77.4 (25 Apr 2019 23:45)  BMI (kg/m2): 22.5 (25 Apr 2019 23:45) IBW: 184lbs (+/-10%)

## 2019-05-02 NOTE — PROGRESS NOTE ADULT - ASSESSMENT
1. Pancytopenia     -- counts slow to recover sec to heavily tx for myeloma  -- likely related to tx, MM  -- Transfuse to keep Hgb > 7 and Platelets> 10  -- cont  Zarxio 480mcg daily until ANC > 1500 x 2 consecutive days  -- monitor for infection or bleeding     2. MM relapsed/refractory including failed Auto Transplant  -- had been on tavares/pomalyst.   -- hold pomalyst for now in acute setting and severe pancytopenia  -- bendamustine as outpt when recovers  -- paraproteins improving  -- s/p decadron 40mg 4/25, weekly dose given today, cont weekly    3. Tonsilitis vs Candida Esophagitis    -- cont Cefepime, Diflucan per ID  -- cont Valtrex 1000mg PO QD since sx not better and pt was on Bendamustine and multiple prior tx  -- ID f/u  -- cont magic mouthwash  -- cont IVF  -- nutrition consult    4. ROBB -- renal input appreciated    Pls call with questions, d/w pt and wife, [phone]

## 2019-05-02 NOTE — PROGRESS NOTE ADULT - SUBJECTIVE AND OBJECTIVE BOX
Patient seen and examined at bedside  c/o fatigue and malaise, also nausea and poor oral intake  Case discussed with medical team    HPI:  54yo M hx of relapsed multiple myeloma s/p bone marrow transplant (~1.5 yrs ago) and chemo/RT (last infusion Bendamustine and Pomalidomide 4/08/2019), perforated diverticulitis s/p Kiah's (proctosigmoidectomy with colostomy, 9/2018), nonischemic cardiomyopathy (mild global LV systolic dysfunction with EF 54% and diastolic LV dysfunction based on TTE in 11/2018), and HTN presents to [facility] ED w/ vomiting and change of vision. Wife Devan Hood at bedside. Pt was sitting at home. He vomited 2-3 times nonbloody, nonbilious and was not able to eat or drink anything. On 4/25 at 4pm, while sitting he noticed his vision went "white" for a few minutes associated with sweating, shaking, and weakness. His wife noticed he had a red rash on his legs and they brought him to the ED. Pt received a platelet transfusion about a week ago outpt. Denies any pain. Had a nose bleed earlier. He denies skin sores, measured fever, HA, trouble speaking, numbness/tingling, focal weakness, dizziness, CP, SOB, abd pain, N/V/D, hematochezia, melena, urinary symptoms, oral/genital sores. He has a recent admission to [facility] discharged 4/08/2019 for diffuse body pain and low-grade fever, infectious workup was negative. MRI C/T/L showed abnormal T1 prolongation in the whole spine, sacral and iliac bones. Received 7 days of Bendamustine and Pomalidomide (on hold few days for neutropenia), last on 4/08/2019.    When pt presented to the ED, code stroke called. Neuro exam was nonfocal, CTH neg for acute process, and vomiting and vision changes were resolved. ED vitals afebrile, H101, /58 --> 102/62, R18, sat 100% on RA. Given decadron 40mg IV, 1u PLT. 1u pRBC ordered. (26 Apr 2019 00:00)      PAST MEDICAL & SURGICAL HISTORY:  Diverticulitis: perforated s/p Kiah  Multiple myeloma: relapsed 3/2019  Hypertension  Left ventricular dysfunction  Cardiomyopathy: nonischemic  Former smoker: (1 ppd x 11 years; Quit ~age 26)  History of bone marrow transplant  History of surgery: s/p exploratory laparotomy with sigmoid resection and colostomy on 9/2/2018      oxycodone (Vomiting; Nausea)       MEDICATIONS  (STANDING):  allopurinol 300 milliGRAM(s) Oral daily  carvedilol 25 milliGRAM(s) Oral every 12 hours  cefepime   IVPB 2000 milliGRAM(s) IV Intermittent every 24 hours  dexamethasone  IVPB 40 milliGRAM(s) IV Intermittent once  filgrastim-sndz Injectable 480 MICROGram(s) SubCutaneous daily  FIRST- Mouthwash  BLM 5 milliLiter(s) Swish and Spit five times a day  fluconAZOLE IVPB 200 milliGRAM(s) IV Intermittent every 24 hours  metoclopramide Injectable 10 milliGRAM(s) IV Push four times a day  ranitidine 150 milliGRAM(s) Oral every 24 hours  Saliva Substitute (CAPHOSOL) 30 milliLiter(s) Swish and Spit every 6 hours  sodium chloride 0.9%. 1000 milliLiter(s) (75 mL/Hr) IV Continuous <Continuous>  sodium chloride 0.9%. 1000 milliLiter(s) (75 mL/Hr) IV Continuous <Continuous>  valACYclovir 1000 milliGRAM(s) Oral daily    MEDICATIONS  (PRN):  acetaminophen   Tablet .. 650 milliGRAM(s) Oral every 6 hours PRN Temp greater or equal to 38C (100.4F)  artificial  tears Solution 1 Drop(s) Both EYES every 6 hours PRN Dry Eyes  benzocaine 15 mG/menthol 3.6 mG (Sugar-Free) Lozenge 1 Lozenge Oral five times a day PRN Sore Throat  docusate sodium 100 milliGRAM(s) Oral three times a day PRN Constipation  guaiFENesin    Syrup 100 milliGRAM(s) Oral every 6 hours PRN Cough  HYDROmorphone   Tablet 2 milliGRAM(s) Oral every 4 hours PRN moderate - severe pain  ondansetron Injectable 8 milliGRAM(s) IV Push every 8 hours PRN Nausea and/or Vomiting  senna 2 Tablet(s) Oral at bedtime PRN Constipation      REVIEW OF SYSTEMS:  CONSTITUTIONAL: (+) malaise. fatigue. poor oral intake.   EYES: No acute change in vision   ENT:  No tinnitus  NECK: No stiffness  RESPIRATORY: No hemoptysis  CARDIOVASCULAR: No chest pain, palpitations, syncope  GASTROINTESTINAL: No hematemesis, diarrhea, melena, or hematochezia.  GENITOURINARY: No hematuria  NEUROLOGICAL: No headaches  LYMPH Nodes: No enlarged glands  ENDOCRINE: No heat or cold intolerance	    T(C): 37.4 (05-02-19 @ 05:25), Max: 37.4 (05-02-19 @ 05:25)  HR: 100 (05-02-19 @ 07:29) (100 - 108)  BP: 108/66 (05-02-19 @ 07:29) (97/54 - 115/69)  RR: 18 (05-02-19 @ 05:25) (17 - 20)  SpO2: 99% (05-02-19 @ 05:25) (99% - 100%)    PHYSICAL EXAMINATION:   Constitutional: ill appearing  HEENT: NC, AT  Neck:  Supple  Respiratory:  Adequate airflow b/l. Not using accessory muscles of respiration.  Cardiovascular: ssytolic murmur, S1 & S2 intact, no R/G, 2+ radial pulses b/l  Gastrointestinal: Soft, NT, ND, normoactive b.s., no organomegaly/RT/rigidity  Extremities: WWP  Neurological:  Alert and awake.  No acute focal motor deficits. Crude sensation intact.     Labs and imaging reviewed    LABS:                        8.1    0.41  )-----------( 6        ( 02 May 2019 06:04 )             23.9     05-02    133<L>  |  102  |  61<H>  ----------------------------<  91  3.9   |  10<LL>  |  5.21<H>    Ca    6.7<L>      02 May 2019 06:04  Phos  3.1     05-02  Mg     2.1     05-02    TPro  x   /  Alb  2.7<L>  /  TBili  x   /  DBili  x   /  AST  x   /  ALT  x   /  AlkPhos  x   05-02            CAPILLARY BLOOD GLUCOSE            LIVER FUNCTIONS - ( 02 May 2019 06:04 )  Alb: 2.7 g/dL / Pro: x     / ALK PHOS: x     / ALT: x     / AST: x     / GGT: x               RADIOLOGY & ADDITIONAL STUDIES:

## 2019-05-02 NOTE — DIETITIAN INITIAL EVALUATION ADULT. - PERTINENT LABORATORY DATA
05-02 Na133 mmol/L<L> Glu 91 mg/dL K+ 3.9 mmol/L Cr  5.21 mg/dL<H> BUN 61 mg/dL<H> 05-02 Phos 3.1 mg/dL 05-02 Alb 2.7 g/dL<L>

## 2019-05-02 NOTE — DIETITIAN INITIAL EVALUATION ADULT. - PROBLEM SELECTOR PLAN 1
Found to have pancytopenia with WBC 0.92, ANC 71, Hg 5.6, PLT 3 due to relapsed MM and chemotherapy  -CTH neg for acute bleed  -neuro checks q4  -Transfuse for PLT <10k and Hg <7  -s/p 1u PLT, giving 1u pRBC now, will give 2ndu PLT then check CBC  -Consult pt's oncologist Dr. Torsten Murphy in AM

## 2019-05-02 NOTE — DIETITIAN INITIAL EVALUATION ADULT. - PROBLEM SELECTOR PLAN 2
Pt with transient vision change of vision going white proceeded by NBNB emesis. Code stroke called, CTH neg for bleed. Pt with PLT 3 at risk for spontaneous brain bleed. Neuro exam currently nonfocal. Perhaps had TIA.  -neuro checks q4  -If change in mental status or new focal deficit, will obtain CTH

## 2019-05-02 NOTE — DIETITIAN INITIAL EVALUATION ADULT. - DIET TYPE
No Carb Prosource (15 grams protein/ 30 mL packet.) x 1pkt, Ensure Clear 3x daily (720kcals, 24g protein)./soft

## 2019-05-02 NOTE — PROGRESS NOTE ADULT - SUBJECTIVE AND OBJECTIVE BOX
SONAL GARCIA:[id number],   53yMale followed for:  oxycodone (Vomiting; Nausea)    PAST MEDICAL & SURGICAL HISTORY:  Diverticulitis: perforated s/p Kiah  Multiple myeloma: relapsed 3/2019  Hypertension  Left ventricular dysfunction  Cardiomyopathy: nonischemic  Former smoker: (1 ppd x 11 years; Quit ~age 26)  History of bone marrow transplant  History of surgery: s/p exploratory laparotomy with sigmoid resection and colostomy on 9/2/2018    FAMILY HISTORY:  Family history of diabetes mellitus  Family history of hypertension    MEDICATIONS  (STANDING):  allopurinol 300 milliGRAM(s) Oral daily  carvedilol 25 milliGRAM(s) Oral every 12 hours  cefepime   IVPB 2000 milliGRAM(s) IV Intermittent every 24 hours  dexamethasone  IVPB 40 milliGRAM(s) IV Intermittent once  filgrastim-sndz Injectable 480 MICROGram(s) SubCutaneous daily  FIRST- Mouthwash  BLM 5 milliLiter(s) Swish and Spit five times a day  fluconAZOLE IVPB 200 milliGRAM(s) IV Intermittent every 24 hours  metoclopramide Injectable 10 milliGRAM(s) IV Push four times a day  ranitidine 150 milliGRAM(s) Oral every 24 hours  Saliva Substitute (CAPHOSOL) 30 milliLiter(s) Swish and Spit every 6 hours  sodium chloride 0.9%. 1000 milliLiter(s) (75 mL/Hr) IV Continuous <Continuous>  sodium chloride 0.9%. 1000 milliLiter(s) (75 mL/Hr) IV Continuous <Continuous>  valACYclovir 1000 milliGRAM(s) Oral daily    MEDICATIONS  (PRN):  acetaminophen   Tablet .. 650 milliGRAM(s) Oral every 6 hours PRN Temp greater or equal to 38C (100.4F)  artificial  tears Solution 1 Drop(s) Both EYES every 6 hours PRN Dry Eyes  benzocaine 15 mG/menthol 3.6 mG (Sugar-Free) Lozenge 1 Lozenge Oral five times a day PRN Sore Throat  docusate sodium 100 milliGRAM(s) Oral three times a day PRN Constipation  guaiFENesin    Syrup 100 milliGRAM(s) Oral every 6 hours PRN Cough  HYDROmorphone   Tablet 2 milliGRAM(s) Oral every 4 hours PRN moderate - severe pain  ondansetron Injectable 8 milliGRAM(s) IV Push every 8 hours PRN Nausea and/or Vomiting  senna 2 Tablet(s) Oral at bedtime PRN Constipation      Vital Signs Last 24 Hrs  T(C): 37.4 (02 May 2019 05:25), Max: 37.4 (02 May 2019 05:25)  T(F): 99.4 (02 May 2019 05:25), Max: 99.4 (02 May 2019 05:25)  HR: 100 (02 May 2019 07:29) (100 - 108)  BP: 108/66 (02 May 2019 07:29) (97/54 - 115/69)  BP(mean): --  RR: 18 (02 May 2019 05:25) (17 - 20)  SpO2: 99% (02 May 2019 05:25) (99% - 100%)  nc/at  s1s2  cta  soft, nt, nd no guarding or rebound  no c/c/e    CBC Full  -  ( 02 May 2019 06:04 )  WBC Count : 0.41 K/uL  RBC Count : 2.82 M/uL  Hemoglobin : 8.1 g/dL  Hematocrit : 23.9 %  Platelet Count - Automated : 6 K/uL  Mean Cell Volume : 84.8 fL  Mean Cell Hemoglobin : 28.7 pg  Mean Cell Hemoglobin Concentration : 33.9 %  Auto Neutrophil # : 0.08 K/uL  Auto Lymphocyte # : 0.24 K/uL  Auto Monocyte # : 0.06 K/uL  Auto Eosinophil # : 0.01 K/uL  Auto Basophil # : 0.00 K/uL  Auto Neutrophil % : 19.6 %  Auto Lymphocyte % : 58.5 %  Auto Monocyte % : 14.6 %  Auto Eosinophil % : 2.4 %  Auto Basophil % : 0.0 %    05-02    133<L>  |  102  |  61<H>  ----------------------------<  91  3.9   |  10<LL>  |  5.21<H>    Ca    6.7<L>      02 May 2019 06:04  Phos  3.1     05-02  Mg     2.1     05-02    TPro  x   /  Alb  2.7<L>  /  TBili  x   /  DBili  x   /  AST  x   /  ALT  x   /  AlkPhos  x   05-02

## 2019-05-02 NOTE — PROGRESS NOTE ADULT - SUBJECTIVE AND OBJECTIVE BOX
[facility]      Seen earlier today, and examined.  - Today, patient is without complaints.           *****MEDICATIONS: Current medication reviewed and documented.    MEDICATIONS  (STANDING):  allopurinol 300 milliGRAM(s) Oral daily  carvedilol 25 milliGRAM(s) Oral every 12 hours  cefepime   IVPB 2000 milliGRAM(s) IV Intermittent every 24 hours  filgrastim-sndz Injectable 480 MICROGram(s) SubCutaneous daily  FIRST- Mouthwash  BLM 5 milliLiter(s) Swish and Spit five times a day  fluconAZOLE IVPB 200 milliGRAM(s) IV Intermittent every 24 hours  metoclopramide Injectable 10 milliGRAM(s) IV Push four times a day  ranitidine 150 milliGRAM(s) Oral every 24 hours  Saliva Substitute (CAPHOSOL) 30 milliLiter(s) Swish and Spit every 6 hours  sodium chloride 0.9%. 1000 milliLiter(s) (75 mL/Hr) IV Continuous <Continuous>  sodium chloride 0.9%. 1000 milliLiter(s) (100 mL/Hr) IV Continuous <Continuous>  valACYclovir 1000 milliGRAM(s) Oral daily    MEDICATIONS  (PRN):  acetaminophen   Tablet .. 650 milliGRAM(s) Oral every 6 hours PRN Temp greater or equal to 38C (100.4F)  artificial  tears Solution 1 Drop(s) Both EYES every 6 hours PRN Dry Eyes  benzocaine 15 mG/menthol 3.6 mG (Sugar-Free) Lozenge 1 Lozenge Oral five times a day PRN Sore Throat  docusate sodium 100 milliGRAM(s) Oral three times a day PRN Constipation  guaiFENesin    Syrup 100 milliGRAM(s) Oral every 6 hours PRN Cough  HYDROmorphone   Tablet 2 milliGRAM(s) Oral every 4 hours PRN moderate - severe pain  ondansetron Injectable 8 milliGRAM(s) IV Push every 8 hours PRN Nausea and/or Vomiting  senna 2 Tablet(s) Oral at bedtime PRN Constipation          ***** VITAL SIGNS:  T(F): 97.8 (19 @ 21:13), Max: 99.4 (19 @ 05:25)  HR: 96 (19 @ 21:13) (96 - 107)  BP: 105/69 (19 @ 21:13) (99/61 - 132/82)  RR: 20 (19 @ 21:13) (18 - 20)  SpO2: 100% (19 @ 21:13) (99% - 100%)  Wt(kg): --  ,   I&O's Summary    01 May 2019 07:01  -  02 May 2019 07:00  --------------------------------------------------------  IN: 0 mL / OUT: 450 mL / NET: -450 mL             *****PHYSICAL EXAM:    alert oriented x 3 attention comprehension are fair.  Able to name, repeat.   EOmi fundi not visualized   no nystagmus VFF to confrontation  Tongue is midline  Palate elevates symmetrically   Moving all 4 ext spontaneously no drift appreciated    Gait not assessed.              *****LAB AND IMAGIN.8    0.57  )-----------( 23       ( 02 May 2019 18:36 )             26.0               05-02    133<L>  |  102  |  61<H>  ----------------------------<  91  3.9   |  10<LL>  |  5.21<H>    Ca    6.7<L>      02 May 2019 06:04  Phos  3.1     05-  Mg     2.1         TPro  x   /  Alb  2.7<L>  /  TBili  x   /  DBili  x   /  AST  x   /  ALT  x   /  AlkPhos  x                        ABG - ( 02 May 2019 10:25 )  pH, Arterial: 7.37  pH, Blood: x     /  pCO2: 17    /  pO2: 166   / HCO3: 13    / Base Excess: -15.3 /  SaO2: 98.3              Urinalysis Basic - ( 02 May 2019 14:30 )    Color: YELLOW / Appearance: Lt TURBID / S.015 / pH: 6.0  Gluc: NEGATIVE / Ketone: TRACE  / Bili: NEGATIVE / Urobili: NORMAL   Blood: SMALL / Protein: 300 / Nitrite: NEGATIVE   Leuk Esterase: NEGATIVE / RBC: 6-10 / WBC 11-25   Sq Epi: FEW / Non Sq Epi: x / Bacteria: NF      [All pertinent recent Imaging/Reports reviewed]           *****A S S E S S M E N T   A N D   P L A N :    54yo M hx of relapsed multiple myeloma s/p bone marrow transplant (~1.5 yrs ago) and chemo/RT (last infusion Bendamustine and Pomalidomide 2019), perforated diverticulitis s/p Kiah's (proctosigmoidectomy with colostomy, 2018), nonischemic cardiomyopathy (mild global LV systolic dysfunction with EF 54% and diastolic LV dysfunction based on TTE in 2018), and HTN presents to [facility] ED w/ vomiting and change of vision. Wife Devan Hood at bedside. Pt was sitting at home. He vomited 2-3 times nonbloody, nonbilious and was not able to eat or drink anything. On  at 4pm, while sitting he noticed his vision went "white" for a few minutes associated with sweating, shaking, and weakness. His wife noticed he had a red rash on his legs and they brought him to the ED. Pt received a platelet transfusion about a week ago outpt. Denies any pain. Had a nose bleed earlier. He denies skin sores, measured fever, HA, trouble speaking, numbness/tingling, focal weakness, dizziness, CP, SOB, abd pain, N/V/D, hematochezia, melena, urinary symptoms, oral/genital sores. He has a recent admission to [facility] discharged 2019 for diffuse body pain and low-grade fever, infectious workup was negative. MRI C/T/L showed abnormal T1 prolongation in the whole spine, sacral and iliac bones. Received 7 days of Bendamustine and Pomalidomide (on hold few days for neutropenia), last on 2019.       Problem/Recommendations 1: near syncope due to anemia/pancytopenia   hemonc on board  ct with no acute intracranial pathology   check orthostatic  goc discussion.   overall poor prognosis '         Problem/Recommendations 2  Acute renal failure of unclear etiology   pt more lethargic today, defer to renal.   fluid hydration    Thank you for allowing me to participate in the care of this patient. Please do not hesitate to call me if you have any  questions.        ________________  Margret Yousif MD  [facility] ([facility]  [phone]      30 minutes spent on total encounter; more than 50 % of the visit was  spent counseling about plan of care, compliance to diet/exercise and medication regimen and or  coordinating care by the attending physician.      It is advised that s stroke patients follow up with SAMUEL Mckeon @ [phone] in 1- 2 weeks.   Others please follow up with Dr. Michael Nissenbaum [phone]

## 2019-05-03 LAB
ANION GAP SERPL CALC-SCNC: 26 MMO/L — HIGH (ref 7–14)
BACTERIA BLD CULT: SIGNIFICANT CHANGE UP
BACTERIA BLD CULT: SIGNIFICANT CHANGE UP
BASOPHILS # BLD AUTO: 0 K/UL — SIGNIFICANT CHANGE UP (ref 0–0.2)
BASOPHILS NFR BLD AUTO: 0 % — SIGNIFICANT CHANGE UP (ref 0–2)
BLD GP AB SCN SERPL QL: NEGATIVE — SIGNIFICANT CHANGE UP
BUN SERPL-MCNC: 83 MG/DL — HIGH (ref 7–23)
CALCIUM SERPL-MCNC: 6.5 MG/DL — CRITICAL LOW (ref 8.4–10.5)
CHLORIDE SERPL-SCNC: 98 MMOL/L — SIGNIFICANT CHANGE UP (ref 98–107)
CO2 SERPL-SCNC: 7 MMOL/L — CRITICAL LOW (ref 22–31)
CREAT SERPL-MCNC: 6.69 MG/DL — HIGH (ref 0.5–1.3)
EOSINOPHIL # BLD AUTO: 0 K/UL — SIGNIFICANT CHANGE UP (ref 0–0.5)
EOSINOPHIL NFR BLD AUTO: 0 % — SIGNIFICANT CHANGE UP (ref 0–6)
GLUCOSE SERPL-MCNC: 175 MG/DL — HIGH (ref 70–99)
HCT VFR BLD CALC: 25.3 % — LOW (ref 39–50)
HGB BLD-MCNC: 8.7 G/DL — LOW (ref 13–17)
IMM GRANULOCYTES NFR BLD AUTO: 1.7 % — HIGH (ref 0–1.5)
LACTATE SERPL-SCNC: 1.2 MMOL/L — SIGNIFICANT CHANGE UP (ref 0.5–2)
LYMPHOCYTES # BLD AUTO: 0.2 K/UL — LOW (ref 1–3.3)
LYMPHOCYTES # BLD AUTO: 33.9 % — SIGNIFICANT CHANGE UP (ref 13–44)
MAGNESIUM SERPL-MCNC: 2.3 MG/DL — SIGNIFICANT CHANGE UP (ref 1.6–2.6)
MCHC RBC-ENTMCNC: 29.2 PG — SIGNIFICANT CHANGE UP (ref 27–34)
MCHC RBC-ENTMCNC: 34.4 % — SIGNIFICANT CHANGE UP (ref 32–36)
MCV RBC AUTO: 84.9 FL — SIGNIFICANT CHANGE UP (ref 80–100)
MONOCYTES # BLD AUTO: 0.1 K/UL — SIGNIFICANT CHANGE UP (ref 0–0.9)
MONOCYTES NFR BLD AUTO: 16.9 % — HIGH (ref 2–14)
NEUTROPHILS # BLD AUTO: 0.28 K/UL — LOW (ref 1.8–7.4)
NEUTROPHILS NFR BLD AUTO: 47.5 % — SIGNIFICANT CHANGE UP (ref 43–77)
NRBC # FLD: 0 K/UL — SIGNIFICANT CHANGE UP (ref 0–0)
PHOSPHATE SERPL-MCNC: 6 MG/DL — HIGH (ref 2.5–4.5)
PLATELET # BLD AUTO: 19 K/UL — CRITICAL LOW (ref 150–400)
PMV BLD: 10.7 FL — SIGNIFICANT CHANGE UP (ref 7–13)
POTASSIUM SERPL-MCNC: 5.1 MMOL/L — SIGNIFICANT CHANGE UP (ref 3.5–5.3)
POTASSIUM SERPL-SCNC: 5.1 MMOL/L — SIGNIFICANT CHANGE UP (ref 3.5–5.3)
RBC # BLD: 2.98 M/UL — LOW (ref 4.2–5.8)
RBC # FLD: 16.9 % — HIGH (ref 10.3–14.5)
RH IG SCN BLD-IMP: POSITIVE — SIGNIFICANT CHANGE UP
SODIUM SERPL-SCNC: 131 MMOL/L — LOW (ref 135–145)
VANCOMYCIN TROUGH SERPL-MCNC: 20.9 UG/ML — HIGH (ref 10–20)
WBC # BLD: 0.59 K/UL — CRITICAL LOW (ref 3.8–10.5)
WBC # FLD AUTO: 0.59 K/UL — CRITICAL LOW (ref 3.8–10.5)

## 2019-05-03 RX ORDER — CALCIUM GLUCONATE 100 MG/ML
1 VIAL (ML) INTRAVENOUS ONCE
Qty: 0 | Refills: 0 | Status: COMPLETED | OUTPATIENT
Start: 2019-05-03 | End: 2019-05-03

## 2019-05-03 RX ORDER — VALACYCLOVIR 500 MG/1
500 TABLET, FILM COATED ORAL
Qty: 0 | Refills: 0 | Status: DISCONTINUED | OUTPATIENT
Start: 2019-05-05 | End: 2019-05-08

## 2019-05-03 RX ADMIN — DIPHENHYDRAMINE HYDROCHLORIDE AND LIDOCAINE HYDROCHLORIDE AND ALUMINUM HYDROXIDE AND MAGNESIUM HYDRO 5 MILLILITER(S): KIT at 12:50

## 2019-05-03 RX ADMIN — Medication 480 MICROGRAM(S): at 12:53

## 2019-05-03 RX ADMIN — Medication 100 MILLIGRAM(S): at 07:21

## 2019-05-03 RX ADMIN — RANITIDINE HYDROCHLORIDE 150 MILLIGRAM(S): 150 TABLET, FILM COATED ORAL at 18:07

## 2019-05-03 RX ADMIN — Medication 200 GRAM(S): at 18:06

## 2019-05-03 RX ADMIN — CEFEPIME 100 MILLIGRAM(S): 1 INJECTION, POWDER, FOR SOLUTION INTRAMUSCULAR; INTRAVENOUS at 12:51

## 2019-05-03 RX ADMIN — SODIUM CHLORIDE 100 MILLILITER(S): 9 INJECTION INTRAMUSCULAR; INTRAVENOUS; SUBCUTANEOUS at 09:20

## 2019-05-03 RX ADMIN — Medication 30 MILLILITER(S): at 07:19

## 2019-05-03 RX ADMIN — Medication 30 MILLILITER(S): at 12:53

## 2019-05-03 RX ADMIN — Medication 10 MILLIGRAM(S): at 07:26

## 2019-05-03 RX ADMIN — CARVEDILOL PHOSPHATE 25 MILLIGRAM(S): 80 CAPSULE, EXTENDED RELEASE ORAL at 18:07

## 2019-05-03 RX ADMIN — FLUCONAZOLE 100 MILLIGRAM(S): 150 TABLET ORAL at 07:18

## 2019-05-03 RX ADMIN — DIPHENHYDRAMINE HYDROCHLORIDE AND LIDOCAINE HYDROCHLORIDE AND ALUMINUM HYDROXIDE AND MAGNESIUM HYDRO 5 MILLILITER(S): KIT at 07:19

## 2019-05-03 RX ADMIN — Medication 30 MILLILITER(S): at 18:07

## 2019-05-03 RX ADMIN — Medication 10 MILLIGRAM(S): at 18:07

## 2019-05-03 RX ADMIN — Medication 10 MILLIGRAM(S): at 23:17

## 2019-05-03 RX ADMIN — CARVEDILOL PHOSPHATE 25 MILLIGRAM(S): 80 CAPSULE, EXTENDED RELEASE ORAL at 07:19

## 2019-05-03 RX ADMIN — Medication 300 MILLIGRAM(S): at 12:52

## 2019-05-03 NOTE — SWALLOW BEDSIDE ASSESSMENT ADULT - SWALLOW EVAL: DIAGNOSIS
Patient demonstrates mild oral dysphagia judged to be associated with generalized weakness and mild oral discomfort 2/2 presence of oral sores, characterized by increased mastication time, delayed bolus collection and delayed anterior-posterior transfer of regular solid textures.  Functional oral management noted for puree, soft solids and thin liquids. A grossly intact pharyngeal stage was characterized by timely pharyngeal trigger, palpable hyolaryngeal elevation and no overt, clinical s/s of laryngeal penetration/aspiration across trials.

## 2019-05-03 NOTE — PROGRESS NOTE ADULT - SUBJECTIVE AND OBJECTIVE BOX
Patient is seen and examined at the bed side,  remains afebrile . He is feeling little better this morning and were able to eat some of his breakfast.  The ANC is 570 today. The mucosal pain  and Odynophagia improved a little. He is refusing  to eat.  The creatinine is trending up.         REVIEW OF SYSTEMS: All other review systems are negative        ALLERGIES: oxycodone (Vomiting; Nausea)        Vital Signs Last 24 Hrs  T(C): 36.6 (03 May 2019 05:33), Max: 36.9 (02 May 2019 14:40)  T(F): 97.9 (03 May 2019 05:33), Max: 98.5 (02 May 2019 14:40)  HR: 99 (03 May 2019 05:33) (96 - 107)  BP: 115/72 (03 May 2019 05:33) (105/69 - 132/82)  BP(mean): --  RR: 20 (03 May 2019 05:33) (19 - 20)  SpO2: 100% (03 May 2019 05:33) (99% - 100%)      PHYSICAL EXAM:  GENERAL: Not in acute distress  HEENT: No oral thrush or any tonsilar exudate noted   CHEST/LUNG:  Air entry bilaterally  HEART: s1 and s2 present  ABDOMEN:  Nontender and  Nondistended  EXTREMITIES: No pedal  edema  CNS: Awake and Alert        LABS: No new LAbs yet                            8.8    0.57  )-----------( 23       ( 02 May 2019 18:36 )             26.0                       8.8    0.57  )-----------( 23       ( 02 May 2019 18:36 )             26.0                         7.3    0.40  )-----------( 12       ( 01 May 2019 06:05 )             21.3                                    7.8    1.08  )-----------( 32       ( 27 Apr 2019 07:00 )             23.1         05-02    133<L>  |  102  |  61<H>  ----------------------------<  91  3.9   |  10<LL>  |  5.21<H>    Ca    6.7<L>      02 May 2019 06:04  Phos  3.1     05-02  Mg     2.1     05-02    TPro  x   /  Alb  2.7<L>  /  TBili  x   /  DBili  x   /  AST  x   /  ALT  x   /  AlkPhos  x   05-02 05-02    133<L>  |  102  |  61<H>  ----------------------------<  91  3.9   |  10<LL>  |  5.21<H>    Ca    6.7<L>      02 May 2019 06:04  Phos  3.1     05-02  Mg     2.1     05-02    TPro  x   /  Alb  2.7<L>  /  TBili  x   /  DBili  x   /  AST  x   /  ALT  x   /  AlkPhos  x   05-02 05-01    134<L>  |  103  |  41<H>  ----------------------------<  120<H>  4.0   |  12<L>  |  3.16<H>    Ca    6.9<L>      01 May 2019 06:05  Phos  2.8     05-01  Mg     1.8     05-01    TPro  5.9<L>  /  Alb  3.0<L>  /  TBili  2.0<H>  /  DBili  x   /  AST  13  /  ALT  47<H>  /  AlkPhos  100  04-30 04-30    135  |  104  |  20  ----------------------------<  139<H>  3.8   |  16<L>  |  1.46<H>    Ca    7.2<L>      30 Apr 2019 06:15  Phos  2.4     04-30  Mg     1.8     04-30    TPro  5.9<L>  /  Alb  3.0<L>  /  TBili  2.0<H>  /  DBili  x   /  AST  13  /  ALT  47<H>  /  AlkPhos  100  04-30      Vancomycin level:    Vancomycin Level, Trough - Prior to Next Dose (05.01.19 @ 06:05) Vancomycin Level, Trough: 29.7: Vancomycin trough levels should be rapidly reached and  maintained at 15-20 ug/ml for life threatening MRSA infections such as sepsis, endocarditis, osteomyelitis and pneumonia.     Vancomycin Level, Random (05.02.19 @ 06:04)  Vancomycin Level, Random: 23.8:         MEDICATIONS  (STANDING):  allopurinol 300 milliGRAM(s) Oral daily  carvedilol 25 milliGRAM(s) Oral every 12 hours  cefepime   IVPB 2000 milliGRAM(s) IV Intermittent every 24 hours  filgrastim-sndz Injectable 480 MICROGram(s) SubCutaneous daily  FIRST- Mouthwash  BLM 5 milliLiter(s) Swish and Spit five times a day  fluconAZOLE IVPB 200 milliGRAM(s) IV Intermittent every 24 hours  metoclopramide Injectable 10 milliGRAM(s) IV Push four times a day  ranitidine 150 milliGRAM(s) Oral every 24 hours  Saliva Substitute (CAPHOSOL) 30 milliLiter(s) Swish and Spit every 6 hours  sodium chloride 0.9%. 1000 milliLiter(s) (75 mL/Hr) IV Continuous <Continuous>  sodium chloride 0.9%. 1000 milliLiter(s) (100 mL/Hr) IV Continuous <Continuous>  valACYclovir 1000 milliGRAM(s) Oral daily    MEDICATIONS  (PRN):  acetaminophen   Tablet .. 650 milliGRAM(s) Oral every 6 hours PRN Temp greater or equal to 38C (100.4F)  artificial  tears Solution 1 Drop(s) Both EYES every 6 hours PRN Dry Eyes  benzocaine 15 mG/menthol 3.6 mG (Sugar-Free) Lozenge 1 Lozenge Oral five times a day PRN Sore Throat  docusate sodium 100 milliGRAM(s) Oral three times a day PRN Constipation  guaiFENesin    Syrup 100 milliGRAM(s) Oral every 6 hours PRN Cough  HYDROmorphone   Tablet 2 milliGRAM(s) Oral every 4 hours PRN moderate - severe pain  ondansetron Injectable 8 milliGRAM(s) IV Push every 8 hours PRN Nausea and/or Vomiting  senna 2 Tablet(s) Oral at bedtime PRN Constipation        RADIOLOGY & ADDITIONAL TESTS:    4/30/19 : Xray Esophagram (04.30.19 @ 09:31) Limited evaluation for esophageal ulcers secondary to underdistention of  the esophagus.      4/27/19 : Xray Chest 1 View- PORTABLE-Urgent (04.27.19 @ 22:45) Probable small left pleural effusion with adjacent opacity that likely  represents atelectasis.    4/25/19 : CT Brain Stroke Protocol (04.25.19 @ 19:42) No acute intracranial hemorrhage, mass effect, or midline shift. No  abnormal extra-axial collections. The basal cisterns are patent without evidence of central herniation. The sulci and ventricles are within normal limits for the patient's age. Stable few patchy areas of low-attenuation in the bihemispheric white  matter, which is nonspecific, but likely related to sequela of chronic  microvascular ischemic disease. Stable coarse calcifications in the left temporal lobe.      MICROBIOLOGY DATA:      Culture - Blood (04.28.19 @ 18:20)    Culture - Blood:   NO ORGANISMS ISOLATED  NO ORGANISMS ISOLATED AT 24 HOURS    Specimen Source: BLOOD VENOUS      Culture - Blood (04.28.19 @ 18:20)    Culture - Blood:   NO ORGANISMS ISOLATED  NO ORGANISMS ISOLATED AT 24 HOURS    Specimen Source: BLOOD PERIPHERAL        Culture - Blood (04.27.19 @ 19:50)    Culture - Blood:   NO ORGANISMS ISOLATED  NO ORGANISMS ISOLATED AT 24 HOURS    Specimen Source: BLOOD PERIPHERAL      Culture - Group A Streptococcus (04.27.19 @ 14:24)    Culture - Group A Streptococcus:   NO BETA STREP. GROUP A  AFTER 24HRS.    Specimen Source: THROAT

## 2019-05-03 NOTE — PROGRESS NOTE ADULT - SUBJECTIVE AND OBJECTIVE BOX
[facility]      Seen earlier today, and examined.  - Today, patient is without complaints.           *****MEDICATIONS: Current medication reviewed and documented.    MEDICATIONS  (STANDING):  allopurinol 300 milliGRAM(s) Oral daily  carvedilol 25 milliGRAM(s) Oral every 12 hours  cefepime   IVPB 2000 milliGRAM(s) IV Intermittent every 24 hours  filgrastim-sndz Injectable 480 MICROGram(s) SubCutaneous daily  FIRST- Mouthwash  BLM 5 milliLiter(s) Swish and Spit five times a day  fluconAZOLE IVPB 200 milliGRAM(s) IV Intermittent every 24 hours  metoclopramide Injectable 10 milliGRAM(s) IV Push four times a day  ranitidine 150 milliGRAM(s) Oral every 24 hours  Saliva Substitute (CAPHOSOL) 30 milliLiter(s) Swish and Spit every 6 hours  sodium chloride 0.9%. 1000 milliLiter(s) (75 mL/Hr) IV Continuous <Continuous>  sodium chloride 0.9%. 1000 milliLiter(s) (100 mL/Hr) IV Continuous <Continuous>  valACYclovir 1000 milliGRAM(s) Oral daily    MEDICATIONS  (PRN):  acetaminophen   Tablet .. 650 milliGRAM(s) Oral every 6 hours PRN Temp greater or equal to 38C (100.4F)  artificial  tears Solution 1 Drop(s) Both EYES every 6 hours PRN Dry Eyes  benzocaine 15 mG/menthol 3.6 mG (Sugar-Free) Lozenge 1 Lozenge Oral five times a day PRN Sore Throat  docusate sodium 100 milliGRAM(s) Oral three times a day PRN Constipation  guaiFENesin    Syrup 100 milliGRAM(s) Oral every 6 hours PRN Cough  HYDROmorphone   Tablet 2 milliGRAM(s) Oral every 4 hours PRN moderate - severe pain  ondansetron Injectable 8 milliGRAM(s) IV Push every 8 hours PRN Nausea and/or Vomiting  senna 2 Tablet(s) Oral at bedtime PRN Constipation          ***** VITAL SIGNS:  T(F): 98.1 (19 @ 10:56), Max: 98.5 (19 @ 14:40)  HR: 94 (19 @ 10:56) (94 - 107)  BP: 115/78 (19 @ 10:56) (105/69 - 132/82)  RR: 20 (19 @ 10:56) (19 - 20)  SpO2: 100% (19 @ 10:56) (99% - 100%)  Wt(kg): --  ,   I&O's Summary           *****PHYSICAL EXAM: lethargic   labored breathing.   sleeping on my arrival responsd with one word answer and then fell asleep.              *****LAB AND IMAGIN.7    0.59  )-----------( 19       ( 03 May 2019 06:55 )             25.3               -    131<L>  |  98  |  83<H>  ----------------------------<  175<H>  5.1   |  7<LL>  |  6.69<H>    Ca    6.5<LL>      03 May 2019 06:55  Phos  6.0       Mg     2.3         TPro  x   /  Alb  2.7<L>  /  TBili  x   /  DBili  x   /  AST  x   /  ALT  x   /  AlkPhos  x                        ABG - ( 02 May 2019 10:25 )  pH, Arterial: 7.37  pH, Blood: x     /  pCO2: 17    /  pO2: 166   / HCO3: 13    / Base Excess: -15.3 /  SaO2: 98.3              Urinalysis Basic - ( 02 May 2019 14:30 )    Color: YELLOW / Appearance: Lt TURBID / S.015 / pH: 6.0  Gluc: NEGATIVE / Ketone: TRACE  / Bili: NEGATIVE / Urobili: NORMAL   Blood: SMALL / Protein: 300 / Nitrite: NEGATIVE   Leuk Esterase: NEGATIVE / RBC: 6-10 / WBC 11-25   Sq Epi: FEW / Non Sq Epi: x / Bacteria: NF      [All pertinent recent Imaging/Reports reviewed]           *****A S S E S S M E N T   A N D   P L A N :  54yo M hx of relapsed multiple myeloma s/p bone marrow transplant (~1.5 yrs ago) and chemo/RT (last infusion Bendamustine and Pomalidomide 2019), perforated diverticulitis s/p Kiah's (proctosigmoidectomy with colostomy, 2018), nonischemic cardiomyopathy (mild global LV systolic dysfunction with EF 54% and diastolic LV dysfunction based on TTE in 2018), and HTN presents to [facility] ED w/ vomiting and change of vision. Wife Devan Hood at bedside. Pt was sitting at home. He vomited 2-3 times nonbloody, nonbilious and was not able to eat or drink anything. On  at 4pm, while sitting he noticed his vision went "white" for a few minutes associated with sweating, shaking, and weakness. His wife noticed he had a red rash on his legs and they brought him to the ED. Pt received a platelet transfusion about a week ago outpt. Denies any pain. Had a nose bleed earlier. He denies skin sores, measured fever, HA, trouble speaking, numbness/tingling, focal weakness, dizziness, CP, SOB, abd pain, N/V/D, hematochezia, melena, urinary symptoms, oral/genital sores. He has a recent admission to [facility] discharged 2019 for diffuse body pain and low-grade fever, infectious workup was negative. MRI C/T/L showed abnormal T1 prolongation in the whole spine, sacral and iliac bones. Received 7 days of Bendamustine and Pomalidomide (on hold few days for neutropenia), last on 2019.       Problem/Recommendations 1: near syncope due to  pancytopenia   hemonc on board  ct with no acute intracranial pathology      c discussion.   overall poor prognosis '         Problem/Recommendations 2  Acute renal failure of unclear etiology   pt more lethargic today, defer to renal.   fluid hydration          Thank you for allowing me to participate in the care of this patient. Please do not hesitate to call me if you have any  questions.        ________________  Margret Yousif MD  [facility]  [phone]      30 minutes spent on total encounter; more than 50 % of the visit was  spent counseling about plan of care, compliance to diet/exercise and medication regimen and or  coordinating care by the attending physician.      It is advised that s stroke patients follow up with NP Zulema Mckeon @ [phone] in 1- 2 weeks.   Others please follow up with Dr. Michael Nissenbaum [phone]

## 2019-05-03 NOTE — PROGRESS NOTE ADULT - ASSESSMENT
A 52 yo Male with  relapsed multiple myeloma s/p bone marrow transplant (~1.5 yrs ago) and chemo/RT (last infusion Bendamustine and Pomalidomide 4/08/2019), perforated diverticulitis s/p Kiah's (proctosigmoidectomy with colostomy, presents to [facility] ER for evaluation of  vomiting and change of vision. He also had been weak, with epistaxis, and petechiae. ON admission, he found to have Pancytopenia and Neutropenia with ANC of 120. He has no fever but rigors and also c/o Odynophagia. The CT head shows No acute intracranial events. The ID consult requested to assist with evaluation and antibiotic management of Neutropenia and Tonsilitis.     # Neutropenic Fever - developed  fever today, 4/28/19  #? Acute Tonsilitis  # Possible Candida Esophagitis-  May benefit from  EGD when cell numbers are stable, since Odynophagia  not improving on Fluconazole    would recommend:    1. Follow up  Today's  Labs  2. Monitor kidney function and Vancomycin level daily    3. Continue oral  Valacyclovir, Fluconazole and renally dose of Cefepime  4. Continue Zarxio and Monitor ANC  5. OOb to chair    d/w  Patient and Nursing staff    will follow the patient with you

## 2019-05-03 NOTE — SWALLOW BEDSIDE ASSESSMENT ADULT - SWALLOW EVAL: RECOMMENDED DIET
1) Continue Soft Solids with Thin Liquids; 2) Defer diet advancement to MD as overall medical status improves; 3) Nutrition consult to assess daily caloric needs given decreased PO intake 1) Continue Soft Solids with Thin Liquids as tolerated; 2) Defer diet advancement to MD as overall medical status improves; 3) Nutrition consult to assess daily caloric needs given decreased PO intake

## 2019-05-03 NOTE — SWALLOW BEDSIDE ASSESSMENT ADULT - SWALLOW EVAL: RECOMMENDED FEEDING/EATING TECHNIQUES
maintain upright posture during/after eating for 30 mins/no straws/small sips/bites/allow for swallow between intakes/position upright (90 degrees)

## 2019-05-03 NOTE — PROGRESS NOTE ADULT - ASSESSMENT
1. Pancytopenia     -- counts slow to recover sec due to heavily tx for myeloma  -- Transfuse to keep Hgb > 7 and Platelets> 10  -- cont  Zarxio 480mcg daily until ANC > 1500 x 2 consecutive days  -- monitor for infection or bleeding     2. MM relapsed/refractory including failed Auto Transplant  -- had been on tavares/pomalyst.   -- hold pomalyst for now in acute setting and severe pancytopenia  -- bendamustine as outpt when recovers  -- paraproteins improving  -- s/p decadron 40mg 5/2  -- will check QIggs, SPEP, JENELLE and SFLC    3. Tonsilitis vs Candida Esophagitis    -- cont Cefepime, Diflucan per ID  -- cont Valtrex 1000mg PO QD    -- ID f/u  -- cont magic mouthwash  -- cont IVF    Torsten Murphy MD  [phone]

## 2019-05-03 NOTE — PROGRESS NOTE ADULT - SUBJECTIVE AND OBJECTIVE BOX
SONAL GARCIA:[id number],   53yMale followed for:  oxycodone (Vomiting; Nausea)    PAST MEDICAL & SURGICAL HISTORY:  Diverticulitis: perforated s/p Kiah  Multiple myeloma: relapsed 3/2019  Hypertension  Left ventricular dysfunction  Cardiomyopathy: nonischemic  Former smoker: (1 ppd x 11 years; Quit ~age 26)  History of bone marrow transplant  History of surgery: s/p exploratory laparotomy with sigmoid resection and colostomy on 9/2/2018    FAMILY HISTORY:  Family history of diabetes mellitus  Family history of hypertension    MEDICATIONS  (STANDING):  allopurinol 300 milliGRAM(s) Oral daily  carvedilol 25 milliGRAM(s) Oral every 12 hours  cefepime   IVPB 2000 milliGRAM(s) IV Intermittent every 24 hours  filgrastim-sndz Injectable 480 MICROGram(s) SubCutaneous daily  FIRST- Mouthwash  BLM 5 milliLiter(s) Swish and Spit five times a day  fluconAZOLE IVPB 200 milliGRAM(s) IV Intermittent every 24 hours  metoclopramide Injectable 10 milliGRAM(s) IV Push four times a day  ranitidine 150 milliGRAM(s) Oral every 24 hours  Saliva Substitute (CAPHOSOL) 30 milliLiter(s) Swish and Spit every 6 hours  sodium chloride 0.9%. 1000 milliLiter(s) (75 mL/Hr) IV Continuous <Continuous>  sodium chloride 0.9%. 1000 milliLiter(s) (100 mL/Hr) IV Continuous <Continuous>  valACYclovir 1000 milliGRAM(s) Oral daily    MEDICATIONS  (PRN):  acetaminophen   Tablet .. 650 milliGRAM(s) Oral every 6 hours PRN Temp greater or equal to 38C (100.4F)  artificial  tears Solution 1 Drop(s) Both EYES every 6 hours PRN Dry Eyes  benzocaine 15 mG/menthol 3.6 mG (Sugar-Free) Lozenge 1 Lozenge Oral five times a day PRN Sore Throat  docusate sodium 100 milliGRAM(s) Oral three times a day PRN Constipation  guaiFENesin    Syrup 100 milliGRAM(s) Oral every 6 hours PRN Cough  HYDROmorphone   Tablet 2 milliGRAM(s) Oral every 4 hours PRN moderate - severe pain  ondansetron Injectable 8 milliGRAM(s) IV Push every 8 hours PRN Nausea and/or Vomiting  senna 2 Tablet(s) Oral at bedtime PRN Constipation      Vital Signs Last 24 Hrs  T(C): 36.6 (03 May 2019 05:33), Max: 36.9 (02 May 2019 14:40)  T(F): 97.9 (03 May 2019 05:33), Max: 98.5 (02 May 2019 14:40)  HR: 99 (03 May 2019 05:33) (96 - 107)  BP: 115/72 (03 May 2019 05:33) (105/69 - 132/82)  BP(mean): --  RR: 20 (03 May 2019 05:33) (19 - 20)  SpO2: 100% (03 May 2019 05:33) (99% - 100%)  nc/at  s1s2  cta  soft, nt, nd no guarding or rebound  no c/c/e    CBC Full  -  ( 02 May 2019 18:36 )  WBC Count : 0.57 K/uL  RBC Count : 3.06 M/uL  Hemoglobin : 8.8 g/dL  Hematocrit : 26.0 %  Platelet Count - Automated : 23 K/uL  Mean Cell Volume : 85.0 fL  Mean Cell Hemoglobin : 28.8 pg  Mean Cell Hemoglobin Concentration : 33.8 %  Auto Neutrophil # : x  Auto Lymphocyte # : x  Auto Monocyte # : x  Auto Eosinophil # : x  Auto Basophil # : x  Auto Neutrophil % : x  Auto Lymphocyte % : x  Auto Monocyte % : x  Auto Eosinophil % : x  Auto Basophil % : x    05-02    133<L>  |  102  |  61<H>  ----------------------------<  91  3.9   |  10<LL>  |  5.21<H>    Ca    6.7<L>      02 May 2019 06:04  Phos  3.1     05-02  Mg     2.1     05-02    TPro  x   /  Alb  2.7<L>  /  TBili  x   /  DBili  x   /  AST  x   /  ALT  x   /  AlkPhos  x   05-02

## 2019-05-03 NOTE — PROGRESS NOTE ADULT - ASSESSMENT
Problem/Plan - 1:  ·  Problem: Pancytopenia with neutropenic fever 2/2 acute tonsillitis and esophageal candidiasis complicated by symptomatic anemia requiring prbc transfusions:      persistent       administer decadron today      f/u vanco trough in AM. Restart Vanco if level < 15.       c/w abx, filgrastim      transfuse prn to maintain hgb > 7.5      all consultants and medical team members management greatly appreciated      f/u cultures to final date      Problem/Plan:  Problem: Acute Renal Failure, likely ATN:      persistent abnormal renal function       fluid hydration       possibly 2/2 underlying multiple myeloma complicated by symptomatic anemia/nsaids/vanco/valacyclovir +/- alternative etiology      f/u consultants for management      strict i/o       avoid nephrotoxic rx        optimize h/h and intravascular volume      Problem/Plan -:  ·  Problem: Multiple myeloma in relapse.  Plan: MM in relapse s/p bone marrow transplant (~1.5 yrs ago) and chemo/RT. Last infusion Bendamustine and Pomalidomide 4/08/2019        Continue Current medications and monitor.         Hem/ onc mgmt    Problem/Plan -:  ·Chronic Systolic CHF       - c/w cardiac meds, adjust/restart meds as needed    Problem/Plan -:Acute Tonsilitis and Candida Esophagitis        continue abx per ID       continue Cepacol

## 2019-05-03 NOTE — PROGRESS NOTE ADULT - ASSESSMENT
53y Male with relapsed multiple myeloma s/p bone marrow transplant (~1.5 yrs ago) and chemo/RT (last infusion Bendamustine and Pomalidomide 4/08/2019), perforated diverticulitis s/p Kiah's (proctosigmoidectomy with colostomy, 9/2018), nonischemic cardiomyopathy (mild global LV systolic dysfunction with EF 54% and diastolic LV dysfunction based on TTE in 11/2018), and HTN admitted to [facility] on 4/25 for nausea and vomiting, and poor PO intake, treated for neutropenic fever, c/b ROBB

## 2019-05-03 NOTE — PROGRESS NOTE ADULT - SUBJECTIVE AND OBJECTIVE BOX
Nephrology Followup Note - [phone] - Dr Bahena / Dr Regalado / Dr Benz / Dr Frost / Dr Mahmood / Dr Mcgee / Dr Guzman / Dr Guerra  Pt seen and examined at bedside  No acute events overnight. Reports throat pain better today. Tolerating small amount of PO diet. Denies diarrhea.     Allergies:  oxycodone (Vomiting; Nausea)    Hospital Medications:   MEDICATIONS  (STANDING):  allopurinol 300 milliGRAM(s) Oral daily  calcium gluconate IVPB 1 Gram(s) IV Intermittent once  carvedilol 25 milliGRAM(s) Oral every 12 hours  cefepime   IVPB 2000 milliGRAM(s) IV Intermittent every 24 hours  filgrastim-sndz Injectable 480 MICROGram(s) SubCutaneous daily  FIRST- Mouthwash  BLM 5 milliLiter(s) Swish and Spit five times a day  fluconAZOLE IVPB 200 milliGRAM(s) IV Intermittent every 24 hours  metoclopramide Injectable 10 milliGRAM(s) IV Push four times a day  ranitidine 150 milliGRAM(s) Oral every 24 hours  Saliva Substitute (CAPHOSOL) 30 milliLiter(s) Swish and Spit every 6 hours  sodium chloride 0.9%. 1000 milliLiter(s) (75 mL/Hr) IV Continuous <Continuous>  sodium chloride 0.9%. 1000 milliLiter(s) (100 mL/Hr) IV Continuous <Continuous>  valACYclovir 1000 milliGRAM(s) Oral daily    VITALS:  T(F): 98.1 (19 @ 10:56), Max: 98.5 (19 @ 14:40)  HR: 94 (19 @ 10:56)  BP: 115/78 (19 @ 10:56)  RR: 20 (19 @ 10:56)  SpO2: 100% (19 @ 10:56)  Wt(kg): --      PHYSICAL EXAM:  Constitutional: NAD  HEENT: anicteric sclera, oropharynx clear, MMM  Neck: No JVD  Respiratory: CTAB, no wheezes, rales or rhonchi  Cardiovascular: S1, S2, RRR  Gastrointestinal: BS+, soft, NT/ND  Extremities: No cyanosis or clubbing. No peripheral edema  Neurological: A/O x 3, no focal deficits  Psychiatric: Normal mood, normal affect  : No CVA tenderness. No quintero.   Skin: Pleuritic rashes    LABS:      131<L>  |  98  |  83<H>  ----------------------------<  175<H>  5.1   |  7<LL>  |  6.69<H>    Ca    6.5<LL>      03 May 2019 06:55  Phos  6.0       Mg     2.3         TPro      /  Alb  2.7<L>  /  TBili      /  DBili      /  AST      /  ALT      /  AlkPhos          Creatinine Trend: 6.69 <--, 5.21 <--, 3.16 <--, 1.46 <--, 1.15 <--, 1.10 <--, 1.10 <--                        8.7    0.59  )-----------( 19       ( 03 May 2019 06:55 )             25.3     Urine Studies:  Urinalysis Basic - ( 02 May 2019 14:30 )    Color: YELLOW / Appearance: Lt TURBID / S.015 / pH: 6.0  Gluc: NEGATIVE / Ketone: TRACE  / Bili: NEGATIVE / Urobili: NORMAL   Blood: SMALL / Protein: 300 / Nitrite: NEGATIVE   Leuk Esterase: NEGATIVE / RBC: 6-10 / WBC 11-25   Sq Epi: FEW / Non Sq Epi:  / Bacteria: NF      Sodium, Random Urine: 24 mmol/L ( @ 18:30)  Potassium, Random Urine: 28.6 mmol/L ( @ 18:30)  Chloride, Random Urine: < 20 mmol/L ( @ 18:30)  Osmolality, Random Urine: 307 mosmo/kg ( @ 18:30)    RADIOLOGY & ADDITIONAL STUDIES:

## 2019-05-03 NOTE — PROGRESS NOTE ADULT - SUBJECTIVE AND OBJECTIVE BOX
Pt is seen and examined  pt is awake and lying in bed   still weak   No fevers  Platelets given yesterday      PAST MEDICAL & SURGICAL HISTORY:  Diverticulitis: perforated s/p Kiah  Multiple myeloma: relapsed 3/2019  Hypertension  Left ventricular dysfunction  Cardiomyopathy: nonischemic  Former smoker: (1 ppd x 11 years; Quit ~age 26)  History of bone marrow transplant  History of surgery: s/p exploratory laparotomy with sigmoid resection and colostomy on 9/2/2018      ROS:  Negative except for:    MEDICATIONS  (STANDING):  allopurinol 300 milliGRAM(s) Oral daily  carvedilol 25 milliGRAM(s) Oral every 12 hours  cefepime   IVPB 2000 milliGRAM(s) IV Intermittent every 24 hours  filgrastim-sndz Injectable 480 MICROGram(s) SubCutaneous daily  FIRST- Mouthwash  BLM 5 milliLiter(s) Swish and Spit five times a day  fluconAZOLE IVPB 200 milliGRAM(s) IV Intermittent every 24 hours  metoclopramide Injectable 10 milliGRAM(s) IV Push four times a day  ranitidine 150 milliGRAM(s) Oral every 24 hours  Saliva Substitute (CAPHOSOL) 30 milliLiter(s) Swish and Spit every 6 hours  sodium chloride 0.9%. 1000 milliLiter(s) (75 mL/Hr) IV Continuous <Continuous>  sodium chloride 0.9%. 1000 milliLiter(s) (100 mL/Hr) IV Continuous <Continuous>  valACYclovir 1000 milliGRAM(s) Oral daily    MEDICATIONS  (PRN):  acetaminophen   Tablet .. 650 milliGRAM(s) Oral every 6 hours PRN Temp greater or equal to 38C (100.4F)  artificial  tears Solution 1 Drop(s) Both EYES every 6 hours PRN Dry Eyes  benzocaine 15 mG/menthol 3.6 mG (Sugar-Free) Lozenge 1 Lozenge Oral five times a day PRN Sore Throat  docusate sodium 100 milliGRAM(s) Oral three times a day PRN Constipation  guaiFENesin    Syrup 100 milliGRAM(s) Oral every 6 hours PRN Cough  HYDROmorphone   Tablet 2 milliGRAM(s) Oral every 4 hours PRN moderate - severe pain  ondansetron Injectable 8 milliGRAM(s) IV Push every 8 hours PRN Nausea and/or Vomiting  senna 2 Tablet(s) Oral at bedtime PRN Constipation      Allergies    oxycodone (Vomiting; Nausea)    Intolerances        Vital Signs Last 24 Hrs  T(C): 36.6 (03 May 2019 05:33), Max: 36.9 (02 May 2019 14:40)  T(F): 97.9 (03 May 2019 05:33), Max: 98.5 (02 May 2019 14:40)  HR: 99 (03 May 2019 05:33) (96 - 107)  BP: 115/72 (03 May 2019 05:33) (105/69 - 132/82)  BP(mean): --  RR: 20 (03 May 2019 05:33) (19 - 20)  SpO2: 100% (03 May 2019 05:33) (99% - 100%)    PHYSICAL EXAM  General: adult in NAD  HEENT: thrush improved  Neck: supple  CV: normal S1/S2 Tachy  Lungs: positive air movement b/l ant lungs,clear to auscultation, no wheezes, no rales  Abdomen: soft non-tender non-distended, no hepatosplenomegaly + Colostomy  Ext: no clubbing cyanosis or edema      LABS:                          8.8    0.57  )-----------( 23       ( 02 May 2019 18:36 )             26.0     Serial CBC's  05-02 @ 18:36  Hct-26.0 / Hgb-8.8 / Plat-23 / RBC-3.06 / WBC-0.57          Serial CBC's  05-02 @ 06:04  Hct-23.9 / Hgb-8.1 / Plat-6 / RBC-2.82 / WBC-0.41            05-02    133<L>  |  102  |  61<H>  ----------------------------<  91  3.9   |  10<LL>  |  5.21<H>    Ca    6.7<L>      02 May 2019 06:04  Phos  3.1     05-02  Mg     2.1     05-02    TPro  x   /  Alb  2.7<L>  /  TBili  x   /  DBili  x   /  AST  x   /  ALT  x   /  AlkPhos  x   05-02          WBC Count: 0.57 K/uL (05-02 @ 18:36)  Hemoglobin: 8.8 g/dL (05-02 @ 18:36)            RADIOLOGY & ADDITIONAL STUDIES:

## 2019-05-03 NOTE — PROGRESS NOTE ADULT - SUBJECTIVE AND OBJECTIVE BOX
Patient is a 53y old  Male who presents with a chief complaint of Pancytopenia (03 May 2019 10:56)      INTERVAL HPI/OVERNIGHT EVENTS:  T(C): 36.7 (19 @ 10:56), Max: 36.9 (19 @ 14:40)  HR: 94 (19 @ 10:56) (94 - 107)  BP: 115/78 (19 @ 10:56) (105/69 - 132/82)  RR: 20 (19 @ 10:56) (19 - 20)  SpO2: 100% (19 @ 10:56) (99% - 100%)  Wt(kg): --  I&O's Summary      LABS:                        8.7    0.59  )-----------( 19       ( 03 May 2019 06:55 )             25.3     05-03    131<L>  |  98  |  83<H>  ----------------------------<  175<H>  5.1   |  7<LL>  |  6.69<H>    Ca    6.5<LL>      03 May 2019 06:55  Phos  6.0     05-  Mg     2.3     05-03    TPro  x   /  Alb  2.7<L>  /  TBili  x   /  DBili  x   /  AST  x   /  ALT  x   /  AlkPhos  x   05-      Urinalysis Basic - ( 02 May 2019 14:30 )    Color: YELLOW / Appearance: Lt TURBID / S.015 / pH: 6.0  Gluc: NEGATIVE / Ketone: TRACE  / Bili: NEGATIVE / Urobili: NORMAL   Blood: SMALL / Protein: 300 / Nitrite: NEGATIVE   Leuk Esterase: NEGATIVE / RBC: 6-10 / WBC 11-25   Sq Epi: FEW / Non Sq Epi: x / Bacteria: NF      CAPILLARY BLOOD GLUCOSE        ABG - ( 02 May 2019 10:25 )  pH, Arterial: 7.37  pH, Blood: x     /  pCO2: 17    /  pO2: 166   / HCO3: 13    / Base Excess: -15.3 /  SaO2: 98.3                Urinalysis Basic - ( 02 May 2019 14:30 )    Color: YELLOW / Appearance: Lt TURBID / S.015 / pH: 6.0  Gluc: NEGATIVE / Ketone: TRACE  / Bili: NEGATIVE / Urobili: NORMAL   Blood: SMALL / Protein: 300 / Nitrite: NEGATIVE   Leuk Esterase: NEGATIVE / RBC: 6-10 / WBC 11-25   Sq Epi: FEW / Non Sq Epi: x / Bacteria: NF        MEDICATIONS  (STANDING):  allopurinol 300 milliGRAM(s) Oral daily  carvedilol 25 milliGRAM(s) Oral every 12 hours  cefepime   IVPB 2000 milliGRAM(s) IV Intermittent every 24 hours  filgrastim-sndz Injectable 480 MICROGram(s) SubCutaneous daily  FIRST- Mouthwash  BLM 5 milliLiter(s) Swish and Spit five times a day  fluconAZOLE IVPB 200 milliGRAM(s) IV Intermittent every 24 hours  metoclopramide Injectable 10 milliGRAM(s) IV Push four times a day  ranitidine 150 milliGRAM(s) Oral every 24 hours  Saliva Substitute (CAPHOSOL) 30 milliLiter(s) Swish and Spit every 6 hours  sodium chloride 0.9%. 1000 milliLiter(s) (75 mL/Hr) IV Continuous <Continuous>  sodium chloride 0.9%. 1000 milliLiter(s) (100 mL/Hr) IV Continuous <Continuous>  valACYclovir 1000 milliGRAM(s) Oral daily    MEDICATIONS  (PRN):  acetaminophen   Tablet .. 650 milliGRAM(s) Oral every 6 hours PRN Temp greater or equal to 38C (100.4F)  artificial  tears Solution 1 Drop(s) Both EYES every 6 hours PRN Dry Eyes  benzocaine 15 mG/menthol 3.6 mG (Sugar-Free) Lozenge 1 Lozenge Oral five times a day PRN Sore Throat  docusate sodium 100 milliGRAM(s) Oral three times a day PRN Constipation  guaiFENesin    Syrup 100 milliGRAM(s) Oral every 6 hours PRN Cough  HYDROmorphone   Tablet 2 milliGRAM(s) Oral every 4 hours PRN moderate - severe pain  ondansetron Injectable 8 milliGRAM(s) IV Push every 8 hours PRN Nausea and/or Vomiting  senna 2 Tablet(s) Oral at bedtime PRN Constipation          PHYSICAL EXAM:  GENERAL: NAD, well-groomed, well-developed  HEAD:  Atraumatic, Normocephalic  CHEST/LUNG: Clear to percussion bilaterally; No rales, rhonchi, wheezing, or rubs  HEART: Regular rate and rhythm; No murmurs, rubs, or gallops  ABDOMEN: Soft, Nontender, Nondistended; Bowel sounds present  EXTREMITIES:  2+ Peripheral Pulses, No clubbing, cyanosis, or edema  LYMPH: No lymphadenopathy noted  SKIN: No rashes or lesions    Care Discussed with Consultants/Other Providers [ ] YES  [ ] NO

## 2019-05-04 LAB
ANION GAP SERPL CALC-SCNC: 25 MMO/L — HIGH (ref 7–14)
APTT BLD: 27.6 SEC — SIGNIFICANT CHANGE UP (ref 27.5–36.3)
BASOPHILS # BLD AUTO: 0 K/UL — SIGNIFICANT CHANGE UP (ref 0–0.2)
BASOPHILS NFR BLD AUTO: 0 % — SIGNIFICANT CHANGE UP (ref 0–2)
BUN SERPL-MCNC: 105 MG/DL — HIGH (ref 7–23)
CALCIUM SERPL-MCNC: 6.4 MG/DL — CRITICAL LOW (ref 8.4–10.5)
CHLORIDE SERPL-SCNC: 99 MMOL/L — SIGNIFICANT CHANGE UP (ref 98–107)
CO2 SERPL-SCNC: 7 MMOL/L — CRITICAL LOW (ref 22–31)
CREAT SERPL-MCNC: 8.02 MG/DL — HIGH (ref 0.5–1.3)
EOSINOPHIL # BLD AUTO: 0 K/UL — SIGNIFICANT CHANGE UP (ref 0–0.5)
EOSINOPHIL NFR BLD AUTO: 0 % — SIGNIFICANT CHANGE UP (ref 0–6)
GLUCOSE SERPL-MCNC: 180 MG/DL — HIGH (ref 70–99)
HBV SURFACE AG SER-ACNC: NEGATIVE — SIGNIFICANT CHANGE UP
HCT VFR BLD CALC: 23.9 % — LOW (ref 39–50)
HCT VFR BLD CALC: 24.5 % — LOW (ref 39–50)
HGB BLD-MCNC: 7.8 G/DL — LOW (ref 13–17)
HGB BLD-MCNC: 8.3 G/DL — LOW (ref 13–17)
IGA FLD-MCNC: 963 MG/DL — HIGH (ref 70–400)
IGG FLD-MCNC: 427 MG/DL — LOW (ref 700–1600)
IGM SERPL-MCNC: 13 MG/DL — LOW (ref 40–230)
IMM GRANULOCYTES NFR BLD AUTO: 15.4 % — HIGH (ref 0–1.5)
INR BLD: 1.43 — HIGH (ref 0.88–1.17)
LYMPHOCYTES # BLD AUTO: 0.16 K/UL — LOW (ref 1–3.3)
LYMPHOCYTES # BLD AUTO: 20.5 % — SIGNIFICANT CHANGE UP (ref 13–44)
MAGNESIUM SERPL-MCNC: 2.3 MG/DL — SIGNIFICANT CHANGE UP (ref 1.6–2.6)
MANUAL SMEAR VERIFICATION: SIGNIFICANT CHANGE UP
MCHC RBC-ENTMCNC: 28.8 PG — SIGNIFICANT CHANGE UP (ref 27–34)
MCHC RBC-ENTMCNC: 29.2 PG — SIGNIFICANT CHANGE UP (ref 27–34)
MCHC RBC-ENTMCNC: 32.6 % — SIGNIFICANT CHANGE UP (ref 32–36)
MCHC RBC-ENTMCNC: 33.9 % — SIGNIFICANT CHANGE UP (ref 32–36)
MCV RBC AUTO: 86.3 FL — SIGNIFICANT CHANGE UP (ref 80–100)
MCV RBC AUTO: 88.2 FL — SIGNIFICANT CHANGE UP (ref 80–100)
MONOCYTES # BLD AUTO: 0.18 K/UL — SIGNIFICANT CHANGE UP (ref 0–0.9)
MONOCYTES NFR BLD AUTO: 23.1 % — HIGH (ref 2–14)
NEUTROPHILS # BLD AUTO: 0.32 K/UL — LOW (ref 1.8–7.4)
NEUTROPHILS NFR BLD AUTO: 41 % — LOW (ref 43–77)
NRBC # FLD: 0 K/UL — SIGNIFICANT CHANGE UP (ref 0–0)
NRBC # FLD: 0 K/UL — SIGNIFICANT CHANGE UP (ref 0–0)
PHOSPHATE SERPL-MCNC: 6.4 MG/DL — HIGH (ref 2.5–4.5)
PLATELET # BLD AUTO: 12 K/UL — CRITICAL LOW (ref 150–400)
PLATELET # BLD AUTO: 70 K/UL — LOW (ref 150–400)
PMV BLD: 10.4 FL — SIGNIFICANT CHANGE UP (ref 7–13)
PMV BLD: 10.8 FL — SIGNIFICANT CHANGE UP (ref 7–13)
POTASSIUM SERPL-MCNC: 5.3 MMOL/L — SIGNIFICANT CHANGE UP (ref 3.5–5.3)
POTASSIUM SERPL-SCNC: 5.3 MMOL/L — SIGNIFICANT CHANGE UP (ref 3.5–5.3)
PROT SERPL-MCNC: 6.6 G/DL — SIGNIFICANT CHANGE UP (ref 6–8.3)
PROTHROM AB SERPL-ACNC: 16.5 SEC — HIGH (ref 9.8–13.1)
RBC # BLD: 2.71 M/UL — LOW (ref 4.2–5.8)
RBC # BLD: 2.84 M/UL — LOW (ref 4.2–5.8)
RBC # FLD: 16.6 % — HIGH (ref 10.3–14.5)
RBC # FLD: 16.9 % — HIGH (ref 10.3–14.5)
SODIUM SERPL-SCNC: 131 MMOL/L — LOW (ref 135–145)
WBC # BLD: 0.78 K/UL — CRITICAL LOW (ref 3.8–10.5)
WBC # BLD: 1.51 K/UL — LOW (ref 3.8–10.5)
WBC # FLD AUTO: 0.78 K/UL — CRITICAL LOW (ref 3.8–10.5)
WBC # FLD AUTO: 1.51 K/UL — LOW (ref 3.8–10.5)

## 2019-05-04 PROCEDURE — 99253 IP/OBS CNSLTJ NEW/EST LOW 45: CPT

## 2019-05-04 PROCEDURE — 86334 IMMUNOFIX E-PHORESIS SERUM: CPT | Mod: 26

## 2019-05-04 PROCEDURE — 84165 PROTEIN E-PHORESIS SERUM: CPT | Mod: 26

## 2019-05-04 RX ORDER — SODIUM CHLORIDE 9 MG/ML
1000 INJECTION, SOLUTION INTRAVENOUS
Qty: 0 | Refills: 0 | Status: DISCONTINUED | OUTPATIENT
Start: 2019-05-04 | End: 2019-05-06

## 2019-05-04 RX ORDER — SODIUM CHLORIDE 9 MG/ML
1000 INJECTION INTRAMUSCULAR; INTRAVENOUS; SUBCUTANEOUS
Qty: 0 | Refills: 0 | Status: DISCONTINUED | OUTPATIENT
Start: 2019-05-04 | End: 2019-05-04

## 2019-05-04 RX ORDER — SODIUM CHLORIDE 9 MG/ML
1000 INJECTION, SOLUTION INTRAVENOUS
Qty: 0 | Refills: 0 | Status: DISCONTINUED | OUTPATIENT
Start: 2019-05-04 | End: 2019-05-04

## 2019-05-04 RX ADMIN — ONDANSETRON 8 MILLIGRAM(S): 8 TABLET, FILM COATED ORAL at 19:45

## 2019-05-04 RX ADMIN — Medication 10 MILLIGRAM(S): at 12:12

## 2019-05-04 RX ADMIN — CARVEDILOL PHOSPHATE 25 MILLIGRAM(S): 80 CAPSULE, EXTENDED RELEASE ORAL at 06:22

## 2019-05-04 RX ADMIN — SODIUM CHLORIDE 100 MILLILITER(S): 9 INJECTION, SOLUTION INTRAVENOUS at 21:21

## 2019-05-04 RX ADMIN — Medication 10 MILLIGRAM(S): at 18:41

## 2019-05-04 RX ADMIN — FLUCONAZOLE 100 MILLIGRAM(S): 150 TABLET ORAL at 06:22

## 2019-05-04 RX ADMIN — Medication 480 MICROGRAM(S): at 14:53

## 2019-05-04 RX ADMIN — Medication 30 MILLILITER(S): at 12:10

## 2019-05-04 RX ADMIN — SODIUM CHLORIDE 100 MILLILITER(S): 9 INJECTION, SOLUTION INTRAVENOUS at 14:53

## 2019-05-04 RX ADMIN — CEFEPIME 100 MILLIGRAM(S): 1 INJECTION, POWDER, FOR SOLUTION INTRAMUSCULAR; INTRAVENOUS at 13:00

## 2019-05-04 RX ADMIN — DIPHENHYDRAMINE HYDROCHLORIDE AND LIDOCAINE HYDROCHLORIDE AND ALUMINUM HYDROXIDE AND MAGNESIUM HYDRO 5 MILLILITER(S): KIT at 12:09

## 2019-05-04 RX ADMIN — RANITIDINE HYDROCHLORIDE 150 MILLIGRAM(S): 150 TABLET, FILM COATED ORAL at 21:20

## 2019-05-04 RX ADMIN — Medication 300 MILLIGRAM(S): at 12:09

## 2019-05-04 RX ADMIN — Medication 10 MILLIGRAM(S): at 06:28

## 2019-05-04 NOTE — PROGRESS NOTE ADULT - SUBJECTIVE AND OBJECTIVE BOX
Patient is seen and examined at the bed side,  remains afebrile . He is in the process of placing a dialysis catheter since kidney function has worsens. The mucosal pain  and Odynophagia improved much.         REVIEW OF SYSTEMS: All other review systems are negative        ALLERGIES: oxycodone (Vomiting; Nausea)      Vital Signs Last 24 Hrs  T(C): 36.6 (04 May 2019 13:36), Max: 36.9 (04 May 2019 05:24)  T(F): 97.8 (04 May 2019 13:36), Max: 98.5 (04 May 2019 12:31)  HR: 100 (04 May 2019 13:36) (84 - 100)  BP: 115/73 (04 May 2019 13:36) (103/57 - 122/75)  BP(mean): --  RR: 20 (04 May 2019 13:36) (18 - 20)  SpO2: 99% (04 May 2019 13:36) (99% - 118%)        PHYSICAL EXAM:  GENERAL: Not in acute distress  HEENT: No oral thrush or any tonsilar exudate noted   CHEST/LUNG:  Air entry bilaterally  HEART: s1 and s2 present  ABDOMEN:  Nontender and  Nondistended  EXTREMITIES: No pedal  edema  CNS: Awake and Alert        LABS:                         7.8    1.51  )-----------( 70       ( 04 May 2019 15:16 )             23.9                             8.8    0.57  )-----------( 23       ( 02 May 2019 18:36 )             26.0                                 7.8    1.08  )-----------( 32       ( 27 Apr 2019 07:00 )             23.1         05-04    131<L>  |  99  |  105<H>  ----------------------------<  180<H>  5.3   |  7<LL>  |  8.02<H>    Ca    6.4<LL>      04 May 2019 06:00  Phos  6.4     05-04  Mg     2.3     05-04    TPro  6.6  /  Alb  x   /  TBili  x   /  DBili  x   /  AST  x   /  ALT  x   /  AlkPhos  x   05-04    05-02    133<L>  |  102  |  61<H>  ----------------------------<  91  3.9   |  10<LL>  |  5.21<H>    Ca    6.7<L>      02 May 2019 06:04  Phos  3.1     05-02  Mg     2.1     05-02    TPro  x   /  Alb  2.7<L>  /  TBili  x   /  DBili  x   /  AST  x   /  ALT  x   /  AlkPhos  x   05-02 04-30    135  |  104  |  20  ----------------------------<  139<H>  3.8   |  16<L>  |  1.46<H>    Ca    7.2<L>      30 Apr 2019 06:15  Phos  2.4     04-30  Mg     1.8     04-30    TPro  5.9<L>  /  Alb  3.0<L>  /  TBili  2.0<H>  /  DBili  x   /  AST  13  /  ALT  47<H>  /  AlkPhos  100  04-30      Vancomycin level:    Vancomycin Level, Trough (05.03.19 @ 06:55) Vancomycin Level, Trough: 20.9: Vancomycin trough levels should be rapidly reached and maintained at 15-20 ug/ml for life threatening MRSA  infections such as sepsis, endocarditis, osteomyelitis and pneumonia.     Vancomycin Level, Trough - Prior to Next Dose (05.01.19 @ 06:05) Vancomycin Level, Trough: 29.7: Vancomycin trough levels should be rapidly reached and  maintained at 15-20 ug/ml for life threatening MRSA infections such as sepsis, endocarditis, osteomyelitis and pneumonia.     Vancomycin Level, Random (05.02.19 @ 06:04)  Vancomycin Level, Random: 23.8:         MEDICATIONS  (STANDING):    MEDICATIONS  (STANDING):  allopurinol 300 milliGRAM(s) Oral daily  carvedilol 25 milliGRAM(s) Oral every 12 hours  cefepime   IVPB 2000 milliGRAM(s) IV Intermittent every 24 hours  dextrose 5% + sodium chloride 0.45% 1000 milliLiter(s) (100 mL/Hr) IV Continuous <Continuous>  filgrastim-sndz Injectable 480 MICROGram(s) SubCutaneous daily  FIRST- Mouthwash  BLM 5 milliLiter(s) Swish and Spit five times a day  fluconAZOLE IVPB 200 milliGRAM(s) IV Intermittent every 24 hours  metoclopramide Injectable 10 milliGRAM(s) IV Push four times a day  ranitidine 150 milliGRAM(s) Oral every 24 hours  Saliva Substitute (CAPHOSOL) 30 milliLiter(s) Swish and Spit every 6 hours  valACYclovir 500 milliGRAM(s) Oral <User Schedule>    MEDICATIONS  (PRN):  acetaminophen   Tablet .. 650 milliGRAM(s) Oral every 6 hours PRN Temp greater or equal to 38C (100.4F)  artificial  tears Solution 1 Drop(s) Both EYES every 6 hours PRN Dry Eyes  benzocaine 15 mG/menthol 3.6 mG (Sugar-Free) Lozenge 1 Lozenge Oral five times a day PRN Sore Throat  docusate sodium 100 milliGRAM(s) Oral three times a day PRN Constipation  guaiFENesin    Syrup 100 milliGRAM(s) Oral every 6 hours PRN Cough  HYDROmorphone   Tablet 2 milliGRAM(s) Oral every 4 hours PRN moderate - severe pain  ondansetron Injectable 8 milliGRAM(s) IV Push every 8 hours PRN Nausea and/or Vomiting  senna 2 Tablet(s) Oral at bedtime PRN Constipation        RADIOLOGY & ADDITIONAL TESTS:    4/30/19 : Xray Esophagram (04.30.19 @ 09:31) Limited evaluation for esophageal ulcers secondary to underdistention of  the esophagus.      4/27/19 : Xray Chest 1 View- PORTABLE-Urgent (04.27.19 @ 22:45) Probable small left pleural effusion with adjacent opacity that likely  represents atelectasis.    4/25/19 : CT Brain Stroke Protocol (04.25.19 @ 19:42) No acute intracranial hemorrhage, mass effect, or midline shift. No  abnormal extra-axial collections. The basal cisterns are patent without evidence of central herniation. The sulci and ventricles are within normal limits for the patient's age. Stable few patchy areas of low-attenuation in the bihemispheric white  matter, which is nonspecific, but likely related to sequela of chronic  microvascular ischemic disease. Stable coarse calcifications in the left temporal lobe.      MICROBIOLOGY DATA:      Culture - Blood (04.28.19 @ 18:20)    Culture - Blood:   NO ORGANISMS ISOLATED  NO ORGANISMS ISOLATED AT 24 HOURS    Specimen Source: BLOOD VENOUS      Culture - Blood (04.28.19 @ 18:20)    Culture - Blood:   NO ORGANISMS ISOLATED  NO ORGANISMS ISOLATED AT 24 HOURS    Specimen Source: BLOOD PERIPHERAL        Culture - Blood (04.27.19 @ 19:50)    Culture - Blood:   NO ORGANISMS ISOLATED  NO ORGANISMS ISOLATED AT 24 HOURS    Specimen Source: BLOOD PERIPHERAL      Culture - Group A Streptococcus (04.27.19 @ 14:24)    Culture - Group A Streptococcus:   NO BETA STREP. GROUP A  AFTER 24HRS.    Specimen Source: THROAT

## 2019-05-04 NOTE — CONSULT NOTE ADULT - ASSESSMENT
53M w/hx of relapsed multiple myeloma s/p bone marrow transplant (~1.5 yrs ago) and chemo/RT (last infusion Bendamustine and Pomalidomide 4/08/2019), admitted for pancytopenia with course complicated by worsening ROBB with associated acidosis now requiring urgent hemodialysis.    - patient is pancytopenic with platelets of 12K  - needs urgent platelet transfusion to >50K prior to intervention  - please repeat CBC and coags following transfusion  - patient is consented for temporary HD catheter placement pending above  - d/w vascular surgery fellow, Dr. Benz  - please call with questions    LALI Bernard, R4  [id number]

## 2019-05-04 NOTE — PROGRESS NOTE ADULT - SUBJECTIVE AND OBJECTIVE BOX
[facility] - ROD Regalado / ROD Bahena / DAMON Frost/ ROD Mahmood/ ROD Benz/ AUDREY Guzman / ROSALVA Guerra / BREANN Mcgee  ---------------------------------------------------------------------------------------------------------------  seen and examined today for ROBB now needing emergent HD  Interval : worsening acidosis and general metabolic status  VITALS:  T(F): 98.5 (05-04-19 @ 12:31), Max: 98.5 (05-04-19 @ 12:31)  HR: 99 (05-04-19 @ 12:31)  BP: 115/73 (05-04-19 @ 12:31)  RR: 20 (05-04-19 @ 12:31)  SpO2: 100% (05-04-19 @ 12:31)  Wt(kg): --    05-03 @ 07:01  -  05-04 @ 07:00  --------------------------------------------------------  IN: 1275 mL / OUT: 500 mL / NET: 775 mL      Physical Exam :-  Constitutional: NAD  Neck: Supple.  Respiratory: Bilateral equal breath sounds,  Cardiovascular: S1, S2 normal,  Gastrointestinal: Bowel Sounds present, soft, non tender.  Extremities: No edema  Neurological: Alert and Oriented x 3, no focal deficits  Psychiatric: Normal mood, normal affect  Data:-  Allergies :   oxycodone (Vomiting; Nausea)    Hospital Medications:   MEDICATIONS  (STANDING):  allopurinol 300 milliGRAM(s) Oral daily  carvedilol 25 milliGRAM(s) Oral every 12 hours  cefepime   IVPB 2000 milliGRAM(s) IV Intermittent every 24 hours  dextrose 5% 1000 milliLiter(s) (100 mL/Hr) IV Continuous <Continuous>  filgrastim-sndz Injectable 480 MICROGram(s) SubCutaneous daily  FIRST- Mouthwash  BLM 5 milliLiter(s) Swish and Spit five times a day  fluconAZOLE IVPB 200 milliGRAM(s) IV Intermittent every 24 hours  metoclopramide Injectable 10 milliGRAM(s) IV Push four times a day  ranitidine 150 milliGRAM(s) Oral every 24 hours  Saliva Substitute (CAPHOSOL) 30 milliLiter(s) Swish and Spit every 6 hours  valACYclovir 500 milliGRAM(s) Oral <User Schedule>    05-04    131<L>  |  99  |  105<H>  ----------------------------<  180<H>  5.3   |  7<LL>  |  8.02<H>    Ca    6.4<LL>      04 May 2019 06:00  Phos  6.4     05-04  Mg     2.3     05-04    TPro  6.6  /  Alb      /  TBili      /  DBili      /  AST      /  ALT      /  AlkPhos      05-04    Creatinine Trend: 8.02 <--, 6.69 <--, 5.21 <--, 3.16 <--, 1.46 <--, 1.15 <--, 1.10 <--                        8.3    0.78  )-----------( 12       ( 04 May 2019 06:00 )             24.5

## 2019-05-04 NOTE — PROGRESS NOTE ADULT - SUBJECTIVE AND OBJECTIVE BOX
SONAL GARCIA:[id number],   53yMale followed for:  oxycodone (Vomiting; Nausea)    PAST MEDICAL & SURGICAL HISTORY:  Diverticulitis: perforated s/p Kiah  Multiple myeloma: relapsed 3/2019  Hypertension  Left ventricular dysfunction  Cardiomyopathy: nonischemic  Former smoker: (1 ppd x 11 years; Quit ~age 26)  History of bone marrow transplant  History of surgery: s/p exploratory laparotomy with sigmoid resection and colostomy on 9/2/2018    FAMILY HISTORY:  Family history of diabetes mellitus  Family history of hypertension    MEDICATIONS  (STANDING):  allopurinol 300 milliGRAM(s) Oral daily  carvedilol 25 milliGRAM(s) Oral every 12 hours  cefepime   IVPB 2000 milliGRAM(s) IV Intermittent every 24 hours  filgrastim-sndz Injectable 480 MICROGram(s) SubCutaneous daily  FIRST- Mouthwash  BLM 5 milliLiter(s) Swish and Spit five times a day  fluconAZOLE IVPB 200 milliGRAM(s) IV Intermittent every 24 hours  metoclopramide Injectable 10 milliGRAM(s) IV Push four times a day  ranitidine 150 milliGRAM(s) Oral every 24 hours  Saliva Substitute (CAPHOSOL) 30 milliLiter(s) Swish and Spit every 6 hours  sodium chloride 0.9%. 1000 milliLiter(s) (100 mL/Hr) IV Continuous <Continuous>  valACYclovir 500 milliGRAM(s) Oral <User Schedule>    MEDICATIONS  (PRN):  acetaminophen   Tablet .. 650 milliGRAM(s) Oral every 6 hours PRN Temp greater or equal to 38C (100.4F)  artificial  tears Solution 1 Drop(s) Both EYES every 6 hours PRN Dry Eyes  benzocaine 15 mG/menthol 3.6 mG (Sugar-Free) Lozenge 1 Lozenge Oral five times a day PRN Sore Throat  docusate sodium 100 milliGRAM(s) Oral three times a day PRN Constipation  guaiFENesin    Syrup 100 milliGRAM(s) Oral every 6 hours PRN Cough  HYDROmorphone   Tablet 2 milliGRAM(s) Oral every 4 hours PRN moderate - severe pain  ondansetron Injectable 8 milliGRAM(s) IV Push every 8 hours PRN Nausea and/or Vomiting  senna 2 Tablet(s) Oral at bedtime PRN Constipation      Vital Signs Last 24 Hrs  T(C): 36.9 (04 May 2019 05:24), Max: 36.9 (04 May 2019 05:24)  T(F): 98.4 (04 May 2019 05:24), Max: 98.4 (04 May 2019 05:24)  HR: 84 (04 May 2019 05:24) (84 - 95)  BP: 109/70 (04 May 2019 05:24) (103/57 - 122/75)  BP(mean): --  RR: 18 (04 May 2019 05:24) (18 - 20)  SpO2: 99% (04 May 2019 05:24) (99% - 118%)  nc/at  s1s2  cta  soft, nt, nd no guarding or rebound  no c/c/e    CBC Full  -  ( 04 May 2019 06:00 )  WBC Count : 0.78 K/uL  RBC Count : 2.84 M/uL  Hemoglobin : 8.3 g/dL  Hematocrit : 24.5 %  Platelet Count - Automated : 12 K/uL  Mean Cell Volume : 86.3 fL  Mean Cell Hemoglobin : 29.2 pg  Mean Cell Hemoglobin Concentration : 33.9 %  Auto Neutrophil # : 0.32 K/uL  Auto Lymphocyte # : 0.16 K/uL  Auto Monocyte # : 0.18 K/uL  Auto Eosinophil # : 0.00 K/uL  Auto Basophil # : 0.00 K/uL  Auto Neutrophil % : 41.0 %  Auto Lymphocyte % : 20.5 %  Auto Monocyte % : 23.1 %  Auto Eosinophil % : 0.0 %  Auto Basophil % : 0.0 %    05-04    131<L>  |  99  |  105<H>  ----------------------------<  180<H>  5.3   |  7<LL>  |  8.02<H>    Ca    6.4<LL>      04 May 2019 06:00  Phos  6.4     05-04  Mg     2.3     05-04    TPro  6.6  /  Alb  x   /  TBili  x   /  DBili  x   /  AST  x   /  ALT  x   /  AlkPhos  x   05-04

## 2019-05-04 NOTE — PROGRESS NOTE ADULT - ASSESSMENT
A 52 yo Male with  relapsed multiple myeloma s/p bone marrow transplant (~1.5 yrs ago) and chemo/RT (last infusion Bendamustine and Pomalidomide 4/08/2019), perforated diverticulitis s/p Kiah's (proctosigmoidectomy with colostomy, presents to [facility] ER for evaluation of  vomiting and change of vision. He also had been weak, with epistaxis, and petechiae. ON admission, he found to have Pancytopenia and Neutropenia with ANC of 120. He has no fever but rigors and also c/o Odynophagia. The CT head shows No acute intracranial events. The ID consult requested to assist with evaluation and antibiotic management of Neutropenia and Tonsilitis.     # Neutropenic Fever - developed  fever today, 4/28/19  #? Acute Tonsilitis  # Possible Candida Esophagitis-  May benefit from  EGD when cell numbers are stable, since Odynophagia  not improving on Fluconazole  # Kidney failure -s/p Shiley catheter now for dialysis 5/4/19    would recommend:    1. Monitor ANC and agree with dialysis  2. Continue renally adjusted Valacyclovir, Fluconazole and Cefepime  3. Continue Zarxio   4. Encourage oral intake    d/w  Patient, family at the bed side, and  Covering PA    will follow the patient with you

## 2019-05-04 NOTE — CONSULT NOTE ADULT - SUBJECTIVE AND OBJECTIVE BOX
VASCULAR SURGERY CONSULT NOTE    Attending: Dr. Parra  Service: C Team  Contact: [phone]    HPI: 53M w/hx of relapsed multiple myeloma s/p bone marrow transplant (~1.5 yrs ago) and chemo/RT (last infusion Bendamustine and Pomalidomide 2019), perforated diverticulitis s/p Kiah's (Dr. Alcaraz, 2018), nonischemic cardiomyopathy, and HTN presented on 19 with self-limited vomiting/vision changes and lethargy, and was admitted for pancytopenia. His hospital course has been complicated by worsening ROBB with associated acidosis  thought secondary to ATN in the setting of poor PO intake/vomiting and nephrotoxic exposure, and he is now requiring urgent hemodialysis -- we are consulted for access placement.    ROS: Today he feels well. Reports some non-productive cough and occasional SOB. Tolerating diet, no abdominal pain.    PMH/PSH  Diverticulitis  Multiple myeloma s/p BMT  Hypertension  Mild mitral regurgitation  Mild aortic regurgitation  Left ventricular dysfunction  Cardiomyopathy  Perforated diverticulitis s/p Kiah's  Former smoker  Heart failure    MEDICATIONS  acetaminophen   Tablet .. 650 milliGRAM(s) Oral every 6 hours PRN  allopurinol 300 milliGRAM(s) Oral daily  artificial  tears Solution 1 Drop(s) Both EYES every 6 hours PRN  benzocaine 15 mG/menthol 3.6 mG (Sugar-Free) Lozenge 1 Lozenge Oral five times a day PRN  carvedilol 25 milliGRAM(s) Oral every 12 hours  cefepime   IVPB 2000 milliGRAM(s) IV Intermittent every 24 hours  docusate sodium 100 milliGRAM(s) Oral three times a day PRN  filgrastim-sndz Injectable 480 MICROGram(s) SubCutaneous daily  FIRST- Mouthwash  BLM 5 milliLiter(s) Swish and Spit five times a day  fluconAZOLE IVPB 200 milliGRAM(s) IV Intermittent every 24 hours  guaiFENesin    Syrup 100 milliGRAM(s) Oral every 6 hours PRN  HYDROmorphone   Tablet 2 milliGRAM(s) Oral every 4 hours PRN  metoclopramide Injectable 10 milliGRAM(s) IV Push four times a day  ondansetron Injectable 8 milliGRAM(s) IV Push every 8 hours PRN  ranitidine 150 milliGRAM(s) Oral every 24 hours  Saliva Substitute (CAPHOSOL) 30 milliLiter(s) Swish and Spit every 6 hours  senna 2 Tablet(s) Oral at bedtime PRN  sodium chloride 0.9%. 1000 milliLiter(s) IV Continuous <Continuous>  valACYclovir 500 milliGRAM(s) Oral <User Schedule>    Allergies  oxycodone (Vomiting; Nausea)    Social  Lives with wife.    Physical Exam  T(C): 36.6 (19 @ 11:42), Max: 36.9 (19 @ 05:24)  HR: 94 (19 @ 11:42) (84 - 95)  BP: 121/80 (19 @ 11:42) (103/57 - 122/75)  RR: 18 (19 @ 11:42) (18 - 18)  SpO2: 100% (19 @ 11:42) (99% - 118%)    Gen: NAD  Neuro: AAOx3  HEENT: normocephalic, atraumatic, no scleral icterus  CV: S1, S2, RRR  Pulm: upper airway rhonchi  Abd: Soft, ND, NT, no rebound, no guarding, no palpable organomegaly/masses, ostomy pink/viable with gas and stool  Ext: warm, no edema, palp DP bilaterally, palp radial bilaterally    LABS                        8.3    0.78  )-----------( 12       ( 04 May 2019 06:00 )             24.5     05-04    131<L>  |  99  |  105<H>  ----------------------------<  180<H>  5.3   |  7<LL>  |  8.02<H>    Ca    6.4<LL>      04 May 2019 06:00  Phos  6.4     05-04  Mg     2.3     05-04    TPro  6.6  /  Alb  x   /  TBili  x   /  DBili  x   /  AST  x   /  ALT  x   /  AlkPhos  x   05-04    Urinalysis Basic - ( 02 May 2019 14:30 )    Color: YELLOW / Appearance: Lt TURBID / S.015 / pH: 6.0  Gluc: NEGATIVE / Ketone: TRACE  / Bili: NEGATIVE / Urobili: NORMAL   Blood: SMALL / Protein: 300 / Nitrite: NEGATIVE   Leuk Esterase: NEGATIVE / RBC: 6-10 / WBC 11-25   Sq Epi: FEW / Non Sq Epi: x / Bacteria: NF

## 2019-05-04 NOTE — PROGRESS NOTE ADULT - SUBJECTIVE AND OBJECTIVE BOX
[facility]      Seen earlier today, and examined.  - Today, patient is without complaints.           *****MEDICATIONS: Current medication reviewed and documented.    MEDICATIONS  (STANDING):  allopurinol 300 milliGRAM(s) Oral daily  carvedilol 25 milliGRAM(s) Oral every 12 hours  cefepime   IVPB 2000 milliGRAM(s) IV Intermittent every 24 hours  dextrose 5% + sodium chloride 0.45% 1000 milliLiter(s) (100 mL/Hr) IV Continuous <Continuous>  filgrastim-sndz Injectable 480 MICROGram(s) SubCutaneous daily  FIRST- Mouthwash  BLM 5 milliLiter(s) Swish and Spit five times a day  fluconAZOLE IVPB 200 milliGRAM(s) IV Intermittent every 24 hours  metoclopramide Injectable 10 milliGRAM(s) IV Push four times a day  ranitidine 150 milliGRAM(s) Oral every 24 hours  Saliva Substitute (CAPHOSOL) 30 milliLiter(s) Swish and Spit every 6 hours  valACYclovir 500 milliGRAM(s) Oral <User Schedule>    MEDICATIONS  (PRN):  acetaminophen   Tablet .. 650 milliGRAM(s) Oral every 6 hours PRN Temp greater or equal to 38C (100.4F)  artificial  tears Solution 1 Drop(s) Both EYES every 6 hours PRN Dry Eyes  benzocaine 15 mG/menthol 3.6 mG (Sugar-Free) Lozenge 1 Lozenge Oral five times a day PRN Sore Throat  docusate sodium 100 milliGRAM(s) Oral three times a day PRN Constipation  guaiFENesin    Syrup 100 milliGRAM(s) Oral every 6 hours PRN Cough  HYDROmorphone   Tablet 2 milliGRAM(s) Oral every 4 hours PRN moderate - severe pain  ondansetron Injectable 8 milliGRAM(s) IV Push every 8 hours PRN Nausea and/or Vomiting  senna 2 Tablet(s) Oral at bedtime PRN Constipation          ***** VITAL SIGNS:  T(F): 97.8 (19 @ 13:36), Max: 98.5 (19 @ 12:31)  HR: 100 (19 @ 13:36) (84 - 100)  BP: 115/73 (19 @ 13:36) (103/57 - 121/80)  RR: 20 (19 @ 13:36) (18 - 20)  SpO2: 99% (19 @ 13:36) (99% - 100%)  Wt(kg): --  ,   I&O's Summary    03 May 2019 07:01  -  04 May 2019 07:00  --------------------------------------------------------  IN: 1275 mL / OUT: 500 mL / NET: 775 mL             *****PHYSICAL EXAM:   alert oriented x 3 attention comprehension are fair.  still lethargic    EOmi fundi not visualized   no nystagmus     Moving all 4 ext spontaneously no drift appreciated    Gait not assessed.            *****LAB AND IMAGIN.8    1.51  )-----------( 70       ( 04 May 2019 15:16 )             23.9               05-    131<L>  |  99  |  105<H>  ----------------------------<  180<H>  5.3   |  7<LL>  |  8.02<H>    Ca    6.4<LL>      04 May 2019 06:00  Phos  6.4     05-  Mg     2.3     -    TPro  6.6  /  Alb  x   /  TBili  x   /  DBili  x   /  AST  x   /  ALT  x   /  AlkPhos  x   05-04    PT/INR - ( 04 May 2019 15:09 )   PT: 16.5 SEC;   INR: 1.43          PTT - ( 04 May 2019 15:09 )  PTT:27.6 SEC                     [All pertinent recent Imaging/Reports reviewed]           *****A S S E S S M E N T   A N D   P L A N :        52yo M hx of relapsed multiple myeloma s/p bone marrow transplant (~1.5 yrs ago) and chemo/RT (last infusion Bendamustine and Pomalidomide 2019), perforated diverticulitis s/p Kiah's (proctosigmoidectomy with colostomy, 2018), nonischemic cardiomyopathy (mild global LV systolic dysfunction with EF 54% and diastolic LV dysfunction based on TTE in 2018), and HTN presents to [facility] ED w/ vomiting and change of vision. Wife Devan Hood at bedside. Pt was sitting at home. He vomited 2-3 times nonbloody, nonbilious and was not able to eat or drink anything. On  at 4pm, while sitting he noticed his vision went "white" for a few minutes associated with sweating, shaking, and weakness. His wife noticed he had a red rash on his legs and they brought him to the ED. Pt received a platelet transfusion about a week ago outpt. Denies any pain. Had a nose bleed earlier. He denies skin sores, measured fever, HA, trouble speaking, numbness/tingling, focal weakness, dizziness, CP, SOB, abd pain, N/V/D, hematochezia, melena, urinary symptoms, oral/genital sores. He has a recent admission to [facility] discharged 2019 for diffuse body pain and low-grade fever, infectious workup was negative. MRI C/T/L showed abnormal T1 prolongation in the whole spine, sacral and iliac bones. Received 7 days of Bendamustine and Pomalidomide (on hold few days for neutropenia), last on 2019.       Problem/Recommendations 1: near syncope due to  pancytopenia   hemonc on board  ct with no acute intracranial pathology      goc discussion.   overall poor prognosis '         Problem/Recommendations 2  Acute renal failure of unclear etiology    lethargy improved today, defer to renal.   fluid hydration    Thank you for allowing me to participate in the care of this patient. Please do not hesitate to call me if you have any  questions.        ________________  Margret Yousif MD  [facility]  [phone]      30 minutes spent on total encounter; more than 50 % of the visit was  spent counseling about plan of care, compliance to diet/exercise and medication regimen and or  coordinating care by the attending physician.      It is advised that s stroke patients follow up with SAMUEL Mckeon @ [phone] in 1- 2 weeks.   Others please follow up with Dr. Michael Nissenbaum [phone]

## 2019-05-04 NOTE — PROCEDURE NOTE - NSCVLACTUALSITE_VASC_A_CORE
Progress Notes by Goyo Rod MD at 02/19/18 01:20 PM     Author:  Goyo Rod MD Service:  (none) Author Type:  Physician     Filed:  02/19/18 01:22 PM Encounter Date:  2/19/2018 Status:  Signed     :  Goyo Rod MD (Physician)            HPI:  Jamir Becker is a 71 year old male who[MC1.1T] is having an area of slowly increasing swelling on the back of his neck and upper back.  Started several days ago, he believes he had an incision and drainage done for an abscess there some years ago but an area of nodular swelling is slowly gotten worse over last several months but got worse over last several days.  He denies any fevers or chills, denies any resting pain but it is a bit tender when something presses against it.[MC1.1M]    ROS:[MC1.1T]  As above[MC1.1M]      PMH:[MC1.1T]  Otherwise noncontributory[MC1.1M]    ALLERGIES  Review of patient's allergies indicates no known allergies.      EXAM:  /80  Pulse 108  Temp 98.5 °F (36.9 °C) (Tympanic)   Resp 22  SpO2 95%[MC1.1T]  General: Is not in acute distress  Skin: Does have a large area about size of a quarter on his upper back lower neck just to the left of midline, which is indurated and well demarcated, there is no superficial erythema or tenderness or visible skin lesions.  This is firm and not fluctuant[MC1.1M]    ASSESSMENT[MC1.1T]  Large inclusion cyst, not currently infected[MC1.1M]  PLAN[MC1.1T]  Given the sizes I did recommend following with dermatology.  I did not recommend incising this given lack of evidence of infection and the likely need for surgical removal of the whole cyst.[MC1.1M]    Current Outpatient Prescriptions    Medication    • mometasone (ELOCON) 0.1 % cream   • loperamide (IMODIUM A-D) 2 MG tablet   • Chlorpheniramine Maleate (ALLERGY RELIEF OR)   • albuterol (PROAIR HFA) 108 (90 BASE) MCG/ACT inhaler   • furosemide (LASIX) 40 MG tablet   • Omega-3 Fatty Acids (FISH OIL BURP-LESS) 1000 MG CAPS   •  insulin glargine (LANTUS SOLOSTAR) 100 UNIT/ML injection   • METFORMIN HCL   • Lansoprazole (PREVACID 24HR OR)   • ALLOPURINOL   • GLYBURIDE OR   • latanoprost (XALATAN) 0.005 % ophthalmic solution   • warfarin (COUMADIN) 5 MG tablet   • TRAMADOL HCL OR   • Timolol Maleate PF 0.5 % SOLN   • simvastatin (ZOCOR) 10 MG tablet   • Bumetanide (BUMEX) 1 MG tablet   • spironolactone (ALDACTONE) 25 MG tablet   • AMLODIPINE BESYLATE OR   • lisinopril (PRINIVIL,ZESTRIL) 40 MG tablet   • METOPROLOL SUCCINATE OR   • Digoxin (LANOXIN) 0.125 MG tablet[MC1.1T]          Revision History        User Key Date/Time User Provider Type Action    > MC1.1 02/19/18 01:22 PM Goyo Rod MD Physician Sign    M - Manual, T - Template             right/femoral vein

## 2019-05-04 NOTE — CONSULT NOTE ADULT - ENMT
Health Maintenance Due   Topic Date Due   • Cervical Cancer Screening HPV CO-Testing  01/30/2001   • Breast Cancer Screening  01/15/2019       Patient is due for topics as listed above but is not proceeding with Cervical cancer screening and Mammogram at this time.              negative No oral lesions; no gross abnormalities

## 2019-05-04 NOTE — PROGRESS NOTE ADULT - ASSESSMENT
1. Pancytopenia     -- WBC starting to improve  -- counts slow to recover sec due to heavily tx for myeloma  -- Transfuse to keep Hgb > 7 and Platelets> 50K for catheter  -- cont  Zarxio 480mcg daily until ANC > 1500 x 2 consecutive days  -- monitor for infection or bleeding     2. MM relapsed/refractory including failed Auto Transplant    -- had been on tavares/pomalyst.   -- hold pomalyst for now in acute setting and severe pancytopenia  -- further management depends on renal recovery  -- paraproteins improving  -- s/p decadron 40mg 5/2  -- f/u QIggs, SPEP, JENELLE and SFLC    3. Tonsilitis vs Candida Esophagitis    -- cont Cefepime, Diflucan per ID  -- cont Valtrex 1000mg PO QD    -- ID f/u  -- cont magic mouthwash  -- cont IVF    4. ROBB    -- ? sec to light chain disease  -- f/u SFLC  -- Renal following  -- will need dialysis    Overall prognosis is poor. Would get palliative care on board    Torsten Murphy MD  [phone]

## 2019-05-04 NOTE — PROGRESS NOTE ADULT - PROBLEM SELECTOR PLAN 2
Mixed acid-base picture. High anion gap acidosis with resp alkalosis.   No lactic acidosis.   I changed fluids to D5W 1/2NS + 75mEq HCO3 @ 100cc/hr to promote kaleuresis until HD initiated

## 2019-05-04 NOTE — PROGRESS NOTE ADULT - SUBJECTIVE AND OBJECTIVE BOX
Patient is a 53y old  Male who presents with a chief complaint of Pancytopenia (04 May 2019 17:22)      INTERVAL HPI/OVERNIGHT EVENTS:  T(C): 36.6 (05-04-19 @ 13:36), Max: 36.9 (05-04-19 @ 05:24)  HR: 100 (05-04-19 @ 13:36) (84 - 100)  BP: 115/73 (05-04-19 @ 13:36) (103/57 - 121/80)  RR: 20 (05-04-19 @ 13:36) (18 - 20)  SpO2: 99% (05-04-19 @ 13:36) (99% - 100%)  Wt(kg): --  I&O's Summary    03 May 2019 07:01  -  04 May 2019 07:00  --------------------------------------------------------  IN: 1275 mL / OUT: 500 mL / NET: 775 mL        LABS:                        7.8    1.51  )-----------( 70       ( 04 May 2019 15:16 )             23.9     05-04    131<L>  |  99  |  105<H>  ----------------------------<  180<H>  5.3   |  7<LL>  |  8.02<H>    Ca    6.4<LL>      04 May 2019 06:00  Phos  6.4     05-04  Mg     2.3     05-04    TPro  6.6  /  Alb  x   /  TBili  x   /  DBili  x   /  AST  x   /  ALT  x   /  AlkPhos  x   05-04    PT/INR - ( 04 May 2019 15:09 )   PT: 16.5 SEC;   INR: 1.43          PTT - ( 04 May 2019 15:09 )  PTT:27.6 SEC    CAPILLARY BLOOD GLUCOSE                MEDICATIONS  (STANDING):  allopurinol 300 milliGRAM(s) Oral daily  carvedilol 25 milliGRAM(s) Oral every 12 hours  cefepime   IVPB 2000 milliGRAM(s) IV Intermittent every 24 hours  dextrose 5% + sodium chloride 0.45% 1000 milliLiter(s) (100 mL/Hr) IV Continuous <Continuous>  filgrastim-sndz Injectable 480 MICROGram(s) SubCutaneous daily  FIRST- Mouthwash  BLM 5 milliLiter(s) Swish and Spit five times a day  fluconAZOLE IVPB 200 milliGRAM(s) IV Intermittent every 24 hours  metoclopramide Injectable 10 milliGRAM(s) IV Push four times a day  ranitidine 150 milliGRAM(s) Oral every 24 hours  Saliva Substitute (CAPHOSOL) 30 milliLiter(s) Swish and Spit every 6 hours  valACYclovir 500 milliGRAM(s) Oral <User Schedule>    MEDICATIONS  (PRN):  acetaminophen   Tablet .. 650 milliGRAM(s) Oral every 6 hours PRN Temp greater or equal to 38C (100.4F)  artificial  tears Solution 1 Drop(s) Both EYES every 6 hours PRN Dry Eyes  benzocaine 15 mG/menthol 3.6 mG (Sugar-Free) Lozenge 1 Lozenge Oral five times a day PRN Sore Throat  docusate sodium 100 milliGRAM(s) Oral three times a day PRN Constipation  guaiFENesin    Syrup 100 milliGRAM(s) Oral every 6 hours PRN Cough  HYDROmorphone   Tablet 2 milliGRAM(s) Oral every 4 hours PRN moderate - severe pain  ondansetron Injectable 8 milliGRAM(s) IV Push every 8 hours PRN Nausea and/or Vomiting  senna 2 Tablet(s) Oral at bedtime PRN Constipation          PHYSICAL EXAM:  GENERAL: NAD, well-groomed, well-developed  HEAD:  Atraumatic, Normocephalic  CHEST/LUNG: Clear to percussion bilaterally; No rales, rhonchi, wheezing, or rubs  HEART: Regular rate and rhythm; No murmurs, rubs, or gallops  ABDOMEN: Soft, Nontender, Nondistended; Bowel sounds present  EXTREMITIES:  2+ Peripheral Pulses, No clubbing, cyanosis, or edema  LYMPH: No lymphadenopathy noted  SKIN: No rashes or lesions    Care Discussed with Consultants/Other Providers [ ] YES  [ ] NO

## 2019-05-04 NOTE — PROGRESS NOTE ADULT - ASSESSMENT
Problem/Plan - 1:  ·  Problem: Pancytopenia with neutropenic fever 2/2 acute tonsillitis and esophageal candidiasis complicated by symptomatic anemia requiring prbc transfusions:      persistent        c/w abx, filgrastim      transfuse prn to maintain hgb > 7.5      all consultants and medical team members management greatly appreciated      f/u cultures to final date      Problem/Plan:  Problem: Acute Renal Failure, likely ATN:      persistent abnormal renal function       fluid hydration       possibly 2/2 underlying multiple myeloma complicated by symptomatic anemia/nsaids/vanco/valacyclovir +/- alternative etiology      f/u consultants for management      strict i/o       avoid nephrotoxic rx        optimize h/h and intravascular volume      HD as per renal       Problem/Plan -:  ·  Problem: Multiple myeloma in relapse.  Plan: MM in relapse s/p bone marrow transplant (~1.5 yrs ago) and chemo/RT. Last infusion Bendamustine and Pomalidomide 4/08/2019        Continue Current medications and monitor.         Hem/ onc mgmt  Problem/Plan -:  ·Chronic Systolic CHF       - c/w cardiac meds, adjust/restart meds as needed    Problem/Plan -:Acute Tonsilitis and Candida Esophagitis        continue abx per ID       continue Cepacol

## 2019-05-04 NOTE — PROGRESS NOTE ADULT - SUBJECTIVE AND OBJECTIVE BOX
Pt is seen and examined  pt is awake and lying in bed  daughter at bedside  very weak  SCr rising  Pt open to dialysis      PAST MEDICAL & SURGICAL HISTORY:  Diverticulitis: perforated s/p Kiah  Multiple myeloma: relapsed 3/2019  Hypertension  Left ventricular dysfunction  Cardiomyopathy: nonischemic  Former smoker: (1 ppd x 11 years; Quit ~age 26)  History of bone marrow transplant  History of surgery: s/p exploratory laparotomy with sigmoid resection and colostomy on 2018      ROS:  Negative except for:    MEDICATIONS  (STANDING):  allopurinol 300 milliGRAM(s) Oral daily  carvedilol 25 milliGRAM(s) Oral every 12 hours  cefepime   IVPB 2000 milliGRAM(s) IV Intermittent every 24 hours  dextrose 5% + sodium chloride 0.45% 1000 milliLiter(s) (100 mL/Hr) IV Continuous <Continuous>  filgrastim-sndz Injectable 480 MICROGram(s) SubCutaneous daily  FIRST- Mouthwash  BLM 5 milliLiter(s) Swish and Spit five times a day  fluconAZOLE IVPB 200 milliGRAM(s) IV Intermittent every 24 hours  metoclopramide Injectable 10 milliGRAM(s) IV Push four times a day  ranitidine 150 milliGRAM(s) Oral every 24 hours  Saliva Substitute (CAPHOSOL) 30 milliLiter(s) Swish and Spit every 6 hours  valACYclovir 500 milliGRAM(s) Oral <User Schedule>    MEDICATIONS  (PRN):  acetaminophen   Tablet .. 650 milliGRAM(s) Oral every 6 hours PRN Temp greater or equal to 38C (100.4F)  artificial  tears Solution 1 Drop(s) Both EYES every 6 hours PRN Dry Eyes  benzocaine 15 mG/menthol 3.6 mG (Sugar-Free) Lozenge 1 Lozenge Oral five times a day PRN Sore Throat  docusate sodium 100 milliGRAM(s) Oral three times a day PRN Constipation  guaiFENesin    Syrup 100 milliGRAM(s) Oral every 6 hours PRN Cough  HYDROmorphone   Tablet 2 milliGRAM(s) Oral every 4 hours PRN moderate - severe pain  ondansetron Injectable 8 milliGRAM(s) IV Push every 8 hours PRN Nausea and/or Vomiting  senna 2 Tablet(s) Oral at bedtime PRN Constipation      Allergies    oxycodone (Vomiting; Nausea)    Intolerances        Vital Signs Last 24 Hrs  T(C): 36.6 (04 May 2019 13:36), Max: 36.9 (04 May 2019 05:24)  T(F): 97.8 (04 May 2019 13:36), Max: 98.5 (04 May 2019 12:31)  HR: 100 (04 May 2019 13:36) (84 - 100)  BP: 115/73 (04 May 2019 13:36) (103/57 - 122/75)  BP(mean): --  RR: 20 (04 May 2019 13:36) (18 - 20)  SpO2: 99% (04 May 2019 13:36) (99% - 118%)    PHYSICAL EXAM    General: adult in NAD  HEENT: thrush improved  Neck: supple  CV: normal S1/S2 Tachy  Lungs: positive air movement b/l ant lungs,clear to auscultation, no wheezes, no rales  Abdomen: soft non-tender non-distended, no hepatosplenomegaly + Colostomy  Ext: no clubbing cyanosis or edema        LABS:                          7.8    1.51  )-----------( 70       ( 04 May 2019 15:16 )             23.9     Serial CBC's   @ 15:16  Hct-23.9 / Hgb-7.8 / Plat-70 / RBC-2.71 / WBC-1.51          Serial CBC's   @ 06:00  Hct-24.5 / Hgb-8.3 / Plat-12 / RBC-2.84 / WBC-0.78                131<L>  |  99  |  105<H>  ----------------------------<  180<H>  5.3   |  7<LL>  |  8.02<H>    Ca    6.4<LL>      04 May 2019 06:00  Phos  6.4     -  Mg     2.3         TPro  6.6  /  Alb  x   /  TBili  x   /  DBili  x   /  AST  x   /  ALT  x   /  AlkPhos  x             WBC Count: 1.51 K/uL ( @ 15:16)  Hemoglobin: 7.8 g/dL ( @ 15:16)      Quantitative Ig mg/dL ( @ 06:00)  Quantitative IgA: 963 mg/dL ( @ 06:00)        RADIOLOGY & ADDITIONAL STUDIES:

## 2019-05-04 NOTE — CHART NOTE - NSCHARTNOTEFT_GEN_A_CORE
As per discussion with nephrology team, pt requiring urgent HD. Discussed with pt at bedside. Vascular surgery consulted for urgent shiley placement. Requested type and screen and 2 units of plts. Message left for wife requesting call back to discuss plan of care.

## 2019-05-04 NOTE — PROGRESS NOTE ADULT - ASSESSMENT
53y Male with relapsed multiple myeloma s/p bone marrow transplant (~1.5 yrs ago) and chemo/RT (last infusion Bendamustine and Pomalidomide 4/08/2019), perforated diverticulitis s/p Kiah's (proctosigmoidectomy with colostomy, 9/2018), nonischemic cardiomyopathy (mild global LV systolic dysfunction with EF 54% and diastolic LV dysfunction based on TTE in 11/2018), and HTN admitted to [facility] on 4/25 for nausea and vomiting, and poor PO intake, treated for neutropenic fever, c/b ROBB

## 2019-05-05 DIAGNOSIS — N18.6 END STAGE RENAL DISEASE: ICD-10-CM

## 2019-05-05 DIAGNOSIS — C90.00 MULTIPLE MYELOMA NOT HAVING ACHIEVED REMISSION: ICD-10-CM

## 2019-05-05 LAB
ANION GAP SERPL CALC-SCNC: 23 MMO/L — HIGH (ref 7–14)
BASOPHILS # BLD AUTO: 0.01 K/UL — SIGNIFICANT CHANGE UP (ref 0–0.2)
BASOPHILS NFR BLD AUTO: 0.7 % — SIGNIFICANT CHANGE UP (ref 0–2)
BASOPHILS NFR SPEC: 0 % — SIGNIFICANT CHANGE UP (ref 0–2)
BLASTS # FLD: 0 % — SIGNIFICANT CHANGE UP (ref 0–0)
BUN SERPL-MCNC: 86 MG/DL — HIGH (ref 7–23)
CALCIUM SERPL-MCNC: 6.7 MG/DL — LOW (ref 8.4–10.5)
CHLORIDE SERPL-SCNC: 98 MMOL/L — SIGNIFICANT CHANGE UP (ref 98–107)
CO2 SERPL-SCNC: 13 MMOL/L — LOW (ref 22–31)
CREAT SERPL-MCNC: 6.88 MG/DL — HIGH (ref 0.5–1.3)
DACRYOCYTES BLD QL SMEAR: SLIGHT — SIGNIFICANT CHANGE UP
EOSINOPHIL # BLD AUTO: 0.01 K/UL — SIGNIFICANT CHANGE UP (ref 0–0.5)
EOSINOPHIL NFR BLD AUTO: 0.7 % — SIGNIFICANT CHANGE UP (ref 0–6)
EOSINOPHIL NFR FLD: 0.9 % — SIGNIFICANT CHANGE UP (ref 0–6)
GIANT PLATELETS BLD QL SMEAR: PRESENT — SIGNIFICANT CHANGE UP
GLUCOSE SERPL-MCNC: 124 MG/DL — HIGH (ref 70–99)
HBV CORE IGM SER-ACNC: NONREACTIVE — SIGNIFICANT CHANGE UP
HCT VFR BLD CALC: 21.7 % — LOW (ref 39–50)
HGB BLD-MCNC: 7.5 G/DL — LOW (ref 13–17)
IMM GRANULOCYTES NFR BLD AUTO: 2 % — HIGH (ref 0–1.5)
LYMPHOCYTES # BLD AUTO: 0.26 K/UL — LOW (ref 1–3.3)
LYMPHOCYTES # BLD AUTO: 17.4 % — SIGNIFICANT CHANGE UP (ref 13–44)
LYMPHOCYTES NFR SPEC AUTO: 8 % — LOW (ref 13–44)
MAGNESIUM SERPL-MCNC: 2.1 MG/DL — SIGNIFICANT CHANGE UP (ref 1.6–2.6)
MCHC RBC-ENTMCNC: 29.4 PG — SIGNIFICANT CHANGE UP (ref 27–34)
MCHC RBC-ENTMCNC: 34.6 % — SIGNIFICANT CHANGE UP (ref 32–36)
MCV RBC AUTO: 85.1 FL — SIGNIFICANT CHANGE UP (ref 80–100)
METAMYELOCYTES # FLD: 0.9 % — SIGNIFICANT CHANGE UP (ref 0–1)
MONOCYTES # BLD AUTO: 0.14 K/UL — SIGNIFICANT CHANGE UP (ref 0–0.9)
MONOCYTES NFR BLD AUTO: 9.4 % — SIGNIFICANT CHANGE UP (ref 2–14)
MONOCYTES NFR BLD: 9.8 % — HIGH (ref 2–9)
MYELOCYTES NFR BLD: 0 % — SIGNIFICANT CHANGE UP (ref 0–0)
NEUTROPHIL AB SER-ACNC: 73.2 % — SIGNIFICANT CHANGE UP (ref 43–77)
NEUTROPHILS # BLD AUTO: 1.04 K/UL — LOW (ref 1.8–7.4)
NEUTROPHILS NFR BLD AUTO: 69.8 % — SIGNIFICANT CHANGE UP (ref 43–77)
NEUTS BAND # BLD: 2.7 % — SIGNIFICANT CHANGE UP (ref 0–6)
NRBC # FLD: 0 K/UL — SIGNIFICANT CHANGE UP (ref 0–0)
OTHER - HEMATOLOGY %: 0 — SIGNIFICANT CHANGE UP
PHOSPHATE SERPL-MCNC: 4 MG/DL — SIGNIFICANT CHANGE UP (ref 2.5–4.5)
PLATELET # BLD AUTO: 43 K/UL — LOW (ref 150–400)
PLATELET COUNT - ESTIMATE: SIGNIFICANT CHANGE UP
PMV BLD: 10.7 FL — SIGNIFICANT CHANGE UP (ref 7–13)
POTASSIUM SERPL-MCNC: 3.9 MMOL/L — SIGNIFICANT CHANGE UP (ref 3.5–5.3)
POTASSIUM SERPL-SCNC: 3.9 MMOL/L — SIGNIFICANT CHANGE UP (ref 3.5–5.3)
PROMYELOCYTES # FLD: 0 % — SIGNIFICANT CHANGE UP (ref 0–0)
RBC # BLD: 2.55 M/UL — LOW (ref 4.2–5.8)
RBC # FLD: 16.3 % — HIGH (ref 10.3–14.5)
SODIUM SERPL-SCNC: 134 MMOL/L — LOW (ref 135–145)
VANCOMYCIN TROUGH SERPL-MCNC: 17.4 UG/ML — SIGNIFICANT CHANGE UP (ref 10–20)
VARIANT LYMPHS # BLD: 4.5 % — SIGNIFICANT CHANGE UP
WBC # BLD: 1.49 K/UL — LOW (ref 3.8–10.5)
WBC # FLD AUTO: 1.49 K/UL — LOW (ref 3.8–10.5)

## 2019-05-05 PROCEDURE — 99232 SBSQ HOSP IP/OBS MODERATE 35: CPT

## 2019-05-05 RX ORDER — CHLORHEXIDINE GLUCONATE 213 G/1000ML
1 SOLUTION TOPICAL
Qty: 0 | Refills: 0 | Status: DISCONTINUED | OUTPATIENT
Start: 2019-05-05 | End: 2019-05-14

## 2019-05-05 RX ADMIN — Medication 480 MICROGRAM(S): at 17:32

## 2019-05-05 RX ADMIN — CHLORHEXIDINE GLUCONATE 1 APPLICATION(S): 213 SOLUTION TOPICAL at 08:54

## 2019-05-05 RX ADMIN — Medication 30 MILLILITER(S): at 07:09

## 2019-05-05 RX ADMIN — Medication 10 MILLIGRAM(S): at 11:15

## 2019-05-05 RX ADMIN — RANITIDINE HYDROCHLORIDE 150 MILLIGRAM(S): 150 TABLET, FILM COATED ORAL at 17:27

## 2019-05-05 RX ADMIN — Medication 10 MILLIGRAM(S): at 23:11

## 2019-05-05 RX ADMIN — ONDANSETRON 8 MILLIGRAM(S): 8 TABLET, FILM COATED ORAL at 08:36

## 2019-05-05 RX ADMIN — Medication 300 MILLIGRAM(S): at 15:23

## 2019-05-05 RX ADMIN — CEFEPIME 100 MILLIGRAM(S): 1 INJECTION, POWDER, FOR SOLUTION INTRAMUSCULAR; INTRAVENOUS at 11:13

## 2019-05-05 RX ADMIN — DIPHENHYDRAMINE HYDROCHLORIDE AND LIDOCAINE HYDROCHLORIDE AND ALUMINUM HYDROXIDE AND MAGNESIUM HYDRO 5 MILLILITER(S): KIT at 15:23

## 2019-05-05 RX ADMIN — Medication 10 MILLIGRAM(S): at 17:31

## 2019-05-05 RX ADMIN — CARVEDILOL PHOSPHATE 25 MILLIGRAM(S): 80 CAPSULE, EXTENDED RELEASE ORAL at 17:30

## 2019-05-05 RX ADMIN — Medication 30 MILLILITER(S): at 17:31

## 2019-05-05 RX ADMIN — Medication 30 MILLILITER(S): at 15:24

## 2019-05-05 RX ADMIN — Medication 10 MILLIGRAM(S): at 07:10

## 2019-05-05 RX ADMIN — Medication 10 MILLIGRAM(S): at 01:06

## 2019-05-05 RX ADMIN — FLUCONAZOLE 100 MILLIGRAM(S): 150 TABLET ORAL at 08:34

## 2019-05-05 NOTE — PROGRESS NOTE ADULT - SUBJECTIVE AND OBJECTIVE BOX
Pt is seen and examined  pt is awake and lying in bed   had first dialysis yesterday  c/o fatigue, burning in mouth when eating  genealized anxiety      PAST MEDICAL & SURGICAL HISTORY:  Diverticulitis: perforated s/p Kiah  Multiple myeloma: relapsed 3/2019  Hypertension  Left ventricular dysfunction  Cardiomyopathy: nonischemic  Former smoker: (1 ppd x 11 years; Quit ~age 26)  History of bone marrow transplant  History of surgery: s/p exploratory laparotomy with sigmoid resection and colostomy on 2018      ROS:  Negative except for:    MEDICATIONS  (STANDING):  allopurinol 300 milliGRAM(s) Oral daily  carvedilol 25 milliGRAM(s) Oral every 12 hours  cefepime   IVPB 2000 milliGRAM(s) IV Intermittent every 24 hours  chlorhexidine 4% Liquid 1 Application(s) Topical <User Schedule>  dextrose 5% + sodium chloride 0.45% 1000 milliLiter(s) (100 mL/Hr) IV Continuous <Continuous>  filgrastim-sndz Injectable 480 MICROGram(s) SubCutaneous daily  FIRST- Mouthwash  BLM 5 milliLiter(s) Swish and Spit five times a day  fluconAZOLE IVPB 200 milliGRAM(s) IV Intermittent every 24 hours  metoclopramide Injectable 10 milliGRAM(s) IV Push four times a day  ranitidine 150 milliGRAM(s) Oral every 24 hours  Saliva Substitute (CAPHOSOL) 30 milliLiter(s) Swish and Spit every 6 hours  valACYclovir 500 milliGRAM(s) Oral <User Schedule>    MEDICATIONS  (PRN):  acetaminophen   Tablet .. 650 milliGRAM(s) Oral every 6 hours PRN Temp greater or equal to 38C (100.4F)  artificial  tears Solution 1 Drop(s) Both EYES every 6 hours PRN Dry Eyes  benzocaine 15 mG/menthol 3.6 mG (Sugar-Free) Lozenge 1 Lozenge Oral five times a day PRN Sore Throat  docusate sodium 100 milliGRAM(s) Oral three times a day PRN Constipation  guaiFENesin    Syrup 100 milliGRAM(s) Oral every 6 hours PRN Cough  HYDROmorphone   Tablet 2 milliGRAM(s) Oral every 4 hours PRN moderate - severe pain  ondansetron Injectable 8 milliGRAM(s) IV Push every 8 hours PRN Nausea and/or Vomiting  senna 2 Tablet(s) Oral at bedtime PRN Constipation      Allergies    oxycodone (Vomiting; Nausea)    Intolerances        Vital Signs Last 24 Hrs  T(C): 37 (05 May 2019 15:13), Max: 37 (05 May 2019 15:13)  T(F): 98.6 (05 May 2019 15:13), Max: 98.6 (05 May 2019 15:13)  HR: 98 (05 May 2019 15:13) (87 - 100)  BP: 123/60 (05 May 2019 15:13) (102/55 - 125/71)  BP(mean): --  RR: 18 (05 May 2019 15:13) (18 - 20)  SpO2: 100% (05 May 2019 15:13) (99% - 100%)    PHYSICAL EXAM    General: adult in NAD  HEENT: thrush improved  Neck: supple  CV: normal S1/S2 Tachy  Lungs: positive air movement b/l ant lungs,clear to auscultation, no wheezes, no rales  Abdomen: soft non-tender non-distended, no hepatosplenomegaly + Colostomy  Ext: no clubbing cyanosis or edema. R Groin Permacath      LABS:                          7.5    1.49  )-----------( 43       ( 05 May 2019 06:15 )             21.7     Serial CBC's   @ 06:15  Hct-21.7 / Hgb-7.5 / Plat-43 / RBC-2.55 / WBC-1.49          Serial CBC's   @ 15:16  Hct-23.9 / Hgb-7.8 / Plat-70 / RBC-2.71 / WBC-1.51            05-05    134<L>  |  98  |  86<H>  ----------------------------<  124<H>  3.9   |  13<L>  |  6.88<H>    Ca    6.7<L>      05 May 2019 06:15  Phos  4.0     05-05  Mg     2.1     05-05    TPro  6.6  /  Alb  x   /  TBili  x   /  DBili  x   /  AST  x   /  ALT  x   /  AlkPhos  x   05-04      PT/INR - ( 04 May 2019 15:09 )   PT: 16.5 SEC;   INR: 1.43          PTT - ( 04 May 2019 15:09 )  PTT:27.6 SEC    WBC Count: 1.49 K/uL ( @ 06:15)  Hemoglobin: 7.5 g/dL ( @ 06:15)      Quantitative Ig mg/dL ( @ 06:00)  Quantitative IgA: 963 mg/dL ( @ 06:00)        RADIOLOGY & ADDITIONAL STUDIES:

## 2019-05-05 NOTE — PROGRESS NOTE ADULT - ASSESSMENT
A 54 yo Male with  relapsed multiple myeloma s/p bone marrow transplant (~1.5 yrs ago) and chemo/RT (last infusion Bendamustine and Pomalidomide 4/08/2019), perforated diverticulitis s/p Kiah's (proctosigmoidectomy with colostomy, presents to [facility] ER for evaluation of  vomiting and change of vision. He also had been weak, with epistaxis, and petechiae. ON admission, he found to have Pancytopenia and Neutropenia with ANC of 120. He has no fever but rigors and also c/o Odynophagia. The CT head shows No acute intracranial events. The ID consult requested to assist with evaluation and antibiotic management of Neutropenia and Tonsilitis.     # Neutropenic Fever - developed  fever today, 4/28/19  #? Acute Tonsilitis  # Possible Candida Esophagitis-  May benefit from  EGD when cell numbers are stable, since Odynophagia  not improving on Fluconazole  # Kidney failure -s/p Shiley catheter now for dialysis 5/4/19    would recommend:    1. Monitor ANC and agree with dialysis  2. Continue renally adjusted Valacyclovir, Fluconazole and Cefepime  3. Continue Zarxio   4. Encourage oral intake    d/w  Patient, family at the bed side, and  Covering PA    will follow the patient with you A 54 yo Male with  relapsed multiple myeloma s/p bone marrow transplant (~1.5 yrs ago) and chemo/RT (last infusion Bendamustine and Pomalidomide 4/08/2019), perforated diverticulitis s/p Kiah's (proctosigmoidectomy with colostomy, presents to [facility] ER for evaluation of  vomiting and change of vision. He also had been weak, with epistaxis, and petechiae. ON admission, he found to have Pancytopenia and Neutropenia with ANC of 120. He has no fever but rigors and also c/o Odynophagia. The CT head shows No acute intracranial events. The ID consult requested to assist with evaluation and antibiotic management of Neutropenia and Tonsilitis.     # Neutropenic Fever - developed  fever today, 4/28/19  #? Acute Tonsilitis  # Possible Candida Esophagitis-  May benefit from  EGD when cell numbers are stable, since Odynophagia  not improving on Fluconazole  # Kidney failure -s/p Shiley catheter now for dialysis 5/4/19    would recommend:    1. Pain management as needed  2. Continue renally adjusted Valacyclovir, Fluconazole and Cefepime  3. Continue dialysis as tolerated  4. Encourage oral intake    d/w  Family at the bed side    will follow the patient with you

## 2019-05-05 NOTE — PROGRESS NOTE ADULT - SUBJECTIVE AND OBJECTIVE BOX
Patient is seen and examined at the bed side,  remains afebrile . He is in the process of placing a dialysis catheter since kidney function has worsens. The mucosal pain  and Odynophagia improved much.         REVIEW OF SYSTEMS: All other review systems are negative        ALLERGIES: oxycodone (Vomiting; Nausea)      Vital Signs Last 24 Hrs  T(C): 37.2 (05 May 2019 17:17), Max: 37.2 (05 May 2019 17:17)  T(F): 98.9 (05 May 2019 17:17), Max: 98.9 (05 May 2019 17:17)  HR: 97 (05 May 2019 17:17) (87 - 100)  BP: 116/67 (05 May 2019 17:17) (102/55 - 125/71)  BP(mean): --  RR: 18 (05 May 2019 17:17) (18 - 20)  SpO2: 100% (05 May 2019 17:17) (99% - 100%)      PHYSICAL EXAM:  GENERAL: Not in acute distress  HEENT: No oral thrush or any tonsilar exudate noted   CHEST/LUNG:  Air entry bilaterally  HEART: s1 and s2 present  ABDOMEN:  Nontender and  Nondistended  EXTREMITIES: No pedal  edema  CNS: Awake and Alert        LABS:                         7.5    1.49  )-----------( 43       ( 05 May 2019 06:15 )             21.7                           7.8    1.51  )-----------( 70       ( 04 May 2019 15:16 )             23.9                             8.8    0.57  )-----------( 23       ( 02 May 2019 18:36 )             26.0                        05-05    134<L>  |  98  |  86<H>  ----------------------------<  124<H>  3.9   |  13<L>  |  6.88<H>    Ca    6.7<L>      05 May 2019 06:15  Phos  4.0     05-05  Mg     2.1     05-05    TPro  6.6  /  Alb  x   /  TBili  x   /  DBili  x   /  AST  x   /  ALT  x   /  AlkPhos  x   05-04    05-04    131<L>  |  99  |  105<H>  ----------------------------<  180<H>  5.3   |  7<LL>  |  8.02<H>    Ca    6.4<LL>      04 May 2019 06:00  Phos  6.4     05-04  Mg     2.3     05-04    TPro  6.6  /  Alb  x   /  TBili  x   /  DBili  x   /  AST  x   /  ALT  x   /  AlkPhos  x   05-04    05-02    133<L>  |  102  |  61<H>  ----------------------------<  91  3.9   |  10<LL>  |  5.21<H>    Ca    6.7<L>      02 May 2019 06:04  Phos  3.1     05-02  Mg     2.1     05-02    TPro  x   /  Alb  2.7<L>  /  TBili  x   /  DBili  x   /  AST  x   /  ALT  x   /  AlkPhos  x   05-02 04-30    135  |  104  |  20  ----------------------------<  139<H>  3.8   |  16<L>  |  1.46<H>    Ca    7.2<L>      30 Apr 2019 06:15  Phos  2.4     04-30  Mg     1.8     04-30    TPro  5.9<L>  /  Alb  3.0<L>  /  TBili  2.0<H>  /  DBili  x   /  AST  13  /  ALT  47<H>  /  AlkPhos  100  04-30      Vancomycin level:    Vancomycin Level, Trough (05.03.19 @ 06:55) Vancomycin Level, Trough: 20.9: Vancomycin trough levels should be rapidly reached and maintained at 15-20 ug/ml for life threatening MRSA  infections such as sepsis, endocarditis, osteomyelitis and pneumonia.     Vancomycin Level, Trough - Prior to Next Dose (05.01.19 @ 06:05) Vancomycin Level, Trough: 29.7: Vancomycin trough levels should be rapidly reached and  maintained at 15-20 ug/ml for life threatening MRSA infections such as sepsis, endocarditis, osteomyelitis and pneumonia.     Vancomycin Level, Random (05.02.19 @ 06:04)  Vancomycin Level, Random: 23.8:         MEDICATIONS  (STANDING):    MEDICATIONS  (STANDING):  allopurinol 300 milliGRAM(s) Oral daily  carvedilol 25 milliGRAM(s) Oral every 12 hours  cefepime   IVPB 2000 milliGRAM(s) IV Intermittent every 24 hours  chlorhexidine 4% Liquid 1 Application(s) Topical <User Schedule>  dextrose 5% + sodium chloride 0.45% 1000 milliLiter(s) (100 mL/Hr) IV Continuous <Continuous>  filgrastim-sndz Injectable 480 MICROGram(s) SubCutaneous daily  FIRST- Mouthwash  BLM 5 milliLiter(s) Swish and Spit five times a day  fluconAZOLE IVPB 200 milliGRAM(s) IV Intermittent every 24 hours  metoclopramide Injectable 10 milliGRAM(s) IV Push four times a day  ranitidine 150 milliGRAM(s) Oral every 24 hours  Saliva Substitute (CAPHOSOL) 30 milliLiter(s) Swish and Spit every 6 hours  valACYclovir 500 milliGRAM(s) Oral <User Schedule>    MEDICATIONS  (PRN):  acetaminophen   Tablet .. 650 milliGRAM(s) Oral every 6 hours PRN Temp greater or equal to 38C (100.4F)  artificial  tears Solution 1 Drop(s) Both EYES every 6 hours PRN Dry Eyes  benzocaine 15 mG/menthol 3.6 mG (Sugar-Free) Lozenge 1 Lozenge Oral five times a day PRN Sore Throat  docusate sodium 100 milliGRAM(s) Oral three times a day PRN Constipation  guaiFENesin    Syrup 100 milliGRAM(s) Oral every 6 hours PRN Cough  HYDROmorphone   Tablet 2 milliGRAM(s) Oral every 4 hours PRN moderate - severe pain  ondansetron Injectable 8 milliGRAM(s) IV Push every 8 hours PRN Nausea and/or Vomiting  senna 2 Tablet(s) Oral at bedtime PRN Constipation        RADIOLOGY & ADDITIONAL TESTS:    4/30/19 : Xray Esophagram (04.30.19 @ 09:31) Limited evaluation for esophageal ulcers secondary to underdistention of  the esophagus.      4/27/19 : Xray Chest 1 View- PORTABLE-Urgent (04.27.19 @ 22:45) Probable small left pleural effusion with adjacent opacity that likely  represents atelectasis.    4/25/19 : CT Brain Stroke Protocol (04.25.19 @ 19:42) No acute intracranial hemorrhage, mass effect, or midline shift. No  abnormal extra-axial collections. The basal cisterns are patent without evidence of central herniation. The sulci and ventricles are within normal limits for the patient's age. Stable few patchy areas of low-attenuation in the bihemispheric white  matter, which is nonspecific, but likely related to sequela of chronic  microvascular ischemic disease. Stable coarse calcifications in the left temporal lobe.      MICROBIOLOGY DATA:      Culture - Blood (04.28.19 @ 18:20)    Culture - Blood:   NO ORGANISMS ISOLATED  NO ORGANISMS ISOLATED AT 24 HOURS    Specimen Source: BLOOD VENOUS      Culture - Blood (04.28.19 @ 18:20)    Culture - Blood:   NO ORGANISMS ISOLATED  NO ORGANISMS ISOLATED AT 24 HOURS    Specimen Source: BLOOD PERIPHERAL        Culture - Blood (04.27.19 @ 19:50)    Culture - Blood:   NO ORGANISMS ISOLATED  NO ORGANISMS ISOLATED AT 24 HOURS    Specimen Source: BLOOD PERIPHERAL      Culture - Group A Streptococcus (04.27.19 @ 14:24)    Culture - Group A Streptococcus:   NO BETA STREP. GROUP A  AFTER 24HRS.    Specimen Source: THROAT Patient is seen and examined at the bed side,  remains afebrile . He is s/p dialysis yesterday. He appears very ill and confused.  The mucosal pain  and Odynophagia has not improved. The Neutropenia resolved, 1040.         REVIEW OF SYSTEMS: All other review systems are negative        ALLERGIES: oxycodone (Vomiting; Nausea)      Vital Signs Last 24 Hrs  T(C): 37.2 (05 May 2019 17:17), Max: 37.2 (05 May 2019 17:17)  T(F): 98.9 (05 May 2019 17:17), Max: 98.9 (05 May 2019 17:17)  HR: 97 (05 May 2019 17:17) (87 - 100)  BP: 116/67 (05 May 2019 17:17) (102/55 - 125/71)  BP(mean): --  RR: 18 (05 May 2019 17:17) (18 - 20)  SpO2: 100% (05 May 2019 17:17) (99% - 100%)        PHYSICAL EXAM:  GENERAL: Appears ill  HEENT: No oral thrush or any tonsilar exudate noted   CHEST/LUNG:  Air entry bilaterally  HEART: s1 and s2 present  ABDOMEN:  Nontender and  Nondistended  EXTREMITIES: No pedal  edema  CNS: Lethargic        LABS:                         7.5    1.49  )-----------( 43       ( 05 May 2019 06:15 )             21.7                           7.8    1.51  )-----------( 70       ( 04 May 2019 15:16 )             23.9                             8.8    0.57  )-----------( 23       ( 02 May 2019 18:36 )             26.0                        05-05    134<L>  |  98  |  86<H>  ----------------------------<  124<H>  3.9   |  13<L>  |  6.88<H>    Ca    6.7<L>      05 May 2019 06:15  Phos  4.0     05-05  Mg     2.1     05-05    TPro  6.6  /  Alb  x   /  TBili  x   /  DBili  x   /  AST  x   /  ALT  x   /  AlkPhos  x   05-04    05-04    131<L>  |  99  |  105<H>  ----------------------------<  180<H>  5.3   |  7<LL>  |  8.02<H>    Ca    6.4<LL>      04 May 2019 06:00  Phos  6.4     05-04  Mg     2.3     05-04    TPro  6.6  /  Alb  x   /  TBili  x   /  DBili  x   /  AST  x   /  ALT  x   /  AlkPhos  x   05-04    05-02    133<L>  |  102  |  61<H>  ----------------------------<  91  3.9   |  10<LL>  |  5.21<H>    Ca    6.7<L>      02 May 2019 06:04  Phos  3.1     05-02  Mg     2.1     05-02    TPro  x   /  Alb  2.7<L>  /  TBili  x   /  DBili  x   /  AST  x   /  ALT  x   /  AlkPhos  x   05-02 04-30    135  |  104  |  20  ----------------------------<  139<H>  3.8   |  16<L>  |  1.46<H>    Ca    7.2<L>      30 Apr 2019 06:15  Phos  2.4     04-30  Mg     1.8     04-30    TPro  5.9<L>  /  Alb  3.0<L>  /  TBili  2.0<H>  /  DBili  x   /  AST  13  /  ALT  47<H>  /  AlkPhos  100  04-30      Vancomycin level:    Vancomycin Level, Trough (05.03.19 @ 06:55) Vancomycin Level, Trough: 20.9: Vancomycin trough levels should be rapidly reached and maintained at 15-20 ug/ml for life threatening MRSA  infections such as sepsis, endocarditis, osteomyelitis and pneumonia.     Vancomycin Level, Trough - Prior to Next Dose (05.01.19 @ 06:05) Vancomycin Level, Trough: 29.7: Vancomycin trough levels should be rapidly reached and  maintained at 15-20 ug/ml for life threatening MRSA infections such as sepsis, endocarditis, osteomyelitis and pneumonia.     Vancomycin Level, Random (05.02.19 @ 06:04)  Vancomycin Level, Random: 23.8:         MEDICATIONS  (STANDING):    MEDICATIONS  (STANDING):  allopurinol 300 milliGRAM(s) Oral daily  carvedilol 25 milliGRAM(s) Oral every 12 hours  cefepime   IVPB 2000 milliGRAM(s) IV Intermittent every 24 hours  chlorhexidine 4% Liquid 1 Application(s) Topical <User Schedule>  dextrose 5% + sodium chloride 0.45% 1000 milliLiter(s) (100 mL/Hr) IV Continuous <Continuous>  filgrastim-sndz Injectable 480 MICROGram(s) SubCutaneous daily  FIRST- Mouthwash  BLM 5 milliLiter(s) Swish and Spit five times a day  fluconAZOLE IVPB 200 milliGRAM(s) IV Intermittent every 24 hours  metoclopramide Injectable 10 milliGRAM(s) IV Push four times a day  ranitidine 150 milliGRAM(s) Oral every 24 hours  Saliva Substitute (CAPHOSOL) 30 milliLiter(s) Swish and Spit every 6 hours  valACYclovir 500 milliGRAM(s) Oral <User Schedule>    MEDICATIONS  (PRN):  acetaminophen   Tablet .. 650 milliGRAM(s) Oral every 6 hours PRN Temp greater or equal to 38C (100.4F)  artificial  tears Solution 1 Drop(s) Both EYES every 6 hours PRN Dry Eyes  benzocaine 15 mG/menthol 3.6 mG (Sugar-Free) Lozenge 1 Lozenge Oral five times a day PRN Sore Throat  docusate sodium 100 milliGRAM(s) Oral three times a day PRN Constipation  guaiFENesin    Syrup 100 milliGRAM(s) Oral every 6 hours PRN Cough  HYDROmorphone   Tablet 2 milliGRAM(s) Oral every 4 hours PRN moderate - severe pain  ondansetron Injectable 8 milliGRAM(s) IV Push every 8 hours PRN Nausea and/or Vomiting  senna 2 Tablet(s) Oral at bedtime PRN Constipation        RADIOLOGY & ADDITIONAL TESTS:    4/30/19 : Xray Esophagram (04.30.19 @ 09:31) Limited evaluation for esophageal ulcers secondary to underdistention of  the esophagus.      4/27/19 : Xray Chest 1 View- PORTABLE-Urgent (04.27.19 @ 22:45) Probable small left pleural effusion with adjacent opacity that likely  represents atelectasis.    4/25/19 : CT Brain Stroke Protocol (04.25.19 @ 19:42) No acute intracranial hemorrhage, mass effect, or midline shift. No  abnormal extra-axial collections. The basal cisterns are patent without evidence of central herniation. The sulci and ventricles are within normal limits for the patient's age. Stable few patchy areas of low-attenuation in the bihemispheric white  matter, which is nonspecific, but likely related to sequela of chronic  microvascular ischemic disease. Stable coarse calcifications in the left temporal lobe.      MICROBIOLOGY DATA:      Culture - Blood (04.28.19 @ 18:20)    Culture - Blood:   NO ORGANISMS ISOLATED  NO ORGANISMS ISOLATED AT 24 HOURS    Specimen Source: BLOOD VENOUS      Culture - Blood (04.28.19 @ 18:20)    Culture - Blood:   NO ORGANISMS ISOLATED  NO ORGANISMS ISOLATED AT 24 HOURS    Specimen Source: BLOOD PERIPHERAL        Culture - Blood (04.27.19 @ 19:50)    Culture - Blood:   NO ORGANISMS ISOLATED  NO ORGANISMS ISOLATED AT 24 HOURS    Specimen Source: BLOOD PERIPHERAL      Culture - Group A Streptococcus (04.27.19 @ 14:24)    Culture - Group A Streptococcus:   NO BETA STREP. GROUP A  AFTER 24HRS.    Specimen Source: THROAT

## 2019-05-05 NOTE — PROGRESS NOTE ADULT - PROBLEM SELECTOR PLAN 2
Mixed acid-base picture. High anion gap acidosis with resp alkalosis.   No lactic acidosis.   Continue D5W 1/2NS + 75mEq HCO3 @ 100cc/hr today and DC in AM

## 2019-05-05 NOTE — PROGRESS NOTE ADULT - SUBJECTIVE AND OBJECTIVE BOX
[facility] - ROD Regalado / ROD Bahena / DAMON Frost/ ROD Mahmood/ ROD Benz/ AUDREY Guzman / ROSALVA Guerra / BREANN Mcgee  ---------------------------------------------------------------------------------------------------------------  seen and examined today for ROBB needing HD  Interval : labs better  VITALS:  T(F): 98.2 (05-05-19 @ 07:07), Max: 98.5 (05-04-19 @ 12:31)  HR: 96 (05-05-19 @ 07:07)  BP: 102/55 (05-05-19 @ 07:07)  RR: 18 (05-05-19 @ 07:07)  SpO2: 100% (05-05-19 @ 07:07)  Wt(kg): --    05-04 @ 07:01  -  05-05 @ 07:00  --------------------------------------------------------  IN: 400 mL / OUT: 400 mL / NET: 0 mL      Physical Exam :-  Constitutional: NAD  Neck: Supple.  Respiratory: Bilateral equal breath sounds,  Cardiovascular: S1, S2 normal,  Gastrointestinal: Bowel Sounds present, soft, non tender.  Extremities: No edema  Neurological: Alert and Oriented x 3, no focal deficits  Psychiatric: Normal mood, normal affect  Data:-  Allergies :   oxycodone (Vomiting; Nausea)    Hospital Medications:   MEDICATIONS  (STANDING):  allopurinol 300 milliGRAM(s) Oral daily  carvedilol 25 milliGRAM(s) Oral every 12 hours  cefepime   IVPB 2000 milliGRAM(s) IV Intermittent every 24 hours  chlorhexidine 4% Liquid 1 Application(s) Topical <User Schedule>  dextrose 5% + sodium chloride 0.45% 1000 milliLiter(s) (100 mL/Hr) IV Continuous <Continuous>  filgrastim-sndz Injectable 480 MICROGram(s) SubCutaneous daily  FIRST- Mouthwash  BLM 5 milliLiter(s) Swish and Spit five times a day  fluconAZOLE IVPB 200 milliGRAM(s) IV Intermittent every 24 hours  metoclopramide Injectable 10 milliGRAM(s) IV Push four times a day  ranitidine 150 milliGRAM(s) Oral every 24 hours  Saliva Substitute (CAPHOSOL) 30 milliLiter(s) Swish and Spit every 6 hours  valACYclovir 500 milliGRAM(s) Oral <User Schedule>    05-05    134<L>  |  98  |  86<H>  ----------------------------<  124<H>  3.9   |  13<L>  |  6.88<H>    Ca    6.7<L>      05 May 2019 06:15  Phos  4.0     05-05  Mg     2.1     05-05    TPro  6.6  /  Alb      /  TBili      /  DBili      /  AST      /  ALT      /  AlkPhos      05-04    Creatinine Trend: 6.88 <--, 8.02 <--, 6.69 <--, 5.21 <--, 3.16 <--, 1.46 <--, 1.15 <--                        7.5    1.49  )-----------( 43       ( 05 May 2019 06:15 )             21.7

## 2019-05-05 NOTE — PROGRESS NOTE ADULT - SUBJECTIVE AND OBJECTIVE BOX
Patient is a 53y old  Male who presents with a chief complaint of Pancytopenia (05 May 2019 19:43)      Vascular Surgery Attending Progress Note    Interval HPI: pt w/o c/o     Medications:  acetaminophen   Tablet .. 650 milliGRAM(s) Oral every 6 hours PRN  allopurinol 300 milliGRAM(s) Oral daily  artificial  tears Solution 1 Drop(s) Both EYES every 6 hours PRN  benzocaine 15 mG/menthol 3.6 mG (Sugar-Free) Lozenge 1 Lozenge Oral five times a day PRN  carvedilol 25 milliGRAM(s) Oral every 12 hours  cefepime   IVPB 2000 milliGRAM(s) IV Intermittent every 24 hours  chlorhexidine 4% Liquid 1 Application(s) Topical <User Schedule>  dextrose 5% + sodium chloride 0.45% 1000 milliLiter(s) IV Continuous <Continuous>  docusate sodium 100 milliGRAM(s) Oral three times a day PRN  filgrastim-sndz Injectable 480 MICROGram(s) SubCutaneous daily  FIRST- Mouthwash  BLM 5 milliLiter(s) Swish and Spit five times a day  fluconAZOLE IVPB 200 milliGRAM(s) IV Intermittent every 24 hours  guaiFENesin    Syrup 100 milliGRAM(s) Oral every 6 hours PRN  HYDROmorphone   Tablet 2 milliGRAM(s) Oral every 4 hours PRN  metoclopramide Injectable 10 milliGRAM(s) IV Push four times a day  ondansetron Injectable 8 milliGRAM(s) IV Push every 8 hours PRN  ranitidine 150 milliGRAM(s) Oral every 24 hours  Saliva Substitute (CAPHOSOL) 30 milliLiter(s) Swish and Spit every 6 hours  senna 2 Tablet(s) Oral at bedtime PRN  valACYclovir 500 milliGRAM(s) Oral <User Schedule>      Vital Signs Last 24 Hrs  T(C): 36.9 (05 May 2019 21:40), Max: 37.2 (05 May 2019 17:17)  T(F): 98.5 (05 May 2019 21:40), Max: 98.9 (05 May 2019 17:17)  HR: 97 (05 May 2019 21:40) (87 - 98)  BP: 117/72 (05 May 2019 21:40) (102/55 - 123/60)  BP(mean): --  RR: 18 (05 May 2019 21:40) (18 - 20)  SpO2: 99% (05 May 2019 21:40) (99% - 100%)  I&O's Summary    04 May 2019 07:01  -  05 May 2019 07:00  --------------------------------------------------------  IN: 400 mL / OUT: 400 mL / NET: 0 mL        Physical Exam:  Neuro  A&Ox3 VSS  Vascular:  rt cfv shiley in place     LABS:                        7.5    1.49  )-----------( 43       ( 05 May 2019 06:15 )             21.7     05-05    134<L>  |  98  |  86<H>  ----------------------------<  124<H>  3.9   |  13<L>  |  6.88<H>    Ca    6.7<L>      05 May 2019 06:15  Phos  4.0     05-05  Mg     2.1     05-05    TPro  6.6  /  Alb  x   /  TBili  x   /  DBili  x   /  AST  x   /  ALT  x   /  AlkPhos  x   05-04    PT/INR - ( 04 May 2019 15:09 )   PT: 16.5 SEC;   INR: 1.43          PTT - ( 04 May 2019 15:09 )  PTT:27.6 SEC    JITENDRA LACY MD  [phone]

## 2019-05-05 NOTE — PROGRESS NOTE ADULT - SUBJECTIVE AND OBJECTIVE BOX
SONAL GARCIA:[id number],   53yMale followed for:  oxycodone (Vomiting; Nausea)    PAST MEDICAL & SURGICAL HISTORY:  Diverticulitis: perforated s/p Kiah  Multiple myeloma: relapsed 3/2019  Hypertension  Left ventricular dysfunction  Cardiomyopathy: nonischemic  Former smoker: (1 ppd x 11 years; Quit ~age 26)  History of bone marrow transplant  History of surgery: s/p exploratory laparotomy with sigmoid resection and colostomy on 9/2/2018    FAMILY HISTORY:  Family history of diabetes mellitus  Family history of hypertension    MEDICATIONS  (STANDING):  allopurinol 300 milliGRAM(s) Oral daily  carvedilol 25 milliGRAM(s) Oral every 12 hours  cefepime   IVPB 2000 milliGRAM(s) IV Intermittent every 24 hours  chlorhexidine 4% Liquid 1 Application(s) Topical <User Schedule>  dextrose 5% + sodium chloride 0.45% 1000 milliLiter(s) (100 mL/Hr) IV Continuous <Continuous>  filgrastim-sndz Injectable 480 MICROGram(s) SubCutaneous daily  FIRST- Mouthwash  BLM 5 milliLiter(s) Swish and Spit five times a day  fluconAZOLE IVPB 200 milliGRAM(s) IV Intermittent every 24 hours  metoclopramide Injectable 10 milliGRAM(s) IV Push four times a day  ranitidine 150 milliGRAM(s) Oral every 24 hours  Saliva Substitute (CAPHOSOL) 30 milliLiter(s) Swish and Spit every 6 hours  valACYclovir 500 milliGRAM(s) Oral <User Schedule>    MEDICATIONS  (PRN):  acetaminophen   Tablet .. 650 milliGRAM(s) Oral every 6 hours PRN Temp greater or equal to 38C (100.4F)  artificial  tears Solution 1 Drop(s) Both EYES every 6 hours PRN Dry Eyes  benzocaine 15 mG/menthol 3.6 mG (Sugar-Free) Lozenge 1 Lozenge Oral five times a day PRN Sore Throat  docusate sodium 100 milliGRAM(s) Oral three times a day PRN Constipation  guaiFENesin    Syrup 100 milliGRAM(s) Oral every 6 hours PRN Cough  HYDROmorphone   Tablet 2 milliGRAM(s) Oral every 4 hours PRN moderate - severe pain  ondansetron Injectable 8 milliGRAM(s) IV Push every 8 hours PRN Nausea and/or Vomiting  senna 2 Tablet(s) Oral at bedtime PRN Constipation      Vital Signs Last 24 Hrs  T(C): 36.8 (05 May 2019 07:07), Max: 36.9 (04 May 2019 12:31)  T(F): 98.2 (05 May 2019 07:07), Max: 98.5 (04 May 2019 12:31)  HR: 96 (05 May 2019 07:07) (87 - 100)  BP: 102/55 (05 May 2019 07:07) (102/55 - 125/71)  BP(mean): --  RR: 18 (05 May 2019 07:07) (18 - 20)  SpO2: 100% (05 May 2019 07:07) (99% - 100%)  nc/at  s1s2  cta  soft, nt, nd no guarding or rebound  no c/c/e    CBC Full  -  ( 05 May 2019 06:15 )  WBC Count : 1.49 K/uL  RBC Count : 2.55 M/uL  Hemoglobin : 7.5 g/dL  Hematocrit : 21.7 %  Platelet Count - Automated : 43 K/uL  Mean Cell Volume : 85.1 fL  Mean Cell Hemoglobin : 29.4 pg  Mean Cell Hemoglobin Concentration : 34.6 %  Auto Neutrophil # : 1.04 K/uL  Auto Lymphocyte # : 0.26 K/uL  Auto Monocyte # : 0.14 K/uL  Auto Eosinophil # : 0.01 K/uL  Auto Basophil # : 0.01 K/uL  Auto Neutrophil % : 69.8 %  Auto Lymphocyte % : 17.4 %  Auto Monocyte % : 9.4 %  Auto Eosinophil % : 0.7 %  Auto Basophil % : 0.7 %    05-05    134<L>  |  98  |  86<H>  ----------------------------<  124<H>  3.9   |  13<L>  |  6.88<H>    Ca    6.7<L>      05 May 2019 06:15  Phos  4.0     05-05  Mg     2.1     05-05    TPro  6.6  /  Alb  x   /  TBili  x   /  DBili  x   /  AST  x   /  ALT  x   /  AlkPhos  x   05-04    PT/INR - ( 04 May 2019 15:09 )   PT: 16.5 SEC;   INR: 1.43          PTT - ( 04 May 2019 15:09 )  PTT:27.6 SEC

## 2019-05-05 NOTE — PROGRESS NOTE ADULT - SUBJECTIVE AND OBJECTIVE BOX
Patient is a 53y old  Male who presents with a chief complaint of Pancytopenia (04 May 2019 18:53)      INTERVAL HPI/OVERNIGHT EVENTS:  T(C): 36.8 (05-05-19 @ 07:07), Max: 36.9 (05-04-19 @ 12:31)  HR: 96 (05-05-19 @ 07:07) (87 - 100)  BP: 102/55 (05-05-19 @ 07:07) (102/55 - 125/71)  RR: 18 (05-05-19 @ 07:07) (18 - 20)  SpO2: 100% (05-05-19 @ 07:07) (99% - 100%)  Wt(kg): --  I&O's Summary    04 May 2019 07:01  -  05 May 2019 07:00  --------------------------------------------------------  IN: 400 mL / OUT: 400 mL / NET: 0 mL        LABS:                        7.8    1.51  )-----------( 70       ( 04 May 2019 15:16 )             23.9     05-04    131<L>  |  99  |  105<H>  ----------------------------<  180<H>  5.3   |  7<LL>  |  8.02<H>    Ca    6.4<LL>      04 May 2019 06:00  Phos  6.4     05-04  Mg     2.3     05-04    TPro  6.6  /  Alb  x   /  TBili  x   /  DBili  x   /  AST  x   /  ALT  x   /  AlkPhos  x   05-04    PT/INR - ( 04 May 2019 15:09 )   PT: 16.5 SEC;   INR: 1.43          PTT - ( 04 May 2019 15:09 )  PTT:27.6 SEC    CAPILLARY BLOOD GLUCOSE                MEDICATIONS  (STANDING):  allopurinol 300 milliGRAM(s) Oral daily  carvedilol 25 milliGRAM(s) Oral every 12 hours  cefepime   IVPB 2000 milliGRAM(s) IV Intermittent every 24 hours  chlorhexidine 4% Liquid 1 Application(s) Topical <User Schedule>  dextrose 5% + sodium chloride 0.45% 1000 milliLiter(s) (100 mL/Hr) IV Continuous <Continuous>  filgrastim-sndz Injectable 480 MICROGram(s) SubCutaneous daily  FIRST- Mouthwash  BLM 5 milliLiter(s) Swish and Spit five times a day  fluconAZOLE IVPB 200 milliGRAM(s) IV Intermittent every 24 hours  metoclopramide Injectable 10 milliGRAM(s) IV Push four times a day  ranitidine 150 milliGRAM(s) Oral every 24 hours  Saliva Substitute (CAPHOSOL) 30 milliLiter(s) Swish and Spit every 6 hours  valACYclovir 500 milliGRAM(s) Oral <User Schedule>    MEDICATIONS  (PRN):  acetaminophen   Tablet .. 650 milliGRAM(s) Oral every 6 hours PRN Temp greater or equal to 38C (100.4F)  artificial  tears Solution 1 Drop(s) Both EYES every 6 hours PRN Dry Eyes  benzocaine 15 mG/menthol 3.6 mG (Sugar-Free) Lozenge 1 Lozenge Oral five times a day PRN Sore Throat  docusate sodium 100 milliGRAM(s) Oral three times a day PRN Constipation  guaiFENesin    Syrup 100 milliGRAM(s) Oral every 6 hours PRN Cough  HYDROmorphone   Tablet 2 milliGRAM(s) Oral every 4 hours PRN moderate - severe pain  ondansetron Injectable 8 milliGRAM(s) IV Push every 8 hours PRN Nausea and/or Vomiting  senna 2 Tablet(s) Oral at bedtime PRN Constipation          PHYSICAL EXAM:  GENERAL: frail  CHEST/LUNG: Clear to percussion bilaterally; No rales, rhonchi, wheezing, or rubs  HEART: Regular rate and rhythm; No murmurs, rubs, or gallops  ABDOMEN: Soft, Nontender, Nondistended; Bowel sounds present  EXTREMITIES:  2+ Peripheral Pulses, No clubbing, cyanosis, or edema  LYMPH: No lymphadenopathy noted  SKIN: No rashes or lesions    Care Discussed with Consultants/Other Providers [+] YES  [ ] NO

## 2019-05-05 NOTE — PROGRESS NOTE ADULT - ASSESSMENT
1. Pancytopenia     -- WBC low but stable  -- cont  Zarxio 480mcg daily until ANC > 1500 x 2 consecutive days  -- counts slow to recover sec due to heavily tx for myeloma  -- Transfuse to keep Hgb > 7  -- Keep Platelets>/= 40K while  catheter in place  -- monitor for infection or bleeding     2. MM relapsed/refractory including failed Auto Transplant    -- had been on bendamustine/pomalyst. Tx on hold  -- further management depends on renal recovery  -- paraproteins improving   -- s/p decadron 40mg 5/2  -- f/u QIggs, SPEP, JENELLE and SFLC    3. Tonsilitis vs Candida Esophagitis    -- cont Cefepime, Diflucan per ID  -- cont Valtrex renal dose    -- ID f/u  -- cont magic mouthwash  -- cont IVF    4. ROBB    -- HD per renal, next on 5/6  -- ? sec to light chain disease  -- f/u SFLC        Overall prognosis is poor. Pt has exhausted all realistic tx options. His disease has been refractory to most Tx including failed transplant. If he remains on HD, would not be a candidate for clinical trial.  Would get palliative care on board    Torsten Murphy MD  [phone]

## 2019-05-05 NOTE — PROGRESS NOTE ADULT - ASSESSMENT
53M w/hx of relapsed multiple myeloma s/p bone marrow transplant (~1.5 yrs ago) and chemo/RT (last infusion Bendamustine and Pomalidomide 4/08/2019), admitted for pancytopenia with course complicated by worsening ROBB with associated acidosis now requiring urgent hemodialysis.    - stable from vasc standpoint   reconsult prn

## 2019-05-05 NOTE — PROGRESS NOTE ADULT - ASSESSMENT
53y Male with relapsed multiple myeloma s/p bone marrow transplant (~1.5 yrs ago) and chemo/RT (last infusion Bendamustine and Pomalidomide 4/08/2019), perforated diverticulitis s/p Kiah's (proctosigmoidectomy with colostomy, 9/2018), nonischemic cardiomyopathy (mild global LV systolic dysfunction with EF 54% and diastolic LV dysfunction based on TTE in 11/2018), and HTN admitted to [facility] on 4/25 for nausea and vomiting, and poor PO intake, treated for neutropenic fever, c/b ROBB

## 2019-05-05 NOTE — PROGRESS NOTE ADULT - ASSESSMENT
Problem/Plan - 1:  ·  Problem: Pancytopenia with neutropenic fever 2/2 acute tonsillitis and esophageal candidiasis complicated by symptomatic anemia requiring prbc transfusions:      persistent            transfuse prn to maintain hgb > 7.5    Problem/Plan:  Problem: Acute Renal Failure, likely ATN:      persistent abnormal renal function       fluid hydration       possibly 2/2 underlying multiple myeloma complicated by symptomatic anemia/nsaids/vanco/valacyclovir +/- alternative etiology     HD as scheduled per renal     Problem/Plan -:  ·  Problem: Multiple myeloma in relapse.  Plan: MM in relapse s/p bone marrow transplant (~1.5 yrs ago) and chemo/RT. Last infusion Bendamustine and Pomalidomide 4/08/2019        Continue Current medications and monitor.         Hem/ onc mgmt    Problem/Plan -:  ·Chronic Systolic CHF       - c/w cardiac meds, adjust/restart meds as needed    Problem/Plan -:Acute Tonsilitis and Candida Esophagitis        continue abx per ID       continue Cepacol

## 2019-05-06 LAB
ANION GAP SERPL CALC-SCNC: 19 MMO/L — HIGH (ref 7–14)
BASOPHILS # BLD AUTO: 0 K/UL — SIGNIFICANT CHANGE UP (ref 0–0.2)
BASOPHILS NFR BLD AUTO: 0 % — SIGNIFICANT CHANGE UP (ref 0–2)
BUN SERPL-MCNC: 90 MG/DL — HIGH (ref 7–23)
C DIFF TOX GENS STL QL NAA+PROBE: DETECTED — HIGH
CALCIUM SERPL-MCNC: 6.4 MG/DL — CRITICAL LOW (ref 8.4–10.5)
CHLORIDE SERPL-SCNC: 105 MMOL/L — SIGNIFICANT CHANGE UP (ref 98–107)
CO2 SERPL-SCNC: 14 MMOL/L — LOW (ref 22–31)
CREAT SERPL-MCNC: 8.51 MG/DL — HIGH (ref 0.5–1.3)
EOSINOPHIL # BLD AUTO: 0.01 K/UL — SIGNIFICANT CHANGE UP (ref 0–0.5)
EOSINOPHIL NFR BLD AUTO: 0.5 % — SIGNIFICANT CHANGE UP (ref 0–6)
GLUCOSE SERPL-MCNC: 96 MG/DL — SIGNIFICANT CHANGE UP (ref 70–99)
HCT VFR BLD CALC: 22.6 % — LOW (ref 39–50)
HCT VFR BLD CALC: 23.7 % — LOW (ref 39–50)
HGB BLD-MCNC: 7.6 G/DL — LOW (ref 13–17)
HGB BLD-MCNC: 8 G/DL — LOW (ref 13–17)
IMM GRANULOCYTES NFR BLD AUTO: 12.6 % — HIGH (ref 0–1.5)
KAPPA FREE LIGHT CHAINS, SERUM: 269.49 MG/DL — HIGH (ref 0.33–1.94)
LAMBDA FREE LIGHT CHAINS, SERUM: 0.13 MG/DL — LOW (ref 0.57–2.63)
LYMPHOCYTES # BLD AUTO: 0.24 K/UL — LOW (ref 1–3.3)
LYMPHOCYTES # BLD AUTO: 10.8 % — LOW (ref 13–44)
MAGNESIUM SERPL-MCNC: 2.1 MG/DL — SIGNIFICANT CHANGE UP (ref 1.6–2.6)
MCHC RBC-ENTMCNC: 28.7 PG — SIGNIFICANT CHANGE UP (ref 27–34)
MCHC RBC-ENTMCNC: 28.8 PG — SIGNIFICANT CHANGE UP (ref 27–34)
MCHC RBC-ENTMCNC: 33.6 % — SIGNIFICANT CHANGE UP (ref 32–36)
MCHC RBC-ENTMCNC: 33.8 % — SIGNIFICANT CHANGE UP (ref 32–36)
MCV RBC AUTO: 84.9 FL — SIGNIFICANT CHANGE UP (ref 80–100)
MCV RBC AUTO: 85.6 FL — SIGNIFICANT CHANGE UP (ref 80–100)
MONOCYTES # BLD AUTO: 0.31 K/UL — SIGNIFICANT CHANGE UP (ref 0–0.9)
MONOCYTES NFR BLD AUTO: 14 % — SIGNIFICANT CHANGE UP (ref 2–14)
NEUTROPHILS # BLD AUTO: 1.38 K/UL — LOW (ref 1.8–7.4)
NEUTROPHILS NFR BLD AUTO: 62.1 % — SIGNIFICANT CHANGE UP (ref 43–77)
NRBC # FLD: 0 K/UL — SIGNIFICANT CHANGE UP (ref 0–0)
NRBC # FLD: 0.04 K/UL — SIGNIFICANT CHANGE UP (ref 0–0)
NRBC FLD-RTO: 1.1 — SIGNIFICANT CHANGE UP
PHOSPHATE SERPL-MCNC: 4.4 MG/DL — SIGNIFICANT CHANGE UP (ref 2.5–4.5)
PLATELET # BLD AUTO: 27 K/UL — LOW (ref 150–400)
PLATELET # BLD AUTO: 45 K/UL — LOW (ref 150–400)
PMV BLD: 11.1 FL — SIGNIFICANT CHANGE UP (ref 7–13)
PMV BLD: 12 FL — SIGNIFICANT CHANGE UP (ref 7–13)
POTASSIUM SERPL-MCNC: 3.9 MMOL/L — SIGNIFICANT CHANGE UP (ref 3.5–5.3)
POTASSIUM SERPL-SCNC: 3.9 MMOL/L — SIGNIFICANT CHANGE UP (ref 3.5–5.3)
RBC # BLD: 2.64 M/UL — LOW (ref 4.2–5.8)
RBC # BLD: 2.79 M/UL — LOW (ref 4.2–5.8)
RBC # FLD: 16 % — HIGH (ref 10.3–14.5)
RBC # FLD: 16.3 % — HIGH (ref 10.3–14.5)
SODIUM SERPL-SCNC: 138 MMOL/L — SIGNIFICANT CHANGE UP (ref 135–145)
VANCOMYCIN FLD-MCNC: 16.1 UG/ML — SIGNIFICANT CHANGE UP
WBC # BLD: 2.22 K/UL — LOW (ref 3.8–10.5)
WBC # BLD: 3.78 K/UL — LOW (ref 3.8–10.5)
WBC # FLD AUTO: 2.22 K/UL — LOW (ref 3.8–10.5)
WBC # FLD AUTO: 3.78 K/UL — LOW (ref 3.8–10.5)

## 2019-05-06 PROCEDURE — 74176 CT ABD & PELVIS W/O CONTRAST: CPT | Mod: 26

## 2019-05-06 PROCEDURE — 71045 X-RAY EXAM CHEST 1 VIEW: CPT | Mod: 26

## 2019-05-06 RX ORDER — VANCOMYCIN HCL 1 G
125 VIAL (EA) INTRAVENOUS EVERY 6 HOURS
Qty: 0 | Refills: 0 | Status: DISCONTINUED | OUTPATIENT
Start: 2019-05-06 | End: 2019-05-06

## 2019-05-06 RX ORDER — METRONIDAZOLE 500 MG
500 TABLET ORAL EVERY 8 HOURS
Qty: 0 | Refills: 0 | Status: DISCONTINUED | OUTPATIENT
Start: 2019-05-07 | End: 2019-05-13

## 2019-05-06 RX ORDER — VANCOMYCIN HCL 1 G
125 VIAL (EA) INTRAVENOUS EVERY 6 HOURS
Qty: 0 | Refills: 0 | Status: DISCONTINUED | OUTPATIENT
Start: 2019-05-06 | End: 2019-05-08

## 2019-05-06 RX ORDER — METRONIDAZOLE 500 MG
500 TABLET ORAL ONCE
Qty: 0 | Refills: 0 | Status: COMPLETED | OUTPATIENT
Start: 2019-05-06 | End: 2019-05-06

## 2019-05-06 RX ORDER — METRONIDAZOLE 500 MG
TABLET ORAL
Qty: 0 | Refills: 0 | Status: DISCONTINUED | OUTPATIENT
Start: 2019-05-06 | End: 2019-05-13

## 2019-05-06 RX ORDER — FUROSEMIDE 40 MG
60 TABLET ORAL ONCE
Qty: 0 | Refills: 0 | Status: COMPLETED | OUTPATIENT
Start: 2019-05-06 | End: 2019-05-06

## 2019-05-06 RX ADMIN — Medication 30 MILLILITER(S): at 18:09

## 2019-05-06 RX ADMIN — DIPHENHYDRAMINE HYDROCHLORIDE AND LIDOCAINE HYDROCHLORIDE AND ALUMINUM HYDROXIDE AND MAGNESIUM HYDRO 5 MILLILITER(S): KIT at 15:27

## 2019-05-06 RX ADMIN — Medication 60 MILLIGRAM(S): at 20:35

## 2019-05-06 RX ADMIN — Medication 125 MILLIGRAM(S): at 23:04

## 2019-05-06 RX ADMIN — CEFEPIME 100 MILLIGRAM(S): 1 INJECTION, POWDER, FOR SOLUTION INTRAMUSCULAR; INTRAVENOUS at 15:28

## 2019-05-06 RX ADMIN — DIPHENHYDRAMINE HYDROCHLORIDE AND LIDOCAINE HYDROCHLORIDE AND ALUMINUM HYDROXIDE AND MAGNESIUM HYDRO 5 MILLILITER(S): KIT at 08:07

## 2019-05-06 RX ADMIN — Medication 480 MICROGRAM(S): at 16:10

## 2019-05-06 RX ADMIN — DIPHENHYDRAMINE HYDROCHLORIDE AND LIDOCAINE HYDROCHLORIDE AND ALUMINUM HYDROXIDE AND MAGNESIUM HYDRO 5 MILLILITER(S): KIT at 23:04

## 2019-05-06 RX ADMIN — DIPHENHYDRAMINE HYDROCHLORIDE AND LIDOCAINE HYDROCHLORIDE AND ALUMINUM HYDROXIDE AND MAGNESIUM HYDRO 5 MILLILITER(S): KIT at 19:53

## 2019-05-06 RX ADMIN — CHLORHEXIDINE GLUCONATE 1 APPLICATION(S): 213 SOLUTION TOPICAL at 10:19

## 2019-05-06 RX ADMIN — Medication 650 MILLIGRAM(S): at 22:04

## 2019-05-06 RX ADMIN — CARVEDILOL PHOSPHATE 25 MILLIGRAM(S): 80 CAPSULE, EXTENDED RELEASE ORAL at 08:00

## 2019-05-06 RX ADMIN — Medication 10 MILLIGRAM(S): at 08:01

## 2019-05-06 RX ADMIN — Medication 300 MILLIGRAM(S): at 15:27

## 2019-05-06 RX ADMIN — FLUCONAZOLE 100 MILLIGRAM(S): 150 TABLET ORAL at 08:01

## 2019-05-06 RX ADMIN — Medication 100 MILLIGRAM(S): at 19:53

## 2019-05-06 RX ADMIN — CARVEDILOL PHOSPHATE 25 MILLIGRAM(S): 80 CAPSULE, EXTENDED RELEASE ORAL at 18:09

## 2019-05-06 RX ADMIN — RANITIDINE HYDROCHLORIDE 150 MILLIGRAM(S): 150 TABLET, FILM COATED ORAL at 18:09

## 2019-05-06 NOTE — PROGRESS NOTE ADULT - SUBJECTIVE AND OBJECTIVE BOX
Patient seen and examined at bedside  No acute events noted overnight  Case discussed with medical team    HPI:  54yo M hx of relapsed multiple myeloma s/p bone marrow transplant (~1.5 yrs ago) and chemo/RT (last infusion Bendamustine and Pomalidomide 4/08/2019), perforated diverticulitis s/p Kiah's (proctosigmoidectomy with colostomy, 9/2018), nonischemic cardiomyopathy (mild global LV systolic dysfunction with EF 54% and diastolic LV dysfunction based on TTE in 11/2018), and HTN presents to [facility] ED w/ vomiting and change of vision. Wife Devan Hood at bedside. Pt was sitting at home. He vomited 2-3 times nonbloody, nonbilious and was not able to eat or drink anything. On 4/25 at 4pm, while sitting he noticed his vision went "white" for a few minutes associated with sweating, shaking, and weakness. His wife noticed he had a red rash on his legs and they brought him to the ED. Pt received a platelet transfusion about a week ago outpt. Denies any pain. Had a nose bleed earlier. He denies skin sores, measured fever, HA, trouble speaking, numbness/tingling, focal weakness, dizziness, CP, SOB, abd pain, N/V/D, hematochezia, melena, urinary symptoms, oral/genital sores. He has a recent admission to [facility] discharged 4/08/2019 for diffuse body pain and low-grade fever, infectious workup was negative. MRI C/T/L showed abnormal T1 prolongation in the whole spine, sacral and iliac bones. Received 7 days of Bendamustine and Pomalidomide (on hold few days for neutropenia), last on 4/08/2019.    When pt presented to the ED, code stroke called. Neuro exam was nonfocal, CTH neg for acute process, and vomiting and vision changes were resolved. ED vitals afebrile, H101, /58 --> 102/62, R18, sat 100% on RA. Given decadron 40mg IV, 1u PLT. 1u pRBC ordered. (26 Apr 2019 00:00)      PAST MEDICAL & SURGICAL HISTORY:  Diverticulitis: perforated s/p Kiah  Multiple myeloma: relapsed 3/2019  Hypertension  Left ventricular dysfunction  Cardiomyopathy: nonischemic  Former smoker: (1 ppd x 11 years; Quit ~age 26)  History of bone marrow transplant  History of surgery: s/p exploratory laparotomy with sigmoid resection and colostomy on 9/2/2018      oxycodone (Vomiting; Nausea)       MEDICATIONS  (STANDING):  allopurinol 300 milliGRAM(s) Oral daily  carvedilol 25 milliGRAM(s) Oral every 12 hours  cefepime   IVPB 2000 milliGRAM(s) IV Intermittent every 24 hours  chlorhexidine 4% Liquid 1 Application(s) Topical <User Schedule>  filgrastim-sndz Injectable 480 MICROGram(s) SubCutaneous daily  FIRST- Mouthwash  BLM 5 milliLiter(s) Swish and Spit five times a day  fluconAZOLE IVPB 200 milliGRAM(s) IV Intermittent every 24 hours  metoclopramide Injectable 10 milliGRAM(s) IV Push four times a day  ranitidine 150 milliGRAM(s) Oral every 24 hours  Saliva Substitute (CAPHOSOL) 30 milliLiter(s) Swish and Spit every 6 hours  valACYclovir 500 milliGRAM(s) Oral <User Schedule>    MEDICATIONS  (PRN):  acetaminophen   Tablet .. 650 milliGRAM(s) Oral every 6 hours PRN Temp greater or equal to 38C (100.4F)  artificial  tears Solution 1 Drop(s) Both EYES every 6 hours PRN Dry Eyes  benzocaine 15 mG/menthol 3.6 mG (Sugar-Free) Lozenge 1 Lozenge Oral five times a day PRN Sore Throat  docusate sodium 100 milliGRAM(s) Oral three times a day PRN Constipation  guaiFENesin    Syrup 100 milliGRAM(s) Oral every 6 hours PRN Cough  HYDROmorphone   Tablet 2 milliGRAM(s) Oral every 4 hours PRN moderate - severe pain  ondansetron Injectable 8 milliGRAM(s) IV Push every 8 hours PRN Nausea and/or Vomiting  senna 2 Tablet(s) Oral at bedtime PRN Constipation      REVIEW OF SYSTEMS:  CONSTITUTIONAL: (+) malaise. gen weakness. fatigue  EYES: No acute change in vision   ENT:  No tinnitus  NECK: No stiffness  RESPIRATORY: No hemoptysis  CARDIOVASCULAR: No chest pain, palpitations, syncope  GASTROINTESTINAL: No hematemesis, diarrhea, melena, or hematochezia.  GENITOURINARY: No hematuria  NEUROLOGICAL: No headaches  LYMPH Nodes: No enlarged glands  ENDOCRINE: No heat or cold intolerance	    T(C): 37 (05-06-19 @ 07:58), Max: 37.2 (05-05-19 @ 17:17)  HR: 98 (05-06-19 @ 07:58) (97 - 98)  BP: 116/71 (05-06-19 @ 07:58) (116/67 - 123/60)  RR: 18 (05-06-19 @ 07:58) (18 - 18)  SpO2: 99% (05-06-19 @ 07:58) (99% - 100%)    PHYSICAL EXAMINATION:   Constitutional:NAD  HEENT: AT  Neck:  Supple  Respiratory:  Adequate airflow b/l. Not using accessory muscles of respiration.  Cardiovascular:  S1 & S2 intact, no R/G, 2+ radial pulses b/l  Gastrointestinal: Soft, NT, ND, normoactive b.s., no organomegaly/RT/rigidity  Extremities: WWP  Neurological:  Alert and awake.  No acute focal motor deficits. Crude sensation intact.     Labs and imaging reviewed    LABS:                        7.6    2.22  )-----------( 27       ( 06 May 2019 06:28 )             22.6     05-06    138  |  105  |  90<H>  ----------------------------<  96  3.9   |  14<L>  |  8.51<H>    Ca    6.4<LL>      06 May 2019 06:28  Phos  4.4     05-06  Mg     2.1     05-06          PT/INR - ( 04 May 2019 15:09 )   PT: 16.5 SEC;   INR: 1.43          PTT - ( 04 May 2019 15:09 )  PTT:27.6 SEC    CAPILLARY BLOOD GLUCOSE                  RADIOLOGY & ADDITIONAL STUDIES:

## 2019-05-06 NOTE — PROGRESS NOTE ADULT - SUBJECTIVE AND OBJECTIVE BOX
[facility] - Sabine / Shruti S /Margot/ S Timoteo/ SCOUT Benz/ Ori Guzman / ANALISA Njeru  ---------------------------------------------------------------------------------------------------------------    Patient seen and examined bedside, on HD earlier this afternoon    Subjective and Objective: No overnight events, denied V/urinary sxs/sob/bleeding. c/o diarrhea.     Allergies: oxycodone (Vomiting; Nausea)      Hospital Medications:   MEDICATIONS  (STANDING):  allopurinol 300 milliGRAM(s) Oral daily  carvedilol 25 milliGRAM(s) Oral every 12 hours  cefepime   IVPB 2000 milliGRAM(s) IV Intermittent every 24 hours  chlorhexidine 4% Liquid 1 Application(s) Topical <User Schedule>  filgrastim-sndz Injectable 480 MICROGram(s) SubCutaneous daily  FIRST- Mouthwash  BLM 5 milliLiter(s) Swish and Spit five times a day  fluconAZOLE IVPB 200 milliGRAM(s) IV Intermittent every 24 hours  ranitidine 150 milliGRAM(s) Oral every 24 hours  Saliva Substitute (CAPHOSOL) 30 milliLiter(s) Swish and Spit every 6 hours  valACYclovir 500 milliGRAM(s) Oral <User Schedule>    VITALS:  T(F): 97.9 (19 @ 14:39), Max: 98.9 (19 @ 17:17)  HR: 96 (19 @ 14:39)  BP: 134/69 (19 @ 14:39)  RR: 16 (19 @ 14:39)  SpO2: 99% (19 @ 07:58)  Wt(kg): --     @ 07:01  -   @ 15:47  --------------------------------------------------------  IN: 634 mL / OUT: 600 mL / NET: 34 mL    PHYSICAL EXAM:  Constitutional: NAD  HEENT: anicteric sclera, oropharynx clear  Neck: No JVD  Respiratory: CTAB, no wheezes, rales or rhonchi  Cardiovascular: S1, S2, RRR  Gastrointestinal: BS+, soft, NT/ND  Extremities: No cyanosis or clubbing. No peripheral edema  Neurological: A/O x 3, no focal deficits  : No CVA tenderness. No quintero.   Skin: petechial rash diffused b/l legs  Vascular Access: rt femoral shiley    LABS:      138  |  105  |  90<H>  ----------------------------<  96  3.9   |  14<L>  |  8.51<H>    Ca    6.4<LL>      06 May 2019 06:28  Phos  4.4       Mg     2.1           Creatinine Trend: 8.51 <--, 6.88 <--, 8.02 <--, 6.69 <--, 5.21 <--, 3.16 <--, 1.46 <--                        7.6    2.22  )-----------( 27       ( 06 May 2019 06:28 )             22.6     Urine Studies:  Urinalysis Basic - ( 02 May 2019 14:30 )    Color: YELLOW / Appearance: Lt TURBID / S.015 / pH: 6.0  Gluc: NEGATIVE / Ketone: TRACE  / Bili: NEGATIVE / Urobili: NORMAL   Blood: SMALL / Protein: 300 / Nitrite: NEGATIVE   Leuk Esterase: NEGATIVE / RBC: 6-10 / WBC 11-25   Sq Epi: FEW / Non Sq Epi:  / Bacteria: NF      Sodium, Random Urine: 24 mmol/L ( @ 18:30)  Potassium, Random Urine: 28.6 mmol/L ( @ 18:30)  Chloride, Random Urine: < 20 mmol/L ( @ 18:30)  Osmolality, Random Urine: 307 mosmo/kg ( @ 18:30)      RADIOLOGY & ADDITIONAL STUDIES:

## 2019-05-06 NOTE — PROVIDER CONTACT NOTE (OTHER) - ASSESSMENT
Pt temp 101. Pt now bleeding on testes from previous skin tear, Bleeding continued even though pressure applied. Scant blood seen in colostomy bag. Pt lips bleeding from multiple cracks.

## 2019-05-06 NOTE — PROGRESS NOTE ADULT - SUBJECTIVE AND OBJECTIVE BOX
SONAL GARCIA:[id number],   53yMale followed for:  oxycodone (Vomiting; Nausea)    PAST MEDICAL & SURGICAL HISTORY:  Diverticulitis: perforated s/p Kiah  Multiple myeloma: relapsed 3/2019  Hypertension  Left ventricular dysfunction  Cardiomyopathy: nonischemic  Former smoker: (1 ppd x 11 years; Quit ~age 26)  History of bone marrow transplant  History of surgery: s/p exploratory laparotomy with sigmoid resection and colostomy on 9/2/2018    FAMILY HISTORY:  Family history of diabetes mellitus  Family history of hypertension    MEDICATIONS  (STANDING):  allopurinol 300 milliGRAM(s) Oral daily  carvedilol 25 milliGRAM(s) Oral every 12 hours  cefepime   IVPB 2000 milliGRAM(s) IV Intermittent every 24 hours  chlorhexidine 4% Liquid 1 Application(s) Topical <User Schedule>  filgrastim-sndz Injectable 480 MICROGram(s) SubCutaneous daily  FIRST- Mouthwash  BLM 5 milliLiter(s) Swish and Spit five times a day  fluconAZOLE IVPB 200 milliGRAM(s) IV Intermittent every 24 hours  metoclopramide Injectable 10 milliGRAM(s) IV Push four times a day  ranitidine 150 milliGRAM(s) Oral every 24 hours  Saliva Substitute (CAPHOSOL) 30 milliLiter(s) Swish and Spit every 6 hours  valACYclovir 500 milliGRAM(s) Oral <User Schedule>    MEDICATIONS  (PRN):  acetaminophen   Tablet .. 650 milliGRAM(s) Oral every 6 hours PRN Temp greater or equal to 38C (100.4F)  artificial  tears Solution 1 Drop(s) Both EYES every 6 hours PRN Dry Eyes  benzocaine 15 mG/menthol 3.6 mG (Sugar-Free) Lozenge 1 Lozenge Oral five times a day PRN Sore Throat  docusate sodium 100 milliGRAM(s) Oral three times a day PRN Constipation  guaiFENesin    Syrup 100 milliGRAM(s) Oral every 6 hours PRN Cough  HYDROmorphone   Tablet 2 milliGRAM(s) Oral every 4 hours PRN moderate - severe pain  ondansetron Injectable 8 milliGRAM(s) IV Push every 8 hours PRN Nausea and/or Vomiting  senna 2 Tablet(s) Oral at bedtime PRN Constipation      Vital Signs Last 24 Hrs  T(C): 37 (06 May 2019 07:58), Max: 37.2 (05 May 2019 17:17)  T(F): 98.6 (06 May 2019 07:58), Max: 98.9 (05 May 2019 17:17)  HR: 98 (06 May 2019 07:58) (97 - 98)  BP: 116/71 (06 May 2019 07:58) (116/67 - 123/60)  BP(mean): --  RR: 18 (06 May 2019 07:58) (18 - 18)  SpO2: 99% (06 May 2019 07:58) (99% - 100%)  nc/at  s1s2  cta  soft, nt, nd no guarding or rebound  no c/c/e    CBC Full  -  ( 06 May 2019 06:28 )  WBC Count : 2.22 K/uL  RBC Count : 2.64 M/uL  Hemoglobin : 7.6 g/dL  Hematocrit : 22.6 %  Platelet Count - Automated : 27 K/uL  Mean Cell Volume : 85.6 fL  Mean Cell Hemoglobin : 28.8 pg  Mean Cell Hemoglobin Concentration : 33.6 %  Auto Neutrophil # : 1.38 K/uL  Auto Lymphocyte # : 0.24 K/uL  Auto Monocyte # : 0.31 K/uL  Auto Eosinophil # : 0.01 K/uL  Auto Basophil # : 0.00 K/uL  Auto Neutrophil % : 62.1 %  Auto Lymphocyte % : 10.8 %  Auto Monocyte % : 14.0 %  Auto Eosinophil % : 0.5 %  Auto Basophil % : 0.0 %    05-06    138  |  105  |  90<H>  ----------------------------<  96  3.9   |  14<L>  |  8.51<H>    Ca    6.4<LL>      06 May 2019 06:28  Phos  4.4     05-06  Mg     2.1     05-06      PT/INR - ( 04 May 2019 15:09 )   PT: 16.5 SEC;   INR: 1.43          PTT - ( 04 May 2019 15:09 )  PTT:27.6 SEC

## 2019-05-06 NOTE — PROGRESS NOTE ADULT - ASSESSMENT
53y Male with relapsed multiple myeloma s/p bone marrow transplant (~1.5 yrs ago) and chemo/RT (last infusion Bendamustine and Pomalidomide 4/08/2019), perforated diverticulitis s/p Kiah's (proctosigmoidectomy with colostomy, 9/2018), nonischemic cardiomyopathy (mild global LV systolic dysfunction with EF 54% and diastolic LV dysfunction based on TTE in 11/2018), and HTN admitted to [facility] on 4/25 for nausea and vomiting, and poor PO intake, treated for neutropenic fever, c/b ROBB. Renal following for ARF    labs, chart reviewed  Thrombocytopenia- no bleeding. f/u w/hem/onc

## 2019-05-06 NOTE — PROGRESS NOTE ADULT - ASSESSMENT
Problem/Plan - 1:  ·  Problem: Pancytopenia with neutropenic fever 2/2 acute tonsillitis and esophageal candidiasis complicated by symptomatic anemia requiring prbc transfusions:      persistent           - persistent         - platelet transfusion     Problem/Plan:  Problem: Acute Renal Failure, likely ATN:      - new Hemodialysis       - c/w HD per nephro recs      - renally adjust rx/abx       - additional management per consultnats    Problem/Plan:      - Metabolic Acidosis:           - 2/2 ATN           - fluids, HD    Problem/Plan -:  ·  Problem: Multiple myeloma in relapse.  Plan: MM in relapse s/p bone marrow transplant (~1.5 yrs ago) and chemo/RT. Last infusion Bendamustine and Pomalidomide 4/08/2019        Continue Current medications and monitor labs closely        Hem/onc mgmt    Problem/Plan -:  ·Chronic Systolic CHF       - c/w cardiac meds, adjust/restart meds as needed    Problem/Plan -:Acute Tonsilitis and Candida Esophagitis       -continue abx per ID       -supportive care prn

## 2019-05-06 NOTE — CHART NOTE - NSCHARTNOTEFT_GEN_A_CORE
CXR: Enlarged cardiac silhouette. Findings suggesting mild interstitial pulmonary edema. More focal ill-defined left lower lung opacity which could be due to  subsegmental atelectasis, pneumonia, or alveolar pulmonary edema. Small left pleural effusion.  Discussed above with medicine attg. Will administer 60 mg IV lasix x1. Fluid restrictions, daily weight, strict I/O. Awaiting CT abd.     Pt with positive C diff. Will initiate IV flagyl and PO vanco for c diff colitis. Contact precautions.

## 2019-05-06 NOTE — PROGRESS NOTE ADULT - PROBLEM SELECTOR PLAN 2
No lactic acidosis. 2/2 ROBB and GI losses  off bicarb drip. if diarrhea persists, add HCO3 in ivf again

## 2019-05-07 LAB
ANION GAP SERPL CALC-SCNC: 21 MMO/L — HIGH (ref 7–14)
BASOPHILS # BLD AUTO: 0.01 K/UL — SIGNIFICANT CHANGE UP (ref 0–0.2)
BASOPHILS NFR BLD AUTO: 0.3 % — SIGNIFICANT CHANGE UP (ref 0–2)
BLD GP AB SCN SERPL QL: NEGATIVE — SIGNIFICANT CHANGE UP
BUN SERPL-MCNC: 72 MG/DL — HIGH (ref 7–23)
CALCIUM SERPL-MCNC: 6.6 MG/DL — LOW (ref 8.4–10.5)
CHLORIDE SERPL-SCNC: 104 MMOL/L — SIGNIFICANT CHANGE UP (ref 98–107)
CO2 SERPL-SCNC: 16 MMOL/L — LOW (ref 22–31)
CREAT SERPL-MCNC: 7.77 MG/DL — HIGH (ref 0.5–1.3)
EOSINOPHIL # BLD AUTO: 0.01 K/UL — SIGNIFICANT CHANGE UP (ref 0–0.5)
EOSINOPHIL NFR BLD AUTO: 0.3 % — SIGNIFICANT CHANGE UP (ref 0–6)
GAS PNL BLDMV: SIGNIFICANT CHANGE UP
GLUCOSE SERPL-MCNC: 105 MG/DL — HIGH (ref 70–99)
HCT VFR BLD CALC: 23.3 % — LOW (ref 39–50)
HGB BLD-MCNC: 7.5 G/DL — LOW (ref 13–17)
IMM GRANULOCYTES NFR BLD AUTO: 6.6 % — HIGH (ref 0–1.5)
LDH SERPL L TO P-CCNC: 260 U/L — HIGH (ref 135–225)
LYMPHOCYTES # BLD AUTO: 0.61 K/UL — LOW (ref 1–3.3)
LYMPHOCYTES # BLD AUTO: 19.1 % — SIGNIFICANT CHANGE UP (ref 13–44)
MAGNESIUM SERPL-MCNC: 2 MG/DL — SIGNIFICANT CHANGE UP (ref 1.6–2.6)
MANUAL SMEAR VERIFICATION: SIGNIFICANT CHANGE UP
MCHC RBC-ENTMCNC: 28.5 PG — SIGNIFICANT CHANGE UP (ref 27–34)
MCHC RBC-ENTMCNC: 32.2 % — SIGNIFICANT CHANGE UP (ref 32–36)
MCV RBC AUTO: 88.6 FL — SIGNIFICANT CHANGE UP (ref 80–100)
MONOCYTES # BLD AUTO: 0.25 K/UL — SIGNIFICANT CHANGE UP (ref 0–0.9)
MONOCYTES NFR BLD AUTO: 7.8 % — SIGNIFICANT CHANGE UP (ref 2–14)
NEUTROPHILS # BLD AUTO: 2.1 K/UL — SIGNIFICANT CHANGE UP (ref 1.8–7.4)
NEUTROPHILS NFR BLD AUTO: 65.9 % — SIGNIFICANT CHANGE UP (ref 43–77)
NRBC # FLD: 0 K/UL — SIGNIFICANT CHANGE UP (ref 0–0)
PHOSPHATE SERPL-MCNC: 4.3 MG/DL — SIGNIFICANT CHANGE UP (ref 2.5–4.5)
PLATELET # BLD AUTO: 35 K/UL — LOW (ref 150–400)
PMV BLD: 11.3 FL — SIGNIFICANT CHANGE UP (ref 7–13)
POTASSIUM SERPL-MCNC: 3.9 MMOL/L — SIGNIFICANT CHANGE UP (ref 3.5–5.3)
POTASSIUM SERPL-SCNC: 3.9 MMOL/L — SIGNIFICANT CHANGE UP (ref 3.5–5.3)
RBC # BLD: 2.63 M/UL — LOW (ref 4.2–5.8)
RBC # FLD: 16.2 % — HIGH (ref 10.3–14.5)
RH IG SCN BLD-IMP: POSITIVE — SIGNIFICANT CHANGE UP
SODIUM SERPL-SCNC: 141 MMOL/L — SIGNIFICANT CHANGE UP (ref 135–145)
SPECIMEN SOURCE: SIGNIFICANT CHANGE UP
URATE SERPL-MCNC: 6.1 MG/DL — SIGNIFICANT CHANGE UP (ref 3.4–8.8)
VANCOMYCIN FLD-MCNC: 13.2 UG/ML — SIGNIFICANT CHANGE UP
WBC # BLD: 3.19 K/UL — LOW (ref 3.8–10.5)
WBC # FLD AUTO: 3.19 K/UL — LOW (ref 3.8–10.5)

## 2019-05-07 RX ADMIN — Medication 125 MILLIGRAM(S): at 07:04

## 2019-05-07 RX ADMIN — DIPHENHYDRAMINE HYDROCHLORIDE AND LIDOCAINE HYDROCHLORIDE AND ALUMINUM HYDROXIDE AND MAGNESIUM HYDRO 5 MILLILITER(S): KIT at 13:00

## 2019-05-07 RX ADMIN — Medication 30 MILLILITER(S): at 17:50

## 2019-05-07 RX ADMIN — Medication 100 MILLIGRAM(S): at 13:59

## 2019-05-07 RX ADMIN — CARVEDILOL PHOSPHATE 25 MILLIGRAM(S): 80 CAPSULE, EXTENDED RELEASE ORAL at 17:50

## 2019-05-07 RX ADMIN — RANITIDINE HYDROCHLORIDE 150 MILLIGRAM(S): 150 TABLET, FILM COATED ORAL at 17:50

## 2019-05-07 RX ADMIN — Medication 300 MILLIGRAM(S): at 13:06

## 2019-05-07 RX ADMIN — Medication 125 MILLIGRAM(S): at 14:15

## 2019-05-07 RX ADMIN — Medication 30 MILLILITER(S): at 07:04

## 2019-05-07 RX ADMIN — Medication 30 MILLILITER(S): at 13:02

## 2019-05-07 RX ADMIN — Medication 100 MILLIGRAM(S): at 21:59

## 2019-05-07 RX ADMIN — FLUCONAZOLE 100 MILLIGRAM(S): 150 TABLET ORAL at 07:04

## 2019-05-07 RX ADMIN — DIPHENHYDRAMINE HYDROCHLORIDE AND LIDOCAINE HYDROCHLORIDE AND ALUMINUM HYDROXIDE AND MAGNESIUM HYDRO 5 MILLILITER(S): KIT at 17:45

## 2019-05-07 RX ADMIN — DIPHENHYDRAMINE HYDROCHLORIDE AND LIDOCAINE HYDROCHLORIDE AND ALUMINUM HYDROXIDE AND MAGNESIUM HYDRO 5 MILLILITER(S): KIT at 21:59

## 2019-05-07 RX ADMIN — CARVEDILOL PHOSPHATE 25 MILLIGRAM(S): 80 CAPSULE, EXTENDED RELEASE ORAL at 07:04

## 2019-05-07 RX ADMIN — CEFEPIME 100 MILLIGRAM(S): 1 INJECTION, POWDER, FOR SOLUTION INTRAMUSCULAR; INTRAVENOUS at 15:23

## 2019-05-07 RX ADMIN — DIPHENHYDRAMINE HYDROCHLORIDE AND LIDOCAINE HYDROCHLORIDE AND ALUMINUM HYDROXIDE AND MAGNESIUM HYDRO 5 MILLILITER(S): KIT at 07:04

## 2019-05-07 RX ADMIN — Medication 100 MILLIGRAM(S): at 07:04

## 2019-05-07 RX ADMIN — CHLORHEXIDINE GLUCONATE 1 APPLICATION(S): 213 SOLUTION TOPICAL at 09:59

## 2019-05-07 RX ADMIN — Medication 125 MILLIGRAM(S): at 17:50

## 2019-05-07 RX ADMIN — Medication 480 MICROGRAM(S): at 15:23

## 2019-05-07 NOTE — CHART NOTE - NSCHARTNOTEFT_GEN_A_CORE
Source: Patient [X ]    Family [ ]     other [X ] electronic chart, RN     Diet : Soft, 1000ml fluid restriction, No Carb Prosource (15 grams protein/ 30 mL packet.) x1, Ensure Clear 3x daily (720kcals, 24g protein).     Patient seen for nutrition follow-up. Continues with poor po intake. S/p swallow on 5/3/19 recommended to continue soft thin liquids diet. Patient c/o pain during encounter-very limited participation in interview. +C. diff 5/6/19 per chart. Encouraged small frequent po intake and po supplement between meals.        Current Weight: 81 kg (5/6) 4/25 77.4kg with +1 right and left lef edema noted. Weight change likely related to fluid shift and bedscale discrepancy.       Pertinent Medications: allopurinol  carvedilol  ranitidine    Pertinent Labs:  05-07 Na141 mmol/L Glu 105 mg/dL<H> K+ 3.9 mmol/L Cr  7.77 mg/dL<H> BUN 72 mg/dL<H> 05-07 Phos 4.3 mg/dL 05-02 Alb 2.7 g/dL<L>      Skin: intact. +1 right and left leg edema noted.     Estimated Needs:   [X ] no change since previous assessment  [ ] recalculated:       Previous Nutrition Diagnosis:     [ ] Inadequate Energy Intake [ ]Inadequate Oral Intake [ ] Excessive Energy Intake     [ ] Underweight [ ] Increased Nutrient Needs [ ] Overweight/Obesity     [ ] Altered GI Function [ ] Unintended Weight Loss [ ] Food & Nutrition Related Knowledge Deficit [X ] Malnutrition, Severe     Nutrition Diagnosis is [X ] ongoing  [ ] resolved [ ] not applicable     Additional Recommendations:  1. Continue current diet order, which remains appropriate at this time.   2. Monitor weights, labs, BM's, skin integrity, p.o. intake.   3. Consider alternate means of nutrition if inadequate po intake persists given prolonged suboptimal intake.

## 2019-05-07 NOTE — PROGRESS NOTE ADULT - SUBJECTIVE AND OBJECTIVE BOX
Patient is seen and examined at the bed side,  remains afebrile . He is s/p dialysis yesterday. He appears very ill and confused.  The mucosal pain  and Odynophagia has not improved. The Neutropenia resolved, 1040.         REVIEW OF SYSTEMS: All other review systems are negative        ALLERGIES: oxycodone (Vomiting; Nausea)      Vital Signs Last 24 Hrs  T(C): 36.6 (07 May 2019 17:47), Max: 38.3 (06 May 2019 21:43)  T(F): 97.8 (07 May 2019 17:47), Max: 101 (06 May 2019 21:43)  HR: 107 (07 May 2019 17:47) (95 - 107)  BP: 126/- (07 May 2019 17:47) (119/73 - 143/85)  BP(mean): --  RR: 20 (07 May 2019 17:47) (20 - 20)  SpO2: 100% (07 May 2019 17:47) (98% - 100%)      PHYSICAL EXAM:  GENERAL: Appears ill  HEENT: No oral thrush or any tonsilar exudate noted   CHEST/LUNG:  Air entry bilaterally  HEART: s1 and s2 present  ABDOMEN:  Nontender and  Nondistended  EXTREMITIES: No pedal  edema  CNS: Lethargic        LABS:                         7.5    3.19  )-----------( 35       ( 07 May 2019 06:00 )             23.3                           7.5    1.49  )-----------( 43       ( 05 May 2019 06:15 )             21.7                               8.8    0.57  )-----------( 23       ( 02 May 2019 18:36 )             26.0                     05-07    141  |  104  |  72<H>  ----------------------------<  105<H>  3.9   |  16<L>  |  7.77<H>    Ca    6.6<L>      07 May 2019 06:00  Phos  4.3     05-07  Mg     2.0     05-07         05-05    134<L>  |  98  |  86<H>  ----------------------------<  124<H>  3.9   |  13<L>  |  6.88<H>    Ca    6.7<L>      05 May 2019 06:15  Phos  4.0     05-05  Mg     2.1     05-05    TPro  6.6  /  Alb  x   /  TBili  x   /  DBili  x   /  AST  x   /  ALT  x   /  AlkPhos  x   05-04    05-04    131<L>  |  99  |  105<H>  ----------------------------<  180<H>  5.3   |  7<LL>  |  8.02<H>    Ca    6.4<LL>      04 May 2019 06:00  Phos  6.4     05-04  Mg     2.3     05-04    TPro  6.6  /  Alb  x   /  TBili  x   /  DBili  x   /  AST  x   /  ALT  x   /  AlkPhos  x   05-04    04-30    135  |  104  |  20  ----------------------------<  139<H>  3.8   |  16<L>  |  1.46<H>    Ca    7.2<L>      30 Apr 2019 06:15  Phos  2.4     04-30  Mg     1.8     04-30    TPro  5.9<L>  /  Alb  3.0<L>  /  TBili  2.0<H>  /  DBili  x   /  AST  13  /  ALT  47<H>  /  AlkPhos  100  04-30      Vancomycin level:    Vancomycin Level, Trough (05.03.19 @ 06:55) Vancomycin Level, Trough: 20.9: Vancomycin trough levels should be rapidly reached and maintained at 15-20 ug/ml for life threatening MRSA  infections such as sepsis, endocarditis, osteomyelitis and pneumonia.     Vancomycin Level, Trough - Prior to Next Dose (05.01.19 @ 06:05) Vancomycin Level, Trough: 29.7: Vancomycin trough levels should be rapidly reached and  maintained at 15-20 ug/ml for life threatening MRSA infections such as sepsis, endocarditis, osteomyelitis and pneumonia.     Vancomycin Level, Random (05.02.19 @ 06:04)  Vancomycin Level, Random: 23.8:         MEDICATIONS  (STANDING):    MEDICATIONS  (STANDING):  allopurinol 300 milliGRAM(s) Oral daily  carvedilol 25 milliGRAM(s) Oral every 12 hours  cefepime   IVPB 2000 milliGRAM(s) IV Intermittent every 24 hours  chlorhexidine 4% Liquid 1 Application(s) Topical <User Schedule>  filgrastim-sndz Injectable 480 MICROGram(s) SubCutaneous daily  FIRST- Mouthwash  BLM 5 milliLiter(s) Swish and Spit five times a day  fluconAZOLE IVPB 200 milliGRAM(s) IV Intermittent every 24 hours  metroNIDAZOLE  IVPB 500 milliGRAM(s) IV Intermittent every 8 hours  metroNIDAZOLE  IVPB      ranitidine 150 milliGRAM(s) Oral every 24 hours  Saliva Substitute (CAPHOSOL) 30 milliLiter(s) Swish and Spit every 6 hours  valACYclovir 500 milliGRAM(s) Oral <User Schedule>  vancomycin    Solution 125 milliGRAM(s) Oral every 6 hours    MEDICATIONS  (PRN):  acetaminophen   Tablet .. 650 milliGRAM(s) Oral every 6 hours PRN Temp greater or equal to 38C (100.4F)  artificial  tears Solution 1 Drop(s) Both EYES every 6 hours PRN Dry Eyes  benzocaine 15 mG/menthol 3.6 mG (Sugar-Free) Lozenge 1 Lozenge Oral five times a day PRN Sore Throat  guaiFENesin    Syrup 100 milliGRAM(s) Oral every 6 hours PRN Cough  HYDROmorphone   Tablet 2 milliGRAM(s) Oral every 4 hours PRN moderate - severe pain  ondansetron Injectable 8 milliGRAM(s) IV Push every 8 hours PRN Nausea and/or Vomiting        RADIOLOGY & ADDITIONAL TESTS:    4/30/19 : Xray Esophagram (04.30.19 @ 09:31) Limited evaluation for esophageal ulcers secondary to underdistention of  the esophagus.      4/27/19 : Xray Chest 1 View- PORTABLE-Urgent (04.27.19 @ 22:45) Probable small left pleural effusion with adjacent opacity that likely  represents atelectasis.    4/25/19 : CT Brain Stroke Protocol (04.25.19 @ 19:42) No acute intracranial hemorrhage, mass effect, or midline shift. No  abnormal extra-axial collections. The basal cisterns are patent without evidence of central herniation. The sulci and ventricles are within normal limits for the patient's age. Stable few patchy areas of low-attenuation in the bihemispheric white  matter, which is nonspecific, but likely related to sequela of chronic  microvascular ischemic disease. Stable coarse calcifications in the left temporal lobe.      MICROBIOLOGY DATA:      Culture - Blood (04.28.19 @ 18:20)    Culture - Blood:   NO ORGANISMS ISOLATED  NO ORGANISMS ISOLATED AT 24 HOURS    Specimen Source: BLOOD VENOUS      Culture - Blood (04.28.19 @ 18:20)    Culture - Blood:   NO ORGANISMS ISOLATED  NO ORGANISMS ISOLATED AT 24 HOURS    Specimen Source: BLOOD PERIPHERAL        Culture - Blood (04.27.19 @ 19:50)    Culture - Blood:   NO ORGANISMS ISOLATED  NO ORGANISMS ISOLATED AT 24 HOURS    Specimen Source: BLOOD PERIPHERAL      Culture - Group A Streptococcus (04.27.19 @ 14:24)    Culture - Group A Streptococcus:   NO BETA STREP. GROUP A  AFTER 24HRS.    Specimen Source: THROAT Patient is seen and examined at the bed side, spiked fever again but remains afebrile . He appears very ill and moaning but mentioned doing ok.   The Neutropenia resolved, 2100. The Blood cultures are negative to date and C.difficile Toxin is positive.         REVIEW OF SYSTEMS: All other review systems are negative        ALLERGIES: oxycodone (Vomiting; Nausea)      Vital Signs Last 24 Hrs  T(C): 36.6 (07 May 2019 17:47), Max: 38.3 (06 May 2019 21:43)  T(F): 97.8 (07 May 2019 17:47), Max: 101 (06 May 2019 21:43)  HR: 107 (07 May 2019 17:47) (95 - 107)  BP: 126/- (07 May 2019 17:47) (119/73 - 143/85)  BP(mean): --  RR: 20 (07 May 2019 17:47) (20 - 20)  SpO2: 100% (07 May 2019 17:47) (98% - 100%)        PHYSICAL EXAM:  GENERAL: Appears ill  HEENT: Mucositis  CHEST/LUNG:  Air entry bilaterally  HEART: s1 and s2 present  ABDOMEN:  Nontender and  Nondistended  EXTREMITIES: No pedal  edema  CNS: Lethargic        LABS:                         7.5    3.19  )-----------( 35       ( 07 May 2019 06:00 )             23.3                           7.5    1.49  )-----------( 43       ( 05 May 2019 06:15 )             21.7                               8.8    0.57  )-----------( 23       ( 02 May 2019 18:36 )             26.0                     05-07    141  |  104  |  72<H>  ----------------------------<  105<H>  3.9   |  16<L>  |  7.77<H>    Ca    6.6<L>      07 May 2019 06:00  Phos  4.3     05-07  Mg     2.0     05-07 05-05    134<L>  |  98  |  86<H>  ----------------------------<  124<H>  3.9   |  13<L>  |  6.88<H>    Ca    6.7<L>      05 May 2019 06:15  Phos  4.0     05-05  Mg     2.1     05-05    TPro  6.6  /  Alb  x   /  TBili  x   /  DBili  x   /  AST  x   /  ALT  x   /  AlkPhos  x   05-04    05-04    131<L>  |  99  |  105<H>  ----------------------------<  180<H>  5.3   |  7<LL>  |  8.02<H>    Ca    6.4<LL>      04 May 2019 06:00  Phos  6.4     05-04  Mg     2.3     05-04    TPro  6.6  /  Alb  x   /  TBili  x   /  DBili  x   /  AST  x   /  ALT  x   /  AlkPhos  x   05-04 04-30    135  |  104  |  20  ----------------------------<  139<H>  3.8   |  16<L>  |  1.46<H>    Ca    7.2<L>      30 Apr 2019 06:15  Phos  2.4     04-30  Mg     1.8     04-30    TPro  5.9<L>  /  Alb  3.0<L>  /  TBili  2.0<H>  /  DBili  x   /  AST  13  /  ALT  47<H>  /  AlkPhos  100  04-30      Vancomycin level:    Vancomycin Level, Trough (05.03.19 @ 06:55) Vancomycin Level, Trough: 20.9: Vancomycin trough levels should be rapidly reached and maintained at 15-20 ug/ml for life threatening MRSA  infections such as sepsis, endocarditis, osteomyelitis and pneumonia.     Vancomycin Level, Trough - Prior to Next Dose (05.01.19 @ 06:05) Vancomycin Level, Trough: 29.7: Vancomycin trough levels should be rapidly reached and  maintained at 15-20 ug/ml for life threatening MRSA infections such as sepsis, endocarditis, osteomyelitis and pneumonia.     Vancomycin Level, Random (05.02.19 @ 06:04)  Vancomycin Level, Random: 23.8:         MEDICATIONS  (STANDING):    MEDICATIONS  (STANDING):  allopurinol 300 milliGRAM(s) Oral daily  carvedilol 25 milliGRAM(s) Oral every 12 hours  cefepime   IVPB 2000 milliGRAM(s) IV Intermittent every 24 hours  chlorhexidine 4% Liquid 1 Application(s) Topical <User Schedule>  filgrastim-sndz Injectable 480 MICROGram(s) SubCutaneous daily  FIRST- Mouthwash  BLM 5 milliLiter(s) Swish and Spit five times a day  fluconAZOLE IVPB 200 milliGRAM(s) IV Intermittent every 24 hours  metroNIDAZOLE  IVPB 500 milliGRAM(s) IV Intermittent every 8 hours  metroNIDAZOLE  IVPB      ranitidine 150 milliGRAM(s) Oral every 24 hours  Saliva Substitute (CAPHOSOL) 30 milliLiter(s) Swish and Spit every 6 hours  valACYclovir 500 milliGRAM(s) Oral <User Schedule>  vancomycin    Solution 125 milliGRAM(s) Oral every 6 hours    MEDICATIONS  (PRN):  acetaminophen   Tablet .. 650 milliGRAM(s) Oral every 6 hours PRN Temp greater or equal to 38C (100.4F)  artificial  tears Solution 1 Drop(s) Both EYES every 6 hours PRN Dry Eyes  benzocaine 15 mG/menthol 3.6 mG (Sugar-Free) Lozenge 1 Lozenge Oral five times a day PRN Sore Throat  guaiFENesin    Syrup 100 milliGRAM(s) Oral every 6 hours PRN Cough  HYDROmorphone   Tablet 2 milliGRAM(s) Oral every 4 hours PRN moderate - severe pain  ondansetron Injectable 8 milliGRAM(s) IV Push every 8 hours PRN Nausea and/or Vomiting        RADIOLOGY & ADDITIONAL TESTS:    4/30/19 : Xray Esophagram (04.30.19 @ 09:31) Limited evaluation for esophageal ulcers secondary to underdistention of  the esophagus.      4/27/19 : Xray Chest 1 View- PORTABLE-Urgent (04.27.19 @ 22:45) Probable small left pleural effusion with adjacent opacity that likely  represents atelectasis.    4/25/19 : CT Brain Stroke Protocol (04.25.19 @ 19:42) No acute intracranial hemorrhage, mass effect, or midline shift. No  abnormal extra-axial collections. The basal cisterns are patent without evidence of central herniation. The sulci and ventricles are within normal limits for the patient's age. Stable few patchy areas of low-attenuation in the bihemispheric white  matter, which is nonspecific, but likely related to sequela of chronic  microvascular ischemic disease. Stable coarse calcifications in the left temporal lobe.      MICROBIOLOGY DATA:    Culture - Blood (05.06.19 @ 23:10)    Culture - Blood:   NO ORGANISMS ISOLATED  NO ORGANISMS ISOLATED AT 24 HOURS    Specimen Source: BLOOD VENOUS        Culture - Blood (05.06.19 @ 23:10)    Culture - Blood:   NO ORGANISMS ISOLATED  NO ORGANISMS ISOLATED AT 24 HOURS    Specimen Source: BLOOD PERIPHERAL        Clostridium difficile Toxin by PCR (05.06.19 @ 15:50)    Clostridium difficile Toxin by PCR: DETECTED: RESULT CALLED TO / READ BACK: MITRA EMANUEL RN/Y  DATE / TIME CALLED: 05/06/19 1919  CALLED BY: ALYCE CELAYA  C. difficile toxin B gene (tcdb) detected    The results of this test should be interpreted with  consideration of all clinical andlaboratory findings. C.  difficile PCR is more sensitive and specific than EIA,  therefore, repeat testing during one episode does not  provide additional clinically useful information. One test  per episode is sufficient for determining C. difficile  infection status.    This test is performed on the Cepheid Gene Xpert detection  system which automates and integrates sample purification,  Nucleic acid amplification and detection of the target  sequence in simple or complex samples using real-time PCR  and RT-PCR assays.      Culture - Blood (04.28.19 @ 18:20)    Culture - Blood:   NO ORGANISMS ISOLATED  NO ORGANISMS ISOLATED AT 24 HOURS    Specimen Source: BLOOD VENOUS      Culture - Blood (04.28.19 @ 18:20)    Culture - Blood:   NO ORGANISMS ISOLATED  NO ORGANISMS ISOLATED AT 24 HOURS    Specimen Source: BLOOD PERIPHERAL        Culture - Blood (04.27.19 @ 19:50)    Culture - Blood:   NO ORGANISMS ISOLATED  NO ORGANISMS ISOLATED AT 24 HOURS    Specimen Source: BLOOD PERIPHERAL      Culture - Group A Streptococcus (04.27.19 @ 14:24)    Culture - Group A Streptococcus:   NO BETA STREP. GROUP A  AFTER 24HRS.    Specimen Source: THROAT

## 2019-05-07 NOTE — PROGRESS NOTE ADULT - SUBJECTIVE AND OBJECTIVE BOX
Nephrology Followup Note - [phone] - Dr Bahena / Dr Regalado / Dr Benz / Dr Frost / Dr Mahmood / Dr Mcgee / Dr Guzman / Dr Guerra  Pt seen and examined at bedside  Pt not answering questions, just moaning. Not eating much, little to no urine output noted.     Allergies:  oxycodone (Vomiting; Nausea)    Hospital Medications:   MEDICATIONS  (STANDING):  allopurinol 300 milliGRAM(s) Oral daily  carvedilol 25 milliGRAM(s) Oral every 12 hours  cefepime   IVPB 2000 milliGRAM(s) IV Intermittent every 24 hours  chlorhexidine 4% Liquid 1 Application(s) Topical <User Schedule>  filgrastim-sndz Injectable 480 MICROGram(s) SubCutaneous daily  FIRST- Mouthwash  BLM 5 milliLiter(s) Swish and Spit five times a day  fluconAZOLE IVPB 200 milliGRAM(s) IV Intermittent every 24 hours  metroNIDAZOLE  IVPB 500 milliGRAM(s) IV Intermittent every 8 hours  metroNIDAZOLE  IVPB      ranitidine 150 milliGRAM(s) Oral every 24 hours  Saliva Substitute (CAPHOSOL) 30 milliLiter(s) Swish and Spit every 6 hours  valACYclovir 500 milliGRAM(s) Oral <User Schedule>  vancomycin    Solution 125 milliGRAM(s) Oral every 6 hours    VITALS:  T(F): 97.4 (19 @ 07:02), Max: 101 (19 @ 21:43)  HR: 100 (19 @ 07:02)  BP: 143/85 (19 @ 07:02)  RR: 20 (19 @ 07:02)  SpO2: 100% (19 @ 07:02)  Wt(kg): --     @ 07:01  -   @ 07:00  --------------------------------------------------------  IN: 634 mL / OUT: 600 mL / NET: 34 mL    PHYSICAL EXAM:  Constitutional: NAD  HEENT: anicteric sclera, oropharynx clear, MMM  Neck: No JVD  Respiratory: CTAB, no wheezes, rales or rhonchi  Cardiovascular: S1, S2, RRR  Gastrointestinal: BS+, soft, NT/ND, ostomy   Extremities: No cyanosis or clubbing. No peripheral edema  Neurological: A/O x 0  : No CVA tenderness. No quintero.   Skin: No rashes  Vascular Access: LEISA sheets     LABS:      141  |  104  |  72<H>  ----------------------------<  105<H>  3.9   |  16<L>  |  7.77<H>    Ca    6.6<L>      07 May 2019 06:00  Phos  4.3       Mg     2.0           Creatinine Trend: 7.77 <--, 8.51 <--, 6.88 <--, 8.02 <--, 6.69 <--, 5.21 <--, 3.16 <--                        7.5    3.19  )-----------( 35       ( 07 May 2019 06:00 )             23.3     Urine Studies:  Urinalysis Basic - ( 02 May 2019 14:30 )    Color: YELLOW / Appearance: Lt TURBID / S.015 / pH: 6.0  Gluc: NEGATIVE / Ketone: TRACE  / Bili: NEGATIVE / Urobili: NORMAL   Blood: SMALL / Protein: 300 / Nitrite: NEGATIVE   Leuk Esterase: NEGATIVE / RBC: 6-10 / WBC 11-25   Sq Epi: FEW / Non Sq Epi:  / Bacteria: NF      Sodium, Random Urine: 24 mmol/L ( @ 18:30)  Potassium, Random Urine: 28.6 mmol/L ( @ 18:30)  Chloride, Random Urine: < 20 mmol/L ( @ 18:30)  Osmolality, Random Urine: 307 mosmo/kg ( @ 18:30)    RADIOLOGY & ADDITIONAL STUDIES:

## 2019-05-07 NOTE — PROGRESS NOTE ADULT - ASSESSMENT
53y Male with relapsed multiple myeloma s/p bone marrow transplant (~1.5 yrs ago) and chemo/RT (last infusion Bendamustine and Pomalidomide 4/08/2019), perforated diverticulitis s/p Kiah's (proctosigmoidectomy with colostomy, 9/2018), nonischemic cardiomyopathy (mild global LV systolic dysfunction with EF 54% and diastolic LV dysfunction based on TTE in 11/2018), and HTN admitted to [facility] on 4/25 for nausea and vomiting, and poor PO intake, treated for neutropenic fever, c/b ROBB. Renal following for ARF    labs, chart reviewed  Thrombocytopenia- no bleeding. f/u w/hem/onc

## 2019-05-07 NOTE — PROGRESS NOTE ADULT - SUBJECTIVE AND OBJECTIVE BOX
SONAL GARCIA:[id number],   53yMale followed for:  oxycodone (Vomiting; Nausea)    PAST MEDICAL & SURGICAL HISTORY:  Diverticulitis: perforated s/p Kiah  Multiple myeloma: relapsed 3/2019  Hypertension  Left ventricular dysfunction  Cardiomyopathy: nonischemic  Former smoker: (1 ppd x 11 years; Quit ~age 26)  History of bone marrow transplant  History of surgery: s/p exploratory laparotomy with sigmoid resection and colostomy on 9/2/2018    FAMILY HISTORY:  Family history of diabetes mellitus  Family history of hypertension    MEDICATIONS  (STANDING):  allopurinol 300 milliGRAM(s) Oral daily  carvedilol 25 milliGRAM(s) Oral every 12 hours  cefepime   IVPB 2000 milliGRAM(s) IV Intermittent every 24 hours  chlorhexidine 4% Liquid 1 Application(s) Topical <User Schedule>  filgrastim-sndz Injectable 480 MICROGram(s) SubCutaneous daily  FIRST- Mouthwash  BLM 5 milliLiter(s) Swish and Spit five times a day  fluconAZOLE IVPB 200 milliGRAM(s) IV Intermittent every 24 hours  metroNIDAZOLE  IVPB 500 milliGRAM(s) IV Intermittent every 8 hours  metroNIDAZOLE  IVPB      ranitidine 150 milliGRAM(s) Oral every 24 hours  Saliva Substitute (CAPHOSOL) 30 milliLiter(s) Swish and Spit every 6 hours  valACYclovir 500 milliGRAM(s) Oral <User Schedule>  vancomycin    Solution 125 milliGRAM(s) Oral every 6 hours    MEDICATIONS  (PRN):  acetaminophen   Tablet .. 650 milliGRAM(s) Oral every 6 hours PRN Temp greater or equal to 38C (100.4F)  artificial  tears Solution 1 Drop(s) Both EYES every 6 hours PRN Dry Eyes  benzocaine 15 mG/menthol 3.6 mG (Sugar-Free) Lozenge 1 Lozenge Oral five times a day PRN Sore Throat  docusate sodium 100 milliGRAM(s) Oral three times a day PRN Constipation  guaiFENesin    Syrup 100 milliGRAM(s) Oral every 6 hours PRN Cough  HYDROmorphone   Tablet 2 milliGRAM(s) Oral every 4 hours PRN moderate - severe pain  ondansetron Injectable 8 milliGRAM(s) IV Push every 8 hours PRN Nausea and/or Vomiting  senna 2 Tablet(s) Oral at bedtime PRN Constipation      Vital Signs Last 24 Hrs  T(C): 36.3 (07 May 2019 07:02), Max: 38.3 (06 May 2019 21:43)  T(F): 97.4 (07 May 2019 07:02), Max: 101 (06 May 2019 21:43)  HR: 100 (07 May 2019 07:02) (94 - 100)  BP: 143/85 (07 May 2019 07:02) (119/73 - 143/85)  BP(mean): --  RR: 20 (07 May 2019 07:02) (16 - 20)  SpO2: 100% (07 May 2019 07:02) (98% - 100%)  nc/at  s1s2  cta  soft, nt, nd no guarding or rebound  no c/c/e    CBC Full  -  ( 07 May 2019 06:00 )  WBC Count : 3.19 K/uL  RBC Count : 2.63 M/uL  Hemoglobin : 7.5 g/dL  Hematocrit : 23.3 %  Platelet Count - Automated : 35 K/uL  Mean Cell Volume : 88.6 fL  Mean Cell Hemoglobin : 28.5 pg  Mean Cell Hemoglobin Concentration : 32.2 %  Auto Neutrophil # : 2.10 K/uL  Auto Lymphocyte # : 0.61 K/uL  Auto Monocyte # : 0.25 K/uL  Auto Eosinophil # : 0.01 K/uL  Auto Basophil # : 0.01 K/uL  Auto Neutrophil % : 65.9 %  Auto Lymphocyte % : 19.1 %  Auto Monocyte % : 7.8 %  Auto Eosinophil % : 0.3 %  Auto Basophil % : 0.3 %    05-07    141  |  104  |  72<H>  ----------------------------<  105<H>  3.9   |  16<L>  |  7.77<H>    Ca    6.6<L>      07 May 2019 06:00  Phos  4.3     05-07  Mg     2.0     05-07

## 2019-05-07 NOTE — CHART NOTE - NSCHARTNOTEFT_GEN_A_CORE
Right femoral shiley cath removed. 2 sutures removed. Pressure applied to area for 10 min, bleeding controlled at this time with surgicell and pressure dressing. Pt awaiting IR procedure for Shiley placement to IJ

## 2019-05-07 NOTE — PROGRESS NOTE ADULT - ASSESSMENT
pt immunosupressed. agree with vanco and iv flagyl.  emeperic rx for dysphagia.  high sirsk endosopcy

## 2019-05-07 NOTE — PROGRESS NOTE ADULT - ASSESSMENT
A 54 yo Male with  relapsed multiple myeloma s/p bone marrow transplant (~1.5 yrs ago) and chemo/RT (last infusion Bendamustine and Pomalidomide 4/08/2019), perforated diverticulitis s/p Kiah's (proctosigmoidectomy with colostomy, presents to [facility] ER for evaluation of  vomiting and change of vision. He also had been weak, with epistaxis, and petechiae. ON admission, he found to have Pancytopenia and Neutropenia with ANC of 120. He has no fever but rigors and also c/o Odynophagia. The CT head shows No acute intracranial events. The ID consult requested to assist with evaluation and antibiotic management of Neutropenia and Tonsilitis.     # Neutropenic Fever - developed  fever today, 4/28/19  #? Acute Tonsilitis  # Possible Candida Esophagitis-  May benefit from  EGD when cell numbers are stable, since Odynophagia  not improving on Fluconazole  # Kidney failure -s/p Shiley catheter now for dialysis 5/4/19    would recommend:    1. Pain management as needed  2. Continue renally adjusted Valacyclovir, Fluconazole and Cefepime  3. Continue dialysis as tolerated  4. Encourage oral intake    d/w  Family at the bed side    will follow the patient with you A 54 yo Male with  relapsed multiple myeloma s/p bone marrow transplant (~1.5 yrs ago) and chemo/RT (last infusion Bendamustine and Pomalidomide 4/08/2019), perforated diverticulitis s/p Kiah's (proctosigmoidectomy with colostomy, presents to [facility] ER for evaluation of  vomiting and change of vision. He also had been weak, with epistaxis, and petechiae. ON admission, he found to have Pancytopenia and Neutropenia with ANC of 120. He has no fever but rigors and also c/o Odynophagia. The CT head shows No acute intracranial events. The ID consult requested to assist with evaluation and antibiotic management of Neutropenia and Tonsilitis.     # Neutropenic Fever - developed  fever, 4/28/19 Neutropenia resolved 5/7/19  #? Acute Tonsilitis  # Possible Candida Esophagitis-  May benefit from  EGD when cell numbers are stable, since Odynophagia  not improving on Fluconazole  # Kidney failure -s/p Shiley catheter now for dialysis 5/4/19  # C.difficile infection -5/6/19    would recommend:      1. Discontinue Cefepime since Neutropenia  resolved and no organisms isolated in the culture  2. Monitor Temp. and c/w supportive care  3. Continue Oral Vancomycin  and IV Flagyl to cover  C.difficile infection   4. Continue renally adjusted Valacyclovir, and Fluconazole   5.  Continue dialysis as tolerated  6. Contact Isolation      d/w  Family at the bed side    will follow the patient with you

## 2019-05-07 NOTE — PROGRESS NOTE ADULT - ASSESSMENT
Problem/Plan:  Severe C.diff colitis:      - po vanco, iv flagyl, monitor stool count      - adjust abx per ID/GI    Problem/Plan:  Metabolic Encephalopathy:        - optimize lytes and treat underlying infectious state         - monitor mental status     Acute on Chronic Systolic CHF Exacerbation:       - with pulmonary edema/effusions        - UF HD       - daily weight, strict i/o, c/w cardiac meds, adjust     Problem/Plan - :  ·  Problem: Pancytopenia with neutropenic fever 2/2 acute tonsillitis and esophageal candidiasis complicated by symptomatic anemia requiring prbc transfusions:      persistent           - persistent         - c/w management     Problem/Plan:  Problem: Acute Renal Failure, 2/2 ATN:      - requiring new Hemodialysis       - c/w HD per nephro recs      - renally adjust rx/abx       - additional management per consultnats    Problem/Plan:      - Metabolic Acidosis:           - 2/2 ATN           - HD           - correct infectious abnormalities and lab abnormalities    Problem/Plan -:  ·  Problem: Multiple myeloma in relapse.  Plan: MM in relapse s/p bone marrow transplant (~1.5 yrs ago) and chemo/RT. Last infusion Bendamustine and Pomalidomide 4/08/2019        Continue Current medications and monitor labs closely        Hem/onc mgmt    Problem/Plan -:Acute Tonsilitis and Candida Esophagitis       -continue abx per ID       -supportive care prn

## 2019-05-07 NOTE — PROGRESS NOTE ADULT - ASSESSMENT
1. Pancytopenia     -- WBC low but improving on GCSF  -- cont  Zarxio 480mcg daily until ANC > 1500 x 2 consecutive days, cont today  -- counts slow to recover sec due to heavily tx for myeloma  -- Transfuse to keep Hgb > 7  -- Keep Platelets>/= 40K while catheter in place, transfuse today  -- monitor for infection or bleeding     2. MM relapsed/refractory including failed Auto Transplant    -- had been on bendamustine/pomalyst. Tx on hold  -- further management depends on renal recovery  -- paraproteins improving   -- s/p decadron 40mg 5/2, next dose on 5/9 if no contraindication at that time  -- QIggs, SPEP, JENELLE and SFLC showed increased free kappa    3. Tonsilitis vs Candida Esophagitis    -- cont Cefepime, Diflucan per ID  -- cont Valtrex renal dose    -- ID f/u  -- cont magic mouthwash  -- cont IVF    4. ROBB    -- HD per renal  -- ? sec to light chain disease    5. cdiff -- vanc PO and flagyl        Overall prognosis is poor. Pt has exhausted all realistic tx options. His disease has been refractory to most Tx including failed transplant. If he remains on HD, would not be a candidate for clinical trial.  Would get palliative care on board    D/w pt's family and primary team. [phone]

## 2019-05-07 NOTE — CHART NOTE - NSCHARTNOTEFT_GEN_A_CORE
Patient Age: 53    Patient Gender: male    Procedure (including site/side if known): shiley placement    Diagnosis/Indication: for HD    Interventional Radiology Attending Physician: Dr. Clancy    Ordering Attending Physician: Dr. Pop    Pertinent medical history: multiple myeloma, pancytopenia    Pertinent Labs: H&H- 7.5/23, Plt 35, wbc 3.19, INR- 1.43      Patient and Family aware? yes      Attending/Resident/NP/PA    Contact:    Ivory Meza     [id number]                       Date:       5/7/19                             time: 2828

## 2019-05-07 NOTE — PROGRESS NOTE ADULT - SUBJECTIVE AND OBJECTIVE BOX
Pt seen, lethargic, rouses to voice but falls asleep again. Family at bedside. Wife reports some bleeding from mouth last night. Some bleeding at scrotal skin tear. No other new issues    MEDICATIONS  (STANDING):  allopurinol 300 milliGRAM(s) Oral daily  carvedilol 25 milliGRAM(s) Oral every 12 hours  cefepime   IVPB 2000 milliGRAM(s) IV Intermittent every 24 hours  chlorhexidine 4% Liquid 1 Application(s) Topical <User Schedule>  filgrastim-sndz Injectable 480 MICROGram(s) SubCutaneous daily  FIRST- Mouthwash  BLM 5 milliLiter(s) Swish and Spit five times a day  fluconAZOLE IVPB 200 milliGRAM(s) IV Intermittent every 24 hours  metroNIDAZOLE  IVPB 500 milliGRAM(s) IV Intermittent every 8 hours  metroNIDAZOLE  IVPB      ranitidine 150 milliGRAM(s) Oral every 24 hours  Saliva Substitute (CAPHOSOL) 30 milliLiter(s) Swish and Spit every 6 hours  valACYclovir 500 milliGRAM(s) Oral <User Schedule>  vancomycin    Solution 125 milliGRAM(s) Oral every 6 hours    MEDICATIONS  (PRN):  acetaminophen   Tablet .. 650 milliGRAM(s) Oral every 6 hours PRN Temp greater or equal to 38C (100.4F)  artificial  tears Solution 1 Drop(s) Both EYES every 6 hours PRN Dry Eyes  benzocaine 15 mG/menthol 3.6 mG (Sugar-Free) Lozenge 1 Lozenge Oral five times a day PRN Sore Throat  guaiFENesin    Syrup 100 milliGRAM(s) Oral every 6 hours PRN Cough  HYDROmorphone   Tablet 2 milliGRAM(s) Oral every 4 hours PRN moderate - severe pain  ondansetron Injectable 8 milliGRAM(s) IV Push every 8 hours PRN Nausea and/or Vomiting      ROS  UTO, as above    Vital Signs Last 24 Hrs  T(C): 36.3 (07 May 2019 07:02), Max: 38.3 (06 May 2019 21:43)  T(F): 97.4 (07 May 2019 07:02), Max: 101 (06 May 2019 21:43)  HR: 100 (07 May 2019 07:02) (95 - 100)  BP: 143/85 (07 May 2019 07:02) (119/73 - 143/85)  BP(mean): --  RR: 20 (07 May 2019 07:02) (16 - 20)  SpO2: 100% (07 May 2019 07:02) (98% - 100%)    PE  NAD  lethargic  dried blood on lips  abd benign  no edema  shiley at R inguinal area  petechia at b/l legs, unchanged                          7.5    3.19  )-----------( 35       ( 07 May 2019 06:00 )             23.3       05-07    141  |  104  |  72<H>  ----------------------------<  105<H>  3.9   |  16<L>  |  7.77<H>    Ca    6.6<L>      07 May 2019 06:00  Phos  4.3     05-07  Mg     2.0     05-07

## 2019-05-07 NOTE — PROGRESS NOTE ADULT - SUBJECTIVE AND OBJECTIVE BOX
Patient seen and examined at bedside, with wife and family at bedside  pt moaning and in distress overnight, s/p HD  Case discussed with medical team    HPI:  54yo M hx of relapsed multiple myeloma s/p bone marrow transplant (~1.5 yrs ago) and chemo/RT (last infusion Bendamustine and Pomalidomide 4/08/2019), perforated diverticulitis s/p Kiah's (proctosigmoidectomy with colostomy, 9/2018), nonischemic cardiomyopathy (mild global LV systolic dysfunction with EF 54% and diastolic LV dysfunction based on TTE in 11/2018), and HTN presents to [facility] ED w/ vomiting and change of vision. Wife Devan Hood at bedside. Pt was sitting at home. He vomited 2-3 times nonbloody, nonbilious and was not able to eat or drink anything. On 4/25 at 4pm, while sitting he noticed his vision went "white" for a few minutes associated with sweating, shaking, and weakness. His wife noticed he had a red rash on his legs and they brought him to the ED. Pt received a platelet transfusion about a week ago outpt. Denies any pain. Had a nose bleed earlier. He denies skin sores, measured fever, HA, trouble speaking, numbness/tingling, focal weakness, dizziness, CP, SOB, abd pain, N/V/D, hematochezia, melena, urinary symptoms, oral/genital sores. He has a recent admission to [facility] discharged 4/08/2019 for diffuse body pain and low-grade fever, infectious workup was negative. MRI C/T/L showed abnormal T1 prolongation in the whole spine, sacral and iliac bones. Received 7 days of Bendamustine and Pomalidomide (on hold few days for neutropenia), last on 4/08/2019.    When pt presented to the ED, code stroke called. Neuro exam was nonfocal, CTH neg for acute process, and vomiting and vision changes were resolved. ED vitals afebrile, H101, /58 --> 102/62, R18, sat 100% on RA. Given decadron 40mg IV, 1u PLT. 1u pRBC ordered. (26 Apr 2019 00:00)      PAST MEDICAL & SURGICAL HISTORY:  Diverticulitis: perforated s/p Kiah  Multiple myeloma: relapsed 3/2019  Hypertension  Left ventricular dysfunction  Cardiomyopathy: nonischemic  Former smoker: (1 ppd x 11 years; Quit ~age 26)  History of bone marrow transplant  History of surgery: s/p exploratory laparotomy with sigmoid resection and colostomy on 9/2/2018      oxycodone (Vomiting; Nausea)       MEDICATIONS  (STANDING):  allopurinol 300 milliGRAM(s) Oral daily  carvedilol 25 milliGRAM(s) Oral every 12 hours  cefepime   IVPB 2000 milliGRAM(s) IV Intermittent every 24 hours  chlorhexidine 4% Liquid 1 Application(s) Topical <User Schedule>  filgrastim-sndz Injectable 480 MICROGram(s) SubCutaneous daily  FIRST- Mouthwash  BLM 5 milliLiter(s) Swish and Spit five times a day  fluconAZOLE IVPB 200 milliGRAM(s) IV Intermittent every 24 hours  metroNIDAZOLE  IVPB 500 milliGRAM(s) IV Intermittent every 8 hours  metroNIDAZOLE  IVPB      ranitidine 150 milliGRAM(s) Oral every 24 hours  Saliva Substitute (CAPHOSOL) 30 milliLiter(s) Swish and Spit every 6 hours  valACYclovir 500 milliGRAM(s) Oral <User Schedule>  vancomycin    Solution 125 milliGRAM(s) Oral every 6 hours    MEDICATIONS  (PRN):  acetaminophen   Tablet .. 650 milliGRAM(s) Oral every 6 hours PRN Temp greater or equal to 38C (100.4F)  artificial  tears Solution 1 Drop(s) Both EYES every 6 hours PRN Dry Eyes  benzocaine 15 mG/menthol 3.6 mG (Sugar-Free) Lozenge 1 Lozenge Oral five times a day PRN Sore Throat  guaiFENesin    Syrup 100 milliGRAM(s) Oral every 6 hours PRN Cough  HYDROmorphone   Tablet 2 milliGRAM(s) Oral every 4 hours PRN moderate - severe pain  ondansetron Injectable 8 milliGRAM(s) IV Push every 8 hours PRN Nausea and/or Vomiting      REVIEW OF SYSTEMS:  CONSTITUTIONAL: (+) malaise. gen weakness. fatigue  EYES: No acute change in vision   ENT:  No tinnitus  NECK: No stiffness  RESPIRATORY: No hemoptysis  CARDIOVASCULAR: No chest pain, palpitations, syncope  GASTROINTESTINAL: diarrhea (mildly improving in quantity). no hematemesis, melena, or hematochezia.  GENITOURINARY: No hematuria  NEUROLOGICAL: No headaches  LYMPH Nodes: No enlarged glands  ENDOCRINE: No heat or cold intolerance	    Vital Signs Last 24 Hrs  T(C): 36.3 (07 May 2019 07:02), Max: 38.3 (06 May 2019 21:43)  T(F): 97.4 (07 May 2019 07:02), Max: 101 (06 May 2019 21:43)  HR: 100 (07 May 2019 07:02) (94 - 100)  BP: 143/85 (07 May 2019 07:02) (119/73 - 143/85)  BP(mean): --  RR: 20 (07 May 2019 07:02) (16 - 20)  SpO2: 100% (07 May 2019 07:02) (98% - 100%)    PHYSICAL EXAMINATION:   Constitutional: ill appearing, moaning aloud in distress  HEENT: mucositis  Neck:  Supple  Respiratory:  mild bibasilar rales. Adequate airflow b/l. Not using accessory muscles of respiration.  Cardiovascular:  sys murmur, S1 & S2 intact, no R/G, 2+ radial pulses b/l  Gastrointestinal: loose brown stool on ostomy. Soft, NT, ND, normoactive b.s., no organomegaly/RT/rigidity  Extremities: WWP  Neurological: disoriented. lethargic. arousable. occasionally answers questions accordingly.  No acute focal motor deficits. Crude sensation intact.     Labs and imaging reviewed

## 2019-05-08 DIAGNOSIS — Z51.5 ENCOUNTER FOR PALLIATIVE CARE: ICD-10-CM

## 2019-05-08 DIAGNOSIS — G93.40 ENCEPHALOPATHY, UNSPECIFIED: ICD-10-CM

## 2019-05-08 DIAGNOSIS — R53.81 OTHER MALAISE: ICD-10-CM

## 2019-05-08 DIAGNOSIS — C90.00 MULTIPLE MYELOMA NOT HAVING ACHIEVED REMISSION: ICD-10-CM

## 2019-05-08 LAB
ALBUMIN SERPL ELPH-MCNC: 3.3 G/DL — SIGNIFICANT CHANGE UP (ref 3.3–5)
ALP SERPL-CCNC: 144 U/L — HIGH (ref 40–120)
ALT FLD-CCNC: 36 U/L — SIGNIFICANT CHANGE UP (ref 4–41)
AMMONIA BLD-MCNC: 52 UMOL/L — SIGNIFICANT CHANGE UP (ref 11–55)
ANION GAP SERPL CALC-SCNC: 19 MMO/L — HIGH (ref 7–14)
ANION GAP SERPL CALC-SCNC: 24 MMO/L — HIGH (ref 7–14)
ANISOCYTOSIS BLD QL: SLIGHT — SIGNIFICANT CHANGE UP
APTT BLD: 28.9 SEC — SIGNIFICANT CHANGE UP (ref 27.5–36.3)
AST SERPL-CCNC: 35 U/L — SIGNIFICANT CHANGE UP (ref 4–40)
BASE EXCESS BLDV CALC-SCNC: 1.4 MMOL/L — SIGNIFICANT CHANGE UP
BASOPHILS # BLD AUTO: 0.01 K/UL — SIGNIFICANT CHANGE UP (ref 0–0.2)
BASOPHILS NFR BLD AUTO: 0.3 % — SIGNIFICANT CHANGE UP (ref 0–2)
BASOPHILS NFR SPEC: 0 % — SIGNIFICANT CHANGE UP (ref 0–2)
BILIRUB SERPL-MCNC: 1.2 MG/DL — SIGNIFICANT CHANGE UP (ref 0.2–1.2)
BLASTS # FLD: 0 % — SIGNIFICANT CHANGE UP (ref 0–0)
BLOOD GAS VENOUS - CREATININE: 4.69 MG/DL — HIGH (ref 0.5–1.3)
BLOOD GAS VENOUS - FIO2: 21 — SIGNIFICANT CHANGE UP
BUN SERPL-MCNC: 42 MG/DL — HIGH (ref 7–23)
BUN SERPL-MCNC: 92 MG/DL — HIGH (ref 7–23)
CALCIUM SERPL-MCNC: 6.2 MG/DL — CRITICAL LOW (ref 8.4–10.5)
CALCIUM SERPL-MCNC: 7.7 MG/DL — LOW (ref 8.4–10.5)
CHLORIDE BLDV-SCNC: 104 MMOL/L — SIGNIFICANT CHANGE UP (ref 96–108)
CHLORIDE SERPL-SCNC: 100 MMOL/L — SIGNIFICANT CHANGE UP (ref 98–107)
CHLORIDE SERPL-SCNC: 102 MMOL/L — SIGNIFICANT CHANGE UP (ref 98–107)
CO2 SERPL-SCNC: 13 MMOL/L — LOW (ref 22–31)
CO2 SERPL-SCNC: 23 MMOL/L — SIGNIFICANT CHANGE UP (ref 22–31)
CREAT SERPL-MCNC: 4.71 MG/DL — HIGH (ref 0.5–1.3)
CREAT SERPL-MCNC: 9.68 MG/DL — HIGH (ref 0.5–1.3)
EOSINOPHIL # BLD AUTO: 0.01 K/UL — SIGNIFICANT CHANGE UP (ref 0–0.5)
EOSINOPHIL NFR BLD AUTO: 0.3 % — SIGNIFICANT CHANGE UP (ref 0–6)
EOSINOPHIL NFR FLD: 0 % — SIGNIFICANT CHANGE UP (ref 0–6)
GAS PNL BLDV: 140 MMOL/L — SIGNIFICANT CHANGE UP (ref 136–146)
GIANT PLATELETS BLD QL SMEAR: PRESENT — SIGNIFICANT CHANGE UP
GLUCOSE BLDV-MCNC: 104 MG/DL — HIGH (ref 70–99)
GLUCOSE SERPL-MCNC: 104 MG/DL — HIGH (ref 70–99)
GLUCOSE SERPL-MCNC: 158 MG/DL — HIGH (ref 70–99)
HCO3 BLDV-SCNC: 26 MMOL/L — SIGNIFICANT CHANGE UP (ref 20–27)
HCT VFR BLD CALC: 18.9 % — CRITICAL LOW (ref 39–50)
HCT VFR BLD CALC: 21.9 % — LOW (ref 39–50)
HCT VFR BLDV CALC: 24.4 % — LOW (ref 39–51)
HGB BLD-MCNC: 6.3 G/DL — CRITICAL LOW (ref 13–17)
HGB BLD-MCNC: 7.5 G/DL — LOW (ref 13–17)
HGB BLDV-MCNC: 7.8 G/DL — LOW (ref 13–17)
IMM GRANULOCYTES NFR BLD AUTO: 3.4 % — HIGH (ref 0–1.5)
INR BLD: 1.83 — HIGH (ref 0.88–1.17)
KAPPA/LAMBDA FREE LIGHT CHAIN RATIO, SERUM: 2073 — SIGNIFICANT CHANGE UP
LACTATE BLDV-MCNC: 1.7 MMOL/L — SIGNIFICANT CHANGE UP (ref 0.5–2)
LDH SERPL L TO P-CCNC: 309 U/L — HIGH (ref 135–225)
LDH SERPL L TO P-CCNC: 379 U/L — HIGH (ref 135–225)
LYMPHOCYTES # BLD AUTO: 0.99 K/UL — LOW (ref 1–3.3)
LYMPHOCYTES # BLD AUTO: 30.4 % — SIGNIFICANT CHANGE UP (ref 13–44)
LYMPHOCYTES NFR SPEC AUTO: 15.6 % — SIGNIFICANT CHANGE UP (ref 13–44)
MACROCYTES BLD QL: SLIGHT — SIGNIFICANT CHANGE UP
MAGNESIUM SERPL-MCNC: 2 MG/DL — SIGNIFICANT CHANGE UP (ref 1.6–2.6)
MAGNESIUM SERPL-MCNC: 2.2 MG/DL — SIGNIFICANT CHANGE UP (ref 1.6–2.6)
MCHC RBC-ENTMCNC: 28.7 PG — SIGNIFICANT CHANGE UP (ref 27–34)
MCHC RBC-ENTMCNC: 29.3 PG — SIGNIFICANT CHANGE UP (ref 27–34)
MCHC RBC-ENTMCNC: 33.3 % — SIGNIFICANT CHANGE UP (ref 32–36)
MCHC RBC-ENTMCNC: 34.2 % — SIGNIFICANT CHANGE UP (ref 32–36)
MCV RBC AUTO: 83.9 FL — SIGNIFICANT CHANGE UP (ref 80–100)
MCV RBC AUTO: 87.9 FL — SIGNIFICANT CHANGE UP (ref 80–100)
METAMYELOCYTES # FLD: 0 % — SIGNIFICANT CHANGE UP (ref 0–1)
MONOCYTES # BLD AUTO: 0.48 K/UL — SIGNIFICANT CHANGE UP (ref 0–0.9)
MONOCYTES NFR BLD AUTO: 14.7 % — HIGH (ref 2–14)
MONOCYTES NFR BLD: 10.1 % — HIGH (ref 2–9)
MYELOCYTES NFR BLD: 0.9 % — HIGH (ref 0–0)
NEUTROPHIL AB SER-ACNC: 67 % — SIGNIFICANT CHANGE UP (ref 43–77)
NEUTROPHILS # BLD AUTO: 1.66 K/UL — LOW (ref 1.8–7.4)
NEUTROPHILS NFR BLD AUTO: 50.9 % — SIGNIFICANT CHANGE UP (ref 43–77)
NEUTS BAND # BLD: 0.9 % — SIGNIFICANT CHANGE UP (ref 0–6)
NRBC # FLD: 0 K/UL — SIGNIFICANT CHANGE UP (ref 0–0)
NRBC # FLD: 0.03 K/UL — SIGNIFICANT CHANGE UP (ref 0–0)
O+P SPEC CONC: SIGNIFICANT CHANGE UP
OTHER - HEMATOLOGY %: 0 — SIGNIFICANT CHANGE UP
PCO2 BLDV: 32 MMHG — LOW (ref 41–51)
PH BLDV: 7.5 PH — HIGH (ref 7.32–7.43)
PHOSPHATE SERPL-MCNC: 2.7 MG/DL — SIGNIFICANT CHANGE UP (ref 2.5–4.5)
PHOSPHATE SERPL-MCNC: 5.7 MG/DL — HIGH (ref 2.5–4.5)
PLATELET # BLD AUTO: 29 K/UL — LOW (ref 150–400)
PLATELET # BLD AUTO: 33 K/UL — LOW (ref 150–400)
PLATELET COUNT - ESTIMATE: SIGNIFICANT CHANGE UP
PMV BLD: 10.7 FL — SIGNIFICANT CHANGE UP (ref 7–13)
PMV BLD: 11.9 FL — SIGNIFICANT CHANGE UP (ref 7–13)
PO2 BLDV: 48 MMHG — HIGH (ref 35–40)
POLYCHROMASIA BLD QL SMEAR: SLIGHT — SIGNIFICANT CHANGE UP
POTASSIUM BLDV-SCNC: 2.9 MMOL/L — CRITICAL LOW (ref 3.4–4.5)
POTASSIUM SERPL-MCNC: 3.1 MMOL/L — LOW (ref 3.5–5.3)
POTASSIUM SERPL-MCNC: 3.9 MMOL/L — SIGNIFICANT CHANGE UP (ref 3.5–5.3)
POTASSIUM SERPL-SCNC: 3.1 MMOL/L — LOW (ref 3.5–5.3)
POTASSIUM SERPL-SCNC: 3.9 MMOL/L — SIGNIFICANT CHANGE UP (ref 3.5–5.3)
PROMYELOCYTES # FLD: 0 % — SIGNIFICANT CHANGE UP (ref 0–0)
PROT SERPL-MCNC: 6.7 G/DL — SIGNIFICANT CHANGE UP (ref 6–8.3)
PROTHROM AB SERPL-ACNC: 21.2 SEC — HIGH (ref 9.8–13.1)
RBC # BLD: 2.15 M/UL — LOW (ref 4.2–5.8)
RBC # BLD: 2.61 M/UL — LOW (ref 4.2–5.8)
RBC # FLD: 15.9 % — HIGH (ref 10.3–14.5)
RBC # FLD: 16.2 % — HIGH (ref 10.3–14.5)
REVIEW TO FOLLOW: YES — SIGNIFICANT CHANGE UP
ROULEAUX BLD QL SMEAR: PRESENT — SIGNIFICANT CHANGE UP
SAO2 % BLDV: 85.7 % — HIGH (ref 60–85)
SMUDGE CELLS # BLD: PRESENT — SIGNIFICANT CHANGE UP
SODIUM SERPL-SCNC: 139 MMOL/L — SIGNIFICANT CHANGE UP (ref 135–145)
SODIUM SERPL-SCNC: 142 MMOL/L — SIGNIFICANT CHANGE UP (ref 135–145)
SPECIMEN SOURCE: SIGNIFICANT CHANGE UP
TRI STN SPEC: SIGNIFICANT CHANGE UP
URATE SERPL-MCNC: 2.9 MG/DL — LOW (ref 3.4–8.8)
URATE SERPL-MCNC: 6.9 MG/DL — SIGNIFICANT CHANGE UP (ref 3.4–8.8)
VARIANT LYMPHS # BLD: 5.5 % — SIGNIFICANT CHANGE UP
WBC # BLD: 3.26 K/UL — LOW (ref 3.8–10.5)
WBC # BLD: 3.78 K/UL — LOW (ref 3.8–10.5)
WBC # FLD AUTO: 3.26 K/UL — LOW (ref 3.8–10.5)
WBC # FLD AUTO: 3.78 K/UL — LOW (ref 3.8–10.5)

## 2019-05-08 PROCEDURE — 71045 X-RAY EXAM CHEST 1 VIEW: CPT | Mod: 26

## 2019-05-08 PROCEDURE — 77001 FLUOROGUIDE FOR VEIN DEVICE: CPT | Mod: 26,GC

## 2019-05-08 PROCEDURE — 76937 US GUIDE VASCULAR ACCESS: CPT | Mod: 26

## 2019-05-08 PROCEDURE — 99223 1ST HOSP IP/OBS HIGH 75: CPT

## 2019-05-08 PROCEDURE — 99291 CRITICAL CARE FIRST HOUR: CPT

## 2019-05-08 PROCEDURE — 70450 CT HEAD/BRAIN W/O DYE: CPT | Mod: 26

## 2019-05-08 PROCEDURE — 36556 INSERT NON-TUNNEL CV CATH: CPT

## 2019-05-08 RX ORDER — ACYCLOVIR SODIUM 500 MG
200 VIAL (EA) INTRAVENOUS EVERY 24 HOURS
Qty: 0 | Refills: 0 | Status: DISCONTINUED | OUTPATIENT
Start: 2019-05-08 | End: 2019-05-14

## 2019-05-08 RX ORDER — DEXMEDETOMIDINE HYDROCHLORIDE IN 0.9% SODIUM CHLORIDE 4 UG/ML
0.2 INJECTION INTRAVENOUS
Qty: 200 | Refills: 0 | Status: DISCONTINUED | OUTPATIENT
Start: 2019-05-08 | End: 2019-05-09

## 2019-05-08 RX ORDER — VANCOMYCIN HCL 1 G
125 VIAL (EA) INTRAVENOUS EVERY 6 HOURS
Qty: 0 | Refills: 0 | Status: DISCONTINUED | OUTPATIENT
Start: 2019-05-08 | End: 2019-05-14

## 2019-05-08 RX ORDER — VANCOMYCIN HCL 1 G
125 VIAL (EA) INTRAVENOUS EVERY 6 HOURS
Qty: 0 | Refills: 0 | Status: DISCONTINUED | OUTPATIENT
Start: 2019-05-08 | End: 2019-05-08

## 2019-05-08 RX ORDER — HYDROMORPHONE HYDROCHLORIDE 2 MG/ML
0.5 INJECTION INTRAMUSCULAR; INTRAVENOUS; SUBCUTANEOUS ONCE
Qty: 0 | Refills: 0 | Status: DISCONTINUED | OUTPATIENT
Start: 2019-05-08 | End: 2019-05-08

## 2019-05-08 RX ORDER — CHLORHEXIDINE GLUCONATE 213 G/1000ML
1 SOLUTION TOPICAL DAILY
Qty: 0 | Refills: 0 | Status: DISCONTINUED | OUTPATIENT
Start: 2019-05-08 | End: 2019-05-08

## 2019-05-08 RX ORDER — ACYCLOVIR SODIUM 500 MG
VIAL (EA) INTRAVENOUS
Qty: 0 | Refills: 0 | Status: DISCONTINUED | OUTPATIENT
Start: 2019-05-08 | End: 2019-05-08

## 2019-05-08 RX ORDER — DOCOSANOL 100 MG/G
1 CREAM TOPICAL THREE TIMES A DAY
Qty: 0 | Refills: 0 | Status: DISCONTINUED | OUTPATIENT
Start: 2019-05-08 | End: 2019-05-14

## 2019-05-08 RX ADMIN — Medication 104 MILLIGRAM(S): at 20:04

## 2019-05-08 RX ADMIN — Medication 100 MILLIGRAM(S): at 22:18

## 2019-05-08 RX ADMIN — FLUCONAZOLE 100 MILLIGRAM(S): 150 TABLET ORAL at 09:11

## 2019-05-08 RX ADMIN — Medication 480 MICROGRAM(S): at 12:12

## 2019-05-08 RX ADMIN — Medication 100 MILLIGRAM(S): at 07:36

## 2019-05-08 RX ADMIN — DEXMEDETOMIDINE HYDROCHLORIDE IN 0.9% SODIUM CHLORIDE 3.87 MICROGRAM(S)/KG/HR: 4 INJECTION INTRAVENOUS at 19:48

## 2019-05-08 RX ADMIN — Medication 125 MILLIGRAM(S): at 12:12

## 2019-05-08 RX ADMIN — Medication 125 MILLIGRAM(S): at 01:22

## 2019-05-08 RX ADMIN — CHLORHEXIDINE GLUCONATE 1 APPLICATION(S): 213 SOLUTION TOPICAL at 09:12

## 2019-05-08 RX ADMIN — DIPHENHYDRAMINE HYDROCHLORIDE AND LIDOCAINE HYDROCHLORIDE AND ALUMINUM HYDROXIDE AND MAGNESIUM HYDRO 5 MILLILITER(S): KIT at 12:13

## 2019-05-08 RX ADMIN — Medication 125 MILLIGRAM(S): at 20:04

## 2019-05-08 RX ADMIN — Medication 30 MILLILITER(S): at 12:13

## 2019-05-08 RX ADMIN — Medication 100 MILLIGRAM(S): at 15:12

## 2019-05-08 RX ADMIN — HYDROMORPHONE HYDROCHLORIDE 0.5 MILLIGRAM(S): 2 INJECTION INTRAMUSCULAR; INTRAVENOUS; SUBCUTANEOUS at 03:08

## 2019-05-08 RX ADMIN — HYDROMORPHONE HYDROCHLORIDE 0.5 MILLIGRAM(S): 2 INJECTION INTRAMUSCULAR; INTRAVENOUS; SUBCUTANEOUS at 03:23

## 2019-05-08 RX ADMIN — Medication 125 MILLIGRAM(S): at 07:35

## 2019-05-08 RX ADMIN — DOCOSANOL 1 APPLICATION(S): 100 CREAM TOPICAL at 22:18

## 2019-05-08 RX ADMIN — DEXMEDETOMIDINE HYDROCHLORIDE IN 0.9% SODIUM CHLORIDE 3.87 MICROGRAM(S)/KG/HR: 4 INJECTION INTRAVENOUS at 17:00

## 2019-05-08 NOTE — PROGRESS NOTE ADULT - ASSESSMENT
pt with dysphagia and diarrha, rx cdad. if not eating consider ngt feeds.  pt not candidate for egd with low plateletes, risk of bleeding, on rx for all dx.

## 2019-05-08 NOTE — PROVIDER CONTACT NOTE (OTHER) - SITUATION
Pt confused, restless and climbing OOB q 5-10 mins. Wife at bedside states this behavior is new. Constantly moaning.  Pt unable to describe pain or indicate where pain is.

## 2019-05-08 NOTE — PROGRESS NOTE ADULT - SUBJECTIVE AND OBJECTIVE BOX
Patient is seen and examined at the bed side, is afebrile . He has transferred  to MICU for change in mental status.      REVIEW OF SYSTEMS: All other review systems are negative        ALLERGIES: oxycodone (Vomiting; Nausea)      ICU Vital Signs Last 24 Hrs  T(C): 36.5 (08 May 2019 17:05), Max: 37.2 (08 May 2019 02:41)  T(F): 97.7 (08 May 2019 17:05), Max: 99 (08 May 2019 02:41)  HR: 110 (08 May 2019 18:00) (99 - 110)  BP: 150/80 (08 May 2019 18:00) (105/87 - 150/80)  BP(mean): 102 (08 May 2019 18:00) (88 - 102)  ABP: --  ABP(mean): --  RR: 16 (08 May 2019 18:00) (12 - 23)  SpO2: 100% (08 May 2019 18:00) (98% - 100%)      PHYSICAL EXAM:  GENERAL: Appears ill  HEENT: Mucositis  CHEST/LUNG:  Air entry bilaterally  HEART: s1 and s2 present  ABDOMEN:  Nontender and  Nondistended  EXTREMITIES: No pedal  edema  CNS: Lethargic        LABS:                         6.3    3.26  )-----------( 33       ( 08 May 2019 06:38 )             18.9                           7.5    3.19  )-----------( 35       ( 07 May 2019 06:00 )             23.3                           7.5    1.49  )-----------( 43       ( 05 May 2019 06:15 )             21.7                               8.8    0.57  )-----------( 23       ( 02 May 2019 18:36 )             26.0                    05-08    139  |  102  |  92<H>  ----------------------------<  158<H>  3.9   |  13<L>  |  9.68<H>    Ca    6.2<LL>      08 May 2019 06:38  Phos  5.7     05-08  Mg     2.2     05-08       05-07    141  |  104  |  72<H>  ----------------------------<  105<H>  3.9   |  16<L>  |  7.77<H>    Ca    6.6<L>      07 May 2019 06:00  Phos  4.3     05-07  Mg     2.0     05-07         05-05    134<L>  |  98  |  86<H>  ----------------------------<  124<H>  3.9   |  13<L>  |  6.88<H>    Ca    6.7<L>      05 May 2019 06:15  Phos  4.0     05-05  Mg     2.1     05-05    TPro  6.6  /  Alb  x   /  TBili  x   /  DBili  x   /  AST  x   /  ALT  x   /  AlkPhos  x   05-04 05-04    131<L>  |  99  |  105<H>  ----------------------------<  180<H>  5.3   |  7<LL>  |  8.02<H>    Ca    6.4<LL>      04 May 2019 06:00  Phos  6.4     05-04  Mg     2.3     05-04    TPro  6.6  /  Alb  x   /  TBili  x   /  DBili  x   /  AST  x   /  ALT  x   /  AlkPhos  x   05-04 04-30    135  |  104  |  20  ----------------------------<  139<H>  3.8   |  16<L>  |  1.46<H>    Ca    7.2<L>      30 Apr 2019 06:15  Phos  2.4     04-30  Mg     1.8     04-30    TPro  5.9<L>  /  Alb  3.0<L>  /  TBili  2.0<H>  /  DBili  x   /  AST  13  /  ALT  47<H>  /  AlkPhos  100  04-30      Vancomycin level:    Vancomycin Level, Trough (05.03.19 @ 06:55) Vancomycin Level, Trough: 20.9: Vancomycin trough levels should be rapidly reached and maintained at 15-20 ug/ml for life threatening MRSA  infections such as sepsis, endocarditis, osteomyelitis and pneumonia.     Vancomycin Level, Trough - Prior to Next Dose (05.01.19 @ 06:05) Vancomycin Level, Trough: 29.7: Vancomycin trough levels should be rapidly reached and  maintained at 15-20 ug/ml for life threatening MRSA infections such as sepsis, endocarditis, osteomyelitis and pneumonia.     Vancomycin Level, Random (05.02.19 @ 06:04)  Vancomycin Level, Random: 23.8:         MEDICATIONS  (STANDING):    MEDICATIONS  (STANDING):  acyclovir IVPB 200 milliGRAM(s) IV Intermittent every 24 hours  chlorhexidine 2% Cloths 1 Application(s) Topical daily  chlorhexidine 4% Liquid 1 Application(s) Topical <User Schedule>  dexmedetomidine Infusion 0.2 MICROgram(s)/kG/Hr (3.87 mL/Hr) IV Continuous <Continuous>  docosanol 10% Cream 1 Application(s) Topical three times a day  filgrastim-sndz Injectable 480 MICROGram(s) SubCutaneous daily  fluconAZOLE IVPB 200 milliGRAM(s) IV Intermittent every 24 hours  metroNIDAZOLE  IVPB 500 milliGRAM(s) IV Intermittent every 8 hours  metroNIDAZOLE  IVPB      vancomycin    Solution 125 milliGRAM(s) Oral every 6 hours    MEDICATIONS  (PRN):  artificial  tears Solution 1 Drop(s) Both EYES every 6 hours PRN Dry Eyes        RADIOLOGY & ADDITIONAL TESTS:    4/30/19 : Xray Esophagram (04.30.19 @ 09:31) Limited evaluation for esophageal ulcers secondary to underdistention of  the esophagus.      4/27/19 : Xray Chest 1 View- PORTABLE-Urgent (04.27.19 @ 22:45) Probable small left pleural effusion with adjacent opacity that likely  represents atelectasis.    4/25/19 : CT Brain Stroke Protocol (04.25.19 @ 19:42) No acute intracranial hemorrhage, mass effect, or midline shift. No  abnormal extra-axial collections. The basal cisterns are patent without evidence of central herniation. The sulci and ventricles are within normal limits for the patient's age. Stable few patchy areas of low-attenuation in the bihemispheric white  matter, which is nonspecific, but likely related to sequela of chronic  microvascular ischemic disease. Stable coarse calcifications in the left temporal lobe.      MICROBIOLOGY DATA:    Culture - Blood (05.06.19 @ 23:10)    Culture - Blood:   NO ORGANISMS ISOLATED  NO ORGANISMS ISOLATED AT 24 HOURS    Specimen Source: BLOOD VENOUS        Culture - Blood (05.06.19 @ 23:10)    Culture - Blood:   NO ORGANISMS ISOLATED  NO ORGANISMS ISOLATED AT 24 HOURS    Specimen Source: BLOOD PERIPHERAL        Clostridium difficile Toxin by PCR (05.06.19 @ 15:50)    Clostridium difficile Toxin by PCR: DETECTED: RESULT CALLED TO / READ BACK: MITRA EMANUEL RN/Y  DATE / TIME CALLED: 05/06/19 1919  CALLED BY: ALYCE CELAYA  C. difficile toxin B gene (tcdb) detected    The results of this test should be interpreted with  consideration of all clinical andlaboratory findings. C.  difficile PCR is more sensitive and specific than EIA,  therefore, repeat testing during one episode does not  provide additional clinically useful information. One test  per episode is sufficient for determining C. difficile  infection status.    This test is performed on the Cepheid Gene Xpert detection  system which automates and integrates sample purification,  Nucleic acid amplification and detection of the target  sequence in simple or complex samples using real-time PCR  and RT-PCR assays.      Culture - Blood (04.28.19 @ 18:20)    Culture - Blood:   NO ORGANISMS ISOLATED  NO ORGANISMS ISOLATED AT 24 HOURS    Specimen Source: BLOOD VENOUS      Culture - Blood (04.28.19 @ 18:20)    Culture - Blood:   NO ORGANISMS ISOLATED  NO ORGANISMS ISOLATED AT 24 HOURS    Specimen Source: BLOOD PERIPHERAL        Culture - Blood (04.27.19 @ 19:50)    Culture - Blood:   NO ORGANISMS ISOLATED  NO ORGANISMS ISOLATED AT 24 HOURS    Specimen Source: BLOOD PERIPHERAL      Culture - Group A Streptococcus (04.27.19 @ 14:24)    Culture - Group A Streptococcus:   NO BETA STREP. GROUP A  AFTER 24HRS.    Specimen Source: THROAT Patient is seen and examined at the bed side, is afebrile . He has transferred  to MICU for change in mental status., receiving  dialysis, tolerating well.  The Hem/ONC recommendation noted. The Family at the bed side.      REVIEW OF SYSTEMS: All other review systems are negative        ALLERGIES: oxycodone (Vomiting; Nausea)      ICU Vital Signs Last 24 Hrs  T(C): 36.5 (08 May 2019 17:05), Max: 37.2 (08 May 2019 02:41)  T(F): 97.7 (08 May 2019 17:05), Max: 99 (08 May 2019 02:41)  HR: 110 (08 May 2019 18:00) (99 - 110)  BP: 150/80 (08 May 2019 18:00) (105/87 - 150/80)  BP(mean): 102 (08 May 2019 18:00) (88 - 102)  ABP: --  ABP(mean): --  RR: 16 (08 May 2019 18:00) (12 - 23)  SpO2: 100% (08 May 2019 18:00) (98% - 100%)      PHYSICAL EXAM:  GENERAL: Appears ill  HEENT: Mucositis, both lips bloody due to mucosal  break  CHEST/LUNG:  Air entry bilaterally  HEART: s1 and s2 present  ABDOMEN:  Nontender and  Nondistended  EXTREMITIES: No pedal  edema  CNS: Appears Confused        LABS:                         6.3    3.26  )-----------( 33       ( 08 May 2019 06:38 )             18.9                           7.5    3.19  )-----------( 35       ( 07 May 2019 06:00 )             23.3                           7.5    1.49  )-----------( 43       ( 05 May 2019 06:15 )             21.7                               8.8    0.57  )-----------( 23       ( 02 May 2019 18:36 )             26.0                    05-08    139  |  102  |  92<H>  ----------------------------<  158<H>  3.9   |  13<L>  |  9.68<H>    Ca    6.2<LL>      08 May 2019 06:38  Phos  5.7     05-08  Mg     2.2     05-08       05-05    134<L>  |  98  |  86<H>  ----------------------------<  124<H>  3.9   |  13<L>  |  6.88<H>    Ca    6.7<L>      05 May 2019 06:15  Phos  4.0     05-05  Mg     2.1     05-05    TPro  6.6  /  Alb  x   /  TBili  x   /  DBili  x   /  AST  x   /  ALT  x   /  AlkPhos  x   05-04    05-04    131<L>  |  99  |  105<H>  ----------------------------<  180<H>  5.3   |  7<LL>  |  8.02<H>    Ca    6.4<LL>      04 May 2019 06:00  Phos  6.4     05-04  Mg     2.3     05-04    TPro  6.6  /  Alb  x   /  TBili  x   /  DBili  x   /  AST  x   /  ALT  x   /  AlkPhos  x   05-04    04-30    135  |  104  |  20  ----------------------------<  139<H>  3.8   |  16<L>  |  1.46<H>    Ca    7.2<L>      30 Apr 2019 06:15  Phos  2.4     04-30  Mg     1.8     04-30    TPro  5.9<L>  /  Alb  3.0<L>  /  TBili  2.0<H>  /  DBili  x   /  AST  13  /  ALT  47<H>  /  AlkPhos  100  04-30      Vancomycin level:    Vancomycin Level, Random (05.06.19 @ 06:28) Vancomycin Level, Random: 16.1:     Vancomycin Level, Trough (05.03.19 @ 06:55) Vancomycin Level, Trough: 20.9: Vancomycin trough levels should be rapidly reached and maintained at 15-20 ug/ml for life threatening MRSA  infections such as sepsis, endocarditis, osteomyelitis and pneumonia.     Vancomycin Level, Trough - Prior to Next Dose (05.01.19 @ 06:05) Vancomycin Level, Trough: 29.7: Vancomycin trough levels should be rapidly reached and  maintained at 15-20 ug/ml for life threatening MRSA infections such as sepsis, endocarditis, osteomyelitis and pneumonia.     Vancomycin Level, Random (05.02.19 @ 06:04)  Vancomycin Level, Random: 23.8:         MEDICATIONS  (STANDING):    MEDICATIONS  (STANDING):  acyclovir IVPB 200 milliGRAM(s) IV Intermittent every 24 hours  chlorhexidine 2% Cloths 1 Application(s) Topical daily  chlorhexidine 4% Liquid 1 Application(s) Topical <User Schedule>  dexmedetomidine Infusion 0.2 MICROgram(s)/kG/Hr (3.87 mL/Hr) IV Continuous <Continuous>  docosanol 10% Cream 1 Application(s) Topical three times a day  filgrastim-sndz Injectable 480 MICROGram(s) SubCutaneous daily  fluconAZOLE IVPB 200 milliGRAM(s) IV Intermittent every 24 hours  metroNIDAZOLE  IVPB 500 milliGRAM(s) IV Intermittent every 8 hours  metroNIDAZOLE  IVPB      vancomycin    Solution 125 milliGRAM(s) Oral every 6 hours    MEDICATIONS  (PRN):  artificial  tears Solution 1 Drop(s) Both EYES every 6 hours PRN Dry Eyes        RADIOLOGY & ADDITIONAL TESTS:    4/30/19 : Xray Esophagram (04.30.19 @ 09:31) Limited evaluation for esophageal ulcers secondary to underdistention of  the esophagus.      4/27/19 : Xray Chest 1 View- PORTABLE-Urgent (04.27.19 @ 22:45) Probable small left pleural effusion with adjacent opacity that likely  represents atelectasis.    4/25/19 : CT Brain Stroke Protocol (04.25.19 @ 19:42) No acute intracranial hemorrhage, mass effect, or midline shift. No  abnormal extra-axial collections. The basal cisterns are patent without evidence of central herniation. The sulci and ventricles are within normal limits for the patient's age. Stable few patchy areas of low-attenuation in the bihemispheric white  matter, which is nonspecific, but likely related to sequela of chronic  microvascular ischemic disease. Stable coarse calcifications in the left temporal lobe.      MICROBIOLOGY DATA:    Culture - Blood (05.06.19 @ 23:10)    Culture - Blood:   NO ORGANISMS ISOLATED  NO ORGANISMS ISOLATED AT 24 HOURS    Specimen Source: BLOOD VENOUS        Culture - Blood (05.06.19 @ 23:10)    Culture - Blood:   NO ORGANISMS ISOLATED  NO ORGANISMS ISOLATED AT 24 HOURS    Specimen Source: BLOOD PERIPHERAL        Clostridium difficile Toxin by PCR (05.06.19 @ 15:50)    Clostridium difficile Toxin by PCR: DETECTED: RESULT CALLED TO / READ BACK: MITRA EMANUEL RN/Y  DATE / TIME CALLED: 05/06/19 1919  CALLED BY: ALYCE CELAYA  C. difficile toxin B gene (tcdb) detected    The results of this test should be interpreted with  consideration of all clinical andlaboratory findings. C.  difficile PCR is more sensitive and specific than EIA,  therefore, repeat testing during one episode does not  provide additional clinically useful information. One test  per episode is sufficient for determining C. difficile  infection status.    This test is performed on the Cepheid Gene Xpert detection  system which automates and integrates sample purification,  Nucleic acid amplification and detection of the target  sequence in simple or complex samples using real-time PCR  and RT-PCR assays.      Culture - Blood (04.28.19 @ 18:20)    Culture - Blood:   NO ORGANISMS ISOLATED  NO ORGANISMS ISOLATED AT 24 HOURS    Specimen Source: BLOOD VENOUS      Culture - Blood (04.28.19 @ 18:20)    Culture - Blood:   NO ORGANISMS ISOLATED  NO ORGANISMS ISOLATED AT 24 HOURS    Specimen Source: BLOOD PERIPHERAL        Culture - Blood (04.27.19 @ 19:50)    Culture - Blood:   NO ORGANISMS ISOLATED  NO ORGANISMS ISOLATED AT 24 HOURS    Specimen Source: BLOOD PERIPHERAL      Culture - Group A Streptococcus (04.27.19 @ 14:24)    Culture - Group A Streptococcus:   NO BETA STREP. GROUP A  AFTER 24HRS.    Specimen Source: THROAT

## 2019-05-08 NOTE — PROGRESS NOTE ADULT - ASSESSMENT
A 54 yo Male with  relapsed multiple myeloma s/p bone marrow transplant (~1.5 yrs ago) and chemo/RT (last infusion Bendamustine and Pomalidomide 4/08/2019), perforated diverticulitis s/p Kiah's (proctosigmoidectomy with colostomy, presents to [facility] ER for evaluation of  vomiting and change of vision. He also had been weak, with epistaxis, and petechiae. ON admission, he found to have Pancytopenia and Neutropenia with ANC of 120. He has no fever but rigors and also c/o Odynophagia. The CT head shows No acute intracranial events. The ID consult requested to assist with evaluation and antibiotic management of Neutropenia and Tonsilitis.     # Neutropenic Fever - developed  fever, 4/28/19 Neutropenia resolved 5/7/19  #? Acute Tonsilitis  # Possible Candida Esophagitis-  May benefit from  EGD when cell numbers are stable, since Odynophagia  not improving on Fluconazole  # Kidney failure -s/p Shiley catheter now for dialysis 5/4/19  # C.difficile infection -5/6/19    would recommend:      1. Continue Oral Vancomycin  and IV Flagyl to cover  C.difficile infection   2. Monitor Temp. and c/w supportive care  3.Continue renally adjusted Valacyclovir, and Fluconazole   4.  Contact Isolation      d/w  Family at the bed side    will follow the patient with you A 52 yo Male with  relapsed multiple myeloma s/p bone marrow transplant (~1.5 yrs ago) and chemo/RT (last infusion Bendamustine and Pomalidomide 4/08/2019), perforated diverticulitis s/p Kiah's (proctosigmoidectomy with colostomy, presents to [facility] ER for evaluation of  vomiting and change of vision. He also had been weak, with epistaxis, and petechiae. ON admission, he found to have Pancytopenia and Neutropenia with ANC of 120. He has no fever but rigors and also c/o Odynophagia. The CT head shows No acute intracranial events. The ID consult requested to assist with evaluation and antibiotic management of Neutropenia and Tonsilitis.     # Neutropenic Fever -   - s/p Chemotherapy for MM,  developed  fever, 4/28/19 Neutropenia resolved 5/7/19  #? Acute Tonsilitis  # Possible Candida Esophagitis-  May benefit from  EGD when cell numbers are stable, since Odynophagia  not improving on Fluconazole  # Kidney failure -s/p Shiley catheter now for dialysis 5/4/19  # C.difficile infection -5/6/19    would recommend:    1. Continue Oral Vancomycin  and IV Flagyl to cover  C.difficile infection   2. Monitor Temp. and If >100.4 then obtain cultures   3. Continue renally adjusted  Acyclovir, and Fluconazole   4.  Contact Isolation    5. Dialysis as tolerated  6. Aspiration precaution     d/w  Family at the bed side and MICU team    will follow the patient with you

## 2019-05-08 NOTE — CONSULT NOTE ADULT - PROBLEM SELECTOR RECOMMENDATION 2
Now on HD - s/p permacath placement today.  Given overall poor prognosis, patient would be a poor candidate for long term HD.  Plan for HD today. Nephrology following.
I performed a history and physical exam of the patient and discussed  the findings and plan with the house officer. I reviewed the resident note and agree with the findings and plan

## 2019-05-08 NOTE — PROGRESS NOTE ADULT - ASSESSMENT
1. Pancytopenia     -- WBC low but improving on GCSF  -- cont  Zarxio 480mcg daily until ANC > 1500 x 2 consecutive days, cont today  -- counts slow to recover sec due to heavily tx for myeloma  -- Transfuse to keep Hgb > 7  -- Keep Platelets>/= 40K while catheter in place, transfuse today  -- monitor for infection or bleeding     2. MM relapsed/refractory including failed Auto Transplant    -- had been on bendamustine/pomalyst. Tx on hold  -- s/p decadron 40mg 5/2, next dose on 5/9 if no contraindication at that time  -- QIggs, SPEP, JENELLE and SFLC showed increased free kappa    3. Tonsilitis vs Candida Esophagitis    -- cont Diflucan per ID  -- cont Valtrex renal dose    -- ID f/u  -- cont magic mouthwash  -- cont IVF    4. ROBB    -- HD per renal  -- ? sec to light chain disease    5. cdiff -- vanc PO and flagyl     Overall prognosis is poor. Pt has exhausted all realistic tx options. He further declined today compared to yesterday. I had a discussion with pt's family, friend at bedside. D/w MICU and pall care teams. Explained to pt's family that I don't anticipate any meaningful recovery from his current condition, concern that even if he does recover, he'll have a long road of rehab that would still limit tx options. He also has cytopenia that would limit tx. He is certainly not a candidate for further systemic tx at this time. I also discussed that given his current clinical course and grim prognosis, it would be reasonable to make pt DNR/DNI and stop HD. Daughter obviously upset, wife and friend are understanding and seemed to be in agreement. They wish to discuss and then would let the MICU team know.    D/w pt's family and primary team. [phone] 1. Pancytopenia     -- WBC low but improving on GCSF  -- cont  Zarxio 480mcg daily until ANC > 1500 x 2 consecutive days, cont today  -- counts slow to recover sec due to heavily tx for myeloma  -- Transfuse to keep Hgb > 7  -- Keep Platelets>/= 40K while catheter in place, transfuse today  -- monitor for infection or bleeding     2. MM relapsed/refractory including failed Auto Transplant    -- had been on bendamustine/pomalyst. Tx on hold  -- s/p decadron 40mg 5/2, next dose on 5/9 if no contraindication at that time  -- QIggs, SPEP, JENELLE and SFLC showed increased free kappa    3. Tonsilitis vs Candida Esophagitis    -- cont Diflucan per ID  -- cont Valtrex renal dose    -- ID f/u  -- cont magic mouthwash  -- cont IVF    4. ROBB    -- HD per renal  -- ? sec to light chain disease    5. cdiff -- vanc PO and flagyl     Overall prognosis is poor. Pt has exhausted all realistic tx options. He further declined today compared to yesterday. I had a discussion with pt's family, friend at bedside. D/w MICU and pall care teams. Explained to pt's family that I don't anticipate any meaningful recovery from his current condition, concern that even if he does recover, he'll have a long road of rehab that would still limit tx options. He also has cytopenia that would limit tx. He is certainly not a candidate for further systemic tx at this time. I also discussed that given his current clinical course and grim prognosis, it would be reasonable to make pt DNR/DNI and stop HD. Daughter obviously upset, wife and friend are understanding and seemed to be in agreement. They wish to discuss and then would let the MICU team know.    D/w pt's family and primary team. total time spent 45 min, >50% spent in discussion and coordination. [phone]

## 2019-05-08 NOTE — PROVIDER CONTACT NOTE (OTHER) - SITUATION
Patient lethargic. Unable to answer questions and follow commands. Unable to tolerate oral medications.

## 2019-05-08 NOTE — CONSULT NOTE ADULT - PROBLEM SELECTOR RECOMMENDATION 4
Patient of Dr. Booth.  Spoke to Dr. Gastelum via telephone - patient with overall poor prognosis and unable to receive disease modifying treatment at this time given renal function and overall poor performance status.  Oncology to discuss with patient's wife.  Will follow-up.

## 2019-05-08 NOTE — PROGRESS NOTE ADULT - SUBJECTIVE AND OBJECTIVE BOX
CHIEF COMPLAINT:    HPI: 52yo M hx of relapsed multiple myeloma s/p bone marrow transplant (~1.5 yrs ago) and chemo/RT (last infusion Bendamustine and Pomalidomide 4/08/2019), perforated diverticulitis s/p Kiah's (proctosigmoidectomy with colostomy, 9/2018), nonischemic cardiomyopathy (mild global LV systolic dysfunction with EF 54% and diastolic LV dysfunction based on TTE in 11/2018), and HTN initially presented to [facility] ED w/ vomiting and change of vision. Code stroke was called and CTH was neg. He received  decadron 40mg IV, 1u PLT. 1u pRBC. Found to have pancytopenia on admission likely due to MM and Chemo. Reported odynophagia, and was started on neupogen and ce fepime, concern for tonsilltiis and esophagitis, also started on fluconazole. Developed neutropenic fever, added vanc and valcyclovir. GI consulted for Esophagitis, and EGD. 2U PRBC. added valtrex. 4/30 New ROBB, renal failure, metabolic acidosis. 2U plts. Vasc for sudeep, hd 5/4. cidff on 5/6. R froilan sheets removed due to c diff. IR for Ij ismaelvasyl.     PAST MEDICAL & SURGICAL HISTORY:  Diverticulitis: perforated s/p Kiah  Multiple myeloma: relapsed 3/2019  Hypertension  Left ventricular dysfunction  Cardiomyopathy: nonischemic  Former smoker: (1 ppd x 11 years; Quit ~age 26)  History of bone marrow transplant  History of surgery: s/p exploratory laparotomy with sigmoid resection and colostomy on 9/2/2018      FAMILY HISTORY:  Family history of diabetes mellitus  Family history of hypertension      SOCIAL HISTORY:  Smoking: [ ] Never Smoked [ ] Former Smoker (__ packs x ___ years) [ ] Current Smoker  (__ packs x ___ years)  Substance Use: [ ] Never Used [ ] Used ____  EtOH Use:  Marital Status: [ ] Single [ ]  [ ]  [ ]   Sexual History:   Occupation:  Recent Travel:  Country of Birth:  Advance Directives:    Allergies    oxycodone (Vomiting; Nausea)    Intolerances        HOME MEDICATIONS:    REVIEW OF SYSTEMS:  Constitutional: [ ] negative [ ] fevers [ ] chills [ ] weight loss [ ] weight gain  HEENT: [ ] negative [ ] dry eyes [ ] eye irritation [ ] postnasal drip [ ] nasal congestion  CV: [ ] negative  [ ] chest pain [ ] orthopnea [ ] palpitations [ ] murmur  Resp: [ ] negative [ ] cough [ ] shortness of breath [ ] dyspnea [ ] wheezing [ ] sputum [ ] hemoptysis  GI: [ ] negative [ ] nausea [ ] vomiting [ ] diarrhea [ ] constipation [ ] abd pain [ ] dysphagia   : [ ] negative [ ] dysuria [ ] nocturia [ ] hematuria [ ] increased urinary frequency  Musculoskeletal: [ ] negative [ ] back pain [ ] myalgias [ ] arthralgias [ ] fracture  Skin: [ ] negative [ ] rash [ ] itch  Neurological: [ ] negative [ ] headache [ ] dizziness [ ] syncope [ ] weakness [ ] numbness  Psychiatric: [ ] negative [ ] anxiety [ ] depression  Endocrine: [ ] negative [ ] diabetes [ ] thyroid problem  Hematologic/Lymphatic: [ ] negative [ ] anemia [ ] bleeding problem  Allergic/Immunologic: [ ] negative [ ] itchy eyes [ ] nasal discharge [ ] hives [ ] angioedema  [ ] All other systems negative  [ ] Unable to assess ROS because ________    OBJECTIVE:  ICU Vital Signs Last 24 Hrs  T(C): 36.4 (08 May 2019 12:10), Max: 37.2 (08 May 2019 02:41)  T(F): 97.6 (08 May 2019 12:10), Max: 99 (08 May 2019 02:41)  HR: 105 (08 May 2019 12:10) (100 - 108)  BP: 124/74 (08 May 2019 12:10) (113/63 - 132/74)  BP(mean): --  ABP: --  ABP(mean): --  RR: 20 (08 May 2019 12:10) (19 - 20)  SpO2: 98% (08 May 2019 12:10) (98% - 100%)        05-07 @ 07:01  -  05-08 @ 07:00  --------------------------------------------------------  IN: 0 mL / OUT: 450 mL / NET: -450 mL      CAPILLARY BLOOD GLUCOSE          PHYSICAL EXAM:  General:   HEENT:   Lymph Nodes:  Neck:   Respiratory:   Cardiovascular:   Abdomen:   Extremities:   Skin:   Neurological:  Psychiatry:    LINES:     HOSPITAL MEDICATIONS:    fluconAZOLE IVPB 200 milliGRAM(s) IV Intermittent every 24 hours  metroNIDAZOLE  IVPB 500 milliGRAM(s) IV Intermittent every 8 hours  metroNIDAZOLE  IVPB      valACYclovir 500 milliGRAM(s) Oral <User Schedule>  vancomycin    Solution 125 milliGRAM(s) Oral every 6 hours    carvedilol 25 milliGRAM(s) Oral every 12 hours    allopurinol 300 milliGRAM(s) Oral daily    guaiFENesin    Syrup 100 milliGRAM(s) Oral every 6 hours PRN    acetaminophen   Tablet .. 650 milliGRAM(s) Oral every 6 hours PRN  HYDROmorphone   Tablet 2 milliGRAM(s) Oral every 4 hours PRN  ondansetron Injectable 8 milliGRAM(s) IV Push every 8 hours PRN    ranitidine 150 milliGRAM(s) Oral every 24 hours          filgrastim-sndz Injectable 480 MICROGram(s) SubCutaneous daily    artificial  tears Solution 1 Drop(s) Both EYES every 6 hours PRN  benzocaine 15 mG/menthol 3.6 mG (Sugar-Free) Lozenge 1 Lozenge Oral five times a day PRN  chlorhexidine 4% Liquid 1 Application(s) Topical <User Schedule>  docosanol 10% Cream 1 Application(s) Topical three times a day  FIRST- Mouthwash  BLM 5 milliLiter(s) Swish and Spit five times a day  Saliva Substitute (CAPHOSOL) 30 milliLiter(s) Swish and Spit every 6 hours        LABS:                        6.3    3.26  )-----------( 33       ( 08 May 2019 06:38 )             18.9     Hgb Trend: 6.3<--, 7.5<--, 8.0<--, 7.6<--, 7.5<--  05-08    139  |  102  |  92<H>  ----------------------------<  158<H>  3.9   |  13<L>  |  9.68<H>    Ca    6.2<LL>      08 May 2019 06:38  Phos  5.7     05-08  Mg     2.2     05-08      Creatinine Trend: 9.68<--, 7.77<--, 8.51<--, 6.88<--, 8.02<--, 6.69<--            MICROBIOLOGY:     RADIOLOGY:  [ ] Reviewed and interpreted by me    EKG: CHIEF COMPLAINT:    HPI: 54yo M hx of relapsed multiple myeloma s/p bone marrow transplant (~1.5 yrs ago) and chemo/RT (last infusion Bendamustine and Pomalidomide 4/08/2019), perforated diverticulitis s/p Kiah's (proctosigmoidectomy with colostomy, 9/2018), nonischemic cardiomyopathy (mild global LV systolic dysfunction with EF 54% and diastolic LV dysfunction based on TTE in 11/2018), and HTN initially presented to [facility] ED w/ vomiting and change of vision. Code stroke was called and CTH was neg. He received decadron 40mg IV, 1u PLT. 1u pRBC. Found to have pancytopenia on admission likely due to MM and Chemo. Reported odynophagia, and was started on neupogen and cefepime, concern for tonsilltiis and esophagitis, also started on fluconazole. Developed neutropenic fever, added vanc and valacyclovir. GI consulted for Esophagitis, and EGD. 2U PRBC. 4/30 New ARF on HD, metabolic acidosis. 2U plts. Vasc for sudeep, HD 5/4. Cdiff dx on 5/6. R groin sudeep removed due to C Diff. IR for IJ ismaelvasyl.     PAST MEDICAL & SURGICAL HISTORY:  Diverticulitis: perforated s/p Kiah  Multiple myeloma: relapsed 3/2019  Hypertension  Left ventricular dysfunction  Cardiomyopathy: nonischemic  Former smoker: (1 ppd x 11 years; Quit ~age 26)  History of bone marrow transplant  History of surgery: s/p exploratory laparotomy with sigmoid resection and colostomy on 9/2/2018      FAMILY HISTORY:  Family history of diabetes mellitus  Family history of hypertension      SOCIAL HISTORY:  Smoking: [ ] Never Smoked [ x ] Former Smoker (11 packs years) [ ] Current Smoker  (__ packs x ___ years)  Substance Use: [ x ] Never Used [ ] Used ____  EtOH Use: non  Marital Status: [ ] Single [ x ]  [ ]  [ ]     Allergies    oxycodone (Vomiting; Nausea)    Intolerances    HOME MEDICATIONS:    REVIEW OF SYSTEMS:  Constitutional: [ ] negative [ ] fevers [ ] chills [ ] weight loss [ ] weight gain  HEENT: [ ] negative [ ] dry eyes [ ] eye irritation [ ] postnasal drip [ ] nasal congestion  CV: [ ] negative  [ ] chest pain [ ] orthopnea [ ] palpitations [ ] murmur  Resp: [ ] negative [ ] cough [ ] shortness of breath [ ] dyspnea [ ] wheezing [ ] sputum [ ] hemoptysis  GI: [ ] negative [ ] nausea [ ] vomiting [ ] diarrhea [ ] constipation [ ] abd pain [ ] dysphagia   : [ ] negative [ ] dysuria [ ] nocturia [ ] hematuria [ ] increased urinary frequency  Musculoskeletal: [ ] negative [ ] back pain [ ] myalgias [ ] arthralgias [ ] fracture  Skin: [ ] negative [ ] rash [ ] itch  Neurological: [ ] negative [ ] headache [ ] dizziness [ ] syncope [ ] weakness [ ] numbness  Psychiatric: [ ] negative [ ] anxiety [ ] depression  Endocrine: [ ] negative [ ] diabetes [ ] thyroid problem  Hematologic/Lymphatic: [ ] negative [ ] anemia [ ] bleeding problem  Allergic/Immunologic: [ ] negative [ ] itchy eyes [ ] nasal discharge [ ] hives [ ] angioedema  [ ] All other systems negative  [ x Unable to assess ROS because AMS    OBJECTIVE:  ICU Vital Signs Last 24 Hrs  T(C): 36.4 (08 May 2019 12:10), Max: 37.2 (08 May 2019 02:41)  T(F): 97.6 (08 May 2019 12:10), Max: 99 (08 May 2019 02:41)  HR: 105 (08 May 2019 12:10) (100 - 108)  BP: 124/74 (08 May 2019 12:10) (113/63 - 132/74)  BP(mean): --  ABP: --  ABP(mean): --  RR: 20 (08 May 2019 12:10) (19 - 20)  SpO2: 98% (08 May 2019 12:10) (98% - 100%)        05-07 @ 07:01  -  05-08 @ 07:00  --------------------------------------------------------  IN: 0 mL / OUT: 450 mL / NET: -450 mL      CAPILLARY BLOOD GLUCOSE    PHYSICAL EXAM:  GENERAL: Laying in stretcher, lethargic, moaning, not responding to questions or commands  HEAD:  Atraumatic, Normocephalic  ENT: PERRL, conjunctiva and sclera clear, neck supple, no JVD, dry mucosa, posterior oropharynx clear, petechiae on hard palate, ulcerated lips with dried blood present  CHEST/LUNG: Clear to auscultation bilaterally; No wheezing, equal breath sounds bilaterally, respirations nonlabored  HEART: Regular rate and rhythm; No murmurs, rubs, or gallops  ABDOMEN: Soft, nontender, nondistended; Bowel sounds present, no organomegaly, no suprapubic tenderness  BACK: no spinal tenderness, no CVA tenderness  EXTREMITIES:  No clubbing, cyanosis, or edema  NEUROLOGY: AAOx0, moaning, not responding to questions or commands, strength intact, trying to climb out of stretcher repeatedly, withdraws to painful stimuli in all 4 extremities  SKIN: +petechiae/purpura over b/l LE    [facility] MEDICATIONS:    fluconAZOLE IVPB 200 milliGRAM(s) IV Intermittent every 24 hours  metroNIDAZOLE  IVPB 500 milliGRAM(s) IV Intermittent every 8 hours  metroNIDAZOLE  IVPB      valACYclovir 500 milliGRAM(s) Oral <User Schedule>  vancomycin    Solution 125 milliGRAM(s) Oral every 6 hours    carvedilol 25 milliGRAM(s) Oral every 12 hours    allopurinol 300 milliGRAM(s) Oral daily    guaiFENesin    Syrup 100 milliGRAM(s) Oral every 6 hours PRN    acetaminophen   Tablet .. 650 milliGRAM(s) Oral every 6 hours PRN  HYDROmorphone   Tablet 2 milliGRAM(s) Oral every 4 hours PRN  ondansetron Injectable 8 milliGRAM(s) IV Push every 8 hours PRN    ranitidine 150 milliGRAM(s) Oral every 24 hours    filgrastim-sndz Injectable 480 MICROGram(s) SubCutaneous daily    artificial  tears Solution 1 Drop(s) Both EYES every 6 hours PRN  benzocaine 15 mG/menthol 3.6 mG (Sugar-Free) Lozenge 1 Lozenge Oral five times a day PRN  chlorhexidine 4% Liquid 1 Application(s) Topical <User Schedule>  docosanol 10% Cream 1 Application(s) Topical three times a day  FIRST- Mouthwash  BLM 5 milliLiter(s) Swish and Spit five times a day  Saliva Substitute (CAPHOSOL) 30 milliLiter(s) Swish and Spit every 6 hours      LABS:                        6.3    3.26  )-----------( 33       ( 08 May 2019 06:38 )             18.9     Hgb Trend: 6.3<--, 7.5<--, 8.0<--, 7.6<--, 7.5<--  05-08    139  |  102  |  92<H>  ----------------------------<  158<H>  3.9   |  13<L>  |  9.68<H>    Ca    6.2<LL>      08 May 2019 06:38  Phos  5.7     05-08  Mg     2.2     05-08      Creatinine Trend: 9.68<--, 7.77<--, 8.51<--, 6.88<--, 8.02<--, 6.69<--    MICROBIOLOGY: MICU ACCEPT NOTE    CHIEF COMPLAINT: Altered mental status    HPI: 52yo M hx of relapsed multiple myeloma s/p bone marrow transplant (~1.5 yrs ago) and chemo/RT (Bendamustine and Pomalidomide on 4/08/2019), perforated diverticulitis s/p Kiah's (proctosigmoidectomy with colostomy, 9/2018), nonischemic cardiomyopathy (mild systolic and diastolic dysfunction), and HTN initially presented to [facility] ED on 4/25/19  w/ vomiting and change of vision. Code stroke was called and CTH was unremarkable. He was found to be pancytopenic likely due to MM and recent chemo. He was admitted to medicine for further w/u. In ED, he received decadron 40mg IV, 1u PLT. 1u pRBC.  Heme/onc and ID was consulted. Hospital course was complicated by odynophagia and neutropenic fevers. He received neupogen and cefepime, vancomycin, and valacyclovir. Also on fluconazole for esophagitis. GI was consulted for Esophagitis, and EGD deemed too risky in setting of pancytopenia. Hospital course further complicated by acute renal failure, metabolic acidosis and anuria. Pt had R groin shiley placed by vascular and received first HD on 5/4. Further complicated by C diff colitis dx on 5/6 and R groin shiley removed due to C Diff. He was planned for IJ shiley with IR today.     MICU consulted for altered mental status. Wife at bedside reports pt with cognitive decline since saturday with first HD session. He could previously hold a conversation and now with difficulty following commands. Pt currently waxing and waning. Denies pain. Transferred to MICU for acute encephalopathy.       PAST MEDICAL & SURGICAL HISTORY:  Diverticulitis: perforated s/p Kiah  Multiple myeloma: relapsed 3/2019  Hypertension  Left ventricular dysfunction  Cardiomyopathy: nonischemic  Former smoker: (1 ppd x 11 years; Quit ~age 26)  History of bone marrow transplant  History of surgery: s/p exploratory laparotomy with sigmoid resection and colostomy on 9/2/2018      FAMILY HISTORY:  Family history of diabetes mellitus  Family history of hypertension      SOCIAL HISTORY:  Smoking: [ ] Never Smoked [ x ] Former Smoker (11 packs years) [ ] Current Smoker  (__ packs x ___ years)  Substance Use: [ x ] Never Used [ ] Used ____  EtOH Use: none  Marital Status: [ ] Single [ x ]  [ ]  [ ]     Allergies  oxycodone (Vomiting; Nausea)    Intolerances    HOME MEDICATIONS:    REVIEW OF SYSTEMS:  Constitutional: [ ] negative [ ] fevers [ ] chills [ ] weight loss [ ] weight gain  HEENT: [ ] negative [ ] dry eyes [ ] eye irritation [ ] postnasal drip [ ] nasal congestion  CV: [ ] negative  [ ] chest pain [ ] orthopnea [ ] palpitations [ ] murmur  Resp: [ ] negative [ ] cough [ ] shortness of breath [ ] dyspnea [ ] wheezing [ ] sputum [ ] hemoptysis  GI: [ ] negative [ ] nausea [ ] vomiting [ ] diarrhea [ ] constipation [ ] abd pain [ ] dysphagia   : [ ] negative [ ] dysuria [ ] nocturia [ ] hematuria [ ] increased urinary frequency  Musculoskeletal: [ ] negative [ ] back pain [ ] myalgias [ ] arthralgias [ ] fracture  Skin: [ ] negative [ ] rash [ ] itch  Neurological: [ ] negative [ ] headache [ ] dizziness [ ] syncope [ ] weakness [ ] numbness  Psychiatric: [ ] negative [ ] anxiety [ ] depression  Endocrine: [ ] negative [ ] diabetes [ ] thyroid problem  Hematologic/Lymphatic: [ ] negative [ ] anemia [ ] bleeding problem  Allergic/Immunologic: [ ] negative [ ] itchy eyes [ ] nasal discharge [ ] hives [ ] angioedema  [ ] All other systems negative  [ x Unable to assess ROS because AMS    OBJECTIVE:  ICU Vital Signs Last 24 Hrs  T(C): 36.4 (08 May 2019 12:10), Max: 37.2 (08 May 2019 02:41)  T(F): 97.6 (08 May 2019 12:10), Max: 99 (08 May 2019 02:41)  HR: 105 (08 May 2019 12:10) (100 - 108)  BP: 124/74 (08 May 2019 12:10) (113/63 - 132/74)  BP(mean): --  ABP: --  ABP(mean): --  RR: 20 (08 May 2019 12:10) (19 - 20)  SpO2: 98% (08 May 2019 12:10) (98% - 100%)        05-07 @ 07:01  -  05-08 @ 07:00  --------------------------------------------------------  IN: 0 mL / OUT: 450 mL / NET: -450 mL      CAPILLARY BLOOD GLUCOSE    PHYSICAL EXAM:  GENERAL: Laying in stretcher, lethargic, moaning, not responding to questions or commands  HEAD:  Atraumatic, Normocephalic  ENT: PERRL, conjunctiva and sclera clear, neck supple, no JVD, dry mucosa, posterior oropharynx clear, petechiae on hard palate, ulcerated lips with dried blood present  CHEST/LUNG: Clear to auscultation bilaterally; No wheezing, equal breath sounds bilaterally, respirations nonlabored  HEART: Regular rate and rhythm; No murmurs, rubs, or gallops  ABDOMEN: Soft, nontender, nondistended; Bowel sounds present, no organomegaly, no suprapubic tenderness  BACK: no spinal tenderness, no CVA tenderness  EXTREMITIES:  No clubbing, cyanosis, or edema  NEUROLOGY: AAOx0, moaning, not responding to questions or commands, strength intact, trying to climb out of stretcher repeatedly, withdraws to painful stimuli in all 4 extremities  SKIN: +petechiae/purpura over b/l LE    LINES: PIVs, R [facility] MEDICATIONS:    fluconAZOLE IVPB 200 milliGRAM(s) IV Intermittent every 24 hours  metroNIDAZOLE  IVPB 500 milliGRAM(s) IV Intermittent every 8 hours  metroNIDAZOLE  IVPB      valACYclovir 500 milliGRAM(s) Oral <User Schedule>  vancomycin    Solution 125 milliGRAM(s) Oral every 6 hours    carvedilol 25 milliGRAM(s) Oral every 12 hours    allopurinol 300 milliGRAM(s) Oral daily    guaiFENesin    Syrup 100 milliGRAM(s) Oral every 6 hours PRN    acetaminophen   Tablet .. 650 milliGRAM(s) Oral every 6 hours PRN  HYDROmorphone   Tablet 2 milliGRAM(s) Oral every 4 hours PRN  ondansetron Injectable 8 milliGRAM(s) IV Push every 8 hours PRN    ranitidine 150 milliGRAM(s) Oral every 24 hours    filgrastim-sndz Injectable 480 MICROGram(s) SubCutaneous daily    artificial  tears Solution 1 Drop(s) Both EYES every 6 hours PRN  benzocaine 15 mG/menthol 3.6 mG (Sugar-Free) Lozenge 1 Lozenge Oral five times a day PRN  chlorhexidine 4% Liquid 1 Application(s) Topical <User Schedule>  docosanol 10% Cream 1 Application(s) Topical three times a day  FIRST- Mouthwash  BLM 5 milliLiter(s) Swish and Spit five times a day  Saliva Substitute (CAPHOSOL) 30 milliLiter(s) Swish and Spit every 6 hours      LABS:                        6.3    3.26  )-----------( 33       ( 08 May 2019 06:38 )             18.9     Hgb Trend: 6.3<--, 7.5<--, 8.0<--, 7.6<--, 7.5<--  05-08    139  |  102  |  92<H>  ----------------------------<  158<H>  3.9   |  13<L>  |  9.68<H>    Ca    6.2<LL>      08 May 2019 06:38  Phos  5.7     05-08  Mg     2.2     05-08      Creatinine Trend: 9.68<--, 7.77<--, 8.51<--, 6.88<--, 8.02<--, 6.69<--    MICROBIOLOGY:   Culture - Blood (05.06.19 @ 23:10)    Culture - Blood:   NO ORGANISMS ISOLATED  NO ORGANISMS ISOLATED AT 24 HOURS    Specimen Source: BLOOD VENOUS    Culture - Stool (05.06.19 @ 17:45)    Culture - Stool:   CULTURE IN PROGRESS, FURTHER REPORT TO FOLLOW.    Specimen Source: FECES    Clostridium difficile Toxin by PCR (05.06.19 @ 15:50)    Clostridium difficile Toxin by PCR: DETECTED: RESULT CALLED TO / READ BACK: MITRA EMANUEL RN/Y  DATE / TIME CALLED: 05/06/19 1919  CALLED BY: ALYCE CELAYA  C. difficile toxin B gene (tcdb) detected    Ova and Parasites (05.06.19 @ 17:45)    Trichrome Stain:          NO OVA OR PARASITES SEEN  PLEASE NOTE THAT ONE NEGATIVE SPECIMEN DOES NOT RULE OUT THE  POSSIBILITY OF A PARASITIC INFECTION.    Ova and Parasites Result:          NO OVA OR PARASITES SEEN    Rapid Respiratory Viral Panel (04.27.19 @ 13:08)    Adenovirus (RapRVP): Not Detected    Influenza A (RapRVP): Not Detected    Influenza AH1 2009 (RapRVP): Not Detected    Influenza AH1 (RapRVP): Not Detected    Influenza AH3 (RapRVP): Not Detected    Influenza B (RapRVP): Not Detected    Parainfluenza 1 (RapRVP): Not Detected    Parainfluenza 2 (RapRVP): Not Detected    Parainfluenza 3 (RapRVP): Not Detected    Parainfluenza 4 (RapRVP): Not Detected    Resp Syncytial Virus (RapRVP): Not Detected    Chlamydia pneumoniae (RapRVP): Not Detected    Mycoplasma pneumoniae (RapRVP): Not Detected    Entero/Rhinovirus (RapRVP): Not Detected    hMPV (RapRVP): Not Detected    Coronavirus (229E,HKU1,NL63,OC43): Not Detected     RADIOLOGY:   < from: CT Abdomen and Pelvis w/ Oral Cont (05.06.19 @ 19:33) >  IMPRESSION:   No bowel obstruction or bowel wall thickening.  Small bilateral pleural effusions and associated atelectasis, slightly   decreased since 3/30/2019.  Age-indeterminate compression deformity of the inferior T12 vertebral   endplate, which is new since 9/9/2018.    < from: US Kidney and Bladder (05.01.19 @ 12:46) >  IMPRESSION:   Parenchymal renal disease.      < from: Transthoracic Echocardiogram (03.29.19 @ 20:01) >  CONCLUSIONS:  1. Normal mitral valve. Minimal mitral regurgitation.  2. Normal left ventricular internal dimensions and wall  thicknesses.  3. Normal left ventricular systolic function. No segmental  wall motion abnormalities.  4. Normal right ventricular size and function.  5. Small pericardial effusion posterior to the left  ventricle and superior to the right atrium.  *** Compared with echocardiogram of 8/2/2017, left  ventricular systolic function has improved.    < end of copied text >

## 2019-05-08 NOTE — PROVIDER CONTACT NOTE (OTHER) - SITUATION
Pt third bladder scan is 165, Pt third bladder scan is 165, pt was straight cath once 800cc collected

## 2019-05-08 NOTE — CONSULT NOTE ADULT - SUBJECTIVE AND OBJECTIVE BOX
HPI: Obtained from H&P  52yo M hx of relapsed multiple myeloma s/p bone marrow transplant (~1.5 yrs ago) and chemo/RT (last infusion Bendamustine and Pomalidomide 4/08/2019), perforated diverticulitis s/p Kiah's (proctosigmoidectomy with colostomy, 9/2018), nonischemic cardiomyopathy (mild global LV systolic dysfunction with EF 54% and diastolic LV dysfunction based on TTE in 11/2018), and HTN presents to [facility] ED w/ vomiting and change of vision. Wife Devan Hood at bedside. Pt was sitting at home. He vomited 2-3 times nonbloody, nonbilious and was not able to eat or drink anything. On 4/25 at 4pm, while sitting he noticed his vision went "white" for a few minutes associated with sweating, shaking, and weakness. His wife noticed he had a red rash on his legs and they brought him to the ED. Pt received a platelet transfusion about a week ago outpt. Denies any pain. Had a nose bleed earlier. He denies skin sores, measured fever, HA, trouble speaking, numbness/tingling, focal weakness, dizziness, CP, SOB, abd pain, N/V/D, hematochezia, melena, urinary symptoms, oral/genital sores. He has a recent admission to [facility] discharged 4/08/2019 for diffuse body pain and low-grade fever, infectious workup was negative. MRI C/T/L showed abnormal T1 prolongation in the whole spine, sacral and iliac bones. Received 7 days of Bendamustine and Pomalidomide (on hold few days for neutropenia), last on 4/08/2019.    Patient with C-diff, new ROBB and new HD - currently with altered mental status pending transfer to MICU     PERTINENT PM/SXH:   Diverticulitis  Multiple myeloma  Hypertension  Mild mitral regurgitation  Mild aortic regurgitation  Left ventricular dysfunction  Cardiomyopathy  Diverticulitis of large intestine without perforation or abscess without bleeding  Former smoker  Heart failure    History of bone marrow transplant  History of surgery    FAMILY HISTORY:  Family history of diabetes mellitus  Family history of hypertension      SOCIAL HISTORY:   Significant other/partner: Yes [ x]  No [ ] Children:  Yes [x ]  No [ ] Lutheran/Spirituality: Restorationism   Substance hx: Yes[ ]  No [x ]   Tobacco hx:  Yes [x ] former Alcohol hx: Yes [ ] No [ x]   Home Opioid hx:  Yes [ ] No [x ]    Living Situation: [x ]Home  [ ]Long term care  [ ]Rehab [ ]Other    ADVANCE DIRECTIVES:  Full Code     [ ] Health Care Proxy(s)  [x ] Surrogate(s)  [ ] Guardian           Name(s): Phone Number(s): Devan Hood [phone]     BASELINE (I)ADL(s) (prior to admission):  [place]: [ ]Total  [ ] Moderate [ ]Dependent    Allergies    oxycodone (Vomiting; Nausea)    Intolerances    MEDICATIONS  (STANDING):  allopurinol 300 milliGRAM(s) Oral daily  carvedilol 25 milliGRAM(s) Oral every 12 hours  chlorhexidine 4% Liquid 1 Application(s) Topical <User Schedule>  docosanol 10% Cream 1 Application(s) Topical three times a day  filgrastim-sndz Injectable 480 MICROGram(s) SubCutaneous daily  FIRST- Mouthwash  BLM 5 milliLiter(s) Swish and Spit five times a day  fluconAZOLE IVPB 200 milliGRAM(s) IV Intermittent every 24 hours  metroNIDAZOLE  IVPB 500 milliGRAM(s) IV Intermittent every 8 hours  metroNIDAZOLE  IVPB      ranitidine 150 milliGRAM(s) Oral every 24 hours  Saliva Substitute (CAPHOSOL) 30 milliLiter(s) Swish and Spit every 6 hours  valACYclovir 500 milliGRAM(s) Oral <User Schedule>  vancomycin    Solution 125 milliGRAM(s) Oral every 6 hours    MEDICATIONS  (PRN):  acetaminophen   Tablet .. 650 milliGRAM(s) Oral every 6 hours PRN Temp greater or equal to 38C (100.4F)  artificial  tears Solution 1 Drop(s) Both EYES every 6 hours PRN Dry Eyes  benzocaine 15 mG/menthol 3.6 mG (Sugar-Free) Lozenge 1 Lozenge Oral five times a day PRN Sore Throat  guaiFENesin    Syrup 100 milliGRAM(s) Oral every 6 hours PRN Cough  HYDROmorphone   Tablet 2 milliGRAM(s) Oral every 4 hours PRN moderate - severe pain  ondansetron Injectable 8 milliGRAM(s) IV Push every 8 hours PRN Nausea and/or Vomiting    PRESENT SYMPTOMS: [ ]Unable to obtain due to poor mentation - ROS limited due to altered mental status   Source if other than patient:  [ ]Family   [ ]Team     Pain (Impact on QOL):  Denies     PAIN AD Score:     [email or web address]/[email or web address] (press ctrl +  left click to view)    Dyspnea:  Yes [ ] No [ ] - [ ]Mild [ ]Moderate [ ]Severe  Anxiety:    Yes [ ] No [ ] - [ ]Mild [ ]Moderate [ ]Severe  Fatigue:    Yes [ ] No [ ] - [ ]Mild [ ]Moderate [ ]Severe  Nausea:    Yes [ ] No [ ] - [ ]Mild [ ]Moderate [ ]Severe                         Loss of appetite: Yes [ ] No [ ] - [ ]Mild [ ]Moderate [ ]Severe             Constipation:  Yes [ ] No [ ] - [ ]Mild [ ]Moderate [ ]Severe  Grief: Yes [ ] No [ ]     Other Symptoms:  [ ]All other review of systems negative     Karnofsky Performance Score/Palliative Performance Status Version 2:  20%    http://palliative.info/resource_material/[email or web address].pdf    PHYSICAL EXAM:  Vital Signs Last 24 Hrs  T(C): 36.6 (08 May 2019 13:20), Max: 37.2 (08 May 2019 02:41)  T(F): 97.8 (08 May 2019 13:20), Max: 99 (08 May 2019 02:41)  HR: 100 (08 May 2019 13:20) (100 - 108)  BP: 128/74 (08 May 2019 13:20) (113/63 - 132/74)  BP(mean): 89 (08 May 2019 13:20) (89 - 89)  RR: 14 (08 May 2019 13:20) (14 - 20)  SpO2: 100% (08 May 2019 13:20) (98% - 100%) I&O's Summary    07 May 2019 07:01  -  08 May 2019 07:00  --------------------------------------------------------  IN: 0 mL / OUT: 450 mL / NET: -450 mL        GENERAL:  Lethargic - opens eyes and sits up at times - not following commands   HEENT:   crusting of blood to lips - +mucositis   PULMONARY:   Clear anteriorly   CARDIOVASCULAR:    [x ]Regular [ ]Irregular [ ]Tachy  [ ]Anshul [ ]Murmur [ ]Other  GASTROINTESTINAL:  [x ]Soft  [ ]Distended   [x ]+BS  [ x]Non tender Ostomy patent for loose brown stool   05-07-19 @ 07:01  -  05-08-19 @ 07:00  --------------------------------------------------------  OUT: 450 mL      GENITOURINARY:  [ ]Normal [ ] Incontinent   [x ]Oliguria/Anuria   [ ][place]  MUSCULOSKELETAL:   [ ]Normal   [x ]Weakness  [ ]Bed/Wheelchair bound [ ]Edema  NEUROLOGIC:   [ ]No focal deficits  [x ] Cognitive impairment  [ ] Dysphagia [ ]Dysarthria [ ] Paresis [ ]Other   SKIN:   Right chest permacath dressing intact    LABS:                        6.3    3.26  )-----------( 33       ( 08 May 2019 06:38 )             18.9   05-08    139  |  102  |  92<H>  ----------------------------<  158<H>  3.9   |  13<L>  |  9.68<H>    Ca    6.2<LL>      08 May 2019 06:38  Phos  5.7     05-08  Mg     2.2     05-08          RADIOLOGY & ADDITIONAL STUDIES: Reviewed  CT abdomen  IMPRESSION:     No bowel obstruction or bowel wall thickening.    Small bilateral pleural effusions and associated atelectasis, slightly   decreased since 3/30/2019.    Age-indeterminate compression deformity of the inferior T12 vertebral   endplate, which is new since 9/9/2018.      PROTEIN CALORIE MALNUTRITION PRESENT: [ ] Yes [ ] No  [ ] PPSV2 < or = to 30% [ ] significant weight loss  [ ] poor nutritional intake [ ] catabolic state [ ] anasarca     Albumin, Serum: 2.7 g/dL (05-02-19 @ 06:04)      REFERRALS:   [ ]Chaplaincy  [ ] Hospice  [ ]Child Life  [ ]Social Work  [ ]Case management [ ]Holistic Therapy   Goals of Care Discussion Document:

## 2019-05-08 NOTE — CONSULT NOTE ADULT - ASSESSMENT
54yo M hx of relapsed multiple myeloma - now with c-diff -  new ROBB on HD, and altered mental status.  Palliative consulted for goals of care.

## 2019-05-08 NOTE — PROVIDER CONTACT NOTE (OTHER) - RECOMMENDATIONS
Notified Clinical Impact Nurse concerning situation. Recommended to administer medication to relax pt. Notified Clinical Impact Nurse Abner concerning situation. Recommended to administer medication to relax pt.

## 2019-05-08 NOTE — PROGRESS NOTE ADULT - SUBJECTIVE AND OBJECTIVE BOX
Pt seen in MICU. Wife, family, and daughter at bedside. Pt confused, standing, did not want to sit, cannot participate in discussion, does not follow commands    MEDICATIONS  (STANDING):  acyclovir IVPB      chlorhexidine 2% Cloths 1 Application(s) Topical daily  chlorhexidine 4% Liquid 1 Application(s) Topical <User Schedule>  dexmedetomidine Infusion 0.2 MICROgram(s)/kG/Hr (3.87 mL/Hr) IV Continuous <Continuous>  docosanol 10% Cream 1 Application(s) Topical three times a day  filgrastim-sndz Injectable 480 MICROGram(s) SubCutaneous daily  fluconAZOLE IVPB 200 milliGRAM(s) IV Intermittent every 24 hours  metroNIDAZOLE  IVPB 500 milliGRAM(s) IV Intermittent every 8 hours  metroNIDAZOLE  IVPB      valACYclovir 500 milliGRAM(s) Oral <User Schedule>  vancomycin    Solution 125 milliGRAM(s) Oral every 6 hours    MEDICATIONS  (PRN):  artificial  tears Solution 1 Drop(s) Both EYES every 6 hours PRN Dry Eyes      ROS  UTO    Vital Signs Last 24 Hrs  T(C): 36.6 (08 May 2019 13:20), Max: 37.2 (08 May 2019 02:41)  T(F): 97.8 (08 May 2019 13:20), Max: 99 (08 May 2019 02:41)  HR: 102 (08 May 2019 16:00) (99 - 108)  BP: 147/78 (08 May 2019 16:00) (105/87 - 147/78)  BP(mean): 96 (08 May 2019 16:00) (88 - 96)  RR: 12 (08 May 2019 16:00) (12 - 23)  SpO2: 100% (08 May 2019 16:00) (98% - 100%)    PE  NAD, awake, confused, as above  remainder of exam limited 2/2 participation                          6.3    3.26  )-----------( 33       ( 08 May 2019 06:38 )             18.9       05-08    139  |  102  |  92<H>  ----------------------------<  158<H>  3.9   |  13<L>  |  9.68<H>    Ca    6.2<LL>      08 May 2019 06:38  Phos  5.7     05-08  Mg     2.2     05-08

## 2019-05-08 NOTE — CHART NOTE - NSCHARTNOTEFT_GEN_A_CORE
Patient seen and examined this morning at bedside.  Patient lethargic, having difficulty answering simple questions.  Vital stables stable, O2 saturation at 100% on RA.    Patient awaiting catheter placement for HD.    MICU called for consult secondary to change in mental status.    Patient had IJ placement with IR for HD treatment.  Patient awaiting MICU transfer.    Will monitor closely.

## 2019-05-08 NOTE — CONSULT NOTE ADULT - PROBLEM SELECTOR RECOMMENDATION 5
Patient's wife at bedside.  Tearful.  She is hopeful that he will recover and is currently overwhelmed.  Oncology to speak to her regarding goals of care going forward.  Will continue to follow for ongoing goals of care discussion.  Psychosocial support provided. Patient's wife at bedside.  Tearful.  She is hopeful that he will recover and is currently overwhelmed.  Oncology to speak to her regarding poor prognosis and goals of care going forward.  Will continue to follow for ongoing goals of care discussion.  Psychosocial support provided.

## 2019-05-08 NOTE — PROGRESS NOTE ADULT - ASSESSMENT
54 y/o Male with relapsed multiple myeloma s/p bone marrow transplant (~1.5 yrs ago) and chemo/RT (last infusion Bendamustine and Pomalidomide 4/08/2019), perforated diverticulitis s/p Kiah's (proctosigmoidectomy with colostomy, 9/2018), nonischemic cardiomyopathy (mild global LV systolic dysfunction with EF 54% and diastolic LV dysfunction based on TTE in 11/2018), and HTN admitted to [facility] on 4/25 for nausea/vomiting and poor PO intake, found to have pancytopenia and was treated for neutropenic fever however course c/b acute renal failure with shiley placed by IR for new HD, C diff infection, and acute altered mental status transferred to the MICU for further evaluation and management.    #Neuro - acute altered mental status, ddx includes toxic (Cdiff v other infection in setting of neutropenia) v metabolic (uremia v electrolyte abnormality) v acute intracranial process in setting of thrombocytopenia    #CV    #Pulm    #GI    #Renal    #Heme    #ID    #DVT    #GOC 52 y/o Male with relapsed multiple myeloma s/p bone marrow transplant (~1.5 yrs ago) and chemo/RT (last infusion Bendamustine and Pomalidomide 4/08/2019), perforated diverticulitis s/p Kiah's (proctosigmoidectomy with colostomy, 9/2018), nonischemic cardiomyopathy (mild global LV systolic dysfunction with EF 54% and diastolic LV dysfunction based on TTE in 11/2018), and HTN admitted to [facility] on 4/25 for nausea/vomiting and poor PO intake, found to have pancytopenia and was treated for neutropenic fever however course c/b acute renal failure with shiley placed by IR for new HD, C diff infection, and acute altered mental status transferred to the MICU for further evaluation and management.    #Neuro - acute AMS, A&Ox0, toxic (Cdiff v other infectious process w/ neutropenia) v metabolic (uremia v electrolyte abnormality) v acute intracranial process in setting of thrombocytopenia  - Will obtain CT head  - Pt currently maintaining airway with no respiratory distress or hypoxemia, slightly agitated, low threshold for intubation for airway protection  - Will work-up and manage toxic v metabolic etiologies as below    #CV  - BP stable  - TTE 3/29/19 EF 61%  - No active issues    #Pulm    #GI    #Renal    #Heme    #ID    #DVT    #GOC 52 y/o Male with relapsed multiple myeloma s/p bone marrow transplant (~1.5 yrs ago) and chemo/RT (last infusion Bendamustine and Pomalidomide 4/08/2019), perforated diverticulitis s/p Kiah's (proctosigmoidectomy with colostomy, 9/2018), nonischemic cardiomyopathy (mild global LV systolic dysfunction with EF 54% and diastolic LV dysfunction based on TTE in 11/2018), and HTN admitted to [facility] on 4/25 for nausea/vomiting and poor PO intake, found to have pancytopenia and was treated for neutropenic fever however course c/b acute renal failure with shiley placed by IR for new HD, C diff infection, and acute altered mental status transferred to the MICU for further evaluation and management.    #Neuro - acute AMS, A&Ox0, toxic (Cdiff v other infectious process w/ neutropenia) v metabolic (uremia v electrolyte abnormality) v acute intracranial process in setting of thrombocytopenia  - Will obtain CT head  - Pt currently maintaining airway with no respiratory distress or hypoxemia, slightly agitated, low threshold for intubation for airway protection  - Will work-up and manage toxic v metabolic etiologies as below    #CV  - BP stable  - TTE 3/29/19 EF 61%  - No active issues    #Pulm  - No active issues, no respiratory distress and not hypoxemic on RA  - Low suspicion for intubation based on mental status as above    #GI  (+) C-diff infection  - c/w IV flagyl and vanc PO  - NPO for now given mental status    #Renal - ARF and anuric requiring HD (fem shiley removed and R subclavian shiley placed by IR), baseline Cr ~1.1, possibly 2/2 ATN   - No obstruction on renal U/S      Baseline cr ~1.1. No evidence of obstruction on renal US.  Poor PO intake, and high vanc trough previously. No recent contrast exposure. Suspect ROBB 2/2 ATN.  Off IVF. no improvement w/iv hydration. No evidence of TMA, or hemolytic anemia.  dose all meds for egfr <10 ml/min  Started on HD for ROBB with anuria and met acidosis.   Dialyzed 5/6. Femoral shiley removed yesterday, due to high risk of cath related infection.   Can place IJ shiley or tunneled cath, will need platelets >50K. transfusions pending.   Plan for repeat HD after IJ catheter placement.    Heme onc noted appreciated. Given poor prognosis, refractory MM, and significant clinical decline, I question if he will be stable enough for outpt HD.       #Heme    #ID    #DVT    #GOC 52 y/o Male with relapsed multiple myeloma s/p bone marrow transplant (~1.5 yrs ago) and chemo/RT (last infusion Bendamustine and Pomalidomide 4/08/2019), perforated diverticulitis s/p Kiah's (proctosigmoidectomy with colostomy, 9/2018), nonischemic cardiomyopathy (mild global LV systolic dysfunction with EF 54% and diastolic LV dysfunction based on TTE in 11/2018), and HTN admitted to [facility] on 4/25 for nausea/vomiting and poor PO intake, found to have pancytopenia and was treated for neutropenic fever however course c/b acute renal failure with shiley placed by IR for new HD, C diff infection, and acute encephalopathy, transferred to the MICU for further evaluation and management.    #Neuro - acute encephalopathy likely due acute renal failure. A&Ox0, Concern for toxic (Cdiff v other infectious process w/ neutropenia) v metabolic (uremia v electrolyte abnormality) v acute intracranial process in setting of thrombocytopenia.   - Will obtain CT head  - Pt currently maintaining airway with no respiratory distress or hypoxemia, slightly agitated, low threshold for intubation for airway protection  - Will work-up and manage toxic v metabolic etiologies as below    #CV - Hx of HTN, Hx of nonischemic Cardiomyopathy - TTE showed improved and now normal LVSF and RVSF  - BP stable  - TTE 3/29/19 EF 61%  - No active issues  - monitor VSq1  - For HTN: hold home carvedilol and lisinopril.     #Pulm  - No active issues, no respiratory distress and not hypoxemic on RA  - Low threshold for intubation based on mental status as above      #GI  (+) C-diff infection, esophagitis  - c/w IV flagyl and vanc PO via NGT, 125mg q6 for 14d course  - NPO for now given mental status  - monitor bmp qd  - consider starting tube feed via NGT tonight if does not require intubation  - cont fluconazole and valtrex for esophagitis    #Renal - ARF with metabolic acidosis, and anuric requiring HD (fem shiley removed and R subclavian shiley placed by IR), baseline Cr ~1.1, possibly 2/2 ATN and MM. Also with Hypocalcemia and hyperphosphatemia  - No obstruction on renal U/S  - urgent HD today  - f/u bmp, vbg tonight after HD  - renally dose meds  - f/u renal recs    #Heme - Pancytopenia due to Multiple Myeloma, Recent Chemo, Infection   - monitor cbc BID, transfuse for Hg<7, Plt<10 or Plt<50 if bleeding  - Per Heme, Dr Gastelum/Dr Booth, pt currently with poor prognosis due to relapsing MM. Unable to receive further chemo at this time due to renal failure and poor functional status  - Pain management for MM - hold on further sedating meds to monitor mental status.   - Cont zarxio, per heme, until ANC>1500  x2 days  - hold allopurinol in setting of ARF, monitor TLS labs    #ID - CDI, no evidence of obstruction or megacolon on CT a/p  - cont PO vanc and metronidazole   - f/u bl clx, 5/6 NGTD  - cont valacyclovir and fluconazole for esophagitis  - monitor cbc qd  - s/p cefepime and vanc course as bl clx remain negative      #DVT ppx - SCDs, hold pharm in setting of thrombocytopenia    #GOC - Extensive Conservation with MICU team - Pt with poor prognosis due relapsing MM and acute renal failure requiring HD. Palliative care consulted. Wife, Devan, is HCP.   -Currently remains full code.

## 2019-05-08 NOTE — PROVIDER CONTACT NOTE (OTHER) - SITUATION
Patient is lethargic and unable to answer cognitive questions. Some effort against gravity w/ left leg. No effort against gravity w/ right leg.

## 2019-05-08 NOTE — PROGRESS NOTE ADULT - ASSESSMENT
53y Male with relapsed multiple myeloma s/p bone marrow transplant (~1.5 yrs ago) and chemo/RT (last infusion Bendamustine and Pomalidomide 4/08/2019), perforated diverticulitis s/p Kiah's (proctosigmoidectomy with colostomy, 9/2018), nonischemic cardiomyopathy (mild global LV systolic dysfunction with EF 54% and diastolic LV dysfunction based on TTE in 11/2018), and HTN admitted to [facility] on 4/25 for nausea and vomiting, and poor PO intake, treated for neutropenic fever, c/b ROBB. Renal following for ARF    labs, chart reviewed  Thrombocytopenia- no bleeding. f/u w/hem/onc

## 2019-05-08 NOTE — PROGRESS NOTE ADULT - SUBJECTIVE AND OBJECTIVE BOX
Patient seen and examined at bedside,   case discussed with overnight and day team  stable s/s    HPI:  52yo M hx of relapsed multiple myeloma s/p bone marrow transplant (~1.5 yrs ago) and chemo/RT (last infusion Bendamustine and Pomalidomide 4/08/2019), perforated diverticulitis s/p Kiah's (proctosigmoidectomy with colostomy, 9/2018), nonischemic cardiomyopathy (mild global LV systolic dysfunction with EF 54% and diastolic LV dysfunction based on TTE in 11/2018), and HTN presents to [facility] ED w/ vomiting and change of vision. Wife Devan Hood at bedside. Pt was sitting at home. He vomited 2-3 times nonbloody, nonbilious and was not able to eat or drink anything. On 4/25 at 4pm, while sitting he noticed his vision went "white" for a few minutes associated with sweating, shaking, and weakness. His wife noticed he had a red rash on his legs and they brought him to the ED. Pt received a platelet transfusion about a week ago outpt. Denies any pain. Had a nose bleed earlier. He denies skin sores, measured fever, HA, trouble speaking, numbness/tingling, focal weakness, dizziness, CP, SOB, abd pain, N/V/D, hematochezia, melena, urinary symptoms, oral/genital sores. He has a recent admission to [facility] discharged 4/08/2019 for diffuse body pain and low-grade fever, infectious workup was negative. MRI C/T/L showed abnormal T1 prolongation in the whole spine, sacral and iliac bones. Received 7 days of Bendamustine and Pomalidomide (on hold few days for neutropenia), last on 4/08/2019.    When pt presented to the ED, code stroke called. Neuro exam was nonfocal, CTH neg for acute process, and vomiting and vision changes were resolved. ED vitals afebrile, H101, /58 --> 102/62, R18, sat 100% on RA. Given decadron 40mg IV, 1u PLT. 1u pRBC ordered. (26 Apr 2019 00:00)      PAST MEDICAL & SURGICAL HISTORY:  Diverticulitis: perforated s/p Kiah  Multiple myeloma: relapsed 3/2019  Hypertension  Left ventricular dysfunction  Cardiomyopathy: nonischemic  Former smoker: (1 ppd x 11 years; Quit ~age 26)  History of bone marrow transplant  History of surgery: s/p exploratory laparotomy with sigmoid resection and colostomy on 9/2/2018      oxycodone (Vomiting; Nausea)       MEDICATIONS  (STANDING):  allopurinol 300 milliGRAM(s) Oral daily  carvedilol 25 milliGRAM(s) Oral every 12 hours  cefepime   IVPB 2000 milliGRAM(s) IV Intermittent every 24 hours  chlorhexidine 4% Liquid 1 Application(s) Topical <User Schedule>  filgrastim-sndz Injectable 480 MICROGram(s) SubCutaneous daily  FIRST- Mouthwash  BLM 5 milliLiter(s) Swish and Spit five times a day  fluconAZOLE IVPB 200 milliGRAM(s) IV Intermittent every 24 hours  metroNIDAZOLE  IVPB 500 milliGRAM(s) IV Intermittent every 8 hours  metroNIDAZOLE  IVPB      ranitidine 150 milliGRAM(s) Oral every 24 hours  Saliva Substitute (CAPHOSOL) 30 milliLiter(s) Swish and Spit every 6 hours  valACYclovir 500 milliGRAM(s) Oral <User Schedule>  vancomycin    Solution 125 milliGRAM(s) Oral every 6 hours    MEDICATIONS  (PRN):  acetaminophen   Tablet .. 650 milliGRAM(s) Oral every 6 hours PRN Temp greater or equal to 38C (100.4F)  artificial  tears Solution 1 Drop(s) Both EYES every 6 hours PRN Dry Eyes  benzocaine 15 mG/menthol 3.6 mG (Sugar-Free) Lozenge 1 Lozenge Oral five times a day PRN Sore Throat  guaiFENesin    Syrup 100 milliGRAM(s) Oral every 6 hours PRN Cough  HYDROmorphone   Tablet 2 milliGRAM(s) Oral every 4 hours PRN moderate - severe pain  ondansetron Injectable 8 milliGRAM(s) IV Push every 8 hours PRN Nausea and/or Vomiting      REVIEW OF SYSTEMS:  CONSTITUTIONAL: (+) malaise. gen weakness. fatigue  EYES: No acute change in vision   ENT:  No tinnitus  NECK: No stiffness  RESPIRATORY: No hemoptysis  CARDIOVASCULAR: No chest pain, palpitations, syncope  GASTROINTESTINAL: diarrhea (mildly improving in quantity). no hematemesis, melena, or hematochezia.  GENITOURINARY: No hematuria  NEUROLOGICAL: No headaches  LYMPH Nodes: No enlarged glands  ENDOCRINE: No heat or cold intolerance	    Vital Signs Last 24 Hrs  T(C): 36.7 (08 May 2019 07:31), Max: 37.2 (08 May 2019 02:41)  T(F): 98 (08 May 2019 07:31), Max: 99 (08 May 2019 02:41)  HR: 108 (08 May 2019 07:31) (100 - 108)  BP: 120/66 (08 May 2019 07:31) (113/63 - 132/74)  BP(mean): --  RR: 20 (08 May 2019 07:31) (19 - 20)  SpO2: 100% (08 May 2019 07:31) (100% - 100%)    PHYSICAL EXAMINATION:   Constitutional: ill appearing, moaning, mild distress  HEENT: mucositis  Neck:  Supple  Respiratory:  mild bibasilar rales. Adequate airflow b/l. Not using accessory muscles of respiration.  Cardiovascular:  sys murmur, S1 & S2 intact, no R/G, 2+ radial pulses b/l  Gastrointestinal: loose brown stool on ostomy. Soft, NT, ND, normoactive b.s., no organomegaly/RT/rigidity  Extremities: WWP  Neurological: disoriented. lethargic. arousable. occasionally answers questions accordingly.  No acute focal motor deficits. Crude sensation intact.     Labs and imaging reviewed  Basic Metabolic Panel w/Mg &amp; Inorg Phos (05.08.19 @ 06:38)    Calcium, Total Serum: 6.2 mg/dL    Phosphorus Level, Serum: 5.7: Delta: 4.3 on 05/07/  Delta: 4.3 on 05/07/ mg/dL    eGFR if : 6 mL/min    eGFR if Non : 5: The units for eGFR are ml/min/1.73m2 (normalized body  surface area). The eGFR is calculated from a serum  creatinine using the CKD-EPI equation. Other variables  required for calculation are race, age and sex. Among  patients with chronic kidney disease (CKD), the eGFR is  useful in determining the stage of disease according to  KDOQI CKD classification. All eGFR results are reported  numerically with the following interpretation.    GFR  (ml/min/1.73 m2)          W/KIDNEY DAMAGE    W/O KIDNEY DMG  ==========================================================  >= 90.......................Stage 1..............Normal  60-89.......................Stage 2...........Decreased GFR  30-59.......................Stage 3..............Stage 3  15-29.......................Stage 4..............Stage 4  < 15........................Stage 5..............Stage 5    Each stage of CKD assumes that the associated GFR level  has been in effect for at least 3 months. Determination of  stages one and two (with eGFR > 59ml/min/m2) requires  estimation of kidney damage for at least 3 months as  defined by structural or functional abnormalities.    Limitations: All estimates of GFR will be less accurate  for patients at extremes of muscle mass (including but  not limited to frail elderly, critically ill, or cancer  patients), those with unusual diets, and those with  conditions associated with reduced secretion or  extrarenal elimination of creatinine. The eGFR equation  is not recommended for use in patients with unstable  creatinine levels. mL/min    Sodium, Serum: 139 mmol/L    Potassium, Serum: 3.9 mmol/L    Chloride, Serum: 102 mmol/L    Carbon Dioxide, Serum: 13 mmol/L    Anion Gap, Serum: 24 mmo/L    Blood Urea Nitrogen, Serum: 92 mg/dL    Creatinine, Serum: 9.68 mg/dL    Glucose, Serum: 158 mg/dL    Magnesium, Serum: 2.2 mg/dL

## 2019-05-08 NOTE — PROGRESS NOTE ADULT - SUBJECTIVE AND OBJECTIVE BOX
SONAL GARCIA:[id number],   53yMale followed for:  oxycodone (Vomiting; Nausea)    PAST MEDICAL & SURGICAL HISTORY:  Diverticulitis: perforated s/p Kiah  Multiple myeloma: relapsed 3/2019  Hypertension  Left ventricular dysfunction  Cardiomyopathy: nonischemic  Former smoker: (1 ppd x 11 years; Quit ~age 26)  History of bone marrow transplant  History of surgery: s/p exploratory laparotomy with sigmoid resection and colostomy on 9/2/2018    FAMILY HISTORY:  Family history of diabetes mellitus  Family history of hypertension    MEDICATIONS  (STANDING):  allopurinol 300 milliGRAM(s) Oral daily  carvedilol 25 milliGRAM(s) Oral every 12 hours  chlorhexidine 4% Liquid 1 Application(s) Topical <User Schedule>  filgrastim-sndz Injectable 480 MICROGram(s) SubCutaneous daily  FIRST- Mouthwash  BLM 5 milliLiter(s) Swish and Spit five times a day  fluconAZOLE IVPB 200 milliGRAM(s) IV Intermittent every 24 hours  metroNIDAZOLE  IVPB 500 milliGRAM(s) IV Intermittent every 8 hours  metroNIDAZOLE  IVPB      ranitidine 150 milliGRAM(s) Oral every 24 hours  Saliva Substitute (CAPHOSOL) 30 milliLiter(s) Swish and Spit every 6 hours  valACYclovir 500 milliGRAM(s) Oral <User Schedule>  vancomycin    Solution 125 milliGRAM(s) Oral every 6 hours    MEDICATIONS  (PRN):  acetaminophen   Tablet .. 650 milliGRAM(s) Oral every 6 hours PRN Temp greater or equal to 38C (100.4F)  artificial  tears Solution 1 Drop(s) Both EYES every 6 hours PRN Dry Eyes  benzocaine 15 mG/menthol 3.6 mG (Sugar-Free) Lozenge 1 Lozenge Oral five times a day PRN Sore Throat  guaiFENesin    Syrup 100 milliGRAM(s) Oral every 6 hours PRN Cough  HYDROmorphone   Tablet 2 milliGRAM(s) Oral every 4 hours PRN moderate - severe pain  ondansetron Injectable 8 milliGRAM(s) IV Push every 8 hours PRN Nausea and/or Vomiting      Vital Signs Last 24 Hrs  T(C): 37.2 (08 May 2019 02:41), Max: 37.2 (08 May 2019 02:41)  T(F): 99 (08 May 2019 02:41), Max: 99 (08 May 2019 02:41)  HR: 105 (08 May 2019 03:07) (100 - 107)  BP: 113/63 (08 May 2019 03:07) (113/63 - 132/74)  BP(mean): --  RR: 19 (08 May 2019 03:07) (19 - 20)  SpO2: 100% (08 May 2019 03:07) (100% - 100%)  nc/at  s1s2  cta  soft, nt, nd no guarding or rebound  no c/c/e    CBC Full  -  ( 07 May 2019 06:00 )  WBC Count : 3.19 K/uL  RBC Count : 2.63 M/uL  Hemoglobin : 7.5 g/dL  Hematocrit : 23.3 %  Platelet Count - Automated : 35 K/uL  Mean Cell Volume : 88.6 fL  Mean Cell Hemoglobin : 28.5 pg  Mean Cell Hemoglobin Concentration : 32.2 %  Auto Neutrophil # : 2.10 K/uL  Auto Lymphocyte # : 0.61 K/uL  Auto Monocyte # : 0.25 K/uL  Auto Eosinophil # : 0.01 K/uL  Auto Basophil # : 0.01 K/uL  Auto Neutrophil % : 65.9 %  Auto Lymphocyte % : 19.1 %  Auto Monocyte % : 7.8 %  Auto Eosinophil % : 0.3 %  Auto Basophil % : 0.3 %    05-07    141  |  104  |  72<H>  ----------------------------<  105<H>  3.9   |  16<L>  |  7.77<H>    Ca    6.6<L>      07 May 2019 06:00  Phos  4.3     05-07  Mg     2.0     05-07

## 2019-05-09 LAB
ALBUMIN SERPL ELPH-MCNC: 2.8 G/DL — LOW (ref 3.3–5)
ALP SERPL-CCNC: 151 U/L — HIGH (ref 40–120)
ALT FLD-CCNC: 32 U/L — SIGNIFICANT CHANGE UP (ref 4–41)
ANION GAP SERPL CALC-SCNC: 22 MMO/L — HIGH (ref 7–14)
AST SERPL-CCNC: 34 U/L — SIGNIFICANT CHANGE UP (ref 4–40)
BACTERIA STL CULT: SIGNIFICANT CHANGE UP
BASOPHILS # BLD AUTO: 0.01 K/UL — SIGNIFICANT CHANGE UP (ref 0–0.2)
BASOPHILS NFR BLD AUTO: 0.3 % — SIGNIFICANT CHANGE UP (ref 0–2)
BILIRUB SERPL-MCNC: 1 MG/DL — SIGNIFICANT CHANGE UP (ref 0.2–1.2)
BUN SERPL-MCNC: 54 MG/DL — HIGH (ref 7–23)
CALCIUM SERPL-MCNC: 6.9 MG/DL — LOW (ref 8.4–10.5)
CHLORIDE SERPL-SCNC: 103 MMOL/L — SIGNIFICANT CHANGE UP (ref 98–107)
CO2 SERPL-SCNC: 18 MMOL/L — LOW (ref 22–31)
CREAT SERPL-MCNC: 6.61 MG/DL — HIGH (ref 0.5–1.3)
EOSINOPHIL # BLD AUTO: 0.01 K/UL — SIGNIFICANT CHANGE UP (ref 0–0.5)
EOSINOPHIL NFR BLD AUTO: 0.3 % — SIGNIFICANT CHANGE UP (ref 0–6)
GLUCOSE SERPL-MCNC: 109 MG/DL — HIGH (ref 70–99)
HCT VFR BLD CALC: 19.4 % — CRITICAL LOW (ref 39–50)
HGB BLD-MCNC: 6.6 G/DL — CRITICAL LOW (ref 13–17)
IMM GRANULOCYTES NFR BLD AUTO: 6.5 % — HIGH (ref 0–1.5)
LDH SERPL L TO P-CCNC: 348 U/L — HIGH (ref 135–225)
LYMPHOCYTES # BLD AUTO: 1.79 K/UL — SIGNIFICANT CHANGE UP (ref 1–3.3)
LYMPHOCYTES # BLD AUTO: 50.7 % — HIGH (ref 13–44)
MAGNESIUM SERPL-MCNC: 2 MG/DL — SIGNIFICANT CHANGE UP (ref 1.6–2.6)
MCHC RBC-ENTMCNC: 29.1 PG — SIGNIFICANT CHANGE UP (ref 27–34)
MCHC RBC-ENTMCNC: 34 % — SIGNIFICANT CHANGE UP (ref 32–36)
MCV RBC AUTO: 85.5 FL — SIGNIFICANT CHANGE UP (ref 80–100)
MONOCYTES # BLD AUTO: 0.4 K/UL — SIGNIFICANT CHANGE UP (ref 0–0.9)
MONOCYTES NFR BLD AUTO: 11.3 % — SIGNIFICANT CHANGE UP (ref 2–14)
NEUTROPHILS # BLD AUTO: 1.09 K/UL — LOW (ref 1.8–7.4)
NEUTROPHILS NFR BLD AUTO: 30.9 % — LOW (ref 43–77)
NRBC # FLD: 0 K/UL — SIGNIFICANT CHANGE UP (ref 0–0)
PHOSPHATE SERPL-MCNC: 3.9 MG/DL — SIGNIFICANT CHANGE UP (ref 2.5–4.5)
PLATELET # BLD AUTO: 24 K/UL — LOW (ref 150–400)
PMV BLD: 11.2 FL — SIGNIFICANT CHANGE UP (ref 7–13)
POTASSIUM SERPL-MCNC: 3.6 MMOL/L — SIGNIFICANT CHANGE UP (ref 3.5–5.3)
POTASSIUM SERPL-SCNC: 3.6 MMOL/L — SIGNIFICANT CHANGE UP (ref 3.5–5.3)
PROT SERPL-MCNC: 6.2 G/DL — SIGNIFICANT CHANGE UP (ref 6–8.3)
RBC # BLD: 2.27 M/UL — LOW (ref 4.2–5.8)
RBC # FLD: 15.5 % — HIGH (ref 10.3–14.5)
SODIUM SERPL-SCNC: 143 MMOL/L — SIGNIFICANT CHANGE UP (ref 135–145)
URATE SERPL-MCNC: 4.4 MG/DL — SIGNIFICANT CHANGE UP (ref 3.4–8.8)
WBC # BLD: 3.53 K/UL — LOW (ref 3.8–10.5)
WBC # FLD AUTO: 3.53 K/UL — LOW (ref 3.8–10.5)

## 2019-05-09 PROCEDURE — 99291 CRITICAL CARE FIRST HOUR: CPT

## 2019-05-09 PROCEDURE — 99233 SBSQ HOSP IP/OBS HIGH 50: CPT

## 2019-05-09 RX ORDER — ACETAMINOPHEN 500 MG
650 TABLET ORAL ONCE
Refills: 0 | Status: COMPLETED | OUTPATIENT
Start: 2019-05-09 | End: 2019-05-09

## 2019-05-09 RX ADMIN — DOCOSANOL 1 APPLICATION(S): 100 CREAM TOPICAL at 21:20

## 2019-05-09 RX ADMIN — Medication 125 MILLIGRAM(S): at 15:47

## 2019-05-09 RX ADMIN — Medication 125 MILLIGRAM(S): at 08:13

## 2019-05-09 RX ADMIN — CHLORHEXIDINE GLUCONATE 1 APPLICATION(S): 213 SOLUTION TOPICAL at 19:29

## 2019-05-09 RX ADMIN — Medication 104 MILLIGRAM(S): at 19:29

## 2019-05-09 RX ADMIN — Medication 100 MILLIGRAM(S): at 14:05

## 2019-05-09 RX ADMIN — DOCOSANOL 1 APPLICATION(S): 100 CREAM TOPICAL at 14:05

## 2019-05-09 RX ADMIN — Medication 650 MILLIGRAM(S): at 19:08

## 2019-05-09 RX ADMIN — Medication 100 MILLIGRAM(S): at 21:20

## 2019-05-09 RX ADMIN — Medication 125 MILLIGRAM(S): at 21:20

## 2019-05-09 RX ADMIN — Medication 125 MILLIGRAM(S): at 02:13

## 2019-05-09 RX ADMIN — FLUCONAZOLE 100 MILLIGRAM(S): 150 TABLET ORAL at 06:01

## 2019-05-09 RX ADMIN — Medication 480 MICROGRAM(S): at 14:05

## 2019-05-09 RX ADMIN — Medication 650 MILLIGRAM(S): at 16:41

## 2019-05-09 RX ADMIN — DEXMEDETOMIDINE HYDROCHLORIDE IN 0.9% SODIUM CHLORIDE 3.87 MICROGRAM(S)/KG/HR: 4 INJECTION INTRAVENOUS at 08:12

## 2019-05-09 RX ADMIN — Medication 100 MILLIGRAM(S): at 05:02

## 2019-05-09 RX ADMIN — DOCOSANOL 1 APPLICATION(S): 100 CREAM TOPICAL at 05:02

## 2019-05-09 NOTE — PROGRESS NOTE ADULT - ASSESSMENT
A 52 yo Male with  relapsed multiple myeloma s/p bone marrow transplant (~1.5 yrs ago) and chemo/RT (last infusion Bendamustine and Pomalidomide 4/08/2019), perforated diverticulitis s/p Kiah's (proctosigmoidectomy with colostomy, presents to [facility] ER for evaluation of  vomiting and change of vision. He also had been weak, with epistaxis, and petechiae. ON admission, he found to have Pancytopenia and Neutropenia with ANC of 120. He has no fever but rigors and also c/o Odynophagia. The CT head shows No acute intracranial events. The ID consult requested to assist with evaluation and antibiotic management of Neutropenia and Tonsilitis.     # Neutropenic Fever -   - s/p Chemotherapy for MM,  developed  fever, 4/28/19 Neutropenia resolved 5/7/19  #? Acute Tonsilitis  # Possible Candida Esophagitis-  May benefit from  EGD when cell numbers are stable, since Odynophagia  not improving on Fluconazole  # Kidney failure -s/p Shiley catheter now for dialysis 5/4/19  # C.difficile infection -5/6/19    would recommend:    1. Continue Oral Vancomycin  and IV Flagyl to cover  C.difficile infection   2. Monitor Temp. and If >100.4 then obtain cultures   3. Continue renally adjusted  Acyclovir, and Fluconazole   4.  Contact Isolation    5. Dialysis as tolerated  6. Aspiration precaution     d/w  Family at the bed side and MICU team    will follow the patient with you A 54 yo Male with  relapsed multiple myeloma s/p bone marrow transplant (~1.5 yrs ago) and chemo/RT (last infusion Bendamustine and Pomalidomide 4/08/2019), perforated diverticulitis s/p Kiah's (proctosigmoidectomy with colostomy, presents to [facility] ER for evaluation of  vomiting and change of vision. He also had been weak, with epistaxis, and petechiae. ON admission, he found to have Pancytopenia and Neutropenia with ANC of 120. He has no fever but rigors and also c/o Odynophagia. The CT head shows No acute intracranial events. The ID consult requested to assist with evaluation and antibiotic management of Neutropenia and Tonsilitis.     # Neutropenic Fever -   - s/p Chemotherapy for MM,  developed  fever, 4/28/19 Neutropenia resolved 5/7/19  #? Acute Tonsilitis  # Possible Candida Esophagitis-  May benefit from  EGD when cell numbers are stable, since Odynophagia  not improving on Fluconazole  # Kidney failure -s/p Shiley catheter now for dialysis 5/4/19  # C.difficile infection -5/6/19    would recommend:    1. Management of mucositis as per Hem/ONC  2. Continue Oral Vancomycin  and IV Flagyl to cover  C.difficile infection   3. Monitor Temp. and If >100.4 then obtain cultures   4. Continue renally adjusted  Acyclovir, and Fluconazole   5.  Contact Isolation  and Dialysis as tolerated  6. Aspiration precaution     d/w  patient, Family at the bed side and MICU team    will follow the patient with you

## 2019-05-09 NOTE — PROGRESS NOTE ADULT - ASSESSMENT
1. Pancytopenia     -- WBC low but improving on GCSF. ANC> 1000  -- cont  Zarxio 480mcg daily  for now  -- counts slow to recover sec due to heavily tx for myeloma  -- Transfuse to keep Hgb > 7  -- Keep Platelets>/= 40K while catheter in place, transfuse today       2. MM relapsed/refractory including failed Auto Transplant    -- had been on bendamustine/pomalyst. Tx on hold  -- s/p decadron 40mg 5/2   -- QIggs, SPEP, JENELLE and SFLC showed increased free kappa    3. Tonsilitis vs Candida Esophagitis    -- cont Diflucan per ID  -- cont Valtrex renal dose    -- ID f/u  -- cont magic mouthwash  -- cont IVF    4. ROBB    -- HD per renal  -- ? sec to light chain disease    5. cdiff     -- vanc PO and flagyl      Overall progniosis is dismal, even if he recovers from infection and liberated from HD, which is highly unlikely, he does not have any realistic tx options left for myeloma. This was d/w wife. Understands. I would recommend hospice care    Torsten Murphy MD  [phone]

## 2019-05-09 NOTE — PROGRESS NOTE ADULT - SUBJECTIVE AND OBJECTIVE BOX
Pt is seen and examined  chart reviewed  yesterdays events noted  wife at bedside . discussed his course and critical condition as well as overall dismal prognosis w low chance for meaningful recovery      PAST MEDICAL & SURGICAL HISTORY:  Diverticulitis: perforated s/p Kiah  Multiple myeloma: relapsed 3/2019  Hypertension  Left ventricular dysfunction  Cardiomyopathy: nonischemic  Former smoker: (1 ppd x 11 years; Quit ~age 26)  History of bone marrow transplant  History of surgery: s/p exploratory laparotomy with sigmoid resection and colostomy on 9/2/2018      ROS:  Negative except for:    MEDICATIONS  (STANDING):  acyclovir IVPB 200 milliGRAM(s) IV Intermittent every 24 hours  chlorhexidine 4% Liquid 1 Application(s) Topical <User Schedule>  dexmedetomidine Infusion 0.2 MICROgram(s)/kG/Hr (3.87 mL/Hr) IV Continuous <Continuous>  docosanol 10% Cream 1 Application(s) Topical three times a day  filgrastim-sndz Injectable 480 MICROGram(s) SubCutaneous daily  fluconAZOLE IVPB 200 milliGRAM(s) IV Intermittent every 24 hours  metroNIDAZOLE  IVPB 500 milliGRAM(s) IV Intermittent every 8 hours  metroNIDAZOLE  IVPB      vancomycin    Solution 125 milliGRAM(s) Oral every 6 hours    MEDICATIONS  (PRN):  artificial  tears Solution 1 Drop(s) Both EYES every 6 hours PRN Dry Eyes      Allergies    oxycodone (Vomiting; Nausea)    Intolerances        Vital Signs Last 24 Hrs  T(C): 36.9 (09 May 2019 08:00), Max: 37.4 (09 May 2019 04:00)  T(F): 98.5 (09 May 2019 08:00), Max: 99.4 (09 May 2019 04:00)  HR: 91 (09 May 2019 08:00) (81 - 123)  BP: 130/71 (09 May 2019 08:00) (105/87 - 160/81)  BP(mean): 87 (09 May 2019 08:00) (72 - 106)  RR: 30 (09 May 2019 08:00) (12 - 34)  SpO2: 98% (09 May 2019 08:00) (94% - 100%)    PHYSICAL EXAM    NAD  lethargic/confused  dried blood on lips  abd benign  no edema  shiley at R inguinal area     LABS:                          6.6    3.53  )-----------( 24       ( 09 May 2019 02:25 )             19.4     Serial CBC's  05-09 @ 02:25  Hct-19.4 / Hgb-6.6 / Plat-24 / RBC-2.27 / WBC-3.53          Serial CBC's  05-08 @ 20:16  Hct-21.9 / Hgb-7.5 / Plat-29 / RBC-2.61 / WBC-3.78            05-09    143  |  103  |  54<H>  ----------------------------<  109<H>  3.6   |  18<L>  |  6.61<H>    Ca    6.9<L>      09 May 2019 02:25  Phos  3.9     05-09  Mg     2.0     05-09    TPro  6.2  /  Alb  2.8<L>  /  TBili  1.0  /  DBili  x   /  AST  34  /  ALT  32  /  AlkPhos  151<H>  05-09      PT/INR - ( 08 May 2019 20:16 )   PT: 21.2 SEC;   INR: 1.83          PTT - ( 08 May 2019 20:16 )  PTT:28.9 SEC    WBC Count: 3.53 K/uL (05-09 @ 02:25)  Hemoglobin: 6.6 g/dL (05-09 @ 02:25)            RADIOLOGY & ADDITIONAL STUDIES:

## 2019-05-09 NOTE — CHART NOTE - NSCHARTNOTEFT_GEN_A_CORE
MICU Transfer Note    Transfer from: MICU    Transfer to: ( X ) Medicine    (  ) Telemetry     (   ) RCU        (    ) Palliative         (   ) Stroke Unit          (   ) __________________    Accepting physician:      MICU COURSE:          ASSESSMENT & PLAN:             For Followup:          Vital Signs Last 24 Hrs  T(C): 38.7 (09 May 2019 16:00), Max: 38.7 (09 May 2019 16:00)  T(F): 101.6 (09 May 2019 16:00), Max: 101.6 (09 May 2019 16:00)  HR: 121 (09 May 2019 12:00) (81 - 123)  BP: 142/84 (09 May 2019 12:00) (112/57 - 160/81)  BP(mean): 98 (09 May 2019 12:00) (72 - 106)  RR: 25 (09 May 2019 12:00) (15 - 34)  SpO2: 98% (09 May 2019 11:00) (94% - 100%)  I&O's Summary    08 May 2019 07:01  -  09 May 2019 07:00  --------------------------------------------------------  IN: 2039.9 mL / OUT: 1290 mL / NET: 749.9 mL    09 May 2019 07:01  -  09 May 2019 16:20  --------------------------------------------------------  IN: 46.4 mL / OUT: 550 mL / NET: -503.6 mL        MEDICATIONS  (STANDING):  acetaminophen    Suspension .. 650 milliGRAM(s) Oral once  acyclovir IVPB 200 milliGRAM(s) IV Intermittent every 24 hours  chlorhexidine 4% Liquid 1 Application(s) Topical <User Schedule>  dexmedetomidine Infusion 0.2 MICROgram(s)/kG/Hr (3.87 mL/Hr) IV Continuous <Continuous>  docosanol 10% Cream 1 Application(s) Topical three times a day  filgrastim-sndz Injectable 480 MICROGram(s) SubCutaneous daily  fluconAZOLE IVPB 200 milliGRAM(s) IV Intermittent every 24 hours  metroNIDAZOLE  IVPB 500 milliGRAM(s) IV Intermittent every 8 hours  metroNIDAZOLE  IVPB      vancomycin    Solution 125 milliGRAM(s) Oral every 6 hours    MEDICATIONS  (PRN):  artificial  tears Solution 1 Drop(s) Both EYES every 6 hours PRN Dry Eyes        LABS                                            6.6                   Neurophils% (auto):   30.9   (05-09 @ 02:25):    3.53 )-----------(24           Lymphocytes% (auto):  50.7                                          19.4                   Eosinphils% (auto):   0.3      Manual%: Neutrophils x    ; Lymphocytes x    ; Eosinophils x    ; Bands%: x    ; Blasts x                                    143    |  103    |  54                  Calcium: 6.9   / iCa: x      (05-09 @ 02:25)    ----------------------------<  109       Magnesium: 2.0                              3.6     |  18     |  6.61             Phosphorous: 3.9      TPro  6.2    /  Alb  2.8    /  TBili  1.0    /  DBili  x      /  AST  34     /  ALT  32     /  AlkPhos  151    09 May 2019 02:25    ( 05-08 @ 20:16 )   PT: 21.2 SEC;   INR: 1.83   aPTT: 28.9 SEC MICU Transfer Note    Transfer from: MICU    Transfer to: ( X ) Medicine       Accepting physician:  Dr Jack Castro    HPI: 54yo M hx of relapsed multiple myeloma s/p bone marrow transplant (~1.5 yrs ago) and chemo/RT (Bendamustine and Pomalidomide on 4/08/2019), perforated diverticulitis s/p Kiah's (proctosigmoidectomy with colostomy, 9/2018), nonischemic cardiomyopathy (mild systolic and diastolic dysfunction), and HTN initially presented to [facility] ED on 4/25/19  w/ vomiting and change of vision. Code stroke was called and CTH was unremarkable. He was found to be pancytopenic likely due to MM and recent chemo. He was admitted to medicine for further w/u. In ED, he received decadron 40mg IV, 1u PLT. 1u pRBC.  Heme/onc and ID was consulted. Hospital course was complicated by odynophagia and neutropenic fevers. He received neupogen and cefepime, vancomycin, and valacyclovir. Also on fluconazole for esophagitis. GI was consulted for Esophagitis, and EGD deemed too risky in setting of pancytopenia. Hospital course further complicated by acute renal failure, metabolic acidosis and anuria. Pt had R groin shiley placed by vascular and received first HD on 5/4. Further complicated by C diff colitis dx on 5/6 and R groin shiley removed due to C Diff. He was planned for IJ shiley with IR today.     MICU consulted for altered mental status. Wife at bedside reports pt with cognitive decline since saturday with first HD session. He could previously hold a conversation and now with difficulty following commands. Pt currently waxing and waning. Denies pain. Transferred to MICU for acute encephalopathy.     MICU Course: Pt was transferred to MICU s/p R subclavian shiley placement by IR on 5/8. He received urgent HD with 1U pRBC and mental status improved. HE had an NGT placed for oral meds. Extensive GOC conversations were held with Heme, Palliative and MICU team. Heme has expressed currently he has likely exhausted all MM treatment and would not be a candidate for trials while on HD. Pt remains full code. In AM, he returned at 6.6 and was transfused 2nd unit PRBC. He remained stable on room air, and never required respiratory or pressure support. He is stable for transfer to medicine.     ASSESSMENT & PLAN:   52 y/o Male with relapsed multiple myeloma s/p bone marrow transplant (~1.5 yrs ago) and chemo/RT (last infusion Bendamustine and Pomalidomide 4/08/2019), perforated diverticulitis s/p Kiah's (proctosigmoidectomy with colostomy, 9/2018), nonischemic cardiomyopathy (mild global LV systolic dysfunction with EF 54% and diastolic LV dysfunction based on TTE in 11/2018), and HTN admitted to [facility] on 4/25 for nausea/vomiting and poor PO intake, found to have pancytopenia and was treated for neutropenic fever however course c/b acute renal failure with shiley placed by IR for new HD, C diff infection, and acute encephalopathy, transferred to the MICU for further evaluation and management. Encephalopathy now improving with HD.     #Neuro - acute encephalopathy likely due acute renal failure. Improving today s/p HD yesterday.   - repeat CT head yesterday unremarkable  - will wean off precedex during day  - continue HD as per renal, treating infection as below    #CV - Hx of HTN, Hx of nonischemic Cardiomyopathy - TTE showed improved and now normal LVSF and RVSF  - BP stable  - TTE 3/29/19 EF 61%  - No active issues  - monitor VSq1  - For HTN: hold home carvedilol and lisinopril.     #Pulm  - No active issues, no respiratory distress and not hypoxemic on RA  - currently protecting airway, wean off sedation    #GI  (+) C-diff infection, esophagitis  - c/w IV flagyl and vanc PO via NGT, 125mg q6 for 14d course  - monitor bmp qd  - will consider tube feeds  - cont empiric fluconazole and acyclovir for esophagitis    #Renal - ARF with metabolic acidosis, and anuric requiring HD (fem shiley removed and R subclavian shiley placed by IR), baseline Cr ~1.1, possibly 2/2 ATN and MM. Also with Hypocalcemia and hyperphosphatemia  - HD as per renal  - monitor bmp qd  - renally dose meds    #Heme - Pancytopenia due to Multiple Myeloma, Recent Chemo, Infection   - monitor cbc BID, transfuse for Hg<7, Plt<10 or Plt<50 if bleeding  - will transfuse platelet today, goal 40k  - Per Heme, Dr Gastelum/Dr Booth, pt currently with poor prognosis due to relapsing MM. Unable to receive further chemo at this time due to renal failure and poor functional status  - Pain management for MM - hold on further sedating meds to monitor mental status.   - Cont zarxio, per heme, until ANC>1500   - hold allopurinol in setting of ARF, monitor TLS labs  - will transfuse 1U plt with next HD session    #ID - CDI, no evidence of obstruction or megacolon on CT a/p  - cont PO vanc and metronidazole   - f/u bl clx, 5/6 NGTD  - cont acyand fluconazole for esophagitis  - monitor cbc qd  - s/p cefepime and vanc course as bl clx remain negative    #DVT ppx - SCDs, hold pharm VTE in setting of thrombocytopenia    #GOC - Extensive Conservation with MICU team - Pt with poor prognosis due to relapsing MM and acute renal failure requiring HD. Palliative care consulted. Wife, Devan, is HCP.   -Currently remains full code.   - As per heme, not eligible for trials while on HD and has likely exhausted MM treatments  - wife considering DNR/DNI, will discuss with family      For Followup:  -Monitor H/h, keep active T+S, transfuse for Hg<7, platelets<40, as per heme. Transfuse next 1U of platelets with HD tomorrow.  -   - Ongoing Goals of care, f/u heme and palliative discussion      Vital Signs Last 24 Hrs  T(C): 38.7 (09 May 2019 16:00), Max: 38.7 (09 May 2019 16:00)  T(F): 101.6 (09 May 2019 16:00), Max: 101.6 (09 May 2019 16:00)  HR: 121 (09 May 2019 12:00) (81 - 123)  BP: 142/84 (09 May 2019 12:00) (112/57 - 160/81)  BP(mean): 98 (09 May 2019 12:00) (72 - 106)  RR: 25 (09 May 2019 12:00) (15 - 34)  SpO2: 98% (09 May 2019 11:00) (94% - 100%)  I&O's Summary    08 May 2019 07:01  -  09 May 2019 07:00  --------------------------------------------------------  IN: 2039.9 mL / OUT: 1290 mL / NET: 749.9 mL    09 May 2019 07:01  -  09 May 2019 16:20  --------------------------------------------------------  IN: 46.4 mL / OUT: 550 mL / NET: -503.6 mL        MEDICATIONS  (STANDING):  acetaminophen    Suspension .. 650 milliGRAM(s) Oral once  acyclovir IVPB 200 milliGRAM(s) IV Intermittent every 24 hours  chlorhexidine 4% Liquid 1 Application(s) Topical <User Schedule>  dexmedetomidine Infusion 0.2 MICROgram(s)/kG/Hr (3.87 mL/Hr) IV Continuous <Continuous>  docosanol 10% Cream 1 Application(s) Topical three times a day  filgrastim-sndz Injectable 480 MICROGram(s) SubCutaneous daily  fluconAZOLE IVPB 200 milliGRAM(s) IV Intermittent every 24 hours  metroNIDAZOLE  IVPB 500 milliGRAM(s) IV Intermittent every 8 hours  metroNIDAZOLE  IVPB      vancomycin    Solution 125 milliGRAM(s) Oral every 6 hours    MEDICATIONS  (PRN):  artificial  tears Solution 1 Drop(s) Both EYES every 6 hours PRN Dry Eyes        LABS                                            6.6                   Neurophils% (auto):   30.9   (05-09 @ 02:25):    3.53 )-----------(24           Lymphocytes% (auto):  50.7                                          19.4                   Eosinphils% (auto):   0.3      Manual%: Neutrophils x    ; Lymphocytes x    ; Eosinophils x    ; Bands%: x    ; Blasts x                                    143    |  103    |  54                  Calcium: 6.9   / iCa: x      (05-09 @ 02:25)    ----------------------------<  109       Magnesium: 2.0                              3.6     |  18     |  6.61             Phosphorous: 3.9      TPro  6.2    /  Alb  2.8    /  TBili  1.0    /  DBili  x      /  AST  34     /  ALT  32     /  AlkPhos  151    09 May 2019 02:25    ( 05-08 @ 20:16 )   PT: 21.2 SEC;   INR: 1.83   aPTT: 28.9 SEC MICU Transfer Note    Transfer from: MICU    Transfer to: ( X ) Medicine       Accepting physician:  Dr Jack Castro    HPI: 52yo M hx of relapsed multiple myeloma s/p bone marrow transplant (~1.5 yrs ago) and chemo/RT (Bendamustine and Pomalidomide on 4/08/2019), perforated diverticulitis s/p Kiah's (proctosigmoidectomy with colostomy, 9/2018), nonischemic cardiomyopathy (mild systolic and diastolic dysfunction), and HTN initially presented to [facility] ED on 4/25/19  w/ vomiting and change of vision. Code stroke was called and CTH was unremarkable. He was found to be pancytopenic likely due to MM and recent chemo. He was admitted to medicine for further w/u. In ED, he received decadron 40mg IV, 1u PLT. 1u pRBC.  Heme/onc and ID was consulted. Hospital course was complicated by odynophagia and neutropenic fevers. He received neupogen and cefepime, vancomycin, and valacyclovir. Also on fluconazole for esophagitis. GI was consulted for Esophagitis, and EGD deemed too risky in setting of pancytopenia. Hospital course further complicated by acute renal failure, metabolic acidosis and anuria. Pt had R groin shiley placed by vascular and received first HD on 5/4. Further complicated by C diff colitis dx on 5/6 and R groin shiley removed due to C Diff. He was planned for IJ shiley with IR today.     MICU consulted for altered mental status. Wife at bedside reports pt with cognitive decline since saturday with first HD session. He could previously hold a conversation and now with difficulty following commands. Pt currently waxing and waning. Denies pain. Transferred to MICU for acute encephalopathy.     MICU Course: Pt was transferred to MICU s/p R subclavian shiley placement by IR on 5/8. He received urgent HD with 1U pRBC and mental status improved. HE had an NGT placed for oral meds. Extensive GOC conversations were held with Heme, Palliative and MICU team. Heme has expressed currently he has likely exhausted all MM treatment and would not be a candidate for trials while on HD. Pt remains full code. In AM, he returned at 6.6 and was transfused 2nd unit PRBC. He remained stable on room air, and never required respiratory or pressure support. He is stable for transfer to medicine.     ASSESSMENT & PLAN:   52 y/o Male with relapsed multiple myeloma s/p bone marrow transplant (~1.5 yrs ago) and chemo/RT (last infusion Bendamustine and Pomalidomide 4/08/2019), perforated diverticulitis s/p Kiah's (proctosigmoidectomy with colostomy, 9/2018), nonischemic cardiomyopathy (mild global LV systolic dysfunction with EF 54% and diastolic LV dysfunction based on TTE in 11/2018), and HTN admitted to [facility] on 4/25 for nausea/vomiting and poor PO intake, found to have pancytopenia and was treated for neutropenic fever however course c/b acute renal failure with shiley placed by IR for new HD, C diff infection, and acute encephalopathy, transferred to the MICU for further evaluation and management. Encephalopathy now improving with HD.     #Neuro - acute encephalopathy likely due acute renal failure. Improving today s/p HD yesterday.   - repeat CT head yesterday unremarkable  - will wean off precedex during day  - continue HD as per renal, treating infection as below    #CV - Hx of HTN, Hx of nonischemic Cardiomyopathy - TTE showed improved and now normal LVSF and RVSF  - BP stable  - TTE 3/29/19 EF 61%  - No active issues  - monitor VSq1  - For HTN: hold home carvedilol and lisinopril.     #Pulm  - No active issues, no respiratory distress and not hypoxemic on RA  - currently protecting airway, wean off sedation    #GI  (+) C-diff infection, esophagitis  - c/w IV flagyl and vanc PO via NGT, 125mg q6 for 14d course  - monitor bmp qd  - will consider tube feeds  - cont empiric fluconazole and acyclovir for esophagitis    #Renal - ARF with metabolic acidosis, and anuric requiring HD (fem shiley removed and R subclavian shiley placed by IR), baseline Cr ~1.1, possibly 2/2 ATN and MM. Also with Hypocalcemia and hyperphosphatemia  - HD as per renal  - monitor bmp qd  - renally dose meds    #Heme - Pancytopenia due to Multiple Myeloma, Recent Chemo, Infection   - monitor cbc BID, transfuse for Hg<7, Plt<10 or Plt<50 if bleeding  - will transfuse platelet today, goal 40k  - Per Heme, Dr Gastelum/Dr Booth, pt currently with poor prognosis due to relapsing MM. Unable to receive further chemo at this time due to renal failure and poor functional status  - Pain management for MM - hold on further sedating meds to monitor mental status.   - Cont zarxio, per heme, until ANC>1500   - hold allopurinol in setting of ARF, monitor TLS labs  - will transfuse 1U plt with next HD session    #ID - CDI, no evidence of obstruction or megacolon on CT a/p  - cont PO vanc and metronidazole   - f/u bl clx, 5/6 NGTD  - cont acyand fluconazole for esophagitis  - monitor cbc qd  - s/p cefepime and vanc course as bl clx remain negative    #DVT ppx - SCDs, hold pharm VTE in setting of thrombocytopenia    #GOC - Extensive Conservation with MICU team - Pt with poor prognosis due to relapsing MM and acute renal failure requiring HD. Palliative care consulted. Wife, Devan, is HCP.   -Currently remains full code.   - As per heme, not eligible for trials while on HD and has likely exhausted MM treatments  - wife considering DNR/DNI, will discuss with family      For Followup:  -Monitor H/h, keep active T+S, transfuse for Hg<7, platelets<40, as per heme. Transfuse next 1U of platelets with HD tomorrow.  - Get speech and swallow or consider Tube feeds if pt agreeable  - Ongoing Goals of care, f/u heme and palliative discussion      Vital Signs Last 24 Hrs  T(C): 38.7 (09 May 2019 16:00), Max: 38.7 (09 May 2019 16:00)  T(F): 101.6 (09 May 2019 16:00), Max: 101.6 (09 May 2019 16:00)  HR: 121 (09 May 2019 12:00) (81 - 123)  BP: 142/84 (09 May 2019 12:00) (112/57 - 160/81)  BP(mean): 98 (09 May 2019 12:00) (72 - 106)  RR: 25 (09 May 2019 12:00) (15 - 34)  SpO2: 98% (09 May 2019 11:00) (94% - 100%)  I&O's Summary    08 May 2019 07:01  -  09 May 2019 07:00  --------------------------------------------------------  IN: 2039.9 mL / OUT: 1290 mL / NET: 749.9 mL    09 May 2019 07:01  -  09 May 2019 16:20  --------------------------------------------------------  IN: 46.4 mL / OUT: 550 mL / NET: -503.6 mL        MEDICATIONS  (STANDING):  acetaminophen    Suspension .. 650 milliGRAM(s) Oral once  acyclovir IVPB 200 milliGRAM(s) IV Intermittent every 24 hours  chlorhexidine 4% Liquid 1 Application(s) Topical <User Schedule>  dexmedetomidine Infusion 0.2 MICROgram(s)/kG/Hr (3.87 mL/Hr) IV Continuous <Continuous>  docosanol 10% Cream 1 Application(s) Topical three times a day  filgrastim-sndz Injectable 480 MICROGram(s) SubCutaneous daily  fluconAZOLE IVPB 200 milliGRAM(s) IV Intermittent every 24 hours  metroNIDAZOLE  IVPB 500 milliGRAM(s) IV Intermittent every 8 hours  metroNIDAZOLE  IVPB      vancomycin    Solution 125 milliGRAM(s) Oral every 6 hours    MEDICATIONS  (PRN):  artificial  tears Solution 1 Drop(s) Both EYES every 6 hours PRN Dry Eyes        LABS                                            6.6                   Neurophils% (auto):   30.9   (05-09 @ 02:25):    3.53 )-----------(24           Lymphocytes% (auto):  50.7                                          19.4                   Eosinphils% (auto):   0.3      Manual%: Neutrophils x    ; Lymphocytes x    ; Eosinophils x    ; Bands%: x    ; Blasts x                                    143    |  103    |  54                  Calcium: 6.9   / iCa: x      (05-09 @ 02:25)    ----------------------------<  109       Magnesium: 2.0                              3.6     |  18     |  6.61             Phosphorous: 3.9      TPro  6.2    /  Alb  2.8    /  TBili  1.0    /  DBili  x      /  AST  34     /  ALT  32     /  AlkPhos  151    09 May 2019 02:25    ( 05-08 @ 20:16 )   PT: 21.2 SEC;   INR: 1.83   aPTT: 28.9 SEC

## 2019-05-09 NOTE — PROGRESS NOTE ADULT - ASSESSMENT
Problem/Plan:  Severe C.diff colitis:      - po vanco, iv flagyl, monitor stool count      - adjust abx per ID/GI    Problem/Plan - :  ·  Problem: Pancytopenia with neutropenic fever 2/2 acute tonsillitis and esophageal candidiasis complicated by symptomatic anemia requiring prbc transfusions:      persistent           - persistent         - plt and prbc transfusion         - monitor for signs of bleeding           Problem/Plan:  Metabolic Encephalopathy:        - persistent. optimize lytes and treat underlying infectious state         - monitor mental status     Acute on Chronic Systolic CHF Exacerbation:       - with pulmonary edema/effusions        - UF HD       - daily weight, strict i/o, c/w cardiac meds, adjust     Problem/Plan:  Problem: Acute Renal Failure, 2/2 ATN:      - Hemodialysis       - cath intact       - c/w HD per nephro recs      - renally adjust rx/abx       - additional management per consultnats    Problem/Plan:      - Metabolic Acidosis:           - 2/2 ATN           - HD           - correct infectious abnormalities and lab abnormalities    Problem/Plan -:  ·  Problem: Multiple myeloma in relapse.  Plan: MM in relapse s/p bone marrow transplant (~1.5 yrs ago) and chemo/RT. Last infusion Bendamustine and Pomalidomide 4/08/2019        Continue Current medications and monitor labs closely        Hem/onc mgmt    Problem/Plan -:Acute Tonsilitis and Candida Esophagitis       -continue abx per ID       -supportive care prn Problem/Plan:  Severe C.diff colitis:      - po vanco, iv flagyl, monitor stool count      - adjust abx per ID/GI    Problem/Plan - :  ·  Problem: Pancytopenia with neutropenic fever 2/2 acute tonsillitis and esophageal candidiasis complicated by symptomatic anemia requiring prbc transfusions:      persistent           - persistent         - administer plt and prbc transfusion         - monitor for signs of bleeding           Problem/Plan -:  ·  Problem: Multiple myeloma in relapse.  Plan: MM in relapse s/p bone marrow transplant (~1.5 yrs ago) and chemo/RT. Last infusion Bendamustine and Pomalidomide 4/08/2019        - administer decadron today         - Continue Current medications and monitor labs closely        - Hem/onc mgmt    Problem/Plan:  Metabolic Encephalopathy:        - persistent. optimize lytes and treat underlying infectious state         - monitor mental status     Acute on Chronic Systolic CHF Exacerbation:       - with pulmonary edema/effusions        - UF HD       - daily weight, strict i/o, c/w cardiac meds, adjust     Problem/Plan:  Problem: Acute Renal Failure, 2/2 ATN:      - Hemodialysis       - cath intact       - c/w HD per nephro recs      - renally adjust rx/abx       - additional management per consultnats    Problem/Plan:      - Metabolic Acidosis:           - 2/2 ATN           - HD           - correct infectious abnormalities and lab abnormalities      Problem/Plan -:Acute Tonsilitis and Candida Esophagitis       -continue abx per ID       -supportive care prn

## 2019-05-09 NOTE — PROGRESS NOTE ADULT - SUBJECTIVE AND OBJECTIVE BOX
Patient seen and examined at bedside, with wife at bedside, all questions answered to the best of my medical specialty  transferred to micu overnight  Case discussed with medical team    HPI:  52yo M hx of relapsed multiple myeloma s/p bone marrow transplant (~1.5 yrs ago) and chemo/RT (last infusion Bendamustine and Pomalidomide 4/08/2019), perforated diverticulitis s/p Kiah's (proctosigmoidectomy with colostomy, 9/2018), nonischemic cardiomyopathy (mild global LV systolic dysfunction with EF 54% and diastolic LV dysfunction based on TTE in 11/2018), and HTN presents to [facility] ED w/ vomiting and change of vision. Wife Devan Hood at bedside. Pt was sitting at home. He vomited 2-3 times nonbloody, nonbilious and was not able to eat or drink anything. On 4/25 at 4pm, while sitting he noticed his vision went "white" for a few minutes associated with sweating, shaking, and weakness. His wife noticed he had a red rash on his legs and they brought him to the ED. Pt received a platelet transfusion about a week ago outpt. Denies any pain. Had a nose bleed earlier. He denies skin sores, measured fever, HA, trouble speaking, numbness/tingling, focal weakness, dizziness, CP, SOB, abd pain, N/V/D, hematochezia, melena, urinary symptoms, oral/genital sores. He has a recent admission to [facility] discharged 4/08/2019 for diffuse body pain and low-grade fever, infectious workup was negative. MRI C/T/L showed abnormal T1 prolongation in the whole spine, sacral and iliac bones. Received 7 days of Bendamustine and Pomalidomide (on hold few days for neutropenia), last on 4/08/2019.    When pt presented to the ED, code stroke called. Neuro exam was nonfocal, CTH neg for acute process, and vomiting and vision changes were resolved. ED vitals afebrile, H101, /58 --> 102/62, R18, sat 100% on RA. Given decadron 40mg IV, 1u PLT. 1u pRBC ordered. (26 Apr 2019 00:00)      PAST MEDICAL & SURGICAL HISTORY:  Diverticulitis: perforated s/p Kiah  Multiple myeloma: relapsed 3/2019  Hypertension  Left ventricular dysfunction  Cardiomyopathy: nonischemic  Former smoker: (1 ppd x 11 years; Quit ~age 26)  History of bone marrow transplant  History of surgery: s/p exploratory laparotomy with sigmoid resection and colostomy on 9/2/2018      oxycodone (Vomiting; Nausea)       MEDICATIONS  (STANDING):  acyclovir IVPB 200 milliGRAM(s) IV Intermittent every 24 hours  chlorhexidine 4% Liquid 1 Application(s) Topical <User Schedule>  dexmedetomidine Infusion 0.2 MICROgram(s)/kG/Hr (3.87 mL/Hr) IV Continuous <Continuous>  docosanol 10% Cream 1 Application(s) Topical three times a day  filgrastim-sndz Injectable 480 MICROGram(s) SubCutaneous daily  fluconAZOLE IVPB 200 milliGRAM(s) IV Intermittent every 24 hours  metroNIDAZOLE  IVPB 500 milliGRAM(s) IV Intermittent every 8 hours  metroNIDAZOLE  IVPB      vancomycin    Solution 125 milliGRAM(s) Oral every 6 hours    MEDICATIONS  (PRN):  artificial  tears Solution 1 Drop(s) Both EYES every 6 hours PRN Dry Eyes      REVIEW OF SYSTEMS: from wife at bedside  CONSTITUTIONAL: (+) malaise, gen weakness, fatigue, lethargic.   EYES: No acute change in vision   ENT:  No tinnitus  NECK: No stiffness  RESPIRATORY: cough. No hemoptysis  CARDIOVASCULAR: No chest pain, palpitations, syncope  GASTROINTESTINAL: No hematemesis, melena, or hematochezia.  GENITOURINARY: No hematuria  NEUROLOGICAL: No headaches  LYMPH Nodes: No enlarged glands  ENDOCRINE: No heat or cold intolerance	    T(C): 36.9 (05-09-19 @ 08:00), Max: 37.4 (05-09-19 @ 04:00)  HR: 91 (05-09-19 @ 08:00) (81 - 123)  BP: 130/71 (05-09-19 @ 08:00) (105/87 - 160/81)  RR: 30 (05-09-19 @ 08:00) (12 - 34)  SpO2: 98% (05-09-19 @ 08:00) (94% - 100%)    PHYSICAL EXAMINATION:   Constitutional: ill appearing  HEENT: ngt intact. mucositis.   Neck: cath intact. Supple  Respiratory: b/l inspiratory rales. Adequate airflow b/l. Not using accessory muscles of respiration.  Cardiovascular: sys murmur, S1 & S2 intact, no R/G, 2+ radial pulses b/l  Gastrointestinal: ostomy intact, soft, NT, ND, normoactive b.s., no organomegaly/RT/rigidity  Extremities: WWP  Neurological: moaning in distress, lethargic, mostly does not respond to questions accordingly. confused. Crude sensation intact.     Labs and imaging reviewed    LABS:                        6.6    3.53  )-----------( 24       ( 09 May 2019 02:25 )             19.4     05-09    143  |  103  |  54<H>  ----------------------------<  109<H>  3.6   |  18<L>  |  6.61<H>    Ca    6.9<L>      09 May 2019 02:25  Phos  3.9     05-09  Mg     2.0     05-09    TPro  6.2  /  Alb  2.8<L>  /  TBili  1.0  /  DBili  x   /  AST  34  /  ALT  32  /  AlkPhos  151<H>  05-09        PT/INR - ( 08 May 2019 20:16 )   PT: 21.2 SEC;   INR: 1.83          PTT - ( 08 May 2019 20:16 )  PTT:28.9 SEC    CAPILLARY BLOOD GLUCOSE            LIVER FUNCTIONS - ( 09 May 2019 02:25 )  Alb: 2.8 g/dL / Pro: 6.2 g/dL / ALK PHOS: 151 u/L / ALT: 32 u/L / AST: 34 u/L / GGT: x               RADIOLOGY & ADDITIONAL STUDIES:

## 2019-05-09 NOTE — PROGRESS NOTE ADULT - ASSESSMENT
53y Male with relapsed multiple myeloma s/p bone marrow transplant (~1.5 yrs ago) and chemo/RT (last infusion Bendamustine and Pomalidomide 4/08/2019), perforated diverticulitis s/p Kiah's (proctosigmoidectomy with colostomy, 9/2018), nonischemic cardiomyopathy (mild global LV systolic dysfunction with EF 54% and diastolic LV dysfunction based on TTE in 11/2018), and HTN admitted to [facility] on 4/25 for nausea and vomiting, and poor PO intake, treated for neutropenic fever, c/b ROBB. Renal following for ARF    labs, chart reviewed

## 2019-05-09 NOTE — CHART NOTE - NSCHARTNOTEFT_GEN_A_CORE
Accepting physician:  Dr Jack Castro    HPI: 52yo M hx of relapsed multiple myeloma s/p bone marrow transplant (~1.5 yrs ago) and chemo/RT (Bendamustine and Pomalidomide on 4/08/2019), perforated diverticulitis s/p Kiah's (proctosigmoidectomy with colostomy, 9/2018), nonischemic cardiomyopathy (mild systolic and diastolic dysfunction), and HTN initially presented to [facility] ED on 4/25/19  w/ vomiting and change of vision. Code stroke was called and CTH was unremarkable. He was found to be pancytopenic likely due to MM and recent chemo. He was admitted to medicine for further w/u. In ED, he received decadron 40mg IV, 1u PLT. 1u pRBC.  Heme/onc and ID was consulted. Hospital course was complicated by odynophagia and neutropenic fevers. He received neupogen and cefepime, vancomycin, and valacyclovir. Also on fluconazole for esophagitis. GI was consulted for Esophagitis, and EGD deemed too risky in setting of pancytopenia. Hospital course further complicated by acute renal failure, metabolic acidosis and anuria. Pt had R groin shiley placed by vascular and received first HD on 5/4. Further complicated by C diff colitis dx on 5/6 and R groin shiley removed due to C Diff. He was planned for IJ shiley with IR today.     MICU consulted for altered mental status. Wife at bedside reports pt with cognitive decline since saturday with first HD session. He could previously hold a conversation and now with difficulty following commands. Pt currently waxing and waning. Denies pain. Transferred to MICU for acute encephalopathy.     MICU Course: Pt was transferred to MICU s/p R subclavian shiley placement by IR on 5/8. He received urgent HD with 1U pRBC and mental status improved. HE had an NGT placed for oral meds. Extensive GOC conversations were held with Heme, Palliative and MICU team. Heme has expressed currently he has likely exhausted all MM treatment and would not be a candidate for trials while on HD. Pt remains full code. In AM, he returned at 6.6 and was transfused 2nd unit PRBC. He remained stable on room air, and never required respiratory or pressure support. He is stable for transfer to medicine.    MEDICATIONS  (STANDING):  acyclovir IVPB 200 milliGRAM(s) IV Intermittent every 24 hours  chlorhexidine 4% Liquid 1 Application(s) Topical <User Schedule>  dexmedetomidine Infusion 0.2 MICROgram(s)/kG/Hr (3.87 mL/Hr) IV Continuous <Continuous>  docosanol 10% Cream 1 Application(s) Topical three times a day  filgrastim-sndz Injectable 480 MICROGram(s) SubCutaneous daily  fluconAZOLE IVPB 200 milliGRAM(s) IV Intermittent every 24 hours  metroNIDAZOLE  IVPB 500 milliGRAM(s) IV Intermittent every 8 hours  metroNIDAZOLE  IVPB      vancomycin    Solution 125 milliGRAM(s) Oral every 6 hours    MEDICATIONS  (PRN):  artificial  tears Solution 1 Drop(s) Both EYES every 6 hours PRN Dry Eyes      T(F): 101.6 (05-09 @ 16:00), Max: 101.6 (05-09 @ 16:00)  HR: 125 (05-09 @ 16:00) (81 - 125)  BP: 146/78 (05-09 @ 16:00) (112/57 - 160/81)  RR: 26 (05-09 @ 16:00) (15 - 34)  SpO2: 98% (05-09 @ 11:00) (94% - 100%)    I&O's Summary    08 May 2019 07:01  -  09 May 2019 07:00  --------------------------------------------------------  IN: 2039.9 mL / OUT: 1290 mL / NET: 749.9 mL    09 May 2019 07:01  -  09 May 2019 17:23  --------------------------------------------------------  IN: 46.4 mL / OUT: 550 mL / NET: -503.6 mL      PHYSICAL EXAM:  GEN: Appears in no acute distress  HEENT: EOMI, neck supple, no lymphadenopathy, no JVD  MOUTH: moist mucous membranes, no exudates or lesions   PULM: Clear to auscultation bilaterally, no wheezes, rales, rhonchi  CV: Regular rate and rhythm, S1S2, no murmurs, rubs or gallops  ABD: Soft, nontender to palpation, non-distended, normoactive bowel sounds  EXTREMITIES: No edema, + peripheral pulses  NEURO: AAOx3, moving all four extremities spontaneously  SKIN: No rashes, lesions, hematomas, ecchymosis    LABS & IMAGING:  Labs personally reviewed [X]                        6.6    3.53  )-----------( 24       ( 09 May 2019 02:25 )             19.4     Hgb Trend: 6.6<--, 7.5<--, 6.3<--, 7.5<--, 8.0<--    05-09    143  |  103  |  54<H>  ----------------------------<  109<H>  3.6   |  18<L>  |  6.61<H>    Ca    6.9<L>      09 May 2019 02:25  Phos  3.9     05-09  Mg     2.0     05-09    TPro  6.2  /  Alb  2.8<L>  /  TBili  1.0  /  DBili  x   /  AST  34  /  ALT  32  /  AlkPhos  151<H>  05-09    Creatinine Trend: 6.61<--, 4.71<--, 9.68<--, 7.77<--, 8.51<--, 6.88<--    PT/INR - ( 08 May 2019 20:16 )   PT: 21.2 SEC;   INR: 1.83          PTT - ( 08 May 2019 20:16 )  PTT:28.9 SEC      Imaging personally reviewed [ ]      GEN: Appears in no acute distress  HEENT: PERRLA, EOMI and accommodate, neck supple, no lymphadenopathy, no JVD  MOUTH: moist mucous membranes, no exudates or lesions   PULM: Clear to auscultation bilaterally, no wheezes, rales, rhonchi  CV: Regular rate and rhythm, S1S2, no murmurs, rubs or gallops  ABD: Soft, nontender to palpation, non-distended, normoactive bowel sounds  EXTREMITIES: No edema, + peripheral pulses  NEURO: AAOx3, moving all four extremities spontaneously  SKIN: No rashes, lesions, hematomas, ecchymosis                          6.6    3.53  )-----------( 24       ( 09 May 2019 02:25 )             19.4     Hgb Trend: 6.6<--, 7.5<--, 6.3<--, 7.5<--, 8.0<--    05-09    143  |  103  |  54<H>  ----------------------------<  109<H>  3.6   |  18<L>  |  6.61<H>    Ca    6.9<L>      09 May 2019 02:25  Phos  3.9     05-09  Mg     2.0     05-09    TPro  6.2  /  Alb  2.8<L>  /  TBili  1.0  /  DBili  x   /  AST  34  /  ALT  32  /  AlkPhos  151<H>  05-09    Creatinine Trend: 6.61<--, 4.71<--, 9.68<--, 7.77<--, 8.51<--, 6.88<--  PT/INR - ( 08 May 2019 20:16 )   PT: 21.2 SEC;   INR: 1.83          PTT - ( 08 May 2019 20:16 )  PTT:28.9 SEC Accepting physician:  Dr Jack Castro    HPI: 54yo M hx of relapsed multiple myeloma s/p bone marrow transplant (~1.5 yrs ago) and chemo/RT (Bendamustine and Pomalidomide on 4/08/2019), perforated diverticulitis s/p Kiah's (proctosigmoidectomy with colostomy, 9/2018), nonischemic cardiomyopathy (mild systolic and diastolic dysfunction), and HTN initially presented to [facility] ED on 4/25/19  w/ vomiting and change of vision. Code stroke was called and CTH was unremarkable. He was found to be pancytopenic likely due to MM and recent chemo. He was admitted to medicine for further w/u. In ED, he received decadron 40mg IV, 1u PLT. 1u pRBC.  Heme/onc and ID was consulted. Hospital course was complicated by odynophagia and neutropenic fevers. He received neupogen and cefepime, vancomycin, and valacyclovir. Also on fluconazole for esophagitis. GI was consulted for Esophagitis, and EGD deemed too risky in setting of pancytopenia. Hospital course further complicated by acute renal failure, metabolic acidosis and anuria. Pt had R groin shiley placed by vascular and received first HD on 5/4. Further complicated by C diff colitis dx on 5/6 and R groin shiley removed due to C Diff. He was planned for IJ shiley with IR today.     MICU consulted for altered mental status. Wife at bedside reports pt with cognitive decline since saturday with first HD session. He could previously hold a conversation and now with difficulty following commands. Pt currently waxing and waning. Denies pain. Transferred to MICU for acute encephalopathy.     MICU Course: Pt was transferred to MICU s/p R subclavian shiley placement by IR on 5/8. He received urgent HD with 1U pRBC and mental status improved. HE had an NGT placed for oral meds. Extensive GOC conversations were held with Heme, Palliative and MICU team. Heme has expressed currently he has likely exhausted all MM treatment and would not be a candidate for trials while on HD. Pt remains full code. In AM, he returned at 6.6 and was transfused 2nd unit PRBC. He remained stable on room air, and never required respiratory or pressure support. He is stable for transfer to medicine.    MEDICATIONS  (STANDING):  acyclovir IVPB 200 milliGRAM(s) IV Intermittent every 24 hours  chlorhexidine 4% Liquid 1 Application(s) Topical <User Schedule>  dexmedetomidine Infusion 0.2 MICROgram(s)/kG/Hr (3.87 mL/Hr) IV Continuous <Continuous>  docosanol 10% Cream 1 Application(s) Topical three times a day  filgrastim-sndz Injectable 480 MICROGram(s) SubCutaneous daily  fluconAZOLE IVPB 200 milliGRAM(s) IV Intermittent every 24 hours  metroNIDAZOLE  IVPB 500 milliGRAM(s) IV Intermittent every 8 hours  metroNIDAZOLE  IVPB      vancomycin    Solution 125 milliGRAM(s) Oral every 6 hours    MEDICATIONS  (PRN):  artificial  tears Solution 1 Drop(s) Both EYES every 6 hours PRN Dry Eyes      T(F): 101.6 (05-09 @ 16:00), Max: 101.6 (05-09 @ 16:00)  HR: 125 (05-09 @ 16:00) (81 - 125)  BP: 146/78 (05-09 @ 16:00) (112/57 - 160/81)  RR: 26 (05-09 @ 16:00) (15 - 34)  SpO2: 98% (05-09 @ 11:00) (94% - 100%)    I&O's Summary    08 May 2019 07:01  -  09 May 2019 07:00  --------------------------------------------------------  IN: 2039.9 mL / OUT: 1290 mL / NET: 749.9 mL    09 May 2019 07:01  -  09 May 2019 17:23  --------------------------------------------------------  IN: 46.4 mL / OUT: 550 mL / NET: -503.6 mL      PHYSICAL EXAM:  GEN: Appears in no acute distress  HEENT: EOMI, neck supple, no lymphadenopathy, no JVD, MMM  PULM: Clear to auscultation bilaterally, no wheezes, rales, rhonchi  CV: Regular rate and rhythm, S1S2, no murmurs, rubs or gallops  ABD: Soft, nontender to palpation, non-distended, colostomy bag in place  EXTREMITIES: No edema, + peripheral pulses  NEURO: AAOx3, moving all four extremities spontaneously  PSYCH: "ok" mood, appropriate and congruent affect    LABS & IMAGING:  Labs personally reviewed [X]                        6.6    3.53  )-----------( 24       ( 09 May 2019 02:25 )             19.4     Hgb Trend: 6.6<--, 7.5<--, 6.3<--, 7.5<--, 8.0<--    05-09    143  |  103  |  54<H>  ----------------------------<  109<H>  3.6   |  18<L>  |  6.61<H>    Ca    6.9<L>      09 May 2019 02:25  Phos  3.9     05-09  Mg     2.0     05-09    TPro  6.2  /  Alb  2.8<L>  /  TBili  1.0  /  DBili  x   /  AST  34  /  ALT  32  /  AlkPhos  151<H>  05-09    Creatinine Trend: 6.61<--, 4.71<--, 9.68<--, 7.77<--, 8.51<--, 6.88<--    PT/INR - ( 08 May 2019 20:16 )   PT: 21.2 SEC;   INR: 1.83          PTT - ( 08 May 2019 20:16 )  PTT:28.9 SEC      Imaging personally reviewed [ ]      GEN: Appears in no acute distress  HEENT: PERRLA, EOMI and accommodate, neck supple, no lymphadenopathy, no JVD  MOUTH: moist mucous membranes, no exudates or lesions   PULM: Clear to auscultation bilaterally, no wheezes, rales, rhonchi  CV: Regular rate and rhythm, S1S2, no murmurs, rubs or gallops  ABD: Soft, nontender to palpation, non-distended, normoactive bowel sounds  EXTREMITIES: No edema, + peripheral pulses  NEURO: AAOx3, moving all four extremities spontaneously  SKIN: No rashes, lesions, hematomas, ecchymosis                          6.6    3.53  )-----------( 24       ( 09 May 2019 02:25 )             19.4     Hgb Trend: 6.6<--, 7.5<--, 6.3<--, 7.5<--, 8.0<--    05-09    143  |  103  |  54<H>  ----------------------------<  109<H>  3.6   |  18<L>  |  6.61<H>    Ca    6.9<L>      09 May 2019 02:25  Phos  3.9     05-09  Mg     2.0     05-09    TPro  6.2  /  Alb  2.8<L>  /  TBili  1.0  /  DBili  x   /  AST  34  /  ALT  32  /  AlkPhos  151<H>  05-09    Creatinine Trend: 6.61<--, 4.71<--, 9.68<--, 7.77<--, 8.51<--, 6.88<--  PT/INR - ( 08 May 2019 20:16 )   PT: 21.2 SEC;   INR: 1.83          PTT - ( 08 May 2019 20:16 )  PTT:28.9 SEC Accepting physician:  Dr Jack Castro    HPI: 54yo M hx of relapsed multiple myeloma s/p bone marrow transplant (~1.5 yrs ago) and chemo/RT (Bendamustine and Pomalidomide on 4/08/2019), perforated diverticulitis s/p Kiah's (proctosigmoidectomy with colostomy, 9/2018), nonischemic cardiomyopathy (mild systolic and diastolic dysfunction), and HTN initially presented to [facility] ED on 4/25/19  w/ vomiting and change of vision. Code stroke was called and CTH was unremarkable. He was found to be pancytopenic likely due to MM and recent chemo. He was admitted to medicine for further w/u. In ED, he received decadron 40mg IV, 1u PLT. 1u pRBC.  Heme/onc and ID was consulted. Hospital course was complicated by odynophagia and neutropenic fevers. He received neupogen and cefepime, vancomycin, and valacyclovir. Also on fluconazole for esophagitis. GI was consulted for Esophagitis, and EGD deemed too risky in setting of pancytopenia. Hospital course further complicated by acute renal failure, metabolic acidosis and anuria. Pt had R groin shiley placed by vascular and received first HD on 5/4. Further complicated by C diff colitis dx on 5/6 and R groin shiley removed due to C Diff. He was planned for IJ shiley with IR today.     MICU consulted for altered mental status. Wife at bedside reports pt with cognitive decline since saturday with first HD session. He could previously hold a conversation and now with difficulty following commands. Pt currently waxing and waning. Denies pain. Transferred to MICU for acute encephalopathy.     MICU Course: Pt was transferred to MICU s/p R subclavian shiley placement by IR on 5/8. He received urgent HD with 1U pRBC and mental status improved. HE had an NGT placed for oral meds. Extensive GOC conversations were held with Heme, Palliative and MICU team. Heme has expressed currently he has likely exhausted all MM treatment and would not be a candidate for trials while on HD. Pt remains full code. In AM, he returned at 6.6 and was transfused 2nd unit PRBC. He remained stable on room air, and never required respiratory or pressure support. He is stable for transfer to medicine.    MEDICATIONS  (STANDING):  acyclovir IVPB 200 milliGRAM(s) IV Intermittent every 24 hours  chlorhexidine 4% Liquid 1 Application(s) Topical <User Schedule>  dexmedetomidine Infusion 0.2 MICROgram(s)/kG/Hr (3.87 mL/Hr) IV Continuous <Continuous>  docosanol 10% Cream 1 Application(s) Topical three times a day  filgrastim-sndz Injectable 480 MICROGram(s) SubCutaneous daily  fluconAZOLE IVPB 200 milliGRAM(s) IV Intermittent every 24 hours  metroNIDAZOLE  IVPB 500 milliGRAM(s) IV Intermittent every 8 hours  metroNIDAZOLE  IVPB      vancomycin    Solution 125 milliGRAM(s) Oral every 6 hours    MEDICATIONS  (PRN):  artificial  tears Solution 1 Drop(s) Both EYES every 6 hours PRN Dry Eyes      T(F): 101.6 (05-09 @ 16:00), Max: 101.6 (05-09 @ 16:00)  HR: 125 (05-09 @ 16:00) (81 - 125)  BP: 146/78 (05-09 @ 16:00) (112/57 - 160/81)  RR: 26 (05-09 @ 16:00) (15 - 34)  SpO2: 98% (05-09 @ 11:00) (94% - 100%)    I&O's Summary    08 May 2019 07:01  -  09 May 2019 07:00  --------------------------------------------------------  IN: 2039.9 mL / OUT: 1290 mL / NET: 749.9 mL    09 May 2019 07:01  -  09 May 2019 17:23  --------------------------------------------------------  IN: 46.4 mL / OUT: 550 mL / NET: -503.6 mL      PHYSICAL EXAM:  GEN: Appears in no acute distress  HEENT: EOMI, neck supple, no lymphadenopathy, no JVD, MMM  PULM: Clear to auscultation bilaterally, no wheezes, rales, rhonchi  CV: Regular rate and rhythm, S1S2, no murmurs, rubs or gallops  ABD: Soft, nontender to palpation, non-distended, colostomy bag in place  EXTREMITIES: No edema, + peripheral pulses  NEURO: AAOx3, moving all four extremities spontaneously  PSYCH: "ok" mood, appropriate and congruent affect    LABS & IMAGING:  Labs personally reviewed [X]                        6.6    3.53  )-----------( 24       ( 09 May 2019 02:25 )             19.4     Hgb Trend: 6.6<--, 7.5<--, 6.3<--, 7.5<--, 8.0<--    05-09    143  |  103  |  54<H>  ----------------------------<  109<H>  3.6   |  18<L>  |  6.61<H>    Ca    6.9<L>      09 May 2019 02:25  Phos  3.9     05-09  Mg     2.0     05-09    TPro  6.2  /  Alb  2.8<L>  /  TBili  1.0  /  DBili  x   /  AST  34  /  ALT  32  /  AlkPhos  151<H>  05-09    Creatinine Trend: 6.61<--, 4.71<--, 9.68<--, 7.77<--, 8.51<--, 6.88<--    PT/INR - ( 08 May 2019 20:16 )   PT: 21.2 SEC;   INR: 1.83     PTT - ( 08 May 2019 20:16 )  PTT:28.9 SEC    < from: CT Head No Cont (05.08.19 @ 21:50) >  The study is partially limited by motion. There is no gross evidence for   acute infarct, acute hemorrhage, mass effect, calvarial fracture, or   hydrocephalus. Punctate calcification left temporal lobe is unchanged.    Mild patchy hypodensity without mass effect is noted in the   periventricular white matter which most likely represents chronic   microvascular ischemic changes given the patient's age.    Cerebral volume loss is present with secondary proportional prominence of   the sulci and ventricles.    No lytic or blastic calvarial lesions are noted. The visualized portions   of the paranasal sinuses and mastoid air cells are clear.    IMPRESSION:    No gross acute intracranial pathology is noted on a motion limited exam.   If the patient has new and persistent symptoms, consider short interval   follow-up head CT or brain MRI follow-up if there are no MRI   contraindications.    < end of copied text >        ASSESSMENT & PLAN:  54 y/o Male with relapsed multiple myeloma s/p bone marrow transplant (~1.5 yrs ago) and chemo/RT (last infusion Bendamustine and Pomalidomide 4/08/2019), perforated diverticulitis s/p Kiah's (proctosigmoidectomy with colostomy, 9/2018), nonischemic cardiomyopathy (mild global LV systolic dysfunction with EF 54% and diastolic LV dysfunction based on TTE in 11/2018), and HTN admitted to [facility] on 4/25 for nausea/vomiting and poor PO intake, found to have pancytopenia and was treated for neutropenic fever however course c/b acute renal failure with Shiley placed by IR for new HD, C diff infection, and acute encephalopathy, transferred to the MICU for further evaluation and management. Encephalopathy now improving with HD.    1. C.diff colitis  - no evidence of megacolon on CT A/P  - c/w po vanco, iv flagyl  for 14d course  - monitor stool count  - ID consult, GI consult  - CBC qd    2. Pancytopenia with neutropenic fever (2/2 acute tonsillitis and esophageal candidiasis)   - recurrent symptomatic anemia requiring pRBC transfusions  - f/u Bcx 5/9  - transfused 1u pRBC today  - transfuse 1u plt w/ HD tmr  - transfuse goal Hgb <7, Plt<10 or Plt<50 if bleeding  - monitor for signs of bleeding  - c/w Zarxio 480mcg per Heme/Onc  - Heme/On cc/s appreciated           3. Multiple myeloma   - in relapse s/p bone marrow transplant (~1.5 yrs ago) and chemo/RT.   - Last infusion Bendamustine and Pomalidomide 4/08/2019  - s/p decadron 40mg  - Hem/onc consult    4. Metabolic Encephalopathy  - electrolyte derangement vs infectious etiology  - Head CT w/o acute pathology  - monitor mental status     5. Acute on Chronic Systolic CHF Exacerbation  - with pulmonary edema/effusions   - BP currently stable  - Holding home carvedilol/lisinopril   - TTE 3/29/19 EF 61%  - UF HD per nephrology  - daily weight, strict i/o, c/w cardiac meds, adjust     6. Acute Renal Failure, 2/2 ATN:  - c/w HD per nephro recs  - hypocalcemia and hyperphos  - renally adjust medications  - Nephrology consult    7. Metabolic Acidosis  - 2/2 ATN vs GI loss  - anticipate resolution with HD and infectious treatment  - BMP qd    8. Acute Tonsilitis and Candida Esophagitis  - c/w acyclovir and fluconazole  - supportive care prn    9. GOC  - Palliative, Heme/Onc, and MICU assistance appreciated  - Full code  - Pending further discussion    10. Prophylactic measures  - DVT: SCDs - low risk  - DIET: NPO - S/S  - DISPO: Pending GOC discussion Accepting physician:  Dr Jack Castro    HPI: 52yo M hx of relapsed multiple myeloma s/p bone marrow transplant (~1.5 yrs ago) and chemo/RT (Bendamustine and Pomalidomide on 4/08/2019), perforated diverticulitis s/p Kiah's (proctosigmoidectomy with colostomy, 9/2018), nonischemic cardiomyopathy (mild systolic and diastolic dysfunction), and HTN initially presented to [facility] ED on 4/25/19  w/ vomiting and change of vision. Code stroke was called and CTH was unremarkable. He was found to be pancytopenic likely due to MM and recent chemo. He was admitted to medicine for further w/u. In ED, he received decadron 40mg IV, 1u PLT. 1u pRBC.  Heme/onc and ID was consulted. Hospital course was complicated by odynophagia and neutropenic fevers. He received neupogen and cefepime, vancomycin, and valacyclovir. Also on fluconazole for esophagitis. GI was consulted for Esophagitis, and EGD deemed too risky in setting of pancytopenia. Hospital course further complicated by acute renal failure, metabolic acidosis and anuria. Pt had R groin shiley placed by vascular and received first HD on 5/4. Further complicated by C diff colitis dx on 5/6 and R groin shiley removed due to C Diff. He was planned for IJ shiley with IR today.     MICU consulted for altered mental status. Wife at bedside reports pt with cognitive decline since saturday with first HD session. He could previously hold a conversation and now with difficulty following commands. Pt currently waxing and waning. Denies pain. Transferred to MICU for acute encephalopathy.     MICU Course: Pt was transferred to MICU s/p R subclavian shiley placement by IR on 5/8. He received urgent HD with 1U pRBC and mental status improved. HE had an NGT placed for oral meds. Extensive GOC conversations were held with Heme, Palliative and MICU team. Heme has expressed currently he has likely exhausted all MM treatment and would not be a candidate for trials while on HD. Pt remains full code. In AM, he returned at 6.6 and was transfused 2nd unit PRBC. He remained stable on room air, and never required respiratory or pressure support. He is stable for transfer to medicine.    MEDICATIONS  (STANDING):  acyclovir IVPB 200 milliGRAM(s) IV Intermittent every 24 hours  chlorhexidine 4% Liquid 1 Application(s) Topical <User Schedule>  dexmedetomidine Infusion 0.2 MICROgram(s)/kG/Hr (3.87 mL/Hr) IV Continuous <Continuous>  docosanol 10% Cream 1 Application(s) Topical three times a day  filgrastim-sndz Injectable 480 MICROGram(s) SubCutaneous daily  fluconAZOLE IVPB 200 milliGRAM(s) IV Intermittent every 24 hours  metroNIDAZOLE  IVPB 500 milliGRAM(s) IV Intermittent every 8 hours  metroNIDAZOLE  IVPB      vancomycin    Solution 125 milliGRAM(s) Oral every 6 hours    MEDICATIONS  (PRN):  artificial  tears Solution 1 Drop(s) Both EYES every 6 hours PRN Dry Eyes      T(F): 101.6 (05-09 @ 16:00), Max: 101.6 (05-09 @ 16:00)  HR: 125 (05-09 @ 16:00) (81 - 125)  BP: 146/78 (05-09 @ 16:00) (112/57 - 160/81)  RR: 26 (05-09 @ 16:00) (15 - 34)  SpO2: 98% (05-09 @ 11:00) (94% - 100%)    I&O's Summary    08 May 2019 07:01  -  09 May 2019 07:00  --------------------------------------------------------  IN: 2039.9 mL / OUT: 1290 mL / NET: 749.9 mL    09 May 2019 07:01  -  09 May 2019 17:23  --------------------------------------------------------  IN: 46.4 mL / OUT: 550 mL / NET: -503.6 mL      PHYSICAL EXAM:  GEN: Appears in no acute distress  HEENT: EOMI, neck supple, no lymphadenopathy, no JVD, MMM  PULM: Clear to auscultation bilaterally, no wheezes, rales, rhonchi  CV: Regular rate and rhythm, S1S2, no murmurs, rubs or gallops  ABD: Soft, nontender to palpation, non-distended, colostomy bag in place  EXTREMITIES: No edema, + peripheral pulses  NEURO: AAOx3, moving all four extremities spontaneously  PSYCH: "ok" mood, appropriate and congruent affect    LABS & IMAGING:  Labs personally reviewed [X]                        6.6    3.53  )-----------( 24       ( 09 May 2019 02:25 )             19.4     Hgb Trend: 6.6<--, 7.5<--, 6.3<--, 7.5<--, 8.0<--    05-09    143  |  103  |  54<H>  ----------------------------<  109<H>  3.6   |  18<L>  |  6.61<H>    Ca    6.9<L>      09 May 2019 02:25  Phos  3.9     05-09  Mg     2.0     05-09    TPro  6.2  /  Alb  2.8<L>  /  TBili  1.0  /  DBili  x   /  AST  34  /  ALT  32  /  AlkPhos  151<H>  05-09    Creatinine Trend: 6.61<--, 4.71<--, 9.68<--, 7.77<--, 8.51<--, 6.88<--    PT/INR - ( 08 May 2019 20:16 )   PT: 21.2 SEC;   INR: 1.83     PTT - ( 08 May 2019 20:16 )  PTT:28.9 SEC    < from: CT Head No Cont (05.08.19 @ 21:50) >  The study is partially limited by motion. There is no gross evidence for   acute infarct, acute hemorrhage, mass effect, calvarial fracture, or   hydrocephalus. Punctate calcification left temporal lobe is unchanged.    Mild patchy hypodensity without mass effect is noted in the   periventricular white matter which most likely represents chronic   microvascular ischemic changes given the patient's age.    Cerebral volume loss is present with secondary proportional prominence of   the sulci and ventricles.    No lytic or blastic calvarial lesions are noted. The visualized portions   of the paranasal sinuses and mastoid air cells are clear.    IMPRESSION:    No gross acute intracranial pathology is noted on a motion limited exam.   If the patient has new and persistent symptoms, consider short interval   follow-up head CT or brain MRI follow-up if there are no MRI   contraindications.    < end of copied text >        ASSESSMENT & PLAN:  52 y/o Male with relapsed multiple myeloma s/p bone marrow transplant (~1.5 yrs ago) and chemo/RT (last infusion Bendamustine and Pomalidomide 4/08/2019), perforated diverticulitis s/p Kiah's (proctosigmoidectomy with colostomy, 9/2018), nonischemic cardiomyopathy (mild global LV systolic dysfunction with EF 54% and diastolic LV dysfunction based on TTE in 11/2018), and HTN admitted to [facility] on 4/25 for nausea/vomiting and poor PO intake, found to have pancytopenia and was treated for neutropenic fever however course c/b acute renal failure with Shiley placed by IR for new HD, C diff infection, and acute encephalopathy, transferred to the MICU for further evaluation and management. Encephalopathy now improving with HD.    1. C.diff colitis  - no evidence of megacolon on CT A/P  - c/w po vanco, iv flagyl  for 14d course  - monitor stool count  - ID consult, GI consult  - CBC qd    2. Pancytopenia with neutropenic fever (2/2 acute tonsillitis and esophageal candidiasis)   - recurrent symptomatic anemia requiring pRBC transfusions  - f/u Bcx 5/9  - transfused 1u pRBC today  - transfuse 1u plt w/ HD tmr  - transfuse goal Hgb <7, Plt<10 or Plt<50 if bleeding  - monitor for signs of bleeding  - c/w Zarxio 480mcg per Heme/Onc  - Heme/On cc/s appreciated           3. Multiple myeloma   - in relapse s/p bone marrow transplant (~1.5 yrs ago) and chemo/RT.   - Last infusion Bendamustine and Pomalidomide 4/08/2019  - s/p decadron 40mg  - Hem/onc consult    4. Metabolic Encephalopathy  - electrolyte derangement vs infectious etiology  - Head CT w/o acute pathology  - monitor mental status     5. Acute on Chronic Systolic CHF Exacerbation  - with pulmonary edema/effusions   - BP currently stable  - Holding home carvedilol/lisinopril   - TTE 3/29/19 EF 61%  - UF HD per nephrology  - daily weight, strict i/o, c/w cardiac meds, adjust     6. Acute Renal Failure, 2/2 ATN:  - c/w HD per nephro recs  - hypocalcemia and hyperphos  - renally adjust medications  - Nephrology consult    7. Metabolic Acidosis  - 2/2 ATN vs GI loss  - anticipate resolution with HD and infectious treatment  - BMP qd    8. Acute Tonsilitis and Candida Esophagitis  - c/w acyclovir and fluconazole  - supportive care prn    9. GOC  - Palliative, Heme/Onc, and MICU assistance appreciated  - Full code  - Pending further discussion    10. Prophylactic measures  - DVT: SCDs - low risk  - DIET: NPO - S/S  - DISPO: Pending GOC discussion      ================================  Follow up:  - Monitor H/h, keep active T+S, transfuse for Hg<7, platelets<40, as per heme. Transfuse next 1U of platelets with HD tomorrow.  - Get speech and swallow or consider Tube feeds if pt agreeable  - Ongoing Goals of care, f/u heme and palliative discussion

## 2019-05-09 NOTE — PROGRESS NOTE ADULT - SUBJECTIVE AND OBJECTIVE BOX
SONAL GARCIA:[id number],   53yMale followed for:  oxycodone (Vomiting; Nausea)    PAST MEDICAL & SURGICAL HISTORY:  Diverticulitis: perforated s/p Kiah  Multiple myeloma: relapsed 3/2019  Hypertension  Left ventricular dysfunction  Cardiomyopathy: nonischemic  Former smoker: (1 ppd x 11 years; Quit ~age 26)  History of bone marrow transplant  History of surgery: s/p exploratory laparotomy with sigmoid resection and colostomy on 9/2/2018    FAMILY HISTORY:  Family history of diabetes mellitus  Family history of hypertension    MEDICATIONS  (STANDING):  acyclovir IVPB 200 milliGRAM(s) IV Intermittent every 24 hours  chlorhexidine 4% Liquid 1 Application(s) Topical <User Schedule>  dexmedetomidine Infusion 0.2 MICROgram(s)/kG/Hr (3.87 mL/Hr) IV Continuous <Continuous>  docosanol 10% Cream 1 Application(s) Topical three times a day  filgrastim-sndz Injectable 480 MICROGram(s) SubCutaneous daily  fluconAZOLE IVPB 200 milliGRAM(s) IV Intermittent every 24 hours  metroNIDAZOLE  IVPB 500 milliGRAM(s) IV Intermittent every 8 hours  metroNIDAZOLE  IVPB      vancomycin    Solution 125 milliGRAM(s) Oral every 6 hours    MEDICATIONS  (PRN):  artificial  tears Solution 1 Drop(s) Both EYES every 6 hours PRN Dry Eyes      Vital Signs Last 24 Hrs  T(C): 36.9 (09 May 2019 08:00), Max: 37.4 (09 May 2019 04:00)  T(F): 98.5 (09 May 2019 08:00), Max: 99.4 (09 May 2019 04:00)  HR: 91 (09 May 2019 08:00) (81 - 123)  BP: 130/71 (09 May 2019 08:00) (105/87 - 160/81)  BP(mean): 87 (09 May 2019 08:00) (72 - 106)  RR: 30 (09 May 2019 08:00) (12 - 34)  SpO2: 98% (09 May 2019 08:00) (94% - 100%)  nc/at  s1s2  cta  soft, nt, nd no guarding or rebound  no c/c/e    CBC Full  -  ( 09 May 2019 02:25 )  WBC Count : 3.53 K/uL  RBC Count : 2.27 M/uL  Hemoglobin : 6.6 g/dL  Hematocrit : 19.4 %  Platelet Count - Automated : 24 K/uL  Mean Cell Volume : 85.5 fL  Mean Cell Hemoglobin : 29.1 pg  Mean Cell Hemoglobin Concentration : 34.0 %  Auto Neutrophil # : 1.09 K/uL  Auto Lymphocyte # : 1.79 K/uL  Auto Monocyte # : 0.40 K/uL  Auto Eosinophil # : 0.01 K/uL  Auto Basophil # : 0.01 K/uL  Auto Neutrophil % : 30.9 %  Auto Lymphocyte % : 50.7 %  Auto Monocyte % : 11.3 %  Auto Eosinophil % : 0.3 %  Auto Basophil % : 0.3 %    05-09    143  |  103  |  54<H>  ----------------------------<  109<H>  3.6   |  18<L>  |  6.61<H>    Ca    6.9<L>      09 May 2019 02:25  Phos  3.9     05-09  Mg     2.0     05-09    TPro  6.2  /  Alb  2.8<L>  /  TBili  1.0  /  DBili  x   /  AST  34  /  ALT  32  /  AlkPhos  151<H>  05-09    PT/INR - ( 08 May 2019 20:16 )   PT: 21.2 SEC;   INR: 1.83          PTT - ( 08 May 2019 20:16 )  PTT:28.9 SEC

## 2019-05-09 NOTE — PROGRESS NOTE ADULT - SUBJECTIVE AND OBJECTIVE BOX
Nephrology Followup Note - [phone] - Dr Bahena / Dr Regalado / Dr Benz / Dr Frost / Dr Mahmood / Dr Mcgee / Dr Guzman / Dr Guerra  Pt seen and examined at bedside  No acute events overnight. Pt on precedex, moaning, but denies pain.   Transferred to MICU for increased monitoring.     Allergies:  oxycodone (Vomiting; Nausea)    Hospital Medications:   MEDICATIONS  (STANDING):  acyclovir IVPB 200 milliGRAM(s) IV Intermittent every 24 hours  chlorhexidine 4% Liquid 1 Application(s) Topical <User Schedule>  dexmedetomidine Infusion 0.2 MICROgram(s)/kG/Hr (3.87 mL/Hr) IV Continuous <Continuous>  docosanol 10% Cream 1 Application(s) Topical three times a day  filgrastim-sndz Injectable 480 MICROGram(s) SubCutaneous daily  fluconAZOLE IVPB 200 milliGRAM(s) IV Intermittent every 24 hours  metroNIDAZOLE  IVPB 500 milliGRAM(s) IV Intermittent every 8 hours  metroNIDAZOLE  IVPB      vancomycin    Solution 125 milliGRAM(s) Oral every 6 hours    VITALS:  T(F): 100.7 (19 @ 12:00), Max: 100.7 (19 @ 12:00)  HR: 121 (19 @ 12:00)  BP: 142/84 (19 @ 12:00)  RR: 25 (19 @ 12:00)  SpO2: 98% (19 @ 11:00)  Wt(kg): --     @ 07:01  -   @ 07:00  --------------------------------------------------------  IN: 2039.9 mL / OUT: 1290 mL / NET: 749.9 mL     @ 07:01  -   @ 13:40  --------------------------------------------------------  IN: 34.8 mL / OUT: 250 mL / NET: -215.2 mL        PHYSICAL EXAM:  Constitutional: NAD  HEENT: anicteric sclera, mucosal bleeding, with scabs   Neck: No JVD  Respiratory: CTAB, no wheezes, rales or rhonchi  Cardiovascular: S1, S2, RRR  Gastrointestinal: BS+, soft, NT/ND  Extremities: No cyanosis or clubbing. No peripheral edema  Neurological: A/O x 2, no focal deficits  Psychiatric: Normal mood, normal affect  : No CVA tenderness. No quintero.   Vascular Access: R IJ sudeep     LABS:      143  |  103  |  54<H>  ----------------------------<  109<H>  3.6   |  18<L>  |  6.61<H>    Ca    6.9<L>      09 May 2019 02:25  Phos  3.9       Mg     2.0         TPro  6.2  /  Alb  2.8<L>  /  TBili  1.0  /  DBili      /  AST  34  /  ALT  32  /  AlkPhos  151<H>      Creatinine Trend: 6.61 <--, 4.71 <--, 9.68 <--, 7.77 <--, 8.51 <--, 6.88 <--, 8.02 <--, 6.69 <--                        6.6    3.53  )-----------( 24       ( 09 May 2019 02:25 )             19.4     Urine Studies:  Urinalysis Basic - ( 02 May 2019 14:30 )    Color: YELLOW / Appearance: Lt TURBID / S.015 / pH: 6.0  Gluc: NEGATIVE / Ketone: TRACE  / Bili: NEGATIVE / Urobili: NORMAL   Blood: SMALL / Protein: 300 / Nitrite: NEGATIVE   Leuk Esterase: NEGATIVE / RBC: 6-10 / WBC 11-25   Sq Epi: FEW / Non Sq Epi:  / Bacteria: NF        RADIOLOGY & ADDITIONAL STUDIES:

## 2019-05-09 NOTE — PROGRESS NOTE ADULT - ASSESSMENT
54 y/o Male with relapsed multiple myeloma s/p bone marrow transplant (~1.5 yrs ago) and chemo/RT (last infusion Bendamustine and Pomalidomide 4/08/2019), perforated diverticulitis s/p Kiah's (proctosigmoidectomy with colostomy, 9/2018), nonischemic cardiomyopathy (mild global LV systolic dysfunction with EF 54% and diastolic LV dysfunction based on TTE in 11/2018), and HTN admitted to [facility] on 4/25 for nausea/vomiting and poor PO intake, found to have pancytopenia and was treated for neutropenic fever however course c/b acute renal failure with shiley placed by IR for new HD, C diff infection, and acute encephalopathy, transferred to the MICU for further evaluation and management.    #Neuro - acute encephalopathy likely due acute renal failure. A&Ox0, Concern for toxic (Cdiff v other infectious process w/ neutropenia) v metabolic (uremia v electrolyte abnormality) v acute intracranial process in setting of thrombocytopenia.   - Will obtain CT head  - Pt currently maintaining airway with no respiratory distress or hypoxemia, slightly agitated, low threshold for intubation for airway protection  - Will work-up and manage toxic v metabolic etiologies as below    #CV - Hx of HTN, Hx of nonischemic Cardiomyopathy - TTE showed improved and now normal LVSF and RVSF  - BP stable  - TTE 3/29/19 EF 61%  - No active issues  - monitor VSq1  - For HTN: hold home carvedilol and lisinopril.     #Pulm  - No active issues, no respiratory distress and not hypoxemic on RA  - Low threshold for intubation based on mental status as above      #GI  (+) C-diff infection, esophagitis  - c/w IV flagyl and vanc PO via NGT, 125mg q6 for 14d course  - NPO for now given mental status  - monitor bmp qd  - consider starting tube feed via NGT tonight if does not require intubation  - cont fluconazole and valtrex for esophagitis    #Renal - ARF with metabolic acidosis, and anuric requiring HD (fem shiley removed and R subclavian shiley placed by IR), baseline Cr ~1.1, possibly 2/2 ATN and MM. Also with Hypocalcemia and hyperphosphatemia  - No obstruction on renal U/S  - urgent HD today  - f/u bmp, vbg tonight after HD  - renally dose meds  - f/u renal recs    #Heme - Pancytopenia due to Multiple Myeloma, Recent Chemo, Infection   - monitor cbc BID, transfuse for Hg<7, Plt<10 or Plt<50 if bleeding  - Per Heme, Dr Gastelum/Dr Booth, pt currently with poor prognosis due to relapsing MM. Unable to receive further chemo at this time due to renal failure and poor functional status  - Pain management for MM - hold on further sedating meds to monitor mental status.   - Cont zarxio, per heme, until ANC>1500  x2 days  - hold allopurinol in setting of ARF, monitor TLS labs    #ID - CDI, no evidence of obstruction or megacolon on CT a/p  - cont PO vanc and metronidazole   - f/u bl clx, 5/6 NGTD  - cont valacyclovir and fluconazole for esophagitis  - monitor cbc qd  - s/p cefepime and vanc course as bl clx remain negative      #DVT ppx - SCDs, hold pharm in setting of thrombocytopenia    #GOC - Extensive Conservation with MICU team - Pt with poor prognosis due relapsing MM and acute renal failure requiring HD. Palliative care consulted. Wife, Devan, is HCP.   -Currently remains full code. 52 y/o Male with relapsed multiple myeloma s/p bone marrow transplant (~1.5 yrs ago) and chemo/RT (last infusion Bendamustine and Pomalidomide 4/08/2019), perforated diverticulitis s/p Kiah's (proctosigmoidectomy with colostomy, 9/2018), nonischemic cardiomyopathy (mild global LV systolic dysfunction with EF 54% and diastolic LV dysfunction based on TTE in 11/2018), and HTN admitted to [facility] on 4/25 for nausea/vomiting and poor PO intake, found to have pancytopenia and was treated for neutropenic fever however course c/b acute renal failure with shiley placed by IR for new HD, C diff infection, and acute encephalopathy, transferred to the MICU for further evaluation and management. Encephalopathy now improving with HD.     #Neuro - acute encephalopathy likely due acute renal failure. Improving today s/p HD yesterday.   - repeat CT head yesterday unremarkable  - will wean off precedex during day  - continue HD as per renal, treating infection as below    #CV - Hx of HTN, Hx of nonischemic Cardiomyopathy - TTE showed improved and now normal LVSF and RVSF  - BP stable  - TTE 3/29/19 EF 61%  - No active issues  - monitor VSq1  - For HTN: hold home carvedilol and lisinopril.     #Pulm  - No active issues, no respiratory distress and not hypoxemic on RA  - currently protecting airway, wean off sedation    #GI  (+) C-diff infection, esophagitis  - c/w IV flagyl and vanc PO via NGT, 125mg q6 for 14d course  - monitor bmp qd  - will consider tube feeds  - cont empiric fluconazole and acyclovir for esophagitis    #Renal - ARF with metabolic acidosis, and anuric requiring HD (fem shiley removed and R subclavian shiley placed by IR), baseline Cr ~1.1, possibly 2/2 ATN and MM. Also with Hypocalcemia and hyperphosphatemia  - HD as per renal  - monitor bmp qd  - renally dose meds    #Heme - Pancytopenia due to Multiple Myeloma, Recent Chemo, Infection   - monitor cbc BID, transfuse for Hg<7, Plt<10 or Plt<50 if bleeding  - will transfuse platelet today, goal 40k  - Per [facility], Dr Gastelum/Dr Booth, pt currently with poor prognosis due to relapsing MM. Unable to receive further chemo at this time due to renal failure and poor functional status  - Pain management for MM - hold on further sedating meds to monitor mental status.   - Cont zarxio, per heme, until ANC>1500   - hold allopurinol in setting of ARF, monitor TLS labs  - will transfuse 1U plt today     #ID - CDI, no evidence of obstruction or megacolon on CT a/p  - cont PO vanc and metronidazole   - f/u bl clx, 5/6 NGTD  - cont acyand fluconazole for esophagitis  - monitor cbc qd  - s/p cefepime and vanc course as bl clx remain negative      #DVT ppx - SCDs, hold pharm in setting of thrombocytopenia    #GOC - Extensive Conservation with MICU team - Pt with poor prognosis due relapsing MM and acute renal failure requiring HD. Palliative care consulted. Wife, Devan, is HCP.   -Currently remains full code. 52 y/o Male with relapsed multiple myeloma s/p bone marrow transplant (~1.5 yrs ago) and chemo/RT (last infusion Bendamustine and Pomalidomide 4/08/2019), perforated diverticulitis s/p Kiah's (proctosigmoidectomy with colostomy, 9/2018), nonischemic cardiomyopathy (mild global LV systolic dysfunction with EF 54% and diastolic LV dysfunction based on TTE in 11/2018), and HTN admitted to [facility] on 4/25 for nausea/vomiting and poor PO intake, found to have pancytopenia and was treated for neutropenic fever however course c/b acute renal failure with shiley placed by IR for new HD, C diff infection, and acute encephalopathy, transferred to the MICU for further evaluation and management. Encephalopathy now improving with HD.     #Neuro - acute encephalopathy likely due acute renal failure. Improving today s/p HD yesterday.   - repeat CT head yesterday unremarkable  - will wean off precedex during day  - continue HD as per renal, treating infection as below    #CV - Hx of HTN, Hx of nonischemic Cardiomyopathy - TTE showed improved and now normal LVSF and RVSF  - BP stable  - TTE 3/29/19 EF 61%  - No active issues  - monitor VSq1  - For HTN: hold home carvedilol and lisinopril.     #Pulm  - No active issues, no respiratory distress and not hypoxemic on RA  - currently protecting airway, wean off sedation    #GI  (+) C-diff infection, esophagitis  - c/w IV flagyl and vanc PO via NGT, 125mg q6 for 14d course  - monitor bmp qd  - will consider tube feeds  - cont empiric fluconazole and acyclovir for esophagitis    #Renal - ARF with metabolic acidosis, and anuric requiring HD (fem shiley removed and R subclavian shiley placed by IR), baseline Cr ~1.1, possibly 2/2 ATN and MM. Also with Hypocalcemia and hyperphosphatemia  - HD as per renal  - monitor bmp qd  - renally dose meds    #Heme - Pancytopenia due to Multiple Myeloma, Recent Chemo, Infection   - monitor cbc BID, transfuse for Hg<7, Plt<10 or Plt<50 if bleeding  - will transfuse platelet today, goal 40k  - Per [facility], Dr Gastelum/Dr Booth, pt currently with poor prognosis due to relapsing MM. Unable to receive further chemo at this time due to renal failure and poor functional status  - Pain management for MM - hold on further sedating meds to monitor mental status.   - Cont zarxio, per heme, until ANC>1500   - hold allopurinol in setting of ARF, monitor TLS labs  - will transfuse 1U plt with next HD session    #ID - CDI, no evidence of obstruction or megacolon on CT a/p  - cont PO vanc and metronidazole   - f/u bl clx, 5/6 NGTD  - cont acyand fluconazole for esophagitis  - monitor cbc qd  - s/p cefepime and vanc course as bl clx remain negative    #DVT ppx - SCDs, hold pharm VTE in setting of thrombocytopenia    #GOC - Extensive Conservation with MICU team - Pt with poor prognosis due to relapsing MM and acute renal failure requiring HD. Palliative care consulted. Wife, Devan, is HCP.   -Currently remains full code.   - As per heme, not eligible for trials while on HD and has likely exhausted MM treatments  - wife considering DNR/DNI, will discuss with family

## 2019-05-09 NOTE — PROGRESS NOTE ADULT - SUBJECTIVE AND OBJECTIVE BOX
INTERVAL HPI/OVERNIGHT EVENTS:    O/N:    SUBJECTIVE: Patient seen and examined at bedside.     CONSTITUTIONAL: No weakness, fevers or chills  EYES/ENT: No visual changes;  No vertigo or throat pain   NECK: No pain or stiffness  RESPIRATORY: No cough, wheezing, hemoptysis; No shortness of breath  CARDIOVASCULAR: No chest pain or palpitations  GASTROINTESTINAL: No abdominal or epigastric pain. No nausea, vomiting, or hematemesis; No diarrhea or constipation. No melena or hematochezia.  GENITOURINARY: No dysuria, frequency or hematuria  NEUROLOGICAL: No numbness or weakness  SKIN: No itching, rashes    OBJECTIVE:    VITAL SIGNS:  ICU Vital Signs Last 24 Hrs  T(C): 36.9 (09 May 2019 08:00), Max: 37.4 (09 May 2019 04:00)  T(F): 98.5 (09 May 2019 08:00), Max: 99.4 (09 May 2019 04:00)  HR: 91 (09 May 2019 08:00) (81 - 123)  BP: 130/71 (09 May 2019 08:00) (105/87 - 160/81)  BP(mean): 87 (09 May 2019 08:00) (72 - 106)  ABP: --  ABP(mean): --  RR: 30 (09 May 2019 08:00) (12 - 34)  SpO2: 98% (09 May 2019 08:00) (94% - 100%)        05-08 @ 07:01  -  05-09 @ 07:00  --------------------------------------------------------  IN: 2039.9 mL / OUT: 1290 mL / NET: 749.9 mL      CAPILLARY BLOOD GLUCOSE          PHYSICAL EXAM:    General: NAD  HEENT: NC/AT; PERRL, clear conjunctiva  Neck: supple  Respiratory: CTA b/l  Cardiovascular: +S1/S2; RRR  Abdomen: soft, NT/ND; +BS x4  Extremities: WWP, 2+ peripheral pulses b/l; no LE edema  Skin: normal color and turgor; no rash  Neurological:    MEDICATIONS:  MEDICATIONS  (STANDING):  acyclovir IVPB 200 milliGRAM(s) IV Intermittent every 24 hours  chlorhexidine 4% Liquid 1 Application(s) Topical <User Schedule>  dexmedetomidine Infusion 0.2 MICROgram(s)/kG/Hr (3.87 mL/Hr) IV Continuous <Continuous>  docosanol 10% Cream 1 Application(s) Topical three times a day  filgrastim-sndz Injectable 480 MICROGram(s) SubCutaneous daily  fluconAZOLE IVPB 200 milliGRAM(s) IV Intermittent every 24 hours  metroNIDAZOLE  IVPB 500 milliGRAM(s) IV Intermittent every 8 hours  metroNIDAZOLE  IVPB      vancomycin    Solution 125 milliGRAM(s) Oral every 6 hours    MEDICATIONS  (PRN):  artificial  tears Solution 1 Drop(s) Both EYES every 6 hours PRN Dry Eyes      ALLERGIES:  Allergies    oxycodone (Vomiting; Nausea)    Intolerances        LABS:                        6.6    3.53  )-----------( 24       ( 09 May 2019 02:25 )             19.4     05-09    143  |  103  |  54<H>  ----------------------------<  109<H>  3.6   |  18<L>  |  6.61<H>    Ca    6.9<L>      09 May 2019 02:25  Phos  3.9     05-09  Mg     2.0     05-09    TPro  6.2  /  Alb  2.8<L>  /  TBili  1.0  /  DBili  x   /  AST  34  /  ALT  32  /  AlkPhos  151<H>  05-09    PT/INR - ( 08 May 2019 20:16 )   PT: 21.2 SEC;   INR: 1.83          PTT - ( 08 May 2019 20:16 )  PTT:28.9 SEC      RADIOLOGY & ADDITIONAL TESTS: Reviewed. INTERVAL HPI/OVERNIGHT EVENTS:  admitted to MICU yesterday. Recieved HD and 2U pRBCs total since arrival.    SUBJECTIVE: Patient seen and examined at bedside.     CONSTITUTIONAL: No weakness, fevers or chills  EYES/ENT: No visual changes;  No vertigo or throat pain   NECK: No pain or stiffness  RESPIRATORY: No cough, wheezing, hemoptysis; No shortness of breath  CARDIOVASCULAR: No chest pain or palpitations  GASTROINTESTINAL: No abdominal or epigastric pain. No nausea, vomiting, or hematemesis; No diarrhea or constipation. No melena or hematochezia.  GENITOURINARY: No dysuria, frequency or hematuria  NEUROLOGICAL: No numbness or weakness  SKIN: No itching, rashes    OBJECTIVE:    VITAL SIGNS:  ICU Vital Signs Last 24 Hrs  T(C): 36.9 (09 May 2019 08:00), Max: 37.4 (09 May 2019 04:00)  T(F): 98.5 (09 May 2019 08:00), Max: 99.4 (09 May 2019 04:00)  HR: 91 (09 May 2019 08:00) (81 - 123)  BP: 130/71 (09 May 2019 08:00) (105/87 - 160/81)  BP(mean): 87 (09 May 2019 08:00) (72 - 106)  ABP: --  ABP(mean): --  RR: 30 (09 May 2019 08:00) (12 - 34)  SpO2: 98% (09 May 2019 08:00) (94% - 100%)        05-08 @ 07:01  -  05-09 @ 07:00  --------------------------------------------------------  IN: 2039.9 mL / OUT: 1290 mL / NET: 749.9 mL      CAPILLARY BLOOD GLUCOSE          PHYSICAL EXAM:    General: NAD  HEENT: NC/AT; PERRL, clear conjunctiva  Neck: supple  Respiratory: CTA b/l  Cardiovascular: +S1/S2; RRR  Abdomen: soft, NT/ND; +BS x4  Extremities: WWP, 2+ peripheral pulses b/l; no LE edema  Skin: normal color and turgor; no rash  Neurological:    MEDICATIONS:  MEDICATIONS  (STANDING):  acyclovir IVPB 200 milliGRAM(s) IV Intermittent every 24 hours  chlorhexidine 4% Liquid 1 Application(s) Topical <User Schedule>  dexmedetomidine Infusion 0.2 MICROgram(s)/kG/Hr (3.87 mL/Hr) IV Continuous <Continuous>  docosanol 10% Cream 1 Application(s) Topical three times a day  filgrastim-sndz Injectable 480 MICROGram(s) SubCutaneous daily  fluconAZOLE IVPB 200 milliGRAM(s) IV Intermittent every 24 hours  metroNIDAZOLE  IVPB 500 milliGRAM(s) IV Intermittent every 8 hours  metroNIDAZOLE  IVPB      vancomycin    Solution 125 milliGRAM(s) Oral every 6 hours    MEDICATIONS  (PRN):  artificial  tears Solution 1 Drop(s) Both EYES every 6 hours PRN Dry Eyes      ALLERGIES:  Allergies    oxycodone (Vomiting; Nausea)    Intolerances        LABS:                        6.6    3.53  )-----------( 24       ( 09 May 2019 02:25 )             19.4     05-09    143  |  103  |  54<H>  ----------------------------<  109<H>  3.6   |  18<L>  |  6.61<H>    Ca    6.9<L>      09 May 2019 02:25  Phos  3.9     05-09  Mg     2.0     05-09    TPro  6.2  /  Alb  2.8<L>  /  TBili  1.0  /  DBili  x   /  AST  34  /  ALT  32  /  AlkPhos  151<H>  05-09    PT/INR - ( 08 May 2019 20:16 )   PT: 21.2 SEC;   INR: 1.83          PTT - ( 08 May 2019 20:16 )  PTT:28.9 SEC      RADIOLOGY & ADDITIONAL TESTS: Reviewed. INTERVAL HPI/OVERNIGHT EVENTS:  admitted to MICU yesterday. Recieved HD and 2U pRBCs total since arrival. Started on precedex for sedation after trying to get out of bed overnight. In AM, AOx3, reports no pain. Wife at bedside.     SUBJECTIVE: Patient seen and examined at bedside.     CONSTITUTIONAL: No weakness, fevers or chills  EYES/ENT: No visual changes;  No vertigo or throat pain   NECK: No pain or stiffness  RESPIRATORY: +cough, No wheezing, hemoptysis; No shortness of breath  CARDIOVASCULAR: No chest pain or palpitations  GASTROINTESTINAL: No abdominal or epigastric pain. No nausea, vomiting, or hematemesis; No diarrhea or constipation. No melena or hematochezia.  GENITOURINARY: No dysuria, frequency or hematuria  NEUROLOGICAL: No numbness or weakness  SKIN: No itching, rashes    OBJECTIVE:    VITAL SIGNS:  ICU Vital Signs Last 24 Hrs  T(C): 36.9 (09 May 2019 08:00), Max: 37.4 (09 May 2019 04:00)  T(F): 98.5 (09 May 2019 08:00), Max: 99.4 (09 May 2019 04:00)  HR: 91 (09 May 2019 08:00) (81 - 123)  BP: 130/71 (09 May 2019 08:00) (105/87 - 160/81)  BP(mean): 87 (09 May 2019 08:00) (72 - 106)  ABP: --  ABP(mean): --  RR: 30 (09 May 2019 08:00) (12 - 34)  SpO2: 98% (09 May 2019 08:00) (94% - 100%)        05-08 @ 07:01  -  05-09 @ 07:00  --------------------------------------------------------  IN: 2039.9 mL / OUT: 1290 mL / NET: 749.9 mL      CAPILLARY BLOOD GLUCOSE          PHYSICAL EXAM:    General: NAD  HEENT: NC/AT; PERRL, clear conjunctiva; oral mucositis with dried blood  Neck: supple  Respiratory: CTA b/l  Cardiovascular: +S1/S2; RRR  Abdomen: soft, NT/ND; +BS x4; colostomy with brown output  Extremities: WWP, 2+ peripheral pulses b/l; no LE edema  Skin: LLE b/l petechiae  Neurological: AOx3, strength intact 5/5 in UEs and LEs    MEDICATIONS:  MEDICATIONS  (STANDING):  acyclovir IVPB 200 milliGRAM(s) IV Intermittent every 24 hours  chlorhexidine 4% Liquid 1 Application(s) Topical <User Schedule>  dexmedetomidine Infusion 0.2 MICROgram(s)/kG/Hr (3.87 mL/Hr) IV Continuous <Continuous>  docosanol 10% Cream 1 Application(s) Topical three times a day  filgrastim-sndz Injectable 480 MICROGram(s) SubCutaneous daily  fluconAZOLE IVPB 200 milliGRAM(s) IV Intermittent every 24 hours  metroNIDAZOLE  IVPB 500 milliGRAM(s) IV Intermittent every 8 hours  metroNIDAZOLE  IVPB      vancomycin    Solution 125 milliGRAM(s) Oral every 6 hours    MEDICATIONS  (PRN):  artificial  tears Solution 1 Drop(s) Both EYES every 6 hours PRN Dry Eyes      ALLERGIES:  Allergies    oxycodone (Vomiting; Nausea)    Intolerances        LABS:                        6.6    3.53  )-----------( 24       ( 09 May 2019 02:25 )             19.4     05-09    143  |  103  |  54<H>  ----------------------------<  109<H>  3.6   |  18<L>  |  6.61<H>    Ca    6.9<L>      09 May 2019 02:25  Phos  3.9     05-09  Mg     2.0     05-09    TPro  6.2  /  Alb  2.8<L>  /  TBili  1.0  /  DBili  x   /  AST  34  /  ALT  32  /  AlkPhos  151<H>  05-09    PT/INR - ( 08 May 2019 20:16 )   PT: 21.2 SEC;   INR: 1.83          PTT - ( 08 May 2019 20:16 )  PTT:28.9 SEC      RADIOLOGY & ADDITIONAL TESTS: Reviewed.

## 2019-05-09 NOTE — CHART NOTE - NSCHARTNOTEFT_GEN_A_CORE
Briefly, 53M w/ relapsed MM initially a/w stroke like symptoms with negative work-up. Hospital course c/b pancytopenia requiring transfusions and new onset ROBB requiring HD. Patient with significant encephalopathy yesterday and transferred to MICU for monitoring. Recently found cdiff+ and HD shiley in groin which was removed and R subclavian shiley placed by MICU team. Mental status improved post HD. On PO Vanco and IV flagyl for cdiff. Pt stable for transfer to floors and house staff to resume care.    Tam Drew PGY3

## 2019-05-09 NOTE — PROGRESS NOTE ADULT - ASSESSMENT
agree with tf.  conservative gi managment, high risk anesthesia, endosccopy. will not chagne managment, rx cdad appropriate

## 2019-05-09 NOTE — PROGRESS NOTE ADULT - PROBLEM SELECTOR PLAN 5
Patient's daughter at bedside.  She states her family is aware that her father is in the end stages of his cancer but that her step-mother is hopeful he will improve and is struggling.  Patient's wife to come back later tonight.  Endorsed to MICU.  Will follow-up.  Psychosocial support provided.

## 2019-05-09 NOTE — PROGRESS NOTE ADULT - PROBLEM SELECTOR PLAN 2
s/p HD yesterday.  Given overall poor prognosis and inability to treat multiple myeloma, patient would be a poor candidate for long term dialysis.

## 2019-05-09 NOTE — PROGRESS NOTE ADULT - SUBJECTIVE AND OBJECTIVE BOX
INTERVAL HPI/OVERNIGHT EVENTS: Remains in MICU - currently on precedex     Code Status: Full Code   Allergies    oxycodone (Vomiting; Nausea)    Intolerances    MEDICATIONS  (STANDING):  acyclovir IVPB 200 milliGRAM(s) IV Intermittent every 24 hours  chlorhexidine 4% Liquid 1 Application(s) Topical <User Schedule>  dexmedetomidine Infusion 0.2 MICROgram(s)/kG/Hr (3.87 mL/Hr) IV Continuous <Continuous>  docosanol 10% Cream 1 Application(s) Topical three times a day  filgrastim-sndz Injectable 480 MICROGram(s) SubCutaneous daily  fluconAZOLE IVPB 200 milliGRAM(s) IV Intermittent every 24 hours  metroNIDAZOLE  IVPB 500 milliGRAM(s) IV Intermittent every 8 hours  metroNIDAZOLE  IVPB      vancomycin    Solution 125 milliGRAM(s) Oral every 6 hours    MEDICATIONS  (PRN):  artificial  tears Solution 1 Drop(s) Both EYES every 6 hours PRN Dry Eyes      PRESENT SYMPTOMS: [ ]Unable to obtain due to poor mentation - ROS limited due to lethargy   Source if other than patient:  [ ]Family   [ ]Team     Pain (Impact on QOL):  Denies     Dyspnea:  Yes [ ] No [ ] - [ ]Mild [ ]Moderate [ ]Severe  Anxiety:    Yes [ ] No [ ] - [ ]Mild [ ]Moderate [ ]Severe  Fatigue:    Yes [ ] No [ ] - [ ]Mild [ ]Moderate [ ]Severe  Nausea:    Yes [ ] No [ ] - [ ]Mild [ ]Moderate [ ]Severe                         Loss of appetite: Yes [ ] No [ ] - [ ]Mild [ ]Moderate [ ]Severe             Constipation:  Yes [ ] No [ ] - [ ]Mild [ ]Moderate [ ]Severe  Grief: Yes [ ] No [ ]     PAIN AD Score:	  http://geriatrictoolkit.[email or web address]/painad.pdf (Ctrl + left click to view)    Other Symptoms:  [ ]All other review of systems negative     Karnofsky Performance Score/Palliative Performance Status Version 2:   10-20%    http://palliative.info/resource_material/[email or web address].pdf    PHYSICAL EXAM:  Vital Signs Last 24 Hrs  T(C): 38.2 (09 May 2019 12:00), Max: 38.2 (09 May 2019 12:00)  T(F): 100.7 (09 May 2019 12:00), Max: 100.7 (09 May 2019 12:00)  HR: 121 (09 May 2019 12:00) (81 - 123)  BP: 142/84 (09 May 2019 12:00) (112/57 - 160/81)  BP(mean): 98 (09 May 2019 12:00) (72 - 106)  RR: 25 (09 May 2019 12:00) (12 - 34)  SpO2: 98% (09 May 2019 11:00) (94% - 100%) I&O's Summary    08 May 2019 07:01  -  09 May 2019 07:00  --------------------------------------------------------  IN: 2039.9 mL / OUT: 1290 mL / NET: 749.9 mL    09 May 2019 07:01  -  09 May 2019 15:49  --------------------------------------------------------  IN: 46.4 mL / OUT: 550 mL / NET: -503.6 mL         GENERAL:  Lethargic, arousable   HEENT:  +mucositis   PULMONARY:   Coarse  CARDIOVASCULAR:    [x ]Regular [ ]Irregular [ ]Tachy  [ ]Anshul [ ]Murmur [ ]Other  GASTROINTESTINAL:  [x ]Soft  [ ]Distended   [ x]+BS  [x ]Non tender [ ]Tender  [ ]PEG [x ]OGT/ NGT   - ostomy with loose brown stool   05-07-19 @ 07:01  -  05-08-19 @ 07:00  --------------------------------------------------------  OUT: 450 mL    05-08-19 @ 07:01  -  05-09-19 @ 07:00  --------------------------------------------------------  OUT: 200 mL    05-09-19 @ 07:01  -  05-09-19 @ 15:49  --------------------------------------------------------  OUT: 550 mL       GENITOURINARY:  [ ]Normal [ ] Incontinent   [x ]Oliguria/Anuria   [ ][place]  MUSCULOSKELETAL:   [ ]Normal   [x ]Weakness  [ ]Bed/Wheelchair bound [ ]Edema  NEUROLOGIC:   [ ]No focal deficits  [x ] Cognitive impairment  [ ] Dysphagia [ ]Dysarthria [ ] Paresis [ ]Other   SKIN:   [ x]Normal   [ ]Pressure ulcer(s)  [ ]Rash    CRITICAL CARE:  [ ] Shock Present  [ ]Septic [ ]Cardiogenic [ ]Neurologic [ ]Hypovolemic  [ ]  Vasopressors [ ]  Inotropes   [ ] Respiratory failure present  [ ] Acute  [ ] Chronic [ ] Hypoxic  [ ] Hypercarbic [ ] Other  [ ] Other organ failure     LABS:                        6.6    3.53  )-----------( 24       ( 09 May 2019 02:25 )             19.4   05-09    143  |  103  |  54<H>  ----------------------------<  109<H>  3.6   |  18<L>  |  6.61<H>    Ca    6.9<L>      09 May 2019 02:25  Phos  3.9     05-09  Mg     2.0     05-09    TPro  6.2  /  Alb  2.8<L>  /  TBili  1.0  /  DBili  x   /  AST  34  /  ALT  32  /  AlkPhos  151<H>  05-09  PT/INR - ( 08 May 2019 20:16 )   PT: 21.2 SEC;   INR: 1.83          PTT - ( 08 May 2019 20:16 )  PTT:28.9 SEC      RADIOLOGY & ADDITIONAL STUDIES:    Protein Calorie Malnutrition Present: [ ] yes [ ] no  [ ] PPSV2 < or = 30%  [ ] significant weight loss [ ] poor nutritional intake [ ] anasarca [ ] catabolic state Albumin, Serum: 2.8 g/dL (05-09-19 @ 02:25)      REFERRALS:   [ ]Chaplaincy  [ ] Hospice  [ ]Child Life  [ ]Social Work  [ ]Case management [ ]Holistic Therapy   Goals of Care Document:

## 2019-05-10 LAB
ALBUMIN SERPL ELPH-MCNC: 3.2 G/DL — LOW (ref 3.3–5)
ALP SERPL-CCNC: 179 U/L — HIGH (ref 40–120)
ALT FLD-CCNC: 36 U/L — SIGNIFICANT CHANGE UP (ref 4–41)
ANION GAP SERPL CALC-SCNC: 24 MMO/L — HIGH (ref 7–14)
AST SERPL-CCNC: 40 U/L — SIGNIFICANT CHANGE UP (ref 4–40)
BASOPHILS # BLD AUTO: 0.03 K/UL — SIGNIFICANT CHANGE UP (ref 0–0.2)
BASOPHILS NFR BLD AUTO: 0.7 % — SIGNIFICANT CHANGE UP (ref 0–2)
BILIRUB SERPL-MCNC: 1.3 MG/DL — HIGH (ref 0.2–1.2)
BLD GP AB SCN SERPL QL: NEGATIVE — SIGNIFICANT CHANGE UP
BUN SERPL-MCNC: 73 MG/DL — HIGH (ref 7–23)
CALCIUM SERPL-MCNC: 6.7 MG/DL — LOW (ref 8.4–10.5)
CHLORIDE SERPL-SCNC: 103 MMOL/L — SIGNIFICANT CHANGE UP (ref 98–107)
CO2 SERPL-SCNC: 18 MMOL/L — LOW (ref 22–31)
CREAT SERPL-MCNC: 8.57 MG/DL — HIGH (ref 0.5–1.3)
EOSINOPHIL # BLD AUTO: 0.02 K/UL — SIGNIFICANT CHANGE UP (ref 0–0.5)
EOSINOPHIL NFR BLD AUTO: 0.4 % — SIGNIFICANT CHANGE UP (ref 0–6)
GAS PNL BLDMV: SIGNIFICANT CHANGE UP
GLUCOSE SERPL-MCNC: 125 MG/DL — HIGH (ref 70–99)
HCT VFR BLD CALC: 23.1 % — LOW (ref 39–50)
HGB BLD-MCNC: 7.7 G/DL — LOW (ref 13–17)
IMM GRANULOCYTES NFR BLD AUTO: 0.7 % — SIGNIFICANT CHANGE UP (ref 0–1.5)
LYMPHOCYTES # BLD AUTO: 1.78 K/UL — SIGNIFICANT CHANGE UP (ref 1–3.3)
LYMPHOCYTES # BLD AUTO: 39.6 % — SIGNIFICANT CHANGE UP (ref 13–44)
MAGNESIUM SERPL-MCNC: 2.2 MG/DL — SIGNIFICANT CHANGE UP (ref 1.6–2.6)
MANUAL SMEAR VERIFICATION: SIGNIFICANT CHANGE UP
MCHC RBC-ENTMCNC: 28.8 PG — SIGNIFICANT CHANGE UP (ref 27–34)
MCHC RBC-ENTMCNC: 33.3 % — SIGNIFICANT CHANGE UP (ref 32–36)
MCV RBC AUTO: 86.5 FL — SIGNIFICANT CHANGE UP (ref 80–100)
MONOCYTES # BLD AUTO: 1.69 K/UL — HIGH (ref 0–0.9)
MONOCYTES NFR BLD AUTO: 37.6 % — HIGH (ref 2–14)
NEUTROPHILS # BLD AUTO: 0.94 K/UL — LOW (ref 1.8–7.4)
NEUTROPHILS NFR BLD AUTO: 21 % — LOW (ref 43–77)
NRBC # FLD: 0 K/UL — SIGNIFICANT CHANGE UP (ref 0–0)
PHOSPHATE SERPL-MCNC: 5 MG/DL — HIGH (ref 2.5–4.5)
PLATELET # BLD AUTO: 18 K/UL — CRITICAL LOW (ref 150–400)
PMV BLD: 12.8 FL — SIGNIFICANT CHANGE UP (ref 7–13)
POTASSIUM SERPL-MCNC: 3.9 MMOL/L — SIGNIFICANT CHANGE UP (ref 3.5–5.3)
POTASSIUM SERPL-SCNC: 3.9 MMOL/L — SIGNIFICANT CHANGE UP (ref 3.5–5.3)
PROT SERPL-MCNC: 6.5 G/DL — SIGNIFICANT CHANGE UP (ref 6–8.3)
RBC # BLD: 2.67 M/UL — LOW (ref 4.2–5.8)
RBC # FLD: 16.1 % — HIGH (ref 10.3–14.5)
RH IG SCN BLD-IMP: POSITIVE — SIGNIFICANT CHANGE UP
SODIUM SERPL-SCNC: 145 MMOL/L — SIGNIFICANT CHANGE UP (ref 135–145)
SPECIMEN SOURCE: SIGNIFICANT CHANGE UP
SPECIMEN SOURCE: SIGNIFICANT CHANGE UP
WBC # BLD: 4.49 K/UL — SIGNIFICANT CHANGE UP (ref 3.8–10.5)
WBC # FLD AUTO: 4.49 K/UL — SIGNIFICANT CHANGE UP (ref 3.8–10.5)

## 2019-05-10 RX ORDER — LANOLIN ALCOHOL/MO/W.PET/CERES
6 CREAM (GRAM) TOPICAL ONCE
Refills: 0 | Status: COMPLETED | OUTPATIENT
Start: 2019-05-10 | End: 2019-05-10

## 2019-05-10 RX ORDER — DEXAMETHASONE 0.5 MG/5ML
40 ELIXIR ORAL ONCE
Refills: 0 | Status: COMPLETED | OUTPATIENT
Start: 2019-05-10 | End: 2019-05-10

## 2019-05-10 RX ORDER — CARVEDILOL PHOSPHATE 80 MG/1
25 CAPSULE, EXTENDED RELEASE ORAL EVERY 12 HOURS
Refills: 0 | Status: DISCONTINUED | OUTPATIENT
Start: 2019-05-10 | End: 2019-05-14

## 2019-05-10 RX ADMIN — Medication 104 MILLIGRAM(S): at 16:05

## 2019-05-10 RX ADMIN — Medication 100 MILLIGRAM(S): at 21:33

## 2019-05-10 RX ADMIN — Medication 6 MILLIGRAM(S): at 03:47

## 2019-05-10 RX ADMIN — Medication 125 MILLIGRAM(S): at 03:47

## 2019-05-10 RX ADMIN — Medication 120 MILLIGRAM(S): at 12:30

## 2019-05-10 RX ADMIN — CARVEDILOL PHOSPHATE 25 MILLIGRAM(S): 80 CAPSULE, EXTENDED RELEASE ORAL at 16:05

## 2019-05-10 RX ADMIN — CHLORHEXIDINE GLUCONATE 1 APPLICATION(S): 213 SOLUTION TOPICAL at 12:41

## 2019-05-10 RX ADMIN — DOCOSANOL 1 APPLICATION(S): 100 CREAM TOPICAL at 12:37

## 2019-05-10 RX ADMIN — Medication 125 MILLIGRAM(S): at 10:40

## 2019-05-10 RX ADMIN — DOCOSANOL 1 APPLICATION(S): 100 CREAM TOPICAL at 05:48

## 2019-05-10 RX ADMIN — DOCOSANOL 1 APPLICATION(S): 100 CREAM TOPICAL at 21:34

## 2019-05-10 RX ADMIN — Medication 125 MILLIGRAM(S): at 16:05

## 2019-05-10 RX ADMIN — Medication 100 MILLIGRAM(S): at 05:48

## 2019-05-10 RX ADMIN — Medication 125 MILLIGRAM(S): at 21:34

## 2019-05-10 RX ADMIN — Medication 100 MILLIGRAM(S): at 12:36

## 2019-05-10 RX ADMIN — FLUCONAZOLE 100 MILLIGRAM(S): 150 TABLET ORAL at 05:48

## 2019-05-10 RX ADMIN — Medication 480 MICROGRAM(S): at 12:31

## 2019-05-10 NOTE — PROGRESS NOTE ADULT - SUBJECTIVE AND OBJECTIVE BOX
SONAL GARCIA:[id number],   53yMale followed for:  oxycodone (Vomiting; Nausea)    PAST MEDICAL & SURGICAL HISTORY:  Diverticulitis: perforated s/p Kiah  Multiple myeloma: relapsed 3/2019  Hypertension  Left ventricular dysfunction  Cardiomyopathy: nonischemic  Former smoker: (1 ppd x 11 years; Quit ~age 26)  History of bone marrow transplant  History of surgery: s/p exploratory laparotomy with sigmoid resection and colostomy on 9/2/2018    FAMILY HISTORY:  Family history of diabetes mellitus  Family history of hypertension    MEDICATIONS  (STANDING):  acyclovir IVPB 200 milliGRAM(s) IV Intermittent every 24 hours  chlorhexidine 4% Liquid 1 Application(s) Topical <User Schedule>  docosanol 10% Cream 1 Application(s) Topical three times a day  filgrastim-sndz Injectable 480 MICROGram(s) SubCutaneous daily  fluconAZOLE IVPB 200 milliGRAM(s) IV Intermittent every 24 hours  metroNIDAZOLE  IVPB 500 milliGRAM(s) IV Intermittent every 8 hours  metroNIDAZOLE  IVPB      vancomycin    Solution 125 milliGRAM(s) Oral every 6 hours    MEDICATIONS  (PRN):  artificial  tears Solution 1 Drop(s) Both EYES every 6 hours PRN Dry Eyes      Vital Signs Last 24 Hrs  T(C): 36.8 (10 May 2019 07:01), Max: 38.7 (09 May 2019 16:00)  T(F): 98.2 (10 May 2019 07:01), Max: 101.6 (09 May 2019 16:00)  HR: 130 (10 May 2019 07:01) (90 - 130)  BP: 138/70 (10 May 2019 07:01) (115/62 - 155/73)  BP(mean): 105 (09 May 2019 20:00) (76 - 105)  RR: 32 (10 May 2019 07:01) (20 - 32)  SpO2: 98% (10 May 2019 07:01) (96% - 100%)  nc/at  s1s2  cta  soft, nt, nd no guarding or rebound  no c/c/e    CBC Full  -  ( 10 May 2019 03:57 )  WBC Count : 4.49 K/uL  RBC Count : 2.67 M/uL  Hemoglobin : 7.7 g/dL  Hematocrit : 23.1 %  Platelet Count - Automated : 18 K/uL  Mean Cell Volume : 86.5 fL  Mean Cell Hemoglobin : 28.8 pg  Mean Cell Hemoglobin Concentration : 33.3 %  Auto Neutrophil # : 0.94 K/uL  Auto Lymphocyte # : 1.78 K/uL  Auto Monocyte # : 1.69 K/uL  Auto Eosinophil # : 0.02 K/uL  Auto Basophil # : 0.03 K/uL  Auto Neutrophil % : 21.0 %  Auto Lymphocyte % : 39.6 %  Auto Monocyte % : 37.6 %  Auto Eosinophil % : 0.4 %  Auto Basophil % : 0.7 %    05-10    145  |  103  |  73<H>  ----------------------------<  125<H>  3.9   |  18<L>  |  8.57<H>    Ca    6.7<L>      10 May 2019 03:57  Phos  5.0     05-10  Mg     2.2     05-10    TPro  6.5  /  Alb  3.2<L>  /  TBili  1.3<H>  /  DBili  x   /  AST  40  /  ALT  36  /  AlkPhos  179<H>  05-10    PT/INR - ( 08 May 2019 20:16 )   PT: 21.2 SEC;   INR: 1.83          PTT - ( 08 May 2019 20:16 )  PTT:28.9 SEC

## 2019-05-10 NOTE — PROGRESS NOTE ADULT - ASSESSMENT
1. Pancytopenia     -- WBC improved. Can d/c Zarxio after todays dose  -- counts slow to recover sec due to heavily tx for myeloma  -- Transfuse to keep Hgb > 7  -- Keep Platelets>/= 40K while catheter in place, transfuse today       2. MM relapsed/refractory including failed Auto Transplant    -- had been on bendamustine/pomalyst. Tx on hold  -- s/p decadron 40mg 5/2   -- QIggs, SPEP, JENELLE and SFLC showed increased free kappa  -- no realistic tx options left. Recommend pall care    3. Tonsilitis vs Candida Esophagitis    -- cont Diflucan per ID  -- cont Acyclovir  -- ID f/u  -- cont magic mouthwash  -- cont IVF    4. ROBB    -- HD per renal  -- ? sec to light chain disease    5. cdiff     -- vanc PO and flagyl        Triantafildelores Murphy MD  [phone]

## 2019-05-10 NOTE — PROGRESS NOTE ADULT - ASSESSMENT
53y Male with relapsed multiple myeloma s/p bone marrow transplant (~1.5 yrs ago) and chemo/RT (last infusion Bendamustine and Pomalidomide 4/08/2019), perforated diverticulitis s/p Kiah's (proctosigmoidectomy with colostomy, 9/2018), nonischemic cardiomyopathy (mild global LV systolic dysfunction with EF 54% and diastolic LV dysfunction based on TTE in 11/2018), and HTN admitted to [facility] on 4/25 for nausea and vomiting, and poor PO intake, treated for neutropenic fever, c/b ROBB. Renal following for ARF    labs, chart reviewed

## 2019-05-10 NOTE — PROGRESS NOTE ADULT - SUBJECTIVE AND OBJECTIVE BOX
Nephrology Followup Note - [phone] - Dr Bahena / Dr Regalado / Dr Benz / Dr Frost / Dr Mahmood / Dr Mcgee / Dr Guzman / Dr Guerra  Pt seen and examined at bedside  No acute events overnight. Pt sitting up in chair, denies pain or other complaints.     Allergies:  oxycodone (Vomiting; Nausea)    Hospital Medications:   MEDICATIONS  (STANDING):  acyclovir IVPB 200 milliGRAM(s) IV Intermittent every 24 hours  carvedilol 25 milliGRAM(s) Oral every 12 hours  chlorhexidine 4% Liquid 1 Application(s) Topical <User Schedule>  docosanol 10% Cream 1 Application(s) Topical three times a day  filgrastim-sndz Injectable 480 MICROGram(s) SubCutaneous daily  fluconAZOLE IVPB 200 milliGRAM(s) IV Intermittent every 24 hours  metroNIDAZOLE  IVPB 500 milliGRAM(s) IV Intermittent every 8 hours  metroNIDAZOLE  IVPB      vancomycin    Solution 125 milliGRAM(s) Oral every 6 hours    VITALS:  T(F): 98.2 (05-10-19 @ 09:07), Max: 101.6 (05-09-19 @ 16:00)  HR: 122 (05-10-19 @ 10:53)  BP: 132/66 (05-10-19 @ 10:53)  RR: 32 (05-10-19 @ 10:53)  SpO2: 98% (05-10-19 @ 10:53)  Wt(kg): --    05-09 @ 07:01  -  05-10 @ 07:00  --------------------------------------------------------  IN: 246.4 mL / OUT: 650 mL / NET: -403.6 mL    PHYSICAL EXAM:  Constitutional: NAD  HEENT: anicteric sclera, oropharynx clear, MMM  Neck: No JVD  Respiratory: CTAB, no wheezes, rales or rhonchi  Cardiovascular: S1, S2, RRR  Gastrointestinal: BS+, soft, NT/ND  Extremities: No cyanosis or clubbing. No peripheral edema  Neurological: A/O x 3, no focal deficits  Psychiatric: Normal mood, normal affect  : No CVA tenderness. No quintero.   Skin: No rashes  [facility]     LABS:  05-10    145  |  103  |  73<H>  ----------------------------<  125<H>  3.9   |  18<L>  |  8.57<H>    Ca    6.7<L>      10 May 2019 03:57  Phos  5.0     05-10  Mg     2.2     05-10    TPro  6.5  /  Alb  3.2<L>  /  TBili  1.3<H>  /  DBili      /  AST  40  /  ALT  36  /  AlkPhos  179<H>  05-10    Creatinine Trend: 8.57 <--, 6.61 <--, 4.71 <--, 9.68 <--, 7.77 <--, 8.51 <--, 6.88 <--, 8.02 <--                        7.7    4.49  )-----------( 18       ( 10 May 2019 03:57 )             23.1     Urine Studies:      RADIOLOGY & ADDITIONAL STUDIES:

## 2019-05-10 NOTE — PROGRESS NOTE ADULT - SUBJECTIVE AND OBJECTIVE BOX
Patient is a 53y old  Male who presents with a chief complaint of Pancytopenia (10 May 2019 07:17)      SUBJECTIVE / OVERNIGHT EVENTS: No acute events overnight, altered this morning but easily redirectable. Patient denies F/C, HA, CP, SOB, abdominal pain, N/V/D/C. States he would like to drink. Family understands risks of drinking with esophageal injury. Will discuss further during GOC today.     MEDICATIONS  (STANDING):  acyclovir IVPB 200 milliGRAM(s) IV Intermittent every 24 hours  chlorhexidine 4% Liquid 1 Application(s) Topical <User Schedule>  docosanol 10% Cream 1 Application(s) Topical three times a day  filgrastim-sndz Injectable 480 MICROGram(s) SubCutaneous daily  fluconAZOLE IVPB 200 milliGRAM(s) IV Intermittent every 24 hours  metroNIDAZOLE  IVPB 500 milliGRAM(s) IV Intermittent every 8 hours  metroNIDAZOLE  IVPB      vancomycin    Solution 125 milliGRAM(s) Oral every 6 hours    MEDICATIONS  (PRN):  artificial  tears Solution 1 Drop(s) Both EYES every 6 hours PRN Dry Eyes      T(F): 98.2 (05-10 @ 07:01), Max: 101.6 (05-09 @ 16:00)  HR: 130 (05-10 @ 07:01) (90 - 130)  BP: 138/70 (05-10 @ 07:01) (115/62 - 155/73)  RR: 32 (05-10 @ 07:01) (20 - 32)  SpO2: 98% (05-10 @ 07:01) (96% - 100%)    CAPILLARY BLOOD GLUCOSE        I&O's Summary    09 May 2019 07:01  -  10 May 2019 07:00  --------------------------------------------------------  IN: 246.4 mL / OUT: 650 mL / NET: -403.6 mL      PHYSICAL EXAM:  GEN: Appears in no acute distress  HEENT: EOMI, neck supple, no lymphadenopathy, no JVD, MMM - dried blood over lips  PULM: Clear to auscultation bilaterally, no wheezes, rales, rhonchi  CV: Regular rate and rhythm, S1S2, no murmurs, rubs or gallops  ABD: Soft, nontender to palpation, non-distended, colostomy bag in place  EXTREMITIES: No edema, + peripheral pulses  NEURO: AAOx3 using native language via wife, moving all four extremities spontaneously  PSYCH: Mood "I feel ok", appropriate and congruent affect    LABS & IMAGING:  Labs personally reviewed [X]                        7.7    4.49  )-----------( 18       ( 10 May 2019 03:57 )             23.1     Hgb Trend: 7.7<--, 6.6<--, 7.5<--, 6.3<--, 7.5<--    05-10    145  |  103  |  73<H>  ----------------------------<  125<H>  3.9   |  18<L>  |  8.57<H>    Ca    6.7<L>      10 May 2019 03:57  Phos  5.0     05-10  Mg     2.2     05-10    TPro  6.5  /  Alb  3.2<L>  /  TBili  1.3<H>  /  DBili  x   /  AST  40  /  ALT  36  /  AlkPhos  179<H>  05-10    Creatinine Trend: 8.57<--, 6.61<--, 4.71<--, 9.68<--, 7.77<--, 8.51<--    PT/INR - ( 08 May 2019 20:16 )   PT: 21.2 SEC;   INR: 1.83     PTT - ( 08 May 2019 20:16 )  PTT:28.9 SEC

## 2019-05-10 NOTE — PROGRESS NOTE ADULT - ASSESSMENT
52 y/o Male with relapsed multiple myeloma s/p bone marrow transplant (~1.5 yrs ago) and chemo/RT (last infusion Bendamustine and Pomalidomide 4/08/2019), perforated diverticulitis s/p Kiah's (proctosigmoidectomy with colostomy, 9/2018), nonischemic cardiomyopathy (mild global LV systolic dysfunction with EF 54% and diastolic LV dysfunction based on TTE in 11/2018), and HTN admitted to [facility] on 4/25 for nausea/vomiting and poor PO intake, found to have pancytopenia and was treated for neutropenic fever however course c/b acute renal failure with Shiley placed by IR for new HD, C diff infection, and acute encephalopathy, transferred back to floors from MICU. Encephalopathy now improving with HD. Per heme no further therapeutic options for pt at this time. GOC discussion pending.    1. C.diff colitis  - no evidence of megacolon on CT A/P  - c/w po vanco, iv flagyl  for 14d course  - monitor stool count  - ID consult, GI consult  - CBC qd    2. Pancytopenia with neutropenic fever (2/2 acute tonsillitis and esophageal candidiasis)   - recurrent symptomatic anemia requiring pRBC transfusions  - f/u Bcx 5/9  - transfused 1u pRBC today  - transfuse 1u plt w/ HD today  - transfuse goal Hgb <7, Plt<10 or Plt<50 if bleeding  - monitor for signs of bleeding  - c/w Zarxio 480mcg per Heme/Onc  - Heme/On cc/s appreciated           3. Multiple myeloma   - in relapse s/p bone marrow transplant (~1.5 yrs ago) and chemo/RT.   - Last infusion Bendamustine and Pomalidomide 4/08/2019  - s/p decadron 40mg  - Hem/onc consult    4. Metabolic Encephalopathy  - electrolyte derangement vs infectious etiology  - Head CT w/o acute pathology  - monitor mental status - worsening when pending next dialysis session    5. Acute on Chronic Systolic CHF Exacerbation  - with pulmonary edema/effusions   - BP currently stable  - Holding home carvedilol/lisinopril   - TTE 3/29/19 EF 61%  - UF HD per nephrology  - daily weight, strict i/o, c/w cardiac meds, adjust     6. Acute Renal Failure, 2/2 ATN:  - c/w HD per nephro recs  - hypocalcemia and hyperphos  - renally adjust medications  - Nephrology consult    7. Metabolic Acidosis  - 2/2 ATN vs GI loss  - anticipate resolution with HD and infectious treatment  - BMP qd    8. Acute Tonsilitis and Candida Esophagitis  - c/w acyclovir and fluconazole  - supportive care prn    9. GOC  - Palliative, Heme/Onc, and MICU assistance appreciated  - Full code  - Pending further discussion    10. Prophylactic measures  - DVT: SCDs - low risk  - DIET: NPO - S/S  - DISPO: Pending GOC discussion

## 2019-05-10 NOTE — PROGRESS NOTE ADULT - SUBJECTIVE AND OBJECTIVE BOX
Pt is seen and examined  pt is awake and sitting up in chair   more awake and alert  WBC improved      PAST MEDICAL & SURGICAL HISTORY:  Diverticulitis: perforated s/p Kiah  Multiple myeloma: relapsed 3/2019  Hypertension  Left ventricular dysfunction  Cardiomyopathy: nonischemic  Former smoker: (1 ppd x 11 years; Quit ~age 26)  History of bone marrow transplant  History of surgery: s/p exploratory laparotomy with sigmoid resection and colostomy on 9/2/2018      ROS:  Negative except for:    MEDICATIONS  (STANDING):  acyclovir IVPB 200 milliGRAM(s) IV Intermittent every 24 hours  carvedilol 25 milliGRAM(s) Oral every 12 hours  chlorhexidine 4% Liquid 1 Application(s) Topical <User Schedule>  docosanol 10% Cream 1 Application(s) Topical three times a day  filgrastim-sndz Injectable 480 MICROGram(s) SubCutaneous daily  fluconAZOLE IVPB 200 milliGRAM(s) IV Intermittent every 24 hours  metroNIDAZOLE  IVPB 500 milliGRAM(s) IV Intermittent every 8 hours  metroNIDAZOLE  IVPB      vancomycin    Solution 125 milliGRAM(s) Oral every 6 hours    MEDICATIONS  (PRN):  artificial  tears Solution 1 Drop(s) Both EYES every 6 hours PRN Dry Eyes      Allergies    oxycodone (Vomiting; Nausea)    Intolerances        Vital Signs Last 24 Hrs  T(C): 36.8 (10 May 2019 09:07), Max: 38.7 (09 May 2019 16:00)  T(F): 98.2 (10 May 2019 09:07), Max: 101.6 (09 May 2019 16:00)  HR: 122 (10 May 2019 10:53) (109 - 130)  BP: 132/66 (10 May 2019 10:53) (132/66 - 155/73)  BP(mean): 105 (09 May 2019 20:00) (96 - 105)  RR: 32 (10 May 2019 10:53) (22 - 32)  SpO2: 98% (10 May 2019 10:53) (96% - 100%)    PHYSICAL EXAM    NAD  lethargic/confused  dried blood on lips  abd benign  no edema  shiley at R inguinal area    LABS:                          7.7    4.49  )-----------( 18       ( 10 May 2019 03:57 )             23.1     Serial CBC's  05-10 @ 03:57  Hct-23.1 / Hgb-7.7 / Plat-18 / RBC-2.67 / WBC-4.49          Serial CBC's  05-09 @ 02:25  Hct-19.4 / Hgb-6.6 / Plat-24 / RBC-2.27 / WBC-3.53            05-10    145  |  103  |  73<H>  ----------------------------<  125<H>  3.9   |  18<L>  |  8.57<H>    Ca    6.7<L>      10 May 2019 03:57  Phos  5.0     05-10  Mg     2.2     05-10    TPro  6.5  /  Alb  3.2<L>  /  TBili  1.3<H>  /  DBili  x   /  AST  40  /  ALT  36  /  AlkPhos  179<H>  05-10      PT/INR - ( 08 May 2019 20:16 )   PT: 21.2 SEC;   INR: 1.83          PTT - ( 08 May 2019 20:16 )  PTT:28.9 SEC    WBC Count: 4.49 K/uL (05-10 @ 03:57)  Hemoglobin: 7.7 g/dL (05-10 @ 03:57)            RADIOLOGY & ADDITIONAL STUDIES:

## 2019-05-11 LAB
ALBUMIN SERPL ELPH-MCNC: 3.2 G/DL — LOW (ref 3.3–5)
ALP SERPL-CCNC: 156 U/L — HIGH (ref 40–120)
ALT FLD-CCNC: 30 U/L — SIGNIFICANT CHANGE UP (ref 4–41)
ANION GAP SERPL CALC-SCNC: 21 MMO/L — HIGH (ref 7–14)
AST SERPL-CCNC: 26 U/L — SIGNIFICANT CHANGE UP (ref 4–40)
BACTERIA BLD CULT: SIGNIFICANT CHANGE UP
BACTERIA BLD CULT: SIGNIFICANT CHANGE UP
BASOPHILS # BLD AUTO: 0.01 K/UL — SIGNIFICANT CHANGE UP (ref 0–0.2)
BASOPHILS NFR BLD AUTO: 0.3 % — SIGNIFICANT CHANGE UP (ref 0–2)
BILIRUB SERPL-MCNC: 1.4 MG/DL — HIGH (ref 0.2–1.2)
BUN SERPL-MCNC: 61 MG/DL — HIGH (ref 7–23)
CALCIUM SERPL-MCNC: 7.9 MG/DL — LOW (ref 8.4–10.5)
CHLORIDE SERPL-SCNC: 99 MMOL/L — SIGNIFICANT CHANGE UP (ref 98–107)
CO2 SERPL-SCNC: 21 MMOL/L — LOW (ref 22–31)
CREAT SERPL-MCNC: 6.47 MG/DL — HIGH (ref 0.5–1.3)
EOSINOPHIL # BLD AUTO: 0.01 K/UL — SIGNIFICANT CHANGE UP (ref 0–0.5)
EOSINOPHIL NFR BLD AUTO: 0.3 % — SIGNIFICANT CHANGE UP (ref 0–6)
GLUCOSE SERPL-MCNC: 182 MG/DL — HIGH (ref 70–99)
HCT VFR BLD CALC: 20.6 % — CRITICAL LOW (ref 39–50)
HCT VFR BLD CALC: 23.4 % — LOW (ref 39–50)
HGB BLD-MCNC: 7 G/DL — CRITICAL LOW (ref 13–17)
HGB BLD-MCNC: 7.9 G/DL — LOW (ref 13–17)
IMM GRANULOCYTES NFR BLD AUTO: 0.3 % — SIGNIFICANT CHANGE UP (ref 0–1.5)
LYMPHOCYTES # BLD AUTO: 1.51 K/UL — SIGNIFICANT CHANGE UP (ref 1–3.3)
LYMPHOCYTES # BLD AUTO: 44.4 % — HIGH (ref 13–44)
MAGNESIUM SERPL-MCNC: 2.2 MG/DL — SIGNIFICANT CHANGE UP (ref 1.6–2.6)
MANUAL SMEAR VERIFICATION: SIGNIFICANT CHANGE UP
MCHC RBC-ENTMCNC: 28.8 PG — SIGNIFICANT CHANGE UP (ref 27–34)
MCHC RBC-ENTMCNC: 28.8 PG — SIGNIFICANT CHANGE UP (ref 27–34)
MCHC RBC-ENTMCNC: 33.8 % — SIGNIFICANT CHANGE UP (ref 32–36)
MCHC RBC-ENTMCNC: 34 % — SIGNIFICANT CHANGE UP (ref 32–36)
MCV RBC AUTO: 84.8 FL — SIGNIFICANT CHANGE UP (ref 80–100)
MCV RBC AUTO: 85.4 FL — SIGNIFICANT CHANGE UP (ref 80–100)
MONOCYTES # BLD AUTO: 1.15 K/UL — HIGH (ref 0–0.9)
MONOCYTES NFR BLD AUTO: 33.8 % — HIGH (ref 2–14)
NEUTROPHILS # BLD AUTO: 0.71 K/UL — LOW (ref 1.8–7.4)
NEUTROPHILS NFR BLD AUTO: 20.9 % — LOW (ref 43–77)
NRBC # FLD: 0 K/UL — SIGNIFICANT CHANGE UP (ref 0–0)
NRBC # FLD: 0 K/UL — SIGNIFICANT CHANGE UP (ref 0–0)
PHOSPHATE SERPL-MCNC: 4.6 MG/DL — HIGH (ref 2.5–4.5)
PLATELET # BLD AUTO: 29 K/UL — LOW (ref 150–400)
PLATELET # BLD AUTO: 56 K/UL — LOW (ref 150–400)
PMV BLD: 11.5 FL — SIGNIFICANT CHANGE UP (ref 7–13)
PMV BLD: 9.6 FL — SIGNIFICANT CHANGE UP (ref 7–13)
POTASSIUM SERPL-MCNC: 3.9 MMOL/L — SIGNIFICANT CHANGE UP (ref 3.5–5.3)
POTASSIUM SERPL-SCNC: 3.9 MMOL/L — SIGNIFICANT CHANGE UP (ref 3.5–5.3)
PROT SERPL-MCNC: 6.5 G/DL — SIGNIFICANT CHANGE UP (ref 6–8.3)
RBC # BLD: 2.43 M/UL — LOW (ref 4.2–5.8)
RBC # BLD: 2.74 M/UL — LOW (ref 4.2–5.8)
RBC # FLD: 15.8 % — HIGH (ref 10.3–14.5)
RBC # FLD: 16 % — HIGH (ref 10.3–14.5)
SODIUM SERPL-SCNC: 141 MMOL/L — SIGNIFICANT CHANGE UP (ref 135–145)
WBC # BLD: 3.05 K/UL — LOW (ref 3.8–10.5)
WBC # BLD: 3.4 K/UL — LOW (ref 3.8–10.5)
WBC # FLD AUTO: 3.05 K/UL — LOW (ref 3.8–10.5)
WBC # FLD AUTO: 3.4 K/UL — LOW (ref 3.8–10.5)

## 2019-05-11 PROCEDURE — 71045 X-RAY EXAM CHEST 1 VIEW: CPT | Mod: 26

## 2019-05-11 RX ORDER — METOCLOPRAMIDE HCL 10 MG
5 TABLET ORAL EVERY 8 HOURS
Refills: 0 | Status: DISCONTINUED | OUTPATIENT
Start: 2019-05-11 | End: 2019-05-14

## 2019-05-11 RX ORDER — IPRATROPIUM/ALBUTEROL SULFATE 18-103MCG
3 AEROSOL WITH ADAPTER (GRAM) INHALATION ONCE
Refills: 0 | Status: COMPLETED | OUTPATIENT
Start: 2019-05-11 | End: 2019-05-11

## 2019-05-11 RX ORDER — PANTOPRAZOLE SODIUM 20 MG/1
40 TABLET, DELAYED RELEASE ORAL
Refills: 0 | Status: DISCONTINUED | OUTPATIENT
Start: 2019-05-11 | End: 2019-05-14

## 2019-05-11 RX ADMIN — Medication 125 MILLIGRAM(S): at 17:46

## 2019-05-11 RX ADMIN — Medication 5 MILLIGRAM(S): at 12:01

## 2019-05-11 RX ADMIN — Medication 125 MILLIGRAM(S): at 12:00

## 2019-05-11 RX ADMIN — FLUCONAZOLE 100 MILLIGRAM(S): 150 TABLET ORAL at 06:11

## 2019-05-11 RX ADMIN — CARVEDILOL PHOSPHATE 25 MILLIGRAM(S): 80 CAPSULE, EXTENDED RELEASE ORAL at 05:12

## 2019-05-11 RX ADMIN — Medication 1 MILLIGRAM(S): at 04:34

## 2019-05-11 RX ADMIN — DOCOSANOL 1 APPLICATION(S): 100 CREAM TOPICAL at 12:14

## 2019-05-11 RX ADMIN — Medication 100 MILLIGRAM(S): at 05:12

## 2019-05-11 RX ADMIN — Medication 125 MILLIGRAM(S): at 04:34

## 2019-05-11 RX ADMIN — CHLORHEXIDINE GLUCONATE 1 APPLICATION(S): 213 SOLUTION TOPICAL at 12:00

## 2019-05-11 RX ADMIN — CARVEDILOL PHOSPHATE 25 MILLIGRAM(S): 80 CAPSULE, EXTENDED RELEASE ORAL at 17:46

## 2019-05-11 RX ADMIN — Medication 3 MILLILITER(S): at 08:15

## 2019-05-11 RX ADMIN — Medication 104 MILLIGRAM(S): at 17:45

## 2019-05-11 RX ADMIN — DOCOSANOL 1 APPLICATION(S): 100 CREAM TOPICAL at 21:55

## 2019-05-11 RX ADMIN — Medication 100 MILLIGRAM(S): at 15:48

## 2019-05-11 RX ADMIN — Medication 100 MILLIGRAM(S): at 21:54

## 2019-05-11 RX ADMIN — Medication 40 MILLIGRAM(S): at 12:00

## 2019-05-11 RX ADMIN — DOCOSANOL 1 APPLICATION(S): 100 CREAM TOPICAL at 05:12

## 2019-05-11 NOTE — SWALLOW BEDSIDE ASSESSMENT ADULT - SLP PERTINENT HISTORY OF CURRENT PROBLEM
Patient is a "52 yo M hx of relapsed multiple myeloma s/p bone marrow transplant (~1.5 yrs ago) and chemo/RT (last infusion Bendamustine and Pomalidomide 4/08/2019), perforated diverticulitis s/p Kiah's (proctosigmoidectomy with colostomy, 9/2018), nonischemic cardiomyopathy (mild global LV systolic dysfunction with EF 54% and diastolic LV dysfunction based on TTE in 11/2018), and HTN presents with vision change and vomiting that resolved, concern for CVA, admitted for pancytopenia."

## 2019-05-11 NOTE — PROVIDER CONTACT NOTE (OTHER) - SITUATION
Pt and family states they don't think pt is tolerating tube feed, pt c/o acid reflux and abdominal pain

## 2019-05-11 NOTE — PROGRESS NOTE ADULT - SUBJECTIVE AND OBJECTIVE BOX
SONAL GARCIA:[id number],   53yMale followed for:  oxycodone (Vomiting; Nausea)    PAST MEDICAL & SURGICAL HISTORY:  Diverticulitis: perforated s/p Kiah  Multiple myeloma: relapsed 3/2019  Hypertension  Left ventricular dysfunction  Cardiomyopathy: nonischemic  Former smoker: (1 ppd x 11 years; Quit ~age 26)  History of bone marrow transplant  History of surgery: s/p exploratory laparotomy with sigmoid resection and colostomy on 9/2/2018    FAMILY HISTORY:  Family history of diabetes mellitus  Family history of hypertension    MEDICATIONS  (STANDING):  acyclovir IVPB 200 milliGRAM(s) IV Intermittent every 24 hours  carvedilol 25 milliGRAM(s) Oral every 12 hours  chlorhexidine 4% Liquid 1 Application(s) Topical <User Schedule>  docosanol 10% Cream 1 Application(s) Topical three times a day  fluconAZOLE IVPB 200 milliGRAM(s) IV Intermittent every 24 hours  metoclopramide Injectable 5 milliGRAM(s) IV Push every 8 hours  metroNIDAZOLE  IVPB 500 milliGRAM(s) IV Intermittent every 8 hours  metroNIDAZOLE  IVPB      pantoprazole    Tablet 40 milliGRAM(s) Oral before breakfast  vancomycin    Solution 125 milliGRAM(s) Oral every 6 hours    MEDICATIONS  (PRN):  artificial  tears Solution 1 Drop(s) Both EYES every 6 hours PRN Dry Eyes      Vital Signs Last 24 Hrs  T(C): 36.8 (11 May 2019 09:51), Max: 36.8 (10 May 2019 15:16)  T(F): 98.2 (11 May 2019 09:51), Max: 98.2 (10 May 2019 15:16)  HR: 102 (11 May 2019 09:51) (98 - 122)  BP: 117/64 (11 May 2019 09:51) (117/64 - 141/68)  BP(mean): --  RR: 16 (11 May 2019 09:51) (16 - 32)  SpO2: 95% (11 May 2019 09:51) (95% - 99%)  nc/at  s1s2  cta  soft, nt, nd no guarding or rebound  no c/c/e    CBC Full  -  ( 11 May 2019 06:33 )  WBC Count : 3.40 K/uL  RBC Count : 2.43 M/uL  Hemoglobin : 7.0 g/dL  Hematocrit : 20.6 %  Platelet Count - Automated : 29 K/uL  Mean Cell Volume : 84.8 fL  Mean Cell Hemoglobin : 28.8 pg  Mean Cell Hemoglobin Concentration : 34.0 %  Auto Neutrophil # : 0.71 K/uL  Auto Lymphocyte # : 1.51 K/uL  Auto Monocyte # : 1.15 K/uL  Auto Eosinophil # : 0.01 K/uL  Auto Basophil # : 0.01 K/uL  Auto Neutrophil % : 20.9 %  Auto Lymphocyte % : 44.4 %  Auto Monocyte % : 33.8 %  Auto Eosinophil % : 0.3 %  Auto Basophil % : 0.3 %    05-11    141  |  99  |  61<H>  ----------------------------<  182<H>  3.9   |  21<L>  |  6.47<H>    Ca    7.9<L>      11 May 2019 06:33  Phos  4.6     05-11  Mg     2.2     05-11    TPro  6.5  /  Alb  3.2<L>  /  TBili  1.4<H>  /  DBili  x   /  AST  26  /  ALT  30  /  AlkPhos  156<H>  05-11

## 2019-05-11 NOTE — PROGRESS NOTE ADULT - ASSESSMENT
53y Male with relapsed multiple myeloma s/p bone marrow transplant (~1.5 yrs ago) and chemo/RT (last infusion Bendamustine and Pomalidomide 4/08/2019), perforated diverticulitis s/p Kiah's (proctosigmoidectomy with colostomy, 9/2018), nonischemic cardiomyopathy (mild global LV systolic dysfunction with EF 54% and diastolic LV dysfunction based on TTE in 11/2018), and HTN admitted to [facility] on 4/25 for nausea and vomiting, and poor PO intake, treated for neutropenic fever, c/b ROBB. Renal following for ARF    labs, chart reviewed

## 2019-05-11 NOTE — PROGRESS NOTE ADULT - SUBJECTIVE AND OBJECTIVE BOX
Annelise Elizalde, PGY1   Contact/Pager - [phone] / [phone]    SUBJECTIVE / OVERNIGHT EVENTS:  -acid reflux overnight, with tube feeds held   -patient with cough, congestion, and diarrhea all for the past few days  -refusing feeds this AM       MEDICATIONS  (STANDING):  acyclovir IVPB 200 milliGRAM(s) IV Intermittent every 24 hours  ALBUTerol/ipratropium for Nebulization. 3 milliLiter(s) Nebulizer once  carvedilol 25 milliGRAM(s) Oral every 12 hours  chlorhexidine 4% Liquid 1 Application(s) Topical <User Schedule>  docosanol 10% Cream 1 Application(s) Topical three times a day  fluconAZOLE IVPB 200 milliGRAM(s) IV Intermittent every 24 hours  metroNIDAZOLE  IVPB 500 milliGRAM(s) IV Intermittent every 8 hours  metroNIDAZOLE  IVPB      vancomycin    Solution 125 milliGRAM(s) Oral every 6 hours    MEDICATIONS  (PRN):  artificial  tears Solution 1 Drop(s) Both EYES every 6 hours PRN Dry Eyes      Allergies    oxycodone (Vomiting; Nausea)    Intolerances          Vital Signs Last 24 Hrs  T(C): 36.5 (11 May 2019 05:34), Max: 36.8 (10 May 2019 09:07)  T(F): 97.7 (11 May 2019 05:34), Max: 98.2 (10 May 2019 09:07)  HR: 98 (11 May 2019 05:34) (98 - 125)  BP: 128/73 (11 May 2019 05:34) (122/69 - 145/70)  BP(mean): --  RR: 26 (11 May 2019 05:34) (22 - 32)  SpO2: 98% (11 May 2019 05:34) (97% - 99%)  CAPILLARY BLOOD GLUCOSE        I&O's Summary    10 May 2019 07:01  -  11 May 2019 07:00  --------------------------------------------------------  IN: 400 mL / OUT: 900 mL / NET: -500 mL          PHYSICAL EXAM:  GENERAL: NAD, appears ill  HEAD:  AT, NC  EYES: EOMI, PERRLA, conjunctiva and sclera clear  ENMT: Airway patent. MMM. dry oral mucosa, dried blood around mouth   NECK: Supple, No JVD  CHEST/LUNG: decreased air movement, with slight rhonchi bilaterally in all fields  HEART: RRR; Normal S1, S2. No murmurs, rubs, or gallops  ABDOMEN: Soft, NT, ND; Bowel sounds present. Colostomy bag with liquid stool   EXTREMITIES:  2+ Peripheral Pulses, bilateral 1+ symmetric pitting edema extending to ankles  PSYCH: AAOx3, following commands  NEUROLOGY: non-focal  SKIN: Warm, dry, intact; petechiae noted on shins       LABS:                        7.7    4.49  )-----------( 18       ( 10 May 2019 03:57 )             23.1     05-11    141  |  99  |  61<H>  ----------------------------<  182<H>  3.9   |  21<L>  |  6.47<H>    Ca    7.9<L>      11 May 2019 06:33  Phos  4.6     05-11  Mg     2.2     05-11    TPro  6.5  /  Alb  3.2<L>  /  TBili  1.4<H>  /  DBili  x   /  AST  26  /  ALT  30  /  AlkPhos  156<H>  05-11    LIVER FUNCTIONS - ( 11 May 2019 06:33 )  Alb: 3.2 g/dL / Pro: 6.5 g/dL / ALK PHOS: 156 u/L / ALT: 30 u/L / AST: 26 u/L / GGT: x                       Culture - Blood (collected 09 May 2019 18:38)  Source: BLOOD VENOUS  Preliminary Report (10 May 2019 18:40):    NO ORGANISMS ISOLATED    NO ORGANISMS ISOLATED AT 24 HOURS    Culture - Blood (collected 09 May 2019 18:38)  Source: BLOOD PERIPHERAL  Preliminary Report (10 May 2019 18:40):    NO ORGANISMS ISOLATED    NO ORGANISMS ISOLATED AT 24 HOURS          RADIOLOGY & ADDITIONAL TESTS:    Imaging Personally Reviewed: YES    Consultant(s) Notes Reviewed: YES    Care Discussed with Consultants/Other Providers: YES

## 2019-05-11 NOTE — CHART NOTE - NSCHARTNOTEFT_GEN_A_CORE
NUTRITION CONSULT/FOLLOW-UP:  Pt. somnolent and lethargic at time of RDN visit.  Wife @ bedside.  Swallowing difficulty due to oral sores.  Pt. s/p NGT placement & initiation of enteral feeding with Jevity 1.2 with goal of 30mL/hr x 24hrs (inadequate to meet nutrient needs).  However Pt. refusing feedings today, per wife, RN and chart.  S/p swallow evaluation today, 5/11/19 with recommendation by SLP to continue with non-oral means of nutrition.  Would change enteral formula to Nepro with eventual goal of 45mL/hr x 24hrs, given Pt. receives HD [new].  Also with elevated phosphorus level.  Noted w nausea... Reglan prescribed.  Per wife and RN, Pt.'s colostomy output now more formed and not liquid consistency.  Will attempt contact with physician re: nutrition recommendations.      Weight:  74.4kg (5/11/19 re-weight obtained by RDN via bed scale)               77.4kg (5/8/19 dry weight via HD flow sheets)    Edema:  1+ B/L legs, ankles and feet.    Skin: No noted pressure injuries.      Pertinent Medications: MEDICATIONS  (STANDING):  acyclovir IVPB 200 milliGRAM(s) IV Intermittent every 24 hours  carvedilol 25 milliGRAM(s) Oral every 12 hours  chlorhexidine 4% Liquid 1 Application(s) Topical <User Schedule>  docosanol 10% Cream 1 Application(s) Topical three times a day  fluconAZOLE IVPB 200 milliGRAM(s) IV Intermittent every 24 hours  metoclopramide Injectable 5 milliGRAM(s) IV Push every 8 hours  metroNIDAZOLE  IVPB 500 milliGRAM(s) IV Intermittent every 8 hours  metroNIDAZOLE  IVPB      pantoprazole    Tablet 40 milliGRAM(s) Oral before breakfast  vancomycin    Solution 125 milliGRAM(s) Oral every 6 hours    MEDICATIONS  (PRN):  artificial  tears Solution 1 Drop(s) Both EYES every 6 hours PRN Dry Eyes    Pertinent Labs:  05-11 Na141 mmol/L Glu 182 mg/dL<H> K+ 3.9 mmol/L Cr  6.47 mg/dL<H> BUN 61 mg/dL<H> 05-11 Phos 4.6 mg/dL<H> 05-11 Alb 3.2 g/dL<L>      Current Diet:  Jevity 1.2 with goal of 30mL/hr x 24hrs         NUTRITION Dx:  Pt. remains severely malnourished       PLAN/RECOMMENDATIONS:    1) Change enteral formula to Nepro.  Initiate 20mL/hr then increase by 10mL q4hrs to goal of 45mL/hr x 24hrs.                    [provides 1080mL total volume, 1944Kcals & ~87g protein, daily]  2) Obtain pre/post HD weights  3) Monitor tolerance to enteral feedings (if/when resumed)... keep HOB > 45 degrees.  4) RDN remains available and will f/u PRN.          Randee Henning, LILIANA, CDN pager [phone]

## 2019-05-11 NOTE — SWALLOW BEDSIDE ASSESSMENT ADULT - ASR SWALLOW ASPIRATION MONITOR
upper respiratory infection/oral hygiene/pneumonia/gurgly voice/fever/throat clearing/change of breathing pattern/position upright (90Y)/cough
oral hygiene/position upright (90Y)/pneumonia/throat clearing/change of breathing pattern/fever/upper respiratory infection/cough/gurgly voice

## 2019-05-11 NOTE — PROGRESS NOTE ADULT - ASSESSMENT
54 y/o Male with relapsed multiple myeloma s/p bone marrow transplant (~1.5 yrs ago) and chemo/RT (last infusion Bendamustine and Pomalidomide 4/08/2019), perforated diverticulitis s/p Kiah's (proctosigmoidectomy with colostomy, 9/2018), nonischemic cardiomyopathy (mild global LV systolic dysfunction with EF 54% and diastolic LV dysfunction based on TTE in 11/2018), and HTN admitted to [facility] on 4/25 for nausea/vomiting and poor PO intake, found to have pancytopenia and was treated for neutropenic fever however course c/b acute renal failure with Shiley placed by IR for new HD, C diff infection, and acute encephalopathy, transferred back to floors from MICU. Encephalopathy now improving with HD. Per heme no further therapeutic options for pt at this time. GOC discussion pending.    1. C.diff colitis  - no evidence of megacolon on CT A/P from 5/6  - c/w po vanco, iv flagyl  for 14d course; last day of each 5/21  - patient remains afebrile, will culture if febrile  - still with liquid stool in colostomy   - ID consult, GI consult  - CBC qd    2. Pancytopenia with neutropenic fever (2/2 acute tonsillitis and esophageal candidiasis)   - recurrent symptomatic anemia requiring pRBC transfusions  - BCx from 5/9 with NGTD  - most recent pRBC 5/9 and plt transfusion 5/10  - transfuse goal Hgb <7, keep Plt >/= 40 while catheter in place as per hematology   - monitor for signs of bleeding; oral mucosa with dried blood  - d/c Zarxio 480mcg as per Heme/Onc  - Heme/On cc/s appreciated           3. Multiple myeloma   - in relapse s/p bone marrow transplant (~1.5 yrs ago) and chemo/RT.   - Last infusion Bendamustine and Pomalidomide 4/08/2019  - s/p decadron 40mg  - Hem/onc consult-  no further tx options, GOC     4. Metabolic Encephalopathy  - electrolyte derangement vs infectious etiology  - Head CT w/o acute pathology  - monitor mental status - improvement with dialysis     5. Acute on Chronic Systolic CHF Exacerbation  - with pulmonary edema/effusions   - BP currently stable  - Holding home carvedilol/lisinopril   - TTE 3/29/19 EF 61%  - UF HD per nephrology- 500 cc out on 5/11; making very little urine as per night nurse  - Duoneb of no help for congestion/cough  - daily weight, strict i/o, c/w cardiac meds, adjust     6. Acute Renal Failure, 2/2 ATN:  - c/w HD per nephro recs  - hypocalcemia and hyperphos  - renally adjust medications  - Nephrology consult    7. Metabolic Acidosis  - 2/2 ATN vs GI loss  - anticipate resolution with HD and infectious treatment  - BMP qd    8. Acute Tonsilitis and Candida Esophagitis  - c/w acyclovir and fluconazole  - supportive care prn    9. GOC  - Palliative, Heme/Onc, and MICU assistance appreciated  - Full code  - Pending further discussion    10. Prophylactic measures  - DVT: SCDs - low risk  - DIET: NPO - S/S  - DISPO: Pending GOC discussion 52 y/o Male with relapsed multiple myeloma s/p bone marrow transplant (~1.5 yrs ago) and chemo/RT (last infusion Bendamustine and Pomalidomide 4/08/2019), perforated diverticulitis s/p Kiah's (proctosigmoidectomy with colostomy, 9/2018), nonischemic cardiomyopathy (mild global LV systolic dysfunction with EF 54% and diastolic LV dysfunction based on TTE in 11/2018), and HTN admitted to [facility] on 4/25 for nausea/vomiting and poor PO intake, found to have pancytopenia and was treated for neutropenic fever however course c/b acute renal failure with Shiley placed by IR for new HD, C diff infection, and acute encephalopathy, transferred back to floors from MICU. Encephalopathy now improving with HD. Per heme no further therapeutic options for pt at this time. GOC discussion pending.    1. C.diff colitis  - no evidence of megacolon on CT A/P from 5/6  - c/w po vanco, iv flagyl  for 14d course; last day of each 5/21  - patient remains afebrile, will culture if febrile  - still with liquid stool in colostomy   - ID consult, GI consult  - CBC qd    2. Pancytopenia with neutropenic fever (2/2 acute tonsillitis and esophageal candidiasis)   - recurrent symptomatic anemia requiring pRBC transfusions  - BCx from 5/9 with NGTD  - most recent pRBC 5/9 and plt transfusion 5/10- will transfuse RBC and plt today as Hgb is 7 (from 7.7) and plt is 29  - transfuse goal Hgb <7, keep Plt >/= 40 while catheter in place as per hematology   - monitor for signs of bleeding; oral mucosa with dried blood  - d/c Zarxio 480mcg as per Heme/Onc  - Heme/On cc/s appreciated           3. Multiple myeloma   - in relapse s/p bone marrow transplant (~1.5 yrs ago) and chemo/RT.   - Last infusion Bendamustine and Pomalidomide 4/08/2019  - s/p decadron 40mg  - Hem/onc consult-  no further tx options, GOC     4. Metabolic Encephalopathy  - electrolyte derangement vs infectious etiology  - Head CT w/o acute pathology  - monitor mental status - improvement with dialysis     5. Acute on Chronic Systolic CHF Exacerbation  - with pulmonary edema/effusions   - BP currently stable  - Holding home carvedilol/lisinopril   - TTE 3/29/19 EF 61%  - UF HD per nephrology- 500 cc out on 5/11; making very little urine as per night nurse  - Duoneb of no help for congestion/cough  - daily weight, strict i/o, c/w cardiac meds, adjust     6. Acute Renal Failure, 2/2 ATN:  - c/w HD per nephro recs  - hypocalcemia and hyperphos  - renally adjust medications  - Nephrology consult    7. Metabolic Acidosis  - 2/2 ATN vs GI loss  - anticipate resolution with HD and infectious treatment  - BMP qd    8. Acute Tonsilitis and Candida Esophagitis  - c/w acyclovir and fluconazole  - supportive care prn    9. GOC  - Palliative, Heme/Onc, and MICU assistance appreciated  - Full code  - Pending further discussion    10. Prophylactic measures  - DVT: SCDs - low risk  - DIET: NPO - S/S  - DISPO: Pending GOC discussion 54 y/o Male with relapsed multiple myeloma s/p bone marrow transplant (~1.5 yrs ago) and chemo/RT (last infusion Bendamustine and Pomalidomide 4/08/2019), perforated diverticulitis s/p Kiah's (proctosigmoidectomy with colostomy, 9/2018), nonischemic cardiomyopathy (mild global LV systolic dysfunction with EF 54% and diastolic LV dysfunction based on TTE in 11/2018), and HTN admitted to [facility] on 4/25 for nausea/vomiting and poor PO intake, found to have pancytopenia and was treated for neutropenic fever however course c/b acute renal failure with Shiley placed by IR for new HD, C diff infection, and acute encephalopathy, transferred back to floors from MICU. Encephalopathy now improving with HD. Per heme no further therapeutic options for pt at this time. GOC discussion pending.    1. C.diff colitis  - no evidence of megacolon on CT A/P from 5/6  - c/w po vanco, iv flagyl  for 14d course; last day of each 5/21  - patient remains afebrile, will culture if febrile  - still with liquid stool in colostomy   - ID consult, GI consult  - CBC qd    2. Pancytopenia with neutropenic fever (2/2 acute tonsillitis and esophageal candidiasis)   - recurrent symptomatic anemia requiring pRBC transfusions  - BCx from 5/9 with NGTD  - most recent pRBC 5/9 and plt transfusion 5/10- will transfuse RBC and plt today as Hgb is 7 (from 7.7) and plt is 29  - transfuse goal Hgb <7, keep Plt >/= 40 while catheter in place as per hematology   - monitor for signs of bleeding; oral mucosa with dried blood  - d/c Zarxio 480mcg as per Heme/Onc  - Heme/On cc/s appreciated           3. Multiple myeloma   - in relapse s/p bone marrow transplant (~1.5 yrs ago) and chemo/RT.   - Last infusion Bendamustine and Pomalidomide 4/08/2019  - s/p decadron 40mg  - Hem/onc consult-  no further tx options, GOC     4. Metabolic Encephalopathy  - electrolyte derangement vs infectious etiology  - Head CT w/o acute pathology  - monitor mental status - improvement with dialysis     5. Acute on Chronic Systolic CHF Exacerbation  - with pulmonary edema/effusions; CXR from 5/11 demonstrating improved pulm edema    - BP currently stable  - Holding home carvedilol/lisinopril   - TTE 3/29/19 EF 61%  - UF HD per nephrology- 500 cc out on 5/11; making very little urine as per night nurse  - Duoneb of no help for congestion/cough  - daily weight, strict i/o, c/w cardiac meds, adjust     6. Pulmonary edema  -improved on CXR from 5/11, no role for another dialysis session today   -no response to duoneb  -will attempt another dose of steroids today    7. Acute Renal Failure, 2/2 ATN:  - c/w HD per nephro recs  - hypocalcemia and hyperphos  - renally adjust medications  - Nephrology consult    8. Metabolic Acidosis  - 2/2 ATN vs GI loss  - anticipate resolution with HD and infectious treatment  - BMP qd    9. Acute Tonsilitis and Candida Esophagitis  - c/w acyclovir and fluconazole  - supportive care prn    10. GOC  - Palliative, Heme/Onc, and MICU assistance appreciated  - Full code  - Pending further discussion    11. Prophylactic measures  - DVT: SCDs - low risk  - DIET: NPO - S/S  - DISPO: Pending GOC discussion

## 2019-05-11 NOTE — PROGRESS NOTE ADULT - ASSESSMENT
A 52 yo Male with  relapsed multiple myeloma s/p bone marrow transplant (~1.5 yrs ago) and chemo/RT (last infusion Bendamustine and Pomalidomide 4/08/2019), perforated diverticulitis s/p Kiah's (proctosigmoidectomy with colostomy, presents to [facility] ER for evaluation of  vomiting and change of vision. He also had been weak, with epistaxis, and petechiae. ON admission, he found to have Pancytopenia and Neutropenia with ANC of 120. He has no fever but rigors and also c/o Odynophagia. The CT head shows No acute intracranial events. The ID consult requested to assist with evaluation and antibiotic management of Neutropenia and Tonsilitis.     # Neutropenic Fever -   - s/p Chemotherapy for MM,  developed  fever, 4/28/19 Neutropenia resolved 5/7/19  #? Acute Tonsilitis  # Possible Candida Esophagitis-  May benefit from  EGD when cell numbers are stable, since Odynophagia  not improving on Fluconazole  # Kidney failure -s/p Shiley catheter now for dialysis 5/4/19  # C.difficile infection -5/6/19    would recommend:    1. Monitor residual from tube feeding  2. Continue Oral Vancomycin  and IV Flagyl to cover  C.difficile infection   3. Monitor Temp. and If >100.4 then obtain cultures   4. Continue renally adjusted  Acyclovir, and Fluconazole   5.  Contact Isolation  and Dialysis as tolerated  6. Aspiration precaution     d/w  Family at the bed side    will follow the patient with you

## 2019-05-11 NOTE — PROGRESS NOTE ADULT - SUBJECTIVE AND OBJECTIVE BOX
Patient seen and examined  no complaints    oxycodone (Vomiting; Nausea)    Hospital Medications:   MEDICATIONS  (STANDING):  acyclovir IVPB 200 milliGRAM(s) IV Intermittent every 24 hours  carvedilol 25 milliGRAM(s) Oral every 12 hours  chlorhexidine 4% Liquid 1 Application(s) Topical <User Schedule>  docosanol 10% Cream 1 Application(s) Topical three times a day  fluconAZOLE IVPB 200 milliGRAM(s) IV Intermittent every 24 hours  metoclopramide Injectable 5 milliGRAM(s) IV Push every 8 hours  metroNIDAZOLE  IVPB 500 milliGRAM(s) IV Intermittent every 8 hours  metroNIDAZOLE  IVPB      pantoprazole    Tablet 40 milliGRAM(s) Oral before breakfast  vancomycin    Solution 125 milliGRAM(s) Oral every 6 hours      VITALS:  T(F): 98.2 (05-11-19 @ 09:51), Max: 98.2 (05-10-19 @ 21:45)  HR: 102 (05-11-19 @ 09:51)  BP: 117/64 (05-11-19 @ 09:51)  RR: 16 (05-11-19 @ 09:51)  SpO2: 95% (05-11-19 @ 09:51)  Wt(kg): --    05-10 @ 07:01  -  05-11 @ 07:00  --------------------------------------------------------  IN: 400 mL / OUT: 900 mL / NET: -500 mL        PHYSICAL EXAM:  Constitutional: NAD  HEENT: anicteric sclera, oropharynx clear, MMM  Neck: No JVD  Respiratory: CTAB, no wheezes, rales or rhonchi  Cardiovascular: S1, S2, RRR  Gastrointestinal: BS+, soft, NT/ND  Extremities: No cyanosis or clubbing. No peripheral edema  Neurological: A/O x 3, no focal deficits  Psychiatric: Normal mood, normal affect  : No CVA tenderness. No quintero.   Skin: No rashes  Vascular Access: R subclavian shiley     LABS:  05-11    141  |  99  |  61<H>  ----------------------------<  182<H>  3.9   |  21<L>  |  6.47<H>    Ca    7.9<L>      11 May 2019 06:33  Phos  4.6     05-11  Mg     2.2     05-11    TPro  6.5  /  Alb  3.2<L>  /  TBili  1.4<H>  /  DBili      /  AST  26  /  ALT  30  /  AlkPhos  156<H>  05-11    Creatinine Trend: 6.47 <--, 8.57 <--, 6.61 <--, 4.71 <--, 9.68 <--, 7.77 <--, 8.51 <--, 6.88 <--                        7.0    3.40  )-----------( 29       ( 11 May 2019 06:33 )             20.6     Urine Studies:      RADIOLOGY & ADDITIONAL STUDIES:

## 2019-05-11 NOTE — SWALLOW BEDSIDE ASSESSMENT ADULT - COMMENTS
Patient currently be treated for candida esophagitis with magic mouthwash. Patient was received with general weakness with fatigue, and wife reports they "just gave him pain medication which will make him tired".   Patient is known to this department and has a bedside swallowing evaluation on 5/3 which recommended soft and thin liquids.  Since then, patient was made NPO and given NG tube feedings.  Patient was sitting upright in rachelle chair. Wife present for the entirety of this evaluation. As per charting and patient report, patient has been experiencing odynophagia since admission.  Patient stopped soft solid diet and thin liquids and received a NG tube.  Sores visualized on the lips and intraorally with active bleeding.  Nursing made aware.  Findings/recommendations discussed with Nursing and Team 2 (pager #[phone]).  Patient received chest x-ray during this evaluation, results not available at this time.  The patient was noted with increased congestion and baseline cough.    4/30/19 - s/p barium esophagram showing "The patient was limited as to how much barium he could drink secondary to pain. There is limited evaluation of the esophagus secondary to underdistention. Contrast passes freely into the stomach. No hiatal hernia. Mild gastroesophageal reflux."  CT Chest 4/27/19 - "Cardiac silhouette not well evaluated. Probable small left pleural effusion with adjacent opacity. Lungs otherwise clear. No pneumothorax."  CT Hear 4/25/19 - "No CT evidence of acute intracranial hemorrhage or mass effect from vasogenic edema."
Patient is a "54 yo M hx of relapsed multiple myeloma s/p bone marrow transplant (~1.5 yrs ago) and chemo/RT (last infusion Bendamustine and Pomalidomide 4/08/2019), perforated diverticulitis s/p Kiah's (proctosigmoidectomy with colostomy, 9/2018), nonischemic cardiomyopathy (mild global LV systolic dysfunction with EF 54% and diastolic LV dysfunction based on TTE in 11/2018), and HTN presents with vision change and vomiting that resolved, concern for CVA, admitted for pancytopenia."    Patient currently be treated for candida esophagitis with magic mouthwash. Patient was received with general weakness though awake, alert and cooperative for and initial bedside swallow evaluation this AM. Patient was sitting upright in rachelle chair. Wife present for the entirety of this evaluation. As per charting and patient report, patient has been experiencing odynophagia since admission which is now resolving. Patient reported he has been doing much better with solid foods since last night. Small oral sores visualized on the lips and intraorally. Findings/recommendations discussed with patient, wife, primary RN and NP/ADS (spectra #[id number]).    4/30/19 - s/p barium esophagram showing "The patient was limited as to how much barium he could drink secondary to pain. There is limited evaluation of the esophagus secondary to underdistention. Contrast passes freely into the stomach. No hiatal hernia. Mild gastroesophageal reflux."    CT Chest 4/27/19 - "Cardiac silhouette not well evaluated. Probable small left pleural effusion with adjacent opacity. Lungs otherwise clear. No pneumothorax."    CT Hear 4/25/19 - "No CT evidence of acute intracranial hemorrhage or mass effect from vasogenic edema."

## 2019-05-11 NOTE — SWALLOW BEDSIDE ASSESSMENT ADULT - SWALLOW EVAL: DIAGNOSIS
Mila seen upright in chair with wife present at bedside.  Upon examination of oral musculatrure, active bleeding noted in the oral cavity with blood pooling in anterior sulci.  Light oral care providd by this clinician, however at [facility], PO intake trials are not appropriate and patient requires extensive oral care/oral hygiene.  As per nursing, patient is not always tolerable of oral care due to pain from oral sores.  MD notified and no oral trials were attempted today as patient with active bleeding and oral sores in mouth.  It is reocmmended to continue NG tube feedings at this time and to provide strict oral hygiene with reconsultation of this department when oral sores and bleeding has improved. Patient seen upright in chair with wife present at bedside.  Upon examination of oral musculature, active bleeding noted in the oral cavity with blood pooling in anterior sulci.  Light oral care provided by this clinician, however at this time, PO intake trials are not appropriate and patient requires extensive oral care/oral hygiene.  As per nursing, patient is not always tolerable of oral care due to pain from oral sores.  MD notified and no oral trials were attempted today as patient with active bleeding and oral sores in mouth.  It is reocmmended to continue NG tube feedings at this time and to provide strict oral hygiene with reconsultation of this department when oral sores and bleeding have improved.

## 2019-05-11 NOTE — PROGRESS NOTE ADULT - ASSESSMENT
1. Pancytopenia     -- WBC improved s/p zarxio  -- counts slow to recover sec due to heavily tx for myeloma  -- Transfuse to keep Hgb > 7  -- Keep Platelets>/= 40K while catheter in place, transfused today       2. MM relapsed/refractory including failed Auto Transplant    -- had been on bendamustine/pomalyst. Tx on hold  -- s/p decadron 40mg 5/2   -- QIggs, SPEP, JENELLE and SFLC showed increased free kappa  -- no realistic tx options left. Recommend pall care, prognosis poor    3. Tonsilitis vs Candida Esophagitis    -- cont Diflucan per ID  -- cont Acyclovir  -- ID f/u  -- cont magic mouthwash  -- cont IVF    4. ROBB    -- HD per renal  -- ? sec to light chain disease    5. cdiff     -- vanc PO and flagyl    6. oral care as tolerated    D/w family, [phone]

## 2019-05-11 NOTE — PROGRESS NOTE ADULT - SUBJECTIVE AND OBJECTIVE BOX
Pt seen, family at bedside, blood tinged saliva in mouth, lips crusted with dried blood    MEDICATIONS  (STANDING):  acyclovir IVPB 200 milliGRAM(s) IV Intermittent every 24 hours  carvedilol 25 milliGRAM(s) Oral every 12 hours  chlorhexidine 4% Liquid 1 Application(s) Topical <User Schedule>  docosanol 10% Cream 1 Application(s) Topical three times a day  fluconAZOLE IVPB 200 milliGRAM(s) IV Intermittent every 24 hours  metoclopramide Injectable 5 milliGRAM(s) IV Push every 8 hours  metroNIDAZOLE  IVPB 500 milliGRAM(s) IV Intermittent every 8 hours  metroNIDAZOLE  IVPB      pantoprazole    Tablet 40 milliGRAM(s) Oral before breakfast  vancomycin    Solution 125 milliGRAM(s) Oral every 6 hours    MEDICATIONS  (PRN):  artificial  tears Solution 1 Drop(s) Both EYES every 6 hours PRN Dry Eyes      ROS  UTO 2/2 participation, illness    Vital Signs Last 24 Hrs  T(C): 36.8 (11 May 2019 09:51), Max: 36.8 (10 May 2019 21:45)  T(F): 98.2 (11 May 2019 09:51), Max: 98.2 (10 May 2019 21:45)  HR: 102 (11 May 2019 17:47) (98 - 110)  BP: 135/78 (11 May 2019 17:47) (117/64 - 136/72)  BP(mean): --  RR: 24 (11 May 2019 17:47) (22 - 28)  SpO2: 97% (11 May 2019 17:47) (95% - 98%)    PE  NAD  ill-appearing  awake, not very interactive  oral exam as above  abd benign  petechiae on legs, no edema  remainder of exam limited 2/2 participation, illness                          7.0    3.40  )-----------( 29       ( 11 May 2019 06:33 )             20.6       05-11    141  |  99  |  61<H>  ----------------------------<  182<H>  3.9   |  21<L>  |  6.47<H>    Ca    7.9<L>      11 May 2019 06:33  Phos  4.6     05-11  Mg     2.2     05-11    TPro  6.5  /  Alb  3.2<L>  /  TBili  1.4<H>  /  DBili  x   /  AST  26  /  ALT  30  /  AlkPhos  156<H>  05-11

## 2019-05-11 NOTE — PROGRESS NOTE ADULT - SUBJECTIVE AND OBJECTIVE BOX
Patient is seen and examined at the bed side, is afebrile . He is doing better, transferred out of ICU. The oral mucosa bleeding stopped, the Lips crusted with dry blood. The stool still watery. The Family at the bed side.      REVIEW OF SYSTEMS: All other review systems are negative        ALLERGIES: oxycodone (Vomiting; Nausea)      Vital Signs Last 24 Hrs  T(C): 36.8 (11 May 2019 09:51), Max: 36.8 (10 May 2019 21:45)  T(F): 98.2 (11 May 2019 09:51), Max: 98.2 (10 May 2019 21:45)  HR: 102 (11 May 2019 17:47) (98 - 110)  BP: 135/78 (11 May 2019 17:47) (117/64 - 136/72)  BP(mean): --  RR: 24 (11 May 2019 17:47) (22 - 28)  SpO2: 97% (11 May 2019 17:47) (95% - 98%)        PHYSICAL EXAM:  GENERAL: Not in acute distress,   HEENT: Mucosa bleeding stopped, Lips crusted with dry blood, NGT in placed  CHEST/LUNG:  Air entry bilaterally  HEART: s1 and s2 present  ABDOMEN:  Nontender and  Nondistended  EXTREMITIES: No pedal  edema  CNS: Awake and alert        LABS:                                    7.9    3.05  )-----------( 56       ( 11 May 2019 18:21 )             23.4                6.6    3.53  )-----------( 24       ( 09 May 2019 02:25 )             19.4                           7.5    3.19  )-----------( 35       ( 07 May 2019 06:00 )             23.3                           8.8    0.57  )-----------( 23       ( 02 May 2019 18:36 )             26.0             05-11    141  |  99  |  61<H>  ----------------------------<  182<H>  3.9   |  21<L>  |  6.47<H>    Ca    7.9<L>      11 May 2019 06:33  Phos  4.6     05-11  Mg     2.2     05-11    TPro  6.5  /  Alb  3.2<L>  /  TBili  1.4<H>  /  DBili  x   /  AST  26  /  ALT  30  /  AlkPhos  156<H>  05-11 05-09    143  |  103  |  54<H>  ----------------------------<  109<H>  3.6   |  18<L>  |  6.61<H>    Ca    6.9<L>      09 May 2019 02:25  Phos  3.9     05-09  Mg     2.0     05-09    TPro  6.2  /  Alb  2.8<L>  /  TBili  1.0  /  DBili  x   /  AST  34  /  ALT  32  /  AlkPhos  151<H>  05-09 04-30    135  |  104  |  20  ----------------------------<  139<H>  3.8   |  16<L>  |  1.46<H>    Ca    7.2<L>      30 Apr 2019 06:15  Phos  2.4     04-30  Mg     1.8     04-30    TPro  5.9<L>  /  Alb  3.0<L>  /  TBili  2.0<H>  /  DBili  x   /  AST  13  /  ALT  47<H>  /  AlkPhos  100  04-30      Vancomycin level:    Vancomycin Level, Random (05.06.19 @ 06:28) Vancomycin Level, Random: 16.1:     Vancomycin Level, Trough (05.03.19 @ 06:55) Vancomycin Level, Trough: 20.9: Vancomycin trough levels should be rapidly reached and maintained at 15-20 ug/ml for life threatening MRSA  infections such as sepsis, endocarditis, osteomyelitis and pneumonia.     Vancomycin Level, Trough - Prior to Next Dose (05.01.19 @ 06:05) Vancomycin Level, Trough: 29.7: Vancomycin trough levels should be rapidly reached and  maintained at 15-20 ug/ml for life threatening MRSA infections such as sepsis, endocarditis, osteomyelitis and pneumonia.     Vancomycin Level, Random (05.02.19 @ 06:04)  Vancomycin Level, Random: 23.8:         MEDICATIONS  (STANDING):    MEDICATIONS  (STANDING):  acyclovir IVPB 200 milliGRAM(s) IV Intermittent every 24 hours  carvedilol 25 milliGRAM(s) Oral every 12 hours  chlorhexidine 4% Liquid 1 Application(s) Topical <User Schedule>  docosanol 10% Cream 1 Application(s) Topical three times a day  fluconAZOLE IVPB 200 milliGRAM(s) IV Intermittent every 24 hours  metoclopramide Injectable 5 milliGRAM(s) IV Push every 8 hours  metroNIDAZOLE  IVPB 500 milliGRAM(s) IV Intermittent every 8 hours  metroNIDAZOLE  IVPB      pantoprazole    Tablet 40 milliGRAM(s) Oral before breakfast  vancomycin    Solution 125 milliGRAM(s) Oral every 6 hours    MEDICATIONS  (PRN):  artificial  tears Solution 1 Drop(s) Both EYES every 6 hours PRN Dry Eyes        RADIOLOGY & ADDITIONAL TESTS:    4/30/19 : Xray Esophagram (04.30.19 @ 09:31) Limited evaluation for esophageal ulcers secondary to underdistention of  the esophagus.      4/27/19 : Xray Chest 1 View- PORTABLE-Urgent (04.27.19 @ 22:45) Probable small left pleural effusion with adjacent opacity that likely  represents atelectasis.    4/25/19 : CT Brain Stroke Protocol (04.25.19 @ 19:42) No acute intracranial hemorrhage, mass effect, or midline shift. No  abnormal extra-axial collections. The basal cisterns are patent without evidence of central herniation. The sulci and ventricles are within normal limits for the patient's age. Stable few patchy areas of low-attenuation in the bihemispheric white  matter, which is nonspecific, but likely related to sequela of chronic  microvascular ischemic disease. Stable coarse calcifications in the left temporal lobe.      MICROBIOLOGY DATA:    Culture - Blood (05.06.19 @ 23:10)    Culture - Blood:   NO ORGANISMS ISOLATED  NO ORGANISMS ISOLATED AT 24 HOURS    Specimen Source: BLOOD VENOUS        Culture - Blood (05.06.19 @ 23:10)    Culture - Blood:   NO ORGANISMS ISOLATED  NO ORGANISMS ISOLATED AT 24 HOURS    Specimen Source: BLOOD PERIPHERAL        Clostridium difficile Toxin by PCR (05.06.19 @ 15:50)    Clostridium difficile Toxin by PCR: DETECTED: RESULT CALLED TO / READ BACK: MITRA EMANUEL RN/NUZHAT  DATE / TIME CALLED: 05/06/19 1919  CALLED BY: ALYCE CELAYA  C. difficile toxin B gene (tcdb) detected    The results of this test should be interpreted with  consideration of all clinical andlaboratory findings. C.  difficile PCR is more sensitive and specific than EIA,  therefore, repeat testing during one episode does not  provide additional clinically useful information. One test  per episode is sufficient for determining C. difficile  infection status.    This test is performed on the Cepheid Gene Xpert detection  system which automates and integrates sample purification,  Nucleic acid amplification and detection of the target  sequence in simple or complex samples using real-time PCR  and RT-PCR assays.      Culture - Blood (04.28.19 @ 18:20)    Culture - Blood:   NO ORGANISMS ISOLATED  NO ORGANISMS ISOLATED AT 24 HOURS    Specimen Source: BLOOD VENOUS      Culture - Blood (04.28.19 @ 18:20)    Culture - Blood:   NO ORGANISMS ISOLATED  NO ORGANISMS ISOLATED AT 24 HOURS    Specimen Source: BLOOD PERIPHERAL        Culture - Blood (04.27.19 @ 19:50)    Culture - Blood:   NO ORGANISMS ISOLATED  NO ORGANISMS ISOLATED AT 24 HOURS    Specimen Source: BLOOD PERIPHERAL      Culture - Group A Streptococcus (04.27.19 @ 14:24)    Culture - Group A Streptococcus:   NO BETA STREP. GROUP A  AFTER 24HRS.    Specimen Source: THROAT

## 2019-05-12 LAB
AGGLUTINATION: PRESENT — SIGNIFICANT CHANGE UP
ALBUMIN SERPL ELPH-MCNC: 3.03 G/DL — LOW (ref 3.3–5)
ALP SERPL-CCNC: 154 U/L — HIGH (ref 40–120)
ALT FLD-CCNC: 23 U/L — SIGNIFICANT CHANGE UP (ref 4–41)
ANION GAP SERPL CALC-SCNC: 23 MMO/L — HIGH (ref 7–14)
ANISOCYTOSIS BLD QL: SLIGHT — SIGNIFICANT CHANGE UP
AST SERPL-CCNC: 20 U/L — SIGNIFICANT CHANGE UP (ref 4–40)
BASOPHILS # BLD AUTO: 0.01 K/UL — SIGNIFICANT CHANGE UP (ref 0–0.2)
BASOPHILS NFR BLD AUTO: 0.3 % — SIGNIFICANT CHANGE UP (ref 0–2)
BASOPHILS NFR SPEC: 0 % — SIGNIFICANT CHANGE UP (ref 0–2)
BILIRUB SERPL-MCNC: 1.1 MG/DL — SIGNIFICANT CHANGE UP (ref 0.2–1.2)
BUN SERPL-MCNC: 103 MG/DL — HIGH (ref 7–23)
CALCIUM SERPL-MCNC: 7.6 MG/DL — LOW (ref 8.4–10.5)
CHLORIDE SERPL-SCNC: 100 MMOL/L — SIGNIFICANT CHANGE UP (ref 98–107)
CO2 SERPL-SCNC: 19 MMOL/L — LOW (ref 22–31)
CREAT SERPL-MCNC: 8.22 MG/DL — HIGH (ref 0.5–1.3)
EOSINOPHIL # BLD AUTO: 0.01 K/UL — SIGNIFICANT CHANGE UP (ref 0–0.5)
EOSINOPHIL NFR BLD AUTO: 0.3 % — SIGNIFICANT CHANGE UP (ref 0–6)
EOSINOPHIL NFR FLD: 1 % — SIGNIFICANT CHANGE UP (ref 0–6)
GLUCOSE SERPL-MCNC: 287 MG/DL — HIGH (ref 70–99)
HCT VFR BLD CALC: 22.4 % — LOW (ref 39–50)
HGB BLD-MCNC: 7.7 G/DL — LOW (ref 13–17)
HYPOCHROMIA BLD QL: SIGNIFICANT CHANGE UP
IMM GRANULOCYTES NFR BLD AUTO: 0 % — SIGNIFICANT CHANGE UP (ref 0–1.5)
LYMPHOCYTES # BLD AUTO: 1.73 K/UL — SIGNIFICANT CHANGE UP (ref 1–3.3)
LYMPHOCYTES # BLD AUTO: 52.7 % — HIGH (ref 13–44)
LYMPHOCYTES NFR SPEC AUTO: 48 % — HIGH (ref 13–44)
MAGNESIUM SERPL-MCNC: 2.6 MG/DL — SIGNIFICANT CHANGE UP (ref 1.6–2.6)
MANUAL SMEAR VERIFICATION: SIGNIFICANT CHANGE UP
MCHC RBC-ENTMCNC: 29.1 PG — SIGNIFICANT CHANGE UP (ref 27–34)
MCHC RBC-ENTMCNC: 34.4 % — SIGNIFICANT CHANGE UP (ref 32–36)
MCV RBC AUTO: 84.5 FL — SIGNIFICANT CHANGE UP (ref 80–100)
MONOCYTES # BLD AUTO: 0.62 K/UL — SIGNIFICANT CHANGE UP (ref 0–0.9)
MONOCYTES NFR BLD AUTO: 18.9 % — HIGH (ref 2–14)
MONOCYTES NFR BLD: 18 % — HIGH (ref 2–9)
MYELOCYTES NFR BLD: 1 % — HIGH (ref 0–0)
NEUTROPHIL AB SER-ACNC: 32 % — LOW (ref 43–77)
NEUTROPHILS # BLD AUTO: 0.91 K/UL — LOW (ref 1.8–7.4)
NEUTROPHILS NFR BLD AUTO: 27.8 % — LOW (ref 43–77)
NRBC # BLD: 1 /100WBC — SIGNIFICANT CHANGE UP
NRBC # FLD: 0.04 K/UL — SIGNIFICANT CHANGE UP (ref 0–0)
NRBC FLD-RTO: 1.2 — SIGNIFICANT CHANGE UP
PHOSPHATE SERPL-MCNC: 6.3 MG/DL — HIGH (ref 2.5–4.5)
PLATELET # BLD AUTO: 43 K/UL — LOW (ref 150–400)
PLATELET COUNT - ESTIMATE: SIGNIFICANT CHANGE UP
PMV BLD: 10.4 FL — SIGNIFICANT CHANGE UP (ref 7–13)
POIKILOCYTOSIS BLD QL AUTO: SIGNIFICANT CHANGE UP
POTASSIUM SERPL-MCNC: 4.4 MMOL/L — SIGNIFICANT CHANGE UP (ref 3.5–5.3)
POTASSIUM SERPL-SCNC: 4.4 MMOL/L — SIGNIFICANT CHANGE UP (ref 3.5–5.3)
PROT SERPL-MCNC: 6.5 G/DL — SIGNIFICANT CHANGE UP (ref 6–8.3)
RBC # BLD: 2.65 M/UL — LOW (ref 4.2–5.8)
RBC # FLD: 15.9 % — HIGH (ref 10.3–14.5)
SODIUM SERPL-SCNC: 142 MMOL/L — SIGNIFICANT CHANGE UP (ref 135–145)
WBC # BLD: 3.28 K/UL — LOW (ref 3.8–10.5)
WBC # FLD AUTO: 3.28 K/UL — LOW (ref 3.8–10.5)

## 2019-05-12 PROCEDURE — 71045 X-RAY EXAM CHEST 1 VIEW: CPT | Mod: 26

## 2019-05-12 RX ADMIN — Medication 125 MILLIGRAM(S): at 06:31

## 2019-05-12 RX ADMIN — FLUCONAZOLE 100 MILLIGRAM(S): 150 TABLET ORAL at 06:32

## 2019-05-12 RX ADMIN — DOCOSANOL 1 APPLICATION(S): 100 CREAM TOPICAL at 06:31

## 2019-05-12 RX ADMIN — DOCOSANOL 1 APPLICATION(S): 100 CREAM TOPICAL at 13:43

## 2019-05-12 RX ADMIN — Medication 125 MILLIGRAM(S): at 22:44

## 2019-05-12 RX ADMIN — Medication 1 MILLIGRAM(S): at 01:55

## 2019-05-12 RX ADMIN — CHLORHEXIDINE GLUCONATE 1 APPLICATION(S): 213 SOLUTION TOPICAL at 13:43

## 2019-05-12 RX ADMIN — DOCOSANOL 1 APPLICATION(S): 100 CREAM TOPICAL at 22:21

## 2019-05-12 RX ADMIN — Medication 125 MILLIGRAM(S): at 13:42

## 2019-05-12 RX ADMIN — Medication 100 MILLIGRAM(S): at 13:42

## 2019-05-12 RX ADMIN — CARVEDILOL PHOSPHATE 25 MILLIGRAM(S): 80 CAPSULE, EXTENDED RELEASE ORAL at 18:13

## 2019-05-12 RX ADMIN — Medication 5 MILLIGRAM(S): at 13:43

## 2019-05-12 RX ADMIN — Medication 125 MILLIGRAM(S): at 00:45

## 2019-05-12 RX ADMIN — PANTOPRAZOLE SODIUM 40 MILLIGRAM(S): 20 TABLET, DELAYED RELEASE ORAL at 06:31

## 2019-05-12 RX ADMIN — Medication 104 MILLIGRAM(S): at 18:13

## 2019-05-12 RX ADMIN — Medication 100 MILLIGRAM(S): at 22:21

## 2019-05-12 RX ADMIN — Medication 125 MILLIGRAM(S): at 18:13

## 2019-05-12 RX ADMIN — Medication 100 MILLIGRAM(S): at 06:32

## 2019-05-12 RX ADMIN — Medication 5 MILLIGRAM(S): at 22:21

## 2019-05-12 RX ADMIN — Medication 5 MILLIGRAM(S): at 06:31

## 2019-05-12 RX ADMIN — CARVEDILOL PHOSPHATE 25 MILLIGRAM(S): 80 CAPSULE, EXTENDED RELEASE ORAL at 06:31

## 2019-05-12 NOTE — PROVIDER CONTACT NOTE (OTHER) - SITUATION
Pt had new NGT placed, verified with XRAY. MD gave okay to use NGT. Pt has brownish red discharge coming out of NGT when pt coughs

## 2019-05-12 NOTE — PROGRESS NOTE ADULT - ASSESSMENT
53y Male with relapsed multiple myeloma s/p bone marrow transplant (~1.5 yrs ago) and chemo/RT (last infusion Bendamustine and Pomalidomide 4/08/2019), perforated diverticulitis s/p Kiah's (proctosigmoidectomy with colostomy, 9/2018), nonischemic cardiomyopathy (mild global LV systolic dysfunction with EF 54% and diastolic LV dysfunction based on TTE in 11/2018), and HTN admitted to [facility] on 4/25 for nausea and vomiting, and poor PO intake, treated for neutropenic fever, c/b ROBB. Renal following for ARF    labs, chart reviewed

## 2019-05-12 NOTE — PROGRESS NOTE ADULT - SUBJECTIVE AND OBJECTIVE BOX
SUBJECTIVE / OVERNIGHT EVENTS:  restless overnight s/p ativan with improvement, now lethargic  tachy overnight        MEDICATIONS  (STANDING):  acyclovir IVPB 200 milliGRAM(s) IV Intermittent every 24 hours  ALBUTerol/ipratropium for Nebulization. 3 milliLiter(s) Nebulizer once  carvedilol 25 milliGRAM(s) Oral every 12 hours  chlorhexidine 4% Liquid 1 Application(s) Topical <User Schedule>  docosanol 10% Cream 1 Application(s) Topical three times a day  fluconAZOLE IVPB 200 milliGRAM(s) IV Intermittent every 24 hours  metroNIDAZOLE  IVPB 500 milliGRAM(s) IV Intermittent every 8 hours  metroNIDAZOLE  IVPB      vancomycin    Solution 125 milliGRAM(s) Oral every 6 hours    MEDICATIONS  (PRN):  artificial  tears Solution 1 Drop(s) Both EYES every 6 hours PRN Dry Eyes      Allergies    oxycodone (Vomiting; Nausea)    Intolerances        Vital Signs Last 24 Hrs  T(C): 37.1 (12 May 2019 06:30), Max: 37.1 (12 May 2019 06:30)  T(F): 98.8 (12 May 2019 06:30), Max: 98.8 (12 May 2019 06:30)  HR: 113 (12 May 2019 06:30) (102 - 113)  BP: 137/85 (12 May 2019 06:30) (124/66 - 137/85)  BP(mean): --  RR: 24 (12 May 2019 06:30) (22 - 24)  SpO2: 95% (12 May 2019 06:30) (95% - 97%)        PHYSICAL EXAM:  GENERAL: appears ill  HEAD: mucositis stable. ngt intact. NC  EYES: EOMI,, conjunctiva and sclera clear  ENMT: Airway patent.  improving mild dried blood around mouth   NECK: Supple, No JVD  CHEST/LUNG: mild bibasilar rales.   HEART: RRR; sys murmur, Normal S1, S2. No murmurs, rubs, or gallops  ABDOMEN: Soft, NT, ND; Bowel sounds present. Colostomy bag with liquid stool   EXTREMITIES:  2+ Peripheral Pulses, bilateral 1+ symmetric pitting edema extending to ankles      Comprehensive Metabolic, Mg + Phosphorus (05.12.19 @ 07:49)    eGFR if : 8 mL/min    eGFR if Non : 7: The units for eGFR are ml/min/1.73m2 (normalized body  surface area). The eGFR is calculated from a serum  creatinine using the CKD-EPI equation. Other variables  required for calculation are race, age and sex. Among  patients with chronic kidney disease (CKD), the eGFR is  useful in determining the stage of disease according to  KDOQI CKD classification. All eGFR results are reported  numerically with the following interpretation.    GFR  (ml/min/1.73 m2)          W/KIDNEY DAMAGE    W/O KIDNEY DMG  ==========================================================  >= 90.......................Stage 1..............Normal  60-89.......................Stage 2...........Decreased GFR  30-59.......................Stage 3..............Stage 3  15-29.......................Stage 4..............Stage 4  < 15........................Stage 5..............Stage 5    Each stage of CKD assumes that the associated GFR level  has been in effect for at least 3 months. Determination of  stages one and two (with eGFR > 59ml/min/m2) requires  estimation of kidney damage for at least 3 months as  defined by structural or functional abnormalities.    Limitations: All estimates of GFR will be less accurate  for patients at extremes of muscle mass (including but  not limited to frail elderly, critically ill, or cancer  patients), those with unusual diets, and those with  conditions associated with reduced secretion or  extrarenal elimination of creatinine. The eGFR equation  is not recommended for use in patients with unstable  creatinine levels. mL/min    Phosphorus Level, Serum: 6.3: Delta: 4.6 on 05/11/  Delta: 4.6 on 05/11/ mg/dL    Sodium, Serum: 142 mmol/L    Potassium, Serum: 4.4 mmol/L    Chloride, Serum: 100 mmol/L    Carbon Dioxide, Serum: 19 mmol/L    Anion Gap, Serum: 23 mmo/L    Blood Urea Nitrogen, Serum: 103: Delta: 61 on 05/11/  Delta: 61 on 05/11/ mg/dL    Creatinine, Serum: 8.22 mg/dL    Glucose, Serum: 287 mg/dL    Calcium, Total Serum: 7.6 mg/dL    Protein Total, Serum: 6.5 g/dL    Albumin, Serum: 3.03 g/dL    Bilirubin Total, Serum: 1.1 mg/dL    Alkaline Phosphatase, Serum: 154 u/L    Aspartate Aminotransferase (AST/SGOT): 20 u/L    Alanine Aminotransferase (ALT/SGPT): 23 u/L    Magnesium, Serum: 2.6 mg/dL        Consultant(s) Notes Reviewed: YES    Care Discussed with Consultants/Other Providers: YES

## 2019-05-12 NOTE — PROGRESS NOTE ADULT - SUBJECTIVE AND OBJECTIVE BOX
Please call.     He will need to be seen  Friday ok  If transport is an issue he may need to go to [facility]    Patient seen and examined  laying in bed    oxycodone (Vomiting; Nausea)    Hospital Medications:   MEDICATIONS  (STANDING):  acyclovir IVPB 200 milliGRAM(s) IV Intermittent every 24 hours  carvedilol 25 milliGRAM(s) Oral every 12 hours  chlorhexidine 4% Liquid 1 Application(s) Topical <User Schedule>  docosanol 10% Cream 1 Application(s) Topical three times a day  fluconAZOLE IVPB 200 milliGRAM(s) IV Intermittent every 24 hours  metoclopramide Injectable 5 milliGRAM(s) IV Push every 8 hours  metroNIDAZOLE  IVPB 500 milliGRAM(s) IV Intermittent every 8 hours  metroNIDAZOLE  IVPB      pantoprazole    Tablet 40 milliGRAM(s) Oral before breakfast  vancomycin    Solution 125 milliGRAM(s) Oral every 6 hours        VITALS:  T(F): 98.2 (05-12-19 @ 08:30), Max: 98.8 (05-12-19 @ 06:30)  HR: 105 (05-12-19 @ 08:30)  BP: 127/65 (05-12-19 @ 08:30)  RR: 16 (05-12-19 @ 08:30)  SpO2: 97% (05-12-19 @ 08:30)  Wt(kg): --      PHYSICAL EXAM:  Constitutional: NAD  HEENT: anicteric sclera, oropharynx clear, MMM  Neck: No JVD  Respiratory: CTAB, no wheezes, rales or rhonchi  Cardiovascular: S1, S2, RRR  Gastrointestinal: BS+, soft, NT/ND  Extremities: No cyanosis or clubbing. No peripheral edema  Neurological: A/O x 3, no focal deficits  Psychiatric: Normal mood, normal affect  : No CVA tenderness. No quintero.   Skin: No rashes  Vascular Access: R subclavian shiley  LABS:  05-12    142  |  100  |  103<H>  ----------------------------<  287<H>  4.4   |  19<L>  |  8.22<H>    Ca    7.6<L>      12 May 2019 07:49  Phos  6.3     05-12  Mg     2.6     05-12    TPro  6.5  /  Alb  3.03<L>  /  TBili  1.1  /  DBili      /  AST  20  /  ALT  23  /  AlkPhos  154<H>  05-12    Creatinine Trend: 8.22 <--, 6.47 <--, 8.57 <--, 6.61 <--, 4.71 <--, 9.68 <--, 7.77 <--, 8.51 <--                        7.7    3.28  )-----------( 43       ( 12 May 2019 07:49 )             22.4     Urine Studies:      RADIOLOGY & ADDITIONAL STUDIES:

## 2019-05-12 NOTE — PROGRESS NOTE ADULT - SUBJECTIVE AND OBJECTIVE BOX
SONAL GARCIA:[id number],   53yMale followed for:  oxycodone (Vomiting; Nausea)    PAST MEDICAL & SURGICAL HISTORY:  Diverticulitis: perforated s/p Kiah  Multiple myeloma: relapsed 3/2019  Hypertension  Left ventricular dysfunction  Cardiomyopathy: nonischemic  Former smoker: (1 ppd x 11 years; Quit ~age 26)  History of bone marrow transplant  History of surgery: s/p exploratory laparotomy with sigmoid resection and colostomy on 9/2/2018    FAMILY HISTORY:  Family history of diabetes mellitus  Family history of hypertension    MEDICATIONS  (STANDING):  acyclovir IVPB 200 milliGRAM(s) IV Intermittent every 24 hours  carvedilol 25 milliGRAM(s) Oral every 12 hours  chlorhexidine 4% Liquid 1 Application(s) Topical <User Schedule>  docosanol 10% Cream 1 Application(s) Topical three times a day  fluconAZOLE IVPB 200 milliGRAM(s) IV Intermittent every 24 hours  metoclopramide Injectable 5 milliGRAM(s) IV Push every 8 hours  metroNIDAZOLE  IVPB 500 milliGRAM(s) IV Intermittent every 8 hours  metroNIDAZOLE  IVPB      pantoprazole    Tablet 40 milliGRAM(s) Oral before breakfast  vancomycin    Solution 125 milliGRAM(s) Oral every 6 hours    MEDICATIONS  (PRN):  artificial  tears Solution 1 Drop(s) Both EYES every 6 hours PRN Dry Eyes      Vital Signs Last 24 Hrs  T(C): 37.1 (12 May 2019 06:30), Max: 37.1 (12 May 2019 06:30)  T(F): 98.8 (12 May 2019 06:30), Max: 98.8 (12 May 2019 06:30)  HR: 113 (12 May 2019 06:30) (102 - 113)  BP: 137/85 (12 May 2019 06:30) (124/66 - 137/85)  BP(mean): --  RR: 24 (12 May 2019 06:30) (22 - 24)  SpO2: 95% (12 May 2019 06:30) (95% - 97%)  nc/at  s1s2  cta  soft, nt, nd no guarding or rebound  no c/c/e    CBC Full  -  ( 12 May 2019 07:49 )  WBC Count : 3.28 K/uL  RBC Count : 2.65 M/uL  Hemoglobin : 7.7 g/dL  Hematocrit : 22.4 %  Platelet Count - Automated : 43 K/uL  Mean Cell Volume : 84.5 fL  Mean Cell Hemoglobin : 29.1 pg  Mean Cell Hemoglobin Concentration : 34.4 %  Auto Neutrophil # : 0.91 K/uL  Auto Lymphocyte # : 1.73 K/uL  Auto Monocyte # : 0.62 K/uL  Auto Eosinophil # : 0.01 K/uL  Auto Basophil # : 0.01 K/uL  Auto Neutrophil % : 27.8 %  Auto Lymphocyte % : 52.7 %  Auto Monocyte % : 18.9 %  Auto Eosinophil % : 0.3 %  Auto Basophil % : 0.3 %    05-12    142  |  100  |  103<H>  ----------------------------<  287<H>  4.4   |  19<L>  |  8.22<H>    Ca    7.6<L>      12 May 2019 07:49  Phos  6.3     05-12  Mg     2.6     05-12    TPro  6.5  /  Alb  3.03<L>  /  TBili  1.1  /  DBili  x   /  AST  20  /  ALT  23  /  AlkPhos  154<H>  05-12

## 2019-05-12 NOTE — PROGRESS NOTE ADULT - ASSESSMENT
52 y/o Male with relapsed multiple myeloma s/p bone marrow transplant (~1.5 yrs ago) and chemo/RT (last infusion Bendamustine and Pomalidomide 4/08/2019), perforated diverticulitis s/p Kiah's (proctosigmoidectomy with colostomy, 9/2018), nonischemic cardiomyopathy (mild global LV systolic dysfunction with EF 54% and diastolic LV dysfunction based on TTE in 11/2018), and HTN admitted to [facility] on 4/25 for nausea/vomiting and poor PO intake, found to have pancytopenia and was treated for neutropenic fever however course c/b acute renal failure with Shiley placed by IR for new HD, C diff infection, and acute encephalopathy, transferred back to floors from MICU. Encephalopathy now improving with HD. Per heme no further therapeutic options for pt at this time. GOC discussion pending.    1. Severe C.diff colitis  - c/w po vanco, iv flagyl  for 14d planned ourse  - with dysphagia, ngt intact, s&s re-eval 5/13 and hopefully can d/c ngt and attempt oral feeds  -  monitor closely  -isolation precautions      2. Pancytopenia with neutropenic fever (2/2 acute tonsillitis and esophageal candidiasis)   - c/w rx and supportive care  - adjust management per consultants           3. Multiple myeloma   - poor prognosis  - c/w treatment    4. Metabolic Encephalopathy  - stable    5. Acute on Chronic Systolic CHF Exacerbation  - c/w rx    6. Pulmonary edema  - mildly improving    7. Acute Renal Failure, 2/2 ATN:  - c/w HD per nephro recs    8. Acute Tonsilitis and Candida Esophagitis  - c/w acyclovir and fluconazole  - supportive care prn

## 2019-05-12 NOTE — PROGRESS NOTE ADULT - SUBJECTIVE AND OBJECTIVE BOX
Pt seen, wife at bedside    MEDICATIONS  (STANDING):  acyclovir IVPB 200 milliGRAM(s) IV Intermittent every 24 hours  carvedilol 25 milliGRAM(s) Oral every 12 hours  chlorhexidine 4% Liquid 1 Application(s) Topical <User Schedule>  docosanol 10% Cream 1 Application(s) Topical three times a day  fluconAZOLE IVPB 200 milliGRAM(s) IV Intermittent every 24 hours  metoclopramide Injectable 5 milliGRAM(s) IV Push every 8 hours  metroNIDAZOLE  IVPB 500 milliGRAM(s) IV Intermittent every 8 hours  metroNIDAZOLE  IVPB      pantoprazole    Tablet 40 milliGRAM(s) Oral before breakfast  vancomycin    Solution 125 milliGRAM(s) Oral every 6 hours    MEDICATIONS  (PRN):  artificial  tears Solution 1 Drop(s) Both EYES every 6 hours PRN Dry Eyes      ROS  no pain, limited 2/2 participation    Vital Signs Last 24 Hrs  T(C): 36.8 (12 May 2019 08:30), Max: 37.1 (12 May 2019 06:30)  T(F): 98.2 (12 May 2019 08:30), Max: 98.8 (12 May 2019 06:30)  HR: 105 (12 May 2019 08:30) (102 - 113)  BP: 127/65 (12 May 2019 08:30) (124/66 - 137/85)  BP(mean): --  RR: 16 (12 May 2019 08:30) (16 - 24)  SpO2: 97% (12 May 2019 08:30) (95% - 97%)    PE  NAD, awake, not very interactive, chronically ill, sitting up in bed  coarse upper resp breath sounds  lung exam limited 2/2 transmitted upper airway sounds  dried scabs on lips  abd benign  no edema  remainder of exam limited 2/2 participation                       7.7    3.28  )-----------( 43       ( 12 May 2019 07:49 )             22.4       05-12    142  |  100  |  103<H>  ----------------------------<  287<H>  4.4   |  19<L>  |  8.22<H>    Ca    7.6<L>      12 May 2019 07:49  Phos  6.3     05-12  Mg     2.6     05-12    TPro  6.5  /  Alb  3.03<L>  /  TBili  1.1  /  DBili  x   /  AST  20  /  ALT  23  /  AlkPhos  154<H>  05-12

## 2019-05-12 NOTE — PROGRESS NOTE ADULT - SUBJECTIVE AND OBJECTIVE BOX
Patient is seen and examined at the bed side, is afebrile . He is doing better. The oral mucosa bleeding stopped, the Lips crusted with dry blood. The stool still watery. The Family at the bed side.      REVIEW OF SYSTEMS: All other review systems are negative        ALLERGIES: oxycodone (Vomiting; Nausea)      Vital Signs Last 24 Hrs  T(C): 37.1 (12 May 2019 14:01), Max: 37.1 (12 May 2019 06:30)  T(F): 98.8 (12 May 2019 14:01), Max: 98.8 (12 May 2019 06:30)  HR: 106 (12 May 2019 14:01) (102 - 113)  BP: 137/89 (12 May 2019 14:01) (124/66 - 137/89)  BP(mean): --  RR: 16 (12 May 2019 14:01) (16 - 24)  SpO2: 97% (12 May 2019 14:01) (95% - 97%)        PHYSICAL EXAM:  GENERAL: Not in acute distress,   HEENT: Mucosa bleeding stopped, Lips crusted with dry blood, NGT in placed  CHEST/LUNG:  Air entry bilaterally  HEART: s1 and s2 present  ABDOMEN:  Nontender and  Nondistended  EXTREMITIES: No pedal  edema  CNS: Awake and alert        LABS:                                    7.7    3.28  )-----------( 43       ( 12 May 2019 07:49 )             22.4                6.6    3.53  )-----------( 24       ( 09 May 2019 02:25 )             19.4                           7.5    3.19  )-----------( 35       ( 07 May 2019 06:00 )             23.3                           8.8    0.57  )-----------( 23       ( 02 May 2019 18:36 )             26.0             05-12    142  |  100  |  103<H>  ----------------------------<  287<H>  4.4   |  19<L>  |  8.22<H>    Ca    7.6<L>      12 May 2019 07:49  Phos  6.3     05-12  Mg     2.6     05-12    TPro  6.5  /  Alb  3.03<L>  /  TBili  1.1  /  DBili  x   /  AST  20  /  ALT  23  /  AlkPhos  154<H>  05-12 05-11    141  |  99  |  61<H>  ----------------------------<  182<H>  3.9   |  21<L>  |  6.47<H>    Ca    7.9<L>      11 May 2019 06:33  Phos  4.6     05-11  Mg     2.2     05-11    TPro  6.5  /  Alb  3.2<L>  /  TBili  1.4<H>  /  DBili  x   /  AST  26  /  ALT  30  /  AlkPhos  156<H>  05-11 05-09    143  |  103  |  54<H>  ----------------------------<  109<H>  3.6   |  18<L>  |  6.61<H>    Ca    6.9<L>      09 May 2019 02:25  Phos  3.9     05-09  Mg     2.0     05-09    TPro  6.2  /  Alb  2.8<L>  /  TBili  1.0  /  DBili  x   /  AST  34  /  ALT  32  /  AlkPhos  151<H>  05-09 04-30    135  |  104  |  20  ----------------------------<  139<H>  3.8   |  16<L>  |  1.46<H>    Ca    7.2<L>      30 Apr 2019 06:15  Phos  2.4     04-30  Mg     1.8     04-30    TPro  5.9<L>  /  Alb  3.0<L>  /  TBili  2.0<H>  /  DBili  x   /  AST  13  /  ALT  47<H>  /  AlkPhos  100  04-30      Vancomycin level:    Vancomycin Level, Random (05.06.19 @ 06:28) Vancomycin Level, Random: 16.1:     Vancomycin Level, Trough (05.03.19 @ 06:55) Vancomycin Level, Trough: 20.9: Vancomycin trough levels should be rapidly reached and maintained at 15-20 ug/ml for life threatening MRSA  infections such as sepsis, endocarditis, osteomyelitis and pneumonia.     Vancomycin Level, Trough - Prior to Next Dose (05.01.19 @ 06:05) Vancomycin Level, Trough: 29.7: Vancomycin trough levels should be rapidly reached and  maintained at 15-20 ug/ml for life threatening MRSA infections such as sepsis, endocarditis, osteomyelitis and pneumonia.     Vancomycin Level, Random (05.02.19 @ 06:04)  Vancomycin Level, Random: 23.8:         MEDICATIONS  (STANDING):    MEDICATIONS  (STANDING):  acyclovir IVPB 200 milliGRAM(s) IV Intermittent every 24 hours  carvedilol 25 milliGRAM(s) Oral every 12 hours  chlorhexidine 4% Liquid 1 Application(s) Topical <User Schedule>  docosanol 10% Cream 1 Application(s) Topical three times a day  fluconAZOLE IVPB 200 milliGRAM(s) IV Intermittent every 24 hours  metoclopramide Injectable 5 milliGRAM(s) IV Push every 8 hours  metroNIDAZOLE  IVPB 500 milliGRAM(s) IV Intermittent every 8 hours  metroNIDAZOLE  IVPB      pantoprazole    Tablet 40 milliGRAM(s) Oral before breakfast  vancomycin    Solution 125 milliGRAM(s) Oral every 6 hours    MEDICATIONS  (PRN):  artificial  tears Solution 1 Drop(s) Both EYES every 6 hours PRN Dry Eyes          RADIOLOGY & ADDITIONAL TESTS:    4/30/19 : Xray Esophagram (04.30.19 @ 09:31) Limited evaluation for esophageal ulcers secondary to underdistention of  the esophagus.      4/27/19 : Xray Chest 1 View- PORTABLE-Urgent (04.27.19 @ 22:45) Probable small left pleural effusion with adjacent opacity that likely  represents atelectasis.    4/25/19 : CT Brain Stroke Protocol (04.25.19 @ 19:42) No acute intracranial hemorrhage, mass effect, or midline shift. No  abnormal extra-axial collections. The basal cisterns are patent without evidence of central herniation. The sulci and ventricles are within normal limits for the patient's age. Stable few patchy areas of low-attenuation in the bihemispheric white  matter, which is nonspecific, but likely related to sequela of chronic  microvascular ischemic disease. Stable coarse calcifications in the left temporal lobe.      MICROBIOLOGY DATA:    Culture - Blood (05.06.19 @ 23:10)    Culture - Blood:   NO ORGANISMS ISOLATED  NO ORGANISMS ISOLATED AT 24 HOURS    Specimen Source: BLOOD VENOUS        Culture - Blood (05.06.19 @ 23:10)    Culture - Blood:   NO ORGANISMS ISOLATED  NO ORGANISMS ISOLATED AT 24 HOURS    Specimen Source: BLOOD PERIPHERAL        Clostridium difficile Toxin by PCR (05.06.19 @ 15:50)    Clostridium difficile Toxin by PCR: DETECTED: RESULT CALLED TO / READ BACK: MITRA EMANUEL RN/Y  DATE / TIME CALLED: 05/06/19 1919  CALLED BY: ALYCE CELAYA  C. difficile toxin B gene (tcdb) detected    The results of this test should be interpreted with  consideration of all clinical andlaboratory findings. C.  difficile PCR is more sensitive and specific than EIA,  therefore, repeat testing during one episode does not  provide additional clinically useful information. One test  per episode is sufficient for determining C. difficile  infection status.    This test is performed on the Cepheid Gene Xpert detection  system which automates and integrates sample purification,  Nucleic acid amplification and detection of the target  sequence in simple or complex samples using real-time PCR  and RT-PCR assays.      Culture - Blood (04.28.19 @ 18:20)    Culture - Blood:   NO ORGANISMS ISOLATED  NO ORGANISMS ISOLATED AT 24 HOURS    Specimen Source: BLOOD VENOUS      Culture - Blood (04.28.19 @ 18:20)    Culture - Blood:   NO ORGANISMS ISOLATED  NO ORGANISMS ISOLATED AT 24 HOURS    Specimen Source: BLOOD PERIPHERAL        Culture - Blood (04.27.19 @ 19:50)    Culture - Blood:   NO ORGANISMS ISOLATED  NO ORGANISMS ISOLATED AT 24 HOURS    Specimen Source: BLOOD PERIPHERAL      Culture - Group A Streptococcus (04.27.19 @ 14:24)    Culture - Group A Streptococcus:   NO BETA STREP. GROUP A  AFTER 24HRS.    Specimen Source: THROAT Patient is seen and examined at the bed side, is afebrile . He is doing better, tolerating NGT feeding. The stool still watery in colostomy bag.  The wife at the bed side.      REVIEW OF SYSTEMS: All other review systems are negative        ALLERGIES: oxycodone (Vomiting; Nausea)      Vital Signs Last 24 Hrs  T(C): 37.1 (12 May 2019 14:01), Max: 37.1 (12 May 2019 06:30)  T(F): 98.8 (12 May 2019 14:01), Max: 98.8 (12 May 2019 06:30)  HR: 106 (12 May 2019 14:01) (102 - 113)  BP: 137/89 (12 May 2019 14:01) (124/66 - 137/89)  BP(mean): --  RR: 16 (12 May 2019 14:01) (16 - 24)  SpO2: 97% (12 May 2019 14:01) (95% - 97%)        PHYSICAL EXAM:  GENERAL: Not in acute distress,   HEENT: Mucosa bleeding stopped, Lips crusted with dry blood, NGT in placed  CHEST/LUNG:  Air entry bilaterally  HEART: s1 and s2 present  ABDOMEN:  Nontender and  Nondistended  EXTREMITIES: No pedal  edema  CNS: Awake and alert        LABS:                                    7.7    3.28  )-----------( 43       ( 12 May 2019 07:49 )             22.4                         7.5    3.19  )-----------( 35       ( 07 May 2019 06:00 )             23.3                           8.8    0.57  )-----------( 23       ( 02 May 2019 18:36 )             26.0                 05-12    142  |  100  |  103<H>  ----------------------------<  287<H>  4.4   |  19<L>  |  8.22<H>    Ca    7.6<L>      12 May 2019 07:49  Phos  6.3     05-12  Mg     2.6     05-12    TPro  6.5  /  Alb  3.03<L>  /  TBili  1.1  /  DBili  x   /  AST  20  /  ALT  23  /  AlkPhos  154<H>  05-12    05-11    141  |  99  |  61<H>  ----------------------------<  182<H>  3.9   |  21<L>  |  6.47<H>    Ca    7.9<L>      11 May 2019 06:33  Phos  4.6     05-11  Mg     2.2     05-11    TPro  6.5  /  Alb  3.2<L>  /  TBili  1.4<H>  /  DBili  x   /  AST  26  /  ALT  30  /  AlkPhos  156<H>  05-11 05-09    143  |  103  |  54<H>  ----------------------------<  109<H>  3.6   |  18<L>  |  6.61<H>    Ca    6.9<L>      09 May 2019 02:25  Phos  3.9     05-09  Mg     2.0     05-09    TPro  6.2  /  Alb  2.8<L>  /  TBili  1.0  /  DBili  x   /  AST  34  /  ALT  32  /  AlkPhos  151<H>  05-09 04-30    135  |  104  |  20  ----------------------------<  139<H>  3.8   |  16<L>  |  1.46<H>    Ca    7.2<L>      30 Apr 2019 06:15  Phos  2.4     04-30  Mg     1.8     04-30    TPro  5.9<L>  /  Alb  3.0<L>  /  TBili  2.0<H>  /  DBili  x   /  AST  13  /  ALT  47<H>  /  AlkPhos  100  04-30      Vancomycin level:    Vancomycin Level, Random (05.06.19 @ 06:28) Vancomycin Level, Random: 16.1:     Vancomycin Level, Trough (05.03.19 @ 06:55) Vancomycin Level, Trough: 20.9: Vancomycin trough levels should be rapidly reached and maintained at 15-20 ug/ml for life threatening MRSA  infections such as sepsis, endocarditis, osteomyelitis and pneumonia.     Vancomycin Level, Trough - Prior to Next Dose (05.01.19 @ 06:05) Vancomycin Level, Trough: 29.7: Vancomycin trough levels should be rapidly reached and  maintained at 15-20 ug/ml for life threatening MRSA infections such as sepsis, endocarditis, osteomyelitis and pneumonia.     Vancomycin Level, Random (05.02.19 @ 06:04)  Vancomycin Level, Random: 23.8:         MEDICATIONS  (STANDING):    MEDICATIONS  (STANDING):  acyclovir IVPB 200 milliGRAM(s) IV Intermittent every 24 hours  carvedilol 25 milliGRAM(s) Oral every 12 hours  chlorhexidine 4% Liquid 1 Application(s) Topical <User Schedule>  docosanol 10% Cream 1 Application(s) Topical three times a day  fluconAZOLE IVPB 200 milliGRAM(s) IV Intermittent every 24 hours  metoclopramide Injectable 5 milliGRAM(s) IV Push every 8 hours  metroNIDAZOLE  IVPB 500 milliGRAM(s) IV Intermittent every 8 hours  metroNIDAZOLE  IVPB      pantoprazole    Tablet 40 milliGRAM(s) Oral before breakfast  vancomycin    Solution 125 milliGRAM(s) Oral every 6 hours    MEDICATIONS  (PRN):  artificial  tears Solution 1 Drop(s) Both EYES every 6 hours PRN Dry Eyes          RADIOLOGY & ADDITIONAL TESTS:    4/30/19 : Xray Esophagram (04.30.19 @ 09:31) Limited evaluation for esophageal ulcers secondary to underdistention of  the esophagus.      4/27/19 : Xray Chest 1 View- PORTABLE-Urgent (04.27.19 @ 22:45) Probable small left pleural effusion with adjacent opacity that likely  represents atelectasis.    4/25/19 : CT Brain Stroke Protocol (04.25.19 @ 19:42) No acute intracranial hemorrhage, mass effect, or midline shift. No  abnormal extra-axial collections. The basal cisterns are patent without evidence of central herniation. The sulci and ventricles are within normal limits for the patient's age. Stable few patchy areas of low-attenuation in the bihemispheric white  matter, which is nonspecific, but likely related to sequela of chronic  microvascular ischemic disease. Stable coarse calcifications in the left temporal lobe.      MICROBIOLOGY DATA:    Culture - Blood (05.06.19 @ 23:10)    Culture - Blood:   NO ORGANISMS ISOLATED  NO ORGANISMS ISOLATED AT 24 HOURS    Specimen Source: BLOOD VENOUS        Culture - Blood (05.06.19 @ 23:10)    Culture - Blood:   NO ORGANISMS ISOLATED  NO ORGANISMS ISOLATED AT 24 HOURS    Specimen Source: BLOOD PERIPHERAL        Clostridium difficile Toxin by PCR (05.06.19 @ 15:50)    Clostridium difficile Toxin by PCR: DETECTED: RESULT CALLED TO / READ BACK: MITRA EMANUEL RN/Y  DATE / TIME CALLED: 05/06/19 1919  CALLED BY: ALYCE CELAYA  C. difficile toxin B gene (tcdb) detected    The results of this test should be interpreted with  consideration of all clinical andlaboratory findings. C.  difficile PCR is more sensitive and specific than EIA,  therefore, repeat testing during one episode does not  provide additional clinically useful information. One test  per episode is sufficient for determining C. difficile  infection status.    This test is performed on the Cepheid Gene Xpert detection  system which automates and integrates sample purification,  Nucleic acid amplification and detection of the target  sequence in simple or complex samples using real-time PCR  and RT-PCR assays.      Culture - Blood (04.28.19 @ 18:20)    Culture - Blood:   NO ORGANISMS ISOLATED  NO ORGANISMS ISOLATED AT 24 HOURS    Specimen Source: BLOOD VENOUS      Culture - Blood (04.28.19 @ 18:20)    Culture - Blood:   NO ORGANISMS ISOLATED  NO ORGANISMS ISOLATED AT 24 HOURS    Specimen Source: BLOOD PERIPHERAL        Culture - Blood (04.27.19 @ 19:50)    Culture - Blood:   NO ORGANISMS ISOLATED  NO ORGANISMS ISOLATED AT 24 HOURS    Specimen Source: BLOOD PERIPHERAL      Culture - Group A Streptococcus (04.27.19 @ 14:24)    Culture - Group A Streptococcus:   NO BETA STREP. GROUP A  AFTER 24HRS.    Specimen Source: THROAT

## 2019-05-12 NOTE — PROGRESS NOTE ADULT - ASSESSMENT
A 52 yo Male with  relapsed multiple myeloma s/p bone marrow transplant (~1.5 yrs ago) and chemo/RT (last infusion Bendamustine and Pomalidomide 4/08/2019), perforated diverticulitis s/p Kiah's (proctosigmoidectomy with colostomy, presents to [facility] ER for evaluation of  vomiting and change of vision. He also had been weak, with epistaxis, and petechiae. ON admission, he found to have Pancytopenia and Neutropenia with ANC of 120. He has no fever but rigors and also c/o Odynophagia. The CT head shows No acute intracranial events. The ID consult requested to assist with evaluation and antibiotic management of Neutropenia and Tonsilitis.     # Neutropenic Fever -   - s/p Chemotherapy for MM,  developed  fever, 4/28/19 Neutropenia resolved 5/7/19  #? Acute Tonsilitis  # Possible Candida Esophagitis-  May benefit from  EGD when cell numbers are stable, since Odynophagia  not improving on Fluconazole  # Kidney failure -s/p Shiley catheter now for dialysis 5/4/19  # C.difficile infection -5/6/19    would recommend:    1. Monitor residual from tube feeding  2. Continue Oral Vancomycin  and IV Flagyl to cover  C.difficile infection   3. Monitor Temp. and If >100.4 then obtain cultures   4. Continue renally adjusted  Acyclovir, and Fluconazole   5.  Contact Isolation  and Dialysis as tolerated  6. Aspiration precaution     d/w  Family at the bed side    will follow the patient with you A 54 yo Male with  relapsed multiple myeloma s/p bone marrow transplant (~1.5 yrs ago) and chemo/RT (last infusion Bendamustine and Pomalidomide 4/08/2019), perforated diverticulitis s/p Kiah's (proctosigmoidectomy with colostomy, presents to [facility] ER for evaluation of  vomiting and change of vision. He also had been weak, with epistaxis, and petechiae. ON admission, he found to have Pancytopenia and Neutropenia with ANC of 120. He has no fever but rigors and also c/o Odynophagia. The CT head shows No acute intracranial events. The ID consult requested to assist with evaluation and antibiotic management of Neutropenia and Tonsilitis.     # Neutropenic Fever -   - s/p Chemotherapy for MM,  developed  fever, 4/28/19 Neutropenia resolved 5/7/19  #? Acute Tonsilitis  # Possible Candida Esophagitis-  May benefit from  EGD when cell numbers are stable, since Odynophagia  not improving on Fluconazole  # Kidney failure -s/p Shiley catheter now for dialysis 5/4/19  # C.difficile infection -5/6/19    would recommend:    1. Discontinue  IV Flagyl and Continue Oral Vancomycin  X total of 14 days  2. Monitor residual of tube feeding   3. Monitor Temp. and If >100.4 then obtain cultures   4. Continue renally adjusted  Acyclovir, and Fluconazole until Sx resolved  5.  Contact Isolation  and Dialysis as tolerated  6. Aspiration precaution     d/w  patient and wife  at the bed side    will follow the patient with you

## 2019-05-12 NOTE — PROGRESS NOTE ADULT - ASSESSMENT
1. Pancytopenia     -- WBC improved s/p zarxio  -- counts slow to recover sec due to heavily tx for myeloma  -- Transfuse to keep Hgb > 7  -- Keep Platelets>/= 40K while catheter in place       2. MM relapsed/refractory including failed Auto Transplant    -- had been on bendamustine/pomalyst. Tx on hold  -- s/p decadron 40mg 5/2   -- QIggs, SPEP, JENELLE and SFLC showed increased free kappa  -- no realistic tx options left. Recommend pall care, prognosis poor    3. Tonsilitis vs Candida Esophagitis    -- cont Diflucan per ID  -- cont Acyclovir  -- ID f/u  -- cont magic mouthwash as tolerated  -- cont IVF  -- sx mgmt    4. ROBB    -- HD per renal  -- ? sec to light chain disease    5. cdiff     -- vanc PO and flagyl  -- GI and ID eval appreciated    6. oral care as tolerated    D/w family, [phone]

## 2019-05-13 LAB
ALBUMIN SERPL ELPH-MCNC: 3 G/DL — LOW (ref 3.3–5)
ALP SERPL-CCNC: 159 U/L — HIGH (ref 40–120)
ALT FLD-CCNC: SIGNIFICANT CHANGE UP U/L (ref 4–41)
ANION GAP SERPL CALC-SCNC: 24 MMO/L — HIGH (ref 7–14)
AST SERPL-CCNC: 26 U/L — SIGNIFICANT CHANGE UP (ref 4–40)
BASOPHILS # BLD AUTO: 0.02 K/UL — SIGNIFICANT CHANGE UP (ref 0–0.2)
BASOPHILS NFR BLD AUTO: 0.5 % — SIGNIFICANT CHANGE UP (ref 0–2)
BILIRUB SERPL-MCNC: 1 MG/DL — SIGNIFICANT CHANGE UP (ref 0.2–1.2)
BLD GP AB SCN SERPL QL: NEGATIVE — SIGNIFICANT CHANGE UP
BUN SERPL-MCNC: 119 MG/DL — HIGH (ref 7–23)
CALCIUM SERPL-MCNC: 7.2 MG/DL — LOW (ref 8.4–10.5)
CHLORIDE SERPL-SCNC: 106 MMOL/L — SIGNIFICANT CHANGE UP (ref 98–107)
CO2 SERPL-SCNC: 17 MMOL/L — LOW (ref 22–31)
CREAT SERPL-MCNC: 9.58 MG/DL — HIGH (ref 0.5–1.3)
EOSINOPHIL # BLD AUTO: 0.01 K/UL — SIGNIFICANT CHANGE UP (ref 0–0.5)
EOSINOPHIL NFR BLD AUTO: 0.3 % — SIGNIFICANT CHANGE UP (ref 0–6)
GLUCOSE SERPL-MCNC: 247 MG/DL — HIGH (ref 70–99)
HCT VFR BLD CALC: 21.7 % — LOW (ref 39–50)
HGB BLD-MCNC: 7.6 G/DL — LOW (ref 13–17)
IMM GRANULOCYTES NFR BLD AUTO: 1.3 % — SIGNIFICANT CHANGE UP (ref 0–1.5)
LYMPHOCYTES # BLD AUTO: 1.37 K/UL — SIGNIFICANT CHANGE UP (ref 1–3.3)
LYMPHOCYTES # BLD AUTO: 34.4 % — SIGNIFICANT CHANGE UP (ref 13–44)
MAGNESIUM SERPL-MCNC: 2.5 MG/DL — SIGNIFICANT CHANGE UP (ref 1.6–2.6)
MANUAL SMEAR VERIFICATION: SIGNIFICANT CHANGE UP
MCHC RBC-ENTMCNC: 30.3 PG — SIGNIFICANT CHANGE UP (ref 27–34)
MCHC RBC-ENTMCNC: 35 % — SIGNIFICANT CHANGE UP (ref 32–36)
MCV RBC AUTO: 86.5 FL — SIGNIFICANT CHANGE UP (ref 80–100)
MONOCYTES # BLD AUTO: 1.29 K/UL — HIGH (ref 0–0.9)
MONOCYTES NFR BLD AUTO: 32.4 % — HIGH (ref 2–14)
NEUTROPHILS # BLD AUTO: 1.24 K/UL — LOW (ref 1.8–7.4)
NEUTROPHILS NFR BLD AUTO: 31.1 % — LOW (ref 43–77)
NRBC # FLD: 0.02 K/UL — SIGNIFICANT CHANGE UP (ref 0–0)
PHOSPHATE SERPL-MCNC: 4.9 MG/DL — HIGH (ref 2.5–4.5)
PLATELET # BLD AUTO: 28 K/UL — LOW (ref 150–400)
PMV BLD: 11.4 FL — SIGNIFICANT CHANGE UP (ref 7–13)
POTASSIUM SERPL-MCNC: 4.2 MMOL/L — SIGNIFICANT CHANGE UP (ref 3.5–5.3)
POTASSIUM SERPL-SCNC: 4.2 MMOL/L — SIGNIFICANT CHANGE UP (ref 3.5–5.3)
PROT SERPL-MCNC: 6.4 G/DL — SIGNIFICANT CHANGE UP (ref 6–8.3)
RBC # BLD: 2.51 M/UL — LOW (ref 4.2–5.8)
RBC # FLD: 15.9 % — HIGH (ref 10.3–14.5)
RH IG SCN BLD-IMP: POSITIVE — SIGNIFICANT CHANGE UP
SODIUM SERPL-SCNC: 147 MMOL/L — HIGH (ref 135–145)
WBC # BLD: 3.98 K/UL — SIGNIFICANT CHANGE UP (ref 3.8–10.5)
WBC # FLD AUTO: 3.98 K/UL — SIGNIFICANT CHANGE UP (ref 3.8–10.5)

## 2019-05-13 RX ORDER — ACETAMINOPHEN 500 MG
1000 TABLET ORAL ONCE
Refills: 0 | Status: COMPLETED | OUTPATIENT
Start: 2019-05-13 | End: 2019-05-13

## 2019-05-13 RX ORDER — ACETAMINOPHEN 500 MG
650 TABLET ORAL EVERY 6 HOURS
Refills: 0 | Status: DISCONTINUED | OUTPATIENT
Start: 2019-05-13 | End: 2019-05-14

## 2019-05-13 RX ADMIN — CHLORHEXIDINE GLUCONATE 1 APPLICATION(S): 213 SOLUTION TOPICAL at 11:30

## 2019-05-13 RX ADMIN — Medication 5 MILLIGRAM(S): at 06:15

## 2019-05-13 RX ADMIN — Medication 400 MILLIGRAM(S): at 12:32

## 2019-05-13 RX ADMIN — Medication 5 MILLIGRAM(S): at 21:04

## 2019-05-13 RX ADMIN — Medication 650 MILLIGRAM(S): at 22:14

## 2019-05-13 RX ADMIN — CARVEDILOL PHOSPHATE 25 MILLIGRAM(S): 80 CAPSULE, EXTENDED RELEASE ORAL at 20:48

## 2019-05-13 RX ADMIN — CARVEDILOL PHOSPHATE 25 MILLIGRAM(S): 80 CAPSULE, EXTENDED RELEASE ORAL at 06:14

## 2019-05-13 RX ADMIN — Medication 650 MILLIGRAM(S): at 23:40

## 2019-05-13 RX ADMIN — PANTOPRAZOLE SODIUM 40 MILLIGRAM(S): 20 TABLET, DELAYED RELEASE ORAL at 06:15

## 2019-05-13 RX ADMIN — DOCOSANOL 1 APPLICATION(S): 100 CREAM TOPICAL at 06:15

## 2019-05-13 RX ADMIN — Medication 1 MILLIGRAM(S): at 01:07

## 2019-05-13 RX ADMIN — Medication 125 MILLIGRAM(S): at 11:29

## 2019-05-13 RX ADMIN — DOCOSANOL 1 APPLICATION(S): 100 CREAM TOPICAL at 21:04

## 2019-05-13 RX ADMIN — Medication 125 MILLIGRAM(S): at 20:48

## 2019-05-13 RX ADMIN — DOCOSANOL 1 APPLICATION(S): 100 CREAM TOPICAL at 14:44

## 2019-05-13 RX ADMIN — Medication 125 MILLIGRAM(S): at 06:15

## 2019-05-13 RX ADMIN — FLUCONAZOLE 100 MILLIGRAM(S): 150 TABLET ORAL at 06:15

## 2019-05-13 RX ADMIN — Medication 104 MILLIGRAM(S): at 20:48

## 2019-05-13 RX ADMIN — Medication 5 MILLIGRAM(S): at 14:45

## 2019-05-13 NOTE — CHART NOTE - NSCHARTNOTEFT_GEN_A_CORE
Pt with Temp 102.3 F this AM, IV tylenol given.  Blood cx drawn. Attending notified, high concern for aspiration PNA and neutropenic fever  Pt with persistent diarrhea, on PO vanco. ID following, will follow up regarding neutropenic fever abx recs. CT Chest/A/P ordered. Nutrition re-eval for ngt adjusting.  Pt with platelets 28, spoke with Heme/Onc Dr. Murphy and Dr. Castro, okay to transfuse 1 unit of platelets with HD. Elevated Cr/BUN this AM, attending aware. nephrology following. Pt going for HD around 3PM.

## 2019-05-13 NOTE — PROGRESS NOTE ADULT - ASSESSMENT
54 y/o Male with relapsed multiple myeloma s/p bone marrow transplant (~1.5 yrs ago) and chemo/RT (last infusion Bendamustine and Pomalidomide 4/08/2019), perforated diverticulitis s/p Kiah's (proctosigmoidectomy with colostomy, 9/2018), nonischemic cardiomyopathy (mild global LV systolic dysfunction with EF 54% and diastolic LV dysfunction based on TTE in 11/2018), and HTN admitted to [facility] on 4/25 for nausea/vomiting and poor PO intake, found to have pancytopenia and was treated for neutropenic fever however course c/b acute renal failure with Shiley placed by IR for new HD, C diff infection, and acute encephalopathy, transferred back to floors from MICU. Encephalopathy now improving with HD. Per heme no further therapeutic options for pt at this time. C discussion pending.    1. Sepsis:      - febrile, tachypnea      - high concern for aspiration pna +/- cdiff colitis complications vs alternative etiology       - F/u CT Chest/Abd/Pelvis      - F/u BCx      - Monitor vitals      - poor prognosis    2. Severe C.diff colitis  - c/w abx  - with dysphagia, ngt intact, s&s re-eval. Ideally would eventually like to d/c ngt and start po nutrition   - nutritio re-eval for ?adding/adjust ngt feeding 2/2 persistent copious diarrhea (ie: banana flakes, etc.)  - monitor closely  - isolation precautions      3. Pancytopenia with neutropenic fever (2/2 acute tonsillitis and esophageal candidiasis)   - c/w rx and supportive care  - adjust management per consultants           4. Multiple myeloma   - poor prognosis  - c/w treatment    5. Acute on Chronic Systolic CHF Exacerbation  - c/w rx    6. Pulmonary edema  - clinically mildly improving    7. Acute Renal Failure, 2/2 ATN:  - c/w HD per nephro recs    8. Acute Tonsilitis and Candida Esophagitis  - c/w acyclovir and fluconazole  - supportive care prn

## 2019-05-13 NOTE — PROGRESS NOTE ADULT - ASSESSMENT
53y Male with relapsed multiple myeloma s/p bone marrow transplant (~1.5 yrs ago) and chemo/RT (last infusion Bendamustine and Pomalidomide 4/08/2019), perforated diverticulitis s/p Kiah's (proctosigmoidectomy with colostomy, 9/2018), nonischemic cardiomyopathy (mild global LV systolic dysfunction with EF 54% and diastolic LV dysfunction based on TTE in 11/2018), and HTN admitted to [facility] on 4/25 for nausea and vomiting, and poor PO intake, treated for neutropenic fever, c/b ROBB. Renal following for ARF    labs, chart reviewed

## 2019-05-13 NOTE — PROGRESS NOTE ADULT - SUBJECTIVE AND OBJECTIVE BOX
Pt is seen and examined  pt is awake and lying in bed   agitated last night  familyat bedside  while counts seem better, clinically not sig improved    PAST MEDICAL & SURGICAL HISTORY:  Diverticulitis: perforated s/p Kiah  Multiple myeloma: relapsed 3/2019  Hypertension  Left ventricular dysfunction  Cardiomyopathy: nonischemic  Former smoker: (1 ppd x 11 years; Quit ~age 26)  History of bone marrow transplant  History of surgery: s/p exploratory laparotomy with sigmoid resection and colostomy on 9/2/2018      ROS:  Negative except for:    MEDICATIONS  (STANDING):  acyclovir IVPB 200 milliGRAM(s) IV Intermittent every 24 hours  carvedilol 25 milliGRAM(s) Oral every 12 hours  chlorhexidine 4% Liquid 1 Application(s) Topical <User Schedule>  docosanol 10% Cream 1 Application(s) Topical three times a day  fluconAZOLE IVPB 200 milliGRAM(s) IV Intermittent every 24 hours  metoclopramide Injectable 5 milliGRAM(s) IV Push every 8 hours  pantoprazole    Tablet 40 milliGRAM(s) Oral before breakfast  vancomycin    Solution 125 milliGRAM(s) Oral every 6 hours    MEDICATIONS  (PRN):  artificial  tears Solution 1 Drop(s) Both EYES every 6 hours PRN Dry Eyes      Allergies    oxycodone (Vomiting; Nausea)    Intolerances        Vital Signs Last 24 Hrs  T(C): 36.5 (13 May 2019 06:13), Max: 37.1 (12 May 2019 14:01)  T(F): 97.7 (13 May 2019 06:13), Max: 98.8 (12 May 2019 14:01)  HR: 104 (13 May 2019 06:36) (100 - 113)  BP: 126/77 (13 May 2019 06:13) (126/77 - 141/90)  BP(mean): --  RR: 16 (13 May 2019 06:13) (16 - 16)  SpO2: 98% (13 May 2019 06:13) (97% - 98%)    PHYSICAL EXAM    NAD  lethargic/confused  dried blood on lips  abd benign  no edema  shiley at R inguinal area    LABS:                          7.6    3.98  )-----------( 28       ( 13 May 2019 06:12 )             21.7     Serial CBC's  05-13 @ 06:12  Hct-21.7 / Hgb-7.6 / Plat-28 / RBC-2.51 / WBC-3.98          Serial CBC's  05-12 @ 07:49  Hct-22.4 / Hgb-7.7 / Plat-43 / RBC-2.65 / WBC-3.28            05-13    147<H>  |  106  |  119<H>  ----------------------------<  247<H>  4.2   |  17<L>  |  9.58<H>    Ca    7.2<L>      13 May 2019 06:12  Phos  4.9     05-13  Mg     2.5     05-13    TPro  6.4  /  Alb  3.0<L>  /  TBili  1.0  /  DBili  x   /  AST  26  /  ALT  x   /  AlkPhos  159<H>  05-13          Hemoglobin: 7.6 g/dL (05-13 @ 06:12)  Hematocrit: 21.7 % (05-13 @ 06:12)            RADIOLOGY & ADDITIONAL STUDIES:

## 2019-05-13 NOTE — PROGRESS NOTE ADULT - ASSESSMENT
1. Pancytopenia     -- WBC improved s/p zarxio  -- platelet transfusion less frequent  -- Transfuse to keep Hgb > 7  -- Keep Platelets~ 20K or more       2. MM relapsed/refractory including failed Auto Transplant    -- had been on bendamustine/pomalyst. Tx on hold  -- s/p decadron 40mg 5/2   -- QIggs, SPEP, JENELLE and SFLC showed increased free kappa  -- no realistic tx options left. Recommend pall care, prognosis poor    3. Tonsilitis vs Candida Esophagitis    -- cont Diflucan per ID  -- cont Acyclovir  -- ID f/u  -- cont magic mouthwash as tolerated  -- cont IVF  -- sx mgmt    4. ROBB    -- HD per renal  -- ? sec to light chain disease    5. cdiff     -- vanc PO and flagyl  -- GI and ID eval appreciated    Torsten Murphy MD  [phone]

## 2019-05-13 NOTE — PROGRESS NOTE ADULT - SUBJECTIVE AND OBJECTIVE BOX
SONAL GARCIA:[id number],   53yMale followed for:  oxycodone (Vomiting; Nausea)    PAST MEDICAL & SURGICAL HISTORY:  Diverticulitis: perforated s/p Kiah  Multiple myeloma: relapsed 3/2019  Hypertension  Left ventricular dysfunction  Cardiomyopathy: nonischemic  Former smoker: (1 ppd x 11 years; Quit ~age 26)  History of bone marrow transplant  History of surgery: s/p exploratory laparotomy with sigmoid resection and colostomy on 9/2/2018    FAMILY HISTORY:  Family history of diabetes mellitus  Family history of hypertension    MEDICATIONS  (STANDING):  acyclovir IVPB 200 milliGRAM(s) IV Intermittent every 24 hours  carvedilol 25 milliGRAM(s) Oral every 12 hours  chlorhexidine 4% Liquid 1 Application(s) Topical <User Schedule>  docosanol 10% Cream 1 Application(s) Topical three times a day  fluconAZOLE IVPB 200 milliGRAM(s) IV Intermittent every 24 hours  metoclopramide Injectable 5 milliGRAM(s) IV Push every 8 hours  pantoprazole    Tablet 40 milliGRAM(s) Oral before breakfast  vancomycin    Solution 125 milliGRAM(s) Oral every 6 hours    MEDICATIONS  (PRN):  artificial  tears Solution 1 Drop(s) Both EYES every 6 hours PRN Dry Eyes      Vital Signs Last 24 Hrs  T(C): 36.5 (13 May 2019 06:13), Max: 37.1 (12 May 2019 14:01)  T(F): 97.7 (13 May 2019 06:13), Max: 98.8 (12 May 2019 14:01)  HR: 104 (13 May 2019 06:36) (100 - 113)  BP: 126/77 (13 May 2019 06:13) (126/77 - 141/90)  BP(mean): --  RR: 16 (13 May 2019 06:13) (16 - 16)  SpO2: 98% (13 May 2019 06:13) (97% - 98%)  nc/at  s1s2  cta  soft, nt, nd no guarding or rebound  no c/c/e    CBC Full  -  ( 12 May 2019 07:49 )  WBC Count : 3.28 K/uL  RBC Count : 2.65 M/uL  Hemoglobin : 7.7 g/dL  Hematocrit : 22.4 %  Platelet Count - Automated : 43 K/uL  Mean Cell Volume : 84.5 fL  Mean Cell Hemoglobin : 29.1 pg  Mean Cell Hemoglobin Concentration : 34.4 %  Auto Neutrophil # : 0.91 K/uL  Auto Lymphocyte # : 1.73 K/uL  Auto Monocyte # : 0.62 K/uL  Auto Eosinophil # : 0.01 K/uL  Auto Basophil # : 0.01 K/uL  Auto Neutrophil % : 27.8 %  Auto Lymphocyte % : 52.7 %  Auto Monocyte % : 18.9 %  Auto Eosinophil % : 0.3 %  Auto Basophil % : 0.3 %    05-12    142  |  100  |  103<H>  ----------------------------<  287<H>  4.4   |  19<L>  |  8.22<H>    Ca    7.6<L>      12 May 2019 07:49  Phos  6.3     05-12  Mg     2.6     05-12    TPro  6.5  /  Alb  3.03<L>  /  TBili  1.1  /  DBili  x   /  AST  20  /  ALT  23  /  AlkPhos  154<H>  05-12

## 2019-05-13 NOTE — PROGRESS NOTE ADULT - ASSESSMENT
pt being treated for monilial candidiasis and cmv.  risk endosocpi eval greater than benefit. tube feeds. poor prognosis, continue rx.

## 2019-05-13 NOTE — PROGRESS NOTE ADULT - SUBJECTIVE AND OBJECTIVE BOX
SUBJECTIVE / OVERNIGHT EVENTS:  fever today, tachypneic  immediate family members at bedside - wife and daughter per pt's request  all questions and cocnerns appropraitely answered and addresserd      MEDICATIONS  (STANDING):  acyclovir IVPB 200 milliGRAM(s) IV Intermittent every 24 hours  ALBUTerol/ipratropium for Nebulization. 3 milliLiter(s) Nebulizer once  carvedilol 25 milliGRAM(s) Oral every 12 hours  chlorhexidine 4% Liquid 1 Application(s) Topical <User Schedule>  docosanol 10% Cream 1 Application(s) Topical three times a day  fluconAZOLE IVPB 200 milliGRAM(s) IV Intermittent every 24 hours  metroNIDAZOLE  IVPB 500 milliGRAM(s) IV Intermittent every 8 hours  metroNIDAZOLE  IVPB      vancomycin    Solution 125 milliGRAM(s) Oral every 6 hours    MEDICATIONS  (PRN):  artificial  tears Solution 1 Drop(s) Both EYES every 6 hours PRN Dry Eyes      Allergies    oxycodone (Vomiting; Nausea)    Intolerances        Vital Signs Last 24 Hrs  T(C): 39.1 (13 May 2019 11:06), Max: 39.1 (13 May 2019 11:06)  T(F): 102.3 (13 May 2019 11:06), Max: 102.3 (13 May 2019 11:06)  HR: 116 (13 May 2019 11:15) (100 - 116)  BP: 144/73 (13 May 2019 11:15) (126/77 - 144/73)  BP(mean): --  RR: 22 (13 May 2019 11:15) (16 - 22)  SpO2: 97% (13 May 2019 11:15) (97% - 98%)    PHYSICAL EXAM:  GENERAL: ill appearing,   HEAD: mucositis stable. ngt intact. NC  EYES: EOMI, conjunctiva and sclera clear  ENMT: oral bleeding. poor dentition.   NECK: Supple, No JVD  CHEST/LUNG: tachypnea. mild rales.   HEART: sys murmur, Normal S1, S2. No rubs, or gallops  ABDOMEN: Soft, ostomy intact with loose stool.    EXTREMITIES:  2+ Peripheral Pulses, bilateral 1+ symmetric pitting edema extending to ankles    Comprehensive Metabolic, Mg + Phosphorus (05.13.19 @ 06:12)    eGFR if : 6 mL/min    eGFR if Non : 5: The units for eGFR are ml/min/1.73m2 (normalized body  surface area). The eGFR is calculated from a serum  creatinine using the CKD-EPI equation. Other variables  required for calculation are race, age and sex. Among  patients with chronic kidney disease (CKD), the eGFR is  useful in determining the stage of disease according to  KDOQI CKD classification. All eGFR results are reported  numerically with the following interpretation.    GFR  (ml/min/1.73 m2)          W/KIDNEY DAMAGE    W/O KIDNEY DMG  ==========================================================  >= 90.......................Stage 1..............Normal  60-89.......................Stage 2...........Decreased GFR  30-59.......................Stage 3..............Stage 3  15-29.......................Stage 4..............Stage 4  < 15........................Stage 5..............Stage 5    Each stage of CKD assumes that the associated GFR level  has been in effect for at least 3 months. Determination of  stages one and two (with eGFR > 59ml/min/m2) requires  estimation of kidney damage for at least 3 months as  defined by structural or functional abnormalities.    Limitations: All estimates of GFR will be less accurate  for patients at extremes of muscle mass (including but  not limited to frail elderly, critically ill, or cancer  patients), those with unusual diets, and those with  conditions associated with reduced secretion or  extrarenal elimination of creatinine. The eGFR equation  is not recommended for use in patients with unstable  creatinine levels. mL/min    Phosphorus Level, Serum: 4.9: Delta: 6.3 on 05/12/  Delta: 6.3 on 05/12/ mg/dL    Sodium, Serum: 147 mmol/L    Potassium, Serum: 4.2 mmol/L    Chloride, Serum: 106: Delta: 100 on 05/12/  Delta: 100 on 05/12/ mmol/L    Carbon Dioxide, Serum: 17 mmol/L    Anion Gap, Serum: 24 mmo/L    Blood Urea Nitrogen, Serum: 119 mg/dL    Creatinine, Serum: 9.58 mg/dL    Glucose, Serum: 247 mg/dL    Calcium, Total Serum: 7.2 mg/dL    Protein Total, Serum: 6.4 g/dL    Albumin, Serum: 3.0 g/dL    Bilirubin Total, Serum: 1.0 mg/dL    Alkaline Phosphatase, Serum: 159 u/L    Aspartate Aminotransferase (AST/SGOT): 26 u/L    Alanine Aminotransferase (ALT/SGPT): Test not performed SPECIMAN LIPAMIC u/L    Magnesium, Serum: 2.5 mg/dL

## 2019-05-13 NOTE — PROVIDER CONTACT NOTE (OTHER) - ASSESSMENT
pt agitated; pt climbing out of bed very unsteady trying to get to sink; insists on drinking water when NPO. unable to reorient. wife also believes pt to be agitated

## 2019-05-13 NOTE — PROVIDER CONTACT NOTE (OTHER) - SITUATION
pt agitated; pt climbing out of bed very unsteady trying to get to sink; insists on drinking water when NPO

## 2019-05-14 VITALS
RESPIRATION RATE: 22 BRPM | SYSTOLIC BLOOD PRESSURE: 101 MMHG | DIASTOLIC BLOOD PRESSURE: 58 MMHG | TEMPERATURE: 101 F | HEART RATE: 108 BPM | OXYGEN SATURATION: 95 %

## 2019-05-14 DIAGNOSIS — J96.00 ACUTE RESPIRATORY FAILURE, UNSPECIFIED WHETHER WITH HYPOXIA OR HYPERCAPNIA: ICD-10-CM

## 2019-05-14 LAB
ALBUMIN SERPL ELPH-MCNC: 2.9 G/DL — LOW (ref 3.3–5)
ALP SERPL-CCNC: 140 U/L — HIGH (ref 40–120)
ALT FLD-CCNC: SIGNIFICANT CHANGE UP U/L (ref 4–41)
ANION GAP SERPL CALC-SCNC: 18 MMO/L — HIGH (ref 7–14)
AST SERPL-CCNC: 33 U/L — SIGNIFICANT CHANGE UP (ref 4–40)
BACTERIA BLD CULT: SIGNIFICANT CHANGE UP
BACTERIA BLD CULT: SIGNIFICANT CHANGE UP
BASE EXCESS BLDA CALC-SCNC: 1 MMOL/L — SIGNIFICANT CHANGE UP
BASOPHILS # BLD AUTO: 0.01 K/UL — SIGNIFICANT CHANGE UP (ref 0–0.2)
BASOPHILS NFR BLD AUTO: 0.3 % — SIGNIFICANT CHANGE UP (ref 0–2)
BILIRUB SERPL-MCNC: 1 MG/DL — SIGNIFICANT CHANGE UP (ref 0.2–1.2)
BLOOD GAS ARTERIAL - FIO2: 28 — SIGNIFICANT CHANGE UP
BUN SERPL-MCNC: 81 MG/DL — HIGH (ref 7–23)
CALCIUM SERPL-MCNC: 7.5 MG/DL — LOW (ref 8.4–10.5)
CHLORIDE SERPL-SCNC: 97 MMOL/L — LOW (ref 98–107)
CO2 SERPL-SCNC: 25 MMOL/L — SIGNIFICANT CHANGE UP (ref 22–31)
CREAT SERPL-MCNC: 6.83 MG/DL — HIGH (ref 0.5–1.3)
EOSINOPHIL # BLD AUTO: 0.01 K/UL — SIGNIFICANT CHANGE UP (ref 0–0.5)
EOSINOPHIL NFR BLD AUTO: 0.3 % — SIGNIFICANT CHANGE UP (ref 0–6)
GLUCOSE SERPL-MCNC: 229 MG/DL — HIGH (ref 70–99)
HCO3 BLDA-SCNC: 25 MMOL/L — SIGNIFICANT CHANGE UP (ref 22–26)
HCT VFR BLD CALC: 21.5 % — LOW (ref 39–50)
HGB BLD-MCNC: 7.3 G/DL — LOW (ref 13–17)
IMM GRANULOCYTES NFR BLD AUTO: 0.5 % — SIGNIFICANT CHANGE UP (ref 0–1.5)
LYMPHOCYTES # BLD AUTO: 1.13 K/UL — SIGNIFICANT CHANGE UP (ref 1–3.3)
LYMPHOCYTES # BLD AUTO: 29.4 % — SIGNIFICANT CHANGE UP (ref 13–44)
MAGNESIUM SERPL-MCNC: 2 MG/DL — SIGNIFICANT CHANGE UP (ref 1.6–2.6)
MANUAL SMEAR VERIFICATION: SIGNIFICANT CHANGE UP
MCHC RBC-ENTMCNC: 30.5 PG — SIGNIFICANT CHANGE UP (ref 27–34)
MCHC RBC-ENTMCNC: 34 % — SIGNIFICANT CHANGE UP (ref 32–36)
MCV RBC AUTO: 90 FL — SIGNIFICANT CHANGE UP (ref 80–100)
MONOCYTES # BLD AUTO: 1.14 K/UL — HIGH (ref 0–0.9)
MONOCYTES NFR BLD AUTO: 29.7 % — HIGH (ref 2–14)
NEUTROPHILS # BLD AUTO: 1.53 K/UL — LOW (ref 1.8–7.4)
NEUTROPHILS NFR BLD AUTO: 39.8 % — LOW (ref 43–77)
NRBC # FLD: 0 K/UL — SIGNIFICANT CHANGE UP (ref 0–0)
PCO2 BLDA: 45 MMHG — SIGNIFICANT CHANGE UP (ref 35–48)
PH BLDA: 7.38 PH — SIGNIFICANT CHANGE UP (ref 7.35–7.45)
PHOSPHATE SERPL-MCNC: 3.5 MG/DL — SIGNIFICANT CHANGE UP (ref 2.5–4.5)
PLATELET # BLD AUTO: 16 K/UL — CRITICAL LOW (ref 150–400)
PMV BLD: 10.9 FL — SIGNIFICANT CHANGE UP (ref 7–13)
PO2 BLDA: 75 MMHG — LOW (ref 83–108)
POTASSIUM SERPL-MCNC: 4.2 MMOL/L — SIGNIFICANT CHANGE UP (ref 3.5–5.3)
POTASSIUM SERPL-SCNC: 4.2 MMOL/L — SIGNIFICANT CHANGE UP (ref 3.5–5.3)
PROT SERPL-MCNC: 6.3 G/DL — SIGNIFICANT CHANGE UP (ref 6–8.3)
RBC # BLD: 2.39 M/UL — LOW (ref 4.2–5.8)
RBC # FLD: 15.7 % — HIGH (ref 10.3–14.5)
SAO2 % BLDA: 94.1 % — LOW (ref 95–99)
SODIUM SERPL-SCNC: 140 MMOL/L — SIGNIFICANT CHANGE UP (ref 135–145)
SPECIMEN SOURCE: SIGNIFICANT CHANGE UP
WBC # BLD: 3.84 K/UL — SIGNIFICANT CHANGE UP (ref 3.8–10.5)
WBC # FLD AUTO: 3.84 K/UL — SIGNIFICANT CHANGE UP (ref 3.8–10.5)

## 2019-05-14 PROCEDURE — 99254 IP/OBS CNSLTJ NEW/EST MOD 60: CPT | Mod: GC

## 2019-05-14 PROCEDURE — 71045 X-RAY EXAM CHEST 1 VIEW: CPT | Mod: 26

## 2019-05-14 PROCEDURE — 99233 SBSQ HOSP IP/OBS HIGH 50: CPT

## 2019-05-14 RX ORDER — HYDROMORPHONE HYDROCHLORIDE 2 MG/ML
0.5 INJECTION INTRAMUSCULAR; INTRAVENOUS; SUBCUTANEOUS
Refills: 0 | Status: DISCONTINUED | OUTPATIENT
Start: 2019-05-14 | End: 2019-05-14

## 2019-05-14 RX ORDER — NYSTATIN CREAM 100000 [USP'U]/G
1 CREAM TOPICAL
Refills: 0 | Status: DISCONTINUED | OUTPATIENT
Start: 2019-05-14 | End: 2019-05-14

## 2019-05-14 RX ORDER — MEROPENEM 1 G/30ML
500 INJECTION INTRAVENOUS ONCE
Refills: 0 | Status: COMPLETED | OUTPATIENT
Start: 2019-05-14 | End: 2019-05-14

## 2019-05-14 RX ORDER — ACETAMINOPHEN 500 MG
650 TABLET ORAL ONCE
Refills: 0 | Status: COMPLETED | OUTPATIENT
Start: 2019-05-14 | End: 2019-05-14

## 2019-05-14 RX ORDER — HYDROMORPHONE HYDROCHLORIDE 2 MG/ML
0.5 INJECTION INTRAMUSCULAR; INTRAVENOUS; SUBCUTANEOUS EVERY 4 HOURS
Refills: 0 | Status: DISCONTINUED | OUTPATIENT
Start: 2019-05-14 | End: 2019-05-14

## 2019-05-14 RX ORDER — ROBINUL 0.2 MG/ML
0.2 INJECTION INTRAMUSCULAR; INTRAVENOUS EVERY 6 HOURS
Refills: 0 | Status: DISCONTINUED | OUTPATIENT
Start: 2019-05-14 | End: 2019-05-14

## 2019-05-14 RX ORDER — MEROPENEM 1 G/30ML
INJECTION INTRAVENOUS
Refills: 0 | Status: DISCONTINUED | OUTPATIENT
Start: 2019-05-14 | End: 2019-05-14

## 2019-05-14 RX ORDER — MEROPENEM 1 G/30ML
500 INJECTION INTRAVENOUS EVERY 12 HOURS
Refills: 0 | Status: DISCONTINUED | OUTPATIENT
Start: 2019-05-15 | End: 2019-05-14

## 2019-05-14 RX ORDER — HYDROMORPHONE HYDROCHLORIDE 2 MG/ML
0.2 INJECTION INTRAMUSCULAR; INTRAVENOUS; SUBCUTANEOUS
Refills: 0 | Status: DISCONTINUED | OUTPATIENT
Start: 2019-05-14 | End: 2019-05-14

## 2019-05-14 RX ADMIN — NYSTATIN CREAM 1 APPLICATION(S): 100000 CREAM TOPICAL at 17:35

## 2019-05-14 RX ADMIN — ROBINUL 0.2 MILLIGRAM(S): 0.2 INJECTION INTRAMUSCULAR; INTRAVENOUS at 19:51

## 2019-05-14 RX ADMIN — HYDROMORPHONE HYDROCHLORIDE 0.5 MILLIGRAM(S): 2 INJECTION INTRAMUSCULAR; INTRAVENOUS; SUBCUTANEOUS at 19:51

## 2019-05-14 RX ADMIN — Medication 650 MILLIGRAM(S): at 04:45

## 2019-05-14 RX ADMIN — FLUCONAZOLE 100 MILLIGRAM(S): 150 TABLET ORAL at 05:44

## 2019-05-14 RX ADMIN — Medication 5 MILLIGRAM(S): at 15:25

## 2019-05-14 RX ADMIN — Medication 5 MILLIGRAM(S): at 05:43

## 2019-05-14 RX ADMIN — Medication 125 MILLIGRAM(S): at 01:36

## 2019-05-14 RX ADMIN — Medication 125 MILLIGRAM(S): at 11:14

## 2019-05-14 RX ADMIN — Medication 5 MILLIGRAM(S): at 21:07

## 2019-05-14 RX ADMIN — PANTOPRAZOLE SODIUM 40 MILLIGRAM(S): 20 TABLET, DELAYED RELEASE ORAL at 05:44

## 2019-05-14 RX ADMIN — MEROPENEM 100 MILLIGRAM(S): 1 INJECTION INTRAVENOUS at 15:24

## 2019-05-14 RX ADMIN — DOCOSANOL 1 APPLICATION(S): 100 CREAM TOPICAL at 05:43

## 2019-05-14 RX ADMIN — NYSTATIN CREAM 1 APPLICATION(S): 100000 CREAM TOPICAL at 05:44

## 2019-05-14 RX ADMIN — Medication 650 MILLIGRAM(S): at 16:45

## 2019-05-14 RX ADMIN — CHLORHEXIDINE GLUCONATE 1 APPLICATION(S): 213 SOLUTION TOPICAL at 11:14

## 2019-05-14 RX ADMIN — Medication 104 MILLIGRAM(S): at 21:08

## 2019-05-14 RX ADMIN — Medication 125 MILLIGRAM(S): at 17:35

## 2019-05-14 RX ADMIN — CARVEDILOL PHOSPHATE 25 MILLIGRAM(S): 80 CAPSULE, EXTENDED RELEASE ORAL at 11:14

## 2019-05-14 RX ADMIN — Medication 1 MILLIGRAM(S): at 06:19

## 2019-05-14 RX ADMIN — HYDROMORPHONE HYDROCHLORIDE 0.5 MILLIGRAM(S): 2 INJECTION INTRAMUSCULAR; INTRAVENOUS; SUBCUTANEOUS at 15:26

## 2019-05-14 RX ADMIN — DOCOSANOL 1 APPLICATION(S): 100 CREAM TOPICAL at 15:24

## 2019-05-14 RX ADMIN — HYDROMORPHONE HYDROCHLORIDE 0.2 MILLIGRAM(S): 2 INJECTION INTRAMUSCULAR; INTRAVENOUS; SUBCUTANEOUS at 12:41

## 2019-05-14 RX ADMIN — Medication 650 MILLIGRAM(S): at 03:46

## 2019-05-14 NOTE — PROGRESS NOTE ADULT - ASSESSMENT
agree with dr. carlson. pt not candidate for peg.  pt will continue conseratvie managment, egd risk> benefit  tube feeds speech and swallow

## 2019-05-14 NOTE — PROGRESS NOTE ADULT - ASSESSMENT
A 54 yo Male with  relapsed multiple myeloma s/p bone marrow transplant (~1.5 yrs ago) and chemo/RT (last infusion Bendamustine and Pomalidomide 4/08/2019), perforated diverticulitis s/p Kiah's (proctosigmoidectomy with colostomy, presents to [facility] ER for evaluation of  vomiting and change of vision. He also had been weak, with epistaxis, and petechiae. ON admission, he found to have Pancytopenia and Neutropenia with ANC of 120. He has no fever but rigors and also c/o Odynophagia. The CT head shows No acute intracranial events. The ID consult requested to assist with evaluation and antibiotic management of Neutropenia and Tonsilitis.     # Neutropenic Fever -   - s/p Chemotherapy for MM,  developed  fever, 4/28/19 Neutropenia resolved 5/7/19  #? Acute Tonsilitis  # Possible Candida Esophagitis-  May benefit from  EGD when cell numbers are stable, since Odynophagia  not improving on Fluconazole  # Kidney failure -s/p Shiley catheter now for dialysis 5/4/19  # C.difficile infection -5/6/19    would recommend:    1. Obtain cultures and CXR since spiked fever  2. Add Meropenem 500 mg q 12hours until work us is done  3. Continue Oral Vancomycin    4. Monitor residual of tube feeding   5. Monitor Temp. and If >100.4 then obtain cultures   6. Continue renally adjusted  Acyclovir, and Fluconazole until Sx resolved  7.  Contact Isolation  and Dialysis as tolerated  8. Aspiration precaution     d/w  patient and wife  at the bed side    will follow the patient with you A 54 yo Male with  relapsed multiple myeloma s/p bone marrow transplant (~1.5 yrs ago) and chemo/RT (last infusion Bendamustine and Pomalidomide 4/08/2019), perforated diverticulitis s/p Kiah's (proctosigmoidectomy with colostomy, presents to [facility] ER for evaluation of  vomiting and change of vision. He also had been weak, with epistaxis, and petechiae. ON admission, he found to have Pancytopenia and Neutropenia with ANC of 120. He has no fever but rigors and also c/o Odynophagia. The CT head shows No acute intracranial events. The ID consult requested to assist with evaluation and antibiotic management of Neutropenia and Tonsilitis.     # Neutropenic Fever -   - s/p Chemotherapy for MM,  developed  fever, 4/28/19 Neutropenia resolved 5/7/19  #? Acute Tonsilitis  # Possible Candida Esophagitis-  May benefit from  EGD when cell numbers are stable, since Odynophagia  not improving on Fluconazole  # Kidney failure -s/p Shiley catheter now for dialysis 5/4/19  # C.difficile infection -5/6/19  # Pneumonia/ Fever- most likely Aspirated from the oral bleeding    would recommend:    1. Follow up cultures and Aspiration precaution   2. Add Meropenem 500 mg q 12hours until work us is done  3. Continue Oral Vancomycin    4. Monitor Temp. and If >100.4 then obtain cultures   5. Continue renally adjusted  Acyclovir, and Fluconazole until Sx resolved  6.  Contact Isolation        d/w Covering NP, Danielle,  patient's daughter  at the bed side and Nursing staff    - prognosis poor

## 2019-05-14 NOTE — PROGRESS NOTE ADULT - SUBJECTIVE AND OBJECTIVE BOX
SONAL GARCIA:[id number],   53yMale followed for:  oxycodone (Vomiting; Nausea)    PAST MEDICAL & SURGICAL HISTORY:  Diverticulitis: perforated s/p Kiah  Multiple myeloma: relapsed 3/2019  Hypertension  Left ventricular dysfunction  Cardiomyopathy: nonischemic  Former smoker: (1 ppd x 11 years; Quit ~age 26)  History of bone marrow transplant  History of surgery: s/p exploratory laparotomy with sigmoid resection and colostomy on 9/2/2018    FAMILY HISTORY:  Family history of diabetes mellitus  Family history of hypertension    MEDICATIONS  (STANDING):  acyclovir IVPB 200 milliGRAM(s) IV Intermittent every 24 hours  carvedilol 25 milliGRAM(s) Oral every 12 hours  chlorhexidine 4% Liquid 1 Application(s) Topical <User Schedule>  docosanol 10% Cream 1 Application(s) Topical three times a day  fluconAZOLE IVPB 200 milliGRAM(s) IV Intermittent every 24 hours  metoclopramide Injectable 5 milliGRAM(s) IV Push every 8 hours  nystatin Powder 1 Application(s) Topical two times a day  pantoprazole    Tablet 40 milliGRAM(s) Oral before breakfast  vancomycin    Solution 125 milliGRAM(s) Oral every 6 hours    MEDICATIONS  (PRN):  acetaminophen    Suspension .. 650 milliGRAM(s) Oral every 6 hours PRN Temp greater or equal to 38C (100.4F)  artificial  tears Solution 1 Drop(s) Both EYES every 6 hours PRN Dry Eyes      Vital Signs Last 24 Hrs  T(C): 39.1 (14 May 2019 06:58), Max: 39.1 (13 May 2019 11:06)  T(F): 102.3 (14 May 2019 06:58), Max: 102.3 (13 May 2019 11:06)  HR: 112 (14 May 2019 05:54) (109 - 119)  BP: 137/87 (14 May 2019 05:54) (137/87 - 164/97)  BP(mean): --  RR: 20 (14 May 2019 05:54) (18 - 22)  SpO2: 97% (14 May 2019 05:54) (96% - 98%)  nc/at  s1s2  cta  soft, nt, nd no guarding or rebound  no c/c/e    CBC Full  -  ( 14 May 2019 05:50 )  WBC Count : 3.84 K/uL  RBC Count : 2.39 M/uL  Hemoglobin : 7.3 g/dL  Hematocrit : 21.5 %  Platelet Count - Automated : 16 K/uL  Mean Cell Volume : 90.0 fL  Mean Cell Hemoglobin : 30.5 pg  Mean Cell Hemoglobin Concentration : 34.0 %  Auto Neutrophil # : 1.53 K/uL  Auto Lymphocyte # : 1.13 K/uL  Auto Monocyte # : 1.14 K/uL  Auto Eosinophil # : 0.01 K/uL  Auto Basophil # : 0.01 K/uL  Auto Neutrophil % : 39.8 %  Auto Lymphocyte % : 29.4 %  Auto Monocyte % : 29.7 %  Auto Eosinophil % : 0.3 %  Auto Basophil % : 0.3 %    05-14    140  |  97<L>  |  81<H>  ----------------------------<  229<H>  4.2   |  25  |  6.83<H>    Ca    7.5<L>      14 May 2019 05:50  Phos  3.5     05-14  Mg     2.0     05-14    TPro  6.3  /  Alb  2.9<L>  /  TBili  1.0  /  DBili  x   /  AST  33  /  ALT  Test not performed SPECIMEN LIPEMIC.  RESULTS MAY BE AFFECTED.  /  AlkPhos  140<H>  05-14

## 2019-05-14 NOTE — PROGRESS NOTE ADULT - ASSESSMENT
1. Pancytopenia     -- WBC improved s/p zarxio  -- Transfuse to keep Hgb > 7  -- Keep Platelets~ 20K or more. OK TO GIVE PLTS despite fever, benefits > risks at this time       2. MM relapsed/refractory including failed Auto Transplant    -- had been on bendamustine/pomalyst. Tx on hold  -- s/p decadron 40mg 5/2   -- QIggs, SPEP, JENELLE and SFLC showed increased free kappa  -- no realistic tx options left. Recommend pall care, prognosis poor    3. Tonsilitis vs Candida Esophagitis    -- cont Diflucan per ID  -- cont Acyclovir  -- ID f/u  -- cont magic mouthwash as tolerated  -- cont IVF  -- sx mgmt    4. ROBB    -- HD per renal  -- ? sec to light chain disease    5. cdiff     -- vanc PO and flagyl  -- GI and ID eval appreciated    6. fever, resp distress -- start IV abx, f/u ID  -- participated in part with MICU discussion with pt's wife, which was ongoing at the time I walked in to see pt, regarding code status and GOC. She is having a hard time coping with his acute issues in the last 24 hrs. I agree with MICU and also let pt's wife know that I recommend at least DNR/DNI. I believe comfort care is also appropriate but wife was not ready to have this discussion yet. She was concerned about treating his acute issues still.   -- f/u pall care  -- comfort care is appropriate  -- wife agreed to DNR/DNI  -- pt actively dying  -- hold off on CT, won't change mgmt and pt not stable for travel, wife understands    D/w wife, family friend, TIFFANY, PA, Dr Castro. [phone]

## 2019-05-14 NOTE — PROGRESS NOTE ADULT - SUBJECTIVE AND OBJECTIVE BOX
Patient is seen and examined at the bed side, spiked fever and currently is afebrile .      REVIEW OF SYSTEMS: All other review systems are negative        ALLERGIES: oxycodone (Vomiting; Nausea)      Vital Signs Last 24 Hrs  T(C): 37.9 (14 May 2019 19:50), Max: 39.2 (14 May 2019 11:12)  T(F): 100.2 (14 May 2019 19:50), Max: 102.6 (14 May 2019 11:12)  HR: 108 (14 May 2019 19:50) (108 - 115)  BP: 101/58 (14 May 2019 19:50) (101/58 - 137/87)  BP(mean): --  RR: 22 (14 May 2019 19:50) (20 - 22)  SpO2: 95% (14 May 2019 19:50) (95% - 97%)      PHYSICAL EXAM:  GENERAL: Not in acute distress,   HEENT: Mucosa bleeding stopped, Lips crusted with dry blood, NGT in placed  CHEST/LUNG:  Air entry bilaterally  HEART: s1 and s2 present  ABDOMEN:  Nontender and  Nondistended  EXTREMITIES: No pedal  edema  CNS: Awake and alert        LABS:                                     7.3    3.84  )-----------( 16       ( 14 May 2019 05:50 )             21.5                                   7.5    3.19  )-----------( 35       ( 07 May 2019 06:00 )             23.3                           8.8    0.57  )-----------( 23       ( 02 May 2019 18:36 )             26.0             05-14    140  |  97<L>  |  81<H>  ----------------------------<  229<H>  4.2   |  25  |  6.83<H>    Ca    7.5<L>      14 May 2019 05:50  Phos  3.5     05-14  Mg     2.0     05-14    TPro  6.3  /  Alb  2.9<L>  /  TBili  1.0  /  DBili  x   /  AST  33  /  ALT  Test not performed SPECIMEN LIPEMIC.  RESULTS MAY BE AFFECTED.  /  AlkPhos  140<H>  05-14      05-12    142  |  100  |  103<H>  ----------------------------<  287<H>  4.4   |  19<L>  |  8.22<H>    Ca    7.6<L>      12 May 2019 07:49  Phos  6.3     05-12  Mg     2.6     05-12    TPro  6.5  /  Alb  3.03<L>  /  TBili  1.1  /  DBili  x   /  AST  20  /  ALT  23  /  AlkPhos  154<H>  05-12 05-11    141  |  99  |  61<H>  ----------------------------<  182<H>  3.9   |  21<L>  |  6.47<H>    Ca    7.9<L>      11 May 2019 06:33  Phos  4.6     05-11  Mg     2.2     05-11    TPro  6.5  /  Alb  3.2<L>  /  TBili  1.4<H>  /  DBili  x   /  AST  26  /  ALT  30  /  AlkPhos  156<H>  05-11 04-30    135  |  104  |  20  ----------------------------<  139<H>  3.8   |  16<L>  |  1.46<H>    Ca    7.2<L>      30 Apr 2019 06:15  Phos  2.4     04-30  Mg     1.8     04-30    TPro  5.9<L>  /  Alb  3.0<L>  /  TBili  2.0<H>  /  DBili  x   /  AST  13  /  ALT  47<H>  /  AlkPhos  100  04-30      Vancomycin level:    Vancomycin Level, Random (05.06.19 @ 06:28) Vancomycin Level, Random: 16.1:     Vancomycin Level, Trough (05.03.19 @ 06:55) Vancomycin Level, Trough: 20.9: Vancomycin trough levels should be rapidly reached and maintained at 15-20 ug/ml for life threatening MRSA  infections such as sepsis, endocarditis, osteomyelitis and pneumonia.     Vancomycin Level, Trough - Prior to Next Dose (05.01.19 @ 06:05) Vancomycin Level, Trough: 29.7: Vancomycin trough levels should be rapidly reached and  maintained at 15-20 ug/ml for life threatening MRSA infections such as sepsis, endocarditis, osteomyelitis and pneumonia.     Vancomycin Level, Random (05.02.19 @ 06:04)  Vancomycin Level, Random: 23.8:         MEDICATIONS  (STANDING):      MEDICATIONS  (STANDING):  acyclovir IVPB 200 milliGRAM(s) IV Intermittent every 24 hours  carvedilol 25 milliGRAM(s) Oral every 12 hours  chlorhexidine 4% Liquid 1 Application(s) Topical <User Schedule>  docosanol 10% Cream 1 Application(s) Topical three times a day  fluconAZOLE IVPB 200 milliGRAM(s) IV Intermittent every 24 hours  glycopyrrolate Injectable 0.2 milliGRAM(s) IV Push every 6 hours  HYDROmorphone  Injectable 0.5 milliGRAM(s) IV Push every 4 hours  meropenem  IVPB      metoclopramide Injectable 5 milliGRAM(s) IV Push every 8 hours  nystatin Powder 1 Application(s) Topical two times a day  pantoprazole    Tablet 40 milliGRAM(s) Oral before breakfast  vancomycin    Solution 125 milliGRAM(s) Oral every 6 hours    MEDICATIONS  (PRN):  acetaminophen    Suspension .. 650 milliGRAM(s) Oral every 6 hours PRN Temp greater or equal to 38C (100.4F)  artificial  tears Solution 1 Drop(s) Both EYES every 6 hours PRN Dry Eyes  HYDROmorphone  Injectable 0.5 milliGRAM(s) IV Push every 2 hours PRN dyspnea/increased work of breathing          RADIOLOGY & ADDITIONAL TESTS:    4/30/19 : Xray Esophagram (04.30.19 @ 09:31) Limited evaluation for esophageal ulcers secondary to underdistention of  the esophagus.      4/27/19 : Xray Chest 1 View- PORTABLE-Urgent (04.27.19 @ 22:45) Probable small left pleural effusion with adjacent opacity that likely  represents atelectasis.    4/25/19 : CT Brain Stroke Protocol (04.25.19 @ 19:42) No acute intracranial hemorrhage, mass effect, or midline shift. No  abnormal extra-axial collections. The basal cisterns are patent without evidence of central herniation. The sulci and ventricles are within normal limits for the patient's age. Stable few patchy areas of low-attenuation in the bihemispheric white  matter, which is nonspecific, but likely related to sequela of chronic  microvascular ischemic disease. Stable coarse calcifications in the left temporal lobe.      MICROBIOLOGY DATA:    Culture - Blood (05.06.19 @ 23:10)    Culture - Blood:   NO ORGANISMS ISOLATED  NO ORGANISMS ISOLATED AT 24 HOURS    Specimen Source: BLOOD VENOUS        Culture - Blood (05.06.19 @ 23:10)    Culture - Blood:   NO ORGANISMS ISOLATED  NO ORGANISMS ISOLATED AT 24 HOURS    Specimen Source: BLOOD PERIPHERAL        Clostridium difficile Toxin by PCR (05.06.19 @ 15:50)    Clostridium difficile Toxin by PCR: DETECTED: RESULT CALLED TO / READ BACK: MITRA EMANUEL RN/Y  DATE / TIME CALLED: 05/06/19 1919  CALLED BY: ALYCE CELAYA  C. difficile toxin B gene (tcdb) detected    The results of this test should be interpreted with  consideration of all clinical andlaboratory findings. C.  difficile PCR is more sensitive and specific than EIA,  therefore, repeat testing during one episode does not  provide additional clinically useful information. One test  per episode is sufficient for determining C. difficile  infection status.    This test is performed on the Cepheid Gene Xpert detection  system which automates and integrates sample purification,  Nucleic acid amplification and detection of the target  sequence in simple or complex samples using real-time PCR  and RT-PCR assays.      Culture - Blood (04.28.19 @ 18:20)    Culture - Blood:   NO ORGANISMS ISOLATED  NO ORGANISMS ISOLATED AT 24 HOURS    Specimen Source: BLOOD VENOUS      Culture - Blood (04.28.19 @ 18:20)    Culture - Blood:   NO ORGANISMS ISOLATED  NO ORGANISMS ISOLATED AT 24 HOURS    Specimen Source: BLOOD PERIPHERAL        Culture - Blood (04.27.19 @ 19:50)    Culture - Blood:   NO ORGANISMS ISOLATED  NO ORGANISMS ISOLATED AT 24 HOURS    Specimen Source: BLOOD PERIPHERAL      Culture - Group A Streptococcus (04.27.19 @ 14:24)    Culture - Group A Streptococcus:   NO BETA STREP. GROUP A  AFTER 24HRS.    Specimen Source: THROAT Patient is seen and examined at the bed side, spiked fever around 11:12 AM and currently is afebrile. The Cultures has been sent and the CXR shows Bilateral upper lobe predominant patchy opacities suggestive of pneumonia. He became obtunded around 4 PM and Family signed DNR.      REVIEW OF SYSTEMS: All other review systems are negative        ALLERGIES: oxycodone (Vomiting; Nausea)      Vital Signs Last 24 Hrs  T(C): 37.9 (14 May 2019 19:50), Max: 39.2 (14 May 2019 11:12)  T(F): 100.2 (14 May 2019 19:50), Max: 102.6 (14 May 2019 11:12)  HR: 108 (14 May 2019 19:50) (108 - 115)  BP: 101/58 (14 May 2019 19:50) (101/58 - 137/87)  BP(mean): --  RR: 22 (14 May 2019 19:50) (20 - 22)  SpO2: 95% (14 May 2019 19:50) (95% - 97%)      PHYSICAL EXAM:  GENERAL: Obtunded  HEENT:  Lips crusted with dry blood, NGT in placed  CHEST/LUNG:  Air entry bilaterally  HEART: s1 and s2 present  ABDOMEN:  Nontender and  Nondistended  EXTREMITIES: No pedal  edema  CNS: Obtunded        LABS:                                     7.3    3.84  )-----------( 16       ( 14 May 2019 05:50 )             21.5                                   7.5    3.19  )-----------( 35       ( 07 May 2019 06:00 )             23.3                           8.8    0.57  )-----------( 23       ( 02 May 2019 18:36 )             26.0             05-14    140  |  97<L>  |  81<H>  ----------------------------<  229<H>  4.2   |  25  |  6.83<H>    Ca    7.5<L>      14 May 2019 05:50  Phos  3.5     05-14  Mg     2.0     05-14    TPro  6.3  /  Alb  2.9<L>  /  TBili  1.0  /  DBili  x   /  AST  33  /  ALT  Test not performed SPECIMEN LIPEMIC.  RESULTS MAY BE AFFECTED.  /  AlkPhos  140<H>  05-14      05-12    142  |  100  |  103<H>  ----------------------------<  287<H>  4.4   |  19<L>  |  8.22<H>    Ca    7.6<L>      12 May 2019 07:49  Phos  6.3     05-12  Mg     2.6     05-12    TPro  6.5  /  Alb  3.03<L>  /  TBili  1.1  /  DBili  x   /  AST  20  /  ALT  23  /  AlkPhos  154<H>  05-12 05-11    141  |  99  |  61<H>  ----------------------------<  182<H>  3.9   |  21<L>  |  6.47<H>    Ca    7.9<L>      11 May 2019 06:33  Phos  4.6     05-11  Mg     2.2     05-11    TPro  6.5  /  Alb  3.2<L>  /  TBili  1.4<H>  /  DBili  x   /  AST  26  /  ALT  30  /  AlkPhos  156<H>  05-11 04-30    135  |  104  |  20  ----------------------------<  139<H>  3.8   |  16<L>  |  1.46<H>    Ca    7.2<L>      30 Apr 2019 06:15  Phos  2.4     04-30  Mg     1.8     04-30    TPro  5.9<L>  /  Alb  3.0<L>  /  TBili  2.0<H>  /  DBili  x   /  AST  13  /  ALT  47<H>  /  AlkPhos  100  04-30      Vancomycin level:    Vancomycin Level, Random (05.06.19 @ 06:28) Vancomycin Level, Random: 16.1:     Vancomycin Level, Trough (05.03.19 @ 06:55) Vancomycin Level, Trough: 20.9: Vancomycin trough levels should be rapidly reached and maintained at 15-20 ug/ml for life threatening MRSA  infections such as sepsis, endocarditis, osteomyelitis and pneumonia.     Vancomycin Level, Trough - Prior to Next Dose (05.01.19 @ 06:05) Vancomycin Level, Trough: 29.7: Vancomycin trough levels should be rapidly reached and  maintained at 15-20 ug/ml for life threatening MRSA infections such as sepsis, endocarditis, osteomyelitis and pneumonia.     Vancomycin Level, Random (05.02.19 @ 06:04)  Vancomycin Level, Random: 23.8:         MEDICATIONS  (STANDING):      MEDICATIONS  (STANDING):  acyclovir IVPB 200 milliGRAM(s) IV Intermittent every 24 hours  carvedilol 25 milliGRAM(s) Oral every 12 hours  chlorhexidine 4% Liquid 1 Application(s) Topical <User Schedule>  docosanol 10% Cream 1 Application(s) Topical three times a day  fluconAZOLE IVPB 200 milliGRAM(s) IV Intermittent every 24 hours  glycopyrrolate Injectable 0.2 milliGRAM(s) IV Push every 6 hours  HYDROmorphone  Injectable 0.5 milliGRAM(s) IV Push every 4 hours  meropenem  IVPB      metoclopramide Injectable 5 milliGRAM(s) IV Push every 8 hours  nystatin Powder 1 Application(s) Topical two times a day  pantoprazole    Tablet 40 milliGRAM(s) Oral before breakfast  vancomycin    Solution 125 milliGRAM(s) Oral every 6 hours    MEDICATIONS  (PRN):  acetaminophen    Suspension .. 650 milliGRAM(s) Oral every 6 hours PRN Temp greater or equal to 38C (100.4F)  artificial  tears Solution 1 Drop(s) Both EYES every 6 hours PRN Dry Eyes  HYDROmorphone  Injectable 0.5 milliGRAM(s) IV Push every 2 hours PRN dyspnea/increased work of breathing          RADIOLOGY & ADDITIONAL TESTS:    5/14/19 : Xray Chest 1 View- PORTABLE-Urgent (05.14.19 @ 14:47) Bilateral upper lobe predominant patchy opacities suggestive of pneumonia in the setting of fever. No pneumothorax.      4/30/19 : Xray Esophagram (04.30.19 @ 09:31) Limited evaluation for esophageal ulcers secondary to underdistention of  the esophagus.      4/27/19 : Xray Chest 1 View- PORTABLE-Urgent (04.27.19 @ 22:45) Probable small left pleural effusion with adjacent opacity that likely  represents atelectasis.    4/25/19 : CT Brain Stroke Protocol (04.25.19 @ 19:42) No acute intracranial hemorrhage, mass effect, or midline shift. No  abnormal extra-axial collections. The basal cisterns are patent without evidence of central herniation. The sulci and ventricles are within normal limits for the patient's age. Stable few patchy areas of low-attenuation in the bihemispheric white  matter, which is nonspecific, but likely related to sequela of chronic  microvascular ischemic disease. Stable coarse calcifications in the left temporal lobe.        MICROBIOLOGY DATA:    Culture - Blood (05.06.19 @ 23:10)    Culture - Blood:   NO ORGANISMS ISOLATED  NO ORGANISMS ISOLATED AT 24 HOURS    Specimen Source: BLOOD VENOUS        Culture - Blood (05.06.19 @ 23:10)    Culture - Blood:   NO ORGANISMS ISOLATED  NO ORGANISMS ISOLATED AT 24 HOURS    Specimen Source: BLOOD PERIPHERAL        Clostridium difficile Toxin by PCR (05.06.19 @ 15:50)    Clostridium difficile Toxin by PCR: DETECTED: RESULT CALLED TO / READ BACK: MITRA EMANUEL RN/Y  DATE / TIME CALLED: 05/06/19 1919  CALLED BY: ALYCE CELAYA  C. difficile toxin B gene (tcdb) detected    The results of this test should be interpreted with  consideration of all clinical andlaboratory findings. C.  difficile PCR is more sensitive and specific than EIA,  therefore, repeat testing during one episode does not  provide additional clinically useful information. One test  per episode is sufficient for determining C. difficile  infection status.    This test is performed on the Cepheid Gene Xpert detection  system which automates and integrates sample purification,  Nucleic acid amplification and detection of the target  sequence in simple or complex samples using real-time PCR  and RT-PCR assays.      Culture - Blood (04.28.19 @ 18:20)    Culture - Blood:   NO ORGANISMS ISOLATED  NO ORGANISMS ISOLATED AT 24 HOURS    Specimen Source: BLOOD VENOUS      Culture - Blood (04.28.19 @ 18:20)    Culture - Blood:   NO ORGANISMS ISOLATED  NO ORGANISMS ISOLATED AT 24 HOURS    Specimen Source: BLOOD PERIPHERAL        Culture - Blood (04.27.19 @ 19:50)    Culture - Blood:   NO ORGANISMS ISOLATED  NO ORGANISMS ISOLATED AT 24 HOURS    Specimen Source: BLOOD PERIPHERAL      Culture - Group A Streptococcus (04.27.19 @ 14:24)    Culture - Group A Streptococcus:   NO BETA STREP. GROUP A  AFTER 24HRS.    Specimen Source: THROAT

## 2019-05-14 NOTE — CONSULT NOTE ADULT - ASSESSMENT
53M with relapsed multiple myeloma (s/p BMT, CCRT now transfusion dependent), sCHF (EF 54%), HTN who was admitted 4/25 and has developed worsening pancytopenia, renal failure, as complications of his refractory malignancy.    #Multiple Myeloma  - Patient with advanced disease, severe end stage complications including refractory thrombocytopenia, Renal failure, c.diff infection  - On exam today patient appears to be actively dying  - MICU team discussed overall grave prognosis with wife at bedside, including that all aggressive measures to dates have been unable to reverse patient's underlying malignancy and further interventions such as intubation or resuscitation would also not address the underlying etiology of his current state  - Due to limited benefit of further intervention at this time, wife agrees to DNR/DNI, and to treat pain/symptoms  - Patient is not a candidate for MICU. Please contact with further questions    Plan d/w primary team

## 2019-05-14 NOTE — PROVIDER CONTACT NOTE (CRITICAL VALUE NOTIFICATION) - SITUATION
wbc 0.85 plts 16
C. Diff positive
H&H 6.3/18.9
Patient AM labs returned with WBC of 0.88, Platelet of 9
Pt platelet - 16
Pt platelet 18
calcium 6.2
calcium 6.4

## 2019-05-14 NOTE — PROGRESS NOTE ADULT - REASON FOR ADMISSION
Pancytopenia

## 2019-05-14 NOTE — PROVIDER CONTACT NOTE (OTHER) - RECOMMENDATIONS
continue to apply ice packs; hypothermia blanket requested; ALANNA Avina and Danielle Larsen NP aware on unavailability of hypothermia blanket Continue to apply ice packs and apply hypothermia blanket; hypothermia blanket requested; escalated to ALANNA Avina and Danielle Larsen NP. Provider aware on unavailability of hypothermia blanket

## 2019-05-14 NOTE — PROVIDER CONTACT NOTE (CRITICAL VALUE NOTIFICATION) - TEST AND RESULT REPORTED:
wbc .85 plts plts 16
C. Diff positive
Calcium 6.4
H&H 6.3/18.9
Vanco trough 29.7
WBC 0.39, Platelet 13
WBC 0.4 Platelet 12
WBC 0.61 H/H 6.1/17.9 Platelet 11
WBC 0.63  h/h 6.1/18.0  platelets 12
WBC 0.88 // Platelet 9
calcium 6.2
platelet - 16
platelet 18
wbc 0.41, platelet 6

## 2019-05-14 NOTE — CHART NOTE - NSCHARTNOTEFT_GEN_A_CORE
NUTRITION FOLLOW-UP:  Pt. confused, disoriented and somnolent.  Nutrition services reconsulted to address NGT feeding 2/2 persistent copious diarrhea   Currently receiving Nepro @ goal of 45mL/hr.  Pt. continues to have loose/watery bowel movements from colostomy, per nursing.  Also with Dx of severe CDiff & noted on Reglan (for nausea).  RN also reports Pt. frequently trying to pull out NGT... GI notes that Pt. not candidate for PEG.  Persistently elevated serum glucose values over past 3 days (229, 247, 287 mg/dL)... informed RN and NP.  Per chart, Pt. with very poor prognosis.  May consider changing enteral formula to Peptamen 1.5, (however Pt. does receive HD & this enteral formula is not restricted in K+ or Phosphorus, thus would monitor electrolytes/renal indices closely). At this time strongly suggest palliative consult to help discuss/establish long term [nutritional] goals of care.      Weight:   76.4kg (5/13/19)  79.3kg (5/9/19)  77.4kg (4/25/19)    Edema: 1+ b/l feet & ankles.    Skin:  No noted pressure injuries.     Pertinent Medications: MEDICATIONS  (STANDING):  acyclovir IVPB 200 milliGRAM(s) IV Intermittent every 24 hours  carvedilol 25 milliGRAM(s) Oral every 12 hours  chlorhexidine 4% Liquid 1 Application(s) Topical <User Schedule>  docosanol 10% Cream 1 Application(s) Topical three times a day  fluconAZOLE IVPB 200 milliGRAM(s) IV Intermittent every 24 hours  metoclopramide Injectable 5 milliGRAM(s) IV Push every 8 hours  nystatin Powder 1 Application(s) Topical two times a day  pantoprazole    Tablet 40 milliGRAM(s) Oral before breakfast  vancomycin    Solution 125 milliGRAM(s) Oral every 6 hours    MEDICATIONS  (PRN):  acetaminophen    Suspension .. 650 milliGRAM(s) Oral every 6 hours PRN Temp greater or equal to 38C (100.4F)  artificial  tears Solution 1 Drop(s) Both EYES every 6 hours PRN Dry Eyes    Pertinent Labs:  05-14 Na140 mmol/L Glu 229 mg/dL<H> K+ 4.2 mmol/L Cr  6.83 mg/dL<H> BUN 81 mg/dL<H> 05-14 Phos 3.5 mg/dL 05-14 Alb 2.9 g/dL<L>        Current Diet:  NPO w Nepro @ goal of 45mL/hr x 24hrs via NGT                                [provides 1080mL total volume, 1944Kcals & ~87g protein, daily]      NUTRITION Dx:  Pt. remains severely malnourished       PLAN/RECOMMENDATIONS:    1) Continue NGT feedings with Nepro vs consideration to change enteral formula to Peptamen 1.5 @ goal of 54mL/hr x 24hrs               [Peptamen would provide 1944Kcals & ~88g protein, daily]  2) Obtain daily weights  3) If Peptamen 1.5 initiated... closely monitor electrolytes/renal indices.    4) Suggest palliative consult to discuss & establish long term [nutritional] goals of care.   5) RDN remains available and will f/u PRN.          Randee Henning RDN, CDN pager [phone]

## 2019-05-14 NOTE — CONSULT NOTE ADULT - ATTENDING COMMENTS
terrible case/severe metastatic myeloma/now with septic shock/respiratory ditress/d/w family regarding goals of care/no urgent need for ICU care
Patient seen and examined, agree with above assessment and plan as transcribed above.    - No need for further inpatient cardiac work up.  - Heme onc f/u    Joseph Ragsdale MD, FACC  BEEPER [phone]
Patient seen and examined.  Meds, labs and vitals all reviewed.  Agree with NP note.
I performed a history and physical exam of the patient and discussed  the findings and plan with the house officer. I reviewed the resident note and agree with the findings and plan

## 2019-05-14 NOTE — H&P ADULT - ASSESSMENT
Medication Management [facility]  Anticoagulation Clinic  [phone] (phone)  [phone] (fax)      Mr. Ivelisse Estrada is a 76 y.o.  male with history of PE/DVT, per Dr. Thais Avila referral, who presents today for Warfarin monitoring and adjustment (4 week visit). Patient verifies current dosing regimen and tablet strength. No missed or extra doses. Patient denies bleeding/swelling/chest pain. Has usual SOB/easy bruising. No blood in urine or stool. No dietary changes. No changes in medication/OTC agents/herbals. No change in alcohol use or tobacco use. No change in activity level. Patient denies headaches/dizziness/lightheadedness/falls. No vomiting/diarrhea or acute illness. No procedures scheduled in the future at this time. Assessment:   Lab Results   Component Value Date    INR 2.30 (H) 05/14/2019    INR 2.10 (H) 04/16/2019    INR 1.90 (H) 03/27/2019    PROTIME 1.89 (H) 09/03/2011    PROTIME 2.20 (H) 09/02/2011     INR therapeutic - goal 2-3. Recent Labs     05/14/19  1023   INR 2.30*     Plan:  POCT INR ordered/performed/result reviewed. Continue Coumadin 2.5 mg daily PO. Recheck INR in 4 weeks. Patient reminded to call the Anticoagulation Clinic with any signs or symptoms of bleeding or with any medication changes. Patient given instructions utilizing the teach back method. Needs prescription renewed. Wants to switch to 2.5 mg tablet, instead of 5 mg. Clinic pharmacist will send prescription electronically. Discharged ambulatory in no apparent distress, with cane and wife. After visit summary printed and reviewed with patient.       Medications reviewed and updated on home medication list.     Influenza vaccine:     [] given    [] declined   [] received previously   [] plans to receive at a later time   [] refused    [] documented in EPIC
52 M Hx of MM p/w free perforation of sigmoid diverticulitis  - Emergent laparotomy, bowel resection, possible ostomy

## 2019-05-14 NOTE — PROGRESS NOTE ADULT - PROBLEM/PLAN-1
Pt called and notified of no change in management after review of blood sugars per Dr. Lashon Choi. Pt verbalizes understanding. Dr. Deanne Curtis office called and notified no change and pt notified.
DISPLAY PLAN FREE TEXT

## 2019-05-14 NOTE — PROGRESS NOTE ADULT - PROBLEM SELECTOR PLAN 1
Patient tachypneic with excessive secretions - extensive discussion with patient's wife at bedside.  Patient acutely worsened overnight and wife aware prognosis is poor and agreeable to focus on his symptoms as it appears the patient is in the dying process.  Agreeable to start Dilaudid 0.5mg IV q4h ATC with Dilaudid 0.5mg IV q2h PRN to help alleviate his excessive work of breathing.  Patient's RR 30's despite dilaudid 0.2mg IV.  Low threshold to increase if needed.  Robinul 0.2mg IV q6h added for excessive secretions.  Oral care PRN.
Baseline cr ~1.1. No evidence of obstruction on renal US.  Poor PO intake, and high vanc trough previously. No recent contrast exposure. Suspect ROBB 2/2 ATN.  Off IVF. no improvement w/iv hydration.  dose all meds for egfr <10 ml/min  Started on HD for ROBB with anuria and met acidosis.   Repeat HD today, with low UF goals.   Heme onc noted appreciated. Given poor prognosis, refractory MM, and significant clinical decline, I question if he will be stable enough for outpt HD.  Would recommend stopping HD, in light of terminal cancer and limited life expectancy. Pall care called to discuss GOC.  Plan for repeat prbc and platelet transfusion.
Baseline cr ~1.1. No evidence of obstruction on renal US.  Poor PO intake, and high vanc trough previously. No recent contrast exposure. Suspect ROBB 2/2 ATN.  Off IVF. no improvement w/iv hydration.  dose all meds for egfr <10 ml/min  Started on HD for ROBB with anuria and met acidosis.   s/p HD yesterday- will hold HD for today and observe  Heme onc noted appreciated. Given poor prognosis, refractory MM, and significant clinical decline, I question if he will be stable enough for outpt HD.  Would recommend stopping HD, in light of terminal cancer and limited life expectancy. Pall care called to discuss GOC.  Plan for repeat prbc and platelet transfusion.
Baseline cr ~1.1. No evidence of obstruction on renal US.  Poor PO intake, and high vanc trough previously. No recent contrast exposure. Suspect ROBB 2/2 ATN.  Off IVF. no improvement w/iv hydration. No evidence of TMA, or hemolytic anemia.  dose all meds for egfr <10 ml/min  Started on HD for ROBB with anuria and met acidosis.   Dialyzed 5/6. Femoral shiley removed yesterday, due to high risk of cath related infection.   Can place IJ shiley or tunneled cath, will need platelets >50K. transfusions pending.   Plan for repeat HD after IJ catheter placement.    Heme onc noted appreciated. Given poor prognosis, refractory MM, and significant clinical decline, I question if he will be stable enough for outpt HD.
Baseline cr ~1.1. No evidence of obstruction on renal US.  Poor PO intake, and high vanc trough previously. No recent contrast exposure. Suspect ROBB 2/2 ATN.  Off IVF. no improvement w/iv hydration. No evidence of TMA, or hemolytic anemia.  dose all meds for egfr <10 ml/min  Started on HD for ROBB with anuria and met acidosis.   Pt was dialyzed last night, flow sheet reviewed, BP stable during treatment.   No plans for HD today, will reassess tomorrow regarding HD.   Heme onc noted appreciated. Given poor prognosis, refractory MM, and significant clinical decline, I question if he will be stable enough for outpt HD.  Would recommend stopping HD, in light of terminal cancer and limited life expectancy. Pall care called to discuss GOC.
Baseline cr ~1.1. No evidence of obstruction on renal US.  Poor PO intake, and high vanco trough previously. No recent contrast exposure. ROBB most likely 2/2 ATN, anuric, no recovery of RFT  no improvement w/iv hydration.  dose all meds for egfr <10 ml/min  Started on HD for ROBB with anuria and met acidosis.   Dialyzed yesterday, without acute event.   Heme onc noted appreciated. Given poor prognosis, refractory MM, and significant clinical decline, I question if he will be stable enough for outpt HD.  Would recommend stopping HD, in light of terminal cancer and limited life expectancy. f/u w/ Pall care to discuss GOC.  s/p prbc and platelet transfusion.  Reassess daily for HD needs. Discussed with pts wife at bedside, regarding stopping HD.
Baseline cr ~1.1. No evidence of obstruction on renal US.  Poor PO intake, and high vanco trough previously. No recent contrast exposure. ROBB most likely 2/2 ATN, anuric, no recovery of RFT  no improvement w/iv hydration.  dose all meds for egfr <10 ml/min  Started on HD for ROBB with anuria and met acidosis.   s/p last HD friday-   scheduled for HD today w/2k bath, uf 0.5kg as tolerated  Heme onc noted appreciated. Given poor prognosis, refractory MM, and significant clinical decline, I question if he will be stable enough for outpt HD.  Would recommend stopping HD, in light of terminal cancer and limited life expectancy. f/u w/ Pall care to discuss GOC.  s/p prbc and platelet transfusion.
Baseline cr ~1.1. No evidence of obstruction on renal US.  Poor PO intake, and high vanco trough previously. No recent contrast exposure. Suspect ROBB 2/2 ATN.  Off IVF. no improvement w/iv hydration.  dose all meds for egfr <10 ml/min  Started on HD for ROBB with anuria and met acidosis.   s/p HD friday- will hold HD for today and observe- plan for hd tomm  Heme onc noted appreciated. Given poor prognosis, refractory MM, and significant clinical decline, I question if he will be stable enough for outpt HD.  Would recommend stopping HD, in light of terminal cancer and limited life expectancy. Pall care called to discuss GOC.  Plan for repeat prbc and platelet transfusion.
Baseline cr ~1.1. No evidence of obstruction on renal US.  Poor PO intake, with occasional doses of NSAID and diuretics, and high vanc trough. No recent contrast exposure. Suspect ROBB 2/2 ATN.  off IVF. no improvement w/ivhydration. No evidence of TMA, or hemolytic anemia.  dose all meds for egfr <10 ml/min  Started on HD for ROBB with anuria and met acidosis.   Dialyzed yesterday. No acute indication for HD today.  Agree with taking out groin shiley, in setting of active c. diff infection.  Can place IJ shiley or tunneled cath, will need platelets >50K.   Heme onc noted appreciated. Given poor prognosis, refractory MM, and significant clinical decline, I question if he will be stable enough for outpt HD.
Baseline cr ~1.1. No evidence of obstruction on renal US.  Poor PO intake, with occasional doses of NSAID and diuretics, and high vanc trough. No recent contrast exposure. Suspect ROBB 2/2 ATN.  off IVF. no improvement w/ivhydration. No evidence of TMA, or hemolytic anemia.  dose all meds for egfr <10 ml/min  tolerated first HD well SAT. c/w 2nd hd now w/ 3k bath, no net uf  ok to resuem ivf D5NS till diarrhea resolves.   will evaluate daily for hd need. no u/o documented. need strict I/O.   will need Permacath if no recovery of RFT to continue RRT, will need PLt xfusion prior to PC.
Dramatic rise in cr noted this morning. Baseline cr ~1.1.  No evidence of obstruction on renal US.  Poor PO intake, with occasional doses of NSAID and diuretics, and high vanc trough. No recent contrast exposure. Suspect ROBB 2/2 ATN.  Can recheck UA in setting of new ROBB, althought previous UA relatively bland.   Increase IVF to 100 cc/hr. K acceptable. No evidence of TMA, or hemolytic anemia.  Adjust meds of egfr <10.
I have personally  seen and examined the patient today and have noted the findings and formulated the plan of care.
Likely multifactorial - ROBB, infection, Multiple myeloma - slightly improved today.  Continue management as per MICU
cr continues to rise. Baseline cr ~1.1.  No evidence of obstruction on renal US.  Poor PO intake, with occasional doses of NSAID and diuretics, and high vanc trough. No recent contrast exposure. Suspect ROBB 2/2 ATN.  Continue  cc/hr. K acceptable. No evidence of TMA, or hemolytic anemia.  Adjust meds of egfr <10.  Consider reducing valcyte dosing to 450mg every 3 days, for severe renal dysfunction.  Discussed with pt, that due to worsening ROBB and acidosis, he will likely need HD. If no stabilization of renal function, he will need shiley placement, and HD start.  He will likely need additional platelet transfusions for shiley placement.
cr continues to rise. Baseline cr ~1.1.  No evidence of obstruction on renal US.  Poor PO intake, with occasional doses of NSAID and diuretics, and high vanc trough. No recent contrast exposure. Suspect ROBB 2/2 ATN.  I took the liberty of changing IVF to D5W 1/2NS + 75mEq HCO3 @ 100cc/hr  No evidence of TMA, or hemolytic anemia.  Adjust meds of egfr <10.  Consider reducing valcyte dosing to 450mg every 3 days, for severe renal dysfunction.  Needs Emergent HD  Consult [facility] forshiley ASAP  Consent for HD in chart, sending Hepatitis panel STAT  Scheduled for today/tonight
cr continues to rise. Baseline cr ~1.1.  No evidence of obstruction on renal US.  Poor PO intake, with occasional doses of NSAID and diuretics, and high vanc trough. No recent contrast exposure. Suspect ROBB 2/2 ATN.  continue IVF of D5W 1/2NS + 75mEq HCO3 @ 100cc/hr today, and DC in AM of tomorrow  No evidence of TMA, or hemolytic anemia.  Adjust meds of egfr <10.  Consider reducing valcyte dosing to 450mg every 3 days, for severe renal dysfunction.  tolerated first HD well, next scheduled for Monday
s/p prbc and plt transfusion  transfuse additional prbc's  hem/onc and all consultants management greatly appreciated

## 2019-05-14 NOTE — PROGRESS NOTE ADULT - SUBJECTIVE AND OBJECTIVE BOX
Nephrology Followup Note - [phone] - Dr Bahena / Dr Regalado / Dr Benz / Dr Frost / Dr Mahmood / Dr Mcgee / Dr Guzman / Dr Guerra  Pt seen and examined at bedside  pt febrile, minimally responsive. Discussions between family and Onc and MICU team noted.     Allergies:  oxycodone (Vomiting; Nausea)    Hospital Medications:   MEDICATIONS  (STANDING):  acyclovir IVPB 200 milliGRAM(s) IV Intermittent every 24 hours  carvedilol 25 milliGRAM(s) Oral every 12 hours  chlorhexidine 4% Liquid 1 Application(s) Topical <User Schedule>  docosanol 10% Cream 1 Application(s) Topical three times a day  fluconAZOLE IVPB 200 milliGRAM(s) IV Intermittent every 24 hours  glycopyrrolate Injectable 0.2 milliGRAM(s) IV Push every 6 hours  HYDROmorphone  Injectable 0.5 milliGRAM(s) IV Push every 4 hours  meropenem  IVPB      meropenem  IVPB 500 milliGRAM(s) IV Intermittent once  metoclopramide Injectable 5 milliGRAM(s) IV Push every 8 hours  nystatin Powder 1 Application(s) Topical two times a day  pantoprazole    Tablet 40 milliGRAM(s) Oral before breakfast  vancomycin    Solution 125 milliGRAM(s) Oral every 6 hours    VITALS:  T(F): 102.6 (05-14-19 @ 11:12), Max: 102.6 (05-14-19 @ 11:12)  HR: 115 (05-14-19 @ 11:12)  BP: 129/69 (05-14-19 @ 11:12)  RR: 22 (05-14-19 @ 11:12)  SpO2: 96% (05-14-19 @ 11:12)  Wt(kg): --    05-13 @ 07:01  -  05-14 @ 07:00  --------------------------------------------------------  IN: 400 mL / OUT: 1000 mL / NET: -600 mL      PHYSICAL EXAM:  Constitutional: NAD, respiratory distress, labored breathing  HEENT: anicteric sclera, dry membrane   Neck: No JVD  Respiratory: CTAB, no wheezes, rales or rhonchi  Cardiovascular: S1, S2, RRR  Gastrointestinal: BS+, soft, NT/ND  Extremities: No cyanosis or clubbing. No peripheral edema  Neurological: lethargic   : No CVA tenderness. No quintero.   Skin: No rashes  [facility]: [facility]     LABS:  05-14    140  |  97<L>  |  81<H>  ----------------------------<  229<H>  4.2   |  25  |  6.83<H>    Ca    7.5<L>      14 May 2019 05:50  Phos  3.5     05-14  Mg     2.0     05-14    TPro  6.3  /  Alb  2.9<L>  /  TBili  1.0  /  DBili      /  AST  33  /  ALT  Test not performed SPECIMEN LIPEMIC.  RESULTS MAY BE AFFECTED.  /  AlkPhos  140<H>  05-14    Creatinine Trend: 6.83 <--, 9.58 <--, 8.22 <--, 6.47 <--, 8.57 <--, 6.61 <--, 4.71 <--, 9.68 <--                        7.3    3.84  )-----------( 16       ( 14 May 2019 05:50 )             21.5     Urine Studies:      RADIOLOGY & ADDITIONAL STUDIES:

## 2019-05-14 NOTE — PROVIDER CONTACT NOTE (CRITICAL VALUE NOTIFICATION) - BACKGROUND
hx of MM
Dx: pancytopenia, relapsed multiple myeloma
Multiple myeloma in relapse
Multiple myeloma in relapse
Pt admitted for pancytopenia
Pt admitted for pancytopenia
multiple myeloma
multiple myeloma

## 2019-05-14 NOTE — CONSULT NOTE ADULT - SUBJECTIVE AND OBJECTIVE BOX
CHIEF COMPLAINT:    HPI:  53M with relapsed multiple myeloma (s/p BMT, CCRT now transfusion dependent), sCHF (EF 54%), HTN who was admitted 4/25 and has developed worsening pancytopenia, renal failure, as complications of his refractory malignancy. This a.m. patient noted to have worsening dyspnea, and MICU called to evaluate. Patient seen and examined with family at bedside. Patient unable to answer questions at this time.         PAST MEDICAL & SURGICAL HISTORY:  Diverticulitis: perforated s/p Kiah  Multiple myeloma: relapsed 3/2019  Hypertension  Left ventricular dysfunction  Cardiomyopathy: nonischemic  Former smoker: (1 ppd x 11 years; Quit ~age 26)  History of bone marrow transplant  History of surgery: s/p exploratory laparotomy with sigmoid resection and colostomy on 9/2/2018      FAMILY HISTORY:  Family history of diabetes mellitus  Family history of hypertension      SOCIAL HISTORY:  Smoking: [ ] Never Smoked [ ] Former Smoker (__ packs x ___ years) [ ] Current Smoker  (__ packs x ___ years)  Substance Use: [ ] Never Used [ ] Used ____  EtOH Use:  Marital Status: [ ] Single [ ]  [ ]  [ ]   Sexual History:   Occupation:  Recent Travel:  Country of Birth:  Advance Directives:    Allergies    oxycodone (Vomiting; Nausea)    Intolerances        HOME MEDICATIONS:    REVIEW OF SYSTEMS:  Constitutional: [ ] negative [ ] fevers [ ] chills [ ] weight loss [ ] weight gain  HEENT: [ ] negative [ ] dry eyes [ ] eye irritation [ ] postnasal drip [ ] nasal congestion  CV: [ ] negative  [ ] chest pain [ ] orthopnea [ ] palpitations [ ] murmur  Resp: [ ] negative [ ] cough [ ] shortness of breath [ ] dyspnea [ ] wheezing [ ] sputum [ ] hemoptysis  GI: [ ] negative [ ] nausea [ ] vomiting [ ] diarrhea [ ] constipation [ ] abd pain [ ] dysphagia   : [ ] negative [ ] dysuria [ ] nocturia [ ] hematuria [ ] increased urinary frequency  Musculoskeletal: [ ] negative [ ] back pain [ ] myalgias [ ] arthralgias [ ] fracture  Skin: [ ] negative [ ] rash [ ] itch  Neurological: [ ] negative [ ] headache [ ] dizziness [ ] syncope [ ] weakness [ ] numbness  Psychiatric: [ ] negative [ ] anxiety [ ] depression  Endocrine: [ ] negative [ ] diabetes [ ] thyroid problem  Hematologic/Lymphatic: [ ] negative [ ] anemia [ ] bleeding problem  Allergic/Immunologic: [ ] negative [ ] itchy eyes [ ] nasal discharge [ ] hives [ ] angioedema  [ ] All other systems negative  [x ] Unable to assess ROS because AMS    OBJECTIVE:  ICU Vital Signs Last 24 Hrs  T(C): 39.2 (14 May 2019 11:12), Max: 39.2 (14 May 2019 11:12)  T(F): 102.6 (14 May 2019 11:12), Max: 102.6 (14 May 2019 11:12)  HR: 115 (14 May 2019 11:12) (109 - 119)  BP: 129/69 (14 May 2019 11:12) (129/69 - 164/97)  BP(mean): --  ABP: --  ABP(mean): --  RR: 22 (14 May 2019 11:12) (18 - 22)  SpO2: 96% (14 May 2019 11:12) (96% - 98%)        05-13 @ 07:01  -  05-14 @ 07:00  --------------------------------------------------------  IN: 400 mL / OUT: 1000 mL / NET: -600 mL      CAPILLARY BLOOD GLUCOSE          PHYSICAL EXAM:  General:   HEENT:   Lymph Nodes:  Neck:   Respiratory:   Cardiovascular:   Abdomen:   Extremities:   Skin:   Neurological:  Psychiatry:    LINES:     HOSPITAL MEDICATIONS:    acyclovir IVPB 200 milliGRAM(s) IV Intermittent every 24 hours  fluconAZOLE IVPB 200 milliGRAM(s) IV Intermittent every 24 hours  meropenem  IVPB      meropenem  IVPB 500 milliGRAM(s) IV Intermittent once  vancomycin    Solution 125 milliGRAM(s) Oral every 6 hours    carvedilol 25 milliGRAM(s) Oral every 12 hours        acetaminophen    Suspension .. 650 milliGRAM(s) Oral every 6 hours PRN  HYDROmorphone  Injectable 0.5 milliGRAM(s) IV Push every 2 hours PRN  HYDROmorphone  Injectable 0.5 milliGRAM(s) IV Push every 4 hours  metoclopramide Injectable 5 milliGRAM(s) IV Push every 8 hours    glycopyrrolate Injectable 0.2 milliGRAM(s) IV Push every 6 hours  pantoprazole    Tablet 40 milliGRAM(s) Oral before breakfast            artificial  tears Solution 1 Drop(s) Both EYES every 6 hours PRN  chlorhexidine 4% Liquid 1 Application(s) Topical <User Schedule>  docosanol 10% Cream 1 Application(s) Topical three times a day  nystatin Powder 1 Application(s) Topical two times a day        LABS:                        7.3    3.84  )-----------( 16       ( 14 May 2019 05:50 )             21.5     Hgb Trend: 7.3<--, 7.6<--, 7.7<--, 7.9<--, 7.0<--  05-14    140  |  97<L>  |  81<H>  ----------------------------<  229<H>  4.2   |  25  |  6.83<H>    Ca    7.5<L>      14 May 2019 05:50  Phos  3.5     05-14  Mg     2.0     05-14    TPro  6.3  /  Alb  2.9<L>  /  TBili  1.0  /  DBili  x   /  AST  33  /  ALT  Test not performed SPECIMEN LIPEMIC.  RESULTS MAY BE AFFECTED.  /  AlkPhos  140<H>  05-14    Creatinine Trend: 6.83<--, 9.58<--, 8.22<--, 6.47<--, 8.57<--, 6.61<--      Arterial Blood Gas:  05-14 @ 10:10  7.38/45/75/25/94.1/1.0  ABG lactate: --        MICROBIOLOGY:     RADIOLOGY:  [ ] Reviewed and interpreted by me    EKG: CHIEF COMPLAINT:    HPI:  53M with relapsed multiple myeloma (s/p BMT, CCRT now transfusion dependent), sCHF (EF 54%), HTN who was admitted 4/25 and has developed worsening pancytopenia, renal failure, as complications of his refractory malignancy. This a.m. patient noted to have worsening dyspnea, and MICU called to evaluate. Patient seen and examined with family at bedside. Patient unable to answer questions at this time.         PAST MEDICAL & SURGICAL HISTORY:  Diverticulitis: perforated s/p Kiah  Multiple myeloma: relapsed 3/2019  Hypertension  Left ventricular dysfunction  Cardiomyopathy: nonischemic  Former smoker: (1 ppd x 11 years; Quit ~age 26)  History of bone marrow transplant  History of surgery: s/p exploratory laparotomy with sigmoid resection and colostomy on 9/2/2018      FAMILY HISTORY:  Family history of diabetes mellitus  Family history of hypertension      SOCIAL HISTORY:  Smoking: [ ] Never Smoked [ ] Former Smoker (__ packs x ___ years) [ ] Current Smoker  (__ packs x ___ years)  Substance Use: [ ] Never Used [ ] Used ____  EtOH Use:  Marital Status: [ ] Single [ ]  [ ]  [ ]   Sexual History:   Occupation:  Recent Travel:  Country of Birth:  Advance Directives:    Allergies    oxycodone (Vomiting; Nausea)    Intolerances        HOME MEDICATIONS:    REVIEW OF SYSTEMS:  Constitutional: [ ] negative [ ] fevers [ ] chills [ ] weight loss [ ] weight gain  HEENT: [ ] negative [ ] dry eyes [ ] eye irritation [ ] postnasal drip [ ] nasal congestion  CV: [ ] negative  [ ] chest pain [ ] orthopnea [ ] palpitations [ ] murmur  Resp: [ ] negative [ ] cough [ ] shortness of breath [ ] dyspnea [ ] wheezing [ ] sputum [ ] hemoptysis  GI: [ ] negative [ ] nausea [ ] vomiting [ ] diarrhea [ ] constipation [ ] abd pain [ ] dysphagia   : [ ] negative [ ] dysuria [ ] nocturia [ ] hematuria [ ] increased urinary frequency  Musculoskeletal: [ ] negative [ ] back pain [ ] myalgias [ ] arthralgias [ ] fracture  Skin: [ ] negative [ ] rash [ ] itch  Neurological: [ ] negative [ ] headache [ ] dizziness [ ] syncope [ ] weakness [ ] numbness  Psychiatric: [ ] negative [ ] anxiety [ ] depression  Endocrine: [ ] negative [ ] diabetes [ ] thyroid problem  Hematologic/Lymphatic: [ ] negative [ ] anemia [ ] bleeding problem  Allergic/Immunologic: [ ] negative [ ] itchy eyes [ ] nasal discharge [ ] hives [ ] angioedema  [ ] All other systems negative  [x ] Unable to assess ROS because AMS    OBJECTIVE:  ICU Vital Signs Last 24 Hrs  T(C): 39.2 (14 May 2019 11:12), Max: 39.2 (14 May 2019 11:12)  T(F): 102.6 (14 May 2019 11:12), Max: 102.6 (14 May 2019 11:12)  HR: 115 (14 May 2019 11:12) (109 - 119)  BP: 129/69 (14 May 2019 11:12) (129/69 - 164/97)  BP(mean): --  ABP: --  ABP(mean): --  RR: 22 (14 May 2019 11:12) (18 - 22)  SpO2: 96% (14 May 2019 11:12) (96% - 98%)        05-13 @ 07:01  -  05-14 @ 07:00  --------------------------------------------------------  IN: 400 mL / OUT: 1000 mL / NET: -600 mL      CAPILLARY BLOOD GLUCOSE          PHYSICAL EXAM:  General: Resp distress, air hunger  HEENT: EOMI, blood and secretions in oropharynx  Lymph Nodes: No ant or post C LAD  Neck: supple no JVD  Respiratory: Course breath sounds b/l  Cardiovascular: tachycardic no MGR no LE edema  Abdomen: BS+  Extremities: AROM  Skin:   Neurological: AOX0, responds to verbal stimuli without clear answers  Psychiatry:    LINES:     HOSPITAL MEDICATIONS:    acyclovir IVPB 200 milliGRAM(s) IV Intermittent every 24 hours  fluconAZOLE IVPB 200 milliGRAM(s) IV Intermittent every 24 hours  meropenem  IVPB      meropenem  IVPB 500 milliGRAM(s) IV Intermittent once  vancomycin    Solution 125 milliGRAM(s) Oral every 6 hours    carvedilol 25 milliGRAM(s) Oral every 12 hours        acetaminophen    Suspension .. 650 milliGRAM(s) Oral every 6 hours PRN  HYDROmorphone  Injectable 0.5 milliGRAM(s) IV Push every 2 hours PRN  HYDROmorphone  Injectable 0.5 milliGRAM(s) IV Push every 4 hours  metoclopramide Injectable 5 milliGRAM(s) IV Push every 8 hours    glycopyrrolate Injectable 0.2 milliGRAM(s) IV Push every 6 hours  pantoprazole    Tablet 40 milliGRAM(s) Oral before breakfast            artificial  tears Solution 1 Drop(s) Both EYES every 6 hours PRN  chlorhexidine 4% Liquid 1 Application(s) Topical <User Schedule>  docosanol 10% Cream 1 Application(s) Topical three times a day  nystatin Powder 1 Application(s) Topical two times a day        LABS:                        7.3    3.84  )-----------( 16       ( 14 May 2019 05:50 )             21.5     Hgb Trend: 7.3<--, 7.6<--, 7.7<--, 7.9<--, 7.0<--  05-14    140  |  97<L>  |  81<H>  ----------------------------<  229<H>  4.2   |  25  |  6.83<H>    Ca    7.5<L>      14 May 2019 05:50  Phos  3.5     05-14  Mg     2.0     05-14    TPro  6.3  /  Alb  2.9<L>  /  TBili  1.0  /  DBili  x   /  AST  33  /  ALT  Test not performed SPECIMEN LIPEMIC.  RESULTS MAY BE AFFECTED.  /  AlkPhos  140<H>  05-14    Creatinine Trend: 6.83<--, 9.58<--, 8.22<--, 6.47<--, 8.57<--, 6.61<--      Arterial Blood Gas:  05-14 @ 10:10  7.38/45/75/25/94.1/1.0  ABG lactate: --        MICROBIOLOGY:     RADIOLOGY:  [ ] Reviewed and interpreted by me    EKG:

## 2019-05-14 NOTE — PROGRESS NOTE ADULT - PROVIDER SPECIALTY LIST ADULT
Gastroenterology
Heme/Onc
Hospitalist
Infectious Disease
Internal Medicine
MICU
MICU
Nephrology
Neurology
Palliative Care
Vascular Surgery
Hospitalist
Heme/Onc
Gastroenterology
Heme/Onc
Hospitalist
Infectious Disease
Internal Medicine
Palliative Care

## 2019-05-14 NOTE — PROGRESS NOTE ADULT - ASSESSMENT
54 y/o Male with relapsed multiple myeloma s/p bone marrow transplant (~1.5 yrs ago) and chemo/RT (last infusion Bendamustine and Pomalidomide 4/08/2019), perforated diverticulitis s/p Kiah's (proctosigmoidectomy with colostomy, 9/2018), nonischemic cardiomyopathy (mild global LV systolic dysfunction with EF 54% and diastolic LV dysfunction based on TTE in 11/2018), and HTN admitted to [facility] on 4/25 for nausea/vomiting and poor PO intake, found to have pancytopenia and was treated for neutropenic fever however course c/b acute renal failure with Shiley placed by IR for new HD, C diff infection, and acute encephalopathy, transferred back to floors from MICU. Encephalopathy now improving with HD. Per heme no further therapeutic options for pt at this time. GOC discussion pending.    1. Sepsis:      - febrile, tachypnea      - high concern for aspiration pna +/- cdiff colitis complications vs alternative etiology       ** F/u CT Chest/Abd/Pelvis      - F/u BCx      - Monitor vitals      - very poor prognosis      - Hospice/Palliative f/u     2. Severe C.diff colitis  - c/w abx  - with dysphagia, ngt intact, s&s re-eval. Ideally would eventually like to d/c ngt and start po nutrition, however persistent abnormal oral findings and associated symptoms which limited adequate oral intake  - nutrition re-eval for ?adding/adjust ngt feeding 2/2 persistent copious diarrhea (ie: banana flakes, etc.)  - monitor closely  - isolation precautions    3. Pancytopenia with neutropenic fever (2/2 acute tonsillitis and esophageal candidiasis)   - ?Broaden abx. F/u ID for abx and management  - ?Platelet transfusion +/- steroids. F/u Hem/Onc for management  - c/w rx and supportive care  - adjust management per consultants           4. Multiple myeloma   - very poor prognosis  - c/w treatment as per pt's/family wishes    5. Acute on Chronic Systolic CHF Exacerbation  - c/w rx    6. Acute Renal Failure, 2/2 ATN:  - c/w HD per nephro recs    7. Acute Tonsilitis and Candida Esophagitis  - c/w acyclovir and fluconazole  - supportive care prn

## 2019-05-14 NOTE — PROGRESS NOTE ADULT - PROBLEM SELECTOR PROBLEM 2
Encephalopathy acute
Acidosis, metabolic
Acute kidney failure
CKD (chronic kidney disease) stage V requiring chronic dialysis
Vision changes

## 2019-05-14 NOTE — PROVIDER CONTACT NOTE (OTHER) - NAME OF MD/NP/PA/DO NOTIFIED:
ADS jamel
ADS [id number]
ADS NP Paz Das
ADS NP Paz spectra [id number]
ADS NP Tabitha
ADS PA ORLANDO Daniel. spectra [id number]
ADS Paz BAKER
ADS Paz BAKER
ADS Sow, K spectra [id number]
ADS Tabitha
ADS jamel
ANDREA Corey
ANDREA Das
ANDREA Thomas spectra [id number]
Braulio Bermudez
Danielle Larsen, NP
Doron MENDEZ NP
Fabiano Rashid MD
Jack Castro MD
Jaquan Mckinney, NP
Jaquan Mckinney, NP
Karen BRUMFIELD
LETITAI BRUMFIELD
LIZ Ruiz NP (ADS)
SHELLY BRUMFIELD
SHELLY BRUMFIELD
Yin GORE team2
ADS [id number]

## 2019-05-14 NOTE — PROGRESS NOTE ADULT - SUBJECTIVE AND OBJECTIVE BOX
INTERVAL HPI/OVERNIGHT EVENTS:  Patient with tachypnea and minimally responsive     Code Status: DNR  Allergies    oxycodone (Vomiting; Nausea)    Intolerances    MEDICATIONS  (STANDING):  acyclovir IVPB 200 milliGRAM(s) IV Intermittent every 24 hours  carvedilol 25 milliGRAM(s) Oral every 12 hours  chlorhexidine 4% Liquid 1 Application(s) Topical <User Schedule>  docosanol 10% Cream 1 Application(s) Topical three times a day  fluconAZOLE IVPB 200 milliGRAM(s) IV Intermittent every 24 hours  glycopyrrolate Injectable 0.2 milliGRAM(s) IV Push every 6 hours  HYDROmorphone  Injectable 0.5 milliGRAM(s) IV Push every 4 hours  meropenem  IVPB      meropenem  IVPB 500 milliGRAM(s) IV Intermittent once  metoclopramide Injectable 5 milliGRAM(s) IV Push every 8 hours  nystatin Powder 1 Application(s) Topical two times a day  pantoprazole    Tablet 40 milliGRAM(s) Oral before breakfast  vancomycin    Solution 125 milliGRAM(s) Oral every 6 hours    MEDICATIONS  (PRN):  acetaminophen    Suspension .. 650 milliGRAM(s) Oral every 6 hours PRN Temp greater or equal to 38C (100.4F)  artificial  tears Solution 1 Drop(s) Both EYES every 6 hours PRN Dry Eyes  HYDROmorphone  Injectable 0.5 milliGRAM(s) IV Push every 2 hours PRN dyspnea/increased work of breathing      PRESENT SYMPTOMS: [x ]Unable to obtain due to poor mentation   Source if other than patient:  [ ]Family   [ ]Team     Pain (Impact on QOL):    Location:  Severity:  Minimal acceptable level (0-10 scale):       Quality:       Onset:  Duration:  Aggravating factors:  Relieving Factors  Radiation:    Dyspnea:  Yes [ ] No [ ] - [ ]Mild [ ]Moderate [ ]Severe  Anxiety:    Yes [ ] No [ ] - [ ]Mild [ ]Moderate [ ]Severe  Fatigue:    Yes [ ] No [ ] - [ ]Mild [ ]Moderate [ ]Severe  Nausea:    Yes [ ] No [ ] - [ ]Mild [ ]Moderate [ ]Severe                         Loss of appetite: Yes [ ] No [ ] - [ ]Mild [ ]Moderate [ ]Severe             Constipation:  Yes [ ] No [ ] - [ ]Mild [ ]Moderate [ ]Severe  Grief: Yes [ ] No [ ]     PAIN AD Score:	  [email or web address] (Ctrl + left click to view)    Other Symptoms:  [ ]All other review of systems negative     Karnofsky Performance Score/Palliative Performance Status Version 2:    10-20%    http://palliative.info/resource_material/[email or web address].pdf    PHYSICAL EXAM:  Vital Signs Last 24 Hrs  T(C): 39.2 (14 May 2019 11:12), Max: 39.2 (14 May 2019 11:12)  T(F): 102.6 (14 May 2019 11:12), Max: 102.6 (14 May 2019 11:12)  HR: 115 (14 May 2019 11:12) (109 - 119)  BP: 129/69 (14 May 2019 11:12) (129/69 - 164/97)  BP(mean): --  RR: 22 (14 May 2019 11:12) (18 - 22)  SpO2: 96% (14 May 2019 11:12) (96% - 98%) I&O's Summary    13 May 2019 07:01  -  14 May 2019 07:00  --------------------------------------------------------  IN: 400 mL / OUT: 1000 mL / NET: -600 mL         GENERAL:  Minimally arousable, tachypneic, audible excessive secretions    HEENT:  +mucositis   PULMONARY:   coarse bilaterally   CARDIOVASCULAR:    [x ]Regular [ ]Irregular [x ]Tachy  [ ]Anshul [ ]Murmur [ ]Other  GASTROINTESTINAL:  [x ]Soft  [ ]Distended   [x ]+BS  [ x]Non tender [ ]Tender  [ ]PEG [x ]OGT/ NGT  +ostomy with loose brown stool    05-07-19 @ 07:01  -  05-08-19 @ 07:00  --------------------------------------------------------  OUT: 450 mL    05-08-19 @ 07:01  -  05-09-19 @ 07:00  --------------------------------------------------------  OUT: 200 mL    05-09-19 @ 07:01  -  05-10-19 @ 07:00  --------------------------------------------------------  OUT: 650 mL       GENITOURINARY:  [ ]Normal [ ] Incontinent   [x ]Oliguria/Anuria   [ ][place]  MUSCULOSKELETAL:   [ ]Normal   [x ]Weakness  [x ]Bed/Wheelchair bound [ ]Edema  NEUROLOGIC:   [ ]No focal deficits  [x ] Cognitive impairment  [ ] Dysphagia [ ]Dysarthria [ ] Paresis [ ]Other   SKIN:   [ x]Normal   [ ]Pressure ulcer(s)  [ ]Rash    CRITICAL CARE:  [ ] Shock Present  [ ]Septic [ ]Cardiogenic [ ]Neurologic [ ]Hypovolemic  [ ]  Vasopressors [ ]  Inotropes   [ ] Respiratory failure present  [ ] Acute  [ ] Chronic [ ] Hypoxic  [ ] Hypercarbic [ ] Other  [ ] Other organ failure     LABS:                        7.3    3.84  )-----------( 16       ( 14 May 2019 05:50 )             21.5   05-14    140  |  97<L>  |  81<H>  ----------------------------<  229<H>  4.2   |  25  |  6.83<H>    Ca    7.5<L>      14 May 2019 05:50  Phos  3.5     05-14  Mg     2.0     05-14    TPro  6.3  /  Alb  2.9<L>  /  TBili  1.0  /  DBili  x   /  AST  33  /  ALT  Test not performed SPECIMEN LIPEMIC.  RESULTS MAY BE AFFECTED.  /  AlkPhos  140<H>  05-14        RADIOLOGY & ADDITIONAL STUDIES:    Protein Calorie Malnutrition Present: [ ] yes [ ] no  [ ] PPSV2 < or = 30%  [ ] significant weight loss [ ] poor nutritional intake [ ] anasarca [ ] catabolic state Albumin, Serum: 2.9 g/dL (05-14-19 @ 05:50)      REFERRALS:   [ ]Chaplaincy  [ ] Hospice  [ ]Child Life  [ ]Social Work  [ ]Case management [ ]Holistic Therapy   Goals of Care Document:

## 2019-05-14 NOTE — PROVIDER CONTACT NOTE (OTHER) - BACKGROUND
Pt admitted with headache, hx of multiple myeloma
Pt admitted with headache, hx of multiple myeloma
Pt transfused 1 unit platelets today.
Temp 100.5, , BP 99/50
Admitted for pancytopenia. Multiple myeloma. S/P bone marrow transplant.
Dx: Pancytopenia
Hx of MM
Hx of relapsed multiple myeloma s/p bone marrow (approximately 1.5 years ago) and recent chemo/RT tx. Admitted for pancytopenia. S/P PRBC on 4/26.
Patient admitted for headache. WBC & ANC low. Pt c/o sore throat and difficulty swallowing. Received PRBC yesterday (4/26) and started on cefepime today (4/27).
Pt admitted for pancytopenia
Pt admitted for pancytopenia
Pt admitted with headache, hx of multiple myeloma
Pt admitted with headache, hx pd multiple myeloma
Pt admitted with pancytopenia
Pt admitted with pancytopenia
Pt transferred from MICU overnight into 56. Currently presenting w/ tachypnea and tachycardia. PMH cardiomyopathy, multiple myeloma, HTN, diverticulitis, CKD, L ventricular dysfunction, thrombocytop
Pt transferred from MICU overnight into 56. Currently presenting w/ tachypnea and tachycardia. PMH cardiomyopathy, multiple myeloma, HTN, diverticulitis, CKD, L ventricular dysfunction, thrombocytop
Relapsed multiple myeloma. Recently diagnosed with C. DIFF.
Relapsed multiple myeloma. Recently diagnosed with C. DIFF. Prognosis poor as per Heme/Onc.
Relapsed multiple myeloma. Recently diagnosed with C. DIFF. Stage V CKD.
admitted for acute encephalopathy /headache; ao2 some periods of confusion and agitation
dx: pancytopenia, s/p 1u of platelet
hx MM
hx of MM

## 2019-05-14 NOTE — PROGRESS NOTE ADULT - SUBJECTIVE AND OBJECTIVE BOX
Patient seen and examined at bedside, with wife sleeping at bedside  febrile, tachypneic  covering ADS not on floor to discuss case,  unclear of ADS number, I paged a number provided by the main ADS, awaiting response    HPI:  52yo M hx of relapsed multiple myeloma s/p bone marrow transplant (~1.5 yrs ago) and chemo/RT (last infusion Bendamustine and Pomalidomide 4/08/2019), perforated diverticulitis s/p Kiah's (proctosigmoidectomy with colostomy, 9/2018), nonischemic cardiomyopathy (mild global LV systolic dysfunction with EF 54% and diastolic LV dysfunction based on TTE in 11/2018), and HTN presents to [facility] ED w/ vomiting and change of vision. Wife Devan Hood at bedside. Pt was sitting at home. He vomited 2-3 times nonbloody, nonbilious and was not able to eat or drink anything. On 4/25 at 4pm, while sitting he noticed his vision went "white" for a few minutes associated with sweating, shaking, and weakness. His wife noticed he had a red rash on his legs and they brought him to the ED. Pt received a platelet transfusion about a week ago outpt. Denies any pain. Had a nose bleed earlier. He denies skin sores, measured fever, HA, trouble speaking, numbness/tingling, focal weakness, dizziness, CP, SOB, abd pain, N/V/D, hematochezia, melena, urinary symptoms, oral/genital sores. He has a recent admission to [facility] discharged 4/08/2019 for diffuse body pain and low-grade fever, infectious workup was negative. MRI C/T/L showed abnormal T1 prolongation in the whole spine, sacral and iliac bones. Received 7 days of Bendamustine and Pomalidomide (on hold few days for neutropenia), last on 4/08/2019.    When pt presented to the ED, code stroke called. Neuro exam was nonfocal, CTH neg for acute process, and vomiting and vision changes were resolved. ED vitals afebrile, H101, /58 --> 102/62, R18, sat 100% on RA. Given decadron 40mg IV, 1u PLT. 1u pRBC ordered. (26 Apr 2019 00:00)      PAST MEDICAL & SURGICAL HISTORY:  Diverticulitis: perforated s/p Kiah  Multiple myeloma: relapsed 3/2019  Hypertension  Left ventricular dysfunction  Cardiomyopathy: nonischemic  Former smoker: (1 ppd x 11 years; Quit ~age 26)  History of bone marrow transplant  History of surgery: s/p exploratory laparotomy with sigmoid resection and colostomy on 9/2/2018      oxycodone (Vomiting; Nausea)       MEDICATIONS  (STANDING):  acyclovir IVPB 200 milliGRAM(s) IV Intermittent every 24 hours  carvedilol 25 milliGRAM(s) Oral every 12 hours  chlorhexidine 4% Liquid 1 Application(s) Topical <User Schedule>  docosanol 10% Cream 1 Application(s) Topical three times a day  fluconAZOLE IVPB 200 milliGRAM(s) IV Intermittent every 24 hours  metoclopramide Injectable 5 milliGRAM(s) IV Push every 8 hours  nystatin Powder 1 Application(s) Topical two times a day  pantoprazole    Tablet 40 milliGRAM(s) Oral before breakfast  vancomycin    Solution 125 milliGRAM(s) Oral every 6 hours    MEDICATIONS  (PRN):  acetaminophen    Suspension .. 650 milliGRAM(s) Oral every 6 hours PRN Temp greater or equal to 38C (100.4F)  artificial  tears Solution 1 Drop(s) Both EYES every 6 hours PRN Dry Eyes      REVIEW OF SYSTEMS: limited from pt 2/2 medical state    T(C): 39.1 (05-14-19 @ 06:58), Max: 39.1 (05-13-19 @ 11:06)  HR: 112 (05-14-19 @ 05:54) (109 - 119)  BP: 137/87 (05-14-19 @ 05:54) (137/87 - 164/97)  RR: 20 (05-14-19 @ 05:54) (18 - 22)  SpO2: 97% (05-14-19 @ 05:54) (96% - 98%)    PHYSICAL EXAMINATION:   Constitutional: ill appearing  HEENT: poor oral hygiene and dentition, mild oozing of blood  Neck:  Supple  Respiratory: tachypnea, mild rhonchi. Adequate airflow b/l  Cardiovascular: stable sys mumur, S1 & S2 intact, no R/G, 2+ radial pulses b/l  Gastrointestinal: Soft, ostomy intact with brown liquid stool, ND, normoactive b.s., no organomegaly/RT/rigidity  Extremities: WWP  Neurological:  Arousable, lethargic. Crude sensation intact.     Labs and imaging reviewed    LABS:                        7.3    3.84  )-----------( 16       ( 14 May 2019 05:50 )             21.5     05-14    140  |  97<L>  |  81<H>  ----------------------------<  229<H>  4.2   |  25  |  6.83<H>    Ca    7.5<L>      14 May 2019 05:50  Phos  3.5     05-14  Mg     2.0     05-14    TPro  6.3  /  Alb  2.9<L>  /  TBili  1.0  /  DBili  x   /  AST  33  /  ALT  Test not performed SPECIMEN LIPEMIC.  RESULTS MAY BE AFFECTED.  /  AlkPhos  140<H>  05-14            CAPILLARY BLOOD GLUCOSE            LIVER FUNCTIONS - ( 14 May 2019 05:50 )  Alb: 2.9 g/dL / Pro: 6.3 g/dL / ALK PHOS: 140 u/L / ALT: Test not performed SPECIMEN LIPEMIC.  RESULTS MAY BE AFFECTED. u/L / AST: 33 u/L / GGT: x               RADIOLOGY & ADDITIONAL STUDIES:

## 2019-05-14 NOTE — PROVIDER CONTACT NOTE (OTHER) - ACTION/TREATMENT ORDERED:
Continue with ice packs, admin tylenol, apply hypothermia blanket
PA notified; Will give Lasix per orders.
continue to monitor
ADS intend to hold off at this time. will continue to monitor
Provider will put in an order regarding fever
Pt fluid overloaded. Not ideal for platelet transfusion. Applying pressure dressing and ice to control bleeding.
Will continue to monitor.
ADS made aware. EKG in chart. Awaiting further orders.
Administer ibuprofen PO and reassess accordingly.
Motrin PO
Place ice packs on pt's groin area, reassess temp in 1 hour
Rectal tylenol, continue ice packs, hypothermia blanket
Repeat CBC and draw blood cultures. Apply pressure dressing and monitor for continued bleeding.
ADS made aware. Administer 0.5 mg dilaudid IVP as per order.
ADS made aware. No further action at this time.
ADS made aware. No further action ordered at this time.
ADS made aware. No further action ordered at this time.
ADS made aware. No orders or recommendations at this time. Primary day RN made aware of situation.
EKG done as ordered with read back. will continue to monitor
MD states NGT in correct placement. OK to use NGT and restart tube feed
N/a
N/a
Provider informed of pt's temperature and told RN to call back in 30 minutes because a provider has not been assigned to patient as of yet
Tylenol will be ordered
ativan
pepto bismol ordered, hold tube feed for now, continue to monitor
will continue to follow and ressess.
will follow
will follow and reassess

## 2019-05-14 NOTE — PROGRESS NOTE ADULT - PROBLEM SELECTOR PROBLEM 1
Acute respiratory failure
Acute kidney failure
Encephalopathy acute
Multiple myeloma, remission status unspecified
Pancytopenia

## 2019-05-14 NOTE — PROGRESS NOTE ADULT - SUBJECTIVE AND OBJECTIVE BOX
Pt seen, wife and family friend at bedside, MICU at bedside. Fever all yesterday and today. Pt tachypneic, in resp distress, using accessory muscles.    MEDICATIONS  (STANDING):  acyclovir IVPB 200 milliGRAM(s) IV Intermittent every 24 hours  carvedilol 25 milliGRAM(s) Oral every 12 hours  chlorhexidine 4% Liquid 1 Application(s) Topical <User Schedule>  docosanol 10% Cream 1 Application(s) Topical three times a day  fluconAZOLE IVPB 200 milliGRAM(s) IV Intermittent every 24 hours  metoclopramide Injectable 5 milliGRAM(s) IV Push every 8 hours  nystatin Powder 1 Application(s) Topical two times a day  pantoprazole    Tablet 40 milliGRAM(s) Oral before breakfast  vancomycin    Solution 125 milliGRAM(s) Oral every 6 hours    MEDICATIONS  (PRN):  acetaminophen    Suspension .. 650 milliGRAM(s) Oral every 6 hours PRN Temp greater or equal to 38C (100.4F)  artificial  tears Solution 1 Drop(s) Both EYES every 6 hours PRN Dry Eyes  HYDROmorphone  Injectable 0.2 milliGRAM(s) IV Push every 3 hours PRN Dyspnea      ROS  UTO    Vital Signs Last 24 Hrs  T(C): 39.2 (14 May 2019 11:12), Max: 39.2 (14 May 2019 11:12)  T(F): 102.6 (14 May 2019 11:12), Max: 102.6 (14 May 2019 11:12)  HR: 115 (14 May 2019 11:12) (109 - 119)  BP: 129/69 (14 May 2019 11:12) (129/69 - 164/97)  BP(mean): --  RR: 22 (14 May 2019 11:12) (18 - 22)  SpO2: 96% (14 May 2019 11:12) (96% - 98%)    PE  as above, limited 2/2 acute illness                          7.3    3.84  )-----------( 16       ( 14 May 2019 05:50 )             21.5       05-14    140  |  97<L>  |  81<H>  ----------------------------<  229<H>  4.2   |  25  |  6.83<H>    Ca    7.5<L>      14 May 2019 05:50  Phos  3.5     05-14  Mg     2.0     05-14    TPro  6.3  /  Alb  2.9<L>  /  TBili  1.0  /  DBili  x   /  AST  33  /  ALT  Test not performed SPECIMEN LIPEMIC.  RESULTS MAY BE AFFECTED.  /  AlkPhos  140<H>  05-14

## 2019-05-14 NOTE — DISCHARGE NOTE FOR THE EXPIRED PATIENT - HOSPITAL COURSE
54 y/o Male with relapsed multiple myeloma s/p bone marrow transplant (~1.5 yrs ago) and chemo/RT (last infusion Bendamustine and Pomalidomide 4/08/2019), perforated diverticulitis s/p Kiah's (proctosigmoidectomy with colostomy, 9/2018), nonischemic cardiomyopathy (mild global LV systolic dysfunction with EF 54% and diastolic LV dysfunction based on TTE in 11/2018), and HTN admitted to [facility] on 4/25 for nausea/vomiting and poor PO intake, found to have pancytopenia and was treated for neutropenic fever however course c/b acute renal failure with Shiley placed by IR for new HD, C diff infection, and acute encephalopathy, transferred back to floors from MICU. Encephalopathy now improving with HD. Per heme no further therapeutic options for pt at this time. GOC discussion pending.    1. Sepsis:      - febrile, tachypnea      - high concern for aspiration pna +/- cdiff colitis complications vs alternative etiology       ** F/u CT Chest/Abd/Pelvis      - F/u BCx      - Monitor vitals      - very poor prognosis      - Hospice/Palliative f/u     2. Severe C.diff colitis  - c/w abx  - with dysphagia, ngt intact, s&s re-eval. Ideally would eventually like to d/c ngt and start po nutrition, however persistent abnormal oral findings and associated symptoms which limited adequate oral intake  - nutrition re-eval for ?adding/adjust ngt feeding 2/2 persistent copious diarrhea (ie: banana flakes, etc.)  - monitor closely  - isolation precautions    3. Pancytopenia with neutropenic fever (2/2 acute tonsillitis and esophageal candidiasis)   - ?Broaden abx. F/u ID for abx and management  - ?Platelet transfusion +/- steroids. F/u Hem/Onc for management  - c/w rx and supportive care  - adjust management per consultants           4. Multiple myeloma   - very poor prognosis  - c/w treatment as per pt's/family wishes    5. Acute on Chronic Systolic CHF Exacerbation  - c/w rx    6. Acute Renal Failure, 2/2 ATN:  - c/w HD per nephro recs    7. Acute Tonsilitis and Candida Esophagitis  - c/w acyclovir and fluconazole  - supportive care prn        *********Pt unresponsive, no response to verbal or tactile stimuli, pupils fixed and dilated, no spontaneous respirations, no heart sounds auscultated, no lung sounds auscultated, no carotid pulse. Pt pronounced dead at 21:53. Will notify HCP wife, Devan Hood, awaiting decision regarding autopsy. Will notify med attg Dr. Castro. MOLST form completed and in chart, Pt is DNR/DNI*********** 54 y/o Male with relapsed multiple myeloma s/p bone marrow transplant (~1.5 yrs ago) and chemo/RT (last infusion Bendamustine and Pomalidomide 4/08/2019), perforated diverticulitis s/p Kiah's (proctosigmoidectomy with colostomy, 9/2018), nonischemic cardiomyopathy (mild global LV systolic dysfunction with EF 54% and diastolic LV dysfunction based on TTE in 11/2018), and HTN admitted to [facility] on 4/25 for nausea/vomiting and poor PO intake, found to have pancytopenia and was treated for neutropenic fever however course c/b acute renal failure with Shiley placed by IR for new HD, C diff infection, and acute encephalopathy, transferred back to floors from MICU. Encephalopathy now improving with HD. Per heme no further therapeutic options for pt at this time. GOC discussion pending.    1. Sepsis:      - febrile, tachypnea      - high concern for aspiration pna +/- cdiff colitis complications vs alternative etiology       ** F/u CT Chest/Abd/Pelvis      - F/u BCx      - Monitor vitals      - very poor prognosis      - Hospice/Palliative f/u     2. Severe C.diff colitis  - c/w abx  - with dysphagia, ngt intact, s&s re-eval. Ideally would eventually like to d/c ngt and start po nutrition, however persistent abnormal oral findings and associated symptoms which limited adequate oral intake  - nutrition re-eval for ?adding/adjust ngt feeding 2/2 persistent copious diarrhea (ie: banana flakes, etc.)  - monitor closely  - isolation precautions    3. Pancytopenia with neutropenic fever (2/2 acute tonsillitis and esophageal candidiasis)   - ?Broaden abx. F/u ID for abx and management  - ?Platelet transfusion +/- steroids. F/u Hem/Onc for management  - c/w rx and supportive care  - adjust management per consultants           4. Multiple myeloma   - very poor prognosis  - c/w treatment as per pt's/family wishes    5. Acute on Chronic Systolic CHF Exacerbation  - c/w rx    6. Acute Renal Failure, 2/2 ATN:  - c/w HD per nephro recs    7. Acute Tonsilitis and Candida Esophagitis  - c/w acyclovir and fluconazole  - supportive care prn        *********Pt unresponsive, no response to verbal or tactile stimuli, pupils fixed and dilated, no spontaneous respirations, no heart sounds auscultated, no lung sounds auscultated, no carotid pulse. Pt pronounced dead at 21:53. Will notify HCP wife, Devan Hood, awaiting decision regarding autopsy. Attending Dr. Castro notified. MOLST form completed and in chart, attending notified to sign in AM. Pt is DNR/DNI confirmed with previous form. 52 y/o Male with relapsed multiple myeloma s/p bone marrow transplant (~1.5 yrs ago) and chemo/RT (last infusion Bendamustine and Pomalidomide 4/08/2019), perforated diverticulitis s/p Kiah's (proctosigmoidectomy with colostomy, 9/2018), nonischemic cardiomyopathy (mild global LV systolic dysfunction with EF 54% and diastolic LV dysfunction based on TTE in 11/2018), and HTN admitted to [facility] on 4/25 for nausea/vomiting and poor PO intake, found to have pancytopenia and was treated for neutropenic fever however course c/b acute renal failure with Shiley placed by IR for new HD, C diff infection, and acute encephalopathy, transferred back to floors from MICU. Encephalopathy now improving with HD. Per heme no further therapeutic options for pt at this time. GOC discussion pending.    1. Sepsis:      - febrile, tachypnea      - high concern for aspiration pna +/- cdiff colitis complications vs alternative etiology       ** F/u CT Chest/Abd/Pelvis      - F/u BCx      - Monitor vitals      - very poor prognosis      - Hospice/Palliative f/u     2. Severe C.diff colitis  - c/w abx  - with dysphagia, ngt intact, s&s re-eval. Ideally would eventually like to d/c ngt and start po nutrition, however persistent abnormal oral findings and associated symptoms which limited adequate oral intake  - nutrition re-eval for ?adding/adjust ngt feeding 2/2 persistent copious diarrhea (ie: banana flakes, etc.)  - monitor closely  - isolation precautions    3. Pancytopenia with neutropenic fever (2/2 acute tonsillitis and esophageal candidiasis)   - ?Broaden abx. F/u ID for abx and management  - ?Platelet transfusion +/- steroids. F/u Hem/Onc for management  - c/w rx and supportive care  - adjust management per consultants           4. Multiple myeloma   - very poor prognosis  - c/w treatment as per pt's/family wishes    5. Acute on Chronic Systolic CHF Exacerbation  - c/w rx    6. Acute Renal Failure, 2/2 ATN:  - c/w HD per nephro recs    7. Acute Tonsilitis and Candida Esophagitis  - c/w acyclovir and fluconazole  - supportive care prn        *********Pt unresponsive, no response to verbal or tactile stimuli, pupils fixed and dilated, no spontaneous respirations, no heart sounds auscultated, no lung sounds auscultated, no carotid pulse. Pt pronounced dead at 21:53. Family including HCP wife, Devan Hood at bedside and declined autopsy. Attending Dr. Castro notified. MOLST form completed and in chart, attending notified to sign in AM. Pt is DNR/DNI confirmed with previous form.

## 2019-05-14 NOTE — PROGRESS NOTE ADULT - ASSESSMENT
53y Male with relapsed multiple myeloma s/p bone marrow transplant (~1.5 yrs ago) and chemo/RT (last infusion Bendamustine and Pomalidomide 4/08/2019), perforated diverticulitis s/p Kiah's (proctosigmoidectomy with colostomy, 9/2018), nonischemic cardiomyopathy (mild global LV systolic dysfunction with EF 54% and diastolic LV dysfunction based on TTE in 11/2018), and HTN admitted to [facility] on 4/25 for nausea and vomiting, and poor PO intake, treated for neutropenic fever, c/b ROBB. Renal following for ARF    labs, chart reviewed

## 2019-05-14 NOTE — PROVIDER CONTACT NOTE (CRITICAL VALUE NOTIFICATION) - NAME OF MD/NP/PA/DO NOTIFIED:
LETITIA BRUMFIELD
ADS [id number]  Ally
ADS Karen BRUMFIELD [id number]
ADS Paz
ADS Tabitha
ADS Tabitha
Danielle Larsen, NP
Karen BRUMFIELD
Karen MENDEZ PA
Leora Shelton NP (ADS)
Norma ADS [id number]
Norma BRUMFIELD [id number]
[facility] [id number]
Team 2 resident

## 2019-05-14 NOTE — PROGRESS NOTE ADULT - PROBLEM SELECTOR PLAN 5
Psychosocial support provided.  Chaplaincy offered and accepted.  Referral made.  Wife tearful at bedside but is understanding that no treatment is being offered for his MM and that focusing on treating his symptoms is appropriate.

## 2019-05-14 NOTE — CHART NOTE - NSCHARTNOTEFT_GEN_A_CORE
Patient remains febrile throughout the shift  with temp >101.0 F at HS; Tylenol, hypothermic blanket ordered, ice applied while waiting for blanket,  BCx sent and results pending. Pt was scheduled to have urgent CT C/A/P done at 9pm; however, per RN CT scan would not be done until the AM as transport request was not properly placed.  Patient's platelets were ~28 and one unit of Platelets ordered to be transfuse with HD.  Patient did not receive transfusion with HD; per RN transfusion was going to be administered after patient returned from CT Scan after 9 pm.  Will continue to monitor fever and f/u with ID regarding abx recs.

## 2019-05-14 NOTE — PROGRESS NOTE ADULT - ATTENDING COMMENTS
Agree with above. Seen and examined with residents on rounds. Critically ill requiring frequent bedside visits. Admitted to MICU for multiorgan failure with refractory multiple myeloma, failed BM transplant with ARF, AMS, C diff colitis and pancytopenia. On antibiotics for C Diff, urgent HD, supportive care. Family updated as to overall poor prognosis for functional recovery.  Total CC time 35 min
I agree with the above information, changes made above as needed, of note  pt with improved leukopenia  persistent thrombocytopenia - administer decadron and platelets  tachycardia - restart home rx carvedilol  HD   abx for cdiff colitis  very poor overall prognosis
I agree with the above ifnormation  changes made baove as needed  of note, pt with mild respiratory distress, b/l rales, concerning for worsening pulm edema and/or effusions, f/u cxr  if signs of worsening pulm edema/effusions then can benefit from UF HD  thrombocytopenia and anemia, pt historically repsonds well to steroids, however will transfuse plt and prbc, may benefit from additional steroids  abx for cdiff  unable to switch to po diet 2/2 blood oozing from mouth, mucositis, and inability to safely consume adequate diet and oral rx   will monitor closely  full code  if clinical status deteriorates then immediate micu eval  thanks
Agree with above. Seen and examined with residents on rounds. Mental status slightly improved. Had dialysis yesterday and tolerated it well. Follow up labs and electrolytes. PRBC's as tolerated.  Total CC time 35 min
For any question, call:  Cell # [phone]  Pager # [phone]  Callback # [phone]
For any question, call:  Cell # [phone]  Pager # [phone]  Callback # [phone]
I have personally  seen and examined the patient today and have noted the findings and formulated the plan of care.
Patient seen and examined.  Meds, labs and vitals all reviewed.  Agree with NP note.
Patient seen and examined.  Meds, labs and vitals all reviewed.  Patient examined by physician.   Agree with NP note.

## 2019-05-14 NOTE — CONSULT NOTE ADULT - REASON FOR ADMISSION
Pancytopenia

## 2019-05-14 NOTE — PROVIDER CONTACT NOTE (CRITICAL VALUE NOTIFICATION) - ACTION/TREATMENT ORDERED:
PA notified; No new orders at this time.
Hold Vancomycin
Continue to monitor
Continue to monitor
Repeat CBC with Type and Screen
ADS intend to assess. Will continue to monitor
will folloq
ADS intend to assess, will continue to monitor
None ordered as of yet
Platelet to be given during HD, continue to monitor
awaiting orders
contact precautions oral vanco
continue to monitor

## 2019-05-15 LAB
SPECIMEN SOURCE: SIGNIFICANT CHANGE UP
SPECIMEN SOURCE: SIGNIFICANT CHANGE UP

## 2019-05-18 LAB — BACTERIA BLD CULT: SIGNIFICANT CHANGE UP

## 2019-05-19 LAB
BACTERIA BLD CULT: SIGNIFICANT CHANGE UP
BACTERIA BLD CULT: SIGNIFICANT CHANGE UP

## 2019-06-26 ENCOUNTER — APPOINTMENT (OUTPATIENT)
Dept: CARDIOLOGY | Facility: CLINIC | Age: 54
End: 2019-06-26

## 2019-07-11 ENCOUNTER — APPOINTMENT (OUTPATIENT)
Dept: CARDIOLOGY | Facility: CLINIC | Age: 54
End: 2019-07-11

## 2019-07-24 NOTE — ED CDU PROVIDER NOTE - CROS ED ENMT ALL NEG
Patient received IM Depo Provera to the upper outer side of left upper quad of buttock on 07/23/2019     The patient was instructed to get the next injection on 10/08-10/22/2019.     Lot Number: [id number]  Expiration Date: 04/21  BY BISMARK KIMBROUGH      
negative...

## 2019-07-29 NOTE — DISCHARGE NOTE PROVIDER - NSDCQMPCI_CARD_ALL_CORE
[facility]  Clinic Note    Jennifer Ruano  : 1952  PCP: Syeda Crouch MD    Reason for Consultation:  Chief Complaint   Patient presents with   • Atrial Fibrillation     Yearly f/u has had some faint episodes but has not passed out. Denies cardiac symptoms today          Assessment/Plan:  Jennifer Ruano is a 66 year old woman with persistent AF, HTN, CHADSVasc 3 (gender, HTN, Age-1).     No chest pain, no CHF symptoms.     Recent echo and stress are normal.     She has had a few episodes where she feels flushed and dizzy with near syncope, with nausea. No episodes for 8 weeks.     1) Continue current medications.     2) Consider EVM if flushing episodes recur. Previous holter showed good HR control, no significant pauses.        Follow up: One year    History of Present Illness:  Jennifer Ruano is a 66 year old woman with PMHx of Jennifer is a 64-year-old female referred by Dr. Deyanira Rivers for an abnormal Holter monitor.  Specifically, the Holter monitor demonstrated asymptomatic atrial fibrillation.       Her cardiac workup was otherwise unremarkable demonstrating normal LV function, mild left atrial enlargement, no underlying ischemia.      PMHx:  Past Medical History:   Diagnosis Date   • A-fib (CMS/HCC)    • Abnormal EKG    • Asthma    • Essential hypertension    • Hypercholesterolemia    • Palpitations        PSHx:  Past Surgical History:   Procedure Laterality Date   • Breast biopsy     • Skin cancer excision Right     shoulder melanoma   • Tubal ligation         Family Hx:  Family History   Problem Relation Age of Onset   • Coronary Artery Disease Neg Hx         Negative for premature CAD.   • Aneurysm Neg Hx         Negative for AAA       Social Hx:  she  reports that she has never smoked. She has never used smokeless tobacco. She reports that she drinks alcohol. She reports that she does not use drugs.    Allergies:  ALLERGIES:   Allergen Reactions   • Codeine Nausea & Vomiting   •  Tramadol Hcl Nausea & Vomiting   • Ace Inhibitors Cough   • Adhesive   (Environmental) RASH     Blisters with bandages   • Amoxicillin-Pot Clavulanate DIARRHEA   • Clonidine NAUSEA     headache       Medications:  Current Outpatient Medications   Medication Sig Dispense Refill   • furosemide (LASIX) 20 MG tablet Take 20 mg by mouth 2 times daily.      • ezetimibe (ZETIA) 10 MG tablet Take 1 tablet by mouth daily. 90 tablet 3   • hydrALAZINE (APRESOLINE) 25 MG tablet Take 1 tablet by mouth 2 times daily. (Patient taking differently: Take 25 mg by mouth 2 times daily. Take 2 tablets twice daily) 180 tablet 1   • metoPROLOL succinate (TOPROL-XL) 25 MG 24 hr tablet TAKE TWO TABLETS BY MOUTH ONCE DAILY 180 tablet 3   • ranitidine (ZANTAC) 75 MG tablet Take 75 mg by mouth 3 days a week.     • atorvastatin (LIPITOR) 40 MG tablet Take 1 tablet by mouth daily.     • Unable to Find Medication Clobetasol Propionate, 0.05%, as directed     • Cranberry 500 MG Cap 1 capsule daily.     • dilTIAZem (DILTIAZEM CD) 240 MG 24 hr capsule 1 by mouth daily     • venlafaxine XR (EFFEXOR XR) 75 MG 24 hr capsule      • apixaban (ELIQUIS) 5 MG Tab 1 by mouth twice daily     • fluticasone (FLONASE) 50 MCG/ACT nasal spray as needed     • Multiple Vitamins-Minerals (OCUVITE ADULT FORMULA) Cap 1 cap daily     • omeprazole (PRILOSEC) 20 MG capsule 1 cap 4 days weekly     • Probiotic Cap 2 caps daily     • Cholecalciferol (VITAMIN D3) 2000 units capsule 1 cap daily     • tacrolimus (PROTOPIC) 0.03 % ointment as directed     • ranitidine (ZANTAC 75) 75 MG tablet 1 tab 2 days weekly       No current facility-administered medications for this visit.        Review of Systems:  All systems reviewed and negative.     Physical Exam:  Visit Vitals  /60   Pulse 72   Ht 4' 11\" (1.499 m)   Wt 54.4 kg (120 lb)   BMI 24.24 kg/m²       Gen: no acute distress  Neuro: alert and oriented x 3  HEENT:Normal  CV: normal S1, S2  Pulm: CTAB  GI: soft, non-tender,  non-distended  Ext: warm, well perfused, no edema  Skin: warm, dry      Labs:  No visits with results within 3 Month(s) from this visit.   Latest known visit with results is:   Prior Original Records on 11/30/2018   Component Date Value Ref Range Status   • Cholesterol, Total 11/30/2018 210  mg/dL Final   • Triglycerides 11/30/2018 163  mg/dL Final   • HDL Cholesterol 11/30/2018 68  mg/dL Final   • LDL Cholesterol 11/30/2018 109  mg/dL Final   • Non-HDL Cholesterol 11/30/2018 142  mg/dL Final   • SGOT (AST) 11/30/2018 38  IU/L Final   • SGPT (ALT) 11/30/2018 42  IU/L Final   • Bilirubin Total 11/30/2018 0.6  mg/dL Final   • Protein, Total 11/30/2018 7.0  g/dL Final   • BUN 11/30/2018 26  mg/dL Final   • Creatinine, Serum 11/30/2018 1.59  mg/dL Final   • Glucose 11/30/2018 88  mg/dL Final   • Potassium, Serum 11/30/2018 4.6  mEq/L Final   • Sodium 11/30/2018 134  mEq/L Final   • Chloride 11/30/2018 98  mEq/L Final   • Calcium 11/30/2018 8.9  mg/dL Final   • Albumin 11/30/2018 3.5  g/dL Final   • Globulin 11/30/2018 3.5  g/dL Final   • Alkaline Phosphatate(Alk Phos) 11/30/2018 90  20 - 140 IU/L Final   ]        Data:        Diagnosis:  Patient Active Problem List   Diagnosis   • Abnormal EKG   • Asthma   • Atrial fibrillation (CMS/HCC)   • Benign essential hypertension   • Pure hypercholesterolemia   • Other chest pain   • Palpitations   • Precordial pain       Antoni Desai MD   No

## 2019-12-18 NOTE — CONSULT NOTE ADULT - ATTENDING COMMENTS
Current Issues:  K63.1 Perforated bowel   - S/P wang's  - HD stable breathing comfortably  C90.00 Multiple myeloma, remission status unspecified   - to resume home meds when cleared by surgical team  D64.9 Anemia, unspecified type   - no evidence of clinically significant bleeding on exam  Z91.89 At risk for malnutrition   - NPO awaiting return of bowel function
Provider pre-printed instructions given

## 2020-01-02 NOTE — DISCHARGE NOTE ADULT - CARE PLAN
Spoke to pt - normal CXR. Will scan into chart.    Goal:	a/p exploratory lap with sigmoid resection and end colostomy  Assessment and plan of treatment:	WOUND CARE:  Please keep incisions clean and dry. Please do not Scrub or rub incisions. Do not use lotion or powder on incisions.   BATHING: You may shower and/or sponge bathe. You may use warm soapy water in the shower and rinse, pat dry.  ACTIVITY: No heavy lifting or straining. Otherwise, you may return to your usual level of physical activity. If you are taking narcotic pain medication DO NOT drive a car, operate machinery or make important decisions.  DIET: Return to your usual diet.  NOTIFY YOUR SURGEON IF: You have any bleeding that does not stop, any pus draining from your wound(s), any fever (over 100.4 F) persistent nausea/vomiting, or if your pain is not controlled on your discharge pain medications, unable to urinate.  Please follow up with your primary care physician in one week regarding your hospitalization, bring copies of your discharge paperwork.  Please follow up with your surgeon, Dr. Alcaraz Goal:	s/p exploratory lap with sigmoid resection and end colostomy  Assessment and plan of treatment:	WOUND CARE:  Please keep incisions clean and dry. Please do not Scrub or rub incisions. Do not use lotion or powder on incisions.   BATHING: You may shower and/or sponge bathe. You may use warm soapy water in the shower and rinse, pat dry.  ACTIVITY: No heavy lifting or straining. Otherwise, you may return to your usual level of physical activity. If you are taking narcotic pain medication DO NOT drive a car, operate machinery or make important decisions.  DIET: Return to your usual diet.  NOTIFY YOUR SURGEON IF: You have any bleeding that does not stop, any pus draining from your wound(s), any fever (over 100.4 F) persistent nausea/vomiting, or if your pain is not controlled on your discharge pain medications, unable to urinate.  Please follow up with your primary care physician in one week regarding your hospitalization, bring copies of your discharge paperwork.  Please follow up with your surgeon, Dr. Alcaraz

## 2020-03-06 NOTE — PROVIDER CONTACT NOTE (OTHER) - RECOMMENDATIONS
Patient : Dennys Caal Age: 14 year old Sex: female   MRN: [id number] Encounter Date: 2/28/2020      History     Chief Complaint   Patient presents with   • Back Injury     14 year old F Dennys Caal presents to the emergency department for evaluation of low back pain and tailbone pain.  Patient states she fell from her snowboard today.  She has fallen before and has had some issues with tailbone pain previously but today the pain was much more severe and radiating up toward the back as well.  She is having trouble sitting or is standing due to severe pain.  She did not take anything for the pain prior to arrival  No head injury or LOC, no neck pain.          No Known Allergies    Discharge Medication List as of 2/28/2020 10:46 PM      Prior to Admission Medications    Details   amphetamine-dextroamphetamine XR (ADDERALL XR) 10 MG 24 hr capsule Take 1 capsule by mouth every morning.Normal, Disp-30 capsule, R-0      drospirenone-ethinyl estradiol (AMMY) 3-0.02 MG per tablet Take 1 tablet by mouth daily.Normal, Disp-28 tablet, R-12      ondansetron (ZOFRAN) 4 MG tablet Take 1 tablet by mouth every 8 hours as needed for Nausea.Eprescribe, Disp-20 tablet, R-0      custom magic mouthwash oral suspension Swish and swallow 5 mLs 4 times daily (before meals and nightly). 30 mL Maalox, 30 mL Benadryl, 30 mL viscous lidocaineEprescribe, Disp-90 mL, R-0             Discharge Medication List as of 2/28/2020 10:46 PM          No past medical history on file.    No past surgical history on file.    No family history on file.    Social History     Tobacco Use   • Smoking status: Never Smoker   • Smokeless tobacco: Never Used   Substance Use Topics   • Alcohol use: Not on file   • Drug use: Not on file       Review of Systems   Constitutional: Positive for activity change. Negative for fever.   HENT: Negative for trouble swallowing.    Respiratory: Negative for cough and shortness of breath.    Cardiovascular: Negative for chest  pain.   Gastrointestinal: Negative for abdominal pain and vomiting.   Genitourinary: Negative for difficulty urinating and flank pain.   Musculoskeletal: Positive for back pain. Negative for gait problem and neck pain.   Skin: Negative for color change and rash.   Neurological: Negative for dizziness and numbness.       Physical Exam     ED Triage Vitals [02/28/20 2203]   ED Triage Vitals Group      Temp 98.1 °F (36.7 °C)      Heart Rate 91      Resp 20      BP (!) 128/94      SpO2 99 %      EtCO2 mmHg       Height 5' 10\" (1.778 m)      Weight 150 lb (68 kg)      Weight Scale Used Estimate      BMI (Calculated) 21.52      IBW/kg (Calculated) 68.5       Physical Exam   Constitutional: She is oriented to person, place, and time. She appears well-developed and well-nourished. No distress.   HENT:   Head: Normocephalic and atraumatic.   Cardiovascular: Normal rate and intact distal pulses.   Pulmonary/Chest: Effort normal. No respiratory distress.   Abdominal: Soft. She exhibits no distension. There is no abdominal tenderness.   Musculoskeletal: Normal range of motion.      Lumbar back: She exhibits tenderness, bony tenderness and pain.        Back:    Neurological: She is alert and oriented to person, place, and time. She has normal strength. No cranial nerve deficit or sensory deficit. GCS eye subscore is 4. GCS verbal subscore is 5. GCS motor subscore is 6.   Nursing note and vitals reviewed.      ED Course     Procedures    Lab Results     No results found for this visit on 02/28/20.    EKG Results       Radiology Results     Imaging Results          XR Sacrum and Coccyx 2 + View (Final result)  Result time 02/28/20 22:38:44    Final result                 Impression:    FINDINGS/IMPRESSION: Normal sacrum and coccyx.                 Narrative:    EXAM: XR SACRUM AND COCCYX 2 + VW    INDICATION: fall, pain    COMPARISON: None.                                ED Medication Orders (From admission, onward)    Ordered  Start     Status Ordering Provider    02/28/20 2212 02/28/20 2213  ibuprofen (MOTRIN) tablet 600 mg  ONCE      Last MAR action:  Given JUAQUIN FULLER               MDM   patient tr ibuprofen orally x-rays obtained and are negative.  Patient is ambulatory.  Recommend RICE     Clinical Impression     ED Diagnosis   1. Fall from snowboard, initial encounter     2. Coccygeal injury, initial encounter         Disposition        Discharge 2/28/2020 10:44 PM  Dennys Caal discharge to home/self care.      ED Course/MDM:    Diagnosis  The primary encounter diagnosis was Fall from snowboard, initial encounter. A diagnosis of Coccygeal injury, initial encounter was also pertinent to this visit.    Follow Up:  Jackelin Lagos MD  [id number] [facility] [id number]  [phone]             Discharge Medication List as of 2/28/2020 10:46 PM          Disposition:  Discharge 2/28/2020 10:44 PM  Dennys Caal discharge to home/self care.             Juaquin Fuller MD  03/06/20 1858     tylenol

## 2020-04-24 NOTE — PROVIDER CONTACT NOTE (OTHER) - REASON
Kanika from [place] [address] called back with Dr Gunner Pedroza and connected with Dr Kely Hong  04/24/20  blood sugar 178

## 2021-01-02 NOTE — DISCHARGE NOTE PROVIDER - CARE PROVIDERS DIRECT ADDRESSES
Anesthesia Evaluation     Patient summary reviewed and Nursing notes reviewed   no history of anesthetic complications:  NPO Solid Status: > 8 hours  NPO Liquid Status: > 2 hours           Airway   Mallampati: II  TM distance: >3 FB  Neck ROM: full  No difficulty expected  Dental - normal exam     Pulmonary     breath sounds clear to auscultation  Cardiovascular   Exercise tolerance: good (4-7 METS)    Rhythm: regular  Rate: normal        Neuro/Psych  GI/Hepatic/Renal/Endo    (+) morbid obesity,      Musculoskeletal     Abdominal    Substance History      OB/GYN    (+) Pregnant,         Other                        Anesthesia Plan    ASA 3     spinal   (39w2d)    Anesthetic plan, all risks, benefits, and alternatives have been provided, discussed and informed consent has been obtained with: patient.    Plan discussed with CRNA.      
,DirectAddress_Unknown

## 2021-02-02 NOTE — PATIENT PROFILE ADULT - NSPROEDAABILITYLEARN_GEN_A_NUR
Group Therapy Note    Date: 2/2/2021    Group Start Time: 5708  Group End Time: 1600    Number of Participants: 7/8    Type: Exercise/Recreation Group    Group Topic/Objective: Chair Exercises    Notes:  Pt completed majority of exercises. Pt would at times loose focus and would need a couple of minutes to refocus.       Status After Intervention:  Improved    Participation Level: Interactive    Participation Quality: Attentive    Speech:  normal    Thought Process/Content: Confused    Affective Functioning: Congruent    Mood: Bright    Level of consciousness:  Alert    Response to Learning: Able to verbalize current knowledge/experience and Able to verbalize/acknowledge new learning    Endings: None Reported    Modes of Intervention: Activity and Movement    Discipline Responsible: Certified Therapeutic Recreation Specialist     Electronically signed by JIM Farrell MA on 2/3/2021 at 10:10 AM none

## 2021-02-24 NOTE — ED PROVIDER NOTE - DATE/TIME 2
Rapid response called for patient due to hypotension and shortness of breath. MD notified, 250 bolus given. MD advised transfer to ICU. Report given to oncoming ICU nurse. Patient belongings delivered with patient.     02-Sep-2018 21:41

## 2021-03-15 NOTE — ED ADULT NURSE NOTE - CHIEF COMPLAINT QUOTE
Pt c/o chest pain, neck pain, B/L lower extrem pain x 2 days. Was d/c 2 days for low platelets. Hx: Multiple myeloma, HTN, cardiomyopathy. Denies fever, vomiting, dizziness.
no

## 2021-07-02 NOTE — PROVIDER CONTACT NOTE (OTHER) - ASSESSMENT
PICC Team Education    PT was provided the written \"Midline Patient Guide\" and \"Preventing Infection: Fact Sheet for Patient and Family Members\".   Education materials include:   • Explanation of the procedure,   • Signs and symptoms of infection,   • How to prevent infection,   • The importance of monitoring for infection, and   • The importance of promptly reporting any signs and symptoms to the nurse.  Follow Midline maintenance orders.    oJsie Torres RN    [facility] PICC Team  [phone] Contact number   Pt is warm to touch, ice packs applied Pt is warm to touch, tachycardic and tachypneic

## 2022-07-26 NOTE — ED ADULT NURSE NOTE - PRIMARY CARE PROVIDER
pmd Minocycline Counseling: Patient advised regarding possible photosensitivity and discoloration of the teeth, skin, lips, tongue and gums.  Patient instructed to avoid sunlight, if possible.  When exposed to sunlight, patients should wear protective clothing, sunglasses, and sunscreen.  The patient was instructed to call the office immediately if the following severe adverse effects occur:  hearing changes, easy bruising/bleeding, severe headache, or vision changes.  The patient verbalized understanding of the proper use and possible adverse effects of minocycline.  All of the patient's questions and concerns were addressed.

## 2022-11-18 NOTE — ED PROVIDER NOTE - CROS ED RESP ALL NEG
I called Casey Lyn on 11/18 to remind him to come in on Wed for his injection. We will be closed on Thurs for Thanksgiving and Friday. - - -

## 2023-01-16 NOTE — H&P ADULT - HISTORY OF PRESENT ILLNESS
53M hx of multiple myeloma s/p bone marrow transplant (~1.5 yrs ago) and chemo/RT (stopped ~2 months ago in preparation for colostomy reversal), perforated diverticulitis s/p Kiah's (proctosigmoidectomy with colostomy, 9/2018), nonischemic cardiomyopathy (mild global LV systolic dysfunction with EF 54% and diastolic LV dysfunction based on TTE in 11/2018), and HTN presents to [facility] ED c/o diffuse body pain. Patient recently discharged 3/23/19 from [facility] after being hospitalized for thrombocytopenia.     Patient started to develop severe non-radiating throbbing 10/10 pain in his b/l shoulders, chest, abdomen, and b/l thighs since Sunday night - leaving him essentially bedbound. Was supposed to see Dr. Murphy for follow up today. However because of the pain he came to the ED
PAST MEDICAL HISTORY:  No pertinent past medical history

## 2023-03-21 NOTE — PROGRESS NOTE ADULT - ASSESSMENT
Pt called i and stated that kofi wanted to see her in office. Pt was offered 03/27 with luis daniel. Pt stated that kfoi wanted to see her this week. Pt stated that she does not want to have two separate bills for device check and physician check. Pt wants to know if she can see kofi this week. Please advise. Problem/Plan:  Severe C.diff colitis:      - po vanco, iv flagyl, monitor stool count      - adjust abx per ID/GI    Problem/Plan - :  ·  Problem: Pancytopenia with neutropenic fever 2/2 acute tonsillitis and esophageal candidiasis complicated by symptomatic anemia requiring prbc transfusions:      persistent           - persistent         - plt and prbc transfusion         - monitor for signs of bleeding         - micu eval    Problem/Plan:  Metabolic Encephalopathy:        - persistent. optimize lytes and treat underlying infectious state         - monitor mental status     Acute on Chronic Systolic CHF Exacerbation:       - with pulmonary edema/effusions        - UF HD       - daily weight, strict i/o, c/w cardiac meds, adjust     Problem/Plan:  Problem: Acute Renal Failure, 2/2 ATN:      - new Hemodialysis       - IR cath       - c/w HD per nephro recs      - renally adjust rx/abx       - additional management per consultnats    Problem/Plan:      - Metabolic Acidosis:           - 2/2 ATN           - HD           - correct infectious abnormalities and lab abnormalities    Problem/Plan -:  ·  Problem: Multiple myeloma in relapse.  Plan: MM in relapse s/p bone marrow transplant (~1.5 yrs ago) and chemo/RT. Last infusion Bendamustine and Pomalidomide 4/08/2019        Continue Current medications and monitor labs closely        Hem/onc mgmt    Problem/Plan -:Acute Tonsilitis and Candida Esophagitis       -continue abx per ID       -supportive care prn

## 2023-03-23 NOTE — ED ADULT TRIAGE NOTE - NS ED NOTE AC HIGH RISK COUNTRIES
Procedure:  COLONOSCOPY    Relevant Problems   CARDIO   (+) Mixed hyperlipidemia      Smoking Status: Never   Smokeless Tobacco Status: Never   Alcohol use: Yes, unspecified volume   Comments: social    Drug use: Not Asked         Physical Exam    Airway    Mallampati score: II  TM Distance: >3 FB  Neck ROM: full     Dental   No notable dental hx     Cardiovascular  Rhythm: regular, Rate: normal,     Pulmonary  Breath sounds clear to auscultation,     Other Findings  Intercisor Distance > 3cm          Anesthesia Plan  ASA Score- 2     Anesthesia Type- IV sedation with anesthesia with ASA Monitors  Additional Monitors:   Airway Plan:     Comment: NPO appropriate  Discussed benefits/risks of monitored anesthetic care which involves providing a dynamic level of mild to deep sedation  Complications include awareness and/or airway obstruction/aspiration which may necessitate conversion to general anesthesia  All questions answered  Patient understands and wishes to proceed          Plan Factors-Exercise tolerance (METS): >4 METS  Chart reviewed  EKG reviewed  Existing labs reviewed  Induction-     Postoperative Plan- Plan for postoperative opioid use  Informed Consent- Anesthetic plan and risks discussed with patient  I personally reviewed this patient with the CRNA  Discussed and agreed on the Anesthesia Plan with the CRNA  Garfield Pedersen No

## 2024-06-18 NOTE — ED PROVIDER NOTE - CONSTITUTIONAL MANNER
"Subjective:      Patient ID: Claude A Stevens is a 49 y.o. male.    Vitals:  height is 5' 9" (1.753 m) and weight is 122.5 kg (270 lb). His oral temperature is 99.1 °F (37.3 °C). His blood pressure is 145/94 (abnormal) and his pulse is 64. His respiration is 16 (pended) and oxygen saturation is 96%.     Chief Complaint: Migraine    Headache started Friday into Saturday.  Patient was here in his clinic on Sunday and prescribed Fioricet.  He reports this did not alleviate the headache so later that night he went to the ER.  A CT was done with no acute abnormality patient was given several IV medications including Toradol and Benadryl and sent for follow up with primary care.  Patient reports none of this has broke in the headache.  He reports pressure in his sinuses and behind his eye and into the right ear.  As well as scalp tingling on the right side of his head.  Patient was concerned for a sinus infection or infection behind the eye.  He is on ophthalmologist today who cleared him of this being related to eyes.  Denies dental pain or possibility of dental infection.    Migraine   This is a new problem. The current episode started in the past 7 days. The problem occurs daily. The problem has been unchanged. The pain radiates to the right neck. The pain quality is not similar to prior headaches. The quality of the pain is described as aching and squeezing. The pain is at a severity of 8/10. The pain is moderate. Associated symptoms include ear pain and sinus pressure. Pertinent negatives include no dizziness, fever or loss of balance. The symptoms are aggravated by noise. He has tried acetaminophen for the symptoms. The treatment provided no relief.       Constitution: Negative for chills, fatigue and fever.   HENT:  Positive for ear pain, congestion, sinus pain and sinus pressure.    Neck: neck negative.   Cardiovascular: Negative.    Respiratory: Negative.     Gastrointestinal: Negative.    Musculoskeletal: " Negative.    Skin: Negative.    Neurological:  Positive for headaches and tingling (scalp right side). Negative for dizziness, light-headedness and loss of balance.   Psychiatric/Behavioral: Negative.        Objective:     Physical Exam   Constitutional: He is oriented to person, place, and time. He appears well-developed. He is cooperative.  Non-toxic appearance. He does not appear ill. No distress.   HENT:   Head: Normocephalic and atraumatic.   Ears:   Right Ear: Hearing, external ear and ear canal normal. Tympanic membrane is erythematous.   Left Ear: Hearing, tympanic membrane, external ear and ear canal normal.   Nose: Congestion present. No mucosal edema or nasal deformity. No epistaxis. Right sinus exhibits no maxillary sinus tenderness and no frontal sinus tenderness. Left sinus exhibits no maxillary sinus tenderness and no frontal sinus tenderness.   Mouth/Throat: Uvula is midline, oropharynx is clear and moist and mucous membranes are normal. Mucous membranes are moist. No trismus in the jaw. Normal dentition. No uvula swelling. No posterior oropharyngeal edema.   Eyes: Conjunctivae and lids are normal. No scleral icterus.   Neck: Trachea normal and phonation normal. Neck supple. No edema present. No erythema present. No neck rigidity present.   Cardiovascular: Normal rate.   Pulmonary/Chest: Effort normal. No respiratory distress. He has no decreased breath sounds.   Abdominal: Normal appearance.   Musculoskeletal: Normal range of motion.         General: No deformity. Normal range of motion.   Neurological: He is alert and oriented to person, place, and time. He displays no weakness. He exhibits normal muscle tone. Coordination normal.   Skin: Skin is warm, dry, intact, not diaphoretic and not pale.   Psychiatric: His speech is normal and behavior is normal. Mood, judgment and thought content normal.   Nursing note and vitals reviewed.      Assessment:     1. Intractable migraine without aura and  without status migrainosus    2. Acute non-recurrent maxillary sinusitis    3. Sinus pain        Plan:       Intractable migraine without aura and without status migrainosus  -     dexAMETHasone injection 4 mg  -     ketorolac injection 30 mg  -     sumatriptan (IMITREX) 50 MG tablet; Take 1 tablet (50 mg total) by mouth 2 (two) times daily as needed for Migraine.  Dispense: 4 tablet; Refill: 0    Acute non-recurrent maxillary sinusitis  -     amoxicillin-clavulanate 875-125mg (AUGMENTIN) 875-125 mg per tablet; Take 1 tablet by mouth every 12 (twelve) hours. for 5 days  Dispense: 10 tablet; Refill: 0    Sinus pain      Discussed medication with patient who acknowledges understanding and is agreeable to POC. Follow up with primary care. Increase fluid intake. Red flags for ER discussed.                 appropriate for situation

## 2024-10-23 NOTE — PATIENT PROFILE ADULT - DO YOU FEEL THREATENED BY OTHERS?
Bed in lowest position, wheels locked, appropriate side rails in place/Call bell, personal items and telephone in reach/Instruct patient to call for assistance before getting out of bed or chair/Non-slip footwear when patient is out of bed/[place] to call system/Physically safe environment - no spills, clutter or unnecessary equipment/Purposeful Proactive Rounding/Room/bathroom lighting operational, light cord in reach Compliant on CPAP no

## 2025-04-29 NOTE — PATIENT PROFILE ADULT. - --S/S CONSISTENT WITH FOLEY CATHETER ASSOCIATED UTI
Encourage compliance with atorvastatin 40 mg daily, LDL goal <70, if not at goal recommend addition of Zetia.  Continue lifestyle intervention.      no

## 2025-07-01 NOTE — DISCHARGE NOTE NURSING/CASE MANAGEMENT/SOCIAL WORK - FLU SEASON?
2nd message left for patient  
Letter sent  
Patient needs an OV-no BMI on referral.  Left a message for patient to call to schedule NP OV  
Pt called and scheduled office visit for 8/11/25 with Rhea Alarcon. Appt reminder/map mailed to patient per his request. Letter from Iván Rangel shredded due to pt scheduling.  
[facility].  Can you please review chart for Endo/OR, meds to hold and prep?  
Yes...